# Patient Record
Sex: MALE | Race: BLACK OR AFRICAN AMERICAN | Employment: OTHER | URBAN - NONMETROPOLITAN AREA
[De-identification: names, ages, dates, MRNs, and addresses within clinical notes are randomized per-mention and may not be internally consistent; named-entity substitution may affect disease eponyms.]

---

## 2018-01-16 ENCOUNTER — HOSPITAL ENCOUNTER (OUTPATIENT)
Age: 48
Discharge: HOME | End: 2018-01-16
Payer: MEDICARE

## 2018-01-16 ENCOUNTER — HOSPITAL ENCOUNTER (OUTPATIENT)
Dept: CT IMAGING | Age: 48
Discharge: HOME | End: 2018-01-16
Payer: MEDICARE

## 2018-01-16 DIAGNOSIS — J44.9 CHRONIC OBSTRUCTIVE PULMONARY DISEASE, UNSPECIFIED COPD TYPE (HCC): ICD-10-CM

## 2018-01-16 DIAGNOSIS — R04.2 HEMOPTYSIS: ICD-10-CM

## 2018-01-16 LAB
ALBUMIN SERPL-MCNC: 3.9 G/DL (ref 3.5–5.1)
ALP BLD-CCNC: 73 U/L (ref 38–126)
ALT SERPL-CCNC: 11 U/L (ref 11–66)
ANION GAP SERPL CALCULATED.3IONS-SCNC: 14 MEQ/L (ref 8–16)
ANISOCYTOSIS: ABNORMAL
AST SERPL-CCNC: 13 U/L (ref 5–40)
BASOPHILS # BLD: 0.8 %
BASOPHILS ABSOLUTE: 0 THOU/MM3 (ref 0–0.1)
BILIRUB SERPL-MCNC: 0.2 MG/DL (ref 0.3–1.2)
BUN BLDV-MCNC: 9 MG/DL (ref 7–22)
CALCIUM SERPL-MCNC: 9.2 MG/DL (ref 8.5–10.5)
CHLORIDE BLD-SCNC: 102 MEQ/L (ref 98–111)
CHOLESTEROL, TOTAL: 168 MG/DL (ref 100–199)
CO2: 27 MEQ/L (ref 23–33)
CREAT SERPL-MCNC: 0.7 MG/DL (ref 0.4–1.2)
EOSINOPHIL # BLD: 1.8 %
EOSINOPHILS ABSOLUTE: 0.1 THOU/MM3 (ref 0–0.4)
GFR SERPL CREATININE-BSD FRML MDRD: > 90 ML/MIN/1.73M2
GLUCOSE BLD-MCNC: 88 MG/DL (ref 70–108)
HCT VFR BLD CALC: 48.2 % (ref 42–52)
HDLC SERPL-MCNC: 44 MG/DL
HEMOGLOBIN: 16.2 GM/DL (ref 14–18)
LDL CHOLESTEROL CALCULATED: 111 MG/DL
LYMPHOCYTES # BLD: 34.4 %
LYMPHOCYTES ABSOLUTE: 1.9 THOU/MM3 (ref 1–4.8)
MCH RBC QN AUTO: 31 PG (ref 27–31)
MCHC RBC AUTO-ENTMCNC: 33.6 GM/DL (ref 33–37)
MCV RBC AUTO: 92 FL (ref 80–94)
MONOCYTES # BLD: 8.5 %
MONOCYTES ABSOLUTE: 0.5 THOU/MM3 (ref 0.4–1.3)
NUCLEATED RED BLOOD CELLS: 0 /100 WBC
PDW BLD-RTO: 14.6 % (ref 11.5–14.5)
PLATELET # BLD: 179 THOU/MM3 (ref 130–400)
PMV BLD AUTO: 8.7 MCM (ref 7.4–10.4)
POTASSIUM SERPL-SCNC: 4.8 MEQ/L (ref 3.5–5.2)
PROSTATE SPECIFIC ANTIGEN: 1.63 NG/ML (ref 0–1)
RBC # BLD: 5.24 MILL/MM3 (ref 4.7–6.1)
SEG NEUTROPHILS: 54.5 %
SEGMENTED NEUTROPHILS ABSOLUTE COUNT: 2.9 THOU/MM3 (ref 1.8–7.7)
SODIUM BLD-SCNC: 143 MEQ/L (ref 135–145)
TOTAL PROTEIN: 8.1 G/DL (ref 6.1–8)
TRIGL SERPL-MCNC: 64 MG/DL (ref 0–199)
WBC # BLD: 5.4 THOU/MM3 (ref 4.8–10.8)

## 2018-01-16 PROCEDURE — 85025 COMPLETE CBC W/AUTO DIFF WBC: CPT

## 2018-01-16 PROCEDURE — 6360000004 HC RX CONTRAST MEDICATION: Performed by: INTERNAL MEDICINE

## 2018-01-16 PROCEDURE — 36415 COLL VENOUS BLD VENIPUNCTURE: CPT

## 2018-01-16 PROCEDURE — 80053 COMPREHEN METABOLIC PANEL: CPT

## 2018-01-16 PROCEDURE — G0103 PSA SCREENING: HCPCS

## 2018-01-16 PROCEDURE — 80061 LIPID PANEL: CPT

## 2018-01-16 PROCEDURE — 71260 CT THORAX DX C+: CPT

## 2018-01-16 RX ADMIN — IOPAMIDOL 85 ML: 755 INJECTION, SOLUTION INTRAVENOUS at 14:51

## 2019-03-21 ENCOUNTER — HOSPITAL ENCOUNTER (OUTPATIENT)
Age: 49
Discharge: HOME | End: 2019-03-21
Payer: MEDICARE

## 2019-03-21 LAB
ALBUMIN SERPL-MCNC: 3.9 G/DL (ref 3.5–5.1)
ALP BLD-CCNC: 80 U/L (ref 38–126)
ALT SERPL-CCNC: 15 U/L (ref 11–66)
ANION GAP SERPL CALCULATED.3IONS-SCNC: 9 MEQ/L (ref 8–16)
AST SERPL-CCNC: 17 U/L (ref 5–40)
BACTERIA: ABNORMAL
BASOPHILS # BLD: 0.5 %
BASOPHILS ABSOLUTE: 0 THOU/MM3 (ref 0–0.1)
BILIRUB SERPL-MCNC: 0.5 MG/DL (ref 0.3–1.2)
BILIRUBIN URINE: NEGATIVE
BLOOD, URINE: NEGATIVE
BUN BLDV-MCNC: 4 MG/DL (ref 7–22)
CALCIUM SERPL-MCNC: 8.9 MG/DL (ref 8.5–10.5)
CASTS: ABNORMAL /LPF
CASTS: ABNORMAL /LPF
CHARACTER, URINE: CLEAR
CHLORIDE BLD-SCNC: 101 MEQ/L (ref 98–111)
CHOLESTEROL, TOTAL: 132 MG/DL (ref 100–199)
CO2: 30 MEQ/L (ref 23–33)
COLOR: YELLOW
CREAT SERPL-MCNC: 0.7 MG/DL (ref 0.4–1.2)
CRYSTALS: ABNORMAL
EOSINOPHIL # BLD: 0.8 %
EOSINOPHILS ABSOLUTE: 0.1 THOU/MM3 (ref 0–0.4)
EPITHELIAL CELLS, UA: ABNORMAL /HPF
ERYTHROCYTE [DISTWIDTH] IN BLOOD BY AUTOMATED COUNT: 16.5 % (ref 11.5–14.5)
ERYTHROCYTE [DISTWIDTH] IN BLOOD BY AUTOMATED COUNT: 55.2 FL (ref 35–45)
GFR SERPL CREATININE-BSD FRML MDRD: > 90 ML/MIN/1.73M2
GLUCOSE BLD-MCNC: 78 MG/DL (ref 70–108)
GLUCOSE, URINE: NEGATIVE MG/DL
HCT VFR BLD CALC: 55.7 % (ref 42–52)
HDLC SERPL-MCNC: 41 MG/DL
HEMOGLOBIN: 18 GM/DL (ref 14–18)
IMMATURE GRANS (ABS): 0.02 THOU/MM3 (ref 0–0.07)
IMMATURE GRANULOCYTES: 0.3 %
KETONES, URINE: NEGATIVE
LDL CHOLESTEROL CALCULATED: 79 MG/DL
LEUKOCYTE EST, POC: ABNORMAL
LYMPHOCYTES # BLD: 29.1 %
LYMPHOCYTES ABSOLUTE: 1.9 THOU/MM3 (ref 1–4.8)
MCH RBC QN AUTO: 30.7 PG (ref 26–33)
MCHC RBC AUTO-ENTMCNC: 32.3 GM/DL (ref 32.2–35.5)
MCV RBC AUTO: 95.1 FL (ref 80–94)
MISCELLANEOUS LAB TEST RESULT: ABNORMAL
MONOCYTES # BLD: 7.9 %
MONOCYTES ABSOLUTE: 0.5 THOU/MM3 (ref 0.4–1.3)
NITRITE, URINE: NEGATIVE
NUCLEATED RED BLOOD CELLS: 0 /100 WBC
PH UA: 7.5 (ref 5–9)
PLATELET # BLD: 181 THOU/MM3 (ref 130–400)
PMV BLD AUTO: 10.4 FL (ref 9.4–12.4)
POTASSIUM SERPL-SCNC: 4.3 MEQ/L (ref 3.5–5.2)
PROSTATE SPECIFIC ANTIGEN: 3.13 NG/ML (ref 0–1)
PROTEIN UA: NEGATIVE MG/DL
RBC # BLD: 5.86 MILL/MM3 (ref 4.7–6.1)
RBC URINE: ABNORMAL /HPF
RENAL EPITHELIAL, UA: ABNORMAL
SEG NEUTROPHILS: 61.4 %
SEGMENTED NEUTROPHILS ABSOLUTE COUNT: 3.9 THOU/MM3 (ref 1.8–7.7)
SODIUM BLD-SCNC: 140 MEQ/L (ref 135–145)
SPECIFIC GRAVITY UA: 1.01 (ref 1–1.03)
TOTAL PROTEIN: 8.3 G/DL (ref 6.1–8)
TRIGL SERPL-MCNC: 58 MG/DL (ref 0–199)
TSH SERPL DL<=0.05 MIU/L-ACNC: 0.91 UIU/ML (ref 0.4–4.2)
UROBILINOGEN, URINE: 0.2 EU/DL (ref 0–1)
WBC # BLD: 6.4 THOU/MM3 (ref 4.8–10.8)
WBC UA: ABNORMAL /HPF
YEAST: ABNORMAL

## 2019-03-21 PROCEDURE — 80053 COMPREHEN METABOLIC PANEL: CPT

## 2019-03-21 PROCEDURE — 85025 COMPLETE CBC W/AUTO DIFF WBC: CPT

## 2019-03-21 PROCEDURE — 84443 ASSAY THYROID STIM HORMONE: CPT

## 2019-03-21 PROCEDURE — 81001 URINALYSIS AUTO W/SCOPE: CPT

## 2019-03-21 PROCEDURE — 36415 COLL VENOUS BLD VENIPUNCTURE: CPT

## 2019-03-21 PROCEDURE — 80061 LIPID PANEL: CPT

## 2019-03-21 PROCEDURE — G0103 PSA SCREENING: HCPCS

## 2019-03-30 ENCOUNTER — APPOINTMENT (OUTPATIENT)
Dept: GENERAL RADIOLOGY | Age: 49
DRG: 189 | End: 2019-03-30
Payer: MEDICARE

## 2019-03-30 ENCOUNTER — HOSPITAL ENCOUNTER (INPATIENT)
Age: 49
LOS: 5 days | Discharge: HOME | DRG: 189 | End: 2019-04-04
Attending: FAMILY MEDICINE | Admitting: INTERNAL MEDICINE
Payer: MEDICARE

## 2019-03-30 DIAGNOSIS — J44.1 COPD EXACERBATION (HCC): ICD-10-CM

## 2019-03-30 DIAGNOSIS — J96.22 ACUTE ON CHRONIC RESPIRATORY FAILURE WITH HYPERCAPNIA (HCC): ICD-10-CM

## 2019-03-30 DIAGNOSIS — R06.89 RESPIRATORY INSUFFICIENCY/FAILURE: Primary | ICD-10-CM

## 2019-03-30 LAB
ALBUMIN SERPL-MCNC: 3.7 G/DL (ref 3.5–5.1)
ALLEN TEST: POSITIVE
ALP BLD-CCNC: 107 U/L (ref 38–126)
ALT SERPL-CCNC: 17 U/L (ref 11–66)
ANION GAP SERPL CALCULATED.3IONS-SCNC: 15 MEQ/L (ref 8–16)
AST SERPL-CCNC: 20 U/L (ref 5–40)
BASE EXCESS (CALCULATED): -0.5 MMOL/L (ref -2.5–2.5)
BASE EXCESS (CALCULATED): -1.6 MMOL/L (ref -2.5–2.5)
BASE EXCESS (CALCULATED): 0.6 MMOL/L (ref -2.5–2.5)
BASOPHILS # BLD: 0.8 %
BASOPHILS ABSOLUTE: 0.1 THOU/MM3 (ref 0–0.1)
BILIRUB SERPL-MCNC: 0.5 MG/DL (ref 0.3–1.2)
BILIRUBIN DIRECT: < 0.2 MG/DL (ref 0–0.3)
BUN BLDV-MCNC: 16 MG/DL (ref 7–22)
CALCIUM SERPL-MCNC: 8.8 MG/DL (ref 8.5–10.5)
CHLORIDE BLD-SCNC: 99 MEQ/L (ref 98–111)
CO2: 25 MEQ/L (ref 23–33)
COLLECTED BY:: ABNORMAL
CREAT SERPL-MCNC: 0.6 MG/DL (ref 0.4–1.2)
DEVICE: ABNORMAL
EKG ATRIAL RATE: 98 BPM
EKG P AXIS: 77 DEGREES
EKG P-R INTERVAL: 148 MS
EKG Q-T INTERVAL: 348 MS
EKG QRS DURATION: 90 MS
EKG QTC CALCULATION (BAZETT): 444 MS
EKG R AXIS: 105 DEGREES
EKG T AXIS: 29 DEGREES
EKG VENTRICULAR RATE: 98 BPM
EOSINOPHIL # BLD: 0 %
EOSINOPHILS ABSOLUTE: 0 THOU/MM3 (ref 0–0.4)
ERYTHROCYTE [DISTWIDTH] IN BLOOD BY AUTOMATED COUNT: 16 % (ref 11.5–14.5)
ERYTHROCYTE [DISTWIDTH] IN BLOOD BY AUTOMATED COUNT: 55.8 FL (ref 35–45)
FLU A ANTIGEN: NEGATIVE
FLU B ANTIGEN: NEGATIVE
GFR SERPL CREATININE-BSD FRML MDRD: > 90 ML/MIN/1.73M2
GLUCOSE BLD-MCNC: 161 MG/DL (ref 70–108)
HCO3: 29 MMOL/L (ref 23–28)
HCO3: 29 MMOL/L (ref 23–28)
HCO3: 31 MMOL/L (ref 23–28)
HCT VFR BLD CALC: 54.5 % (ref 42–52)
HEMOGLOBIN: 17.3 GM/DL (ref 14–18)
IFIO2: 100
IFIO2: 70
IFIO2: 70
IMMATURE GRANS (ABS): 0.04 THOU/MM3 (ref 0–0.07)
IMMATURE GRANULOCYTES: 0.6 %
LACTIC ACID: 1.4 MMOL/L (ref 0.5–2.2)
LACTIC ACID: 3 MMOL/L (ref 0.5–2.2)
LYMPHOCYTES # BLD: 20.2 %
LYMPHOCYTES ABSOLUTE: 1.3 THOU/MM3 (ref 1–4.8)
MAGNESIUM: 2 MG/DL (ref 1.6–2.4)
MCH RBC QN AUTO: 30.6 PG (ref 26–33)
MCHC RBC AUTO-ENTMCNC: 31.7 GM/DL (ref 32.2–35.5)
MCV RBC AUTO: 96.5 FL (ref 80–94)
MONOCYTES # BLD: 19.4 %
MONOCYTES ABSOLUTE: 1.3 THOU/MM3 (ref 0.4–1.3)
NUCLEATED RED BLOOD CELLS: 0 /100 WBC
O2 SATURATION: 93 %
O2 SATURATION: 97 %
O2 SATURATION: 98 %
OSMOLALITY CALCULATION: 282.2 MOSMOL/KG (ref 275–300)
PCO2: 63 MMHG (ref 35–45)
PCO2: 68 MMHG (ref 35–45)
PCO2: 70 MMHG (ref 35–45)
PH BLOOD GAS: 7.23 (ref 7.35–7.45)
PH BLOOD GAS: 7.25 (ref 7.35–7.45)
PH BLOOD GAS: 7.27 (ref 7.35–7.45)
PIP: 26 CMH2O
PLATELET # BLD: 204 THOU/MM3 (ref 130–400)
PMV BLD AUTO: 10 FL (ref 9.4–12.4)
PO2: 111 MMHG (ref 71–104)
PO2: 114 MMHG (ref 71–104)
PO2: 82 MMHG (ref 71–104)
POTASSIUM SERPL-SCNC: 4.4 MEQ/L (ref 3.5–5.2)
PRO-BNP: 2989 PG/ML (ref 0–900)
RBC # BLD: 5.65 MILL/MM3 (ref 4.7–6.1)
SEG NEUTROPHILS: 59 %
SEGMENTED NEUTROPHILS ABSOLUTE COUNT: 3.9 THOU/MM3 (ref 1.8–7.7)
SET PEEP: 6 MMHG
SET PRESS SUPP: 20 CMH2O
SODIUM BLD-SCNC: 139 MEQ/L (ref 135–145)
SOURCE, BLOOD GAS: ABNORMAL
TOTAL PROTEIN: 8.4 G/DL (ref 6.1–8)
TROPONIN T: < 0.01 NG/ML
WBC # BLD: 6.6 THOU/MM3 (ref 4.8–10.8)

## 2019-03-30 PROCEDURE — 96375 TX/PRO/DX INJ NEW DRUG ADDON: CPT

## 2019-03-30 PROCEDURE — 94660 CPAP INITIATION&MGMT: CPT

## 2019-03-30 PROCEDURE — 71045 X-RAY EXAM CHEST 1 VIEW: CPT

## 2019-03-30 PROCEDURE — 80048 BASIC METABOLIC PNL TOTAL CA: CPT

## 2019-03-30 PROCEDURE — 99285 EMERGENCY DEPT VISIT HI MDM: CPT

## 2019-03-30 PROCEDURE — 6370000000 HC RX 637 (ALT 250 FOR IP): Performed by: INTERNAL MEDICINE

## 2019-03-30 PROCEDURE — 2709999900 HC NON-CHARGEABLE SUPPLY

## 2019-03-30 PROCEDURE — 93005 ELECTROCARDIOGRAM TRACING: CPT | Performed by: NURSE PRACTITIONER

## 2019-03-30 PROCEDURE — 84484 ASSAY OF TROPONIN QUANT: CPT

## 2019-03-30 PROCEDURE — 83605 ASSAY OF LACTIC ACID: CPT

## 2019-03-30 PROCEDURE — 87804 INFLUENZA ASSAY W/OPTIC: CPT

## 2019-03-30 PROCEDURE — 94761 N-INVAS EAR/PLS OXIMETRY MLT: CPT

## 2019-03-30 PROCEDURE — 2580000003 HC RX 258: Performed by: INTERNAL MEDICINE

## 2019-03-30 PROCEDURE — 6360000002 HC RX W HCPCS: Performed by: NURSE PRACTITIONER

## 2019-03-30 PROCEDURE — 93010 ELECTROCARDIOGRAM REPORT: CPT | Performed by: INTERNAL MEDICINE

## 2019-03-30 PROCEDURE — 83735 ASSAY OF MAGNESIUM: CPT

## 2019-03-30 PROCEDURE — 6360000002 HC RX W HCPCS: Performed by: INTERNAL MEDICINE

## 2019-03-30 PROCEDURE — 82803 BLOOD GASES ANY COMBINATION: CPT

## 2019-03-30 PROCEDURE — 36600 WITHDRAWAL OF ARTERIAL BLOOD: CPT

## 2019-03-30 PROCEDURE — 96374 THER/PROPH/DIAG INJ IV PUSH: CPT

## 2019-03-30 PROCEDURE — 94640 AIRWAY INHALATION TREATMENT: CPT

## 2019-03-30 PROCEDURE — 99222 1ST HOSP IP/OBS MODERATE 55: CPT | Performed by: INTERNAL MEDICINE

## 2019-03-30 PROCEDURE — 80076 HEPATIC FUNCTION PANEL: CPT

## 2019-03-30 PROCEDURE — 2700000000 HC OXYGEN THERAPY PER DAY

## 2019-03-30 PROCEDURE — 36415 COLL VENOUS BLD VENIPUNCTURE: CPT

## 2019-03-30 PROCEDURE — 2500000003 HC RX 250 WO HCPCS: Performed by: NURSE PRACTITIONER

## 2019-03-30 PROCEDURE — 2060000000 HC ICU INTERMEDIATE R&B

## 2019-03-30 PROCEDURE — 85025 COMPLETE CBC W/AUTO DIFF WBC: CPT

## 2019-03-30 PROCEDURE — 83880 ASSAY OF NATRIURETIC PEPTIDE: CPT

## 2019-03-30 PROCEDURE — 6370000000 HC RX 637 (ALT 250 FOR IP): Performed by: NURSE PRACTITIONER

## 2019-03-30 RX ORDER — ALBUTEROL SULFATE 2.5 MG/3ML
2.5 SOLUTION RESPIRATORY (INHALATION)
Status: DISCONTINUED | OUTPATIENT
Start: 2019-03-30 | End: 2019-04-04 | Stop reason: HOSPADM

## 2019-03-30 RX ORDER — METHYLPREDNISOLONE SODIUM SUCCINATE 125 MG/2ML
125 INJECTION, POWDER, LYOPHILIZED, FOR SOLUTION INTRAMUSCULAR; INTRAVENOUS ONCE
Status: COMPLETED | OUTPATIENT
Start: 2019-03-30 | End: 2019-03-30

## 2019-03-30 RX ORDER — NICOTINE 21 MG/24HR
1 PATCH, TRANSDERMAL 24 HOURS TRANSDERMAL DAILY
Status: DISCONTINUED | OUTPATIENT
Start: 2019-03-30 | End: 2019-04-04 | Stop reason: HOSPADM

## 2019-03-30 RX ORDER — SODIUM CHLORIDE 0.9 % (FLUSH) 0.9 %
10 SYRINGE (ML) INJECTION EVERY 12 HOURS SCHEDULED
Status: DISCONTINUED | OUTPATIENT
Start: 2019-03-30 | End: 2019-04-04 | Stop reason: HOSPADM

## 2019-03-30 RX ORDER — IPRATROPIUM BROMIDE AND ALBUTEROL SULFATE 2.5; .5 MG/3ML; MG/3ML
2 SOLUTION RESPIRATORY (INHALATION) ONCE
Status: COMPLETED | OUTPATIENT
Start: 2019-03-30 | End: 2019-03-30

## 2019-03-30 RX ORDER — SODIUM CHLORIDE 0.9 % (FLUSH) 0.9 %
10 SYRINGE (ML) INJECTION PRN
Status: DISCONTINUED | OUTPATIENT
Start: 2019-03-30 | End: 2019-04-04 | Stop reason: HOSPADM

## 2019-03-30 RX ORDER — TRAZODONE HYDROCHLORIDE 100 MG/1
100 TABLET ORAL NIGHTLY
Status: DISCONTINUED | OUTPATIENT
Start: 2019-03-30 | End: 2019-04-04 | Stop reason: HOSPADM

## 2019-03-30 RX ORDER — AMLODIPINE BESYLATE 10 MG/1
10 TABLET ORAL DAILY
Status: DISCONTINUED | OUTPATIENT
Start: 2019-03-30 | End: 2019-04-04 | Stop reason: HOSPADM

## 2019-03-30 RX ORDER — ONDANSETRON 2 MG/ML
4 INJECTION INTRAMUSCULAR; INTRAVENOUS EVERY 6 HOURS PRN
Status: DISCONTINUED | OUTPATIENT
Start: 2019-03-30 | End: 2019-04-04 | Stop reason: HOSPADM

## 2019-03-30 RX ORDER — ACETAMINOPHEN 325 MG/1
650 TABLET ORAL EVERY 4 HOURS PRN
Status: DISCONTINUED | OUTPATIENT
Start: 2019-03-30 | End: 2019-04-04 | Stop reason: HOSPADM

## 2019-03-30 RX ORDER — IPRATROPIUM BROMIDE AND ALBUTEROL SULFATE 2.5; .5 MG/3ML; MG/3ML
1 SOLUTION RESPIRATORY (INHALATION)
Status: DISCONTINUED | OUTPATIENT
Start: 2019-03-30 | End: 2019-04-04 | Stop reason: HOSPADM

## 2019-03-30 RX ORDER — RISPERIDONE 2 MG/1
2 TABLET, FILM COATED ORAL 2 TIMES DAILY
Status: DISCONTINUED | OUTPATIENT
Start: 2019-03-30 | End: 2019-04-04 | Stop reason: HOSPADM

## 2019-03-30 RX ORDER — BUMETANIDE 0.25 MG/ML
1 INJECTION, SOLUTION INTRAMUSCULAR; INTRAVENOUS ONCE
Status: COMPLETED | OUTPATIENT
Start: 2019-03-30 | End: 2019-03-30

## 2019-03-30 RX ORDER — CARBAMAZEPINE 200 MG/1
200 TABLET ORAL 2 TIMES DAILY
Status: DISCONTINUED | OUTPATIENT
Start: 2019-03-30 | End: 2019-04-04 | Stop reason: HOSPADM

## 2019-03-30 RX ORDER — MAGNESIUM SULFATE IN WATER 40 MG/ML
2 INJECTION, SOLUTION INTRAVENOUS ONCE
Status: DISCONTINUED | OUTPATIENT
Start: 2019-03-30 | End: 2019-03-30

## 2019-03-30 RX ORDER — METOPROLOL SUCCINATE 25 MG/1
25 TABLET, EXTENDED RELEASE ORAL DAILY
Status: DISCONTINUED | OUTPATIENT
Start: 2019-03-30 | End: 2019-04-02

## 2019-03-30 RX ORDER — SODIUM CHLORIDE 9 MG/ML
INJECTION, SOLUTION INTRAVENOUS CONTINUOUS
Status: DISCONTINUED | OUTPATIENT
Start: 2019-03-30 | End: 2019-04-01

## 2019-03-30 RX ORDER — NITROGLYCERIN 20 MG/100ML
10 INJECTION INTRAVENOUS CONTINUOUS
Status: DISCONTINUED | OUTPATIENT
Start: 2019-03-30 | End: 2019-03-30

## 2019-03-30 RX ORDER — METHYLPREDNISOLONE SODIUM SUCCINATE 40 MG/ML
40 INJECTION, POWDER, LYOPHILIZED, FOR SOLUTION INTRAMUSCULAR; INTRAVENOUS EVERY 6 HOURS
Status: COMPLETED | OUTPATIENT
Start: 2019-03-30 | End: 2019-04-01

## 2019-03-30 RX ORDER — PREDNISONE 20 MG/1
40 TABLET ORAL DAILY
Status: COMPLETED | OUTPATIENT
Start: 2019-04-02 | End: 2019-04-03

## 2019-03-30 RX ORDER — IPRATROPIUM BROMIDE AND ALBUTEROL SULFATE 2.5; .5 MG/3ML; MG/3ML
1 SOLUTION RESPIRATORY (INHALATION) ONCE
Status: DISCONTINUED | OUTPATIENT
Start: 2019-03-30 | End: 2019-03-30

## 2019-03-30 RX ORDER — DOXYCYCLINE HYCLATE 100 MG
100 TABLET ORAL EVERY 12 HOURS
Status: DISCONTINUED | OUTPATIENT
Start: 2019-03-30 | End: 2019-04-04 | Stop reason: HOSPADM

## 2019-03-30 RX ADMIN — IPRATROPIUM BROMIDE AND ALBUTEROL SULFATE 1 AMPULE: .5; 3 SOLUTION RESPIRATORY (INHALATION) at 20:26

## 2019-03-30 RX ADMIN — METHYLPREDNISOLONE SODIUM SUCCINATE 125 MG: 125 INJECTION, POWDER, FOR SOLUTION INTRAMUSCULAR; INTRAVENOUS at 06:44

## 2019-03-30 RX ADMIN — TRAZODONE HYDROCHLORIDE 100 MG: 100 TABLET ORAL at 21:52

## 2019-03-30 RX ADMIN — AMLODIPINE BESYLATE 10 MG: 10 TABLET ORAL at 13:01

## 2019-03-30 RX ADMIN — DOXYCYCLINE HYCLATE 100 MG: 100 TABLET, COATED ORAL at 23:03

## 2019-03-30 RX ADMIN — IPRATROPIUM BROMIDE AND ALBUTEROL SULFATE 1 AMPULE: .5; 3 SOLUTION RESPIRATORY (INHALATION) at 12:52

## 2019-03-30 RX ADMIN — IPRATROPIUM BROMIDE AND ALBUTEROL SULFATE 2 AMPULE: .5; 3 SOLUTION RESPIRATORY (INHALATION) at 06:35

## 2019-03-30 RX ADMIN — RISPERIDONE 2 MG: 2 TABLET ORAL at 13:01

## 2019-03-30 RX ADMIN — CARBAMAZEPINE 200 MG: 200 TABLET ORAL at 13:01

## 2019-03-30 RX ADMIN — IPRATROPIUM BROMIDE AND ALBUTEROL SULFATE 1 AMPULE: .5; 3 SOLUTION RESPIRATORY (INHALATION) at 16:30

## 2019-03-30 RX ADMIN — METHYLPREDNISOLONE SODIUM SUCCINATE 40 MG: 40 INJECTION, POWDER, FOR SOLUTION INTRAMUSCULAR; INTRAVENOUS at 12:53

## 2019-03-30 RX ADMIN — BUMETANIDE 1 MG: 0.25 INJECTION INTRAMUSCULAR; INTRAVENOUS at 07:38

## 2019-03-30 RX ADMIN — DOXYCYCLINE HYCLATE 100 MG: 100 TABLET, COATED ORAL at 13:01

## 2019-03-30 RX ADMIN — METHYLPREDNISOLONE SODIUM SUCCINATE 40 MG: 40 INJECTION, POWDER, FOR SOLUTION INTRAMUSCULAR; INTRAVENOUS at 23:03

## 2019-03-30 RX ADMIN — Medication 10 ML: at 21:52

## 2019-03-30 RX ADMIN — Medication 10 ML: at 12:54

## 2019-03-30 RX ADMIN — METOPROLOL SUCCINATE 25 MG: 25 TABLET, FILM COATED, EXTENDED RELEASE ORAL at 13:01

## 2019-03-30 RX ADMIN — SODIUM CHLORIDE: 9 INJECTION, SOLUTION INTRAVENOUS at 12:54

## 2019-03-30 RX ADMIN — CARBAMAZEPINE 200 MG: 200 TABLET ORAL at 21:52

## 2019-03-30 RX ADMIN — ASPIRIN 325 MG: 325 TABLET, DELAYED RELEASE ORAL at 13:01

## 2019-03-30 RX ADMIN — RISPERIDONE 2 MG: 2 TABLET ORAL at 21:52

## 2019-03-30 RX ADMIN — METHYLPREDNISOLONE SODIUM SUCCINATE 40 MG: 40 INJECTION, POWDER, FOR SOLUTION INTRAMUSCULAR; INTRAVENOUS at 16:44

## 2019-03-30 RX ADMIN — ENOXAPARIN SODIUM 40 MG: 40 INJECTION SUBCUTANEOUS at 13:53

## 2019-03-30 ASSESSMENT — ENCOUNTER SYMPTOMS
COUGH: 1
NAUSEA: 0
COLOR CHANGE: 0
DIARRHEA: 0
BACK PAIN: 0
CHEST TIGHTNESS: 1
RHINORRHEA: 1
SINUS PRESSURE: 0
WHEEZING: 0
CONSTIPATION: 0
PHOTOPHOBIA: 0
VOMITING: 0
ABDOMINAL DISTENTION: 0
VOICE CHANGE: 0
SHORTNESS OF BREATH: 1
ABDOMINAL PAIN: 0
BLOOD IN STOOL: 0
SORE THROAT: 1
EYE REDNESS: 0

## 2019-03-30 ASSESSMENT — PAIN SCALES - GENERAL
PAINLEVEL_OUTOF10: 0
PAINLEVEL_OUTOF10: 7

## 2019-03-30 ASSESSMENT — PAIN DESCRIPTION - PAIN TYPE: TYPE: ACUTE PAIN

## 2019-03-30 ASSESSMENT — PAIN DESCRIPTION - LOCATION: LOCATION: CHEST

## 2019-03-30 NOTE — CONSULTS
[place] [facility]    Patient - Amy Lyons   MRN -  [id number]   [facility] # - [de-identified]   - 1970      Date of Admission -  3/30/2019  6:05 AM  Date of evaluation -  3/30/2019  Room - -14/014-ANNAMARIA Navarro MD Primary Care Physician - Trevor Kee MD   Chief Complaint     Admitted 19 for worsening SOB    Active Hospital Problem List      Active Hospital Problems    Diagnosis Date Noted    Acute on chronic respiratory failure with hypercapnia [facility]) [J96.22] 2019     HPI     The patient is a 46 y.o. male with past medical history of COPD on home oxygen, and tobacco abuse, presented with worsening SOB x 1 week associated with a recent URTI with cough and wheezes treated with OTC meds. He has increased sputum production, white in Colour, no hemoptysis, No fever, no chills, no N/V. He is on home oxygen and a chronic smoker. Blood gas on admission as below        Ref. Range 3/30/2019 07:41 3/30/2019 07:45 3/30/2019 08:49   Source: Unknown L Radial  L Radial   pH, Blood Gas Latest Ref Range: 7.35 - 7.45  7.25 (L)  7.27 (L)   PCO2 Latest Ref Range: 35 - 45 mmhg 70 (HH)  63 (H)   pO2 Latest Ref Range: 71 - 104 mmhg 111 (H)  114 (H)   HCO3 Latest Ref Range: 23 - 28 mmol/l 31 (H)  29 (H)   Base Excess (Calculated) Latest Ref Range: -2.5 - 2.5 mmol/l 0.6  -0.5   O2 Sat Latest Units: % 97  98     Started on BIPAP     CXR showed no infiltrates     PFT 2017 showed FEV1: 2.01   24% bronchodilator response  DLCO 35%    Past Medical History         Diagnosis Date    Emphysema (subcutaneous) (surgical) resulting from a procedure     Hypertension     Pneumonia     Schizophrenia ([facility] [address]       Past Surgical History     History reviewed. No pertinent surgical history. Diet    DIET LOW SODIUM 2 GM; Allergies    Patient has no known allergies.   Social History     Social History     Socioeconomic History    Marital status: Single Spouse name: Not on file    Number of children: Not on file    Years of education: Not on file    Highest education level: Not on file   Occupational History    Not on file   Social Needs    Financial resource strain: Not on file    Food insecurity:     Worry: Not on file     Inability: Not on file    Transportation needs:     Medical: Not on file     Non-medical: Not on file   Tobacco Use    Smoking status: Current Every Day Smoker    Smokeless tobacco: Never Used   Substance and Sexual Activity    Alcohol use: No    Drug use: No    Sexual activity: Not Currently   Lifestyle    Physical activity:     Days per week: Not on file     Minutes per session: Not on file    Stress: Not on file   Relationships    Social connections:     Talks on phone: Not on file     Gets together: Not on file     Attends [employer] service: Not on file     Active member of club or organization: Not on file     Attends meetings of clubs or organizations: Not on file     Relationship status: Not on file    Intimate partner violence:     Fear of current or ex partner: Not on file     Emotionally abused: Not on file     Physically abused: Not on file     Forced sexual activity: Not on file   Other Topics Concern    Not on file   Social History Narrative    Not on file     Family History    History reviewed. No pertinent family history. Sleep History    No CPAP use   ROS    General/Constitutional: No recent loss of weight or appetite changes. No fever or chills. HENT: URTI last week  Eyes: Negative. Upper respiratory tract: No nasal stuffiness or post nasal drip. Lower respiratory tract/ lungs: Worsening sob and cough with sputum production   Cardiovascular: No palpitations, chest pain or edema. Gastrointestinal: No nausea or vomiting. Neurological: No focal neurological weakness. Extremities: No tenderness. Musculoskeletal: no complaints  Genitourinary: No complaints. Hematological: Negative.  Denies easy buising  Skin: No itching. Meds    Current Medications    amLODIPine  10 mg Oral Daily    aspirin  325 mg Oral Daily    carBAMazepine  200 mg Oral BID    metoprolol succinate  25 mg Oral Daily    nicotine  1 patch Transdermal Daily    risperiDONE  2 mg Oral BID    traZODone  100 mg Oral Nightly    sodium chloride flush  10 mL Intravenous 2 times per day    enoxaparin  40 mg Subcutaneous Q24H    ipratropium-albuterol  1 ampule Inhalation Q4H WA    methylPREDNISolone  40 mg Intravenous Q6H    Followed by   Nadia Post ON 4/2/2019] predniSONE  40 mg Oral Daily    doxycycline hyclate  100 mg Oral Q12H    [START ON 3/31/2019] influenza virus vaccine  0.5 mL Intramuscular Once     sodium chloride flush, magnesium hydroxide, ondansetron, albuterol, acetaminophen  IV Drips/Infusions   sodium chloride 75 mL/hr at 03/30/19 1254     Vitals    Vitals    height is 5' 11\" (1.803 m) and weight is 238 lb (108 kg). His oral temperature is 97.8 °F (36.6 °C). His blood pressure is 129/84 and his pulse is 85. His respiration is 26 and oxygen saturation is 99%. O2 Flow Rate (L/min): 40 L/min  I/O  No intake or output data in the 24 hours ending 03/30/19 1401  Patient Vitals for the past 96 hrs (Last 3 readings):   Weight   03/30/19 0626 238 lb (108 kg)     Exam   Constitutional: Patient appears moderately built and moderately nourished. Head: Normocephalic and atraumatic. Mouth/Throat: Oropharynx is clear and moist.  No oral thrush. Eyes: Conjunctivae are normal. Pupils are equal, round, and reactive to light. No scleral icterus. Neck: Neck supple. No JVD or tracheal deviation present. Cardiovascular: Regular rate, regular rhythm, S1 and S2 with no murmur. No peripheral edema  Pulmonary/Chest: Severely diminished breath sounds bilateral, silent chest   Abdominal: Soft. Bowel sounds audible. No distension or tenderness to palp  Musculoskeletal: Moves all extremities  Lymphadenopathy:  No cervical adenopathy. Neurological: Patient is alert and oriented to person, place, and time. Skin: Skin is warm and dry. Labs   ABG  Lab Results   Component Value Date    PH 7.27 03/30/2019    PO2 114 03/30/2019    PCO2 63 03/30/2019    HCO3 29 03/30/2019    O2SAT 98 03/30/2019     Lab Results   Component Value Date    IFIO2 70 03/30/2019    SETPEEP 6.0 03/30/2019     CBC  Recent Labs     03/30/19  0622   WBC 6.6   RBC 5.65   HGB 17.3   HCT 54.5*   MCV 96.5*   MCH 30.6   MCHC 31.7*      MPV 10.0      BMP  Recent Labs     03/30/19  0622      K 4.4   CL 99   CO2 25   BUN 16   CREATININE 0.6   GLUCOSE 161*   MG 2.0   CALCIUM 8.8     LFT  Recent Labs     03/30/19  0622   AST 20   ALT 17   BILITOT 0.5   ALKPHOS 107     TROP  Lab Results   Component Value Date    TROPONINT < 0.010 03/30/2019    TROPONINT < 0.010 03/02/2017     BNP  Lab Results   Component Value Date    PROBNP 2989.0 03/30/2019    PROBNP 113.8 03/02/2017     D-Dimer  Lab Results   Component Value Date    DDIMER 261.00 03/02/2017     Lactic Acid  Recent Labs     03/30/19  0633 03/30/19  1215   LACTA 3.0* 1.4     INR  No results for input(s): INR, PROTIME in the last 72 hours. PTT  No results for input(s): APTT in the last 72 hours. Glucose  No results for input(s): POCGLU in the last 72 hours. UA No results for input(s): SPECGRAV, PHUR, COLORU, CLARITYU, MUCUS, PROTEINU, BLOODU, RBCUA, WBCUA, BACTERIA, NITRU, GLUCOSEU, BILIRUBINUR, UROBILINOGEN, KETUA, LABCAST, LABCASTTY, AMORPHOS in the last 72 hours. Invalid input(s): CRYSTALS.   PFTs     Echo    No records   Cultures    Procalcitonin  Lab Results   Component Value Date    PROCAL 0.06 04/17/2017    PROCAL 0.07 03/02/2017        Radiology    CXR      CT Scans 01/2018      (See actual reports for details)    Assessment     Acute on chronic respiratory failure  COPD Exacerbation  Tobacco abuse   Recommendations     Continue BIPAP on and off as needed  Repeat blood gas  O2 to keep saturation 92%  Bronchodilators  Steroids  2D ECHO   DVT and GI prophylaxis     Thank you for the consult and allowing us to participate in the care of your patient. Case discussed with nurse and patient/family. Questions and concerns addressed. Meds and Orders reviewed.     Electronically signed by     Rachael Jordan MD on 3/30/2019 at 2:01 PM

## 2019-03-30 NOTE — H&P
Internal Medicine  History and Physical    Patient: Doug Morales  MRN: [id number]      History Obtained From:  patient  PCP: Khadra Lopez MD    CHIEF COMPLAINT:  SOB    HISTORY OF PRESENT ILLNESS:   The patient is a 46 y.o. male who presents with worsening SOB x 1 week. He has cough, wheezes. No fever, no chills, no N/V. He is on home oxygen and a chronic smoker! .  Seen in ED, treated and admitted for COPD exacerbation and hypercapnic resp failure. Now on BiPAP,bronchodilators, steroid. Past Medical History:        Diagnosis Date    Emphysema (subcutaneous) (surgical) resulting from a procedure     Pneumonia     Schizophrenia ([facility] [address]        Past Surgical History:    No past surgical history on file. Medications Prior to Admission:    Prior to Admission medications    Medication Sig Start Date End Date Taking? Authorizing Provider   acetaminophen (TYLENOL) 325 MG tablet Take 2 tablets by mouth every 4 hours as needed for Pain 3/6/17   Khadra Lopez MD   aspirin 325 MG EC tablet Take 1 tablet by mouth daily 3/6/17   Khadra Lopez MD   metoprolol succinate (TOPROL XL) 25 MG extended release tablet Take 1 tablet by mouth daily 3/6/17   Khadra Lopez MD   amLODIPine (NORVASC) 10 MG tablet Take 1 tablet by mouth daily 3/6/17   Khadra Lopez MD   nicotine (NICODERM CQ) 21 MG/24HR Place 1 patch onto the skin daily 3/6/17   Khadra Lopez MD   risperiDONE (RISPERDAL) 2 MG tablet Take 2 mg by mouth 2 times daily    Historical Provider, MD   traZODone (DESYREL) 100 MG tablet Take 100 mg by mouth nightly    Historical Provider, MD   carBAMazepine (TEGRETOL) 200 MG tablet Take 200 mg by mouth 2 times daily    Historical Provider, MD       Allergies:  Patient has no known allergies. Social History:   TOBACCO:   reports that he has never smoked. He has never used smokeless tobacco.  ETOH:   reports that he does not drink alcohol. Family History:   No family history on file.     REVIEW OF SYSTEMS:  CONSTITUTIONAL:  positive for  sweats, fatigue and malaise  negative for  fevers and chills  EYES:  negative for  irritation, redness and icterus  HEENT:  negative for  sore mouth, sore throat and hoarseness  RESPIRATORY:  positive for  dry cough, dyspnea and wheezing  negative for  chest pain and pleuritic pain  CARDIOVASCULAR:  positive for  dyspnea, fatigue  negative for  orthopnea, PND, exertional chest pressure/discomfort, edema  GASTROINTESTINAL:  negative for nausea, vomiting, change in bowel habits, abdominal pain, abdominal mass and abdominal distention  GENITOURINARY:  negative for frequency, dysuria, decreased stream and hematuria  MUSCULOSKELETAL:  negative for  myalgias and arthralgias  NEUROLOGICAL:  positive for weakness  negative for headaches, dizziness and seizures  BEHAVIOR/PSYCH:  positive for decreased energy level, poor concentration and fatigue and negative for increased agitation and anxiety    Physical Exam:    Vitals: /81   Pulse 89   Temp 98.5 °F (36.9 °C) (Oral)   Resp 20   Ht 5' 11\" (1.803 m)   Wt 238 lb (108 kg)   SpO2 99%   BMI 33.19 kg/m²   CONSTITUTIONAL:  fatigued, alert, cooperative, mild distress and mildly obese  EYES:  extra-ocular muscles intact  ENT:  normocepalic, without obvious abnormality  NECK:  supple, symmetrical, trachea midline  LUNGS:  tachypneic and diminished breath sounds throughout lungs  CARDIOVASCULAR:  normal S1 and S2 and no edema  ABDOMEN:  normal bowel sounds, soft, non-distended, non-tender and no hepatosplenomegally  MUSCULOSKELETAL:  there is no redness, warmth, or swelling of the joints  NEUROLOGIC:  Mental Status Exam:  Level of Alertness:   awake  Orientation:   person, place, time  Cranial Nerves:  cranial nerves II-XII are grossly intact  Motor Exam:  Motor exam is symmetrical 5 out of 5 all extremities bilaterally  SKIN:  normal skin color, texture, turgor      CBC:   Recent Labs     03/30/19  0622   WBC 6.6   HGB 17.3   PLT 204     BMP:    Recent Labs     03/30/19  0622      K 4.4   CL 99   CO2 25   BUN 16   CREATININE 0.6   GLUCOSE 161*     Hepatic:   Recent Labs     03/30/19  0622   AST 20   ALT 17   BILITOT 0.5   ALKPHOS 107        Ref. Range 3/30/2019 06:35 3/30/2019 07:00 3/30/2019 07:41 3/30/2019 07:45 3/30/2019 08:49   Source: Unknown R Radial  L Radial  L Radial   pH, Blood Gas Latest Ref Range: 7.35 - 7.45  7.23 (L)  7.25 (L)  7.27 (L)   PCO2 Latest Ref Range: 35 - 45 mmhg 68 (H)  70 (HH)  63 (H)   pO2 Latest Ref Range: 71 - 104 mmhg 82  111 (H)  114 (H)   HCO3 Latest Ref Range: 23 - 28 mmol/l 29 (H)  31 (H)  29 (H)   Base Excess (Calculated) Latest Ref Range: -2.5 - 2.5 mmol/l -1.6  0.6  -0.5   O2 Sat Latest Units: % 93  97  98   Nicholas Test Unknown Positive  Positive  Positive   IFIO2 Unknown 100  70  70   PIP Latest Units: cmH2O     26   SET PRESS SUPP Latest Units: cmH2O     20   SET PEEP Latest Units: mmhg     6.0   DEVICE Unknown Vapotherm  BiPAP  BiPAP   COLLECTED BY: Unknown [id number]  [id number]  [id number]     Pro-BNP 2989. 0High      Nasopharyngeal     Flu A Antigen NEGATIVE    Flu B Antigen NEGATIVE       PA/lat CXR:   FINDINGS:  There is interstitial prominence at the lung bases, stable compared to prior exam. No new focal opacity is identified. Pulmonary vasculature and cardiac-based also what are within normal limits. Impression:     Stable radiographic appearance of the chest. No evidence of an acute process. Assessment and Plan   1. Acute on chronic hypercapnic resp failure  2. COPD exacerbation  3. Chronic nicotine dependence  4. Erythrocytosis secondary to #3, he sees Dr Brenda Blankenship as OP for phlebothomy    duoneb aerosols. Solumedrol  Doxycycline  NiPPV. Resume home meds. F/up abg.  pulm consult.     Patient Active Problem List   Diagnosis Code    Acute on chronic respiratory failure with hypercapnia (HCC) J96.22       Katarina Peace MD  Admitting Internist

## 2019-03-30 NOTE — ED PROVIDER NOTES
[facility]  eMERGENCY dEPARTMENT eNCOUnter   Physician Attestation    Pt Name: Jennifer Fatima  MRN: [id number]  [place] 1970  Date of evaluation: 3/30/19        Physician Note:    Evaluation:  I have personally performed a face-to-face evaluation on this patient. I have reviewed the Midlevel's findings and agree. History and exam by me shows the following information. Patient brought in for shortness of breath and low oxygen saturation noted    Blood gas showed CO2 retention and respiratory acidosis    He was started on BiPAP with improvement in oxygenation    White count normal 6600     Hemoglobin 17.3    BNP 2989    Normal renal function    Troponin normal    EKG showed sinus rhythm with rate 98. QRS complexes show normal axis, normal conduction. ST-T waves show no acute change    With BiPAP repeat ABG showed not much change    Will increase BiPAP settings    Patient is alert, try to get him to coordinate with the BiPAP to improve ventilation. He has received Solu-Medrol    His BNP was high and he was given  IV    Admit          1. Respiratory insufficiency/failure    2. COPD exacerbation ([facility] [address]          DISPOSITION/PLAN  PATIENT REFERRED TO:  No follow-up provider specified.   DISCHARGE MEDICATIONS:  New Prescriptions    No medications on file         MD Pradepe Viramontes MD  03/30/19 [id number]

## 2019-03-30 NOTE — ED TRIAGE NOTES
Patient presents to the ED through the lobby with complaints of shortness of breath that started a few hours ago. Upon entering the room the patient's SpO2 was reading 65% with a good pleth. Patient was placed on a nonrebreather on 15L which brought the patient up to 91%. Respiratory was called and patient was placed on highflow nasal cannula. Patient's SpO2 up to 93% now. Patient has a history of COPD and is diminished bilaterally and has shallow and rapid breathing.

## 2019-03-30 NOTE — ED PROVIDER NOTES
[facility]    CHIEF COMPLAINT       Chief Complaint   Patient presents with    Shortness of Breath       Nurses Notes reviewed and I agree except as noted in the HPI. HISTORY OF PRESENT ILLNESS    Deedee uBrris laya 46 y.o. male who presents to the ED for evaluation of shortness of breath with chest tightness that began 4 hours ago. Patient has a history of COPD. He uses O2 at home but is unaware of how many liters. Patient has had rhinorrhea, pharyngitis, and productive cough recently. On evaluation the patient states his lips and tongue feel swollen. Patient denies fever, chills, nausea, emesis, or weakness. HPI was provided by the patient. REVIEW OF SYSTEMS     Review of Systems   Constitutional: Negative for appetite change, chills, diaphoresis, fatigue, fever and unexpected weight change. HENT: Positive for rhinorrhea and sore throat. Negative for congestion, hearing loss, postnasal drip, sinus pressure and voice change. Eyes: Negative for photophobia, redness and visual disturbance. Respiratory: Positive for cough, chest tightness and shortness of breath. Negative for wheezing. Cardiovascular: Negative for chest pain and palpitations. Gastrointestinal: Negative for abdominal distention, abdominal pain, blood in stool, constipation, diarrhea, nausea and vomiting. Endocrine: Negative for cold intolerance, heat intolerance, polydipsia, polyphagia and polyuria. Genitourinary: Negative for decreased urine volume, difficulty urinating, dysuria, flank pain and frequency. Musculoskeletal: Negative for arthralgias, back pain, gait problem, joint swelling, neck pain and neck stiffness. Skin: Negative for color change and rash. Allergic/Immunologic: Negative for immunocompromised state. Neurological: Negative for dizziness, tremors, weakness, light-headedness, numbness and headaches. Hematological: Does not bruise/bleed easily.    Psychiatric/Behavioral: Negative for normal.   Neck: Normal range of motion. Neck supple. Cardiovascular: Regular rhythm, S1 normal, S2 normal, normal heart sounds and intact distal pulses. Tachycardia present. Pulmonary/Chest: Effort normal. Tachypnea noted. No respiratory distress. He has decreased breath sounds. He exhibits no tenderness. Abdominal: Soft. Normal appearance and bowel sounds are normal. He exhibits no distension. There is no tenderness. Musculoskeletal: Normal range of motion. Neurological: He is alert and oriented to person, place, and time. Skin: Skin is warm and dry. No rash noted. No erythema. No pallor. Psychiatric: His behavior is normal. Judgment and thought content normal.   Nursing note and vitals reviewed. DIFFERENTIAL DIAGNOSIS:   COPD exacerbation, CHF exacerbation, pneumonia    DIAGNOSTIC RESULTS     EKG: All EKG's are interpreted by the Emergency Department Physician who eithersigns or Co-signs this chart in the absence of a cardiologist.    EKG read and interpreted by myself gives impression of sinus rhythm with fusion complexes with heart rate of 98; interval 148; QRS 90;QTc 444; axis 77 105 29. No STEMI. RADIOLOGY: non-plainfilm images(s) such as CT, Ultrasound and MRI are read by the radiologist.  Plain radiographic imagesare visualized and preliminarily interpreted by the emergency physician unless otherwise stated below. XR CHEST PORTABLE   Final Result   Stable radiographic appearance of the chest. No evidence of an acute process. **This report has been created using voice recognition software. It may contain minor errors which are inherent in voice recognition technology. **      Final report electronically signed by Dr. Desirae Trevino MD on 3/30/2019 7:07 AM            LABS:   Labs Reviewed   CBC WITH AUTO DIFFERENTIAL - Abnormal; Notable for the following components:       Result Value    Hematocrit 54.5 (*)     MCV 96.5 (*)     MCHC 31.7 (*)     RDW-CV 16.0 (*) RDW-SD 55.8 (*)     All other components within normal limits   BRAIN NATRIURETIC PEPTIDE - Abnormal; Notable for the following components:    Pro-BNP 2989.0 (*)     All other components within normal limits   BASIC METABOLIC PANEL - Abnormal; Notable for the following components:    Glucose 161 (*)     All other components within normal limits   HEPATIC FUNCTION PANEL - Abnormal; Notable for the following components: Total Protein 8.4 (*)     All other components within normal limits   LACTIC ACID, PLASMA - Abnormal; Notable for the following components:    Lactic Acid 3.0 (*)     All other components within normal limits   BLOOD GAS, ARTERIAL - Abnormal; Notable for the following components:    pH, Blood Gas 7.23 (*)     PCO2 68 (*)     HCO3 29 (*)     All other components within normal limits   BLOOD GAS, ARTERIAL - Abnormal; Notable for the following components:    pH, Blood Gas 7.25 (*)     PCO2 70 (*)     PO2 111 (*)     HCO3 31 (*)     All other components within normal limits   BLOOD GAS, ARTERIAL - Abnormal; Notable for the following components:    pH, Blood Gas 7.27 (*)     PCO2 63 (*)     PO2 114 (*)     HCO3 29 (*)     All other components within normal limits   RAPID INFLUENZA A/B ANTIGENS   TROPONIN   MAGNESIUM   ANION GAP   GLOMERULAR FILTRATION RATE, ESTIMATED   OSMOLALITY   URINALYSIS WITH MICROSCOPIC       EMERGENCY DEPARTMENT COURSE:   Vitals:    Vitals:    03/30/19 0626 03/30/19 3769 03/30/19 0655 03/30/19 0739   BP: (!) 171/117   132/82   Pulse: 103   96   Resp: 30  (!) 42 (!) 40   Temp: 98 °F (36.7 °C)      TempSrc: Oral      SpO2: (!) 65% 97%  99%   Weight: 238 lb (108 kg)      Height: 5' 11\" (1.803 m)          MDM    Patient was seen and evaluated in the emergency department, patient appeared to be in acute distress associated with shortness of breath. Initial pulse ox was 65%, non rebreather was placed on the patient. Options slowly increased.   Respiratory was called, breathing treatments were administered. Patient stabilized. Blood gas was obtained, respiratory acidosis was noted. Hypercapnia was noted. Patient was laced on BiPAP. Further labs and imaging were obtained. Lactic acid elevated, proBNP elevated, no leukocytosis noted. Negative for influenza. Repeat blood gas was obtained, no improvement noted. Pressure increased to 20 CM water. Repeat blood gas obtained, shows improvement minimal, patient appears to be tolerating BiPAP well. Case discussed with Dr. Ancelmo Stanton, who graciously agrees to admit the patient. All here in the emergency present patient is treated with DuoNeb, prednisone, Bumex. Case was staffed with my attending Dr. Efren Bean. Patient was admitted to the hospital in a guarded condition. Medications   nitroGLYCERIN 50 mg in dextrose 5% 250 mL infusion (0 mcg/min Intravenous Held 3/30/19 0738)   methylPREDNISolone sodium (SOLU-MEDROL) injection 125 mg (125 mg Intravenous Given 3/30/19 0644)   ipratropium-albuterol (DUONEB) nebulizer solution 2 ampule (2 ampules Inhalation Given 3/30/19 0635)   bumetanide (BUMEX) injection 1 mg (1 mg Intravenous Given 3/30/19 0738)       Patient was seenindependently by myself. The patient's final impression and disposition and plan was determined by myself. CRITICAL CARE:  There was a high probability of clinically significant/life threatening deterioration in this patient's condition which required my urgent intervention. Total critical care time was 45 minutes. This excludes any time for separately reportable procedures. CONSULTS:  Dr. Robert Daley:  None    FINAL IMPRESSION      1. Respiratory insufficiency/failure    2.  COPD exacerbation [facility])          DISPOSITION/PLAN   Patient admitted to Dr. Wendi Strange:  Ethan Solomon [address] [place] [id number] [phone]            DISCHARGE MEDICATIONS:     New Prescriptions    No medications on file       (Please note that portions of this note were completed with a voice recognition program.  Efforts were made to edit thedictations but occasionally words are mis-transcribed.)    Scribe:  Rae Valdivia 3/30/19 6:24 AM Scribing for and in the presence of KATHARINE Ku. Signed by: Mario Cervantes(Name), Scribe, 03/30/19 9:11 AM    Provider:  I personally performed the services described in the documentation,reviewed and edited the documentation which was dictated to the scribe in my presence, and it accurately records my words and actions.     Chacorta Wang CNP 03/30/19 9:11 AM    SAVANNAH Solomon - KATHARINE         Geosho, SAVANNAH - CNP  03/30/19 2500

## 2019-03-31 LAB
ALLEN TEST: ABNORMAL
ANION GAP SERPL CALCULATED.3IONS-SCNC: 10 MEQ/L (ref 8–16)
BACTERIA: ABNORMAL
BASE EXCESS (CALCULATED): 2.4 MMOL/L (ref -2.5–2.5)
BILIRUBIN URINE: NEGATIVE
BLOOD, URINE: NEGATIVE
BUN BLDV-MCNC: 18 MG/DL (ref 7–22)
CALCIUM SERPL-MCNC: 8.7 MG/DL (ref 8.5–10.5)
CASTS: ABNORMAL /LPF
CASTS: ABNORMAL /LPF
CHARACTER, URINE: CLEAR
CHLORIDE BLD-SCNC: 103 MEQ/L (ref 98–111)
CO2: 27 MEQ/L (ref 23–33)
COLLECTED BY:: ABNORMAL
COLOR: YELLOW
CREAT SERPL-MCNC: 0.5 MG/DL (ref 0.4–1.2)
CRYSTALS: ABNORMAL
DEVICE: ABNORMAL
EPITHELIAL CELLS, UA: ABNORMAL /HPF
GFR SERPL CREATININE-BSD FRML MDRD: > 90 ML/MIN/1.73M2
GLUCOSE BLD-MCNC: 123 MG/DL (ref 70–108)
GLUCOSE, URINE: NEGATIVE MG/DL
HCO3: 32 MMOL/L (ref 23–28)
IFIO2: 50
KETONES, URINE: NEGATIVE
LEUKOCYTE ESTERASE, URINE: NEGATIVE
MISCELLANEOUS LAB TEST RESULT: ABNORMAL
NITRITE, URINE: NEGATIVE
O2 SATURATION: 94 %
PCO2: 69 MMHG (ref 35–45)
PH BLOOD GAS: 7.28 (ref 7.35–7.45)
PH UA: 6 (ref 5–9)
PO2: 84 MMHG (ref 71–104)
POTASSIUM REFLEX MAGNESIUM: 5.2 MEQ/L (ref 3.5–5.2)
PROTEIN UA: 30 MG/DL
RBC URINE: ABNORMAL /HPF
RENAL EPITHELIAL, UA: ABNORMAL
SET PEEP: 6 MMHG
SET PRESS SUPP: 20 CMH2O
SET RESPIRATORY RATE: 32 BPM
SET TIDAL VOLUME: 404 ML
SODIUM BLD-SCNC: 140 MEQ/L (ref 135–145)
SOURCE, BLOOD GAS: ABNORMAL
SPECIFIC GRAVITY UA: 1.03 (ref 1–1.03)
UROBILINOGEN, URINE: 0.2 EU/DL (ref 0–1)
WBC UA: ABNORMAL /HPF
YEAST: ABNORMAL

## 2019-03-31 PROCEDURE — 90686 IIV4 VACC NO PRSV 0.5 ML IM: CPT | Performed by: INTERNAL MEDICINE

## 2019-03-31 PROCEDURE — 82803 BLOOD GASES ANY COMBINATION: CPT

## 2019-03-31 PROCEDURE — 2580000003 HC RX 258: Performed by: INTERNAL MEDICINE

## 2019-03-31 PROCEDURE — 80048 BASIC METABOLIC PNL TOTAL CA: CPT

## 2019-03-31 PROCEDURE — 36600 WITHDRAWAL OF ARTERIAL BLOOD: CPT

## 2019-03-31 PROCEDURE — G0008 ADMIN INFLUENZA VIRUS VAC: HCPCS | Performed by: INTERNAL MEDICINE

## 2019-03-31 PROCEDURE — 81001 URINALYSIS AUTO W/SCOPE: CPT

## 2019-03-31 PROCEDURE — 2060000000 HC ICU INTERMEDIATE R&B

## 2019-03-31 PROCEDURE — 94761 N-INVAS EAR/PLS OXIMETRY MLT: CPT

## 2019-03-31 PROCEDURE — 6370000000 HC RX 637 (ALT 250 FOR IP): Performed by: INTERNAL MEDICINE

## 2019-03-31 PROCEDURE — 94640 AIRWAY INHALATION TREATMENT: CPT

## 2019-03-31 PROCEDURE — 2709999900 HC NON-CHARGEABLE SUPPLY

## 2019-03-31 PROCEDURE — 6360000002 HC RX W HCPCS: Performed by: INTERNAL MEDICINE

## 2019-03-31 PROCEDURE — 99233 SBSQ HOSP IP/OBS HIGH 50: CPT | Performed by: INTERNAL MEDICINE

## 2019-03-31 PROCEDURE — 94660 CPAP INITIATION&MGMT: CPT

## 2019-03-31 PROCEDURE — 36415 COLL VENOUS BLD VENIPUNCTURE: CPT

## 2019-03-31 PROCEDURE — 2700000000 HC OXYGEN THERAPY PER DAY

## 2019-03-31 RX ORDER — VARENICLINE TARTRATE 0.5 MG/1
0.5 TABLET, FILM COATED ORAL DAILY
Status: COMPLETED | OUTPATIENT
Start: 2019-03-31 | End: 2019-04-02

## 2019-03-31 RX ADMIN — IPRATROPIUM BROMIDE AND ALBUTEROL SULFATE 1 AMPULE: .5; 3 SOLUTION RESPIRATORY (INHALATION) at 20:25

## 2019-03-31 RX ADMIN — CARBAMAZEPINE 200 MG: 200 TABLET ORAL at 20:42

## 2019-03-31 RX ADMIN — IPRATROPIUM BROMIDE AND ALBUTEROL SULFATE 1 AMPULE: .5; 3 SOLUTION RESPIRATORY (INHALATION) at 15:42

## 2019-03-31 RX ADMIN — TRAZODONE HYDROCHLORIDE 100 MG: 100 TABLET ORAL at 20:42

## 2019-03-31 RX ADMIN — ENOXAPARIN SODIUM 40 MG: 40 INJECTION SUBCUTANEOUS at 12:17

## 2019-03-31 RX ADMIN — METOPROLOL SUCCINATE 25 MG: 25 TABLET, FILM COATED, EXTENDED RELEASE ORAL at 09:05

## 2019-03-31 RX ADMIN — AMLODIPINE BESYLATE 10 MG: 10 TABLET ORAL at 09:05

## 2019-03-31 RX ADMIN — INFLUENZA A VIRUS A/MICHIGAN/45/2015 X-275 (H1N1) ANTIGEN (FORMALDEHYDE INACTIVATED), INFLUENZA A VIRUS A/SINGAPORE/INFIMH-16-0019/2016 IVR-186 (H3N2) ANTIGEN (FORMALDEHYDE INACTIVATED), INFLUENZA B VIRUS B/PHUKET/3073/2013 ANTIGEN (FORMALDEHYDE INACTIVATED), AND INFLUENZA B VIRUS B/MARYLAND/15/2016 BX-69A ANTIGEN (FORMALDEHYDE INACTIVATED) 0.5 ML: 15; 15; 15; 15 INJECTION, SUSPENSION INTRAMUSCULAR at 13:57

## 2019-03-31 RX ADMIN — ASPIRIN 325 MG: 325 TABLET, DELAYED RELEASE ORAL at 09:05

## 2019-03-31 RX ADMIN — Medication 10 ML: at 20:43

## 2019-03-31 RX ADMIN — DOXYCYCLINE HYCLATE 100 MG: 100 TABLET, COATED ORAL at 23:03

## 2019-03-31 RX ADMIN — METHYLPREDNISOLONE SODIUM SUCCINATE 40 MG: 40 INJECTION, POWDER, FOR SOLUTION INTRAMUSCULAR; INTRAVENOUS at 12:16

## 2019-03-31 RX ADMIN — METHYLPREDNISOLONE SODIUM SUCCINATE 40 MG: 40 INJECTION, POWDER, FOR SOLUTION INTRAMUSCULAR; INTRAVENOUS at 23:03

## 2019-03-31 RX ADMIN — DOXYCYCLINE HYCLATE 100 MG: 100 TABLET, COATED ORAL at 12:16

## 2019-03-31 RX ADMIN — RISPERIDONE 2 MG: 2 TABLET ORAL at 20:42

## 2019-03-31 RX ADMIN — IPRATROPIUM BROMIDE AND ALBUTEROL SULFATE 1 AMPULE: .5; 3 SOLUTION RESPIRATORY (INHALATION) at 07:35

## 2019-03-31 RX ADMIN — SODIUM CHLORIDE: 9 INJECTION, SOLUTION INTRAVENOUS at 15:33

## 2019-03-31 RX ADMIN — METHYLPREDNISOLONE SODIUM SUCCINATE 40 MG: 40 INJECTION, POWDER, FOR SOLUTION INTRAMUSCULAR; INTRAVENOUS at 17:00

## 2019-03-31 RX ADMIN — VARENICLINE TARTRATE 0.5 MG: 0.5 TABLET, FILM COATED ORAL at 17:00

## 2019-03-31 RX ADMIN — RISPERIDONE 2 MG: 2 TABLET ORAL at 09:05

## 2019-03-31 RX ADMIN — METHYLPREDNISOLONE SODIUM SUCCINATE 40 MG: 40 INJECTION, POWDER, FOR SOLUTION INTRAMUSCULAR; INTRAVENOUS at 05:56

## 2019-03-31 RX ADMIN — CARBAMAZEPINE 200 MG: 200 TABLET ORAL at 09:05

## 2019-03-31 RX ADMIN — IPRATROPIUM BROMIDE AND ALBUTEROL SULFATE 1 AMPULE: .5; 3 SOLUTION RESPIRATORY (INHALATION) at 12:22

## 2019-03-31 ASSESSMENT — PAIN SCALES - GENERAL
PAINLEVEL_OUTOF10: 0

## 2019-03-31 NOTE — PROGRESS NOTES
INTERNAL MEDICINE Progress Note  3/31/2019 1:40 PM  Subjective:   Admit Date: 3/30/2019  PCP: Bello Ross MD  Interval History: seen, SOB+, cough+    Objective:   Vitals: /74   Pulse 89   Temp 96.6 °F (35.9 °C) (Axillary)   Resp 21   Ht 5' 11\" (1.803 m)   Wt 238 lb (108 kg)   SpO2 99%   BMI 33.19 kg/m²   General appearance: alert and cooperative with exam  HEENT: Head: atraumatic  Neck: no adenopathy, no carotid bruit and no JVD  Lungs: diminished breath sounds bilaterally  Heart: S1, S2 normal  Abdomen: soft, non-tender; bowel sounds normal; no masses,  no organomegaly  Extremities: no edema, redness or tenderness in the calves or thighs  Neurologic: Mental status: alertness: alert, orientation: person, place, affect: blunted      Medications:   Scheduled Meds:   amLODIPine  10 mg Oral Daily    aspirin  325 mg Oral Daily    carBAMazepine  200 mg Oral BID    metoprolol succinate  25 mg Oral Daily    nicotine  1 patch Transdermal Daily    risperiDONE  2 mg Oral BID    traZODone  100 mg Oral Nightly    sodium chloride flush  10 mL Intravenous 2 times per day    enoxaparin  40 mg Subcutaneous Q24H    ipratropium-albuterol  1 ampule Inhalation Q4H WA    methylPREDNISolone  40 mg Intravenous Q6H    Followed by   Citlalli Hillman ON 4/2/2019] predniSONE  40 mg Oral Daily    doxycycline hyclate  100 mg Oral Q12H    influenza virus vaccine  0.5 mL Intramuscular Once     Continuous Infusions:   sodium chloride 75 mL/hr at 03/30/19 1254       Lab Results:   CBC:   Recent Labs     03/30/19  0622   WBC 6.6   HGB 17.3        BMP:    Recent Labs     03/30/19  0622 03/31/19  0344    140   K 4.4 5.2   CL 99 103   CO2 25 27   BUN 16 18   CREATININE 0.6 0.5   GLUCOSE 161* 123*     Hepatic:   Recent Labs     03/30/19  0622   AST 20   ALT 17   BILITOT 0.5   ALKPHOS 107     Magnesium:    Lab Results   Component Value Date    MG 2.0 03/30/2019     TSH:    Lab Results   Component Value Date    TSH 0.911 03/21/2019         Assessment and Plan:   1. Acute on chronic hypercapnic resp failure  2. COPD exacerbation  3. Chronic nicotine dependence  4. Erythrocytosis secondary to #3,      duoneb aerosols. Solumedrol  Doxycycline  NiPPV.   Clara Vance MD

## 2019-03-31 NOTE — PROGRESS NOTES
[phone] Dr. Leanna Krishnamurthy up to see patient, explains to patient and patient's family that patient needs to limit his intake of coffee to 1-2 cups a day and reasons why. Patient and patient's family voice understanding. 1700 Patient has been given two cups of coffee thus far this day. Patient continues to ask this nurse and staff members repetitively for coffee. Patient educated multiple times regarding excessive caffeine intake. Patient voices understanding. Will continue to educate and monitor.

## 2019-03-31 NOTE — FLOWSHEET NOTE
Pt is a 46year old son.  was following up on AD information that was left yesterday. Pt was alone today and appeared much brighter. However, pt did not seem to have full understanding of the purpose of the AD. Spiritual care to continue to offer support and encouragement. 03/31/19 1445   Encounter Summary   Services provided to: Patient   Referral/Consult From: Other    Support System Parent   Place of Religion   ([facility])   Continue Visiting Yes  (3/31)   Complexity of Encounter Low   Length of Encounter 15 minutes   Spiritual/Zoroastrianism   Type Spiritual support   Assessment Calm; Approachable   Intervention Active listening;Explored coping resources   Outcome Connection/belonging;Engaged in conversation

## 2019-04-01 LAB
ALLEN TEST: POSITIVE
BASE EXCESS (CALCULATED): 5.1 MMOL/L (ref -2.5–2.5)
COLLECTED BY:: ABNORMAL
DEVICE: ABNORMAL
ERYTHROCYTE [DISTWIDTH] IN BLOOD BY AUTOMATED COUNT: 15.4 % (ref 11.5–14.5)
ERYTHROCYTE [DISTWIDTH] IN BLOOD BY AUTOMATED COUNT: 55.4 FL (ref 35–45)
HCO3: 35 MMOL/L (ref 23–28)
HCT VFR BLD CALC: 52.7 % (ref 42–52)
HEMOGLOBIN: 16.3 GM/DL (ref 14–18)
IFIO2: 50
LV EF: 48 %
LVEF MODALITY: NORMAL
MCH RBC QN AUTO: 30.4 PG (ref 26–33)
MCHC RBC AUTO-ENTMCNC: 30.9 GM/DL (ref 32.2–35.5)
MCV RBC AUTO: 98.3 FL (ref 80–94)
O2 SATURATION: 93 %
PCO2: 66 MMHG (ref 35–45)
PH BLOOD GAS: 7.32 (ref 7.35–7.45)
PLATELET # BLD: 200 THOU/MM3 (ref 130–400)
PMV BLD AUTO: 10.1 FL (ref 9.4–12.4)
PO2: 73 MMHG (ref 71–104)
RBC # BLD: 5.36 MILL/MM3 (ref 4.7–6.1)
SOURCE, BLOOD GAS: ABNORMAL
WBC # BLD: 5.3 THOU/MM3 (ref 4.8–10.8)

## 2019-04-01 PROCEDURE — 6360000002 HC RX W HCPCS: Performed by: INTERNAL MEDICINE

## 2019-04-01 PROCEDURE — 94640 AIRWAY INHALATION TREATMENT: CPT

## 2019-04-01 PROCEDURE — 6370000000 HC RX 637 (ALT 250 FOR IP): Performed by: INTERNAL MEDICINE

## 2019-04-01 PROCEDURE — 36415 COLL VENOUS BLD VENIPUNCTURE: CPT

## 2019-04-01 PROCEDURE — 93306 TTE W/DOPPLER COMPLETE: CPT

## 2019-04-01 PROCEDURE — 2060000000 HC ICU INTERMEDIATE R&B

## 2019-04-01 PROCEDURE — 2580000003 HC RX 258: Performed by: INTERNAL MEDICINE

## 2019-04-01 PROCEDURE — 97530 THERAPEUTIC ACTIVITIES: CPT

## 2019-04-01 PROCEDURE — 36600 WITHDRAWAL OF ARTERIAL BLOOD: CPT

## 2019-04-01 PROCEDURE — 2700000000 HC OXYGEN THERAPY PER DAY

## 2019-04-01 PROCEDURE — 94761 N-INVAS EAR/PLS OXIMETRY MLT: CPT

## 2019-04-01 PROCEDURE — 97163 PT EVAL HIGH COMPLEX 45 MIN: CPT

## 2019-04-01 PROCEDURE — 85027 COMPLETE CBC AUTOMATED: CPT

## 2019-04-01 PROCEDURE — 2709999900 HC NON-CHARGEABLE SUPPLY

## 2019-04-01 PROCEDURE — APPSS30 APP SPLIT SHARED TIME 16-30 MINUTES: Performed by: NURSE PRACTITIONER

## 2019-04-01 PROCEDURE — 82803 BLOOD GASES ANY COMBINATION: CPT

## 2019-04-01 PROCEDURE — 99232 SBSQ HOSP IP/OBS MODERATE 35: CPT | Performed by: INTERNAL MEDICINE

## 2019-04-01 PROCEDURE — 94660 CPAP INITIATION&MGMT: CPT

## 2019-04-01 RX ORDER — HALOPERIDOL 5 MG/ML
2 INJECTION INTRAMUSCULAR EVERY 6 HOURS PRN
Status: DISCONTINUED | OUTPATIENT
Start: 2019-04-01 | End: 2019-04-04 | Stop reason: HOSPADM

## 2019-04-01 RX ORDER — POTASSIUM CHLORIDE 20 MEQ/1
20 TABLET, EXTENDED RELEASE ORAL
Status: DISCONTINUED | OUTPATIENT
Start: 2019-04-02 | End: 2019-04-04 | Stop reason: HOSPADM

## 2019-04-01 RX ORDER — FUROSEMIDE 10 MG/ML
20 INJECTION INTRAMUSCULAR; INTRAVENOUS ONCE
Status: COMPLETED | OUTPATIENT
Start: 2019-04-01 | End: 2019-04-01

## 2019-04-01 RX ORDER — FUROSEMIDE 20 MG/1
20 TABLET ORAL DAILY
Status: DISCONTINUED | OUTPATIENT
Start: 2019-04-02 | End: 2019-04-04 | Stop reason: HOSPADM

## 2019-04-01 RX ADMIN — IPRATROPIUM BROMIDE AND ALBUTEROL SULFATE 1 AMPULE: .5; 3 SOLUTION RESPIRATORY (INHALATION) at 10:12

## 2019-04-01 RX ADMIN — ENOXAPARIN SODIUM 40 MG: 40 INJECTION SUBCUTANEOUS at 16:04

## 2019-04-01 RX ADMIN — VARENICLINE TARTRATE 0.5 MG: 0.5 TABLET, FILM COATED ORAL at 08:51

## 2019-04-01 RX ADMIN — FUROSEMIDE 20 MG: 10 INJECTION, SOLUTION INTRAMUSCULAR; INTRAVENOUS at 19:12

## 2019-04-01 RX ADMIN — Medication 10 ML: at 20:39

## 2019-04-01 RX ADMIN — CARBAMAZEPINE 200 MG: 200 TABLET ORAL at 20:39

## 2019-04-01 RX ADMIN — TRAZODONE HYDROCHLORIDE 100 MG: 100 TABLET ORAL at 20:39

## 2019-04-01 RX ADMIN — ASPIRIN 325 MG: 325 TABLET, DELAYED RELEASE ORAL at 08:51

## 2019-04-01 RX ADMIN — RISPERIDONE 2 MG: 2 TABLET ORAL at 20:39

## 2019-04-01 RX ADMIN — HALOPERIDOL LACTATE 2 MG: 5 INJECTION INTRAMUSCULAR at 11:55

## 2019-04-01 RX ADMIN — HALOPERIDOL LACTATE 2 MG: 5 INJECTION INTRAMUSCULAR at 05:38

## 2019-04-01 RX ADMIN — METHYLPREDNISOLONE SODIUM SUCCINATE 40 MG: 40 INJECTION, POWDER, FOR SOLUTION INTRAMUSCULAR; INTRAVENOUS at 04:10

## 2019-04-01 RX ADMIN — DOXYCYCLINE HYCLATE 100 MG: 100 TABLET, COATED ORAL at 20:39

## 2019-04-01 RX ADMIN — DOXYCYCLINE HYCLATE 100 MG: 100 TABLET, COATED ORAL at 16:05

## 2019-04-01 RX ADMIN — METOPROLOL SUCCINATE 25 MG: 25 TABLET, FILM COATED, EXTENDED RELEASE ORAL at 08:51

## 2019-04-01 RX ADMIN — IPRATROPIUM BROMIDE AND ALBUTEROL SULFATE 1 AMPULE: .5; 3 SOLUTION RESPIRATORY (INHALATION) at 21:28

## 2019-04-01 RX ADMIN — CARBAMAZEPINE 200 MG: 200 TABLET ORAL at 08:51

## 2019-04-01 RX ADMIN — RISPERIDONE 2 MG: 2 TABLET ORAL at 08:51

## 2019-04-01 RX ADMIN — IPRATROPIUM BROMIDE AND ALBUTEROL SULFATE 1 AMPULE: .5; 3 SOLUTION RESPIRATORY (INHALATION) at 14:06

## 2019-04-01 RX ADMIN — IPRATROPIUM BROMIDE AND ALBUTEROL SULFATE 1 AMPULE: .5; 3 SOLUTION RESPIRATORY (INHALATION) at 17:51

## 2019-04-01 RX ADMIN — AMLODIPINE BESYLATE 10 MG: 10 TABLET ORAL at 08:51

## 2019-04-01 ASSESSMENT — PAIN DESCRIPTION - LOCATION: LOCATION: SHOULDER

## 2019-04-01 ASSESSMENT — PAIN SCALES - GENERAL
PAINLEVEL_OUTOF10: 0
PAINLEVEL_OUTOF10: 0
PAINLEVEL_OUTOF10: 4

## 2019-04-01 ASSESSMENT — PAIN DESCRIPTION - PAIN TYPE: TYPE: ACUTE PAIN

## 2019-04-01 NOTE — PLAN OF CARE
Problem: OXYGENATION/RESPIRATORY FUNCTION  Goal: Patient will achieve/maintain normal respiratory rate/effort  Description  Respiratory rate and effort will be within normal limits for the patient  4/1/2019 1115 by Julia Holbrook RN  Outcome: Met This Shift     Problem: Falls - Risk of:  Goal: Will remain free from falls  Description  Will remain free from falls  4/1/2019 1115 by Julia Holbrook RN  Outcome: Ongoing  Note:   Call light in reach, bed in lowest position, and bed alarm activated. Education given on use of call light before ambulation and when in need of assistance. Patient expressed understanding. Hourly visual checks performed and charted. Toileting offered to patient. No falls this shift, at any time. Arm band and falling star in place. Will continue to monitor. Problem: Falls - Risk of:  Goal: Absence of physical injury  Description  Absence of physical injury  4/1/2019 1115 by Julia Holbrook RN  Outcome: Ongoing  Note:   Patient remains free from physical injury. Patient's call light remains within reach and hourly rounds performed. Problem: Pain:  Goal: Pain level will decrease  Description  Pain level will decrease  4/1/2019 1115 by Julia Holbrook RN  Outcome: Ongoing  Note:   Patient has not reported any pain this shift. Patient has PRN medications ordered to help manage pain. Problem: Pain:  Goal: Control of acute pain  Description  Control of acute pain  4/1/2019 1115 by Julia Holbrook RN  Outcome: Ongoing  Note:   Patient has not reported any acute pain this shift. Patient has PRN medications ordered to help manage pain. Problem: Pain:  Goal: Control of chronic pain  Description  Control of chronic pain  4/1/2019 1115 by Julia Holbrook RN  Outcome: Ongoing  Note:   Patient has not reported any chronic pain this shift. Patient has PRN medications ordered to help manage pain.       Problem: OXYGENATION/RESPIRATORY FUNCTION  Goal: Patient will maintain patent airway  4/1/2019 1115 by Arlen Baum RN  Outcome: Ongoing  Note:   Patient is able to maintain a patent airway at this time. Patient's oxygen saturation remains above 92% on high flow oxygen. Problem: SKIN INTEGRITY  Goal: Skin integrity is maintained or improved  4/1/2019 1115 by Arlen Baum RN  Outcome: Ongoing     Problem: SKIN INTEGRITY  Goal: Skin integrity is maintained or improved  Intervention: COLLABORATE WITH WOCN  Note:   No signs of skin breakdown. Skin warm, dry, and intact. Mucous membranes pink and moist.  Assistance with turns/ambulation provided PRN. Will continue to monitor. Problem: Discharge Planning:  Goal: Discharged to appropriate level of care  Description  Discharged to appropriate level of care  4/1/2019 1115 by Arlen Baum RN  Outcome: Ongoing  Note:   Discharge planning in process and discussed with patient and family. Social work consulted for any additional needs. Care manager aware of discharge needs. Care plan reviewed with patient. Patient verbalizes understanding of the plan of care and contributed to goal setting.

## 2019-04-01 NOTE — FLOWSHEET NOTE
04/01/19 0513   Provider Notification   Reason for Communication Evaluate   Provider Name Dr. Herb Limon   Provider Notification Physician   Method of Communication Secure Message   Response See orders   Notification Time [id number]     Dr. Herb Limon notified of increase agitation, patient will not keep high flow oxygen on, pulling IV lines out, and pulling telemetry off. Dr. Herb Limon ordered 2 mg of Halodol IV q 6 hours PRN for agitation, 2mg given. Will continue to monitor.

## 2019-04-01 NOTE — CARE COORDINATION
4/1/19, 10:09 AM      Elisa Reed       Admitted from: ED 3/30/2019/ [address] day: 2   Location: [address] [place] [address]-A Reason for admit: Acute on chronic respiratory failure with hypercapnia ([address] [J96.22] Status: IP  Admit order signed?: yes  PMH:  has a past medical history of Emphysema (subcutaneous) (surgical) resulting from a procedure, Hypertension, Pneumonia, and Schizophrenia ([address] Procedure: ECHO pending  Pertinent abnormal Imaging:CXR    Impression:       Stable radiographic appearance of the chest. No evidence of an acute process. Medications:  Scheduled Meds:   varenicline  0.5 mg Oral Daily    amLODIPine  10 mg Oral Daily    aspirin  325 mg Oral Daily    carBAMazepine  200 mg Oral BID    metoprolol succinate  25 mg Oral Daily    nicotine  1 patch Transdermal Daily    risperiDONE  2 mg Oral BID    traZODone  100 mg Oral Nightly    sodium chloride flush  10 mL Intravenous 2 times per day    enoxaparin  40 mg Subcutaneous Q24H    ipratropium-albuterol  1 ampule Inhalation Q4H WA    [START ON 4/2/2019] predniSONE  40 mg Oral Daily    doxycycline hyclate  100 mg Oral Q12H     Continuous Infusions:   sodium chloride 75 mL/hr at 03/31/19 1533      Pertinent Info/Orders/Treatment Plan:  BNP 2989.0, IV fluids, Pulmonology consult, telemetry, PT/OT. Diet: DIET LOW SODIUM 2 GM;   Smoking status:  reports that he has been smoking.   He has never used smokeless tobacco.   PCP: Deisi Craig MD  Readmission: no  Readmission Risk Score: 12%    Discharge Planning  Current Residence:  Private Residence  Living Arrangements:  Parent   Support Systems:  Parent, Family Members  Current Services PTA:     Potential Assistance Needed:  Home Care  Potential Assistance Purchasing Medications:  No  Does patient want to participate in local refill/ meds to beds program?  Not Assessed  Type of Home Care Services:  Nursing Services  Patient expects to be discharged to:  home with mother  Expected Discharge date: 04/02/19  Follow Up Appointment: Best Day/ Time: Monday AM(mornings preferable)    Discharge Plan: Met with Sandie. He currently lives at home with his mother. He is on home oxygen therapy at 4 liters. Mother believes that this is supplied by [facility]. Plan is to return home at discharge. Denies need for DME or HH.      Evaluation: no

## 2019-04-01 NOTE — PLAN OF CARE
Problem: Falls - Risk of:  Goal: Will remain free from falls  Description  Will remain free from falls  Outcome: Ongoing  Note:   Call light in reach, bed in lowest position, and bed alarm activated. Education given on use of call light before ambulation and when in need of assistance. Patient expressed understanding. Hourly visual checks performed and charted. Toileting offered to patient. No falls this shift, at any time. Arm band and falling star in place. Will continue to monitor. Goal: Absence of physical injury  Description  Absence of physical injury  Outcome: Ongoing     Problem: RESPIRATORY  Goal: Clear lung sounds  3/31/2019 2030 by Sher Chapman RCP  Outcome: Ongoing  Note:   Duoneb txs ongoing to improve aeration to lungs and decrease shortness of breath  Goal: Able to breathe comfortably  Description  Able to breathe comfortably     3/31/2019 2030 by Sher Chapman RCP  Outcome: Ongoing  Note:   Pt wearing Vapotherm at this time due to being unable to tolerate bipap. 35L 40% sat 92%, pt tolerates well. Will wean as tolerated     Problem: Pain:  Goal: Pain level will decrease  Description  Pain level will decrease  Outcome: Ongoing  Note:   Patient able to use 0-10 pain scale. Denies pain at this time. Agreeable to take PRN pain medications. Goal: Control of acute pain  Description  Control of acute pain  Outcome: Ongoing  Goal: Control of chronic pain  Description  Control of chronic pain  Outcome: Ongoing     Problem: OXYGENATION/RESPIRATORY FUNCTION  Goal: Patient will maintain patent airway  Outcome: Ongoing  Note:   Lung sounds clear. diminished. Respirations are easy, unlabored, and even. No complaints of shortness of breath. Oxygen saturation >90% on heated high flow. Will continue to monitor.    Goal: Patient will achieve/maintain normal respiratory rate/effort  Description  Respiratory rate and effort will be within normal limits for the patient  Outcome: Ongoing Problem: SKIN INTEGRITY  Goal: Skin integrity is maintained or improved  Outcome: Ongoing  Note:   No signs of skin breakdown. Skin warm, dry, and intact. Mucous membranes pink and moist.  Assistance with turns/ambulation provided PRN. Will continue to monitor. Problem: Discharge Planning:  Goal: Discharged to appropriate level of care  Description  Discharged to appropriate level of care  Outcome: Ongoing  Note:   Discharge planning in process and discussed with patient/family. Patient lives at home with family support. Social work consulted for any additional needs. Care manager aware of discharge needs. Care plan reviewed with patient. Patient verbalize understanding of the plan of care and contribute to goal setting.

## 2019-04-01 NOTE — PROGRESS NOTES
INTERNAL MEDICINE Progress Note  4/1/2019 5:50 PM  Subjective:   Admit Date: 3/30/2019  PCP: Daniel Jaffe MD  Interval History: seen, feels better, less SOB, cough+    Objective:   Vitals: BP (!) 153/93   Pulse 91   Temp 98.7 °F (37.1 °C) (Oral)   Resp 18   Ht 5' 11\" (1.803 m)   Wt 228 lb 11.2 oz (103.7 kg)   SpO2 90%   BMI 31.90 kg/m²   General appearance: alert and cooperative with exam  HEENT:  atraumatic  Neck:  no JVD  Lungs: diminished breath sounds bilaterally  Heart: S1, S2 normal  Abdomen: soft, non-tender; bowel sounds normal; no masses,  no organomegaly  Extremities: no edema,   Neurologic:  alertness: alert, orientation: person, place, affect: blunted      Medications:   Scheduled Meds:   furosemide  20 mg Intravenous Once    varenicline  0.5 mg Oral Daily    amLODIPine  10 mg Oral Daily    aspirin  325 mg Oral Daily    carBAMazepine  200 mg Oral BID    metoprolol succinate  25 mg Oral Daily    nicotine  1 patch Transdermal Daily    risperiDONE  2 mg Oral BID    traZODone  100 mg Oral Nightly    sodium chloride flush  10 mL Intravenous 2 times per day    enoxaparin  40 mg Subcutaneous Q24H    ipratropium-albuterol  1 ampule Inhalation Q4H WA    [START ON 4/2/2019] predniSONE  40 mg Oral Daily    doxycycline hyclate  100 mg Oral Q12H     Continuous Infusions:      Lab Results:   CBC:   Recent Labs     03/30/19  0622 04/01/19  0403   WBC 6.6 5.3   HGB 17.3 16.3    200     BMP:    Recent Labs     03/30/19  0622 03/31/19  0344    140   K 4.4 5.2   CL 99 103   CO2 25 27   BUN 16 18   CREATININE 0.6 0.5   GLUCOSE 161* 123*     Hepatic:   Recent Labs     03/30/19  0622   AST 20   ALT 17   BILITOT 0.5   ALKPHOS 107     Magnesium:    Lab Results   Component Value Date    MG 2.0 03/30/2019     TSH:    Lab Results   Component Value Date    TSH 0.911 03/21/2019     ECHO:  Technically difficult examination.   Ejection fraction is visually estimated at 45-50%.   There was mild global hypokinesis of the left ventricle.       Assessment and Plan:   1. Acute on chronic hypercapnic resp failure  2. COPD exacerbation  3. Chronic nicotine dependence  4. Erythrocytosis secondary to #3,   5. Acute systolic CHF     duoneb aerosols. prednisone  Doxycycline  NiPPV. Chantix  Lasix.     Anastasia Davey MD

## 2019-04-01 NOTE — FLOWSHEET NOTE
04/01/19 1355   Encounter Summary   Services provided to: Patient and family together   Referral/Consult From: Nurse   Support System Parent   Continue Visiting Yes  (4/1-Complete AD)   Complexity of Encounter Moderate   Length of Encounter 30 minutes   Advance Directives (For Healthcare)   Healthcare Directive Other (Comment)  ([facility] waiting for addresses)   Information on Healthcare Directives Requested Yes   Patient Requests Assistance Yes, referral made to [address] [facility] completed; Documents explained;Documents given     Advance Directive Consult: Advance Directive education provided and POA forms were partially completed. Patient did not know the addresses of his niece and sister. His mother was present and she felt he should have others listed however the patient choose not to have them. Therefore the information was placed on the document and will be completed tomorrow after she obtains the addresses. Information was relayed to the patient's nurse for follow-up. Family was informed to contact the nurse once completed and we will take care of filing the docs and making copies for the representing parties.

## 2019-04-01 NOTE — PROGRESS NOTES
donned, steady and no LOB, limited by lines  Distance: 5'x1      Exercises:  Comments: see rx         Activity Tolerance:  Activity Tolerance: Patient Tolerated treatment well    Treatment Initiated: dyn sit and std at edge of bed without UE support to martin shorts, performed BLE stding marches x12 reps without LOB, MOD I. Assessment:  Assessment: pt tolerated well, limited by lines for high flow O2, IV lines, heart monitor, MOD I with mobility, no further PT at this time, defer to OT for energy conservation education  Prognosis: Good    Clinical Presentation: High - Unstable with Unpredictable Characteristics:  :  limited by lines for high flow O2, IV lines, heart monitor, MOD I with mobility, no further PT at this time, defer to OT for energy conservation education      Decision Making: High Complexitybased on patient assessment and decision making process of determining plan of care and establishing reasonable expectations for measurable functional outcomes    REQUIRES PT FOLLOW UP: No  No Skilled PT: Safe to return home    Discharge Recommendations:  Discharge Recommendations: Home with assist PRN    Patient Education:  Patient Education: POC  Barriers to Learning: cognition    Equipment Recommendations:  Equipment Needed: No    Safety:  Type of devices: All fall risk precautions in place, Bed alarm in place, Call light within reach, Left in bed, Nurse notified    Plan:  Times per week: NA    Goals:  Patient goals : return home  Short term goals  Time Frame for Short term goals: NA  Long term goals  Time Frame for Long term goals : NA    Evaluation Complexity: Based on the findings of patient history, examination, clinical presentation, and decision making during this evaluation, the evaluation of Elisa Reed  is of high complexity.             AM-PAC Inpatient Mobility without Stair Climbing Raw Score : 20  AM-PAC Inpatient without Stair Climbing T-Scale Score : 60.57  Mobility Inpatient CMS 0-100% Score: 0  Mobility Inpatient without Stair CMS G-Code Modifier : [facility] [place]

## 2019-04-01 NOTE — PLAN OF CARE
Problem: RESPIRATORY  Goal: Clear lung sounds  4/1/2019 1021 by Familia Navarro RCP  Outcome: Met This Shift    Patient lung sounds are considered normal for their current lung condition. No signs of distress noted.  Current treatment regimen appropriate       Problem: RESPIRATORY  Goal: Able to breathe comfortably  Description  Able to breathe comfortably     4/1/2019 1021 by Familia Navarro RCP  Outcome: Met This Shift    Pt ordered on HS bipap

## 2019-04-01 NOTE — PROGRESS NOTES
temperature is 98.7 °F (37.1 °C). His blood pressure is 128/77 and his pulse is 90. His respiration is 20 and oxygen saturation is 94%. O2 Flow Rate (L/min): 20 L/min  I/O    Intake/Output Summary (Last 24 hours) at 4/1/2019 1442  Last data filed at 4/1/2019 0450  Gross per 24 hour   Intake 2425.45 ml   Output 1100 ml   Net 1325.45 ml     Patient Vitals for the past 96 hrs (Last 3 readings):   Weight   04/01/19 0450 228 lb 11.2 oz (103.7 kg)   03/30/19 0626 238 lb (108 kg)     Exam   Physical Exam   Constitutional: No distress on HFNC. Patient appears moderately built and  moderately nourished. Neck: Neck supple. No tracheal deviation present. No JVD  Cardiovascular: HRRR. S1 and S2 with no murmur. No peripheral edema  Pulmonary/Chest: RRR. Normal effort with bilateral air entry, clear breath sounds; diminished BLL no wheezing. No stridor. No respiratory distress. Patient exhibits no tenderness. Abdominal: Soft. Bowel sounds audible. No distension or tenderness to palp. Musculoskeletal: Moves all extremities; passive and active ROM x 4  Neurological: Patient is alert and oriented to person, place, and time. Skin: Warm and dry. No clubbing or cyanosis. No bruising or bleeding.    Labs   ABG  Lab Results   Component Value Date    PH 7.32 04/01/2019    PO2 73 04/01/2019    PCO2 66 04/01/2019    HCO3 35 04/01/2019    O2SAT 93 04/01/2019     Lab Results   Component Value Date    IFIO2 50 04/01/2019    SETTIDVOL 404 03/31/2019    SETPEEP 6.0 03/31/2019     CBC  Recent Labs     03/30/19  0622 04/01/19  0403   WBC 6.6 5.3   RBC 5.65 5.36   HGB 17.3 16.3   HCT 54.5* 52.7*   MCV 96.5* 98.3*   MCH 30.6 30.4   MCHC 31.7* 30.9*    200   MPV 10.0 10.1      BMP  Recent Labs     03/30/19  0622 03/31/19  0344    140   K 4.4 5.2   CL 99 103   CO2 25 27   BUN 16 18   CREATININE 0.6 0.5   GLUCOSE 161* 123*   MG 2.0  --    CALCIUM 8.8 8.7     LFT  Recent Labs     03/30/19  0622   AST 20   ALT 17   BILITOT 0.5 ALKPHOS 107     TROP  Lab Results   Component Value Date    TROPONINT < 0.010 03/30/2019    TROPONINT < 0.010 03/02/2017     BNP  Lab Results   Component Value Date    PROBNP 2989.0 03/30/2019    PROBNP 113.8 03/02/2017     D-Dimer  Lab Results   Component Value Date    DDIMER 261.00 03/02/2017     Lactic Acid  Recent Labs     03/30/19  0633 03/30/19  1215   LACTA 3.0* 1.4     INR  No results for input(s): INR, PROTIME in the last 72 hours. PTT  No results for input(s): APTT in the last 72 hours. Glucose  No results for input(s): POCGLU in the last 72 hours. UA   Recent Labs     03/31/19  0410   SPECGRAV 1.027   PHUR 6.0   COLORU YELLOW   PROTEINU 30*   BLOODU NEGATIVE   RBCUA 0-2   WBCUA 0-2   BACTERIA NONE   NITRU NEGATIVE   BILIRUBINUR NEGATIVE   UROBILINOGEN 0.2   KETUA NEGATIVE   LABCAST NONE SEEN  NONE SEEN   . PFTs       Echo    4/1/19- done not read yet    Cultures    Procalcitonin  Lab Results   Component Value Date    PROCAL 0.06 04/17/2017    PROCAL 0.07 03/02/2017     Rapid Influenza A/B: (-)  Radiology    CXR  3/30/19  Stable radiographic appearance of the chest. No evidence of an acute process.         CT Scans   1/16/18  PROCEDURE: CT CHEST W CONTRAST   FINDINGS: Cardiac size is normal. Minimal atherosclerotic calcifications are present in the thoracic aorta without evidence of aneurysm. There is no pleural or pericardial effusion. Emphysematous changes are noted in the bilateral lungs. There is scarring at the bilateral lung apices and bases. No lung consolidations are identified. There is no mediastinal, hilar or axillary lymphadenopathy. Bilateral gynecomastia is noted. There are no aggressive osseous lesions in the thoracic spine. 1. No acute intrathoracic findings. 2. Chronic lung disease.              Assessment   Acute on chronic hypercapnic and hypoxemic respiratory failure- improving  COPD Exacerbation  Tobacco abuse; family doesn't give cigarettes but patient will smoke cigarette \"butts\" that he finds  Schizophrenia  HTN  Full Code    Recommendations   -Monitor spO2 to keep > 90%  -Wean HFNC as tolerated keeping spO2 > 90%  -Home O2 evaluation before DC home  -BiPAP 26/6 intermittent use  -ABG this AM reviewed  -Duonebs every 4 hours w/a  -Solumedrol today with Prednisone to start tomorrow 4/2/19  -ECHO done today; pending read  -DVT prophylaxis: Lovenox    Case discussed with nurse and patient/family. Questions and concerns addressed. Meds and Orders reviewed. Electronically signed by     SAVANNAH Aleman - CNP on 4/1/2019 at 2:42 PM    No communication possible due to schizophrenia   Sleeping, would not talk to me  No respiratory distress  On HFNC good sats  Chest no whezing today at tidal volume  ABGs with acute hypercapnic failure, improving  Continue steroids, Bipap as much as Sandie allows it. ABGs in am    Patient seen and examined independently by me. Above discussed and I agree with  Robinson Bearden CNP note Also see my additional comments. Labs, cultures, and radiographs when available were reviewed. Changes were made in the orders as necessary. I discussed patient concerns with Sandie'kostas BRAMBILA and instructions were given. Respiratory care issues addressed. Please see our orders for the updated patient care plan.     Electronically signed by     Ruth Cortez MD on 4/1/2019 at 4:48 PM

## 2019-04-01 NOTE — PLAN OF CARE
Problem: RESPIRATORY  Goal: Clear lung sounds  Outcome: Ongoing  Note:   Duoneb txs ongoing to improve aeration to lungs and decrease shortness of breath     Problem: RESPIRATORY  Goal: Able to breathe comfortably  Description  Able to breathe comfortably     Outcome: Ongoing  Note:   Pt wearing Vapotherm at this time due to being unable to tolerate bipap. 35L 40% sat 92%, pt tolerates well.  Will wean as tolerated

## 2019-04-02 ENCOUNTER — APPOINTMENT (OUTPATIENT)
Dept: GENERAL RADIOLOGY | Age: 49
DRG: 189 | End: 2019-04-02
Payer: MEDICARE

## 2019-04-02 LAB
ALLEN TEST: POSITIVE
ANION GAP SERPL CALCULATED.3IONS-SCNC: 12 MEQ/L (ref 8–16)
BASE EXCESS (CALCULATED): 8.7 MMOL/L (ref -2.5–2.5)
BUN BLDV-MCNC: 15 MG/DL (ref 7–22)
CALCIUM SERPL-MCNC: 8.6 MG/DL (ref 8.5–10.5)
CHLORIDE BLD-SCNC: 99 MEQ/L (ref 98–111)
CO2: 30 MEQ/L (ref 23–33)
COLLECTED BY:: ABNORMAL
CREAT SERPL-MCNC: 0.5 MG/DL (ref 0.4–1.2)
DEVICE: ABNORMAL
GFR SERPL CREATININE-BSD FRML MDRD: > 90 ML/MIN/1.73M2
GLUCOSE BLD-MCNC: 88 MG/DL (ref 70–108)
HCO3: 39 MMOL/L (ref 23–28)
IFIO2: 40
O2 SATURATION: 78 %
PCO2: 69 MMHG (ref 35–45)
PH BLOOD GAS: 7.35 (ref 7.35–7.45)
PIP: 26 CMH2O
PO2: 47 MMHG (ref 71–104)
POTASSIUM REFLEX MAGNESIUM: 4.5 MEQ/L (ref 3.5–5.2)
SET PEEP: 6 MMHG
SODIUM BLD-SCNC: 141 MEQ/L (ref 135–145)
SOURCE, BLOOD GAS: ABNORMAL

## 2019-04-02 PROCEDURE — 6360000002 HC RX W HCPCS: Performed by: INTERNAL MEDICINE

## 2019-04-02 PROCEDURE — 94660 CPAP INITIATION&MGMT: CPT

## 2019-04-02 PROCEDURE — 2700000000 HC OXYGEN THERAPY PER DAY

## 2019-04-02 PROCEDURE — 80048 BASIC METABOLIC PNL TOTAL CA: CPT

## 2019-04-02 PROCEDURE — 2709999900 HC NON-CHARGEABLE SUPPLY

## 2019-04-02 PROCEDURE — 92523 SPEECH SOUND LANG COMPREHEN: CPT

## 2019-04-02 PROCEDURE — 94761 N-INVAS EAR/PLS OXIMETRY MLT: CPT

## 2019-04-02 PROCEDURE — 36600 WITHDRAWAL OF ARTERIAL BLOOD: CPT

## 2019-04-02 PROCEDURE — 71046 X-RAY EXAM CHEST 2 VIEWS: CPT

## 2019-04-02 PROCEDURE — 94640 AIRWAY INHALATION TREATMENT: CPT

## 2019-04-02 PROCEDURE — APPSS30 APP SPLIT SHARED TIME 16-30 MINUTES: Performed by: NURSE PRACTITIONER

## 2019-04-02 PROCEDURE — 6370000000 HC RX 637 (ALT 250 FOR IP): Performed by: INTERNAL MEDICINE

## 2019-04-02 PROCEDURE — 97530 THERAPEUTIC ACTIVITIES: CPT

## 2019-04-02 PROCEDURE — 97166 OT EVAL MOD COMPLEX 45 MIN: CPT

## 2019-04-02 PROCEDURE — 2060000000 HC ICU INTERMEDIATE R&B

## 2019-04-02 PROCEDURE — 36415 COLL VENOUS BLD VENIPUNCTURE: CPT

## 2019-04-02 PROCEDURE — 99232 SBSQ HOSP IP/OBS MODERATE 35: CPT | Performed by: INTERNAL MEDICINE

## 2019-04-02 PROCEDURE — 82803 BLOOD GASES ANY COMBINATION: CPT

## 2019-04-02 RX ORDER — METOPROLOL SUCCINATE 50 MG/1
50 TABLET, EXTENDED RELEASE ORAL DAILY
Status: DISCONTINUED | OUTPATIENT
Start: 2019-04-02 | End: 2019-04-04 | Stop reason: HOSPADM

## 2019-04-02 RX ORDER — ALBUTEROL SULFATE 2.5 MG/3ML
2.5 SOLUTION RESPIRATORY (INHALATION) EVERY 4 HOURS PRN
Qty: 120 EACH | Refills: 3 | Status: ON HOLD | OUTPATIENT
Start: 2019-04-02 | End: 2020-03-06 | Stop reason: HOSPADM

## 2019-04-02 RX ADMIN — IPRATROPIUM BROMIDE AND ALBUTEROL SULFATE 1 AMPULE: .5; 3 SOLUTION RESPIRATORY (INHALATION) at 21:24

## 2019-04-02 RX ADMIN — METOPROLOL SUCCINATE 50 MG: 50 TABLET, FILM COATED, EXTENDED RELEASE ORAL at 13:12

## 2019-04-02 RX ADMIN — AMLODIPINE BESYLATE 10 MG: 10 TABLET ORAL at 10:20

## 2019-04-02 RX ADMIN — ENOXAPARIN SODIUM 40 MG: 40 INJECTION SUBCUTANEOUS at 13:12

## 2019-04-02 RX ADMIN — IPRATROPIUM BROMIDE AND ALBUTEROL SULFATE 1 AMPULE: .5; 3 SOLUTION RESPIRATORY (INHALATION) at 17:29

## 2019-04-02 RX ADMIN — RISPERIDONE 2 MG: 2 TABLET ORAL at 22:38

## 2019-04-02 RX ADMIN — VARENICLINE TARTRATE 0.5 MG: 0.5 TABLET, FILM COATED ORAL at 10:18

## 2019-04-02 RX ADMIN — DOXYCYCLINE HYCLATE 100 MG: 100 TABLET, COATED ORAL at 10:19

## 2019-04-02 RX ADMIN — CARBAMAZEPINE 200 MG: 200 TABLET ORAL at 10:27

## 2019-04-02 RX ADMIN — PREDNISONE 40 MG: 20 TABLET ORAL at 10:18

## 2019-04-02 RX ADMIN — FUROSEMIDE 20 MG: 20 TABLET ORAL at 10:19

## 2019-04-02 RX ADMIN — IPRATROPIUM BROMIDE AND ALBUTEROL SULFATE 1 AMPULE: .5; 3 SOLUTION RESPIRATORY (INHALATION) at 08:35

## 2019-04-02 RX ADMIN — ASPIRIN 325 MG: 325 TABLET, DELAYED RELEASE ORAL at 10:18

## 2019-04-02 RX ADMIN — POTASSIUM CHLORIDE 20 MEQ: 20 TABLET, EXTENDED RELEASE ORAL at 10:18

## 2019-04-02 RX ADMIN — CARBAMAZEPINE 200 MG: 200 TABLET ORAL at 22:38

## 2019-04-02 RX ADMIN — TRAZODONE HYDROCHLORIDE 100 MG: 100 TABLET ORAL at 22:38

## 2019-04-02 RX ADMIN — IPRATROPIUM BROMIDE AND ALBUTEROL SULFATE 1 AMPULE: .5; 3 SOLUTION RESPIRATORY (INHALATION) at 12:50

## 2019-04-02 RX ADMIN — DOXYCYCLINE HYCLATE 100 MG: 100 TABLET, COATED ORAL at 22:38

## 2019-04-02 RX ADMIN — RISPERIDONE 2 MG: 2 TABLET ORAL at 10:18

## 2019-04-02 ASSESSMENT — PAIN SCALES - WONG BAKER

## 2019-04-02 ASSESSMENT — PAIN SCALES - GENERAL
PAINLEVEL_OUTOF10: 0
PAINLEVEL_OUTOF10: 0

## 2019-04-02 NOTE — PROGRESS NOTES
[facility]    Patient - Aaron Rubio   MRN -  [id number]   [facility] # - [de-identified]   - 1970      Date of Admission -  3/30/2019  6:05 AM  Date of evaluation -  2019  Room - -14/014-ANNAMARIA Rebolledo MD Primary Care Physician - Rosa Francis MD   Chief Complaint/Reason for Consult     Acute on chronic hypercapnic respiratory failure    HPI     The patient is a 48 y. o. male with past medical history of COPD on home oxygen, and tobacco abuse, presented with worsening SOB x 1 week associated with a recent URTI with cough and wheezes treated with OTC meds. He has increased sputum production, white in Colour, no hemoptysis, No fever, no chills, no N/V. He is on home oxygen and a chronic smoker. ABG 7.25//    Started on BIPAP /     CXR showed no infiltrates      PFT 2017 showed FEV1: 2.01   24% bronchodilator response  DLCO 35%    Past 24 Hours:  -Chart reviewed  -Afebrile  -HFNC 40% 15LPM 89%  -Denies any chest pain, increased SOB, fever or chills    -All systems reviewed. Meds    Current Medications    metoprolol succinate  50 mg Oral Daily    furosemide  20 mg Oral Daily    potassium chloride  20 mEq Oral Daily with breakfast    amLODIPine  10 mg Oral Daily    aspirin  325 mg Oral Daily    carBAMazepine  200 mg Oral BID    nicotine  1 patch Transdermal Daily    risperiDONE  2 mg Oral BID    traZODone  100 mg Oral Nightly    sodium chloride flush  10 mL Intravenous 2 times per day    enoxaparin  40 mg Subcutaneous Q24H    ipratropium-albuterol  1 ampule Inhalation Q4H WA    predniSONE  40 mg Oral Daily    doxycycline hyclate  100 mg Oral Q12H     haloperidol lactate, sodium chloride flush, magnesium hydroxide, ondansetron, albuterol, acetaminophen  IV Drips/Infusions    Vitals    Vitals    height is 5' 11\" (1.803 m) and weight is 228 lb (103.4 kg). His oral temperature is 98.5 °F (36.9 °C).  His blood pressure is 135/84 and his pulse is 93. His respiration is 18 and oxygen saturation is 89% (abnormal). O2 Flow Rate (L/min): 15 L/min  I/O    Intake/Output Summary (Last 24 hours) at 4/2/2019 1352  Last data filed at 4/2/2019 1315  Gross per 24 hour   Intake 1468 ml   Output 2300 ml   Net -832 ml     Patient Vitals for the past 96 hrs (Last 3 readings):   Weight   04/02/19 0345 228 lb (103.4 kg)   04/01/19 0450 228 lb 11.2 oz (103.7 kg)   03/30/19 0626 238 lb (108 kg)     Exam   Physical Exam   Constitutional: No distress on HFNC. Patient appears moderately built and  moderately nourished. Neck: Neck supple. No tracheal deviation present. No JVD   Cardiovascular: HRRR. S1 and S2 with no murmur. No peripheral edema  Pulmonary/Chest: RRR. Normal effort with bilateral air entry, clear breath sounds. No stridor. No respiratory distress. Patient exhibits no tenderness. Abdominal: Soft. Bowel sounds audible. No distension or tenderness to palp. Musculoskeletal: Moves all extremities; passive and active ROM x 4  Neurological: Patient is alert and oriented to person, place, and time. Skin: Warm and dry. No clubbing or cyanosis. No bruising or bleeding. Labs   ABG  Lab Results   Component Value Date    PH 7.35 04/02/2019    PO2 47 04/02/2019    PCO2 69 04/02/2019    HCO3 39 04/02/2019    O2SAT 78 04/02/2019     Lab Results   Component Value Date    IFIO2 40 04/02/2019    SETTIDVOL 404 03/31/2019    SETPEEP 6.0 04/02/2019     CBC  Recent Labs     04/01/19  0403   WBC 5.3   RBC 5.36   HGB 16.3   HCT 52.7*   MCV 98.3*   MCH 30.4   MCHC 30.9*      MPV 10.1      BMP  Recent Labs     03/31/19  0344 04/02/19  0357    141   K 5.2 4.5    99   CO2 27 30   BUN 18 15   CREATININE 0.5 0.5   GLUCOSE 123* 88   CALCIUM 8.7 8.6     LFT  No results for input(s): AST, ALT, ALB, BILITOT, ALKPHOS, LIPASE in the last 72 hours. Invalid input(s):   AMYLASE  TROP  Lab Results   Component Value Date    TROPONINT < 0.010 03/30/2019    TROPONINT < 0.010 03/02/2017     BNP  Lab Results   Component Value Date    PROBNP 2989.0 03/30/2019    PROBNP 113.8 03/02/2017     D-Dimer  Lab Results   Component Value Date    DDIMER 261.00 03/02/2017     Lactic Acid  No results for input(s): LACTA in the last 72 hours. INR  No results for input(s): INR, PROTIME in the last 72 hours. PTT  No results for input(s): APTT in the last 72 hours. Glucose  No results for input(s): POCGLU in the last 72 hours. UA   Recent Labs     03/31/19  0410   SPECGRAV 1.027   PHUR 6.0   COLORU YELLOW   PROTEINU 30*   BLOODU NEGATIVE   RBCUA 0-2   WBCUA 0-2   BACTERIA NONE   NITRU NEGATIVE   BILIRUBINUR NEGATIVE   UROBILINOGEN 0.2   KETUA NEGATIVE   LABCAST NONE SEEN  NONE SEEN   . PFTs       Echo    4/1/19  Summary   Technically difficult examination.   Ejection fraction is visually estimated at 45-50%.   There was mild global hypokinesis of the left ventricle.      Signature      ----------------------------------------------------------------   Electronically signed by Rossi Carreno MD (Interpreting   physician) on 04/01/2019 at 05:15 PM      Cultures    Procalcitonin  Lab Results   Component Value Date    PROCAL 0.06 04/17/2017    PROCAL 0.07 03/02/2017     Rapid Influenza A/B: (-)  Radiology    CXR  3/30/19  Stable radiographic appearance of the chest. No evidence of an acute process.         CT Scans   1/16/18  PROCEDURE: CT CHEST W CONTRAST   FINDINGS: Cardiac size is normal. Minimal atherosclerotic calcifications are present in the thoracic aorta without evidence of aneurysm. There is no pleural or pericardial effusion. Emphysematous changes are noted in the bilateral lungs. There is scarring at the bilateral lung apices and bases. No lung consolidations are identified. There is no mediastinal, hilar or axillary lymphadenopathy. Bilateral gynecomastia is noted. There are no aggressive osseous lesions in the thoracic spine.    1. No acute intrathoracic findings. 2. Chronic lung disease. Assessment   Acute on chronic hypercapnic and hypoxemic respiratory failure- improving  COPD Exacerbation  Tobacco abuse; family doesn't give cigarettes but patient will smoke cigarette \"butts\" that he finds  Schizophrenia  HTN  Full Code    Recommendations   -Monitor spO2 to keep > 90%  -Wean HFNC as tolerated keeping spO2 > 90%-   -Home O2 evaluation before DC home  -BiPAP 26/6 intermittent use  -ABG this AM reviewed acute hypercapnic failure-compensated  -Duonebs every 4 hours w/a  -Prednisone 40 mg PO daily x 5 days only  -ECHO reviewed  -Diuresis per primary  -ATB per primary  -Pt and family requesting home nebulizer ordered and DME note put in  -RX Albuterol neb every 4 hours PRN for SOB/wheezing- escribed to [facility]  -DVT prophylaxis: Lovenox    Case discussed with nurse and patient/family. Questions and concerns addressed. Meds and Orders reviewed. Electronically signed by     Ashley Cabot, APRN - CNP on 4/2/2019 at 1:52 PM    Stable  Conversant  Afebrile  On HFNC 15lpm/FiO2 40%, good sats  Chest good air movement, few wheezes  Repeat CXR in am    Patient seen and examined independently by me. Above discussed and I agree with  Cachoror Fountain CNP note Also see my additional comments. Labs, cultures, and radiographs when available were reviewed. Changes were made in the orders as necessary. I discussed patient concerns with Marlyn BRAMBILA and instructions were given. Respiratory care issues addressed. Please see our orders for the updated patient care plan.     Electronically signed by     Lynn Paulino MD on 4/2/2019 at 5:04 PM

## 2019-04-02 NOTE — PROGRESS NOTES
INTERNAL MEDICINE Progress Note  4/2/2019 10:15 AM  Subjective:   Admit Date: 3/30/2019  PCP: Anastasia Davey MD  Interval History: seen,   less SOB,   Objective:   Vitals: BP (!) 153/92   Pulse 93   Temp 98.3 °F (36.8 °C) (Oral)   Resp 22   Ht 5' 11\" (1.803 m)   Wt 228 lb (103.4 kg)   SpO2 (!) 87%   BMI 31.80 kg/m²  on high-cheryl oxygen  General appearance: alert and cooperative with exam  HEENT:  atraumatic  Neck:  no JVD  Lungs: diminished breath sounds bilaterally, no rhonchi  Heart: S1, S2 normal  Abdomen: soft, non-tender; bowel sounds normal; no masses,  no organomegaly  Extremities: no edema,   Neurologic:  alertness: alert, orientation: person, place, affect: blunted      Medications:   Scheduled Meds:   furosemide  20 mg Oral Daily    potassium chloride  20 mEq Oral Daily with breakfast    varenicline  0.5 mg Oral Daily    amLODIPine  10 mg Oral Daily    aspirin  325 mg Oral Daily    carBAMazepine  200 mg Oral BID    metoprolol succinate  25 mg Oral Daily    nicotine  1 patch Transdermal Daily    risperiDONE  2 mg Oral BID    traZODone  100 mg Oral Nightly    sodium chloride flush  10 mL Intravenous 2 times per day    enoxaparin  40 mg Subcutaneous Q24H    ipratropium-albuterol  1 ampule Inhalation Q4H WA    predniSONE  40 mg Oral Daily    doxycycline hyclate  100 mg Oral Q12H     Continuous Infusions:      Lab Results:   CBC:   Recent Labs     04/01/19  0403   WBC 5.3   HGB 16.3        BMP:    Recent Labs     03/31/19  0344 04/02/19  0357    141   K 5.2 4.5    99   CO2 27 30   BUN 18 15   CREATININE 0.5 0.5   GLUCOSE 123* 88     Magnesium:    Lab Results   Component Value Date    MG 2.0 03/30/2019     TSH:    Lab Results   Component Value Date    TSH 0.911 03/21/2019     ECHO:  Technically difficult examination.   Ejection fraction is visually estimated at 45-50%.   There was mild global hypokinesis of the left ventricle.     04/02/19 0601    Specimen Source: Blood gases     pH, Blood Gas 7.35    PCO2 69High  mmhg    PO2 47Low  mmhg    HCO3 39High  mmol/l    Base Excess (Calculated) 8.7High  mmol/l    O2 Sat 78 %    IFIO2 40    DEVICE BiPAP    SET PEEP 6.0 mmhg    PIP 26 cmH2O    Nicholas Test Positive         Assessment and Plan:   1. Acute on chronic hypercapnic resp failure  2. COPD exacerbation  3. Chronic nicotine dependence  4. Erythrocytosis secondary to #3,   5. Acute systolic CHF     duoneb aerosols. prednisone  Wean oxygen  Chantix  Lasix.  Increase metoprolol  ambulate    Lukas Rubio MD

## 2019-04-02 NOTE — PROGRESS NOTES
Ronda Castillo was evaluated today and a DME order was entered for a nebulizer compressor in order to administer Albuterol due to the diagnosis of COPD. The need for this equipment and treatment was discussed with the patient and he understands and is in agreement.       Electronically signed by SAVANNAH Otero CNP on 4/2/2019 at 1:52 PM

## 2019-04-02 NOTE — PROGRESS NOTES
[facility] 58  INPATIENT OCCUPATIONAL THERAPY  [facility] NEUROSCIENCES 4A  EVALUATION    Time:  Time In: 1420  Time Out: 1444  Timed Code Treatment Minutes: 9 Minutes  Minutes: 24          Date: 2019  Patient Name: Doug Morales,   Gender: male      MRN: [id number]  : 1970  (46 y.o.)  Referring Practitioner: Dr. Anatoliy Britton  Diagnosis: Acute on chronic respiratory failure with hypercapnia  Additional Pertinent Hx: Per ER note on 3/30/19: 46 y.o. male who presents to the ED for evaluation of shortness of breath with chest tightness that began 4 hours ago. Patient has a history of COPD. He uses O2 at home but is unaware of how many liters. Patient has had rhinorrhea, pharyngitis, and productive cough recently. On evaluation the patient states his lips and tongue feel swollen. Restrictions/Precautions:  General Precautions         Other position/activity restrictions: high flow O2, hx of schizophrenia       Past Medical History:   Diagnosis Date    Emphysema (subcutaneous) (surgical) resulting from a procedure     Hypertension     Pneumonia     Schizophrenia ([facility] [address]      History reviewed. No pertinent surgical history.         Subjective  Chart Reviewed: Yes(orders, progress notes)  Patient assessed for rehabilitation services?: Yes  Family / Caregiver Present: Yes(Maureen (mom))    Subjective: cooperative    General:       Vision: Within Functional Limits    Hearing: Within functional limits         Pain:  Pain Assessment  Patient Currently in Pain: No       Social/Functional History:  Lives With: (with mother)  Type of Home: House  Home Layout: Two level, Bed/Bath upstairs  Home Access: Stairs to enter with rails  Entrance Stairs - Number of Steps: 3  Home Equipment: Oxygen(4L O2 PLOF)     Bathroom Shower/Tub: Tub/Shower unit  Bathroom Toilet: Standard       ADL Assistance: [address]: (does \"some\" per mom)  Homemaking Responsibilities: No    Ambulation Assistance: Independent  Transfer Assistance: Independent          Additional Comments: Mother reports Pt was fully indep PLOF while on 4L O2 no AD. Objective        Overall Cognitive Status: Exceptions(Pt noted to have delusions & decreased problem solving)         Sensation  Overall Sensation Status: WFL                 LUE AROM (degrees)  LUE AROM : WFL          RUE AROM (degrees)  RUE AROM : WFL       LUE Strength  Gross LUE Strength: WFL                RUE Strength  Gross RUE Strength: WFL                 ADL  LE Dressing: Contact guard assistance(expected, able to cross LE up to adjust slipper socks, unsteady in standing)     Bed mobility  Supine to Sit: Independent    Transfers  Sit to stand: Stand by assistance  Stand to sit: Stand by assistance    Balance  Sitting Balance: Independent  Standing Balance: Contact guard assistance           Functional Mobility  Functional - Mobility Device: No device  Activity: Other(3ft)  Assist Level: Contact guard assistance  Functional Mobility Comments: unsteadiness noted              Activity Tolerance:  Activity Tolerance: Patient limited by fatigue, Treatment limited secondary to decreased cognition    Treatment Initiated:  Pt stood x 2 min then 1 min while therapist answered mother's questions. Min SOB noted with standing. Unsteadiness noted with slight anterior-posterior sway in standing. Therapist initiated education on energy conservation. Assessment:  Assessment: Pt demo decreased balance & endurance for ADL at PLOF. Continued OT recommended to facilite increased endurance & balance ease & safety for returning to ADLs & IADLs at home.    Performance deficits / Impairments: Decreased functional mobility , Decreased endurance, Decreased ADL status, Decreased balance, Decreased safe awareness, Decreased cognition  Prognosis: Fair    Clinical Decision Making: Clinical Decision making was of Moderate Complexity as the result of analysis of data from a detailed assessment, a

## 2019-04-02 NOTE — PLAN OF CARE
Problem: Falls - Risk of:  Goal: Will remain free from falls  Description  Will remain free from falls  4/2/2019 0040 by Vicki Rodriguez RN  Outcome: Ongoing  Patient remained free of falls throughout the shift. Bed alarm on at all times, bed in lowest position, call light within reach, non-skid socks on, tele-sitter in room, hourly rounding completed. Problem: Falls - Risk of:  Goal: Absence of physical injury  Description  Absence of physical injury  4/2/2019 0040 by Vicki Rodriguez RN  Outcome: Ongoing  Patient remains free of physical injury throughout the shift. Safety precautions in place. Problem: Pain:  Goal: Pain level will decrease  Description  Pain level will decrease  4/2/2019 0040 by Vicki Rodriguez RN  Outcome: Ongoing  Patient denied any pain throughout the shift using the 0-10 pain scale. Continue to monitor. Problem: OXYGENATION/RESPIRATORY FUNCTION  Goal: Patient will maintain patent airway  4/2/2019 0040 by Vicki Rodriguez RN  Outcome: Ongoing  Patient is able to maintain a patent away at this time. Pulse ox remained above 90% throughout the shift on high flow oxygen. BIPAP worn throughout the night. Problem: SKIN INTEGRITY  Goal: Skin integrity is maintained or improved  4/2/2019 0040 by Vicki Rodriguez RN  Outcome: Ongoing  No signs of skin breakdown throughout the shift. Continue to monitor. Problem: Discharge Planning:  Goal: Discharged to appropriate level of care  Description  Discharged to appropriate level of care  4/2/2019 0040 by Vicki Rodriguez RN  Outcome: Ongoing  Patient is included in discharge planning throughout the shift. Patient plans to return home with family. Care plan reviewed with patient. Patient verbalize understanding of the plan of care and contribute to goal setting.

## 2019-04-02 NOTE — PROGRESS NOTES
[facility] NEUROSCIENCES 4A  Speech - Language - Cognitive Evaluation    SLP Individual Minutes  Time In: 6297  Time Out: 7029  Minutes: 21  Timed Code Treatment Minutes: 0 Minutes       Date: 2019  Patient Name: Amy Lyons      MRN: [id number]    : 1970  (46 y.o.)  Gender: male   Referring Physician:  Trevor Kee MD  Diagnosis: acute on chronic resp failure with hypercapnia  Secondary Diagnosis:  Cognitive deficts   Diet:  General with thin   History of Present Illness/Injury: Patient admitted to [address] [place] [address] with above dx. See physician H&P for full report. ST consulted d/t cognitive deficits. ST to complete cognitive evaluation and determine goals and POC as appropriate   Past Medical History:   Diagnosis Date    Emphysema (subcutaneous) (surgical) resulting from a procedure     Hypertension     Pneumonia     Schizophrenia ([facility] [address]        Precautions: Fall Risk   Pain:  0/10     Subjective:  Patient seen at bedside alert and cooperative; patient's mother present. Dr. Ankita Cuevas in room to discuss POC with patient. SOCIAL HISTORY: Patient's mother provided social history reports he lives with her in her home. Patient reports he enjoys TV and basketball. Patient's mother reports, \"He takes care of cooking, cleaning, finances. \" Patient does not drive.      ORAL MOTOR:  Labial / Facial:  WFL  Lingual: WFL  Soft Palate: WFL  Sensation: WFL  Dentition: WFL    SPEECH / VOICE:  Speech: Patient 80-90% intelligible within conversation, no dysarthria  Voice: Dry and clear    LANGUAGE:  Receptive:  1 step commands: 3/3  2 step commands: 2/3  Simple yes/no: 3/3  Complex yes/no: 0/1    Expressive:  Automatic speech: 3/3  Sentence Completion (automatic): 3/3  Confrontational naming: 3/3  Sentence Formulation: Patient able to express basic wants and needs   Conversational speech: WFL  Paraphasias: None    COGNITION:  Portions of MOCA completed; full evaluation not completed d/t time restrains and severity of poor attention to basic tasks     Orientation: 2/6  Immediate recall:  2/5  Short term recall: 0/5  Simple Yes/No: 3/3  Complex Yes/No: 0/1  Problem solving: Impaired; no awareness of need to utilize assistance prior to ambulating; telesiter in place  Reasoning: Impaired   Sequencing: Impaired  Thought organization: Impaired  Insight: Poor   Attention: Impaired; patient required multiple redirections to attend to basic tasks     SWALLOWING:  Patient denies swallowing deficits. Patient completed PO trial of thin liquids, no s/s of aspiration. Recommend continued recommended diet    IMPRESSIONS: Patient presents with mild-moderate cognitive function as evidence by impaired memory, thought organization, reasoning, attention and problem solving. Patient with overt difficulty attending to basic tasks such as state your phone number and address. Expressive/receptive language presents to be essentially [facility]. No dysarthria, dysphonia or dysphagia. Suspect patient to be close to baseline function as patient's mother stating \"He gets confused and now he is back to his normal self. \"  Patient would benefit from short term skilled ST services within acute setting to target hospital safety and recall of newly learned medical information        REHAB POTENTIAL: [] Excellent [] Good [] Fair  [] Poor    EDUCATION:   Learner: [x]Patient [] Significant other [] Son/Daughter [x] Parent     [] Other:   Education: [x] Reviewed results and recommendations of this evaluation     [x] Reviewed diet and strategies     [x] Reviewed signs, symptoms and risk of aspiration     [] Demonstrated how to thick liquid appropriately.      [x] Reviewed goals and Plan of Care     [] OTHER:   Method: [x] Discussion [x] Demonstration [] Hand-out     [] OTHER:   Evaluation of Education:     [x] Verbalizes understanding-patient and mother [] Demonstrates with assistance     [] Demonstrates without assistance []Needs further instruction     [x] No evidence of learning-patient  [] Family not present    PLAN / TREATMENT RECOMMENDATIONS:  [x] Skilled SLP intervention on acute care 3-5 x per week or until goals met and/or pt plateaus in function.   Specific interventions for next session may include: cognitive tasks       SHORT TERM GOALS:  Short-term Goals  Timeframe for Short-term Goals: 2 weeks  Goal 1: Patient will complete basic recall and short term memory tasks with 75% accuracy provided mod cues to improve overall recall of basic medical and biographgical information  Goal 2: Patient will complete basic sequencing task swith 70% accuracy provided mod cues to improve overall thought organization  Goal 3: Patient will complete basic attention tasks with no more than 3 redirections/cues within 2 minutes in order to improve overall completion of multi-step ADL and IADL task  Goal 4: Patient will complete reasoning/problem solving task swith 75% accuracy provided mod cues to improve overall safety within hospital and home setting    LONG TERM GOALS:  No LTGs due to [address]

## 2019-04-02 NOTE — FLOWSHEET NOTE
There was an advanced directives consult on the pt but he declined, saying that he does not want it.     04/02/19 1248   Encounter Summary   Services provided to: Patient and family together   Referral/Consult From: [facility] Family members   Continue Visiting No  (4/2 )   Complexity of Encounter Low   Length of Encounter 15 minutes   Routine   Type Follow up   Assessment Approachable;Calm   Intervention Nurtured hope; Active listening;Empowerment   Outcome Acceptance;Expressed gratitude;Encouraged; Hopeful

## 2019-04-03 ENCOUNTER — CARE COORDINATION (OUTPATIENT)
Dept: CASE MANAGEMENT | Age: 49
End: 2019-04-03

## 2019-04-03 LAB
ALLEN TEST: POSITIVE
ANION GAP SERPL CALCULATED.3IONS-SCNC: 7 MEQ/L (ref 8–16)
BASE EXCESS (CALCULATED): 8.2 MMOL/L (ref -2.5–2.5)
BUN BLDV-MCNC: 11 MG/DL (ref 7–22)
CALCIUM SERPL-MCNC: 8.4 MG/DL (ref 8.5–10.5)
CHLORIDE BLD-SCNC: 98 MEQ/L (ref 98–111)
CO2: 35 MEQ/L (ref 23–33)
COLLECTED BY:: ABNORMAL
CREAT SERPL-MCNC: 0.5 MG/DL (ref 0.4–1.2)
DEVICE: ABNORMAL
GFR SERPL CREATININE-BSD FRML MDRD: > 90 ML/MIN/1.73M2
GLUCOSE BLD-MCNC: 80 MG/DL (ref 70–108)
HCO3: 36 MMOL/L (ref 23–28)
IFIO2: 40
O2 SATURATION: 85 %
PCO2: 60 MMHG (ref 35–45)
PH BLOOD GAS: 7.39 (ref 7.35–7.45)
PO2: 52 MMHG (ref 71–104)
POTASSIUM SERPL-SCNC: 3.9 MEQ/L (ref 3.5–5.2)
SODIUM BLD-SCNC: 140 MEQ/L (ref 135–145)
SOURCE, BLOOD GAS: ABNORMAL

## 2019-04-03 PROCEDURE — 2060000000 HC ICU INTERMEDIATE R&B

## 2019-04-03 PROCEDURE — 80048 BASIC METABOLIC PNL TOTAL CA: CPT

## 2019-04-03 PROCEDURE — 94761 N-INVAS EAR/PLS OXIMETRY MLT: CPT

## 2019-04-03 PROCEDURE — 6370000000 HC RX 637 (ALT 250 FOR IP): Performed by: NURSE PRACTITIONER

## 2019-04-03 PROCEDURE — 93005 ELECTROCARDIOGRAM TRACING: CPT | Performed by: INTERNAL MEDICINE

## 2019-04-03 PROCEDURE — 6370000000 HC RX 637 (ALT 250 FOR IP): Performed by: INTERNAL MEDICINE

## 2019-04-03 PROCEDURE — 6360000002 HC RX W HCPCS: Performed by: INTERNAL MEDICINE

## 2019-04-03 PROCEDURE — 6360000002 HC RX W HCPCS: Performed by: NURSE PRACTITIONER

## 2019-04-03 PROCEDURE — APPSS30 APP SPLIT SHARED TIME 16-30 MINUTES: Performed by: NURSE PRACTITIONER

## 2019-04-03 PROCEDURE — 94640 AIRWAY INHALATION TREATMENT: CPT

## 2019-04-03 PROCEDURE — 2709999900 HC NON-CHARGEABLE SUPPLY

## 2019-04-03 PROCEDURE — 2700000000 HC OXYGEN THERAPY PER DAY

## 2019-04-03 PROCEDURE — 82803 BLOOD GASES ANY COMBINATION: CPT

## 2019-04-03 PROCEDURE — 99232 SBSQ HOSP IP/OBS MODERATE 35: CPT | Performed by: INTERNAL MEDICINE

## 2019-04-03 PROCEDURE — 36600 WITHDRAWAL OF ARTERIAL BLOOD: CPT

## 2019-04-03 PROCEDURE — 36415 COLL VENOUS BLD VENIPUNCTURE: CPT

## 2019-04-03 RX ORDER — FUROSEMIDE 40 MG/1
40 TABLET ORAL ONCE
Status: COMPLETED | OUTPATIENT
Start: 2019-04-03 | End: 2019-04-03

## 2019-04-03 RX ORDER — FUROSEMIDE 10 MG/ML
20 INJECTION INTRAMUSCULAR; INTRAVENOUS ONCE
Status: DISCONTINUED | OUTPATIENT
Start: 2019-04-03 | End: 2019-04-03

## 2019-04-03 RX ORDER — FORMOTEROL FUMARATE 20 UG/2ML
20 SOLUTION RESPIRATORY (INHALATION) 2 TIMES DAILY
Status: DISCONTINUED | OUTPATIENT
Start: 2019-04-03 | End: 2019-04-04 | Stop reason: HOSPADM

## 2019-04-03 RX ADMIN — AMLODIPINE BESYLATE 10 MG: 10 TABLET ORAL at 08:18

## 2019-04-03 RX ADMIN — IPRATROPIUM BROMIDE AND ALBUTEROL SULFATE 1 AMPULE: .5; 3 SOLUTION RESPIRATORY (INHALATION) at 12:47

## 2019-04-03 RX ADMIN — IPRATROPIUM BROMIDE AND ALBUTEROL SULFATE 1 AMPULE: .5; 3 SOLUTION RESPIRATORY (INHALATION) at 07:50

## 2019-04-03 RX ADMIN — PREDNISONE 40 MG: 20 TABLET ORAL at 08:18

## 2019-04-03 RX ADMIN — FUROSEMIDE 20 MG: 20 TABLET ORAL at 08:18

## 2019-04-03 RX ADMIN — RISPERIDONE 2 MG: 2 TABLET ORAL at 08:18

## 2019-04-03 RX ADMIN — POTASSIUM CHLORIDE 20 MEQ: 20 TABLET, EXTENDED RELEASE ORAL at 08:19

## 2019-04-03 RX ADMIN — TRAZODONE HYDROCHLORIDE 100 MG: 100 TABLET ORAL at 21:06

## 2019-04-03 RX ADMIN — CARBAMAZEPINE 200 MG: 200 TABLET ORAL at 08:18

## 2019-04-03 RX ADMIN — CARBAMAZEPINE 200 MG: 200 TABLET ORAL at 21:06

## 2019-04-03 RX ADMIN — DOXYCYCLINE HYCLATE 100 MG: 100 TABLET, COATED ORAL at 13:23

## 2019-04-03 RX ADMIN — IPRATROPIUM BROMIDE AND ALBUTEROL SULFATE 1 AMPULE: .5; 3 SOLUTION RESPIRATORY (INHALATION) at 22:17

## 2019-04-03 RX ADMIN — METOPROLOL SUCCINATE 50 MG: 50 TABLET, FILM COATED, EXTENDED RELEASE ORAL at 08:18

## 2019-04-03 RX ADMIN — ASPIRIN 325 MG: 325 TABLET, DELAYED RELEASE ORAL at 08:18

## 2019-04-03 RX ADMIN — IPRATROPIUM BROMIDE AND ALBUTEROL SULFATE 1 AMPULE: .5; 3 SOLUTION RESPIRATORY (INHALATION) at 17:50

## 2019-04-03 RX ADMIN — ENOXAPARIN SODIUM 40 MG: 40 INJECTION SUBCUTANEOUS at 13:24

## 2019-04-03 RX ADMIN — FUROSEMIDE 40 MG: 40 TABLET ORAL at 16:22

## 2019-04-03 RX ADMIN — FORMOTEROL FUMARATE DIHYDRATE 20 MCG: 20 SOLUTION RESPIRATORY (INHALATION) at 22:23

## 2019-04-03 RX ADMIN — RISPERIDONE 2 MG: 2 TABLET ORAL at 21:07

## 2019-04-03 ASSESSMENT — PAIN SCALES - GENERAL
PAINLEVEL_OUTOF10: 0

## 2019-04-03 NOTE — PROGRESS NOTES
99.3 °F (37.4 °C). His blood pressure is 145/84 (abnormal) and his pulse is 84. His respiration is 16 and oxygen saturation is 90%. O2 Flow Rate (L/min): 10 L/min  I/O    Intake/Output Summary (Last 24 hours) at 4/3/2019 1434  Last data filed at 4/3/2019 1319  Gross per 24 hour   Intake 1740 ml   Output 2800 ml   Net -1060 ml     Patient Vitals for the past 96 hrs (Last 3 readings):   Weight   04/02/19 0345 228 lb (103.4 kg)   04/01/19 0450 228 lb 11.2 oz (103.7 kg)     Exam   Physical Exam   Constitutional: No distress on HFNC 40% 10LPM per NC. Patient appears moderately built and  moderately nourished. Mouth/Throat: Oropharynx is clear and moist.  No oral thrush. Poor dentation  Neck: Neck supple. No tracheal deviation present. No JVD  Cardiovascular: HRRR. S1 and S2 with no murmur. No peripheral edema  Pulmonary/Chest: RRR. Normal effort with bilateral air entry, diminished breath sounds bilaterally. No stridor. No respiratory distress. Patient exhibits no tenderness. Abdominal: Soft. Bowel sounds audible. No distension or tenderness to palp. Musculoskeletal: Moves all extremities; passive and active ROM x 4  Neurological: Patient is alert and oriented to person, place, and time. Skin: Warm and dry. No bruising or bleeding.  No cyanosis or clubbing  Labs   ABG  Lab Results   Component Value Date    PH 7.39 04/03/2019    PO2 52 04/03/2019    PCO2 60 04/03/2019    HCO3 36 04/03/2019    O2SAT 85 04/03/2019     Lab Results   Component Value Date    IFIO2 40 04/03/2019    SETTIDVOL 404 03/31/2019    SETPEEP 6.0 04/02/2019     CBC  Recent Labs     04/01/19  0403   WBC 5.3   RBC 5.36   HGB 16.3   HCT 52.7*   MCV 98.3*   MCH 30.4   MCHC 30.9*      MPV 10.1      BMP  Recent Labs     04/02/19  0357 04/03/19  0331    140   K 4.5 3.9   CL 99 98   CO2 30 35*   BUN 15 11   CREATININE 0.5 0.5   GLUCOSE 88 80   CALCIUM 8.6 8.4*     LFT  No results for input(s): AST, ALT, ALB, BILITOT, ALKPHOS, LIPASE in the last 72 hours. Invalid input(s): AMYLASE  TROP  Lab Results   Component Value Date    TROPONINT < 0.010 03/30/2019    TROPONINT < 0.010 03/02/2017     BNP  Lab Results   Component Value Date    PROBNP 2989.0 03/30/2019    PROBNP 113.8 03/02/2017     D-Dimer  Lab Results   Component Value Date    DDIMER 261.00 03/02/2017     Lactic Acid  No results for input(s): LACTA in the last 72 hours. INR  No results for input(s): INR, PROTIME in the last 72 hours. PTT  No results for input(s): APTT in the last 72 hours. Glucose  No results for input(s): POCGLU in the last 72 hours. UA   No results for input(s): SPECGRAV, PHUR, COLORU, CLARITYU, MUCUS, PROTEINU, BLOODU, RBCUA, WBCUA, BACTERIA, NITRU, GLUCOSEU, BILIRUBINUR, UROBILINOGEN, KETUA, LABCAST, LABCASTTY, AMORPHOS in the last 72 hours. Invalid input(s): CRYSTALS. PFTs       Echo    3/30/19  Summary   Technically difficult examination.   Ejection fraction is visually estimated at 45-50%.   There was mild global hypokinesis of the left ventricle.      Signature      ----------------------------------------------------------------   Electronically signed by Millie Phillips MD (Interpreting   physician) on 04/01/2019 at 05:15 PM      Cultures    Procalcitonin  Lab Results   Component Value Date    PROCAL 0.06 04/17/2017    PROCAL 0.07 03/02/2017     Rapid Influenza A/B: (-)  Radiology    CXR  4/2/19  Normal chest. No Acute findings. 3/30/19  Stable radiographic appearance of the chest. No evidence of an acute process.         CT Scans   1/16/18  PROCEDURE: CT CHEST W CONTRAST   FINDINGS: Cardiac size is normal. Minimal atherosclerotic calcifications are present in the thoracic aorta without evidence of aneurysm. There is no pleural or pericardial effusion. Emphysematous changes are noted in the bilateral lungs. There is scarring at the bilateral lung apices and bases. No lung consolidations are identified.  There is no mediastinal, hilar or axillary lymphadenopathy. Bilateral gynecomastia is noted. There are no aggressive osseous lesions in the thoracic spine. 1. No acute intrathoracic findings. 2. Chronic lung disease. Assessment   Acute on chronic hypercapnic and hypoxemic respiratory failure- improving  COPD Exacerbation  Tobacco abuse; family doesn't give cigarettes but patient will smoke cigarette \"butts\" that he finds  Schizophrenia  HTN  Full Code    Recommendations   -Monitor spO2 to keep > 90%  -RT communication order in to place patient on 6LPM via NC with HFNC still on standby if needed.   -Wean HFNC as tolerated keeping spO2 > 90%-   -Home O2 evaluation before DC home  -BiPAP 26/6 intermittent use  -ABG this AM- compensated  -Duonebs every 4 hours w/a  -Added Perforomist neb BID  -Prednisone 40 mg PO daily x 5 days only  -Diuresis per primary; ordered Lasix 40 mg PO one time today (+2L on I&O)-pt has no IV access  -BMP in AM  -ATB per primary  -DVT prophylaxis: Lovenox    Case discussed with nurse and patient/family. Questions and concerns addressed. Meds and Orders reviewed. Electronically signed by     SAVANNAH Ortiz - CNP on 4/3/2019 at 2:34 PM    Extensive fibrotic changes related to COPD help explain lack of rapid improvement in oxygen saturation  Continue diuresis, steroids, bronchodilators    Patient seen and examined independently by me. Above discussed and I agree with Marcello Salazar CNP note Also see my additional comments. Labs, cultures, and radiographs when available were reviewed. Changes were made in the orders as necessary. I discussed patient concerns with Sandie's R.N. and instructions were given. Respiratory care issues addressed. Please see our orders for the updated patient care plan.     Electronically signed by     Kristi Chavez MD on 4/3/2019 at 9:32 PM

## 2019-04-03 NOTE — PLAN OF CARE
Problem: RESPIRATORY  Goal: Clear lung sounds  4/3/2019 0759 by Chas Goyal RCP  Outcome: Ongoing     Problem: RESPIRATORY  Goal: Able to breathe comfortably  Description  Able to breathe comfortably     4/3/2019 0759 by Chas Goyal RCP  Outcome: Ongoing   Will continue with treatments to help improve aeration t/o lungs and wean vaoptherm as tolerated

## 2019-04-03 NOTE — PLAN OF CARE
Problem: Falls - Risk of:  Goal: Will remain free from falls  Description  Will remain free from falls  Outcome: Ongoing  Note:   No falls yet this shift. Pt using call light appropriately to call for assistance with ambulation to the bathroom and to chair. Pt is also compliant with use of non-skid slippers. Pt reports understanding of fall prevention when discussed. Goal: Absence of physical injury  Description  Absence of physical injury  Outcome: Ongoing  Note:   No falls yet this shift. Pt using call light appropriately to call for assistance with ambulation to the bathroom and to chair. Pt is also compliant with use of non-skid slippers. Pt reports understanding of fall prevention when discussed. Problem: Pain:  Goal: Pain level will decrease  Description  Pain level will decrease   4/3/2019 0010 by Marika Webb RN  Outcome: Ongoing  Note:   Pt report pain at 0 on scale. Pt has oral medication helping to achieve pain goal of a 0 on scale. 4/3/2019 0007 by Marika Webb RN  Reactivated  4/3/2019 0007 by Marika Webb RN  Outcome: Completed     Problem: OXYGENATION/RESPIRATORY FUNCTION  Goal: Patient will maintain patent airway  Outcome: Ongoing  Note:   Patient on high flow oxygen at 15L with SPO2 of 92-93%. Will continue to monitor. Goal: Patient will achieve/maintain normal respiratory rate/effort  Description  Respiratory rate and effort will be within normal limits for the patient  Outcome: Ongoing  Note:   Patient on high flow oxygen at 15L with SPO2 of 92-93%. Will continue to monitor. Respiratory rate 15-16 this shift. Problem: SKIN INTEGRITY  Goal: Skin integrity is maintained or improved  Outcome: Ongoing  Note:   No new skin impairments noted. Pt understands the importance of frequent repositioning in order to prevent any skin breakdown.       Problem: Discharge Planning:  Goal: Discharged to appropriate level of care  Description  Discharged to appropriate level of care  Outcome: Ongoing  Note:   Pt plans home with mom at discharge. Care manager and social working helping with discharge needs. Problem: Musculor/Skeletal Functional Status  Goal: Highest potential functional level  Outcome: Ongoing  Note:   Up with 1 assist and tolerates ambulation fairly well. Tolerates working with PT and OT well. Care plan reviewed with patient. Patient verbalizes understanding of the plan of care and contributes to goal setting.

## 2019-04-03 NOTE — PLAN OF CARE
Problem: RESPIRATORY  Goal: Clear lung sounds  Outcome: Ongoing  Note:   Treatment will be continued as ordered to improve aeration. Problem: RESPIRATORY  Goal: Able to breathe comfortably  Description  Able to breathe comfortably     Outcome: Ongoing  Note:   Patient remains on vapotherm at this time. Tolerating well.

## 2019-04-03 NOTE — CARE COORDINATION
[address] Transitions Interview     4/3/2019    Patient: Deedee Burris Patient : 1970   MRN: [id number]  Reason for Admission:   RARS: Readmission Risk Score: 13         Reviewed nH Predict Tool with patient and SW/CM. nH Predict Tool uploaded in the media tab. Recommendation is for SNF and discharge plan is to return home with mother. Readmission Risk  Patient Active Problem List   Diagnosis    Acute on chronic respiratory failure with hypercapnia ([facility] [address]    Respiratory insufficiency/failure       Inpatient Assessment  Care Transitions Summary    Care Transitions Inpatient Review  Medication Review  Housing Review  Social Support  Durable Medical Equipment  Functional Review  Hearing and Vision  Care Transitions Interventions         Follow Up  No future appointments.     Health Maintenance  Health Maintenance Due   Topic Date Due    A1C test (Diabetic or Prediabetic)  2018       Louie Zamudio RN

## 2019-04-03 NOTE — PLAN OF CARE
Problem: Falls - Risk of:  Goal: Will remain free from falls  Description  Will remain free from falls  Outcome: Ongoing  Note:   Pt remains free of falls this shift, ambulates short distances without difficulty, does not always use call light appropriately so bed alarm on at all times and tele sitter in room, family at bedside majority of the shift. Problem: Falls - Risk of:  Goal: Absence of physical injury  Description  Absence of physical injury  Outcome: Ongoing  Note:   Pt remains free of physical injury this shift, will continue to provide a safe environment and keep tele sitter at bedside. Problem: Pain:  Goal: Pain level will decrease  Description  Pain level will decrease   Outcome: Ongoing  Note:   Pain Assessment: 0-10  Pain Level: 0   Pain goal:  0   Is pain goal met at this time? Yes     Additional interventions to be implemented: medications Tylenol, position change and rest         Problem: OXYGENATION/RESPIRATORY FUNCTION  Goal: Patient will maintain patent airway  Outcome: Ongoing  Note:   Pt has patent airway, breathing without difficulty, lungs remain clear/diminished, adequate cough, will continue to monitor. Problem: OXYGENATION/RESPIRATORY FUNCTION  Goal: Patient will achieve/maintain normal respiratory rate/effort  Description  Respiratory rate and effort will be within normal limits for the patient  Outcome: Ongoing  Note:   Pt breathing without difficulty at rest, somewhat dyspneic on exertion, will continue to monitor. Problem: SKIN INTEGRITY  Goal: Skin integrity is maintained or improved  Outcome: Ongoing  Note:   No new skin issues noted this shift, will continue to encourage frequent repositioning, pt turns self adequately. Problem: Discharge Planning:  Goal: Discharged to appropriate level of care  Description  Discharged to appropriate level of care  Outcome: Ongoing  Note:   Planning to go home with mother when medically stable, will continue to monitor. Problem: Musculor/Skeletal Functional Status  Goal: Highest potential functional level  Outcome: Ongoing  Note:   Pt is near his baseline functional level, will continue to work with OT and ST. Problem: Breathing Pattern - Ineffective:  Goal: Ability to achieve and maintain a regular respiratory rate will improve  Description  Ability to achieve and maintain a regular respiratory rate will improve  Outcome: Ongoing  Note:   Pt's respiratory rate remains regular, will continue to wean high flow nasal cannula as tolerated, will continue to monitor. Care plan reviewed with patient and family. Patient and family verbalize understanding of the plan of care and contribute to goal setting.

## 2019-04-03 NOTE — PROGRESS NOTES
INTERNAL MEDICINE Progress Note  4/3/2019 12:19 PM  Subjective:   Admit Date: 3/30/2019  PCP: Rae Puentes MD  Interval History: seen,   less SOB but remains on vapotherm FiO2 40%  Objective:   Vitals: BP (!) 141/87   Pulse 80   Temp 97.7 °F (36.5 °C) (Oral)   Resp 18   Ht 5' 11\" (1.803 m)   Wt 228 lb (103.4 kg)   SpO2 91%   BMI 31.80 kg/m²  on high-cheryl oxygen  General appearance: alert and cooperative with exam  HEENT:  atraumatic  Neck:  no JVD  Lungs: diminished breath sounds bilaterally,   Heart: S1, S2 normal  Abdomen: soft, non-tender; bowel sounds normal; no masses,  no organomegaly  Extremities: no edema,   Neurologic:  alertness: alert, orientation: person, place, affect: blunted      Medications:   Scheduled Meds:   metoprolol succinate  50 mg Oral Daily    furosemide  20 mg Oral Daily    potassium chloride  20 mEq Oral Daily with breakfast    amLODIPine  10 mg Oral Daily    aspirin  325 mg Oral Daily    carBAMazepine  200 mg Oral BID    nicotine  1 patch Transdermal Daily    risperiDONE  2 mg Oral BID    traZODone  100 mg Oral Nightly    sodium chloride flush  10 mL Intravenous 2 times per day    enoxaparin  40 mg Subcutaneous Q24H    ipratropium-albuterol  1 ampule Inhalation Q4H WA    doxycycline hyclate  100 mg Oral Q12H     Continuous Infusions:      Lab Results:   CBC:   Recent Labs     04/01/19  0403   WBC 5.3   HGB 16.3        BMP:    Recent Labs     04/02/19  0357 04/03/19  0331    140   K 4.5 3.9   CL 99 98   CO2 30 35*   BUN 15 11   CREATININE 0.5 0.5   GLUCOSE 88 80     Magnesium:    Lab Results   Component Value Date    MG 2.0 03/30/2019     TSH:    Lab Results   Component Value Date    TSH 0.911 03/21/2019     ECHO:  Technically difficult examination.   Ejection fraction is visually estimated at 45-50%.   There was mild global hypokinesis of the left ventricle.     04/02/19 0601    Specimen Source: Blood gases     pH, Blood Gas 7.35    PCO2 69High  mmhg

## 2019-04-03 NOTE — CARE COORDINATION
Remains on high flow oxygen, continue to wean. Oral prednisone x 5 days initiated. Duonebs, oral lasix, and Doxycycline continue. pln is home with mother at discharge.   Electronically signed by Zina Homans, RN on 4/3/2019 at 10:46 AM

## 2019-04-04 VITALS
OXYGEN SATURATION: 92 % | HEIGHT: 71 IN | SYSTOLIC BLOOD PRESSURE: 135 MMHG | HEART RATE: 90 BPM | DIASTOLIC BLOOD PRESSURE: 69 MMHG | BODY MASS INDEX: 30.73 KG/M2 | WEIGHT: 219.5 LBS | RESPIRATION RATE: 16 BRPM | TEMPERATURE: 97.9 F

## 2019-04-04 PROBLEM — J44.1 CHRONIC OBSTRUCTIVE PULMONARY DISEASE WITH ACUTE EXACERBATION (HCC): Status: ACTIVE | Noted: 2019-04-04

## 2019-04-04 PROBLEM — I50.23 ACUTE ON CHRONIC SYSTOLIC CONGESTIVE HEART FAILURE (HCC): Status: ACTIVE | Noted: 2019-04-04

## 2019-04-04 PROBLEM — Z72.0 NICOTINE ABUSE: Status: ACTIVE | Noted: 2019-04-04

## 2019-04-04 LAB
ALLEN TEST: POSITIVE
ANION GAP SERPL CALCULATED.3IONS-SCNC: 9 MEQ/L (ref 8–16)
BASE EXCESS (CALCULATED): 8.7 MMOL/L (ref -2.5–2.5)
BUN BLDV-MCNC: 11 MG/DL (ref 7–22)
CALCIUM SERPL-MCNC: 8.6 MG/DL (ref 8.5–10.5)
CHLORIDE BLD-SCNC: 96 MEQ/L (ref 98–111)
CO2: 34 MEQ/L (ref 23–33)
COLLECTED BY:: ABNORMAL
CREAT SERPL-MCNC: 0.6 MG/DL (ref 0.4–1.2)
DEVICE: ABNORMAL
EKG ATRIAL RATE: 67 BPM
EKG P AXIS: 37 DEGREES
EKG P-R INTERVAL: 162 MS
EKG Q-T INTERVAL: 410 MS
EKG QRS DURATION: 108 MS
EKG QTC CALCULATION (BAZETT): 433 MS
EKG R AXIS: 85 DEGREES
EKG T AXIS: 32 DEGREES
EKG VENTRICULAR RATE: 67 BPM
GFR SERPL CREATININE-BSD FRML MDRD: > 90 ML/MIN/1.73M2
GLUCOSE BLD-MCNC: 82 MG/DL (ref 70–108)
HCO3: 37 MMOL/L (ref 23–28)
IFIO2: 5
O2 SATURATION: 80 %
PCO2: 62 MMHG (ref 35–45)
PH BLOOD GAS: 7.39 (ref 7.35–7.45)
PO2: 46 MMHG (ref 71–104)
POTASSIUM SERPL-SCNC: 3.9 MEQ/L (ref 3.5–5.2)
REASON FOR REJECTION: NORMAL
REJECTED TEST: NORMAL
SODIUM BLD-SCNC: 139 MEQ/L (ref 135–145)
SOURCE, BLOOD GAS: ABNORMAL
TROPONIN T: < 0.01 NG/ML
TROPONIN T: < 0.01 NG/ML

## 2019-04-04 PROCEDURE — 6370000000 HC RX 637 (ALT 250 FOR IP): Performed by: INTERNAL MEDICINE

## 2019-04-04 PROCEDURE — 93010 ELECTROCARDIOGRAM REPORT: CPT | Performed by: INTERNAL MEDICINE

## 2019-04-04 PROCEDURE — 97110 THERAPEUTIC EXERCISES: CPT

## 2019-04-04 PROCEDURE — 6360000002 HC RX W HCPCS: Performed by: NURSE PRACTITIONER

## 2019-04-04 PROCEDURE — 36600 WITHDRAWAL OF ARTERIAL BLOOD: CPT

## 2019-04-04 PROCEDURE — 97127 HC SP THER IVNTJ W/FOCUS COG FUNCJ: CPT

## 2019-04-04 PROCEDURE — 82803 BLOOD GASES ANY COMBINATION: CPT

## 2019-04-04 PROCEDURE — 84484 ASSAY OF TROPONIN QUANT: CPT

## 2019-04-04 PROCEDURE — 2709999900 HC NON-CHARGEABLE SUPPLY

## 2019-04-04 PROCEDURE — 6360000002 HC RX W HCPCS: Performed by: INTERNAL MEDICINE

## 2019-04-04 PROCEDURE — 94761 N-INVAS EAR/PLS OXIMETRY MLT: CPT

## 2019-04-04 PROCEDURE — 2580000003 HC RX 258: Performed by: INTERNAL MEDICINE

## 2019-04-04 PROCEDURE — 99232 SBSQ HOSP IP/OBS MODERATE 35: CPT | Performed by: INTERNAL MEDICINE

## 2019-04-04 PROCEDURE — 36415 COLL VENOUS BLD VENIPUNCTURE: CPT

## 2019-04-04 PROCEDURE — 80048 BASIC METABOLIC PNL TOTAL CA: CPT

## 2019-04-04 PROCEDURE — APPSS30 APP SPLIT SHARED TIME 16-30 MINUTES: Performed by: NURSE PRACTITIONER

## 2019-04-04 PROCEDURE — 97530 THERAPEUTIC ACTIVITIES: CPT

## 2019-04-04 PROCEDURE — 94640 AIRWAY INHALATION TREATMENT: CPT

## 2019-04-04 RX ORDER — FORMOTEROL FUMARATE 20 UG/2ML
20 SOLUTION RESPIRATORY (INHALATION) 2 TIMES DAILY
Qty: 120 ML | Refills: 3 | Status: ON HOLD | OUTPATIENT
Start: 2019-04-04 | End: 2020-03-06 | Stop reason: SDUPTHER

## 2019-04-04 RX ORDER — BUDESONIDE 0.5 MG/2ML
500 INHALANT ORAL 2 TIMES DAILY
Qty: 60 AMPULE | Refills: 3 | Status: ON HOLD | OUTPATIENT
Start: 2019-04-04 | End: 2021-12-23 | Stop reason: SDUPTHER

## 2019-04-04 RX ORDER — POTASSIUM CHLORIDE 20 MEQ/1
20 TABLET, EXTENDED RELEASE ORAL
Qty: 60 TABLET | Refills: 3 | Status: ON HOLD | OUTPATIENT
Start: 2019-04-05 | End: 2020-05-28 | Stop reason: HOSPADM

## 2019-04-04 RX ORDER — FUROSEMIDE 20 MG/1
20 TABLET ORAL DAILY
Qty: 60 TABLET | Refills: 3 | Status: ON HOLD | OUTPATIENT
Start: 2019-04-05 | End: 2020-03-06 | Stop reason: HOSPADM

## 2019-04-04 RX ORDER — NICOTINE 21 MG/24HR
1 PATCH, TRANSDERMAL 24 HOURS TRANSDERMAL DAILY
Qty: 30 PATCH | Refills: 3 | Status: ON HOLD | OUTPATIENT
Start: 2019-04-04 | End: 2019-12-17 | Stop reason: HOSPADM

## 2019-04-04 RX ADMIN — POTASSIUM CHLORIDE 20 MEQ: 20 TABLET, EXTENDED RELEASE ORAL at 10:55

## 2019-04-04 RX ADMIN — RISPERIDONE 2 MG: 2 TABLET ORAL at 10:50

## 2019-04-04 RX ADMIN — METOPROLOL SUCCINATE 50 MG: 50 TABLET, FILM COATED, EXTENDED RELEASE ORAL at 10:50

## 2019-04-04 RX ADMIN — IPRATROPIUM BROMIDE AND ALBUTEROL SULFATE 1 AMPULE: .5; 3 SOLUTION RESPIRATORY (INHALATION) at 08:45

## 2019-04-04 RX ADMIN — FUROSEMIDE 20 MG: 20 TABLET ORAL at 10:50

## 2019-04-04 RX ADMIN — DOXYCYCLINE HYCLATE 100 MG: 100 TABLET, COATED ORAL at 10:50

## 2019-04-04 RX ADMIN — Medication 10 ML: at 10:51

## 2019-04-04 RX ADMIN — IPRATROPIUM BROMIDE AND ALBUTEROL SULFATE 1 AMPULE: .5; 3 SOLUTION RESPIRATORY (INHALATION) at 12:23

## 2019-04-04 RX ADMIN — DOXYCYCLINE HYCLATE 100 MG: 100 TABLET, COATED ORAL at 00:05

## 2019-04-04 RX ADMIN — ENOXAPARIN SODIUM 40 MG: 40 INJECTION SUBCUTANEOUS at 10:55

## 2019-04-04 RX ADMIN — CARBAMAZEPINE 200 MG: 200 TABLET ORAL at 10:50

## 2019-04-04 RX ADMIN — FORMOTEROL FUMARATE DIHYDRATE 20 MCG: 20 SOLUTION RESPIRATORY (INHALATION) at 08:55

## 2019-04-04 RX ADMIN — ASPIRIN 325 MG: 325 TABLET, DELAYED RELEASE ORAL at 10:55

## 2019-04-04 RX ADMIN — AMLODIPINE BESYLATE 10 MG: 10 TABLET ORAL at 10:50

## 2019-04-04 ASSESSMENT — PAIN SCALES - GENERAL: PAINLEVEL_OUTOF10: 0

## 2019-04-04 NOTE — PROGRESS NOTES
Kj Aguilar 58  INPATIENT OCCUPATIONAL THERAPY  [facility] NEUROSCIENCES 4A  DAILY NOTE    Time:  Time In: 6848  Time Out: 0818  Timed Code Treatment Minutes: 26 Minutes  Minutes: 26          Date: 2019  Patient Name: Angie Braden,   Gender: male      Room: [facility]14/014-A  MRN: [id number]  : 1970  (46 y.o.)  Referring Practitioner: Dr. Adan Alatorre  Diagnosis: Acute on chronic respiratory failure with hypercapnia  Additional Pertinent Hx: Per ER note on 3/30/19: 46 y.o. male who presents to the ED for evaluation of shortness of breath with chest tightness that began 4 hours ago. Patient has a history of COPD. He uses O2 at home but is unaware of how many liters. Patient has had rhinorrhea, pharyngitis, and productive cough recently. On evaluation the patient states his lips and tongue feel swollen. Past Medical History:   Diagnosis Date    Emphysema (subcutaneous) (surgical) resulting from a procedure     Hypertension     Pneumonia     Schizophrenia ([facility] [address]      History reviewed. No pertinent surgical history. Restrictions/Precautions:  General Precautions                    Other position/activity restrictions: high flow O2, hx of schizophrenia       Prior Level of Function:  ADL Assistance: Independent  Homemaking Assistance: (does \"some\" per mom)  Ambulation Assistance: Independent  Transfer Assistance: Independent  Additional Comments: Mother reports Pt was fully indep PLOF while on 4L O2 no AD. Subjective       Subjective: pt lying in bed sleeping this am.  Mom present for therapy session.  Pt and mother agreeable to OT   Comments: RN okayed therapy session     Pain:  Pain Assessment  Patient Currently in Pain: Denies       Objective  Overall Cognitive Status: Exceptions(pt having difficulty problem solving)       ADL  LE Dressing: Contact guard assistance(sitting EOB to adjust slipper socks crossing legs, unsteady at times )          Bed mobility  Supine to Sit: Independent    Transfers  Sit to stand: Stand by assistance  Stand to sit: Stand by assistance  Transfer Comments: unsteady at times when initally stand up        Balance  Sitting Balance: Independent  Standing Balance: Contact guard assistance     Time: x1 min   Activity: prep for mobility      Functional Mobility  Functional - Mobility Device: No device  Activity: Other  Assist Level: Contact guard assistance  Functional Mobility Comments: pt ambulated around unit with no device. No LOB, unsteady at times throughout mobility   Apparatus Needs: O2(5 L )        Comment: BUE AROM with mod resistvie band exercises compelte x15 reps x1 set sitting in recliner. Pt completed in all planes and joints to increase strength and endurance needed for ADLS and transfesrs        Activity Tolerance:  Activity Tolerance: Patient Tolerated treatment well    Assessment:     Performance deficits / Impairments: Decreased functional mobility , Decreased endurance, Decreased ADL status, Decreased balance, Decreased safe awareness, Decreased cognition  Prognosis: Fair    Discharge Recommendations:  Discharge Recommendations: 24 hour supervision or assist(home with mother )    Patient Education:  Patient Education: safety with mobility, energy conservation   Barriers to Learning: decreased cognition    Equipment Recommendations: Other: monitor for AD needs    Safety:  Safety Devices in place: Yes  Type of devices:  All fall risk precautions in place, Left in chair, Call light within reach, Chair alarm in place, Gait belt, Nurse notified(telesitter in room and mother in room )    Plan:  Times per week: 3-5x  Current Treatment Recommendations: Self-Care / ADL, Safety Education & Training, Balance Training, Endurance Training, Strengthening    Goals:  Patient goals : Pt did not state, mom asking about getting O2 weaned down    Short term goals  Time Frame for Short term goals: 2 weeks  Short term goal 1: Complete 2-3 min standing ADL with S & 0-1 vcs for safety for increased ease of sinkside grooming  Short term goal 2: Complete various t/fs with S & 0-1 vcs for safety  Short term goal 3: Complete BUE light strengthening HEP to increase UB endurance for assisting with IADL tasks at home  Short term goal 4: Complete mobility to/from bathroom with S & AD prn  Short term goal 5: Pt/mother demo fair understanding of energy conservation stratetgies as evidenced by id 2 with min vcs  Long term goals  Time Frame for Long term goals : No LTG set d/t  short ELOS  If patient is discharged prior to progress note completion, this note is to serve as the discharge note with all goals being unmet unless indicated otherwise.

## 2019-04-04 NOTE — DISCHARGE SUMMARY
Discharge Summary    Nora Tobin  :  1970  MRN:  [id number]    Admit date:  3/30/2019  Discharge date:      Admitting Physician:  Rae Puentes MD    Discharge Diagnoses:    1. Acute on chronic hypercapnic resp failure  2. COPD exacerbation  3. Chronic nicotine dependence  4. Erythrocytosis secondary to #3,   5. Acute on chronic systolic CHF      Admission Condition:  serious  Discharged Condition:  good    Hospital Course:   Patient responded to bronchodilators, steroid, BiPAP, lasix.     Discharge Medications:       Roberta Thrasher   Home Medication Instructions ZET:[id number]    Printed on:19 7110   Medication Information                      acetaminophen (TYLENOL) 325 MG tablet  Take 2 tablets by mouth every 4 hours as needed for Pain             albuterol (PROVENTIL) (2.5 MG/3ML) 0.083% nebulizer solution  Take 3 mLs by nebulization every 4 hours as needed for Wheezing or Shortness of Breath             amLODIPine (NORVASC) 10 MG tablet  Take 1 tablet by mouth daily             aspirin 325 MG EC tablet  Take 1 tablet by mouth daily             budesonide (PULMICORT) 0.5 MG/2ML nebulizer suspension  Take 2 mLs by nebulization 2 times daily             carBAMazepine (TEGRETOL) 200 MG tablet  Take 200 mg by mouth 2 times daily             formoterol (PERFOROMIST) 20 MCG/2ML nebulizer solution  Take 2 mLs by nebulization 2 times daily             furosemide (LASIX) 20 MG tablet  Take 1 tablet by mouth daily             metoprolol succinate (TOPROL XL) 25 MG extended release tablet  Take 1 tablet by mouth daily             nicotine (NICODERM CQ) 21 MG/24HR  Place 1 patch onto the skin daily             potassium chloride (KLOR-CON M) 20 MEQ extended release tablet  Take 1 tablet by mouth daily (with breakfast)             risperiDONE (RISPERDAL) 2 MG tablet  Take 2 mg by mouth 2 times daily             traZODone (DESYREL) 100 MG tablet  Take 100 mg by mouth nightly                 Consults:

## 2019-04-04 NOTE — PROGRESS NOTES
INTERNAL MEDICINE Progress Note  4/4/2019 5:17 PM  Subjective:   Admit Date: 3/30/2019  PCP: Lukas Rubio MD  Interval History: seen,   less SOB , on O2 via NC 5L  Objective:   Vitals: /69   Pulse 90   Temp 97.9 °F (36.6 °C) (Oral)   Resp 16   Ht 5' 11\" (1.803 m)   Wt 219 lb 8 oz (99.6 kg)   SpO2 92%   BMI 30.61 kg/m²    General appearance: alert and cooperative with exam  HEENT:  atraumatic  Neck:  no JVD  Lungs: diminished breath sounds bilaterally,   Heart: S1, S2 normal  Abdomen: soft, non-tender; bowel sounds normal; no masses,  no organomegaly  Extremities: no edema,   Neurologic:  alertness: alert, orientation: person, place, affect: blunted      Medications:   Scheduled Meds:   formoterol  20 mcg Nebulization BID    metoprolol succinate  50 mg Oral Daily    furosemide  20 mg Oral Daily    potassium chloride  20 mEq Oral Daily with breakfast    amLODIPine  10 mg Oral Daily    aspirin  325 mg Oral Daily    carBAMazepine  200 mg Oral BID    nicotine  1 patch Transdermal Daily    risperiDONE  2 mg Oral BID    traZODone  100 mg Oral Nightly    sodium chloride flush  10 mL Intravenous 2 times per day    enoxaparin  40 mg Subcutaneous Q24H    ipratropium-albuterol  1 ampule Inhalation Q4H WA    doxycycline hyclate  100 mg Oral Q12H     Continuous Infusions:      Lab Results:   CBC:   No results for input(s): WBC, HGB, PLT in the last 72 hours.   BMP:    Recent Labs     04/02/19  0357 04/03/19  0331 04/04/19  0440    140 139   K 4.5 3.9 3.9   CL 99 98 96*   CO2 30 35* 34*   BUN 15 11 11   CREATININE 0.5 0.5 0.6   GLUCOSE 88 80 82     Magnesium:    Lab Results   Component Value Date    MG 2.0 03/30/2019     TSH:    Lab Results   Component Value Date    TSH 0.911 03/21/2019     ECHO:  Technically difficult examination.   Ejection fraction is visually estimated at 45-50%.   There was mild global hypokinesis of the left ventricle.     04/02/19 0601    Specimen Source: Blood gases pH, Blood Gas 7.35    PCO2 69High  mmhg    PO2 47Low  mmhg    HCO3 39High  mmol/l    Base Excess (Calculated) 8.7High  mmol/l    O2 Sat 78 %    IFIO2 40    DEVICE BiPAP    SET PEEP 6.0 mmhg    PIP 26 cmH2O    Nicholas Test Positive         Assessment and Plan:   1. Acute on chronic hypercapnic resp failure  2. COPD exacerbation  3. Chronic nicotine dependence  4. Erythrocytosis secondary to #3,   5. Acute systolic CHF     duoneb aerosols. Prednisone po  NC oxygen  Chantix  Lasix. metoprolol  Stable for dc home. Must use Oxygen at home.     Nila Miles MD

## 2019-04-04 NOTE — PROGRESS NOTES
Updated [facility] home oxygen about changes with evaluation information faxed to office and portal tank from floor sent with patient with home oxygen to  tank tomorrow

## 2019-04-04 NOTE — PLAN OF CARE
Problem: RESPIRATORY  Goal: Clear lung sounds  Outcome: Ongoing   Breath sounds diminished, pt on duoneb and perforomist.

## 2019-04-04 NOTE — PROGRESS NOTES
A home oxygen evaluation has been completed. [x]Patient is an inpatient. It is expected that the patient will be discharged within the next 48 hours. Qualified provider to write order for home prescription if patient qualifies. Social service/care managers will arrange for home oxygen. If patient is active, arrange for Home Medical supplier to assess for Oxygen Conserving Device per pulse oximetry. []Patient is an outpatient. Results will be faxed to the ordering provider. Qualified provider to write order for home prescription if patient qualifies and arranges for home oxygen. Patient was placed on room air for 20 minutes. SpO2 was 84 % on room air at rest. 3 liters maintains a sat. of 92% Patient was walked for 6 minutes. SpO2 was 78 % during walking. Patients SpO2 was below 89% and qualified for home oxygen. Oxygen was applied at 6 lpm via nasal cannula to maintain a SpO2 between 90-92% while walking. Actual SpO2 was 90 %. If oxygen need is greater than 4 lpm the SpO2 on 4 lpm was 86. Note: For any SpO2 at 38% see policy and procedure for possible qualifications.

## 2019-04-04 NOTE — PROGRESS NOTES
[address] THERAPY  [facility] NEUROSCIENCES 4A    TIME   SLP Individual Minutes  Time In: 9965  Time Out: 8110  Minutes: 13  Timed Code Treatment Minutes: 15 Minutes       []Daily Note  []Progress Note  [x]Discharge Note    Date: 2019  Patient Name: Mervin Steiner        MRN: [id number]    : 1970  (46 y.o.)  Gender: male   Primary Provider: Adarsh Anders MD  Admitting Diagnosis:  Acute on chronic respiratory failure with hypercapnia   Secondary Diagnosis: Cognitive deficits   Precautions: Fall Risk   Swallowing Status/Diet: Regular with thin   Swallowing Strategies: Standard   DATE of last MBS:  N/a   Pain: 0/10    Subjective: Patient seen sitting upright in chair; alert and cooperative. Patient's mother present at beginning of session reporting \"Sandie is back to hospitals. He is doing much better and thinking the same. \"  Patient's mother reports, \"Yes I feel comfortable taking him home. \"     SHORT TERM GOAL #1:  Goal 1: Patient will complete basic recall and short term memory tasks with 75% accuracy provided mod cues to improve overall recall of basic medical and biographgical information GOAL MET.    INTERVENTIONS: Recall of basic orientation:    Month-min cues  Date-min cues  MARISOL-min cues  Season-indep  Year-indep  Location-idep  *Patient's mother reports, \"See he wasn't paying attention when the new month changed, that is normal.\"     Patient indep recalled he is supposed to stop smoking and is currently on a medication to help that    [address] #2:  Goal 2: Patient will complete basic sequencing task swith 70% accuracy provided mod cues to improve overall thought organization GOAL MET  INTERVENTIONS: Divergent naming: Target 8 members/60 sec  Trial 1 Sports: 8 indep  Trial 2 TV: 8 indep  Trial 3 Breakfast foods: 8 indep  Trial 4 Drinks: 7 indep, 1 min cues     SHORT TERM GOAL #3:  Goal 3: Patient will complete basic attention tasks with no more than 3 redirections/cues within 2 minutes in order to improve overall completion of multi-step ADL and IADL task GOAL MET  INTERVENTIONS: Patient required min-mod level of cues to attend to basic therapy tasks, suspect patient to be at baseline    [address] #4:  Goal 4: Patient will complete reasoning/problem solving task swith 75% accuracy provided mod cues to improve overall safety within hospital and home setting GOAL MET  INTERVENTIONS: Patient with telesitter in room; continues with impulsivity and decreased awareness of need for assistance prior to ambulating; however, patient in unfamiliar environment. Patient's mother home with patient     Patient presented with very basic problem solving situations within the home setting cued to generate safe/functional solution for each problem:  2/3 indep, 1/3 min cues     Patient reports he does not drive and it is recommended patient refrain from driving. ASSESSMENT:  Patient has met 4/4 STG's, continues with cognitive deficits; however, patient with dx of Schizophrenia resulting in attention and other cognitive deficits. Patient's mother reporting, \"He is back to his normal self. He is much better. \"  No further ST services indicated. Recommend discharge to home with Mom. ST to sign off. Certainly re-consult if change in cognitive function.         Assessment: [x]Progressing towards goals          []Not Progressing towards goals       Patient Tolerance of Treatment:   [x]Tolerated well []Tolerated fair []Required rest breaks []Fatigued  Plan for Next Session: n/a   Education:  Learner:  [x]Patient          []Significant Other          []Other  Education provided regarding:  [x]Goals and POC   []Diet and swallowing precautions    []Home Exercise Program  [x]Progress and/or discharge information  Method of Education:  [x]Discussion          [x]Demonstration          []Handout          []Other  Evaluation of Education:   [x]Verbalized understanding   []Demonstrates without assistance  [x]Demonstrates with assistance  []Needs further instruction     []No evidence of learning                  []No family present      Plan: []Continue with current plan of care    []Modify current plan of care as follows:    [x]Discharge patient    Discharge Location: [facility]     Services/Supervision Recommended: Discharge home with TINA Farias [address]

## 2019-04-04 NOTE — PROGRESS NOTES
Bianca Ross of telemetry ST looked a little more elevated. Obtained EKG and it says NSR but looks like changes from last EKG: V2 ST elevation 2 blocks, V3 ST elevation 1.5 blocks, lead 3 T wave flattened/slightly inverted, lead 2 ST 1mm block elevation. Pt denies chest pain    0002 - Received order for troponin every 4 hours x2 from Dr. Lala Ross.

## 2019-04-04 NOTE — PROGRESS NOTES
1406 - Respiratory therapist explained patient had his oxygen off when she was obtaining ABG. Patient remains on 5L nasal cannula. 3467 - Encouraged patient to leave oxygen on.

## 2019-04-04 NOTE — PROGRESS NOTES
[facility]    Patient - Julio Cesar Banda   MRN -  [id number]   [facility]   - 1970      Date of Admission -  3/30/2019  6:05 AM  Date of evaluation -  2019  Room - -14/014-A Meryle Rajas, MD Primary Care Physician - Valencia Walhs MD   Chief Complaint/Reason for Consult     Acute on chronic hypercapnic respiratory failure    HPI     The patient is a 48 y. o. male with past medical history of COPD on home oxygen, and tobacco abuse, presented with worsening SOB x 1 week associated with a recent URTI with cough and wheezes treated with OTC meds. He has increased sputum production, white in Colour, no hemoptysis, No fever, no chills, no N/V. He is on home oxygen and a chronic smoker. ABG 7.//    Started on BIPAP 26/6     CXR showed no infiltrates      PFT 2017 showed FEV1: 2.01   24% bronchodilator response  DLCO 35%    Past 24 Hours:  -Chart reviewed  -OOB to chair  -DC HFNC; stable on 5LPM  -Denies any chest pain, increased SOB, fever or chills  -ABG this AM on NC compensated    -All systems reviewed. Meds    Current Medications    formoterol  20 mcg Nebulization BID    metoprolol succinate  50 mg Oral Daily    furosemide  20 mg Oral Daily    potassium chloride  20 mEq Oral Daily with breakfast    amLODIPine  10 mg Oral Daily    aspirin  325 mg Oral Daily    carBAMazepine  200 mg Oral BID    nicotine  1 patch Transdermal Daily    risperiDONE  2 mg Oral BID    traZODone  100 mg Oral Nightly    sodium chloride flush  10 mL Intravenous 2 times per day    enoxaparin  40 mg Subcutaneous Q24H    ipratropium-albuterol  1 ampule Inhalation Q4H WA    doxycycline hyclate  100 mg Oral Q12H     haloperidol lactate, sodium chloride flush, magnesium hydroxide, ondansetron, albuterol, acetaminophen  IV Drips/Infusions    Vitals    Vitals    height is 5' 11\" (1.803 m) and weight is 219 lb 8 oz (99.6 kg). His oral temperature is 97.9 °F (36.6 °C). His blood pressure is 119/76 and his pulse is 82. His respiration is 18 and oxygen saturation is 92%. O2 Flow Rate (L/min): 4 L/min  I/O    Intake/Output Summary (Last 24 hours) at 4/4/2019 1459  Last data filed at 4/4/2019 1351  Gross per 24 hour   Intake 500 ml   Output 2450 ml   Net -1950 ml     Patient Vitals for the past 96 hrs (Last 3 readings):   Weight   04/04/19 0353 219 lb 8 oz (99.6 kg)   04/02/19 0345 228 lb (103.4 kg)   04/01/19 0450 228 lb 11.2 oz (103.7 kg)     Exam   Physical Exam   Constitutional: No distress on O2 5LPM per NC. Patient appears moderately built and  moderately nourished. OOB to chair  Mouth/Throat: Oropharynx is clear and moist.  No oral thrush. Poor dentation  Neck: Neck supple. No tracheal deviation present. No JVD  Cardiovascular: HRRR S1 and S2 with no murmur. No peripheral edema  Pulmonary/Chest: RRR. Normal effort with bilateral air entry, diminished breath sounds. No stridor. No respiratory distress. Patient exhibits no tenderness. Abdominal: Soft. Bowel sounds audible. No distension or tenderness to palp. Musculoskeletal: Moves all extremities  Neurological: Patient is alert and oriented to person, place, and time. Skin: Warm and dry. No bruising or bleeding. No cyanosis or clubbing    Labs   ABG  Lab Results   Component Value Date    PH 7.39 04/04/2019    PO2 46 04/04/2019    PCO2 62 04/04/2019    HCO3 37 04/04/2019    O2SAT 80 04/04/2019     Lab Results   Component Value Date    IFIO2 5 04/04/2019    SETTIDVOL 404 03/31/2019    SETPEEP 6.0 04/02/2019     CBC  No results for input(s): WBC, RBC, HGB, HCT, MCV, MCH, MCHC, RDW, PLT, MPV in the last 72 hours.    BMP  Recent Labs     04/02/19  0357 04/03/19  0331 04/04/19  0440    140 139   K 4.5 3.9 3.9   CL 99 98 96*   CO2 30 35* 34*   BUN 15 11 11   CREATININE 0.5 0.5 0.6   GLUCOSE 88 80 82   CALCIUM 8.6 8.4* 8.6     LFT  No results for input(s): AST, ALT, ALB, BILITOT, ALKPHOS, LIPASE in the last 72 hours. Invalid input(s): AMYLASE  TROP  Lab Results   Component Value Date    TROPONINT < 0.010 04/04/2019    TROPONINT < 0.010 04/04/2019    TROPONINT < 0.010 03/30/2019     BNP  Lab Results   Component Value Date    PROBNP 2989.0 03/30/2019    PROBNP 113.8 03/02/2017     D-Dimer  Lab Results   Component Value Date    DDIMER 261.00 03/02/2017     Lactic Acid  No results for input(s): LACTA in the last 72 hours. INR  No results for input(s): INR, PROTIME in the last 72 hours. PTT  No results for input(s): APTT in the last 72 hours. Glucose  No results for input(s): POCGLU in the last 72 hours. UA   No results for input(s): SPECGRAV, PHUR, COLORU, CLARITYU, MUCUS, PROTEINU, BLOODU, RBCUA, WBCUA, BACTERIA, NITRU, GLUCOSEU, BILIRUBINUR, UROBILINOGEN, KETUA, LABCAST, LABCASTTY, AMORPHOS in the last 72 hours. Invalid input(s): CRYSTALS. PFTs       Echo    3/30/19  Summary   Technically difficult examination.   Ejection fraction is visually estimated at 45-50%.   There was mild global hypokinesis of the left ventricle.      Signature      ----------------------------------------------------------------   Electronically signed by Yoselin Figueroa MD (Interpreting   physician) on 04/01/2019 at 05:15 PM      Cultures    Procalcitonin  Lab Results   Component Value Date    PROCAL 0.06 04/17/2017    PROCAL 0.07 03/02/2017     Rapid Influenza A/B: (-)  Radiology    CXR  4/2/19  Normal chest. No Acute findings. 3/30/19  Stable radiographic appearance of the chest. No evidence of an acute process.         CT Scans   1/16/18  PROCEDURE: CT CHEST W CONTRAST   FINDINGS: Cardiac size is normal. Minimal atherosclerotic calcifications are present in the thoracic aorta without evidence of aneurysm. There is no pleural or pericardial effusion. Emphysematous changes are noted in the bilateral lungs. There is scarring at the bilateral lung apices and bases.  No lung consolidations

## 2019-04-05 NOTE — CARE COORDINATION
4/5/19, 7:13 AM    Discharge plan discussed by  and . Discharge plan reviewed with patient/ family. Patient/ family verbalize understanding of discharge plan and are in agreement with plan. Understanding was demonstrated using the teach back method. IMM Letter  IMM Letter given to Patient/Family/Significant other/Guardian/POA/by[de-identified] CM  IMM Letter date given[de-identified] 04/04/19  IMM Letter time given[de-identified] 46     Pt discharged to home with [facility].

## 2019-04-18 ENCOUNTER — TELEPHONE (OUTPATIENT)
Dept: PULMONOLOGY | Age: 49
End: 2019-04-18

## 2019-04-18 NOTE — TELEPHONE ENCOUNTER
Called patient, no answer. Unable to leave a message. A no show letter has been mailed to patients home address with office contact information to reschedule the missed appt.

## 2019-12-13 ENCOUNTER — HOSPITAL ENCOUNTER (INPATIENT)
Age: 49
LOS: 4 days | Discharge: HOME | DRG: 189 | End: 2019-12-17
Attending: EMERGENCY MEDICINE | Admitting: INTERNAL MEDICINE
Payer: MEDICARE

## 2019-12-13 ENCOUNTER — APPOINTMENT (OUTPATIENT)
Dept: GENERAL RADIOLOGY | Age: 49
DRG: 189 | End: 2019-12-13
Payer: MEDICARE

## 2019-12-13 DIAGNOSIS — R06.02 SHORTNESS OF BREATH: ICD-10-CM

## 2019-12-13 DIAGNOSIS — E87.29 RESPIRATORY ACIDOSIS: ICD-10-CM

## 2019-12-13 DIAGNOSIS — R91.8 PULMONARY INFILTRATES: ICD-10-CM

## 2019-12-13 DIAGNOSIS — R09.02 HYPOXIA: ICD-10-CM

## 2019-12-13 DIAGNOSIS — R07.9 CHEST PAIN WITH HIGH RISK FOR CARDIAC ETIOLOGY: Primary | ICD-10-CM

## 2019-12-13 LAB
ALLEN TEST: POSITIVE
ANION GAP SERPL CALCULATED.3IONS-SCNC: 10 MEQ/L (ref 8–16)
BASE EXCESS (CALCULATED): 2.7 MMOL/L (ref -2.5–2.5)
BASOPHILS # BLD: 0.5 %
BASOPHILS ABSOLUTE: 0 THOU/MM3 (ref 0–0.1)
BUN BLDV-MCNC: 15 MG/DL (ref 7–22)
CALCIUM SERPL-MCNC: 8.3 MG/DL (ref 8.5–10.5)
CHLORIDE BLD-SCNC: 105 MEQ/L (ref 98–111)
CO2: 27 MEQ/L (ref 23–33)
COLLECTED BY:: ABNORMAL
CREAT SERPL-MCNC: 0.8 MG/DL (ref 0.4–1.2)
DEVICE: ABNORMAL
EOSINOPHIL # BLD: 0.6 %
EOSINOPHILS ABSOLUTE: 0.1 THOU/MM3 (ref 0–0.4)
ERYTHROCYTE [DISTWIDTH] IN BLOOD BY AUTOMATED COUNT: 15.9 % (ref 11.5–14.5)
ERYTHROCYTE [DISTWIDTH] IN BLOOD BY AUTOMATED COUNT: 53 FL (ref 35–45)
GFR SERPL CREATININE-BSD FRML MDRD: > 90 ML/MIN/1.73M2
GLUCOSE BLD-MCNC: 93 MG/DL (ref 70–108)
HCO3: 31 MMOL/L (ref 23–28)
HCT VFR BLD CALC: 55.4 % (ref 42–52)
HEMOGLOBIN: 17.3 GM/DL (ref 14–18)
IMMATURE GRANS (ABS): 0.05 THOU/MM3 (ref 0–0.07)
IMMATURE GRANULOCYTES: 0.6 %
LYMPHOCYTES # BLD: 23.4 %
LYMPHOCYTES ABSOLUTE: 2 THOU/MM3 (ref 1–4.8)
MCH RBC QN AUTO: 29.3 PG (ref 26–33)
MCHC RBC AUTO-ENTMCNC: 31.2 GM/DL (ref 32.2–35.5)
MCV RBC AUTO: 93.9 FL (ref 80–94)
MONOCYTES # BLD: 8.2 %
MONOCYTES ABSOLUTE: 0.7 THOU/MM3 (ref 0.4–1.3)
NUCLEATED RED BLOOD CELLS: 0 /100 WBC
O2 SATURATION: 95 %
OSMOLALITY CALCULATION: 283.6 MOSMOL/KG (ref 275–300)
PCO2: 59 MMHG (ref 35–45)
PH BLOOD GAS: 7.33 (ref 7.35–7.45)
PLATELET # BLD: 230 THOU/MM3 (ref 130–400)
PMV BLD AUTO: 10 FL (ref 9.4–12.4)
PO2: 84 MMHG (ref 71–104)
POTASSIUM REFLEX MAGNESIUM: 4.6 MEQ/L (ref 3.5–5.2)
PRO-BNP: 1583 PG/ML (ref 0–900)
RBC # BLD: 5.9 MILL/MM3 (ref 4.7–6.1)
SEG NEUTROPHILS: 66.7 %
SEGMENTED NEUTROPHILS ABSOLUTE COUNT: 5.6 THOU/MM3 (ref 1.8–7.7)
SODIUM BLD-SCNC: 142 MEQ/L (ref 135–145)
SOURCE, BLOOD GAS: ABNORMAL
TROPONIN T: 0.01 NG/ML
WBC # BLD: 8.4 THOU/MM3 (ref 4.8–10.8)

## 2019-12-13 PROCEDURE — 71045 X-RAY EXAM CHEST 1 VIEW: CPT

## 2019-12-13 PROCEDURE — 85025 COMPLETE CBC W/AUTO DIFF WBC: CPT

## 2019-12-13 PROCEDURE — 83880 ASSAY OF NATRIURETIC PEPTIDE: CPT

## 2019-12-13 PROCEDURE — 1200000003 HC TELEMETRY R&B

## 2019-12-13 PROCEDURE — 2709999900 HC NON-CHARGEABLE SUPPLY

## 2019-12-13 PROCEDURE — 82803 BLOOD GASES ANY COMBINATION: CPT

## 2019-12-13 PROCEDURE — 2500000003 HC RX 250 WO HCPCS: Performed by: EMERGENCY MEDICINE

## 2019-12-13 PROCEDURE — 93005 ELECTROCARDIOGRAM TRACING: CPT | Performed by: EMERGENCY MEDICINE

## 2019-12-13 PROCEDURE — 36415 COLL VENOUS BLD VENIPUNCTURE: CPT

## 2019-12-13 PROCEDURE — 94640 AIRWAY INHALATION TREATMENT: CPT

## 2019-12-13 PROCEDURE — 6370000000 HC RX 637 (ALT 250 FOR IP): Performed by: EMERGENCY MEDICINE

## 2019-12-13 PROCEDURE — 36600 WITHDRAWAL OF ARTERIAL BLOOD: CPT

## 2019-12-13 PROCEDURE — 99285 EMERGENCY DEPT VISIT HI MDM: CPT

## 2019-12-13 PROCEDURE — 80048 BASIC METABOLIC PNL TOTAL CA: CPT

## 2019-12-13 PROCEDURE — 84484 ASSAY OF TROPONIN QUANT: CPT

## 2019-12-13 RX ORDER — HYDROCODONE BITARTRATE AND ACETAMINOPHEN 5; 325 MG/1; MG/1
2 TABLET ORAL EVERY 4 HOURS PRN
Status: DISCONTINUED | OUTPATIENT
Start: 2019-12-13 | End: 2019-12-17 | Stop reason: HOSPADM

## 2019-12-13 RX ORDER — IPRATROPIUM BROMIDE AND ALBUTEROL SULFATE 2.5; .5 MG/3ML; MG/3ML
1 SOLUTION RESPIRATORY (INHALATION) ONCE
Status: COMPLETED | OUTPATIENT
Start: 2019-12-13 | End: 2019-12-13

## 2019-12-13 RX ORDER — SODIUM CHLORIDE 0.9 % (FLUSH) 0.9 %
10 SYRINGE (ML) INJECTION PRN
Status: DISCONTINUED | OUTPATIENT
Start: 2019-12-13 | End: 2019-12-17 | Stop reason: HOSPADM

## 2019-12-13 RX ORDER — ACETAMINOPHEN 325 MG/1
650 TABLET ORAL EVERY 4 HOURS PRN
Status: DISCONTINUED | OUTPATIENT
Start: 2019-12-13 | End: 2019-12-17 | Stop reason: HOSPADM

## 2019-12-13 RX ORDER — SODIUM CHLORIDE 0.9 % (FLUSH) 0.9 %
10 SYRINGE (ML) INJECTION EVERY 12 HOURS SCHEDULED
Status: DISCONTINUED | OUTPATIENT
Start: 2019-12-13 | End: 2019-12-17 | Stop reason: HOSPADM

## 2019-12-13 RX ORDER — IPRATROPIUM BROMIDE AND ALBUTEROL SULFATE 2.5; .5 MG/3ML; MG/3ML
1 SOLUTION RESPIRATORY (INHALATION)
Status: DISCONTINUED | OUTPATIENT
Start: 2019-12-14 | End: 2019-12-17 | Stop reason: HOSPADM

## 2019-12-13 RX ORDER — METHYLPREDNISOLONE SODIUM SUCCINATE 125 MG/2ML
125 INJECTION, POWDER, LYOPHILIZED, FOR SOLUTION INTRAMUSCULAR; INTRAVENOUS ONCE
Status: COMPLETED | OUTPATIENT
Start: 2019-12-14 | End: 2019-12-14

## 2019-12-13 RX ORDER — BUMETANIDE 0.25 MG/ML
2 INJECTION, SOLUTION INTRAMUSCULAR; INTRAVENOUS ONCE
Status: COMPLETED | OUTPATIENT
Start: 2019-12-13 | End: 2019-12-13

## 2019-12-13 RX ORDER — NAPROXEN SODIUM 220 MG
220 TABLET ORAL 2 TIMES DAILY WITH MEALS
Status: ON HOLD | COMMUNITY
End: 2020-05-28 | Stop reason: HOSPADM

## 2019-12-13 RX ORDER — FUROSEMIDE 10 MG/ML
40 INJECTION INTRAMUSCULAR; INTRAVENOUS EVERY 12 HOURS
Status: DISCONTINUED | OUTPATIENT
Start: 2019-12-14 | End: 2019-12-14

## 2019-12-13 RX ORDER — METHYLPREDNISOLONE SODIUM SUCCINATE 40 MG/ML
40 INJECTION, POWDER, LYOPHILIZED, FOR SOLUTION INTRAMUSCULAR; INTRAVENOUS DAILY
Status: DISCONTINUED | OUTPATIENT
Start: 2019-12-14 | End: 2019-12-14

## 2019-12-13 RX ORDER — HYDROCODONE BITARTRATE AND ACETAMINOPHEN 5; 325 MG/1; MG/1
1 TABLET ORAL EVERY 4 HOURS PRN
Status: DISCONTINUED | OUTPATIENT
Start: 2019-12-13 | End: 2019-12-17 | Stop reason: HOSPADM

## 2019-12-13 RX ADMIN — BUMETANIDE 2 MG: 0.25 INJECTION INTRAMUSCULAR; INTRAVENOUS at 20:20

## 2019-12-13 RX ADMIN — IPRATROPIUM BROMIDE AND ALBUTEROL SULFATE 1 AMPULE: .5; 3 SOLUTION RESPIRATORY (INHALATION) at 18:31

## 2019-12-13 ASSESSMENT — ENCOUNTER SYMPTOMS
SHORTNESS OF BREATH: 1
WHEEZING: 1
BACK PAIN: 0
ABDOMINAL PAIN: 0
NAUSEA: 0
CHEST TIGHTNESS: 1
SORE THROAT: 0
BLOOD IN STOOL: 0
DIARRHEA: 0
COUGH: 0
VOMITING: 0

## 2019-12-13 ASSESSMENT — PAIN SCALES - GENERAL
PAINLEVEL_OUTOF10: 10
PAINLEVEL_OUTOF10: 10
PAINLEVEL_OUTOF10: 9

## 2019-12-13 ASSESSMENT — PAIN DESCRIPTION - PAIN TYPE
TYPE: ACUTE PAIN
TYPE: ACUTE PAIN

## 2019-12-13 ASSESSMENT — PAIN DESCRIPTION - FREQUENCY: FREQUENCY: CONTINUOUS

## 2019-12-13 ASSESSMENT — PAIN DESCRIPTION - ORIENTATION
ORIENTATION: MID
ORIENTATION: RIGHT;LEFT;LOWER;MID

## 2019-12-13 ASSESSMENT — PAIN DESCRIPTION - LOCATION
LOCATION: CHEST
LOCATION: ABDOMEN;CHEST

## 2019-12-14 ENCOUNTER — APPOINTMENT (OUTPATIENT)
Dept: CT IMAGING | Age: 49
DRG: 189 | End: 2019-12-14
Payer: MEDICARE

## 2019-12-14 LAB
ALLEN TEST: POSITIVE
ANION GAP SERPL CALCULATED.3IONS-SCNC: 10 MEQ/L (ref 8–16)
BASE EXCESS (CALCULATED): 2.8 MMOL/L (ref -2.5–2.5)
BASE EXCESS (CALCULATED): 3.2 MMOL/L (ref -2.5–2.5)
BASE EXCESS (CALCULATED): 3.4 MMOL/L (ref -2.5–2.5)
BASE EXCESS (CALCULATED): 5 MMOL/L (ref -2.5–2.5)
BUN BLDV-MCNC: 15 MG/DL (ref 7–22)
CALCIUM SERPL-MCNC: 8.3 MG/DL (ref 8.5–10.5)
CHLORIDE BLD-SCNC: 105 MEQ/L (ref 98–111)
CHOLESTEROL, TOTAL: 102 MG/DL (ref 100–199)
CO2: 27 MEQ/L (ref 23–33)
COLLECTED BY:: ABNORMAL
CREAT SERPL-MCNC: 0.8 MG/DL (ref 0.4–1.2)
D-DIMER QUANTITATIVE: 1204 NG/ML FEU (ref 0–500)
DEVICE: ABNORMAL
EKG ATRIAL RATE: 98 BPM
EKG P AXIS: 74 DEGREES
EKG P-R INTERVAL: 146 MS
EKG Q-T INTERVAL: 356 MS
EKG QRS DURATION: 84 MS
EKG QTC CALCULATION (BAZETT): 454 MS
EKG R AXIS: 144 DEGREES
EKG T AXIS: 8 DEGREES
EKG VENTRICULAR RATE: 98 BPM
ERYTHROCYTE [DISTWIDTH] IN BLOOD BY AUTOMATED COUNT: 16.3 % (ref 11.5–14.5)
ERYTHROCYTE [DISTWIDTH] IN BLOOD BY AUTOMATED COUNT: 54.9 FL (ref 35–45)
FLU A ANTIGEN: NEGATIVE
FLU B ANTIGEN: NEGATIVE
GFR SERPL CREATININE-BSD FRML MDRD: > 90 ML/MIN/1.73M2
GLUCOSE BLD-MCNC: 108 MG/DL (ref 70–108)
GLUCOSE BLD-MCNC: 114 MG/DL (ref 70–108)
GLUCOSE BLD-MCNC: 139 MG/DL (ref 70–108)
GLUCOSE BLD-MCNC: 89 MG/DL (ref 70–108)
HCO3: 33 MMOL/L (ref 23–28)
HCO3: 33 MMOL/L (ref 23–28)
HCO3: 34 MMOL/L (ref 23–28)
HCO3: 35 MMOL/L (ref 23–28)
HCT VFR BLD CALC: 57.9 % (ref 42–52)
HDLC SERPL-MCNC: 27 MG/DL
HEMOGLOBIN: 17.9 GM/DL (ref 14–18)
IFIO2: 40
IFIO2: 50
IFIO2: 60
IFIO2: 80
LDL CHOLESTEROL CALCULATED: 61 MG/DL
MCH RBC QN AUTO: 29.5 PG (ref 26–33)
MCHC RBC AUTO-ENTMCNC: 30.9 GM/DL (ref 32.2–35.5)
MCV RBC AUTO: 95.5 FL (ref 80–94)
MODE: ABNORMAL
O2 SATURATION: 86 %
O2 SATURATION: 92 %
O2 SATURATION: 95 %
O2 SATURATION: 97 %
PCO2: 60 MMHG (ref 35–45)
PCO2: 67 MMHG (ref 35–45)
PCO2: 74 MMHG (ref 35–45)
PCO2: 80 MMHG (ref 35–45)
PH BLOOD GAS: 7.25 (ref 7.35–7.45)
PH BLOOD GAS: 7.27 (ref 7.35–7.45)
PH BLOOD GAS: 7.3 (ref 7.35–7.45)
PH BLOOD GAS: 7.35 (ref 7.35–7.45)
PLATELET # BLD: 218 THOU/MM3 (ref 130–400)
PMV BLD AUTO: 9.5 FL (ref 9.4–12.4)
PO2: 108 MMHG (ref 71–104)
PO2: 60 MMHG (ref 71–104)
PO2: 68 MMHG (ref 71–104)
PO2: 91 MMHG (ref 71–104)
POTASSIUM SERPL-SCNC: 4.7 MEQ/L (ref 3.5–5.2)
RBC # BLD: 6.06 MILL/MM3 (ref 4.7–6.1)
SET PEEP: 6 MMHG
SET PEEP: 8 MMHG
SET PRESS SUPP: 16 CMH2O
SET PRESS SUPP: 16 CMH2O
SET PRESS SUPP: 6 CMH2O
SET PRESS SUPP: 8 CMH2O
SODIUM BLD-SCNC: 142 MEQ/L (ref 135–145)
SOURCE, BLOOD GAS: ABNORMAL
TRIGL SERPL-MCNC: 70 MG/DL (ref 0–199)
TROPONIN T: < 0.01 NG/ML
TROPONIN T: < 0.01 NG/ML
WBC # BLD: 8.8 THOU/MM3 (ref 4.8–10.8)

## 2019-12-14 PROCEDURE — 99223 1ST HOSP IP/OBS HIGH 75: CPT | Performed by: INTERNAL MEDICINE

## 2019-12-14 PROCEDURE — 80061 LIPID PANEL: CPT

## 2019-12-14 PROCEDURE — 82803 BLOOD GASES ANY COMBINATION: CPT

## 2019-12-14 PROCEDURE — 2580000003 HC RX 258: Performed by: INTERNAL MEDICINE

## 2019-12-14 PROCEDURE — 84484 ASSAY OF TROPONIN QUANT: CPT

## 2019-12-14 PROCEDURE — 36600 WITHDRAWAL OF ARTERIAL BLOOD: CPT

## 2019-12-14 PROCEDURE — 36415 COLL VENOUS BLD VENIPUNCTURE: CPT

## 2019-12-14 PROCEDURE — 6370000000 HC RX 637 (ALT 250 FOR IP): Performed by: INTERNAL MEDICINE

## 2019-12-14 PROCEDURE — 94660 CPAP INITIATION&MGMT: CPT

## 2019-12-14 PROCEDURE — 2709999900 HC NON-CHARGEABLE SUPPLY

## 2019-12-14 PROCEDURE — 85379 FIBRIN DEGRADATION QUANT: CPT

## 2019-12-14 PROCEDURE — 1200000003 HC TELEMETRY R&B

## 2019-12-14 PROCEDURE — 85027 COMPLETE CBC AUTOMATED: CPT

## 2019-12-14 PROCEDURE — 71275 CT ANGIOGRAPHY CHEST: CPT

## 2019-12-14 PROCEDURE — 80048 BASIC METABOLIC PNL TOTAL CA: CPT

## 2019-12-14 PROCEDURE — 6360000002 HC RX W HCPCS: Performed by: INTERNAL MEDICINE

## 2019-12-14 PROCEDURE — 2700000000 HC OXYGEN THERAPY PER DAY

## 2019-12-14 PROCEDURE — 94761 N-INVAS EAR/PLS OXIMETRY MLT: CPT

## 2019-12-14 PROCEDURE — 94640 AIRWAY INHALATION TREATMENT: CPT

## 2019-12-14 PROCEDURE — 6360000004 HC RX CONTRAST MEDICATION: Performed by: INTERNAL MEDICINE

## 2019-12-14 PROCEDURE — 93010 ELECTROCARDIOGRAM REPORT: CPT | Performed by: NUCLEAR MEDICINE

## 2019-12-14 PROCEDURE — 87804 INFLUENZA ASSAY W/OPTIC: CPT

## 2019-12-14 PROCEDURE — 82948 REAGENT STRIP/BLOOD GLUCOSE: CPT

## 2019-12-14 RX ORDER — NICOTINE POLACRILEX 4 MG
15 LOZENGE BUCCAL PRN
Status: DISCONTINUED | OUTPATIENT
Start: 2019-12-14 | End: 2019-12-17 | Stop reason: HOSPADM

## 2019-12-14 RX ORDER — VARENICLINE TARTRATE 0.5 MG/1
0.5 TABLET, FILM COATED ORAL 2 TIMES DAILY
Status: DISCONTINUED | OUTPATIENT
Start: 2019-12-17 | End: 2019-12-17 | Stop reason: HOSPADM

## 2019-12-14 RX ORDER — FUROSEMIDE 20 MG/1
20 TABLET ORAL DAILY
Status: DISCONTINUED | OUTPATIENT
Start: 2019-12-15 | End: 2019-12-14

## 2019-12-14 RX ORDER — DEXTROSE MONOHYDRATE 25 G/50ML
12.5 INJECTION, SOLUTION INTRAVENOUS PRN
Status: DISCONTINUED | OUTPATIENT
Start: 2019-12-14 | End: 2019-12-17 | Stop reason: HOSPADM

## 2019-12-14 RX ORDER — DEXTROSE MONOHYDRATE 50 MG/ML
100 INJECTION, SOLUTION INTRAVENOUS PRN
Status: DISCONTINUED | OUTPATIENT
Start: 2019-12-14 | End: 2019-12-17 | Stop reason: HOSPADM

## 2019-12-14 RX ORDER — FORMOTEROL FUMARATE 20 UG/2ML
20 SOLUTION RESPIRATORY (INHALATION) 2 TIMES DAILY
Status: DISCONTINUED | OUTPATIENT
Start: 2019-12-14 | End: 2019-12-17 | Stop reason: HOSPADM

## 2019-12-14 RX ORDER — VARENICLINE TARTRATE 0.5 MG/1
0.5 TABLET, FILM COATED ORAL DAILY
Status: COMPLETED | OUTPATIENT
Start: 2019-12-14 | End: 2019-12-16

## 2019-12-14 RX ORDER — FUROSEMIDE 20 MG/1
20 TABLET ORAL DAILY
Status: DISCONTINUED | OUTPATIENT
Start: 2019-12-14 | End: 2019-12-14

## 2019-12-14 RX ORDER — FUROSEMIDE 10 MG/ML
40 INJECTION INTRAMUSCULAR; INTRAVENOUS ONCE
Status: COMPLETED | OUTPATIENT
Start: 2019-12-14 | End: 2019-12-14

## 2019-12-14 RX ORDER — METHYLPREDNISOLONE SODIUM SUCCINATE 40 MG/ML
40 INJECTION, POWDER, LYOPHILIZED, FOR SOLUTION INTRAMUSCULAR; INTRAVENOUS EVERY 6 HOURS
Status: DISCONTINUED | OUTPATIENT
Start: 2019-12-14 | End: 2019-12-15

## 2019-12-14 RX ORDER — ACETAMINOPHEN 325 MG/1
650 TABLET ORAL EVERY 4 HOURS PRN
Status: DISCONTINUED | OUTPATIENT
Start: 2019-12-14 | End: 2019-12-14 | Stop reason: SDUPTHER

## 2019-12-14 RX ORDER — BUDESONIDE 0.5 MG/2ML
500 INHALANT ORAL 2 TIMES DAILY
Status: DISCONTINUED | OUTPATIENT
Start: 2019-12-14 | End: 2019-12-17 | Stop reason: HOSPADM

## 2019-12-14 RX ORDER — RISPERIDONE 2 MG/1
2 TABLET, FILM COATED ORAL 2 TIMES DAILY
Status: DISCONTINUED | OUTPATIENT
Start: 2019-12-14 | End: 2019-12-17 | Stop reason: HOSPADM

## 2019-12-14 RX ORDER — CARBAMAZEPINE 200 MG/1
200 TABLET ORAL 2 TIMES DAILY
Status: DISCONTINUED | OUTPATIENT
Start: 2019-12-14 | End: 2019-12-17 | Stop reason: HOSPADM

## 2019-12-14 RX ORDER — METOPROLOL SUCCINATE 25 MG/1
25 TABLET, EXTENDED RELEASE ORAL DAILY
Status: DISCONTINUED | OUTPATIENT
Start: 2019-12-14 | End: 2019-12-17 | Stop reason: HOSPADM

## 2019-12-14 RX ORDER — AMLODIPINE BESYLATE 10 MG/1
10 TABLET ORAL DAILY
Status: DISCONTINUED | OUTPATIENT
Start: 2019-12-14 | End: 2019-12-17 | Stop reason: HOSPADM

## 2019-12-14 RX ORDER — 0.9 % SODIUM CHLORIDE 0.9 %
250 INTRAVENOUS SOLUTION INTRAVENOUS ONCE
Status: COMPLETED | OUTPATIENT
Start: 2019-12-14 | End: 2019-12-14

## 2019-12-14 RX ORDER — NICOTINE 21 MG/24HR
1 PATCH, TRANSDERMAL 24 HOURS TRANSDERMAL DAILY
Status: DISCONTINUED | OUTPATIENT
Start: 2019-12-14 | End: 2019-12-17 | Stop reason: HOSPADM

## 2019-12-14 RX ORDER — POTASSIUM CHLORIDE 20 MEQ/1
20 TABLET, EXTENDED RELEASE ORAL
Status: DISCONTINUED | OUTPATIENT
Start: 2019-12-14 | End: 2019-12-17 | Stop reason: HOSPADM

## 2019-12-14 RX ADMIN — Medication 10 ML: at 00:11

## 2019-12-14 RX ADMIN — METHYLPREDNISOLONE SODIUM SUCCINATE 40 MG: 40 INJECTION, POWDER, FOR SOLUTION INTRAMUSCULAR; INTRAVENOUS at 15:41

## 2019-12-14 RX ADMIN — CARBAMAZEPINE 200 MG: 200 TABLET ORAL at 20:00

## 2019-12-14 RX ADMIN — BUDESONIDE 500 MCG: 0.5 INHALANT RESPIRATORY (INHALATION) at 10:13

## 2019-12-14 RX ADMIN — RISPERIDONE 2 MG: 2 TABLET ORAL at 20:00

## 2019-12-14 RX ADMIN — AMLODIPINE BESYLATE 10 MG: 10 TABLET ORAL at 12:55

## 2019-12-14 RX ADMIN — CARBAMAZEPINE 200 MG: 200 TABLET ORAL at 12:55

## 2019-12-14 RX ADMIN — IPRATROPIUM BROMIDE AND ALBUTEROL SULFATE 1 AMPULE: .5; 3 SOLUTION RESPIRATORY (INHALATION) at 12:04

## 2019-12-14 RX ADMIN — IPRATROPIUM BROMIDE AND ALBUTEROL SULFATE 1 AMPULE: .5; 3 SOLUTION RESPIRATORY (INHALATION) at 16:13

## 2019-12-14 RX ADMIN — Medication 10 ML: at 10:18

## 2019-12-14 RX ADMIN — FORMOTEROL FUMARATE DIHYDRATE 20 MCG: 20 SOLUTION RESPIRATORY (INHALATION) at 17:17

## 2019-12-14 RX ADMIN — ENOXAPARIN SODIUM 40 MG: 40 INJECTION SUBCUTANEOUS at 10:17

## 2019-12-14 RX ADMIN — METHYLPREDNISOLONE SODIUM SUCCINATE 40 MG: 40 INJECTION, POWDER, FOR SOLUTION INTRAMUSCULAR; INTRAVENOUS at 10:17

## 2019-12-14 RX ADMIN — METHYLPREDNISOLONE SODIUM SUCCINATE 125 MG: 125 INJECTION, POWDER, FOR SOLUTION INTRAMUSCULAR; INTRAVENOUS at 00:11

## 2019-12-14 RX ADMIN — VARENICLINE TARTRATE 0.5 MG: 0.5 TABLET, FILM COATED ORAL at 15:41

## 2019-12-14 RX ADMIN — ASPIRIN 325 MG: 325 TABLET, DELAYED RELEASE ORAL at 12:55

## 2019-12-14 RX ADMIN — FORMOTEROL FUMARATE DIHYDRATE 20 MCG: 20 SOLUTION RESPIRATORY (INHALATION) at 10:14

## 2019-12-14 RX ADMIN — IPRATROPIUM BROMIDE AND ALBUTEROL SULFATE 1 AMPULE: .5; 3 SOLUTION RESPIRATORY (INHALATION) at 20:31

## 2019-12-14 RX ADMIN — FUROSEMIDE 40 MG: 40 INJECTION, SOLUTION INTRAMUSCULAR; INTRAVENOUS at 15:41

## 2019-12-14 RX ADMIN — POTASSIUM CHLORIDE 20 MEQ: 20 TABLET, EXTENDED RELEASE ORAL at 12:55

## 2019-12-14 RX ADMIN — BUDESONIDE 500 MCG: 0.5 INHALANT RESPIRATORY (INHALATION) at 17:33

## 2019-12-14 RX ADMIN — Medication 10 ML: at 20:00

## 2019-12-14 RX ADMIN — METHYLPREDNISOLONE SODIUM SUCCINATE 40 MG: 40 INJECTION, POWDER, FOR SOLUTION INTRAMUSCULAR; INTRAVENOUS at 22:15

## 2019-12-14 RX ADMIN — RISPERIDONE 2 MG: 2 TABLET ORAL at 12:55

## 2019-12-14 RX ADMIN — FUROSEMIDE 40 MG: 10 INJECTION, SOLUTION INTRAVENOUS at 00:11

## 2019-12-14 RX ADMIN — METOPROLOL SUCCINATE 25 MG: 25 TABLET, FILM COATED, EXTENDED RELEASE ORAL at 12:55

## 2019-12-14 RX ADMIN — SODIUM CHLORIDE 250 ML: 9 INJECTION, SOLUTION INTRAVENOUS at 18:32

## 2019-12-14 RX ADMIN — IOPAMIDOL 80 ML: 755 INJECTION, SOLUTION INTRAVENOUS at 17:57

## 2019-12-14 RX ADMIN — IPRATROPIUM BROMIDE AND ALBUTEROL SULFATE 1 AMPULE: .5; 3 SOLUTION RESPIRATORY (INHALATION) at 08:21

## 2019-12-14 ASSESSMENT — PAIN SCALES - GENERAL
PAINLEVEL_OUTOF10: 0
PAINLEVEL_OUTOF10: 0

## 2019-12-14 ASSESSMENT — PAIN SCALES - WONG BAKER: WONGBAKER_NUMERICALRESPONSE: 0

## 2019-12-15 LAB
ANION GAP SERPL CALCULATED.3IONS-SCNC: 11 MEQ/L (ref 8–16)
AVERAGE GLUCOSE: 120 MG/DL (ref 70–126)
BUN BLDV-MCNC: 17 MG/DL (ref 7–22)
CALCIUM SERPL-MCNC: 8.6 MG/DL (ref 8.5–10.5)
CHLORIDE BLD-SCNC: 97 MEQ/L (ref 98–111)
CO2: 32 MEQ/L (ref 23–33)
CREAT SERPL-MCNC: 0.8 MG/DL (ref 0.4–1.2)
ERYTHROCYTE [DISTWIDTH] IN BLOOD BY AUTOMATED COUNT: 15.9 % (ref 11.5–14.5)
ERYTHROCYTE [DISTWIDTH] IN BLOOD BY AUTOMATED COUNT: 54 FL (ref 35–45)
GFR SERPL CREATININE-BSD FRML MDRD: > 90 ML/MIN/1.73M2
GLUCOSE BLD-MCNC: 102 MG/DL (ref 70–108)
GLUCOSE BLD-MCNC: 105 MG/DL (ref 70–108)
GLUCOSE BLD-MCNC: 115 MG/DL (ref 70–108)
GLUCOSE BLD-MCNC: 115 MG/DL (ref 70–108)
GLUCOSE BLD-MCNC: 119 MG/DL (ref 70–108)
HBA1C MFR BLD: 6 % (ref 4.4–6.4)
HCT VFR BLD CALC: 56.1 % (ref 42–52)
HEMOGLOBIN: 17.2 GM/DL (ref 14–18)
MCH RBC QN AUTO: 29.7 PG (ref 26–33)
MCHC RBC AUTO-ENTMCNC: 30.7 GM/DL (ref 32.2–35.5)
MCV RBC AUTO: 96.9 FL (ref 80–94)
PLATELET # BLD: 223 THOU/MM3 (ref 130–400)
PMV BLD AUTO: 9.9 FL (ref 9.4–12.4)
POTASSIUM SERPL-SCNC: 4.8 MEQ/L (ref 3.5–5.2)
RBC # BLD: 5.79 MILL/MM3 (ref 4.7–6.1)
SODIUM BLD-SCNC: 140 MEQ/L (ref 135–145)
WBC # BLD: 9 THOU/MM3 (ref 4.8–10.8)

## 2019-12-15 PROCEDURE — 1200000003 HC TELEMETRY R&B

## 2019-12-15 PROCEDURE — 36415 COLL VENOUS BLD VENIPUNCTURE: CPT

## 2019-12-15 PROCEDURE — 99232 SBSQ HOSP IP/OBS MODERATE 35: CPT | Performed by: INTERNAL MEDICINE

## 2019-12-15 PROCEDURE — 6360000002 HC RX W HCPCS: Performed by: INTERNAL MEDICINE

## 2019-12-15 PROCEDURE — 82948 REAGENT STRIP/BLOOD GLUCOSE: CPT

## 2019-12-15 PROCEDURE — 6370000000 HC RX 637 (ALT 250 FOR IP): Performed by: INTERNAL MEDICINE

## 2019-12-15 PROCEDURE — 2700000000 HC OXYGEN THERAPY PER DAY

## 2019-12-15 PROCEDURE — 94640 AIRWAY INHALATION TREATMENT: CPT

## 2019-12-15 PROCEDURE — 94761 N-INVAS EAR/PLS OXIMETRY MLT: CPT

## 2019-12-15 PROCEDURE — 80048 BASIC METABOLIC PNL TOTAL CA: CPT

## 2019-12-15 PROCEDURE — 94660 CPAP INITIATION&MGMT: CPT

## 2019-12-15 PROCEDURE — 83036 HEMOGLOBIN GLYCOSYLATED A1C: CPT

## 2019-12-15 PROCEDURE — 2709999900 HC NON-CHARGEABLE SUPPLY

## 2019-12-15 PROCEDURE — 85027 COMPLETE CBC AUTOMATED: CPT

## 2019-12-15 PROCEDURE — 2580000003 HC RX 258: Performed by: INTERNAL MEDICINE

## 2019-12-15 RX ORDER — PREDNISONE 20 MG/1
40 TABLET ORAL DAILY
Status: DISCONTINUED | OUTPATIENT
Start: 2019-12-15 | End: 2019-12-17 | Stop reason: HOSPADM

## 2019-12-15 RX ORDER — LEVOFLOXACIN 750 MG/1
750 TABLET ORAL DAILY
Status: DISCONTINUED | OUTPATIENT
Start: 2019-12-15 | End: 2019-12-15

## 2019-12-15 RX ORDER — LEVOFLOXACIN 750 MG/1
750 TABLET ORAL DAILY
Status: DISCONTINUED | OUTPATIENT
Start: 2019-12-15 | End: 2019-12-17 | Stop reason: HOSPADM

## 2019-12-15 RX ADMIN — BUDESONIDE 500 MCG: 0.5 INHALANT RESPIRATORY (INHALATION) at 20:32

## 2019-12-15 RX ADMIN — Medication 10 ML: at 08:26

## 2019-12-15 RX ADMIN — VARENICLINE TARTRATE 0.5 MG: 0.5 TABLET, FILM COATED ORAL at 08:24

## 2019-12-15 RX ADMIN — CARBAMAZEPINE 200 MG: 200 TABLET ORAL at 08:24

## 2019-12-15 RX ADMIN — IPRATROPIUM BROMIDE AND ALBUTEROL SULFATE 1 AMPULE: .5; 3 SOLUTION RESPIRATORY (INHALATION) at 09:01

## 2019-12-15 RX ADMIN — LEVOFLOXACIN 750 MG: 750 TABLET, FILM COATED ORAL at 13:23

## 2019-12-15 RX ADMIN — ENOXAPARIN SODIUM 40 MG: 40 INJECTION SUBCUTANEOUS at 11:39

## 2019-12-15 RX ADMIN — RISPERIDONE 2 MG: 2 TABLET ORAL at 08:24

## 2019-12-15 RX ADMIN — IPRATROPIUM BROMIDE AND ALBUTEROL SULFATE 1 AMPULE: .5; 3 SOLUTION RESPIRATORY (INHALATION) at 16:38

## 2019-12-15 RX ADMIN — POTASSIUM CHLORIDE 20 MEQ: 20 TABLET, EXTENDED RELEASE ORAL at 08:24

## 2019-12-15 RX ADMIN — CARBAMAZEPINE 200 MG: 200 TABLET ORAL at 20:07

## 2019-12-15 RX ADMIN — BUDESONIDE 500 MCG: 0.5 INHALANT RESPIRATORY (INHALATION) at 09:01

## 2019-12-15 RX ADMIN — FORMOTEROL FUMARATE DIHYDRATE 20 MCG: 20 SOLUTION RESPIRATORY (INHALATION) at 09:01

## 2019-12-15 RX ADMIN — ASPIRIN 325 MG: 325 TABLET, DELAYED RELEASE ORAL at 08:24

## 2019-12-15 RX ADMIN — METHYLPREDNISOLONE SODIUM SUCCINATE 40 MG: 40 INJECTION, POWDER, FOR SOLUTION INTRAMUSCULAR; INTRAVENOUS at 04:20

## 2019-12-15 RX ADMIN — RISPERIDONE 2 MG: 2 TABLET ORAL at 21:00

## 2019-12-15 RX ADMIN — Medication 10 ML: at 20:07

## 2019-12-15 RX ADMIN — IPRATROPIUM BROMIDE AND ALBUTEROL SULFATE 1 AMPULE: .5; 3 SOLUTION RESPIRATORY (INHALATION) at 12:59

## 2019-12-15 RX ADMIN — PREDNISONE 40 MG: 20 TABLET ORAL at 13:23

## 2019-12-15 RX ADMIN — METOPROLOL SUCCINATE 25 MG: 25 TABLET, FILM COATED, EXTENDED RELEASE ORAL at 08:24

## 2019-12-15 RX ADMIN — FORMOTEROL FUMARATE DIHYDRATE 20 MCG: 20 SOLUTION RESPIRATORY (INHALATION) at 20:32

## 2019-12-15 RX ADMIN — IPRATROPIUM BROMIDE AND ALBUTEROL SULFATE 1 AMPULE: .5; 3 SOLUTION RESPIRATORY (INHALATION) at 20:32

## 2019-12-15 RX ADMIN — AMLODIPINE BESYLATE 10 MG: 10 TABLET ORAL at 16:53

## 2019-12-15 ASSESSMENT — PAIN SCALES - GENERAL: PAINLEVEL_OUTOF10: 0

## 2019-12-16 LAB
ANION GAP SERPL CALCULATED.3IONS-SCNC: 8 MEQ/L (ref 8–16)
BUN BLDV-MCNC: 12 MG/DL (ref 7–22)
CALCIUM SERPL-MCNC: 8 MG/DL (ref 8.5–10.5)
CHLORIDE BLD-SCNC: 98 MEQ/L (ref 98–111)
CO2: 31 MEQ/L (ref 23–33)
CREAT SERPL-MCNC: 0.7 MG/DL (ref 0.4–1.2)
ERYTHROCYTE [DISTWIDTH] IN BLOOD BY AUTOMATED COUNT: 15.1 % (ref 11.5–14.5)
ERYTHROCYTE [DISTWIDTH] IN BLOOD BY AUTOMATED COUNT: 52.4 FL (ref 35–45)
GFR SERPL CREATININE-BSD FRML MDRD: > 90 ML/MIN/1.73M2
GLUCOSE BLD-MCNC: 107 MG/DL (ref 70–108)
GLUCOSE BLD-MCNC: 145 MG/DL (ref 70–108)
GLUCOSE BLD-MCNC: 83 MG/DL (ref 70–108)
GLUCOSE BLD-MCNC: 97 MG/DL (ref 70–108)
GLUCOSE BLD-MCNC: 99 MG/DL (ref 70–108)
HCT VFR BLD CALC: 51.3 % (ref 42–52)
HEMOGLOBIN: 15.7 GM/DL (ref 14–18)
MCH RBC QN AUTO: 29 PG (ref 26–33)
MCHC RBC AUTO-ENTMCNC: 30.6 GM/DL (ref 32.2–35.5)
MCV RBC AUTO: 94.6 FL (ref 80–94)
PLATELET # BLD: 195 THOU/MM3 (ref 130–400)
PMV BLD AUTO: 9.6 FL (ref 9.4–12.4)
POTASSIUM SERPL-SCNC: 4.6 MEQ/L (ref 3.5–5.2)
RBC # BLD: 5.42 MILL/MM3 (ref 4.7–6.1)
SODIUM BLD-SCNC: 137 MEQ/L (ref 135–145)
WBC # BLD: 10.5 THOU/MM3 (ref 4.8–10.8)

## 2019-12-16 PROCEDURE — 80048 BASIC METABOLIC PNL TOTAL CA: CPT

## 2019-12-16 PROCEDURE — 94761 N-INVAS EAR/PLS OXIMETRY MLT: CPT

## 2019-12-16 PROCEDURE — 6360000002 HC RX W HCPCS: Performed by: INTERNAL MEDICINE

## 2019-12-16 PROCEDURE — 85027 COMPLETE CBC AUTOMATED: CPT

## 2019-12-16 PROCEDURE — 94640 AIRWAY INHALATION TREATMENT: CPT

## 2019-12-16 PROCEDURE — 82948 REAGENT STRIP/BLOOD GLUCOSE: CPT

## 2019-12-16 PROCEDURE — 94660 CPAP INITIATION&MGMT: CPT

## 2019-12-16 PROCEDURE — 1200000003 HC TELEMETRY R&B

## 2019-12-16 PROCEDURE — 2580000003 HC RX 258: Performed by: INTERNAL MEDICINE

## 2019-12-16 PROCEDURE — 36415 COLL VENOUS BLD VENIPUNCTURE: CPT

## 2019-12-16 PROCEDURE — 6370000000 HC RX 637 (ALT 250 FOR IP): Performed by: INTERNAL MEDICINE

## 2019-12-16 PROCEDURE — 2700000000 HC OXYGEN THERAPY PER DAY

## 2019-12-16 PROCEDURE — APPSS30 APP SPLIT SHARED TIME 16-30 MINUTES: Performed by: NURSE PRACTITIONER

## 2019-12-16 RX ORDER — HALOPERIDOL DECANOATE 50 MG/ML
50 INJECTION INTRAMUSCULAR
Status: ON HOLD | COMMUNITY
End: 2020-05-28 | Stop reason: HOSPADM

## 2019-12-16 RX ORDER — PRAVASTATIN SODIUM 40 MG
40 TABLET ORAL NIGHTLY
COMMUNITY

## 2019-12-16 RX ADMIN — LEVOFLOXACIN 750 MG: 750 TABLET, FILM COATED ORAL at 08:44

## 2019-12-16 RX ADMIN — IPRATROPIUM BROMIDE AND ALBUTEROL SULFATE 1 AMPULE: .5; 3 SOLUTION RESPIRATORY (INHALATION) at 11:40

## 2019-12-16 RX ADMIN — VARENICLINE TARTRATE 0.5 MG: 0.5 TABLET, FILM COATED ORAL at 08:44

## 2019-12-16 RX ADMIN — PREDNISONE 40 MG: 20 TABLET ORAL at 08:44

## 2019-12-16 RX ADMIN — METOPROLOL SUCCINATE 25 MG: 25 TABLET, FILM COATED, EXTENDED RELEASE ORAL at 08:44

## 2019-12-16 RX ADMIN — BUDESONIDE 500 MCG: 0.5 INHALANT RESPIRATORY (INHALATION) at 19:54

## 2019-12-16 RX ADMIN — BUDESONIDE 500 MCG: 0.5 INHALANT RESPIRATORY (INHALATION) at 07:35

## 2019-12-16 RX ADMIN — CARBAMAZEPINE 200 MG: 200 TABLET ORAL at 19:45

## 2019-12-16 RX ADMIN — IPRATROPIUM BROMIDE AND ALBUTEROL SULFATE 1 AMPULE: .5; 3 SOLUTION RESPIRATORY (INHALATION) at 07:35

## 2019-12-16 RX ADMIN — Medication 10 ML: at 08:45

## 2019-12-16 RX ADMIN — IPRATROPIUM BROMIDE AND ALBUTEROL SULFATE 1 AMPULE: .5; 3 SOLUTION RESPIRATORY (INHALATION) at 15:49

## 2019-12-16 RX ADMIN — Medication 10 ML: at 19:45

## 2019-12-16 RX ADMIN — RISPERIDONE 2 MG: 2 TABLET ORAL at 19:45

## 2019-12-16 RX ADMIN — FORMOTEROL FUMARATE DIHYDRATE 20 MCG: 20 SOLUTION RESPIRATORY (INHALATION) at 19:54

## 2019-12-16 RX ADMIN — ENOXAPARIN SODIUM 40 MG: 40 INJECTION SUBCUTANEOUS at 08:44

## 2019-12-16 RX ADMIN — CARBAMAZEPINE 200 MG: 200 TABLET ORAL at 08:44

## 2019-12-16 RX ADMIN — ASPIRIN 325 MG: 325 TABLET, DELAYED RELEASE ORAL at 08:44

## 2019-12-16 RX ADMIN — RISPERIDONE 2 MG: 2 TABLET ORAL at 08:44

## 2019-12-16 RX ADMIN — IPRATROPIUM BROMIDE AND ALBUTEROL SULFATE 1 AMPULE: .5; 3 SOLUTION RESPIRATORY (INHALATION) at 19:54

## 2019-12-16 RX ADMIN — POTASSIUM CHLORIDE 20 MEQ: 20 TABLET, EXTENDED RELEASE ORAL at 08:44

## 2019-12-16 RX ADMIN — FORMOTEROL FUMARATE DIHYDRATE 20 MCG: 20 SOLUTION RESPIRATORY (INHALATION) at 07:35

## 2019-12-16 ASSESSMENT — PAIN SCALES - GENERAL
PAINLEVEL_OUTOF10: 0

## 2019-12-17 VITALS
DIASTOLIC BLOOD PRESSURE: 91 MMHG | HEART RATE: 90 BPM | RESPIRATION RATE: 16 BRPM | SYSTOLIC BLOOD PRESSURE: 132 MMHG | WEIGHT: 270.7 LBS | BODY MASS INDEX: 38.75 KG/M2 | OXYGEN SATURATION: 91 % | TEMPERATURE: 97.8 F | HEIGHT: 70 IN

## 2019-12-17 PROBLEM — F20.81 SCHIZOPHRENIFORM DISORDER (HCC): Status: ACTIVE | Noted: 2019-12-17

## 2019-12-17 LAB
ANION GAP SERPL CALCULATED.3IONS-SCNC: 8 MEQ/L (ref 8–16)
BUN BLDV-MCNC: 9 MG/DL (ref 7–22)
CALCIUM SERPL-MCNC: 8 MG/DL (ref 8.5–10.5)
CHLORIDE BLD-SCNC: 102 MEQ/L (ref 98–111)
CO2: 29 MEQ/L (ref 23–33)
CREAT SERPL-MCNC: 0.6 MG/DL (ref 0.4–1.2)
ERYTHROCYTE [DISTWIDTH] IN BLOOD BY AUTOMATED COUNT: 15.1 % (ref 11.5–14.5)
ERYTHROCYTE [DISTWIDTH] IN BLOOD BY AUTOMATED COUNT: 52.5 FL (ref 35–45)
GFR SERPL CREATININE-BSD FRML MDRD: > 90 ML/MIN/1.73M2
GLUCOSE BLD-MCNC: 101 MG/DL (ref 70–108)
GLUCOSE BLD-MCNC: 107 MG/DL (ref 70–108)
GLUCOSE BLD-MCNC: 76 MG/DL (ref 70–108)
GLUCOSE BLD-MCNC: 90 MG/DL (ref 70–108)
HCT VFR BLD CALC: 50.5 % (ref 42–52)
HEMOGLOBIN: 15.4 GM/DL (ref 14–18)
MCH RBC QN AUTO: 28.8 PG (ref 26–33)
MCHC RBC AUTO-ENTMCNC: 30.5 GM/DL (ref 32.2–35.5)
MCV RBC AUTO: 94.6 FL (ref 80–94)
PLATELET # BLD: 192 THOU/MM3 (ref 130–400)
PMV BLD AUTO: 9.7 FL (ref 9.4–12.4)
POTASSIUM SERPL-SCNC: 4.9 MEQ/L (ref 3.5–5.2)
RBC # BLD: 5.34 MILL/MM3 (ref 4.7–6.1)
SODIUM BLD-SCNC: 139 MEQ/L (ref 135–145)
WBC # BLD: 7.8 THOU/MM3 (ref 4.8–10.8)

## 2019-12-17 PROCEDURE — 36415 COLL VENOUS BLD VENIPUNCTURE: CPT

## 2019-12-17 PROCEDURE — 6370000000 HC RX 637 (ALT 250 FOR IP): Performed by: INTERNAL MEDICINE

## 2019-12-17 PROCEDURE — 6360000002 HC RX W HCPCS: Performed by: INTERNAL MEDICINE

## 2019-12-17 PROCEDURE — 2580000003 HC RX 258: Performed by: INTERNAL MEDICINE

## 2019-12-17 PROCEDURE — 94660 CPAP INITIATION&MGMT: CPT

## 2019-12-17 PROCEDURE — 94761 N-INVAS EAR/PLS OXIMETRY MLT: CPT

## 2019-12-17 PROCEDURE — 80048 BASIC METABOLIC PNL TOTAL CA: CPT

## 2019-12-17 PROCEDURE — 2700000000 HC OXYGEN THERAPY PER DAY

## 2019-12-17 PROCEDURE — 82948 REAGENT STRIP/BLOOD GLUCOSE: CPT

## 2019-12-17 PROCEDURE — 85027 COMPLETE CBC AUTOMATED: CPT

## 2019-12-17 PROCEDURE — 94640 AIRWAY INHALATION TREATMENT: CPT

## 2019-12-17 RX ORDER — VARENICLINE TARTRATE 0.5 MG/1
TABLET, FILM COATED ORAL
Qty: 60 TABLET | Refills: 3 | Status: SHIPPED | OUTPATIENT
Start: 2019-12-17 | End: 2020-03-02

## 2019-12-17 RX ORDER — IPRATROPIUM BROMIDE AND ALBUTEROL SULFATE 2.5; .5 MG/3ML; MG/3ML
3 SOLUTION RESPIRATORY (INHALATION)
Qty: 360 ML | Refills: 2 | Status: ON HOLD | OUTPATIENT
Start: 2019-12-17 | End: 2020-05-28 | Stop reason: SDUPTHER

## 2019-12-17 RX ORDER — PREDNISONE 20 MG/1
TABLET ORAL
Qty: 15 TABLET | Refills: 0 | Status: SHIPPED | OUTPATIENT
Start: 2019-12-18 | End: 2019-12-30

## 2019-12-17 RX ORDER — LEVOFLOXACIN 750 MG/1
750 TABLET ORAL DAILY
Qty: 2 TABLET | Refills: 0 | Status: SHIPPED | OUTPATIENT
Start: 2019-12-18 | End: 2019-12-20

## 2019-12-17 RX ADMIN — FORMOTEROL FUMARATE DIHYDRATE 20 MCG: 20 SOLUTION RESPIRATORY (INHALATION) at 05:07

## 2019-12-17 RX ADMIN — VARENICLINE TARTRATE 0.5 MG: 0.5 TABLET, FILM COATED ORAL at 08:58

## 2019-12-17 RX ADMIN — BUDESONIDE 500 MCG: 0.5 INHALANT RESPIRATORY (INHALATION) at 05:07

## 2019-12-17 RX ADMIN — POTASSIUM CHLORIDE 20 MEQ: 20 TABLET, EXTENDED RELEASE ORAL at 08:59

## 2019-12-17 RX ADMIN — IPRATROPIUM BROMIDE AND ALBUTEROL SULFATE 1 AMPULE: .5; 3 SOLUTION RESPIRATORY (INHALATION) at 05:07

## 2019-12-17 RX ADMIN — IPRATROPIUM BROMIDE AND ALBUTEROL SULFATE 1 AMPULE: .5; 3 SOLUTION RESPIRATORY (INHALATION) at 09:09

## 2019-12-17 RX ADMIN — Medication 10 ML: at 08:59

## 2019-12-17 RX ADMIN — RISPERIDONE 2 MG: 2 TABLET ORAL at 08:59

## 2019-12-17 RX ADMIN — LEVOFLOXACIN 750 MG: 750 TABLET, FILM COATED ORAL at 08:58

## 2019-12-17 RX ADMIN — PREDNISONE 40 MG: 20 TABLET ORAL at 08:59

## 2019-12-17 RX ADMIN — AMLODIPINE BESYLATE 10 MG: 10 TABLET ORAL at 08:58

## 2019-12-17 RX ADMIN — IPRATROPIUM BROMIDE AND ALBUTEROL SULFATE 1 AMPULE: .5; 3 SOLUTION RESPIRATORY (INHALATION) at 12:38

## 2019-12-17 RX ADMIN — ENOXAPARIN SODIUM 40 MG: 40 INJECTION SUBCUTANEOUS at 08:59

## 2019-12-17 RX ADMIN — ASPIRIN 325 MG: 325 TABLET, DELAYED RELEASE ORAL at 08:59

## 2019-12-17 RX ADMIN — CARBAMAZEPINE 200 MG: 200 TABLET ORAL at 08:58

## 2019-12-17 RX ADMIN — METOPROLOL SUCCINATE 25 MG: 25 TABLET, FILM COATED, EXTENDED RELEASE ORAL at 08:58

## 2019-12-17 ASSESSMENT — PAIN SCALES - GENERAL: PAINLEVEL_OUTOF10: 0

## 2019-12-18 ENCOUNTER — CARE COORDINATION (OUTPATIENT)
Dept: CASE MANAGEMENT | Age: 49
End: 2019-12-18

## 2019-12-19 ENCOUNTER — CARE COORDINATION (OUTPATIENT)
Dept: CASE MANAGEMENT | Age: 49
End: 2019-12-19

## 2019-12-20 ENCOUNTER — CARE COORDINATION (OUTPATIENT)
Dept: CASE MANAGEMENT | Age: 49
End: 2019-12-20

## 2020-02-13 ENCOUNTER — HOSPITAL ENCOUNTER (OUTPATIENT)
Dept: CT IMAGING | Age: 50
Discharge: HOME | End: 2020-02-13
Payer: MEDICARE

## 2020-02-13 PROCEDURE — 71250 CT THORAX DX C-: CPT

## 2020-03-02 ENCOUNTER — APPOINTMENT (OUTPATIENT)
Dept: GENERAL RADIOLOGY | Age: 50
DRG: 189 | End: 2020-03-02
Payer: MEDICARE

## 2020-03-02 ENCOUNTER — HOSPITAL ENCOUNTER (INPATIENT)
Age: 50
LOS: 4 days | Discharge: HOME HEALTH | DRG: 189 | End: 2020-03-06
Attending: EMERGENCY MEDICINE | Admitting: INTERNAL MEDICINE
Payer: MEDICARE

## 2020-03-02 PROBLEM — J96.90 RESPIRATORY FAILURE (HCC): Status: ACTIVE | Noted: 2020-03-02

## 2020-03-02 LAB
ALBUMIN SERPL-MCNC: 3.7 G/DL (ref 3.5–5.1)
ALLEN TEST: POSITIVE
ALP BLD-CCNC: 107 U/L (ref 38–126)
ALT SERPL-CCNC: 20 U/L (ref 11–66)
ANION GAP SERPL CALCULATED.3IONS-SCNC: 12 MEQ/L (ref 8–16)
APTT: 29.6 SECONDS (ref 22–38)
AST SERPL-CCNC: 21 U/L (ref 5–40)
BASE EXCESS (CALCULATED): -0.5 MMOL/L (ref -2.5–2.5)
BASE EXCESS (CALCULATED): 0.9 MMOL/L (ref -2.5–2.5)
BASOPHILS # BLD: 0.3 %
BASOPHILS ABSOLUTE: 0 THOU/MM3 (ref 0–0.1)
BILIRUB SERPL-MCNC: 0.4 MG/DL (ref 0.3–1.2)
BUN BLDV-MCNC: 21 MG/DL (ref 7–22)
CALCIUM SERPL-MCNC: 8.8 MG/DL (ref 8.5–10.5)
CHLORIDE BLD-SCNC: 100 MEQ/L (ref 98–111)
CO2: 25 MEQ/L (ref 23–33)
COLLECTED BY:: ABNORMAL
COLLECTED BY:: ABNORMAL
CREAT SERPL-MCNC: 1 MG/DL (ref 0.4–1.2)
DEVICE: ABNORMAL
DEVICE: ABNORMAL
EKG ATRIAL RATE: 103 BPM
EKG P AXIS: 76 DEGREES
EKG P-R INTERVAL: 162 MS
EKG Q-T INTERVAL: 334 MS
EKG QRS DURATION: 72 MS
EKG QTC CALCULATION (BAZETT): 437 MS
EKG R AXIS: 130 DEGREES
EKG T AXIS: 46 DEGREES
EKG VENTRICULAR RATE: 103 BPM
EOSINOPHIL # BLD: 0.7 %
EOSINOPHILS ABSOLUTE: 0.1 THOU/MM3 (ref 0–0.4)
ERYTHROCYTE [DISTWIDTH] IN BLOOD BY AUTOMATED COUNT: 18.6 % (ref 11.5–14.5)
ERYTHROCYTE [DISTWIDTH] IN BLOOD BY AUTOMATED COUNT: 56.5 FL (ref 35–45)
FLU A ANTIGEN: NEGATIVE
FLU B ANTIGEN: NEGATIVE
GFR SERPL CREATININE-BSD FRML MDRD: > 90 ML/MIN/1.73M2
GLUCOSE BLD-MCNC: 103 MG/DL (ref 70–108)
HCO3: 29 MMOL/L (ref 23–28)
HCO3: 30 MMOL/L (ref 23–28)
HCT VFR BLD CALC: 59.3 % (ref 42–52)
HEMOGLOBIN: 17.6 GM/DL (ref 14–18)
IFIO2: 100
IFIO2: 60
IMMATURE GRANS (ABS): 0.05 THOU/MM3 (ref 0–0.07)
IMMATURE GRANULOCYTES: 0.7 %
INR BLD: 1.26 (ref 0.85–1.13)
LACTIC ACID, SEPSIS: 1.6 MMOL/L (ref 0.5–1.9)
LYMPHOCYTES # BLD: 19.5 %
LYMPHOCYTES ABSOLUTE: 1.5 THOU/MM3 (ref 1–4.8)
MAGNESIUM: 2 MG/DL (ref 1.6–2.4)
MCH RBC QN AUTO: 27.1 PG (ref 26–33)
MCHC RBC AUTO-ENTMCNC: 29.7 GM/DL (ref 32.2–35.5)
MCV RBC AUTO: 91.2 FL (ref 80–94)
MODE: ABNORMAL
MONOCYTES # BLD: 7.8 %
MONOCYTES ABSOLUTE: 0.6 THOU/MM3 (ref 0.4–1.3)
NUCLEATED RED BLOOD CELLS: 0 /100 WBC
O2 SATURATION: 88 %
O2 SATURATION: 92 %
OSMOLALITY CALCULATION: 277 MOSMOL/KG (ref 275–300)
PCO2: 63 MMHG (ref 35–45)
PCO2: 66 MMHG (ref 35–45)
PH BLOOD GAS: 7.25 (ref 7.35–7.45)
PH BLOOD GAS: 7.29 (ref 7.35–7.45)
PLATELET # BLD: 196 THOU/MM3 (ref 130–400)
PMV BLD AUTO: 10.4 FL (ref 9.4–12.4)
PO2: 65 MMHG (ref 71–104)
PO2: 73 MMHG (ref 71–104)
POTASSIUM SERPL-SCNC: 4.1 MEQ/L (ref 3.5–5.2)
PRO-BNP: 797.2 PG/ML (ref 0–900)
PROCALCITONIN: 0.1 NG/ML (ref 0.01–0.09)
RBC # BLD: 6.5 MILL/MM3 (ref 4.7–6.1)
SEG NEUTROPHILS: 71 %
SEGMENTED NEUTROPHILS ABSOLUTE COUNT: 5.4 THOU/MM3 (ref 1.8–7.7)
SET PEEP: 6 MMHG
SET PRESS SUPP: 10 CMH2O
SODIUM BLD-SCNC: 137 MEQ/L (ref 135–145)
SOURCE, BLOOD GAS: ABNORMAL
SOURCE, BLOOD GAS: ABNORMAL
TOTAL PROTEIN: 7.4 G/DL (ref 6.1–8)
TROPONIN T: < 0.01 NG/ML
TSH SERPL DL<=0.05 MIU/L-ACNC: 2.04 UIU/ML (ref 0.4–4.2)
WBC # BLD: 7.6 THOU/MM3 (ref 4.8–10.8)

## 2020-03-02 PROCEDURE — 82803 BLOOD GASES ANY COMBINATION: CPT

## 2020-03-02 PROCEDURE — 36600 WITHDRAWAL OF ARTERIAL BLOOD: CPT

## 2020-03-02 PROCEDURE — 87804 INFLUENZA ASSAY W/OPTIC: CPT

## 2020-03-02 PROCEDURE — 6360000002 HC RX W HCPCS: Performed by: EMERGENCY MEDICINE

## 2020-03-02 PROCEDURE — 2700000000 HC OXYGEN THERAPY PER DAY

## 2020-03-02 PROCEDURE — 99285 EMERGENCY DEPT VISIT HI MDM: CPT

## 2020-03-02 PROCEDURE — 93005 ELECTROCARDIOGRAM TRACING: CPT | Performed by: EMERGENCY MEDICINE

## 2020-03-02 PROCEDURE — 71045 X-RAY EXAM CHEST 1 VIEW: CPT

## 2020-03-02 PROCEDURE — 87641 MR-STAPH DNA AMP PROBE: CPT

## 2020-03-02 PROCEDURE — 6370000000 HC RX 637 (ALT 250 FOR IP): Performed by: EMERGENCY MEDICINE

## 2020-03-02 PROCEDURE — 85025 COMPLETE CBC W/AUTO DIFF WBC: CPT

## 2020-03-02 PROCEDURE — 83880 ASSAY OF NATRIURETIC PEPTIDE: CPT

## 2020-03-02 PROCEDURE — 84443 ASSAY THYROID STIM HORMONE: CPT

## 2020-03-02 PROCEDURE — 84145 PROCALCITONIN (PCT): CPT

## 2020-03-02 PROCEDURE — 96375 TX/PRO/DX INJ NEW DRUG ADDON: CPT

## 2020-03-02 PROCEDURE — 85730 THROMBOPLASTIN TIME PARTIAL: CPT

## 2020-03-02 PROCEDURE — 87500 VANOMYCIN DNA AMP PROBE: CPT

## 2020-03-02 PROCEDURE — 94660 CPAP INITIATION&MGMT: CPT

## 2020-03-02 PROCEDURE — 87081 CULTURE SCREEN ONLY: CPT

## 2020-03-02 PROCEDURE — 83735 ASSAY OF MAGNESIUM: CPT

## 2020-03-02 PROCEDURE — 71046 X-RAY EXAM CHEST 2 VIEWS: CPT

## 2020-03-02 PROCEDURE — 85610 PROTHROMBIN TIME: CPT

## 2020-03-02 PROCEDURE — 96365 THER/PROPH/DIAG IV INF INIT: CPT

## 2020-03-02 PROCEDURE — 2060000000 HC ICU INTERMEDIATE R&B

## 2020-03-02 PROCEDURE — 87040 BLOOD CULTURE FOR BACTERIA: CPT

## 2020-03-02 PROCEDURE — 36415 COLL VENOUS BLD VENIPUNCTURE: CPT

## 2020-03-02 PROCEDURE — 94761 N-INVAS EAR/PLS OXIMETRY MLT: CPT

## 2020-03-02 PROCEDURE — 80053 COMPREHEN METABOLIC PANEL: CPT

## 2020-03-02 PROCEDURE — 2580000003 HC RX 258: Performed by: EMERGENCY MEDICINE

## 2020-03-02 PROCEDURE — 84484 ASSAY OF TROPONIN QUANT: CPT

## 2020-03-02 PROCEDURE — 93010 ELECTROCARDIOGRAM REPORT: CPT | Performed by: NUCLEAR MEDICINE

## 2020-03-02 PROCEDURE — 94640 AIRWAY INHALATION TREATMENT: CPT

## 2020-03-02 PROCEDURE — 83605 ASSAY OF LACTIC ACID: CPT

## 2020-03-02 PROCEDURE — 2709999900 HC NON-CHARGEABLE SUPPLY

## 2020-03-02 RX ORDER — SODIUM CHLORIDE 0.9 % (FLUSH) 0.9 %
10 SYRINGE (ML) INJECTION PRN
Status: DISCONTINUED | OUTPATIENT
Start: 2020-03-02 | End: 2020-03-06 | Stop reason: HOSPADM

## 2020-03-02 RX ORDER — ACETAMINOPHEN 325 MG/1
650 TABLET ORAL EVERY 6 HOURS PRN
Status: DISCONTINUED | OUTPATIENT
Start: 2020-03-02 | End: 2020-03-06 | Stop reason: HOSPADM

## 2020-03-02 RX ORDER — FUROSEMIDE 10 MG/ML
20 INJECTION INTRAMUSCULAR; INTRAVENOUS DAILY
Status: DISCONTINUED | OUTPATIENT
Start: 2020-03-03 | End: 2020-03-03

## 2020-03-02 RX ORDER — IPRATROPIUM BROMIDE AND ALBUTEROL SULFATE 2.5; .5 MG/3ML; MG/3ML
1 SOLUTION RESPIRATORY (INHALATION)
Status: DISCONTINUED | OUTPATIENT
Start: 2020-03-03 | End: 2020-03-06 | Stop reason: HOSPADM

## 2020-03-02 RX ORDER — AMLODIPINE BESYLATE 10 MG/1
10 TABLET ORAL DAILY
Status: DISCONTINUED | OUTPATIENT
Start: 2020-03-03 | End: 2020-03-06 | Stop reason: HOSPADM

## 2020-03-02 RX ORDER — METHYLPREDNISOLONE SODIUM SUCCINATE 40 MG/ML
40 INJECTION, POWDER, LYOPHILIZED, FOR SOLUTION INTRAMUSCULAR; INTRAVENOUS EVERY 6 HOURS
Status: DISPENSED | OUTPATIENT
Start: 2020-03-03 | End: 2020-03-05

## 2020-03-02 RX ORDER — METHYLPREDNISOLONE SODIUM SUCCINATE 125 MG/2ML
125 INJECTION, POWDER, LYOPHILIZED, FOR SOLUTION INTRAMUSCULAR; INTRAVENOUS ONCE
Status: COMPLETED | OUTPATIENT
Start: 2020-03-02 | End: 2020-03-02

## 2020-03-02 RX ORDER — VARENICLINE TARTRATE 0.5 MG/1
0.5 TABLET, FILM COATED ORAL DAILY
Status: DISCONTINUED | OUTPATIENT
Start: 2020-03-03 | End: 2020-03-02 | Stop reason: CLARIF

## 2020-03-02 RX ORDER — CARBAMAZEPINE 200 MG/1
200 TABLET ORAL 2 TIMES DAILY
Status: DISCONTINUED | OUTPATIENT
Start: 2020-03-02 | End: 2020-03-06 | Stop reason: HOSPADM

## 2020-03-02 RX ORDER — FUROSEMIDE 20 MG/1
20 TABLET ORAL DAILY
Status: DISCONTINUED | OUTPATIENT
Start: 2020-03-03 | End: 2020-03-02 | Stop reason: ALTCHOICE

## 2020-03-02 RX ORDER — PREDNISONE 20 MG/1
40 TABLET ORAL DAILY
Status: DISCONTINUED | OUTPATIENT
Start: 2020-03-05 | End: 2020-03-06 | Stop reason: HOSPADM

## 2020-03-02 RX ORDER — IPRATROPIUM BROMIDE AND ALBUTEROL SULFATE 2.5; .5 MG/3ML; MG/3ML
1 SOLUTION RESPIRATORY (INHALATION) ONCE
Status: COMPLETED | OUTPATIENT
Start: 2020-03-02 | End: 2020-03-02

## 2020-03-02 RX ORDER — PROMETHAZINE HYDROCHLORIDE 25 MG/1
12.5 TABLET ORAL EVERY 6 HOURS PRN
Status: DISCONTINUED | OUTPATIENT
Start: 2020-03-02 | End: 2020-03-06 | Stop reason: HOSPADM

## 2020-03-02 RX ORDER — ONDANSETRON 2 MG/ML
4 INJECTION INTRAMUSCULAR; INTRAVENOUS EVERY 6 HOURS PRN
Status: DISCONTINUED | OUTPATIENT
Start: 2020-03-02 | End: 2020-03-06 | Stop reason: HOSPADM

## 2020-03-02 RX ORDER — BUDESONIDE 0.5 MG/2ML
500 INHALANT ORAL 2 TIMES DAILY
Status: DISCONTINUED | OUTPATIENT
Start: 2020-03-02 | End: 2020-03-06 | Stop reason: HOSPADM

## 2020-03-02 RX ORDER — METOPROLOL SUCCINATE 25 MG/1
25 TABLET, EXTENDED RELEASE ORAL DAILY
Status: DISCONTINUED | OUTPATIENT
Start: 2020-03-03 | End: 2020-03-06 | Stop reason: HOSPADM

## 2020-03-02 RX ORDER — RISPERIDONE 2 MG/1
2 TABLET, FILM COATED ORAL 2 TIMES DAILY
Status: DISCONTINUED | OUTPATIENT
Start: 2020-03-02 | End: 2020-03-06 | Stop reason: HOSPADM

## 2020-03-02 RX ORDER — ALBUTEROL SULFATE 2.5 MG/3ML
2.5 SOLUTION RESPIRATORY (INHALATION)
Status: DISCONTINUED | OUTPATIENT
Start: 2020-03-02 | End: 2020-03-06 | Stop reason: HOSPADM

## 2020-03-02 RX ORDER — HALOPERIDOL DECANOATE 50 MG/ML
50 INJECTION INTRAMUSCULAR
Status: DISCONTINUED | OUTPATIENT
Start: 2020-03-16 | End: 2020-03-06 | Stop reason: HOSPADM

## 2020-03-02 RX ORDER — POLYETHYLENE GLYCOL 3350 17 G/17G
17 POWDER, FOR SOLUTION ORAL DAILY PRN
Status: DISCONTINUED | OUTPATIENT
Start: 2020-03-02 | End: 2020-03-06 | Stop reason: HOSPADM

## 2020-03-02 RX ORDER — FUROSEMIDE 10 MG/ML
20 INJECTION INTRAMUSCULAR; INTRAVENOUS ONCE
Status: COMPLETED | OUTPATIENT
Start: 2020-03-02 | End: 2020-03-02

## 2020-03-02 RX ORDER — ACETAMINOPHEN 650 MG/1
650 SUPPOSITORY RECTAL EVERY 6 HOURS PRN
Status: DISCONTINUED | OUTPATIENT
Start: 2020-03-02 | End: 2020-03-06 | Stop reason: HOSPADM

## 2020-03-02 RX ORDER — PRAVASTATIN SODIUM 40 MG
40 TABLET ORAL NIGHTLY
Status: DISCONTINUED | OUTPATIENT
Start: 2020-03-02 | End: 2020-03-06 | Stop reason: HOSPADM

## 2020-03-02 RX ORDER — SODIUM CHLORIDE 0.9 % (FLUSH) 0.9 %
10 SYRINGE (ML) INJECTION EVERY 12 HOURS SCHEDULED
Status: DISCONTINUED | OUTPATIENT
Start: 2020-03-02 | End: 2020-03-06 | Stop reason: HOSPADM

## 2020-03-02 RX ADMIN — FUROSEMIDE 20 MG: 10 INJECTION, SOLUTION INTRAMUSCULAR; INTRAVENOUS at 19:07

## 2020-03-02 RX ADMIN — IPRATROPIUM BROMIDE AND ALBUTEROL SULFATE 1 AMPULE: .5; 3 SOLUTION RESPIRATORY (INHALATION) at 18:41

## 2020-03-02 RX ADMIN — CEFTRIAXONE SODIUM 1 G: 1 INJECTION, POWDER, FOR SOLUTION INTRAMUSCULAR; INTRAVENOUS at 19:33

## 2020-03-02 RX ADMIN — METHYLPREDNISOLONE SODIUM SUCCINATE 125 MG: 125 INJECTION, POWDER, FOR SOLUTION INTRAMUSCULAR; INTRAVENOUS at 19:07

## 2020-03-02 RX ADMIN — AZITHROMYCIN 500 MG: 500 INJECTION, POWDER, LYOPHILIZED, FOR SOLUTION INTRAVENOUS at 19:33

## 2020-03-02 ASSESSMENT — ENCOUNTER SYMPTOMS
VOMITING: 0
BACK PAIN: 0
ABDOMINAL PAIN: 0
COLOR CHANGE: 1
COUGH: 1
SHORTNESS OF BREATH: 1
WHEEZING: 1
NAUSEA: 0

## 2020-03-02 ASSESSMENT — PAIN SCALES - GENERAL: PAINLEVEL_OUTOF10: 0

## 2020-03-02 NOTE — ED NOTES
Family states that pt was having trouble breathing today beth when he walks. States could not ambulate very far. Pt states he is supposed to be on oxygen all the time but unsure of how many liters. Pt states he does not take his tank out in public with him. Pt has edema noted to bilateral legs. Family states pt takes a water pill everyday. Assessment completed.       Malena Bruno, RN  03/02/20 [address], RN  03/02/20 7329

## 2020-03-02 NOTE — ED NOTES
Upon first encounter with pt,  Pt resp labored and rapid. Pt appears to be in distress. Pt lips grey in color. Pt is alert and answering questions. Pt pulse ox placed and cardiac monitor. SPO2 noted to be 54% on RA. Pt placed on 15L NRB. Resp paged and Dr. Suman Montes notified of pt.                   Shea Glynn RN  03/02/20 4686

## 2020-03-03 LAB
ALLEN TEST: POSITIVE
ANION GAP SERPL CALCULATED.3IONS-SCNC: 11 MEQ/L (ref 8–16)
BASE EXCESS (CALCULATED): 1.4 MMOL/L (ref -2.5–2.5)
BASE EXCESS (CALCULATED): 4.6 MMOL/L (ref -2.5–2.5)
BUN BLDV-MCNC: 22 MG/DL (ref 7–22)
CALCIUM SERPL-MCNC: 8.3 MG/DL (ref 8.5–10.5)
CHLORIDE BLD-SCNC: 102 MEQ/L (ref 98–111)
CO2: 26 MEQ/L (ref 23–33)
COLLECTED BY:: ABNORMAL
COLLECTED BY:: ABNORMAL
CREAT SERPL-MCNC: 0.9 MG/DL (ref 0.4–1.2)
DEVICE: ABNORMAL
DEVICE: ABNORMAL
GFR SERPL CREATININE-BSD FRML MDRD: > 90 ML/MIN/1.73M2
GLUCOSE BLD-MCNC: 114 MG/DL (ref 70–108)
HCO3: 32 MMOL/L (ref 23–28)
HCO3: 35 MMOL/L (ref 23–28)
IFIO2: 50
IFIO2: 50
MODE: ABNORMAL
MRSA SCREEN RT-PCR: NEGATIVE
O2 SATURATION: 47 %
O2 SATURATION: 90 %
PCO2: 73 MMHG (ref 35–45)
PCO2: 73 MMHG (ref 35–45)
PH BLOOD GAS: 7.25 (ref 7.35–7.45)
PH BLOOD GAS: 7.29 (ref 7.35–7.45)
PO2: 31 MMHG (ref 71–104)
PO2: 68 MMHG (ref 71–104)
POTASSIUM REFLEX MAGNESIUM: 4.8 MEQ/L (ref 3.5–5.2)
SET PEEP: 12 MMHG
SET PEEP: 12 MMHG
SET PRESS SUPP: 18 CMH2O
SET RESPIRATORY RATE: 20 BPM
SET RESPIRATORY RATE: 20 BPM
SODIUM BLD-SCNC: 139 MEQ/L (ref 135–145)
SOURCE, BLOOD GAS: ABNORMAL
SOURCE, BLOOD GAS: ABNORMAL
VANCOMYCIN RESISTANT ENTEROCOCCUS: NEGATIVE

## 2020-03-03 PROCEDURE — 80048 BASIC METABOLIC PNL TOTAL CA: CPT

## 2020-03-03 PROCEDURE — 6370000000 HC RX 637 (ALT 250 FOR IP): Performed by: INTERNAL MEDICINE

## 2020-03-03 PROCEDURE — 97535 SELF CARE MNGMENT TRAINING: CPT

## 2020-03-03 PROCEDURE — 97110 THERAPEUTIC EXERCISES: CPT

## 2020-03-03 PROCEDURE — 2700000000 HC OXYGEN THERAPY PER DAY

## 2020-03-03 PROCEDURE — 94660 CPAP INITIATION&MGMT: CPT

## 2020-03-03 PROCEDURE — 6360000002 HC RX W HCPCS: Performed by: INTERNAL MEDICINE

## 2020-03-03 PROCEDURE — 2060000000 HC ICU INTERMEDIATE R&B

## 2020-03-03 PROCEDURE — 82803 BLOOD GASES ANY COMBINATION: CPT

## 2020-03-03 PROCEDURE — 97166 OT EVAL MOD COMPLEX 45 MIN: CPT

## 2020-03-03 PROCEDURE — 2709999900 HC NON-CHARGEABLE SUPPLY

## 2020-03-03 PROCEDURE — 97162 PT EVAL MOD COMPLEX 30 MIN: CPT

## 2020-03-03 PROCEDURE — 36415 COLL VENOUS BLD VENIPUNCTURE: CPT

## 2020-03-03 PROCEDURE — 99223 1ST HOSP IP/OBS HIGH 75: CPT | Performed by: INTERNAL MEDICINE

## 2020-03-03 PROCEDURE — 87255 GENET VIRUS ISOLATE HSV: CPT

## 2020-03-03 PROCEDURE — 36600 WITHDRAWAL OF ARTERIAL BLOOD: CPT

## 2020-03-03 PROCEDURE — 94640 AIRWAY INHALATION TREATMENT: CPT

## 2020-03-03 PROCEDURE — 94761 N-INVAS EAR/PLS OXIMETRY MLT: CPT

## 2020-03-03 PROCEDURE — 2580000003 HC RX 258: Performed by: INTERNAL MEDICINE

## 2020-03-03 RX ORDER — FUROSEMIDE 10 MG/ML
40 INJECTION INTRAMUSCULAR; INTRAVENOUS 2 TIMES DAILY
Status: DISCONTINUED | OUTPATIENT
Start: 2020-03-03 | End: 2020-03-05

## 2020-03-03 RX ORDER — ARFORMOTEROL TARTRATE 15 UG/2ML
15 SOLUTION RESPIRATORY (INHALATION) 2 TIMES DAILY
Status: DISCONTINUED | OUTPATIENT
Start: 2020-03-03 | End: 2020-03-06 | Stop reason: HOSPADM

## 2020-03-03 RX ADMIN — ASPIRIN 325 MG: 325 TABLET, DELAYED RELEASE ORAL at 09:53

## 2020-03-03 RX ADMIN — RISPERIDONE 2 MG: 2 TABLET ORAL at 00:26

## 2020-03-03 RX ADMIN — PRAVASTATIN SODIUM 40 MG: 40 TABLET ORAL at 00:26

## 2020-03-03 RX ADMIN — Medication 10 ML: at 09:54

## 2020-03-03 RX ADMIN — RISPERIDONE 2 MG: 2 TABLET ORAL at 09:53

## 2020-03-03 RX ADMIN — CARBAMAZEPINE 200 MG: 200 TABLET ORAL at 00:26

## 2020-03-03 RX ADMIN — METOPROLOL SUCCINATE 25 MG: 25 TABLET, FILM COATED, EXTENDED RELEASE ORAL at 09:53

## 2020-03-03 RX ADMIN — AMLODIPINE BESYLATE 10 MG: 10 TABLET ORAL at 09:53

## 2020-03-03 RX ADMIN — METHYLPREDNISOLONE SODIUM SUCCINATE 40 MG: 40 INJECTION, POWDER, FOR SOLUTION INTRAMUSCULAR; INTRAVENOUS at 05:21

## 2020-03-03 RX ADMIN — METHYLPREDNISOLONE SODIUM SUCCINATE 40 MG: 40 INJECTION, POWDER, FOR SOLUTION INTRAMUSCULAR; INTRAVENOUS at 18:44

## 2020-03-03 RX ADMIN — METHYLPREDNISOLONE SODIUM SUCCINATE 40 MG: 40 INJECTION, POWDER, FOR SOLUTION INTRAMUSCULAR; INTRAVENOUS at 14:39

## 2020-03-03 RX ADMIN — BUDESONIDE 500 MCG: 0.5 INHALANT RESPIRATORY (INHALATION) at 20:04

## 2020-03-03 RX ADMIN — ARFORMOTEROL TARTRATE 15 MCG: 15 SOLUTION RESPIRATORY (INHALATION) at 16:01

## 2020-03-03 RX ADMIN — FUROSEMIDE 20 MG: 40 INJECTION, SOLUTION INTRAMUSCULAR; INTRAVENOUS at 09:53

## 2020-03-03 RX ADMIN — Medication 10 ML: at 00:27

## 2020-03-03 RX ADMIN — IPRATROPIUM BROMIDE AND ALBUTEROL SULFATE 1 AMPULE: .5; 3 SOLUTION RESPIRATORY (INHALATION) at 15:53

## 2020-03-03 RX ADMIN — AZITHROMYCIN 500 MG: 500 INJECTION, POWDER, LYOPHILIZED, FOR SOLUTION INTRAVENOUS at 20:59

## 2020-03-03 RX ADMIN — IPRATROPIUM BROMIDE AND ALBUTEROL SULFATE 1 AMPULE: .5; 3 SOLUTION RESPIRATORY (INHALATION) at 19:47

## 2020-03-03 RX ADMIN — RISPERIDONE 2 MG: 2 TABLET ORAL at 20:57

## 2020-03-03 RX ADMIN — CARBAMAZEPINE 200 MG: 200 TABLET ORAL at 09:53

## 2020-03-03 RX ADMIN — METHYLPREDNISOLONE SODIUM SUCCINATE 40 MG: 40 INJECTION, POWDER, FOR SOLUTION INTRAMUSCULAR; INTRAVENOUS at 00:26

## 2020-03-03 RX ADMIN — CARBAMAZEPINE 200 MG: 200 TABLET ORAL at 20:57

## 2020-03-03 RX ADMIN — ENOXAPARIN SODIUM 40 MG: 40 INJECTION SUBCUTANEOUS at 09:55

## 2020-03-03 RX ADMIN — BUDESONIDE 500 MCG: 0.5 INHALANT RESPIRATORY (INHALATION) at 08:44

## 2020-03-03 RX ADMIN — IPRATROPIUM BROMIDE AND ALBUTEROL SULFATE 1 AMPULE: .5; 3 SOLUTION RESPIRATORY (INHALATION) at 12:18

## 2020-03-03 RX ADMIN — Medication 10 ML: at 20:57

## 2020-03-03 RX ADMIN — IPRATROPIUM BROMIDE AND ALBUTEROL SULFATE 1 AMPULE: .5; 3 SOLUTION RESPIRATORY (INHALATION) at 08:40

## 2020-03-03 RX ADMIN — CEFTRIAXONE SODIUM 1 G: 1 INJECTION, POWDER, FOR SOLUTION INTRAMUSCULAR; INTRAVENOUS at 20:57

## 2020-03-03 RX ADMIN — FUROSEMIDE 40 MG: 10 INJECTION, SOLUTION INTRAMUSCULAR; INTRAVENOUS at 18:44

## 2020-03-03 RX ADMIN — PRAVASTATIN SODIUM 40 MG: 40 TABLET ORAL at 20:57

## 2020-03-03 ASSESSMENT — PAIN SCALES - GENERAL
PAINLEVEL_OUTOF10: 0

## 2020-03-03 NOTE — ED NOTES
Pt asleep in bed. Resp regular. Family at side. Updated on bed status. Denies other needs. Call light in reach.       Ed Bernardo RN  03/02/20 8194

## 2020-03-03 NOTE — CONSULTS
[facility]    Patient - ePrez Vail   MRN -  [id number]   [facility] - [de-identified]   - 1970      Date of Admission -  3/2/2020  4:21 PM  Date of evaluation -  3/3/2020  Room - --ANNAMARIA Chew MD Primary Care Physician - Mitch Roy MD   Chief Complaint   SOB, wheezing  Active Hospital Problem List      Active Hospital Problems    Diagnosis Date Noted    Respiratory failure [facility]) [J96.90] 2020     HPI   Perez Vail is a 44 y.o. male schizophrenic, smoker, presents to ED with SOB and wheezing which have progressed over the last few days, also worsening B LE edema, in the ED was on respiratory distress, low sats, started on supplemental oxygen and Bipap with improvement. H/O CMP EF 45% on diuretics  ABGs; 7.25/66/65/88%--on Bipap  Has supplemental oxygen but not using as prescribed   Last FEV1 () 2.5L (65%)  CXR: cardiomegaly, pleural effusions. Past Medical History         Diagnosis Date    Acute on chronic systolic congestive heart failure ([address] 2019    Emphysema (subcutaneous) (surgical) resulting from a procedure     Hypertension     Pneumonia     Schizophrenia [facility])       Past Surgical History     History reviewed. No pertinent surgical history. Diet    DIET GENERAL;  Allergies    Patient has no known allergies.   Social History     Social History     Socioeconomic History    Marital status: Single     Spouse name: Not on file    Number of children: 0    Years of education: Not on file    Highest education level: Not on file   Occupational History    Not on file   Social Needs    Financial resource strain: Not on file    Food insecurity:     Worry: Not on file     Inability: Not on file    Transportation needs:     Medical: Not on file     Non-medical: Not on file   Tobacco Use    Smoking status: Current Every Day Smoker     Packs/day: 1.00     Years: 20.00     Pack years: 20.00 Types: Cigarettes    Smokeless tobacco: Never Used   Substance and Sexual Activity    Alcohol use: No    Drug use: No    Sexual activity: Not Currently   Lifestyle    Physical activity:     Days per week: Not on file     Minutes per session: Not on file    Stress: Not on file   Relationships    Social connections:     Talks on phone: Not on file     Gets together: Not on file     Attends Yazidi service: Not on file     Active member of club or organization: Not on file     Attends meetings of clubs or organizations: Not on file     Relationship status: Not on file    Intimate partner violence:     Fear of current or ex partner: Not on file     Emotionally abused: Not on file     Physically abused: Not on file     Forced sexual activity: Not on file   Other Topics Concern    Not on file   Social History Narrative    Not on file     Family History          Problem Relation Age of Onset    High Blood Pressure Mother        ROS    General/Constitutional: No recent loss of weight or appetite changes. No fever or chills. HENT: Negative. Eyes: Negative. Upper respiratory tract: No nasal stuffiness or post nasal drip. Lower respiratory tract/ lungs: SOB, MELGAR, wheezing. Cardiovascular: Bilateral LE edema  Gastrointestinal: No nausea or vomiting. Neurological: No focal neurological weakness. Extremities: No tenderness. Musculoskeletal: no complaints  Genitourinary: No complaints. Hematological: Negative. Denies easy buising  Skin: No itching.   Meds    Current Medications    amLODIPine  10 mg Oral Daily    aspirin  325 mg Oral Daily    budesonide  500 mcg Nebulization BID    carBAMazepine  200 mg Oral BID    [START ON 3/16/2020] haloperidol decanoate  50 mg Intramuscular Q14 Days    metoprolol succinate  25 mg Oral Daily    pravastatin  40 mg Oral Nightly    risperiDONE  2 mg Oral BID    sodium chloride flush  10 mL Intravenous 2 times per day    enoxaparin  40 mg Subcutaneous Daily    FINDINGS: Cardiomegaly. Right pleural effusion. Airspace opacity in the right lung base and prominent interstitium could reflect infiltrate and interstitial edema or asymmetric edema. Follow-up advised. Findings have progressed when compared to prior    study suggesting fluid overload. No acute osseous findings.           Impression   Cardiomegaly. Right pleural effusion. Airspace opacity in the right lung base and prominent interstitium could reflect infiltrate and interstitial edema or asymmetric edema. Follow-up advised. Findings have progressed when compared to prior study    suggesting fluid overload.                   **This report has been created using voice recognition software. It may contain minor errors which are inherent in voice recognition technology. **       Final report electronically signed by Dr. Deedee Foley on 3/2/2020 7:31 PM       CT Scans    (See actual reports for details)  Assessment   Acute on chronic hypercapnic and hypoxemic respiratory failure  Acute on chronic CHF (systolic)  Blunting right costophrenics angle/pleural effusion  Moderate COPD GOLD 2  COPD Exacerbation ? Tobacco abuse  schizophrenia  HTN   Obesity  Full Code     Recommendations   -BIPAP support, settings adjusted 30/12 BUR 20  -Repeat ABGs in 2 h  -Duonebs every 4 hours w/a  -Perforomist neb BID  -Systemic steroids  -Smoking cessation  -Diuresis  -No strong indication for ATBs, reasonable to start empirically, de escalate according to culture data. -DVT prophylaxis: Lovenox     Case discussed with nurse and patient/family. Questions and concerns addressed. Meds and Orders reviewed.     Thank you for the consult and allowing us to participate in the care of your patient.      Case discussed with nurse and patient/family. Questions and concerns addressed. Meds and Orders reviewed. Thank you for the consult and allowing us to participate in the care of your patient.      Case discussed with nurse and

## 2020-03-03 NOTE — FLOWSHEET NOTE
03/03/20 1144   Provider Notification   Reason for Communication Evaluate  (calling critical ABGs)   Provider Name [facility]   Provider Notification Physician   Method of Communication Call  (message left with Reather Camera in office)   Notification Time [phone]    [facility] said no setting changes at this time. Recheck ABGs at 1600.

## 2020-03-03 NOTE — PROGRESS NOTES
PULMONARY REHABILITATION REFERRAL      Pulmonary Rehab Evaluation order received from Dr. Kishore Nj. I saw order today and went up to talk to pt about pulmonary rehab. Pt is currently on bipap of 30/12 with a rate of 20 and 50% FiO2. I did speak with pts mother who was in the room. I explained pulmonary rehab to her and left a pamphlet with her for the pt. I told pt we will stop back up another day to talk to the pt. She had no questions at this time.

## 2020-03-03 NOTE — PROGRESS NOTES
[address] [address]  INPATIENT PHYSICAL THERAPY  EVALUATION  [facility] ICU STEPDOWN TELEMETRY 4K - 4K-03/003-A    Time In:   Time Out: 7275  Timed Code Treatment Minutes: 8 Minutes  Minutes: 20          Date: 3/3/2020  Patient Name: Do Todd,  Gender:  male        MRN: [id number]  : 1970  (44 y.o.)      Referring Practitioner: Richmond Bonner MD  Diagnosis: Respiratory failure  Additional Pertinent Hx: Per ED note: Do Todd is a 44 y.o. male who presents fatigue and wheezing. Patient has COPD and emphysema. Patient had an appointment today and he was not wearing his O2. Patient has schizophrenia and is unsure how much O2 he wears at home. He is here with his mother and a neighbor. Patient has had worsening MELGAR for the past few days. Patient has leg swelling and states he takes Lasix. Patient or mother are unsure if he has CHF. Patient denies chest pain, abdominal pain, fever, recent antibiotics, or steroids. Patient reports he has a cough. Patient is a smoker and mother states he doesn't smoke with his O2 on. Mother or patient has no other complaints at this time. Restrictions/Precautions:  Restrictions/Precautions: Fall Risk  Position Activity Restriction  Other position/activity restrictions: Continuous bipap 3/3    Subjective:  Chart Reviewed: Yes  Patient assessed for rehabilitation services?: Yes  Family / Caregiver Present: No  Subjective: RN approved session, pt is supine in bed on continuous bipap, agreeable to PT. Pt sleepy throughout, cues to keep awake.     General:  Overall Orientation Status: Within Normal Limits  Follows Commands: Within Functional Limits    Vision: Within Functional Limits    Hearing: Within functional limits         Pain:   .      Denies    Social/Functional History:    Lives With: Other (comment)(Mother)  Type of Home: House  Home Layout: One level  Home Equipment: (None)      Ambulation Assistance: Independent  Transfer Assistance: Independent Additional Comments: Pt states amb without AD, O2 at night    OBJECTIVE:  Range of Motion:  Bilateral Lower Extremity: WFL    Strength:  Bilateral Lower Extremity: Impaired - grossly 3+/5    Balance:  Static Standing Balance: Contact Guard Assistance  Dynamic Standing Balance: Contact Guard Assistance    Bed Mobility:  Supine to Sit: Stand By Assistance    Transfers:  Sit to Stand: Contact Guard Assistance  Stand to [employer] Assistance    Ambulation:  Contact Guard Assistance  Distance: 3 feet to chair  Surface: Level Tile  Device:No Device  Gait Deviations: Forward Flexed Posture, Slow Mabel, Decreased Step Length Bilaterally and Decreased Gait Speed  **Pt declines further mobility, O2 sats >93% after mobility on continuous bipap    Exercise:  Patient was guided in 1 set(s) 10 reps of exercise to both lower extremities. Ankle pumps, Heelslides and Hip abduction/adduction. Exercises were completed for increased independence with functional mobility. Functional Outcome Measures: Completed  AM-PAC Inpatient Mobility without Stair Climbing Raw Score : 15  -PAC Inpatient without Stair Climbing T-Scale Score : 43.03    ASSESSMENT:  Activity Tolerance:  Patient tolerance of  treatment: fair. Limited by fatigue. Treatment Initiated: Treatment and education initiated within context of evaluation. Evaluation time included review of current medical information, gathering information related to past medical, social and functional history, completion of standardized testing, formal and informal observation of tasks, assessment of data and development of plan of care and goals. Treatment time included skilled education and facilitation of tasks to increase safety and independence with functional mobility for improved independence and quality of life. See above exercises. Assessment:   Body structures, Functions, Activity limitations: Decreased functional mobility , Decreased balance, Decreased

## 2020-03-03 NOTE — ED PROVIDER NOTES
Details   aspirin 325 MG EC tablet Take 1 tablet by mouth daily  Qty: 30 tablet, Refills: 3      ipratropium-albuterol (DUONEB) 0.5-2.5 (3) MG/3ML SOLN nebulizer solution Inhale 3 mLs into the lungs every 4 hours (while awake)  Qty: 360 mL, Refills: 2      haloperidol decanoate (HALDOL DECANOATE) 50 MG/ML injection Inject 50 mg into the muscle every 14 days      pravastatin (PRAVACHOL) 40 MG tablet Take 40 mg by mouth nightly      naproxen sodium (ALEVE) 220 MG tablet Take 220 mg by mouth 2 times daily (with meals)      formoterol (PERFOROMIST) 20 MCG/2ML nebulizer solution Take 2 mLs by nebulization 2 times daily  Qty: 120 mL, Refills: 3      furosemide (LASIX) 20 MG tablet Take 1 tablet by mouth daily  Qty: 60 tablet, Refills: 3      potassium chloride (KLOR-CON M) 20 MEQ extended release tablet Take 1 tablet by mouth daily (with breakfast)  Qty: 60 tablet, Refills: 3      budesonide (PULMICORT) 0.5 MG/2ML nebulizer suspension Take 2 mLs by nebulization 2 times daily  Qty: 60 ampule, Refills: 3      albuterol (PROVENTIL) (2.5 MG/3ML) 0.083% nebulizer solution Take 3 mLs by nebulization every 4 hours as needed for Wheezing or Shortness of Breath  Qty: 120 each, Refills: 3      metoprolol succinate (TOPROL XL) 25 MG extended release tablet Take 1 tablet by mouth daily  Qty: 30 tablet, Refills: 3      amLODIPine (NORVASC) 10 MG tablet Take 1 tablet by mouth daily  Qty: 30 tablet, Refills: 3      risperiDONE (RISPERDAL) 2 MG tablet Take 2 mg by mouth 2 times daily      traZODone (DESYREL) 100 MG tablet Take 100 mg by mouth nightly      carBAMazepine (TEGRETOL) 200 MG tablet Take 200 mg by mouth 2 times daily      OXYGEN Inhale 3 L into the lungs continuous             ALLERGIES     has No Known Allergies. FAMILY HISTORY     He indicated that his mother is alive. He indicated that his sister is alive. family history includes High Blood Pressure in his mother.     SOCIAL HISTORY      reports that he has been smoking cigarettes. He has a 20.00 pack-year smoking history. He has never used smokeless tobacco. He reports that he does not drink alcohol or use drugs. PHYSICAL EXAM     INITIAL VITALS:  height is 5' 11\" (1.803 m) and weight is 277 lb 6.4 oz (125.8 kg). His oral temperature is 98.7 °F (37.1 °C). His blood pressure is 113/57 (abnormal) and his pulse is 92. His respiration is 20 and oxygen saturation is 94%. CONSTITUTIONAL: [Awake, alert, non toxic, well developed, well nourished, significant respiratory distress exhibited by tachypnea and hypoxia]  HEAD: [Normocephalic, atraumatic]  EYES: [Pupils equal, round & reactive to light, extraocular movements intact, no nystagmus, clear conjunctiva, non-icteric sclera]  ENT: [External ear canal clear without evidence of cerumen impaction or foreign body, TM's clear without erythema or bulging. Nares patent without drainage, septum appears midline. Moist mucus membranes, oropharynx clear without exudate, erythema, or mass. Uvula midline]  NECK: [Nontender and supple. No meningismus, no appreciated lymphadenopathy. Intact full range of motion. C-spine midline without vertebral tenderness. Trachea midline.]  CHEST: [Inspection normal, no lesions, equal rise. No crepitus or tenderness upon palpation.]  CARDIOVASCULAR: Tachycardic rate, rhythm, normal S1 and S2. No appreciated murmurs, rubs, or gallops. No pulse deficits appreciated. Intact distal perfusion. JVD not appreciated.]  PULMONARY: [Patient was 54% on roomair upon arrival to the ER. Patient was put on nonrebreather and his O2 saturation went up to 90's. Patient has wheezing throughout]  ABDOMEN: [Inspection normal, without surgical scars. Soft, non-tender, non-distended, with normoactive bowel sounds. No palpable masses, rebound, or guarding]  BACK: [Intact ROM. No midline vertebral tenderness, step off, or crepitus. No CVA tenderness.]  MUSCULOSKELETAL: [Extremities nontender to palpation.  No gross deformity or Antonio Leak on 3/2/2020 7:31 PM      XR CHEST STANDARD (2 VW)   Final Result   Extensive right lower lobe and right middle lobe airspace infiltrates and mild bilateral interstitial edema. **This report has been created using voice recognition software. It may contain minor errors which are inherent in voice recognition technology. **      Final report electronically signed by Dr. Tyrel Armendariz on 3/2/2020 4:56 PM        [] Visualized and interpreted by me   [x] Radiologist's Wet Read Report Reviewed   [] Discussed withRadiologist.    LABS:   Labs Reviewed   CBC WITH AUTO DIFFERENTIAL - Abnormal; Notable for the following components:       Result Value    RBC 6.50 (*)     Hematocrit 59.3 (*)     MCHC 29.7 (*)     RDW-CV 18.6 (*)     RDW-SD 56.5 (*)     All other components within normal limits   BLOOD GAS, ARTERIAL - Abnormal; Notable for the following components:    pH, Blood Gas 7.29 (*)     PCO2 63 (*)     HCO3 30 (*)     All other components within normal limits   PROCALCITONIN - Abnormal; Notable for the following components:    Procalcitonin 0.10 (*)     All other components within normal limits   PROTIME-INR - Abnormal; Notable for the following components:    INR 1.26 (*)     All other components within normal limits   BLOOD GAS, ARTERIAL - Abnormal; Notable for the following components:    pH, Blood Gas 7.25 (*)     PCO2 66 (*)     PO2 65 (*)     HCO3 29 (*)     All other components within normal limits   BASIC METABOLIC PANEL W/ REFLEX TO MG FOR LOW K - Abnormal; Notable for the following components:    Glucose 114 (*)     Calcium 8.3 (*)     All other components within normal limits   BLOOD GAS, ARTERIAL - Abnormal; Notable for the following components:    pH, Blood Gas 7.29 (*)     PCO2 73 (*)     PO2 68 (*)     HCO3 35 (*)     Base Excess (Calculated) 4.6 (*)     All other components within normal limits   BLOOD GAS, ARTERIAL - Abnormal; Notable for the following components:    pH, Blood Gas 7.25 (*)     PCO2 73 (*)     PO2 31 (*)     HCO3 32 (*)     All other components within normal limits   BLOOD GAS, ARTERIAL - Abnormal; Notable for the following components:    pH, Blood Gas 7.34 (*)     PCO2 59 (*)     PO2 62 (*)     HCO3 32 (*)     Base Excess (Calculated) 3.7 (*)     All other components within normal limits   BLOOD GAS, ARTERIAL - Abnormal; Notable for the following components:    PCO2 57 (*)     PO2 58 (*)     HCO3 32 (*)     Base Excess (Calculated) 3.6 (*)     All other components within normal limits   BASIC METABOLIC PANEL W/ REFLEX TO MG FOR LOW K - Abnormal; Notable for the following components:    BUN 25 (*)     Calcium 8.3 (*)     All other components within normal limits   CULTURE, BLOOD 1    Narrative:     Source: blood-Adult-suboptimal <5.5oz./set volume       Site: Peripheral Vein            Current Antibiotics: not stated   CULTURE, BLOOD 2    Narrative:     Source: blood-Adult-suboptimal <5.5oz./set volume       Site: Peripheral Vein            Current Antibiotics: not stated   RAPID INFLUENZA A/B ANTIGENS   CULTURE, MRSA, SCREENING    Narrative:     Source: rectal       Site:           Current Antibiotics: Azithromycin, Ceftriaxone   VRE SCREEN BY PCR   CULTURE, VIRUS, RESPIRATORY   COMPREHENSIVE METABOLIC PANEL   TROPONIN   BRAIN NATRIURETIC PEPTIDE   MAGNESIUM   TSH WITHOUT REFLEX   ANION GAP   GLOMERULAR FILTRATION RATE, ESTIMATED   OSMOLALITY   APTT   LACTATE, SEPSIS   MRSA BY PCR   ANION GAP   GLOMERULAR FILTRATION RATE, ESTIMATED   BLOOD GAS, ARTERIAL   BLOOD GAS, ARTERIAL   ANION GAP   GLOMERULAR FILTRATION RATE, ESTIMATED   BLOOD GAS, ARTERIAL       EMERGENCY DEPARTMENT COURSE:   Vitals:    Vitals:    03/04/20 0830 03/04/20 1000 03/04/20 1132 03/04/20 1530   BP: 111/75  (!) 108/56 (!) 113/57   Pulse: 73  89 92   Resp: 20 18 20   Temp: 98 °F (36.7 °C)  97.9 °F (36.6 °C) 98.7 °F (37.1 °C)   TempSrc: Axillary  Oral Oral   SpO2: 97% 94% 94% 94%   Weight:       Height: mis-transcribed.)    Scribe:  Laura Borges 3/2/20 7:20 PM Scribing for and in the presence of @Amanda Buitrago MD@. Signed by: Sascha Rosenthal, 03/04/20 5:29 PM    Provider:  I personally performed the services described in the documentation, reviewed and edited the documentation which was dictated to the scribe in my presence, and it accurately records my words and actions.     Humberto Ramon MD 3/2/20 5:29 PM                 Humberto Ramon MD  03/04/20 9514

## 2020-03-03 NOTE — PLAN OF CARE
Problem: Falls - Risk of:  Goal: Will remain free from falls  Description  Will remain free from falls  Outcome: Ongoing  Note:   Patient has remained free of falls this shift. Fall precautions are in place. Problem: Falls - Risk of:  Goal: Absence of physical injury  Description  Absence of physical injury  Outcome: Ongoing     Problem: Pain Control  Goal: Maintain pain level at or below patient's acceptable level (or 5 if patient is unable to determine acceptable level)  Outcome: Ongoing  Flowsheets (Taken 3/3/2020 0057)  Patient's Stated Pain Goal: No pain  Note:   Patient states he has no pain. Problem: Cardiovascular  Goal: No DVT, peripheral vascular complications  Outcome: Ongoing  Note:   Patient shows no signs of DVT, pt to receive Lovenox. Problem: Cardiovascular  Goal: Hemodynamic stability  Outcome: Ongoing  Note:   Patient's vitals are stable at this time. Problem: Respiratory  Goal: No pulmonary complications  Outcome: Ongoing  Note:   Patient admitted with fatigue and wheezing, spo2 was 54% in ED. Patient now on BiPAP with SpO2 remaining above 90%. Problem: Respiratory  Goal: O2 Sat > 90%  Outcome: Ongoing     Problem: Skin Integrity/Risk  Goal: No skin breakdown during hospitalization  Outcome: Ongoing  Note:   Patient shows no signs of skin breakdown this shift. Problem: Skin Integrity/Risk  Goal: Wound healing  Outcome: Ongoing     Care plan reviewed with patient. Patient verbalize understanding of the plan of care and contribute to goal setting.

## 2020-03-03 NOTE — ED NOTES
Pt resting in bed. Resp regular. Pt on BIPAP per orders. Denies needs. Call light in reach. Updated on POC.       Deandre Land RN  03/02/20 Lynn Cowan

## 2020-03-03 NOTE — PROGRESS NOTES
[facility] [address]  INPATIENT OCCUPATIONAL THERAPY  [facility] ICU STEPDOWN TELEMETRY 4K  EVALUATION    Time:   Time In: 7112  Time Out: 1705  Timed Code Treatment Minutes: 15 Minutes  Minutes: 30          Date: 3/3/2020  Patient Name: Anthony Raza,   Gender: male      MRN: [id number]  : 1970  (44 y.o.)  Referring Practitioner: Dr. Elise Medeiros MD  Diagnosis: Respiratory Failure  Additional Pertinent Hx: Pt presents with fatigue and wheezing. Patient has COPD and emphysema. Patient had an appointment today and he was not wearing his O2. Patient has schizophrenia and is unsure how much O2 he wears at home. He is here with his mother and a neighbor. Patient has had worsening MELGAR for the past few days. Patient has leg swelling and states he takes Lasix. Patient or mother are unsure if he has CHF. Patient denies chest pain, abdominal pain, fever, recent antibiotics, or steroids. Patient reports he has a cough. Patient is a smoker and mother states he doesn't smoke with his O2 on. Restrictions/Precautions:  Restrictions/Precautions: Fall Risk  Position Activity Restriction  Other position/activity restrictions: Continuous bipap 3/3    Subjective  Chart Reviewed: Yes, Orders, History and Physical, Other (comment)(PT evaluation)  Patient assessed for rehabilitation services?: Yes  Response to previous treatment: Patient with no complaints from previous session  Family / Caregiver Present: No    Subjective: Pleasant and cooperative  Comments: RN approved session. Pt had finished eating lunch. He had his light on and asked if what he had ordered for supper was here. His nurse indicated he ate lunch late because he was sleeping earlier during lunch time.     Pain:  Pain Assessment  Patient Currently in Pain: Denies    Social/Functional History:  Lives With: Other (comment)(with mother)  Type of Home: House  Home Layout: One level  Home Access: Level entry  Home Equipment: (None)   Bathroom Accessibility: cues for breathing technique as needed to increase his strength for ease of helping with cleaning or carrying groceries. Short term goal 4: Pt will complete standing activities while following good posture with min cues as needed to increase his tolerance and safety while grooming or helping in the kitchen. Long term goals  Time Frame for Long term goals : None secondary to short estimated length of stay. See long-term goal time frame for expected duration of plan of care. If no long-term goals established, a short length of stay is anticipated. Following session, patient left in safe position with all fall risk precautions in place.

## 2020-03-03 NOTE — FLOWSHEET NOTE
03/03/20 1358   Provider Notification   Reason for Communication Evaluate   Provider Name Dr. Scarlet Bridges   Provider Notification Physician   Method of Communication Call  (Message left with office)   Notification Time [phone]   Updated doctor of ABG results. After reviewing patient's condition, doctor stated it is ok to remove the BiPap to allow the patient to eat then replace the BiPap and check ABGs in the morning.

## 2020-03-03 NOTE — PLAN OF CARE
Problem: Falls - Risk of:  Goal: Will remain free from falls  Description  Will remain free from falls  3/3/2020 1333 by Adams Negro RN  Outcome: Ongoing  Note:   Patient up as tolerated with standby assistance. Using call light appropriately. Alert and oriented X 4 but drowsy. Problem: Falls - Risk of:  Goal: Absence of physical injury  Description  Absence of physical injury  3/3/2020 1333 by Adams Negro RN  Outcome: Ongoing  Note:   Patient up as tolerated with standby assistance. Using call light appropriately. Alert and oriented X 4 but drowsy. Problem: Pain Control  Goal: Maintain pain level at or below patient's acceptable level (or 5 if patient is unable to determine acceptable level)  3/3/2020 1333 by Adams Negro RN  Outcome: Ongoing  Note:   Pain Assessment: 0-10  Pain Level: 0   Patient's Stated Pain Goal: No pain   Is pain goal met at this time? Yes             Problem: Cardiovascular  Goal: No DVT, peripheral vascular complications  8/3/3188 7151 by Adams Negro RN  Outcome: Ongoing  Note:   Assessment negative for DVT. Patient tolerates lovenox as it is ordered. Problem: Cardiovascular  Goal: Hemodynamic stability  3/3/2020 1333 by Adams Negro RN  Outcome: Ongoing  Note:   Assessment negative for DVT. Patient tolerates lovenox as it is ordered. Pulses palpable in all extremities. Vitals stable. Problem: Respiratory  Goal: No pulmonary complications  3/0/8732 1392 by Adams Negro RN  Outcome: Ongoing  Note:   Patient wore 6L via nasal canula for breakfast, has otherwise worn the BiPap continuously. Problem: Respiratory  Goal: O2 Sat > 90%  3/3/2020 1333 by Adams Negro RN  Outcome: Ongoing  Note:   Patient wore 6L via nasal canula for breakfast, has otherwise worn the BiPap continuously. SpO2 drops when on nasal canula, but remains greater than 90% on BiPap.       Problem: Skin Integrity/Risk  Goal: No skin breakdown during hospitalization  3/3/2020

## 2020-03-04 LAB
ALLEN TEST: POSITIVE
ALLEN TEST: POSITIVE
ANION GAP SERPL CALCULATED.3IONS-SCNC: 10 MEQ/L (ref 8–16)
BASE EXCESS (CALCULATED): 3.6 MMOL/L (ref -2.5–2.5)
BASE EXCESS (CALCULATED): 3.7 MMOL/L (ref -2.5–2.5)
BUN BLDV-MCNC: 25 MG/DL (ref 7–22)
CALCIUM SERPL-MCNC: 8.3 MG/DL (ref 8.5–10.5)
CHLORIDE BLD-SCNC: 100 MEQ/L (ref 98–111)
CO2: 26 MEQ/L (ref 23–33)
COLLECTED BY:: ABNORMAL
COLLECTED BY:: ABNORMAL
CREAT SERPL-MCNC: 0.6 MG/DL (ref 0.4–1.2)
DEVICE: ABNORMAL
DEVICE: ABNORMAL
GFR SERPL CREATININE-BSD FRML MDRD: > 90 ML/MIN/1.73M2
GLUCOSE BLD-MCNC: 93 MG/DL (ref 70–108)
HCO3: 32 MMOL/L (ref 23–28)
HCO3: 32 MMOL/L (ref 23–28)
IFIO2: 35
IFIO2: 4
O2 SATURATION: 88 %
O2 SATURATION: 89 %
PCO2: 57 MMHG (ref 35–45)
PCO2: 59 MMHG (ref 35–45)
PH BLOOD GAS: 7.34 (ref 7.35–7.45)
PH BLOOD GAS: 7.35 (ref 7.35–7.45)
PO2: 58 MMHG (ref 71–104)
PO2: 62 MMHG (ref 71–104)
POTASSIUM REFLEX MAGNESIUM: 4.8 MEQ/L (ref 3.5–5.2)
SET PEEP: 12 MMHG
SET PRESS SUPP: 18 CMH2O
SET RESPIRATORY RATE: 20 BPM
SODIUM BLD-SCNC: 136 MEQ/L (ref 135–145)
SOURCE, BLOOD GAS: ABNORMAL
SOURCE, BLOOD GAS: ABNORMAL

## 2020-03-04 PROCEDURE — 99232 SBSQ HOSP IP/OBS MODERATE 35: CPT | Performed by: INTERNAL MEDICINE

## 2020-03-04 PROCEDURE — 6360000002 HC RX W HCPCS: Performed by: INTERNAL MEDICINE

## 2020-03-04 PROCEDURE — 6360000002 HC RX W HCPCS: Performed by: NURSE PRACTITIONER

## 2020-03-04 PROCEDURE — 82803 BLOOD GASES ANY COMBINATION: CPT

## 2020-03-04 PROCEDURE — 94761 N-INVAS EAR/PLS OXIMETRY MLT: CPT

## 2020-03-04 PROCEDURE — 36415 COLL VENOUS BLD VENIPUNCTURE: CPT

## 2020-03-04 PROCEDURE — 94640 AIRWAY INHALATION TREATMENT: CPT

## 2020-03-04 PROCEDURE — 6370000000 HC RX 637 (ALT 250 FOR IP): Performed by: INTERNAL MEDICINE

## 2020-03-04 PROCEDURE — 2580000003 HC RX 258: Performed by: INTERNAL MEDICINE

## 2020-03-04 PROCEDURE — 94660 CPAP INITIATION&MGMT: CPT

## 2020-03-04 PROCEDURE — 97116 GAIT TRAINING THERAPY: CPT

## 2020-03-04 PROCEDURE — 2700000000 HC OXYGEN THERAPY PER DAY

## 2020-03-04 PROCEDURE — 2709999900 HC NON-CHARGEABLE SUPPLY

## 2020-03-04 PROCEDURE — 80048 BASIC METABOLIC PNL TOTAL CA: CPT

## 2020-03-04 PROCEDURE — APPSS30 APP SPLIT SHARED TIME 16-30 MINUTES: Performed by: NURSE PRACTITIONER

## 2020-03-04 PROCEDURE — 94010 BREATHING CAPACITY TEST: CPT

## 2020-03-04 PROCEDURE — 2060000000 HC ICU INTERMEDIATE R&B

## 2020-03-04 PROCEDURE — 97110 THERAPEUTIC EXERCISES: CPT

## 2020-03-04 PROCEDURE — 94760 N-INVAS EAR/PLS OXIMETRY 1: CPT

## 2020-03-04 PROCEDURE — 36600 WITHDRAWAL OF ARTERIAL BLOOD: CPT

## 2020-03-04 RX ADMIN — IPRATROPIUM BROMIDE AND ALBUTEROL SULFATE 1 AMPULE: .5; 3 SOLUTION RESPIRATORY (INHALATION) at 13:24

## 2020-03-04 RX ADMIN — METHYLPREDNISOLONE SODIUM SUCCINATE 40 MG: 40 INJECTION, POWDER, FOR SOLUTION INTRAMUSCULAR; INTRAVENOUS at 09:02

## 2020-03-04 RX ADMIN — FUROSEMIDE 40 MG: 10 INJECTION, SOLUTION INTRAMUSCULAR; INTRAVENOUS at 09:02

## 2020-03-04 RX ADMIN — AMLODIPINE BESYLATE 10 MG: 10 TABLET ORAL at 09:03

## 2020-03-04 RX ADMIN — IPRATROPIUM BROMIDE AND ALBUTEROL SULFATE 1 AMPULE: .5; 3 SOLUTION RESPIRATORY (INHALATION) at 17:26

## 2020-03-04 RX ADMIN — PRAVASTATIN SODIUM 40 MG: 40 TABLET ORAL at 20:12

## 2020-03-04 RX ADMIN — ARFORMOTEROL TARTRATE 15 MCG: 15 SOLUTION RESPIRATORY (INHALATION) at 09:39

## 2020-03-04 RX ADMIN — ARFORMOTEROL TARTRATE 15 MCG: 15 SOLUTION RESPIRATORY (INHALATION) at 21:32

## 2020-03-04 RX ADMIN — METHYLPREDNISOLONE SODIUM SUCCINATE 40 MG: 40 INJECTION, POWDER, FOR SOLUTION INTRAMUSCULAR; INTRAVENOUS at 17:46

## 2020-03-04 RX ADMIN — FUROSEMIDE 40 MG: 10 INJECTION, SOLUTION INTRAMUSCULAR; INTRAVENOUS at 17:46

## 2020-03-04 RX ADMIN — IPRATROPIUM BROMIDE AND ALBUTEROL SULFATE 1 AMPULE: .5; 3 SOLUTION RESPIRATORY (INHALATION) at 09:38

## 2020-03-04 RX ADMIN — Medication 10 ML: at 09:02

## 2020-03-04 RX ADMIN — Medication 10 ML: at 20:12

## 2020-03-04 RX ADMIN — BUDESONIDE 500 MCG: 0.5 INHALANT RESPIRATORY (INHALATION) at 09:38

## 2020-03-04 RX ADMIN — METOPROLOL SUCCINATE 25 MG: 25 TABLET, FILM COATED, EXTENDED RELEASE ORAL at 09:03

## 2020-03-04 RX ADMIN — AZITHROMYCIN 500 MG: 500 INJECTION, POWDER, LYOPHILIZED, FOR SOLUTION INTRAVENOUS at 20:11

## 2020-03-04 RX ADMIN — ENOXAPARIN SODIUM 40 MG: 40 INJECTION SUBCUTANEOUS at 09:02

## 2020-03-04 RX ADMIN — CEFTRIAXONE SODIUM 1 G: 1 INJECTION, POWDER, FOR SOLUTION INTRAMUSCULAR; INTRAVENOUS at 20:11

## 2020-03-04 RX ADMIN — IPRATROPIUM BROMIDE AND ALBUTEROL SULFATE 1 AMPULE: .5; 3 SOLUTION RESPIRATORY (INHALATION) at 21:32

## 2020-03-04 RX ADMIN — BUDESONIDE 500 MCG: 0.5 INHALANT RESPIRATORY (INHALATION) at 21:32

## 2020-03-04 RX ADMIN — RISPERIDONE 2 MG: 2 TABLET ORAL at 09:03

## 2020-03-04 RX ADMIN — METHYLPREDNISOLONE SODIUM SUCCINATE 40 MG: 40 INJECTION, POWDER, FOR SOLUTION INTRAMUSCULAR; INTRAVENOUS at 04:22

## 2020-03-04 RX ADMIN — CARBAMAZEPINE 200 MG: 200 TABLET ORAL at 09:03

## 2020-03-04 RX ADMIN — CARBAMAZEPINE 200 MG: 200 TABLET ORAL at 20:12

## 2020-03-04 RX ADMIN — RISPERIDONE 2 MG: 2 TABLET ORAL at 20:12

## 2020-03-04 RX ADMIN — ASPIRIN 325 MG: 325 TABLET, DELAYED RELEASE ORAL at 09:03

## 2020-03-04 ASSESSMENT — PAIN SCALES - GENERAL
PAINLEVEL_OUTOF10: 0

## 2020-03-04 NOTE — PROGRESS NOTES
[facility]  INPATIENT PHYSICAL THERAPY  DAILY NOTE  [facility] ICU STEPDOWN TELEMETRY 4K - 4K-03/003-A    Time In: 2637  Time Out: 1320  Timed Code Treatment Minutes: 23 Minutes  Minutes: 23          Date: 3/4/2020  Patient Name: Lita Valdivia,  Gender:  male        MRN: [id number]  : 1970  (44 y.o.)     Referring Practitioner: Nicky Koch MD  Diagnosis: Respiratory failure  Additional Pertinent Hx: Per ED note: Lita Valdivia is a 44 y.o. male who presents fatigue and wheezing. Patient has COPD and emphysema. Patient had an appointment today and he was not wearing his O2. Patient has schizophrenia and is unsure how much O2 he wears at home. He is here with his mother and a neighbor. Patient has had worsening MELGAR for the past few days. Patient has leg swelling and states he takes Lasix. Patient or mother are unsure if he has CHF. Patient denies chest pain, abdominal pain, fever, recent antibiotics, or steroids. Patient reports he has a cough. Patient is a smoker and mother states he doesn't smoke with his O2 on. Mother or patient has no other complaints at this time. Prior Level of Function:  Lives With: Other (comment)(with mother)  Type of Home: House  Home Layout: One level  Home Access: Level entry  Home Equipment: (None)   Bathroom Accessibility: Accessible    Receives Help From: Family  ADL Assistance: Independent  Homemaking Assistance: Needs assistance  Homemaking Responsibilities: No  Ambulation Assistance: Independent  Transfer Assistance: Independent  Active : No  Additional Comments: Pt states amb without AD, O2 at night- CPAP is used. Restrictions/Precautions:  Restrictions/Precautions: Fall Risk  Position Activity Restriction  Other position/activity restrictions: Continuous bipap 3/3    SUBJECTIVE: RN approved session. Reporting pt has been unsteady and getting himself up throughout the day without use of call light. Pt in bedside chair when therapist arrived.  Pt hard to understand, was agreeable to therapy. PAIN: non noted    OBJECTIVE:  Bed Mobility:  Not Tested    Transfers:  Sit to Stand: [address], cues for hand placement  Stand to [address] for hand placement. Cues to decrease speed of task for safety. Ambulation:  Minimal Assistance, X 1  Distance: 4ft (with RW), 24ft with no AD  Surface: Level Tile  Device:No Device  Gait Deviations:  Decreased Step Length Bilaterally, Decreased Weight Shift Bilaterally, Decreased Heel Strike Bilaterally and [address] of Support. Unsteadiness throughout with no LOB. Pt completing ambulation without AD safer than with AD. Poor understanding of use of walker. Balance:  Dynamic Sitting Balance: Stand By Assistance  Static Standing Balance: Contact Guard Assistance  Dynamic Standing Balance: Contact Guard Assistance  Pt reports dizziness upon initial stand. Lateral weight shifting prior to ambulation all at CGAx1. Exercise:  Patient was guided in 10 reps of exercise to both lower extremities. Long arc quads, Hip abduction/adduction, Straight leg raises, Seated hip flexion, Seated heel/toe raises and Seated isometric hip adduction. Exercises were completed for increased independence with functional mobility. Functional Outcome Measures: Completed  -PAC Inpatient Mobility without Stair Climbing Raw Score : 15  -PAC Inpatient without Stair Climbing T-Scale Score : 43.03    ASSESSMENT:  Assessment: Patient progressing toward established goals. Activity Tolerance:  Patient tolerance of  treatment: good.       Equipment Recommendations:Equipment Needed: No  Discharge Recommendations:  Continue to assess pending progress    Plan: Times per week: 3-5 X GM  Current Treatment Recommendations: Strengthening, Transfer Training, Endurance Training, Functional Mobility Training, Gait Training, Stair training, Safety Education & Training, Balance Training, Patient/Caregiver Education &

## 2020-03-04 NOTE — PROGRESS NOTES
Tobacco Use    Smoking status: Current Every Day Smoker     Packs/day: 1.00     Years: 20.00     Pack years: 20.00     Types: Cigarettes    Smokeless tobacco: Never Used   Substance and Sexual Activity    Alcohol use: No    Drug use: No    Sexual activity: Not Currently   Lifestyle    Physical activity:     Days per week: Not on file     Minutes per session: Not on file    Stress: Not on file   Relationships    Social connections:     Talks on phone: Not on file     Gets together: Not on file     Attends [employer] service: Not on file     Active member of club or organization: Not on file     Attends meetings of clubs or organizations: Not on file     Relationship status: Not on file    Intimate partner violence:     Fear of current or ex partner: Not on file     Emotionally abused: Not on file     Physically abused: Not on file     Forced sexual activity: Not on file   Other Topics Concern    Not on file   Social History Narrative    Not on file     Family History          Problem Relation Age of Onset    High Blood Pressure Mother        Meds    Current Medications    Arformoterol Tartrate  15 mcg Nebulization BID    furosemide  40 mg Intravenous BID    amLODIPine  10 mg Oral Daily    aspirin  325 mg Oral Daily    budesonide  500 mcg Nebulization BID    carBAMazepine  200 mg Oral BID    [START ON 3/16/2020] haloperidol decanoate  50 mg Intramuscular Q14 Days    metoprolol succinate  25 mg Oral Daily    pravastatin  40 mg Oral Nightly    risperiDONE  2 mg Oral BID    sodium chloride flush  10 mL Intravenous 2 times per day    enoxaparin  40 mg Subcutaneous Daily    ipratropium-albuterol  1 ampule Inhalation Q4H WA    methylPREDNISolone  40 mg Intravenous Q6H    Followed by   Clay Ferguson ON 3/5/2020] predniSONE  40 mg Oral Daily    cefTRIAXone (ROCEPHIN) IV  1 g Intravenous Q24H    azithromycin  500 mg Intravenous Q24H     sodium chloride flush, acetaminophen **OR** acetaminophen, polyethylene glycol, promethazine **OR** ondansetron, albuterol  IV Drips/Infusions    Vitals    Vitals    height is 5' 11\" (1.803 m) and weight is 277 lb 6.4 oz (125.8 kg). His axillary temperature is 98 °F (36.7 °C). His blood pressure is 111/75 and his pulse is 73. His respiration is 20 and oxygen saturation is 97%. O2 Flow Rate (L/min): 6 L/min  I/O    Intake/Output Summary (Last 24 hours) at 3/4/2020 0851  Last data filed at 3/4/2020 0350  Gross per 24 hour   Intake 1220 ml   Output 1400 ml   Net -180 ml     Patient Vitals for the past 96 hrs (Last 3 readings):   Weight   03/02/20 2232 277 lb 6.4 oz (125.8 kg)   03/02/20 1615 270 lb (122.5 kg)     Exam   Physical Exam   Constitutional: No distress on O2 per 5LPM NC. Obese BMI 38.8  Head: Normocephalic and atraumatic. Mouth/Throat: Oropharynx is clear and moist.  No oral thrush. Eyes: Conjunctivae are normal. Pupils are equal, round. No scleral icterus. Neck: Neck supple. No tracheal deviation present. Cardiovascular: S1 and S2 with no murmur. No peripheral edema  Pulmonary/Chest: Normal effort with bilateral air entry, bilateral expiratory wheezing . No stridor. No respiratory distress. Patient exhibits no tenderness. Abdominal: Soft. Bowel sounds audible. No distension or tenderness to palp. Musculoskeletal: Moves all extremities; no clubbing/cyanosis  Neurological: Patient is alert and oriented to person, place, and time. Skin: Warm and dry. No bruising or bleeding.    Labs   ABG  Lab Results   Component Value Date    PH 7.34 03/04/2020    PO2 62 03/04/2020    PCO2 59 03/04/2020    HCO3 32 03/04/2020    O2SAT 89 03/04/2020     Lab Results   Component Value Date    IFIO2 35 03/04/2020    MODE NCPAP 03/03/2020    SETTIDVOL 404 03/31/2019    SETPEEP 12.0 03/04/2020     CBC  Recent Labs     03/02/20  1639   WBC 7.6   RBC 6.50*   HGB 17.6   HCT 59.3*   MCV 91.2   MCH 27.1   MCHC 29.7*      MPV 10.4      BMP  Recent Labs     03/02/20  0590 03/03/20  0528 03/04/20  0443    139 136   K 4.1 4.8 4.8    102 100   CO2 25 26 26   BUN 21 22 25*   CREATININE 1.0 0.9 0.6   GLUCOSE 103 114* 93   MG 2.0  --   --    CALCIUM 8.8 8.3* 8.3*     LFT  Recent Labs     03/02/20  1639   AST 21   ALT 20   BILITOT 0.4   ALKPHOS 107     TROP  Lab Results   Component Value Date    TROPONINT < 0.010 03/02/2020    TROPONINT < 0.010 12/14/2019    TROPONINT < 0.010 12/14/2019     BNP  Lab Results   Component Value Date    PROBNP 797.2 03/02/2020    PROBNP 1583.0 12/13/2019    PROBNP 2989.0 03/30/2019     D-Dimer  Lab Results   Component Value Date    DDIMER 1204.00 12/14/2019     Lactic Acid  No results for input(s): LACTA in the last 72 hours. INR  Recent Labs     03/02/20 1949   INR 1.26*     PTT  Recent Labs     03/02/20 1949   APTT 29.6     Glucose  No results for input(s): POCGLU in the last 72 hours. UA No results for input(s): SPECGRAV, PHUR, COLORU, CLARITYU, MUCUS, PROTEINU, BLOODU, RBCUA, WBCUA, BACTERIA, NITRU, GLUCOSEU, BILIRUBINUR, UROBILINOGEN, KETUA, LABCAST, LABCASTTY, AMORPHOS in the last 72 hours. Invalid input(s): CRYSTALS. PFTs             Echo     Conclusions      Summary   Technically difficult examination. Ejection fraction is visually estimated at 45-50%. There was mild global hypokinesis of the left ventricle. Signature      ----------------------------------------------------------------   Electronically signed by Mauro Crowe MD (Interpreting   physician) on 04/01/2019 at 05:15 PM   ----------------------------------------------------------------    Cultures    Procalcitonin  Lab Results   Component Value Date    PROCAL 0.10 03/02/2020    PROCAL 0.06 04/17/2017    PROCAL 0.07 03/02/2017     Blood Cultures x 2:  No growth; prelim   VRE (-)  MRSA pending   Rapid Influenza A/B (-)  Radiology    CXR  3/2/20      FINDINGS: Cardiomegaly. Right pleural effusion.  Airspace opacity in the right lung base and prominent interstitium could reflect infiltrate and interstitial edema or asymmetric edema. Follow-up advised. Findings have progressed when compared to prior    study suggesting fluid overload. No acute osseous findings.           Impression   Cardiomegaly. Right pleural effusion. Airspace opacity in the right lung base and prominent interstitium could reflect infiltrate and interstitial edema or asymmetric edema. Follow-up advised. Findings have progressed when compared to prior study    suggesting fluid overload.                   **This report has been created using voice recognition software. It may contain minor errors which are inherent in voice recognition technology. **       Final report electronically signed by Dr. Veena Sanchez on 3/2/2020 7:31 PM       CT Scans    (See actual reports for details)  Assessment   Acute on chronic hypercapnic and hypoxemic respiratory failure  Acute on chronic CHF (systolic)  Blunting right costophrenics angle/pleural effusion  Moderate COPD GOLD 2  COPD Exacerbation ? Tobacco abuse  schizophrenia  HTN   Obesity  Full Code     Recommendations   -BIPAP support, settings adjusted 25/12 BUR 20  -ABG in AM on BIPAP with new above settings  -ABG now on O2 no less than 3LPM  -May need overnight pulse oximetry study tomorrow night  -Duonebs every 4 hours w/a  -Perforomist neb BID  -Pulmicort neb BID  -Prednisone 40 mg PO daily tomorrow x 3 doses  -Smoking cessation  -Diuresis  -No strong indication for ATBs, reasonable to start empirically, de escalate according to culture data. -DVT prophylaxis: Lovenox  -Home BiPAP evaluation - trying for Severe COPD algorithm   -Bedside spirometry ordered.      Case discussed with nurse and patient/family. Questions and concerns addressed.   Meds and Orders reviewed.       Electronically signed by     SAVANNAH Cade - CNP on 3/4/2020 at 8:51 AM    Awake, alert, eating  Afebrile  On NC  ABGs compensating  Fair diuresis, seems to be improving

## 2020-03-04 NOTE — PROGRESS NOTES
INTERNAL MEDICINE Progress Note  3/4/2020 1:38 PM  Subjective:   Admit Date: 3/2/2020  PCP: Daryl Sims MD  Interval History:     Breathing easier, SOB, cough    Objective:   Vitals: BP (!) 108/56   Pulse 89   Temp 97.9 °F (36.6 °C) (Oral)   Resp 18   Ht 5' 11\" (1.803 m)   Wt 277 lb 6.4 oz (125.8 kg)   SpO2 94%   BMI 38.69 kg/m²   General appearance: alert and cooperative with exam  HEENT: Head: atraumatic  Neck: no adenopathy, no carotid bruit and no JVD  Lungs: diminished breath sounds bilaterally  Heart: S1, S2 normal  Abdomen: normal findings: bowel sounds normal, no masses palpable, soft, non-tender and symmetric and abnormal findings:  distended  Extremities: no edema, redness or tenderness in the calves or thighs  Neurologic: Mental status: alertness: alert, orientation: person, affect: blunted, thought content exhibits tangential connections, loose associations, word salad      Medications:   Scheduled Meds:   Arformoterol Tartrate  15 mcg Nebulization BID    furosemide  40 mg Intravenous BID    amLODIPine  10 mg Oral Daily    aspirin  325 mg Oral Daily    budesonide  500 mcg Nebulization BID    carBAMazepine  200 mg Oral BID    [START ON 3/16/2020] haloperidol decanoate  50 mg Intramuscular Q14 Days    metoprolol succinate  25 mg Oral Daily    pravastatin  40 mg Oral Nightly    risperiDONE  2 mg Oral BID    sodium chloride flush  10 mL Intravenous 2 times per day    enoxaparin  40 mg Subcutaneous Daily    ipratropium-albuterol  1 ampule Inhalation Q4H WA    methylPREDNISolone  40 mg Intravenous Q6H    Followed by   Ronald Salazar ON 3/5/2020] predniSONE  40 mg Oral Daily    cefTRIAXone (ROCEPHIN) IV  1 g Intravenous Q24H    azithromycin  500 mg Intravenous Q24H     Continuous Infusions:    Lab Results:   CBC:   Recent Labs     03/02/20  1639   WBC 7.6   HGB 17.6        BMP:    Recent Labs     03/02/20  1639 03/03/20  0528 03/04/20  0443    139 136   K 4.1 4.8 4.8

## 2020-03-05 LAB
ANION GAP SERPL CALCULATED.3IONS-SCNC: 7 MEQ/L (ref 8–16)
BASE EXCESS MIXED: 6.7 MMOL/L (ref -2–3)
BUN BLDV-MCNC: 20 MG/DL (ref 7–22)
CALCIUM SERPL-MCNC: 8.3 MG/DL (ref 8.5–10.5)
CHLORIDE BLD-SCNC: 100 MEQ/L (ref 98–111)
CO2: 29 MEQ/L (ref 23–33)
COLLECTED BY:: ABNORMAL
CREAT SERPL-MCNC: 0.7 MG/DL (ref 0.4–1.2)
DEVICE: ABNORMAL
ERYTHROCYTE [DISTWIDTH] IN BLOOD BY AUTOMATED COUNT: 17.8 % (ref 11.5–14.5)
ERYTHROCYTE [DISTWIDTH] IN BLOOD BY AUTOMATED COUNT: 54.6 FL (ref 35–45)
FIO2, MIXED VENOUS: 45
GFR SERPL CREATININE-BSD FRML MDRD: > 90 ML/MIN/1.73M2
GLUCOSE BLD-MCNC: 91 MG/DL (ref 70–108)
HCO3, MIXED: 36 MMOL/L (ref 23–28)
HCT VFR BLD CALC: 53 % (ref 42–52)
HEMOGLOBIN: 16.2 GM/DL (ref 14–18)
MCH RBC QN AUTO: 27.2 PG (ref 26–33)
MCHC RBC AUTO-ENTMCNC: 30.6 GM/DL (ref 32.2–35.5)
MCV RBC AUTO: 89.1 FL (ref 80–94)
MRSA SCREEN: NORMAL
O2 SAT, MIXED: 97 %
PCO2, MIXED VENOUS: 69 MMHG (ref 41–51)
PH, MIXED: 7.33 (ref 7.31–7.41)
PLATELET # BLD: 164 THOU/MM3 (ref 130–400)
PMV BLD AUTO: 10.2 FL (ref 9.4–12.4)
PO2 MIXED: 102 MMHG (ref 25–40)
POTASSIUM REFLEX MAGNESIUM: 4.5 MEQ/L (ref 3.5–5.2)
RBC # BLD: 5.95 MILL/MM3 (ref 4.7–6.1)
SET PEEP: 12 MMHG
SET PRESS SUPP: 18 CMH2O
SET RESPIRATORY RATE: 20 BPM
SODIUM BLD-SCNC: 136 MEQ/L (ref 135–145)
WBC # BLD: 7.9 THOU/MM3 (ref 4.8–10.8)

## 2020-03-05 PROCEDURE — 36415 COLL VENOUS BLD VENIPUNCTURE: CPT

## 2020-03-05 PROCEDURE — 2060000000 HC ICU INTERMEDIATE R&B

## 2020-03-05 PROCEDURE — 94660 CPAP INITIATION&MGMT: CPT

## 2020-03-05 PROCEDURE — 6360000002 HC RX W HCPCS: Performed by: INTERNAL MEDICINE

## 2020-03-05 PROCEDURE — 99232 SBSQ HOSP IP/OBS MODERATE 35: CPT | Performed by: INTERNAL MEDICINE

## 2020-03-05 PROCEDURE — 6370000000 HC RX 637 (ALT 250 FOR IP): Performed by: INTERNAL MEDICINE

## 2020-03-05 PROCEDURE — 36600 WITHDRAWAL OF ARTERIAL BLOOD: CPT

## 2020-03-05 PROCEDURE — 80048 BASIC METABOLIC PNL TOTAL CA: CPT

## 2020-03-05 PROCEDURE — 2709999900 HC NON-CHARGEABLE SUPPLY

## 2020-03-05 PROCEDURE — 6370000000 HC RX 637 (ALT 250 FOR IP): Performed by: NURSE PRACTITIONER

## 2020-03-05 PROCEDURE — 2700000000 HC OXYGEN THERAPY PER DAY

## 2020-03-05 PROCEDURE — 85027 COMPLETE CBC AUTOMATED: CPT

## 2020-03-05 PROCEDURE — APPSS30 APP SPLIT SHARED TIME 16-30 MINUTES: Performed by: NURSE PRACTITIONER

## 2020-03-05 PROCEDURE — 2580000003 HC RX 258: Performed by: INTERNAL MEDICINE

## 2020-03-05 PROCEDURE — 94640 AIRWAY INHALATION TREATMENT: CPT

## 2020-03-05 RX ORDER — POTASSIUM CHLORIDE 20 MEQ/1
20 TABLET, EXTENDED RELEASE ORAL
Status: DISCONTINUED | OUTPATIENT
Start: 2020-03-05 | End: 2020-03-06 | Stop reason: HOSPADM

## 2020-03-05 RX ORDER — FUROSEMIDE 20 MG/1
20 TABLET ORAL 2 TIMES DAILY
Status: DISCONTINUED | OUTPATIENT
Start: 2020-03-05 | End: 2020-03-06 | Stop reason: HOSPADM

## 2020-03-05 RX ADMIN — AMLODIPINE BESYLATE 10 MG: 10 TABLET ORAL at 09:34

## 2020-03-05 RX ADMIN — ARFORMOTEROL TARTRATE 15 MCG: 15 SOLUTION RESPIRATORY (INHALATION) at 09:14

## 2020-03-05 RX ADMIN — ENOXAPARIN SODIUM 40 MG: 40 INJECTION SUBCUTANEOUS at 09:34

## 2020-03-05 RX ADMIN — BUDESONIDE 500 MCG: 0.5 INHALANT RESPIRATORY (INHALATION) at 19:57

## 2020-03-05 RX ADMIN — AZITHROMYCIN 500 MG: 500 INJECTION, POWDER, LYOPHILIZED, FOR SOLUTION INTRAVENOUS at 21:34

## 2020-03-05 RX ADMIN — Medication 10 ML: at 21:34

## 2020-03-05 RX ADMIN — IPRATROPIUM BROMIDE AND ALBUTEROL SULFATE 1 AMPULE: .5; 3 SOLUTION RESPIRATORY (INHALATION) at 16:36

## 2020-03-05 RX ADMIN — PRAVASTATIN SODIUM 40 MG: 40 TABLET ORAL at 21:33

## 2020-03-05 RX ADMIN — PREDNISONE 40 MG: 20 TABLET ORAL at 09:45

## 2020-03-05 RX ADMIN — METOPROLOL SUCCINATE 25 MG: 25 TABLET, FILM COATED, EXTENDED RELEASE ORAL at 09:35

## 2020-03-05 RX ADMIN — IPRATROPIUM BROMIDE AND ALBUTEROL SULFATE 1 AMPULE: .5; 3 SOLUTION RESPIRATORY (INHALATION) at 20:07

## 2020-03-05 RX ADMIN — FUROSEMIDE 40 MG: 10 INJECTION, SOLUTION INTRAMUSCULAR; INTRAVENOUS at 17:30

## 2020-03-05 RX ADMIN — RISPERIDONE 2 MG: 2 TABLET ORAL at 21:33

## 2020-03-05 RX ADMIN — IPRATROPIUM BROMIDE AND ALBUTEROL SULFATE 1 AMPULE: .5; 3 SOLUTION RESPIRATORY (INHALATION) at 09:13

## 2020-03-05 RX ADMIN — BUDESONIDE 500 MCG: 0.5 INHALANT RESPIRATORY (INHALATION) at 09:14

## 2020-03-05 RX ADMIN — CARBAMAZEPINE 200 MG: 200 TABLET ORAL at 09:34

## 2020-03-05 RX ADMIN — FUROSEMIDE 40 MG: 10 INJECTION, SOLUTION INTRAMUSCULAR; INTRAVENOUS at 09:34

## 2020-03-05 RX ADMIN — ARFORMOTEROL TARTRATE 15 MCG: 15 SOLUTION RESPIRATORY (INHALATION) at 19:51

## 2020-03-05 RX ADMIN — ASPIRIN 325 MG: 325 TABLET, DELAYED RELEASE ORAL at 09:35

## 2020-03-05 RX ADMIN — CARBAMAZEPINE 200 MG: 200 TABLET ORAL at 21:33

## 2020-03-05 RX ADMIN — Medication 10 ML: at 09:34

## 2020-03-05 RX ADMIN — RISPERIDONE 2 MG: 2 TABLET ORAL at 09:35

## 2020-03-05 RX ADMIN — CEFTRIAXONE SODIUM 1 G: 1 INJECTION, POWDER, FOR SOLUTION INTRAMUSCULAR; INTRAVENOUS at 21:34

## 2020-03-05 RX ADMIN — IPRATROPIUM BROMIDE AND ALBUTEROL SULFATE 1 AMPULE: .5; 3 SOLUTION RESPIRATORY (INHALATION) at 13:06

## 2020-03-05 ASSESSMENT — PAIN SCALES - GENERAL
PAINLEVEL_OUTOF10: 0
PAINLEVEL_OUTOF10: 0

## 2020-03-05 NOTE — PROGRESS NOTES
[facility]    Patient - Federica Thorne   MRN -  [id number]   [facility]   - 1970      Date of Admission -  3/2/2020  4:21 PM  Date of evaluation -  3/5/2020  Room - --A   Herbert Mora MD Primary Care Physician - Cristobal Willoughby MD   Chief Complaint   SOB, wheezing  Active Hospital Problem List      Active Hospital Problems    Diagnosis Date Noted    Respiratory failure [facility]) [J96.90] 2020     HPI   Federica Thorne is a 44 y.o. male schizophrenic, smoker, presents to ED with SOB and wheezing which have progressed over the last few days, also worsening B LE edema, in the ED was on respiratory distress, low sats, started on supplemental oxygen and Bipap with improvement. H/O CMP EF 45% on diuretics  ABGs; 7.25/66/65/88%--on Bipap  Has supplemental oxygen but not using as prescribed   Last FEV1 () 2.5L (65%)  CXR: cardiomegaly, pleural effusions. Past 24 Hours   -used BiPAP overnight  -BiPAP training today for outpt use  -Alert and oriented    -All systems reviewed. Past Medical History         Diagnosis Date    Acute on chronic systolic congestive heart failure ([address] 2019    Emphysema (subcutaneous) (surgical) resulting from a procedure     Hypertension     Pneumonia     Schizophrenia [facility])       Past Surgical History     History reviewed. No pertinent surgical history. Diet    DIET GENERAL;  Allergies    Patient has no known allergies.   Social History     Social History     Socioeconomic History    Marital status: Single     Spouse name: Not on file    Number of children: 0    Years of education: Not on file    Highest education level: Not on file   Occupational History    Not on file   Social Needs    Financial resource strain: Not on file    Food insecurity:     Worry: Not on file     Inability: Not on file    Transportation needs:     Medical: Not on file     Non-medical: Not on Drips/Infusions    Vitals    Vitals    height is 5' 11\" (1.803 m) and weight is 271 lb 8 oz (123.2 kg). His axillary temperature is 96.9 °F (36.1 °C). His blood pressure is 132/92 (abnormal) and his pulse is 74. His respiration is 20 and oxygen saturation is 96%. O2 Flow Rate (L/min): 5 L/min  I/O    Intake/Output Summary (Last 24 hours) at 3/5/2020 1124  Last data filed at 3/5/2020 0929  Gross per 24 hour   Intake 1493.73 ml   Output 2200 ml   Net -706.27 ml     Patient Vitals for the past 96 hrs (Last 3 readings):   Weight   03/05/20 0316 271 lb 8 oz (123.2 kg)   03/02/20 2232 277 lb 6.4 oz (125.8 kg)   03/02/20 1615 270 lb (122.5 kg)     Exam   Physical Exam   Constitutional: Stable on 4LPM via NC. Obese BMI 37.1  Neck: Neck supple. No tracheal deviation present. No JVD  Cardiovascular: S1 and S2 with no murmur. No peripheral edema  Pulmonary/Chest: Normal effort with bilateral air entry, diminished BLL . No stridor. No respiratory distress. Patient exhibits no tenderness. No wheezing heard  Abdominal: Soft. Bowel sounds audible. No distension or tenderness to palp. Musculoskeletal: Moves all extremities; no clubbing/cyanosis  Neurological: Patient is alert and oriented to person, place, and time. Skin: Warm and dry. No bruising or bleeding.    Labs   ABG  Lab Results   Component Value Date    PH 7.35 03/04/2020    PO2 58 03/04/2020    PCO2 57 03/04/2020    HCO3 32 03/04/2020    O2SAT 88 03/04/2020     Lab Results   Component Value Date    IFIO2 4 03/04/2020    MODE NCPAP 03/03/2020    SETTIDVOL 404 03/31/2019    SETPEEP 12.0 03/05/2020     CBC  Recent Labs     03/02/20  1639 03/05/20  0525   WBC 7.6 7.9   RBC 6.50* 5.95   HGB 17.6 16.2   HCT 59.3* 53.0*   MCV 91.2 89.1   MCH 27.1 27.2   MCHC 29.7* 30.6*    164   MPV 10.4 10.2      BMP  Recent Labs     03/02/20  1639 03/03/20  0528 03/04/20  0443 03/05/20  0525    139 136 136   K 4.1 4.8 4.8 4.5    102 100 100   CO2 25 26 26 29   BUN 21 22 25* 20   CREATININE 1.0 0.9 0.6 0.7   GLUCOSE 103 114* 93 91   MG 2.0  --   --   --    CALCIUM 8.8 8.3* 8.3* 8.3*     LFT  Recent Labs     03/02/20  1639   AST 21   ALT 20   BILITOT 0.4   ALKPHOS 107     TROP  Lab Results   Component Value Date    TROPONINT < 0.010 03/02/2020    TROPONINT < 0.010 12/14/2019    TROPONINT < 0.010 12/14/2019     BNP  Lab Results   Component Value Date    PROBNP 797.2 03/02/2020    PROBNP 1583.0 12/13/2019    PROBNP 2989.0 03/30/2019     D-Dimer  Lab Results   Component Value Date    DDIMER 1204.00 12/14/2019     Lactic Acid  No results for input(s): LACTA in the last 72 hours. INR  Recent Labs     03/02/20 1949   INR 1.26*     PTT  Recent Labs     03/02/20 1949   APTT 29.6     Glucose  No results for input(s): POCGLU in the last 72 hours. UA No results for input(s): SPECGRAV, PHUR, COLORU, CLARITYU, MUCUS, PROTEINU, BLOODU, RBCUA, WBCUA, BACTERIA, NITRU, GLUCOSEU, BILIRUBINUR, UROBILINOGEN, KETUA, LABCAST, LABCASTTY, AMORPHOS in the last 72 hours. Invalid input(s): CRYSTALS. Bed side spirometry:   Date performed:3/4/20  FEV1: Measurement:               47         % Predicted. FEV1/FVC ratio : 58  FEF 25-75%:Measurement:           22             % Predicted. PFTs             Echo     Conclusions      Summary   Technically difficult examination. Ejection fraction is visually estimated at 45-50%. There was mild global hypokinesis of the left ventricle.       Signature      ----------------------------------------------------------------   Electronically signed by Serg Kennedy MD (Interpreting   physician) on 04/01/2019 at 05:15 PM   ----------------------------------------------------------------    Cultures    Procalcitonin  Lab Results   Component Value Date    PROCAL 0.10 03/02/2020    PROCAL 0.06 04/17/2017    PROCAL 0.07 03/02/2017     Blood Cultures x 2:  No growth; prelim   VRE (-)  MRSA pending   Rapid Influenza A/B (-)  Radiology    CXR  3/2/20    FINDINGS: Cardiomegaly. Right pleural effusion. Airspace opacity in the right lung base and prominent interstitium could reflect infiltrate and interstitial edema or asymmetric edema. Follow-up advised. Findings have progressed when compared to prior    study suggesting fluid overload. No acute osseous findings.           Impression   Cardiomegaly. Right pleural effusion. Airspace opacity in the right lung base and prominent interstitium could reflect infiltrate and interstitial edema or asymmetric edema. Follow-up advised. Findings have progressed when compared to prior study    suggesting fluid overload.                   **This report has been created using voice recognition software. It may contain minor errors which are inherent in voice recognition technology. **       Final report electronically signed by Dr. Lia Escobar on 3/2/2020 7:31 PM       CT Scans    (See actual reports for details)  Assessment   Acute on chronic hypercapnic and hypoxemic respiratory failure  Acute on chronic CHF (systolic)  Blunting right costophrenics angle/pleural effusion  Bedside spirometry FEV1 47 Severe COPD  COPD Exacerbation ? Tobacco abuse  schizophrenia  HTN   Obesity  Full Code     Recommendations   -BIPAP support, settings adjusted 25/12 BUR 20  -Overnight pulse oximetry study done and reviewed. -Duonebs every 4 hours w/a  -Perforomist neb BID  -Pulmicort neb BID  -Prednisone 40 mg PO daily tomorrow x 3 doses  -Smoking cessation  -Diuresis  -No strong indication for ATBs, reasonable to start empirically, de escalate according to culture data. -DVT prophylaxis: Lovenox  -Home BiPAP evaluation - Severe COPD algorithm   -Bedside spirometry and reviewed. -Stable from pulmonary standpoint     Case discussed with nurse and patient/family. Questions and concerns addressed. Meds and Orders reviewed. -Sleep apnea is not believed to be the predominant cause of patients awake hypercapnia.    -Home BiPAP ordered 25/12 with 3LPM O2 bleed in.   -DME note done        Electronically signed by     SAVANNAH Lipscomb CNP on 3/5/2020 at 11:24 AM    Qualifies for home Bipap due to hypercapnic/hypoxemic respiratory failure. Respiratory failure multifactorial: COPD, Obesity, OHS, Systolic CHF, Smoking, JORDAN  Needs new home oxygen eval to re assess supplemental oxygen requirements. Difficult case due to schizophrenia with poor compliance with recommendations. Patient seen and examined independently by me. Above discussed and I agree with Eron Rodgers CNP note Also see my additional comments. Labs, cultures, and radiographs when available were reviewed. Changes were made in the orders as necessary. I discussed patient concerns with Marlyn BRAMBILA and instructions were given. Respiratory care issues addressed. Please see our orders for the updated patient care plan.     Electronically signed by     Birgit Boudreaux MD on 3/6/2020 at 11:55 AM

## 2020-03-05 NOTE — FLOWSHEET NOTE
03/04/20 3107   Encounter Summary   Services provided to: Patient   Referral/Consult From: [facility]   Continue Visiting Yes  (3/4)   Complexity of Encounter Moderate   Length of Encounter 15 minutes   Spiritual Assessment Completed Yes   Grief and Life Adjustment   Type Adjustment to illness   Assessment Approachable   Intervention Explored feelings, thoughts, concerns;Nurtured hope;Prayer;Discussed illness/injury and it's impact   Outcome Expressed gratitude;Receptive     Assessment: The patient was siting in his chair by the door. The room was all dark with no TV on either. He is a 44 yr old male who shared \"they put something in my water that is making me pee a lot. \" He was shocked to know this could be a treatment option. His nurse explained that they need to pull fluid off him and this is the easiest way. - The patient appeared confused about the treatment. He was also very hard to understand during his conversation. The nurse stated this is also normal for him as well as some confusion has been noticeable. - The patient has family (mother) that visits regularly. Plan: The spiritual care team will continue to offer support by listening to the patient's after care plans and offering encouragement toward success.

## 2020-03-05 NOTE — PROGRESS NOTES
[facility]  PHYSICAL THERAPY MISSED TREATMENT NOTE  ACUTE CARE    Date: 3/5/2020  Patient Name: Stephon Lee        MRN: [id number]   : 1970  (44 y.o.)  Gender: male   Referring Practitioner: Dr. Pili Barron MD  Referring Practitioner: Padmaja Lewis MD  Diagnosis: Respiratory Failure  Diagnosis: Respiratory failure         REASON FOR MISSED TREATMENT:  Nursing reported that pt had just gotten back to bed and to maybe try later, will check back next available date.

## 2020-03-05 NOTE — PLAN OF CARE
Problem: Falls - Risk of:  Goal: Will remain free from falls  Description  Will remain free from falls  Outcome: Ongoing  Note:   Patient alert and oriented x4. Bed alarmed and armed. Bed wheels locked. Bedside table in reach. Patient up with assistance when ambulating. Patient verbalizes and demonstrates the use of the call light. Hourly rounding being completed. Call light within reach. Side rails up x2. Bed alarm on. Non skid slippers available. Problem: Pain Control  Goal: Maintain pain level at or below patient's acceptable level (or 5 if patient is unable to determine acceptable level)  Outcome: Ongoing  Note:   Patient free from pain this shift. Pain rated 0 on 0-10 pain rating scale. Will continue to reassess. Problem: Cardiovascular  Goal: No DVT, peripheral vascular complications  Outcome: Ongoing  Note:   No DVT's found this shift. No redness, swelling, or warm. All signs negative. Problem: Respiratory  Goal: O2 Sat > 90%  Outcome: Ongoing  Note:   Vitals:    03/04/20 2133   BP:    Pulse:    Resp: 18   Temp:    SpO2: 93%    Patient wearing Bipap and tolerating well. Problem: DISCHARGE BARRIERS  Goal: Patient's continuum of care needs are met  Outcome: Ongoing  Note:   Patient plans to be discharged to [facility]? Home with home health?  when medically stable. Will continue to assess. Mother is at bedside and offering constant support for patient. Care plan reviewed with patient and mother  Patient and mother verbalize understanding of the plan of care and contribute to goal setting.

## 2020-03-05 NOTE — PLAN OF CARE
Problem: Impaired respiratory status  Goal: Able to breathe comfortably  Outcome: Ongoing  Note:   Patient currently getting home BiPAP eval on 5L NC for test.

## 2020-03-06 VITALS
RESPIRATION RATE: 21 BRPM | DIASTOLIC BLOOD PRESSURE: 73 MMHG | HEART RATE: 94 BPM | TEMPERATURE: 98.5 F | OXYGEN SATURATION: 90 % | HEIGHT: 71 IN | WEIGHT: 265.3 LBS | BODY MASS INDEX: 37.14 KG/M2 | SYSTOLIC BLOOD PRESSURE: 139 MMHG

## 2020-03-06 LAB
ANION GAP SERPL CALCULATED.3IONS-SCNC: 8 MEQ/L (ref 8–16)
BUN BLDV-MCNC: 19 MG/DL (ref 7–22)
CALCIUM SERPL-MCNC: 8.3 MG/DL (ref 8.5–10.5)
CHLORIDE BLD-SCNC: 99 MEQ/L (ref 98–111)
CO2: 34 MEQ/L (ref 23–33)
CREAT SERPL-MCNC: 0.6 MG/DL (ref 0.4–1.2)
GFR SERPL CREATININE-BSD FRML MDRD: > 90 ML/MIN/1.73M2
GLUCOSE BLD-MCNC: 85 MG/DL (ref 70–108)
POTASSIUM REFLEX MAGNESIUM: 4.8 MEQ/L (ref 3.5–5.2)
SODIUM BLD-SCNC: 141 MEQ/L (ref 135–145)

## 2020-03-06 PROCEDURE — 6360000002 HC RX W HCPCS: Performed by: INTERNAL MEDICINE

## 2020-03-06 PROCEDURE — 94640 AIRWAY INHALATION TREATMENT: CPT

## 2020-03-06 PROCEDURE — 97530 THERAPEUTIC ACTIVITIES: CPT

## 2020-03-06 PROCEDURE — 36415 COLL VENOUS BLD VENIPUNCTURE: CPT

## 2020-03-06 PROCEDURE — 97116 GAIT TRAINING THERAPY: CPT

## 2020-03-06 PROCEDURE — 2580000003 HC RX 258: Performed by: INTERNAL MEDICINE

## 2020-03-06 PROCEDURE — 6370000000 HC RX 637 (ALT 250 FOR IP): Performed by: INTERNAL MEDICINE

## 2020-03-06 PROCEDURE — APPSS30 APP SPLIT SHARED TIME 16-30 MINUTES: Performed by: NURSE PRACTITIONER

## 2020-03-06 PROCEDURE — 94762 N-INVAS EAR/PLS OXIMTRY CONT: CPT

## 2020-03-06 PROCEDURE — 2700000000 HC OXYGEN THERAPY PER DAY

## 2020-03-06 PROCEDURE — 99232 SBSQ HOSP IP/OBS MODERATE 35: CPT | Performed by: INTERNAL MEDICINE

## 2020-03-06 PROCEDURE — 6370000000 HC RX 637 (ALT 250 FOR IP): Performed by: NURSE PRACTITIONER

## 2020-03-06 PROCEDURE — 80048 BASIC METABOLIC PNL TOTAL CA: CPT

## 2020-03-06 RX ORDER — PREDNISONE 20 MG/1
40 TABLET ORAL DAILY
Qty: 4 TABLET | Refills: 0 | Status: SHIPPED | OUTPATIENT
Start: 2020-03-07 | End: 2020-03-09

## 2020-03-06 RX ORDER — FUROSEMIDE 20 MG/1
20 TABLET ORAL 2 TIMES DAILY
Qty: 60 TABLET | Refills: 3 | Status: ON HOLD | OUTPATIENT
Start: 2020-03-06 | End: 2020-05-28 | Stop reason: HOSPADM

## 2020-03-06 RX ORDER — VARENICLINE TARTRATE 0.5 MG/1
TABLET, FILM COATED ORAL
Qty: 60 TABLET | Refills: 3 | Status: ON HOLD | OUTPATIENT
Start: 2020-03-06 | End: 2020-05-27 | Stop reason: ALTCHOICE

## 2020-03-06 RX ORDER — FORMOTEROL FUMARATE 20 UG/2ML
20 SOLUTION RESPIRATORY (INHALATION) 2 TIMES DAILY
Qty: 120 ML | Refills: 3 | Status: ON HOLD | OUTPATIENT
Start: 2020-03-06 | End: 2021-12-23 | Stop reason: HOSPADM

## 2020-03-06 RX ORDER — ALBUTEROL SULFATE 2.5 MG/3ML
2.5 SOLUTION RESPIRATORY (INHALATION)
Qty: 120 EACH | Refills: 3 | Status: ON HOLD | OUTPATIENT
Start: 2020-03-06 | End: 2021-12-23 | Stop reason: SDUPTHER

## 2020-03-06 RX ORDER — AMOXICILLIN AND CLAVULANATE POTASSIUM 875; 125 MG/1; MG/1
1 TABLET, FILM COATED ORAL 2 TIMES DAILY
Qty: 28 TABLET | Refills: 0 | Status: SHIPPED | OUTPATIENT
Start: 2020-03-06 | End: 2020-03-12

## 2020-03-06 RX ADMIN — PREDNISONE 40 MG: 20 TABLET ORAL at 09:52

## 2020-03-06 RX ADMIN — IPRATROPIUM BROMIDE AND ALBUTEROL SULFATE 1 AMPULE: .5; 3 SOLUTION RESPIRATORY (INHALATION) at 08:09

## 2020-03-06 RX ADMIN — FUROSEMIDE 20 MG: 20 TABLET ORAL at 09:51

## 2020-03-06 RX ADMIN — ASPIRIN 325 MG: 325 TABLET, DELAYED RELEASE ORAL at 09:51

## 2020-03-06 RX ADMIN — AMLODIPINE BESYLATE 10 MG: 10 TABLET ORAL at 09:51

## 2020-03-06 RX ADMIN — BUDESONIDE 500 MCG: 0.5 INHALANT RESPIRATORY (INHALATION) at 08:09

## 2020-03-06 RX ADMIN — ENOXAPARIN SODIUM 40 MG: 40 INJECTION SUBCUTANEOUS at 09:51

## 2020-03-06 RX ADMIN — RISPERIDONE 2 MG: 2 TABLET ORAL at 09:51

## 2020-03-06 RX ADMIN — CARBAMAZEPINE 200 MG: 200 TABLET ORAL at 09:51

## 2020-03-06 RX ADMIN — METOPROLOL SUCCINATE 25 MG: 25 TABLET, FILM COATED, EXTENDED RELEASE ORAL at 09:51

## 2020-03-06 RX ADMIN — ARFORMOTEROL TARTRATE 15 MCG: 15 SOLUTION RESPIRATORY (INHALATION) at 08:09

## 2020-03-06 RX ADMIN — Medication 10 ML: at 09:53

## 2020-03-06 RX ADMIN — IPRATROPIUM BROMIDE AND ALBUTEROL SULFATE 1 AMPULE: .5; 3 SOLUTION RESPIRATORY (INHALATION) at 13:05

## 2020-03-06 RX ADMIN — POTASSIUM CHLORIDE 20 MEQ: 1500 TABLET, EXTENDED RELEASE ORAL at 09:51

## 2020-03-06 ASSESSMENT — PAIN SCALES - GENERAL
PAINLEVEL_OUTOF10: 0
PAINLEVEL_OUTOF10: 0

## 2020-03-06 NOTE — DISCHARGE SUMMARY
times daily (with meals)             OXYGEN  Inhale 3 L into the lungs continuous             potassium chloride (KLOR-CON M) 20 MEQ extended release tablet  Take 1 tablet by mouth daily (with breakfast)             pravastatin (PRAVACHOL) 40 MG tablet  Take 40 mg by mouth nightly             predniSONE (DELTASONE) 20 MG tablet  Take 2 tablets by mouth daily for 2 days             risperiDONE (RISPERDAL) 2 MG tablet  Take 2 mg by mouth 2 times daily             traZODone (DESYREL) 100 MG tablet  Take 100 mg by mouth nightly             varenicline (CHANTIX) 0.5 MG tablet  Take 1 tablet by mouth daily for 3 days, THEN 1 tablet 2 times daily for 4 days, THEN 2 tablets 2 times daily. Consults:  Pulmonary service    Significant Diagnostic Studies: labs: CBC:       Recent Labs     03/05/20  0525   WBC 7.9   HGB 16.2         BMP:          Recent Labs     03/04/20  0443 03/05/20  0525 03/06/20  0535    136 141   K 4.8 4.5 4.8    100 99   CO2 26 29 34*   BUN 25* 20 19   CREATININE 0.6 0.7 0.6   GLUCOSE 93 91 85      Magnesium:          Lab Results   Component Value Date     MG 2.0 03/02/2020      HgBA1c:          Lab Results   Component Value Date     LABA1C 6.0 12/15/2019      TSH:          Lab Results   Component Value Date     TSH 2.040 03/02/2020 03/04/20 0602     Specimen Source: Blood gases       pH, Blood Gas 7.34Low      PCO2 59High  mmhg     PO2 62Low  mmhg     HCO3 32High  mmol/l     Base Excess (Calculated) 3.7High  mmol/l     O2 Sat 89 %     IFIO2 35     DEVICE BiPAP     SET RESPIRATORY RATE 20 bpm     SET PEEP 12.0 mmhg     SET PRESS SUPP 18           Assessment and Plan:   6. Acute on chronic hypoxemic and hypercapnic resp failure  7. COPD exacerbation  8. Acute on chronic systolic CHF  9. HTN   10. Schizoaffective disorder     Treatments:   Cont bronchodilators, steroid  Po lasix.   NIPPV prn  / O2,  Per Pulm service - Home BiPAP ordered 25/12 with 3LPM O2 bleed in.   Stable for dc home with HHS. Lnae Dave Disposition:  Home with HHS.     Signed:  Jaspal Diaz  3/6/2020, 11:48 AM

## 2020-03-06 NOTE — CARE COORDINATION
3/4/20, 10:25 AM    DISCHARGE ON GOING EVALUATION    Satish Encinas day: 2  Location: -03/003-A Reason for admit: Respiratory failure [facility]) [J96.90]   Procedure:   3/2 CXR  Right pleural effusion. Airspace opacity in the right lung base and prominent interstitium could reflect infiltrate and interstitial edema or asymmetric edema. Follow-up advised.  Findings have progressed when compared to prior study   suggesting fluid overload  Treatment Plan of Care: CO2 59; monitor; Pulmonary following  Barriers to Discharge: BIPAP 250/12 BUR 20 versus Oxygen 5L; weaning as able, IV AB/Diuretics/Steroids continued  PCP: Roberta Crisostomo MD  Readmission Risk Score: 19%  Patient Goals/Plan/Treatment Preferences: met with client; plans home with mother Adriano Lott, [facility] COPD/CHF/Pneumonia (nsg, therapy), new Pulmonary Rehab, gets OP Haldol injections every 14d, has [facility] home oxygen 3L ATC, therapies following; await therapy input, has nebulizer    Update: home BIPAP planned if qualifies: 25/12; ABG with CO2 66 noted, bedside spirometry planned today (FEV 1/FVC ratio pending needs to be greater than or = to 70 if diagnosis obesity hypoventilation syndrome),    if using COPD/Respiratory algorithm, need ABG now on usual FIO2 4L (called PORTER Guzman) and CO2 must be greater or = 52 to qualify, then need possible overnite oximetry tomorrow night): collaborated wit Tayo Navarro, [address] [facility] RCP and Jorden Santamaria Pulmonary CNP  Electronically signed by Hernandez Núñez RN on 3/4/2020 at 2:03 PM   Update: CO2 57 on usual FIO2 4L; updated [facility] PORTER Hays    Update: [facility] client prefers: however, has outstanding bill and will need copay up front; amount to be determined  Electronically signed by Hernandez Núñez RN on 3/4/2020 at 2:05 PM
DISCHARGE/PLANNING EVALUATION  3/4/20, 3:15 PM    Reason for Referral:  \"[facility] COPD (nursing)  Mental Status:  Alert and oriented, some confusion  Decision Making:  Makes decisions with his mother who is his POA  Family/Social/Home Environment:  Attempted to speak to the patient but he was not certain about a lot of things and was agreeable for SW to speak to his mother. SW called the mother but the patient's sister answered. His mother was not home. She states that the patient and their mother live in an apartment that does not have an elevator. She states he has been able to manage steps. Her mother takes care of all housekeeping. Their sister, Romy Cartwright, takes him to his appointments. He is able to do his own personal care using a bathtub shower without a seat or grab bar. They set this up for him. Current Services including food security, transportation and housekeeping:  See above for household needs. He is current with [facility], they also assist with medications  Current Equipment: he has home o2 from [facility]  Payment Source: Medicare and Medicaid  Concerns or Barriers to Discharge:  SW discussed the need [facility], she felt this would be a good idea for him. Post acute provider list with quality measures, geographic area and applicable managed care information provided. Questions regarding selection process answered: not at this time    Teach Back Method used with patient and his sister regarding care plan and needs  Patient's sisrter verbalize understanding of the plan of care and contribute to goal setting. Patient goals, treatment preferences and discharge plan: SYDNEE asked that patient's mother return call to discuss the discharge plan.  If all in agreement with refer to [facility]    Electronically signed by ALICE Pa on 3/4/2020 at 3:15 PM     3/5/20, 8:29 AM    DISCHARGE PLANNING EVALUATION      SYDNEE verified with Sherry Maciel with Public [employer] that patient does not have Medicaid
Update: Pulmonary pursuing COPD/Respiratory Failure algorithm, ABG CO2 qualified, plans nocturnal oximetry tonight (no BIPAP, do on 5L NC oxygen, updated Arvid April, RCP)  Electronically signed by Edi Santoro RN on 3/5/2020 at 11:49 AM
Update; texted Attending for [facility] (nsg, therapy) orders  Electronically signed by Mariya Guerrero RN on 3/6/2020 at 8:38 AM     Update: left message for mother Keysha Brown to be here for BIPAP training @ 1300  Electronically signed by Mariya Guerrero RN on 3/6/2020 at 10:25 AM    Update: BIPAP [facility] with oxygen [facility]L bleed [facility] Heber Cespedes for possible discharge today; collaborated with Sarina Ponce  Electronically signed by Mariya Guerrero RN on 3/6/2020 at 10:41 AM    Update; reply to [facility] order text; \"read\"  Electronically signed by Mariya Guerrero RN on 3/6/2020 at 11:17 AM    Update: home oxygen eval 4L at rest, 4L with activity; collaborated with Pulmonary who will place order/smart phrase and change BIPAP order bleed in oxygen to 4L; called Jefferson Sharma that family requesting oxygen tank for ride home; collaborated with Heber Cespedes RN  Electronically signed by Mariya Guerrero RN on 3/6/2020 at 1:27 PM    3/6/20, 2:26 PM  Called referral to [facility] for [facility] (nsg, aides, therapy, SW)  Patient goals/plan/ treatment preferences discussed by  and . Patient goals/plan/ treatment preferences reviewed with patient/ family. Patient/ family verbalize understanding of discharge plan and are in agreement with goal/plan/treatment preferences. Understanding was demonstrated using the teach back method. AVS provided by RN at time of discharge, which includes all necessary medical information pertaining to the patients current course of illness, treatment, post-discharge goals of care, and treatment preferences.     Services After Discharge  Services At/After Discharge: Nursing Services, OT, PT, Aide Services([facility] [place])   IMM Letter  IMM Letter given to Patient/Family/Significant other/Guardian/POA/by[de-identified]   IMM Letter date given[de-identified] 03/06/20  IMM Letter time given[de-identified] 4021
Planning Evaluation  Current Residence:  Private Residence  Living Arrangements:  Parent   Support Systems:  Family Members, Parent  Current Services PTA:     Potential Assistance Needed:  N/A  Potential Assistance Purchasing Medications:  No  Does patient want to participate in local refill/ meds to beds program?  No  Type of Home Care Services:  None  Patient expects to be discharged to:  home w/ parent  Expected Discharge date:  03/05/20  Follow Up Appointment: Best Day/ Time: Tuesday AM    Patient Goals/Plan/Treatment Preferences: plans home with mother and new Pulmonary Rehab, gets OP Haldol injections every 14d, has home oxygen 3L ATC, therapies following; await therapy input, needs meet/greet as asleep at time of rounds, nsg did not want awakened  Transportation/Food Security/Housekeeping Addressed:  No issues identified.     Evaluation: no

## 2020-03-06 NOTE — PROGRESS NOTES
[facility]  INPATIENT PHYSICAL THERAPY  DAILY NOTE  [facility] ICU STEPDOWN TELEMETRY 4K - 4K-03/003-A    Time In: 0907  Time Out: 09  Timed Code Treatment Minutes: 23 Minutes  Minutes: 23          Date: 3/6/2020  Patient Name: Tre Guadalupe,  Gender:  male        MRN: [id number]  : 1970  (44 y.o.)     Referring Practitioner: Marisa Gallardo MD  Diagnosis: Respiratory failure  Additional Pertinent Hx: Per ED note: Tre Guadalupe is a 44 y.o. male who presents fatigue and wheezing. Patient has COPD and emphysema. Patient had an appointment today and he was not wearing his O2. Patient has schizophrenia and is unsure how much O2 he wears at home. He is here with his mother and a neighbor. Patient has had worsening MELGAR for the past few days. Patient has leg swelling and states he takes Lasix. Patient or mother are unsure if he has CHF. Patient denies chest pain, abdominal pain, fever, recent antibiotics, or steroids. Patient reports he has a cough. Patient is a smoker and mother states he doesn't smoke with his O2 on. Mother or patient has no other complaints at this time. Prior Level of Function:  Lives With: Other (comment)(with mother)  Type of Home: House  Home Layout: One level  Home Access: Level entry  Home Equipment: (None)   Bathroom Accessibility: Accessible    Receives Help From: Family  ADL Assistance: Independent  Homemaking Assistance: Needs assistance  Homemaking Responsibilities: No  Ambulation Assistance: Independent  Transfer Assistance: Independent  Active : No  Additional Comments: Pt states amb without AD, O2 at night- CPAP is used. Restrictions/Precautions:  Restrictions/Precautions: Fall Risk  Position Activity Restriction  Other position/activity restrictions: Continuous bipap 3/3    SUBJECTIVE: RN approved tx session. Pt in bed sleeping, with breakfast next to him.  Educated pt on getting up to chair for breakfast.     PAIN: reports pain, unable to state where    OBJECTIVE:  Bed Mobility:  Supine to Sit: Stand By Assistance  Scooting: Stand By Assistance  Flat bed with side rail used  Transfers:  Sit to Stand from bed, bedside chair, and toilet: Contact Guard Assistance  Stand to [address] for hand placement, and completing at a slow pace, squaring up to surface prior to sitting. Instructed pt on consecutive sit to stand transfers 10 reps at [facility] 62. Ambulation:  Contact Guard Assistance, Minimal Assistance, X 1  Distance: 3ft + 10ft + 15ft  Surface: Level Tile  Device:No Device  Gait Deviations: Forward Flexed Posture, Decreased Step Length Bilaterally, Decreased Weight Shift Bilaterally, Wide Base of Support and Unsteady Gait  Pt had LOB with all ambulation requiring min assist to correct. Educated to slow pace and obstacle negotiation. Balance:  Dynamic Sitting Balance: Contact Guard Assistance on toilet reaching behind and to floor level several times  Static Standing Balance: Stand By Assistance  Dynamic Standing Balance: Contact Guard Assistance    Exercise:  Patient was guided in 20 reps of exercise to both lower extremities. Long arc quads, Hip abduction/adduction, Seated hip flexion, Seated heel/toe raises and Seated isometric hip adduction. Exercises were completed for increased independence with functional mobility. Functional Outcome Measures: Completed  AM-PAC Inpatient Mobility without Stair Climbing Raw Score : 15  AM-PAC Inpatient without Stair Climbing T-Scale Score : 43.03    ASSESSMENT:  Assessment: Patient progressing toward established goals. Educated RN on pt at close 1 assist with no AD. Pt ambulates unsafe with educated. Activity Tolerance:  Patient tolerance of  treatment: good.       Equipment Recommendations:Equipment Needed: No  Discharge Recommendations:  Continue to assess pending progress    Plan: Times per week: 3-5 X GM  Current Treatment Recommendations: Strengthening, Transfer Training, Endurance Training, Functional Mobility Training, Gait Training, Stair training, Safety Education & Training, Balance Training, Patient/Caregiver Education & Training, Equipment Evaluation, Education, & procurement, Home Exercise Program    Patient Education  Patient Education: [employer], Equipment Education, Transfers, Reviewed Prior Education, Gait, Use of [employer], Verbal Exercise Instruction    Goals:  Patient goals : to get better  Short term goals  Time Frame for Short term goals: by discharge  Short term goal 1: Pt to transfer supine <--> sit S to enable pt to get in/out of bed. Short term goal 2: Pt to transfer sit <--> stand S for increased functional mobility. Short term goal 3: Pt to ambulate >50 feet without AD SBA for household ambulation. Long term goals  Time Frame for Long term goals : NA due to short length of stay. Following session, patient left in safe position with all fall risk precautions in place.

## 2020-03-06 NOTE — PROGRESS NOTES
Spoke with outpatient pharmacy. Stated they would not be able to fill performomist and albuterol here due to patients insurance. Spoke with patient's mother, stated they use [facility] on .  Both prescriptions were called into patient's pharmacy

## 2020-03-06 NOTE — PROGRESS NOTES
A home oxygen evaluation has been completed. [x]Patient is an inpatient. It is expected that the patient will be discharged within the next 48 hours. Qualified provider to write order for home prescription if patient qualifies. Social service/care managers will arrange for home oxygen. If patient is active, arrange for Home Medical supplier to assess for Oxygen Conserving Device per pulse oximetry. []Patient is an outpatient. Results will be faxed to the ordering provider. Qualified provider to write order for home prescription if patient qualifies and arranges for home oxygen. Patient was placed on room air for 5 minutes. SpO2 was 86 % on room air at rest. Patients SpO2 was below 89% and qualified for home oxygen. Oxygen was applied at 4 lpm via nasal cannula to maintain a SpO2 between 90-92% while at rest. Actual SpO2 was 90-91 %. Patient ambulate = stand at bedside for exercise flow rate. Patients standing SpO2 was 90% on 4 lpm to maintain SpO2 between 90-92% while exercising. Results reported to [facility]    If oxygen need is greater than 4 lpm the SpO2 on 4 lpm was . Note: For any SpO2 at 93% see policy and procedure for possible qualifications.

## 2020-03-06 NOTE — PROGRESS NOTES
INTERNAL MEDICINE Progress Note  3/6/2020 11:37 AM  Subjective:   Admit Date: 3/2/2020  PCP: Lucas Posada MD  Interval History:   Feels better  No CP, no fever.     Objective:   Vitals: /73   Pulse 94   Temp 98.5 °F (36.9 °C) (Oral)   Resp 21   Ht 5' 11\" (1.803 m)   Wt 265 lb 4.8 oz (120.3 kg)   SpO2 90%   BMI 37.00 kg/m²   General appearance: alert and cooperative with exam  HEENT:  atraumatic  Neck: no JVD  Lungs: diminished breath sounds bilaterally, no rhonchi, no creps  Heart: S1, S2 normal  Abdomen: normal findings: bowel sounds normal, no masses palpable, soft, non-tender and symmetric and abnormal findings:  distended  Extremities: no edema,   Neurologic: : alert, orientation: person, affect: blunted, non focal exam    Medications:   Scheduled Meds:   furosemide  20 mg Oral BID    potassium chloride  20 mEq Oral Daily with breakfast    Arformoterol Tartrate  15 mcg Nebulization BID    amLODIPine  10 mg Oral Daily    aspirin  325 mg Oral Daily    budesonide  500 mcg Nebulization BID    carBAMazepine  200 mg Oral BID    [START ON 3/16/2020] haloperidol decanoate  50 mg Intramuscular Q14 Days    metoprolol succinate  25 mg Oral Daily    pravastatin  40 mg Oral Nightly    risperiDONE  2 mg Oral BID    sodium chloride flush  10 mL Intravenous 2 times per day    enoxaparin  40 mg Subcutaneous Daily    ipratropium-albuterol  1 ampule Inhalation Q4H WA    predniSONE  40 mg Oral Daily    cefTRIAXone (ROCEPHIN) IV  1 g Intravenous Q24H    azithromycin  500 mg Intravenous Q24H     Continuous Infusions:    Lab Results:   CBC:   Recent Labs     03/05/20  0525   WBC 7.9   HGB 16.2        BMP:    Recent Labs     03/04/20  0443 03/05/20  0525 03/06/20  0535    136 141   K 4.8 4.5 4.8    100 99   CO2 26 29 34*   BUN 25* 20 19   CREATININE 0.6 0.7 0.6   GLUCOSE 93 91 85     Magnesium:    Lab Results   Component Value Date    MG 2.0 03/02/2020     HgBA1c:    Lab Results Component Value Date    LABA1C 6.0 12/15/2019     TSH:    Lab Results   Component Value Date    TSH 2.040 03/02/2020 03/04/20 0602    Specimen Source: Blood gases     pH, Blood Gas 7.34Low     PCO2 59High  mmhg    PO2 62Low  mmhg    HCO3 32High  mmol/l    Base Excess (Calculated) 3.7High  mmol/l    O2 Sat 89 %    IFIO2 35    DEVICE BiPAP    SET RESPIRATORY RATE 20 bpm    SET PEEP 12.0 mmhg    SET PRESS SUPP 18        Assessment and Plan:   1. Acute on chronic hypoxemic and hypercapnic resp failure  2. COPD exacerbation  3. Acute on chronic systolic CHF  4. HTN   5. Schizoaffective disorder     Cont bronchodilators, steroid  Po lasix. NIPPV prn  / O2,  Per Pulm service - Home BiPAP ordered 25/12 with 3LPM O2 bleed in. Stable for dc home with [facility]. Elidia San MD

## 2020-03-06 NOTE — DISCHARGE INSTR - COC
Isolation        Patient Infection Status     None to display          Nurse Assessment:  Last Vital Signs: /62   Pulse 86   Temp 97.8 °F (36.6 °C) (Oral)   Resp 22   Ht 5' 11\" (1.803 m)   Wt 265 lb 4.8 oz (120.3 kg)   SpO2 91%   BMI 37.00 kg/m²     Last documented pain score (0-10 scale): Pain Level: 0  Last Weight:   Wt Readings from Last 1 Encounters:   03/06/20 265 lb 4.8 oz (120.3 kg)     Mental Status:  {IP PT MENTAL STATUS:20030}    IV Access:  { GINO IV ACCESS:452404427}    Nursing Mobility/ADLs:  Walking   {[facility] DME CTID:840127120}  Transfer  {[facility] DME XXNJ:979194983}  Bathing  {[facility] DME QQSL:783484208}  Dressing  {[facility] DME HFJD:498116238}  Toileting  {[facility] DME SDCE:856870113}  Feeding  {[facility] XGIF:457000824}  Med Admin  {[facility] DME PSAB:533890861}  Med Delivery   { GINO MED Delivery:089917153}    Wound Care Documentation and Therapy:   Please have them change daily. Dakins moist to dry w/i ulcers on right distal medial wound and right heel; betadine to right lateral leg, lateral foot and left heel. Offload heels w/ pillows. Elimination:  Continence:   · Bowel: {YES / DK:40921}  · Bladder: {YES / LS:74642}  Urinary Catheter: {Urinary Catheter:116839823}   Colostomy/Ileostomy/Ileal Conduit: {YES / PA:62599}       Date of Last BM: ***    Intake/Output Summary (Last 24 hours) at 3/6/2020 1014  Last data filed at 3/6/2020 0310  Gross per 24 hour   Intake 898 ml   Output 1650 ml   Net -752 ml     I/O last 3 completed shifts: In: 1168 [P.O.:810;  I.V.:358]  Out: 2425 [Urine:2425]    Safety Concerns:     [address] Safety Concerns:746774159}    Impairments/Disabilities:      [address] Impairments/Disabilities:205752644}    Nutrition Therapy:  Current Nutrition Therapy:   [address] Diet List:245438608}    Routes of Feeding: {[facility] DME Other Feedings:878885416}  Liquids: {Slp liquid thickness:50121}  Daily Fluid Restriction: {[facility]P DME Yes amt example:444304137}  Last Modified Barium Swallow with Video (Video Swallowing Test): {Done Not Done [facility]:568632690}    Treatments at the Time of Hospital Discharge:   Respiratory Treatments: ***  Oxygen Therapy:  {Therapy; copd oxygen:42777}  Ventilator:    {[facility] Vent EHSK:157996213}    Rehab Therapies: {THERAPEUTIC INTERVENTION:6695348697}  Weight Bearing Status/Restrictions: { CC Weight Bearin}  Other Medical Equipment (for information only, NOT a DME order):  {EQUIPMENT:296987797}  Other Treatments: ***    Patient's personal belongings (please select all that are sent with patient):  {[facility] DME Belongings:684662619}    RN SIGNATURE:  {Esignature:719401144}    CASE MANAGEMENT/SOCIAL WORK SECTION    Inpatient Status Date: ***    Readmission Risk Assessment Score:  Readmission Risk              Risk of Unplanned Readmission:        16           Discharging to Facility/ Agency   · Name:   · Address:  · Phone:  · Fax:    Dialysis Facility (if applicable)   · Name:  · Address:  · Dialysis Schedule:  · Phone:  · Fax:    / signature: {Esignature:653851235}    PHYSICIAN SECTION    Prognosis: {Prognosis:9051164662}    Condition at Discharge: [address] [place] [address] Patient Condition:949808164}    Rehab Potential (if transferring to Rehab): {Prognosis:6404540617}    Recommended Labs or Other Treatments After Discharge: ***    Physician Certification: I certify the above information and transfer of Mercedes Prasad  is necessary for the continuing treatment of the diagnosis listed and that he requires {Admit to Appropriate Level of Care:38713} for {GREATER/LESS:888781526} 30 days.      Update Admission H&P: {P DME Changes in XYW:612674509}    PHYSICIAN SIGNATURE:  {Esignature:782767368}

## 2020-03-08 LAB
BLOOD CULTURE, ROUTINE: NORMAL
BLOOD CULTURE, ROUTINE: NORMAL

## 2020-03-09 LAB — VIRAL CULTURE,RAPID,RESPIRATOR: NORMAL

## 2020-03-09 NOTE — PROGRESS NOTES
CLINICAL PHARMACY NOTE: MEDS TO [address] Select Patient?: No  Total # of Prescriptions Filled: 4   The following medications were delivered to the patient:  Augmentin 875mg  Furosemide 20mg  Chantix Starting Pack  Prednisone 20mg  Total # of Interventions Completed: 2  Time Spent (min): 30    Additional Documentation:

## 2020-03-10 ENCOUNTER — TELEPHONE (OUTPATIENT)
Dept: RESPIRATORY THERAPY | Age: 50
End: 2020-03-10

## 2020-05-17 ENCOUNTER — APPOINTMENT (OUTPATIENT)
Dept: GENERAL RADIOLOGY | Age: 50
DRG: 004 | End: 2020-05-17
Payer: MEDICARE

## 2020-05-17 ENCOUNTER — HOSPITAL ENCOUNTER (INPATIENT)
Age: 50
LOS: 11 days | Discharge: NURSING, REHAB OR LONG-TERM CARE | DRG: 004 | End: 2020-05-28
Attending: EMERGENCY MEDICINE | Admitting: INTERNAL MEDICINE
Payer: MEDICARE

## 2020-05-17 PROBLEM — J96.20 ACUTE ON CHRONIC RESPIRATORY FAILURE (HCC): Status: ACTIVE | Noted: 2020-05-17

## 2020-05-17 PROBLEM — J96.00 ACUTE RESPIRATORY FAILURE (HCC): Status: ACTIVE | Noted: 2020-05-17

## 2020-05-17 LAB
ALBUMIN SERPL-MCNC: 3.8 G/DL (ref 3.5–5.1)
ALP BLD-CCNC: 112 U/L (ref 38–126)
ALT SERPL-CCNC: 12 U/L (ref 11–66)
AMORPHOUS: ABNORMAL
ANION GAP SERPL CALCULATED.3IONS-SCNC: 11 MEQ/L (ref 8–16)
ANISOCYTOSIS: PRESENT
AST SERPL-CCNC: 13 U/L (ref 5–40)
BACTERIA: ABNORMAL
BASE EXCESS (CALCULATED): -3.8 MMOL/L (ref -2.5–2.5)
BASE EXCESS (CALCULATED): -5.5 MMOL/L (ref -2.5–2.5)
BASOPHILS # BLD: 0.4 %
BASOPHILS ABSOLUTE: 0 THOU/MM3 (ref 0–0.1)
BILIRUB SERPL-MCNC: 0.4 MG/DL (ref 0.3–1.2)
BILIRUBIN DIRECT: 0.3 MG/DL (ref 0–0.3)
BILIRUBIN URINE: ABNORMAL
BLOOD, URINE: NEGATIVE
BUN BLDV-MCNC: 35 MG/DL (ref 7–22)
C-REACTIVE PROTEIN: 1.18 MG/DL (ref 0–1)
CALCIUM SERPL-MCNC: 8.8 MG/DL (ref 8.5–10.5)
CASTS: ABNORMAL /LPF
CASTS: ABNORMAL /LPF
CHARACTER, URINE: ABNORMAL
CHLORIDE BLD-SCNC: 99 MEQ/L (ref 98–111)
CO2: 25 MEQ/L (ref 23–33)
COLLECTED BY:: ABNORMAL
COLLECTED BY:: ABNORMAL
COLOR: ABNORMAL
CREAT SERPL-MCNC: 1.4 MG/DL (ref 0.4–1.2)
CRYSTALS: ABNORMAL
DEVICE: ABNORMAL
DEVICE: ABNORMAL
EKG ATRIAL RATE: 98 BPM
EKG P AXIS: 95 DEGREES
EKG P-R INTERVAL: 164 MS
EKG Q-T INTERVAL: 326 MS
EKG QRS DURATION: 72 MS
EKG QTC CALCULATION (BAZETT): 416 MS
EKG R AXIS: 154 DEGREES
EKG T AXIS: 36 DEGREES
EKG VENTRICULAR RATE: 98 BPM
EOSINOPHIL # BLD: 0.6 %
EOSINOPHILS ABSOLUTE: 0 THOU/MM3 (ref 0–0.4)
EPITHELIAL CELLS, UA: ABNORMAL /HPF
ERYTHROCYTE [DISTWIDTH] IN BLOOD BY AUTOMATED COUNT: 22 % (ref 11.5–14.5)
ERYTHROCYTE [DISTWIDTH] IN BLOOD BY AUTOMATED COUNT: 67.7 FL (ref 35–45)
GFR SERPL CREATININE-BSD FRML MDRD: 64 ML/MIN/1.73M2
GLUCOSE BLD-MCNC: 109 MG/DL (ref 70–108)
GLUCOSE, URINE: NEGATIVE MG/DL
HCO3: 25 MMOL/L (ref 23–28)
HCO3: 26 MMOL/L (ref 23–28)
HCT VFR BLD CALC: 58.1 % (ref 42–52)
HEMOGLOBIN: 17.1 GM/DL (ref 14–18)
ICTOTEST: NEGATIVE
IFIO2: 100
IFIO2: 100
IMMATURE GRANS (ABS): 0.01 THOU/MM3 (ref 0–0.07)
IMMATURE GRANULOCYTES: 0.2 %
KETONES, URINE: ABNORMAL
LACTIC ACID, SEPSIS: 1.1 MMOL/L (ref 0.5–1.9)
LACTIC ACID: 0.7 MMOL/L (ref 0.5–2.2)
LD: 175 U/L (ref 100–190)
LEUKOCYTE EST, POC: ABNORMAL
LYMPHOCYTES # BLD: 21.5 %
LYMPHOCYTES ABSOLUTE: 1.1 THOU/MM3 (ref 1–4.8)
MAGNESIUM: 2.2 MG/DL (ref 1.6–2.4)
MCH RBC QN AUTO: 26.3 PG (ref 26–33)
MCHC RBC AUTO-ENTMCNC: 29.4 GM/DL (ref 32.2–35.5)
MCV RBC AUTO: 89.2 FL (ref 80–94)
MISCELLANEOUS LAB TEST RESULT: ABNORMAL
MODE: ABNORMAL
MONOCYTES # BLD: 10.2 %
MONOCYTES ABSOLUTE: 0.5 THOU/MM3 (ref 0.4–1.3)
MUCUS: ABNORMAL
NITRITE, URINE: NEGATIVE
NUCLEATED RED BLOOD CELLS: 0 /100 WBC
O2 SATURATION: 76 %
O2 SATURATION: 93 %
OSMOLALITY CALCULATION: 278.7 MOSMOL/KG (ref 275–300)
PCO2: 61 MMHG (ref 35–45)
PCO2: 68 MMHG (ref 35–45)
PH BLOOD GAS: 7.18 (ref 7.35–7.45)
PH BLOOD GAS: 7.23 (ref 7.35–7.45)
PH UA: 5 (ref 5–9)
PLATELET # BLD: 182 THOU/MM3 (ref 130–400)
PLATELET ESTIMATE: ADEQUATE
PMV BLD AUTO: 9.5 FL (ref 9.4–12.4)
PO2: 53 MMHG (ref 71–104)
PO2: 79 MMHG (ref 71–104)
POTASSIUM SERPL-SCNC: 5.1 MEQ/L (ref 3.5–5.2)
PRO-BNP: 1388 PG/ML (ref 0–900)
PROCALCITONIN: 0.15 NG/ML (ref 0.01–0.09)
PROTEIN UA: 100 MG/DL
RBC # BLD: 6.51 MILL/MM3 (ref 4.7–6.1)
RBC URINE: ABNORMAL /HPF
RENAL EPITHELIAL, UA: ABNORMAL
SARS-COV-2, NAAT: NOT DETECTED
SCAN OF BLOOD SMEAR: NORMAL
SEDIMENTATION RATE, ERYTHROCYTE: 1 MM/HR (ref 0–10)
SEG NEUTROPHILS: 67.1 %
SEGMENTED NEUTROPHILS ABSOLUTE COUNT: 3.4 THOU/MM3 (ref 1.8–7.7)
SET PEEP: 16 MMHG
SET PRESS SUPP: 16 CMH2O
SET RESPIRATORY RATE: 14 BPM
SODIUM BLD-SCNC: 135 MEQ/L (ref 135–145)
SOURCE, BLOOD GAS: ABNORMAL
SOURCE, BLOOD GAS: ABNORMAL
SPECIFIC GRAVITY UA: 1.02 (ref 1–1.03)
TOTAL PROTEIN: 7 G/DL (ref 6.1–8)
TROPONIN T: 0.03 NG/ML
TROPONIN T: 0.04 NG/ML
TROPONIN T: 0.04 NG/ML
UROBILINOGEN, URINE: 1 EU/DL (ref 0–1)
WBC # BLD: 5 THOU/MM3 (ref 4.8–10.8)
WBC UA: ABNORMAL /HPF
YEAST: ABNORMAL

## 2020-05-17 PROCEDURE — 2500000003 HC RX 250 WO HCPCS

## 2020-05-17 PROCEDURE — 71045 X-RAY EXAM CHEST 1 VIEW: CPT

## 2020-05-17 PROCEDURE — 87070 CULTURE OTHR SPECIMN AEROBIC: CPT

## 2020-05-17 PROCEDURE — 6360000002 HC RX W HCPCS

## 2020-05-17 PROCEDURE — 6370000000 HC RX 637 (ALT 250 FOR IP): Performed by: INTERNAL MEDICINE

## 2020-05-17 PROCEDURE — 2000000000 HC ICU R&B

## 2020-05-17 PROCEDURE — 83735 ASSAY OF MAGNESIUM: CPT

## 2020-05-17 PROCEDURE — 2700000000 HC OXYGEN THERAPY PER DAY

## 2020-05-17 PROCEDURE — 93005 ELECTROCARDIOGRAM TRACING: CPT | Performed by: PHYSICIAN ASSISTANT

## 2020-05-17 PROCEDURE — 6360000002 HC RX W HCPCS: Performed by: PHYSICIAN ASSISTANT

## 2020-05-17 PROCEDURE — 89220 SPUTUM SPECIMEN COLLECTION: CPT

## 2020-05-17 PROCEDURE — 94002 VENT MGMT INPAT INIT DAY: CPT

## 2020-05-17 PROCEDURE — 31500 INSERT EMERGENCY AIRWAY: CPT

## 2020-05-17 PROCEDURE — 2709999900 HC NON-CHARGEABLE SUPPLY

## 2020-05-17 PROCEDURE — 87086 URINE CULTURE/COLONY COUNT: CPT

## 2020-05-17 PROCEDURE — 0BH17EZ INSERTION OF ENDOTRACHEAL AIRWAY INTO TRACHEA, VIA NATURAL OR ARTIFICIAL OPENING: ICD-10-PCS | Performed by: INTERNAL MEDICINE

## 2020-05-17 PROCEDURE — 2500000003 HC RX 250 WO HCPCS: Performed by: PHYSICIAN ASSISTANT

## 2020-05-17 PROCEDURE — 5A1955Z RESPIRATORY VENTILATION, GREATER THAN 96 CONSECUTIVE HOURS: ICD-10-PCS | Performed by: INTERNAL MEDICINE

## 2020-05-17 PROCEDURE — 80053 COMPREHEN METABOLIC PANEL: CPT

## 2020-05-17 PROCEDURE — 51702 INSERT TEMP BLADDER CATH: CPT

## 2020-05-17 PROCEDURE — 93010 ELECTROCARDIOGRAM REPORT: CPT | Performed by: INTERNAL MEDICINE

## 2020-05-17 PROCEDURE — 81001 URINALYSIS AUTO W/SCOPE: CPT

## 2020-05-17 PROCEDURE — 6360000002 HC RX W HCPCS: Performed by: INTERNAL MEDICINE

## 2020-05-17 PROCEDURE — 84300 ASSAY OF URINE SODIUM: CPT

## 2020-05-17 PROCEDURE — 99285 EMERGENCY DEPT VISIT HI MDM: CPT

## 2020-05-17 PROCEDURE — 87077 CULTURE AEROBIC IDENTIFY: CPT

## 2020-05-17 PROCEDURE — 87081 CULTURE SCREEN ONLY: CPT

## 2020-05-17 PROCEDURE — 86140 C-REACTIVE PROTEIN: CPT

## 2020-05-17 PROCEDURE — 36600 WITHDRAWAL OF ARTERIAL BLOOD: CPT

## 2020-05-17 PROCEDURE — 6370000000 HC RX 637 (ALT 250 FOR IP): Performed by: PHYSICIAN ASSISTANT

## 2020-05-17 PROCEDURE — 83880 ASSAY OF NATRIURETIC PEPTIDE: CPT

## 2020-05-17 PROCEDURE — C1751 CATH, INF, PER/CENT/MIDLINE: HCPCS

## 2020-05-17 PROCEDURE — 82570 ASSAY OF URINE CREATININE: CPT

## 2020-05-17 PROCEDURE — 87205 SMEAR GRAM STAIN: CPT

## 2020-05-17 PROCEDURE — 84145 PROCALCITONIN (PCT): CPT

## 2020-05-17 PROCEDURE — 2500000003 HC RX 250 WO HCPCS: Performed by: EMERGENCY MEDICINE

## 2020-05-17 PROCEDURE — 94761 N-INVAS EAR/PLS OXIMETRY MLT: CPT

## 2020-05-17 PROCEDURE — 84484 ASSAY OF TROPONIN QUANT: CPT

## 2020-05-17 PROCEDURE — 85651 RBC SED RATE NONAUTOMATED: CPT

## 2020-05-17 PROCEDURE — 85025 COMPLETE CBC W/AUTO DIFF WBC: CPT

## 2020-05-17 PROCEDURE — U0002 COVID-19 LAB TEST NON-CDC: HCPCS

## 2020-05-17 PROCEDURE — 99291 CRITICAL CARE FIRST HOUR: CPT

## 2020-05-17 PROCEDURE — 2580000003 HC RX 258

## 2020-05-17 PROCEDURE — 2500000003 HC RX 250 WO HCPCS: Performed by: NURSE PRACTITIONER

## 2020-05-17 PROCEDURE — 6360000002 HC RX W HCPCS: Performed by: NURSE PRACTITIONER

## 2020-05-17 PROCEDURE — 83615 LACTATE (LD) (LDH) ENZYME: CPT

## 2020-05-17 PROCEDURE — 6360000002 HC RX W HCPCS: Performed by: EMERGENCY MEDICINE

## 2020-05-17 PROCEDURE — 83605 ASSAY OF LACTIC ACID: CPT

## 2020-05-17 PROCEDURE — 82803 BLOOD GASES ANY COMBINATION: CPT

## 2020-05-17 PROCEDURE — 2580000003 HC RX 258: Performed by: INTERNAL MEDICINE

## 2020-05-17 PROCEDURE — 99223 1ST HOSP IP/OBS HIGH 75: CPT | Performed by: INTERNAL MEDICINE

## 2020-05-17 PROCEDURE — 0100U HC RESPIRPTHGN MULT REV TRANS & AMP PRB TECH 21 TRGT: CPT

## 2020-05-17 PROCEDURE — 82248 BILIRUBIN DIRECT: CPT

## 2020-05-17 PROCEDURE — 87040 BLOOD CULTURE FOR BACTERIA: CPT

## 2020-05-17 PROCEDURE — 94640 AIRWAY INHALATION TREATMENT: CPT

## 2020-05-17 PROCEDURE — 36415 COLL VENOUS BLD VENIPUNCTURE: CPT

## 2020-05-17 RX ORDER — SODIUM CHLORIDE 0.9 % (FLUSH) 0.9 %
10 SYRINGE (ML) INJECTION PRN
Status: DISCONTINUED | OUTPATIENT
Start: 2020-05-17 | End: 2020-05-28 | Stop reason: HOSPADM

## 2020-05-17 RX ORDER — PROPOFOL 10 MG/ML
10 INJECTION, EMULSION INTRAVENOUS
Status: ACTIVE | OUTPATIENT
Start: 2020-05-17 | End: 2020-05-17

## 2020-05-17 RX ORDER — FUROSEMIDE 10 MG/ML
20 INJECTION INTRAMUSCULAR; INTRAVENOUS 2 TIMES DAILY
Status: DISCONTINUED | OUTPATIENT
Start: 2020-05-17 | End: 2020-05-17

## 2020-05-17 RX ORDER — PRAVASTATIN SODIUM 40 MG
40 TABLET ORAL NIGHTLY
Status: DISCONTINUED | OUTPATIENT
Start: 2020-05-17 | End: 2020-05-28 | Stop reason: HOSPADM

## 2020-05-17 RX ORDER — SUCCINYLCHOLINE CHLORIDE 20 MG/ML
INJECTION INTRAMUSCULAR; INTRAVENOUS DAILY PRN
Status: COMPLETED | OUTPATIENT
Start: 2020-05-17 | End: 2020-05-17

## 2020-05-17 RX ORDER — SODIUM CHLORIDE 0.9 % (FLUSH) 0.9 %
10 SYRINGE (ML) INJECTION EVERY 12 HOURS SCHEDULED
Status: DISCONTINUED | OUTPATIENT
Start: 2020-05-17 | End: 2020-05-28 | Stop reason: HOSPADM

## 2020-05-17 RX ORDER — FUROSEMIDE 10 MG/ML
40 INJECTION INTRAMUSCULAR; INTRAVENOUS 2 TIMES DAILY
Status: DISCONTINUED | OUTPATIENT
Start: 2020-05-17 | End: 2020-05-17

## 2020-05-17 RX ORDER — LEVOFLOXACIN 5 MG/ML
500 INJECTION, SOLUTION INTRAVENOUS ONCE
Status: COMPLETED | OUTPATIENT
Start: 2020-05-17 | End: 2020-05-17

## 2020-05-17 RX ORDER — MIDAZOLAM HYDROCHLORIDE 1 MG/ML
4 INJECTION INTRAMUSCULAR; INTRAVENOUS ONCE
Status: COMPLETED | OUTPATIENT
Start: 2020-05-17 | End: 2020-05-17

## 2020-05-17 RX ORDER — ONDANSETRON 2 MG/ML
4 INJECTION INTRAMUSCULAR; INTRAVENOUS EVERY 6 HOURS PRN
Status: DISCONTINUED | OUTPATIENT
Start: 2020-05-17 | End: 2020-05-28 | Stop reason: HOSPADM

## 2020-05-17 RX ORDER — METHYLPREDNISOLONE SODIUM SUCCINATE 40 MG/ML
40 INJECTION, POWDER, LYOPHILIZED, FOR SOLUTION INTRAMUSCULAR; INTRAVENOUS EVERY 6 HOURS
Status: COMPLETED | OUTPATIENT
Start: 2020-05-17 | End: 2020-05-19

## 2020-05-17 RX ORDER — IPRATROPIUM BROMIDE AND ALBUTEROL SULFATE 2.5; .5 MG/3ML; MG/3ML
1 SOLUTION RESPIRATORY (INHALATION) ONCE
Status: COMPLETED | OUTPATIENT
Start: 2020-05-17 | End: 2020-05-17

## 2020-05-17 RX ORDER — BUDESONIDE 0.5 MG/2ML
500 INHALANT ORAL 2 TIMES DAILY
Status: DISCONTINUED | OUTPATIENT
Start: 2020-05-17 | End: 2020-05-17

## 2020-05-17 RX ORDER — VARENICLINE TARTRATE 1 MG/1
1 TABLET, FILM COATED ORAL 2 TIMES DAILY
Status: ON HOLD | COMMUNITY
End: 2020-05-28 | Stop reason: HOSPADM

## 2020-05-17 RX ORDER — RISPERIDONE 2 MG/1
2 TABLET, FILM COATED ORAL 2 TIMES DAILY
Status: DISCONTINUED | OUTPATIENT
Start: 2020-05-17 | End: 2020-05-27

## 2020-05-17 RX ORDER — FUROSEMIDE 10 MG/ML
60 INJECTION INTRAMUSCULAR; INTRAVENOUS 2 TIMES DAILY
Status: DISCONTINUED | OUTPATIENT
Start: 2020-05-17 | End: 2020-05-20

## 2020-05-17 RX ORDER — BUDESONIDE 0.5 MG/2ML
500 INHALANT ORAL 2 TIMES DAILY
Status: DISCONTINUED | OUTPATIENT
Start: 2020-05-17 | End: 2020-05-20

## 2020-05-17 RX ORDER — CARBAMAZEPINE 200 MG/1
200 TABLET ORAL 2 TIMES DAILY
Status: DISCONTINUED | OUTPATIENT
Start: 2020-05-17 | End: 2020-05-28 | Stop reason: HOSPADM

## 2020-05-17 RX ORDER — PROPOFOL 10 MG/ML
INJECTION, EMULSION INTRAVENOUS
Status: COMPLETED
Start: 2020-05-17 | End: 2020-05-17

## 2020-05-17 RX ORDER — PREDNISONE 20 MG/1
40 TABLET ORAL DAILY
Status: DISCONTINUED | OUTPATIENT
Start: 2020-05-19 | End: 2020-05-28 | Stop reason: HOSPADM

## 2020-05-17 RX ORDER — LOSARTAN POTASSIUM 100 MG/1
100 TABLET ORAL DAILY
Status: ON HOLD | COMMUNITY
End: 2020-05-28 | Stop reason: HOSPADM

## 2020-05-17 RX ORDER — IPRATROPIUM BROMIDE AND ALBUTEROL SULFATE 2.5; .5 MG/3ML; MG/3ML
1 SOLUTION RESPIRATORY (INHALATION) EVERY 4 HOURS
Status: DISCONTINUED | OUTPATIENT
Start: 2020-05-17 | End: 2020-05-20

## 2020-05-17 RX ORDER — ACETAMINOPHEN 325 MG/1
650 TABLET ORAL EVERY 6 HOURS PRN
Status: DISCONTINUED | OUTPATIENT
Start: 2020-05-17 | End: 2020-05-28 | Stop reason: HOSPADM

## 2020-05-17 RX ORDER — ACETAMINOPHEN 650 MG/1
650 SUPPOSITORY RECTAL EVERY 6 HOURS PRN
Status: DISCONTINUED | OUTPATIENT
Start: 2020-05-17 | End: 2020-05-28 | Stop reason: HOSPADM

## 2020-05-17 RX ORDER — POLYETHYLENE GLYCOL 3350 17 G/17G
17 POWDER, FOR SOLUTION ORAL DAILY PRN
Status: DISCONTINUED | OUTPATIENT
Start: 2020-05-17 | End: 2020-05-28 | Stop reason: HOSPADM

## 2020-05-17 RX ORDER — PROPOFOL 10 MG/ML
INJECTION, EMULSION INTRAVENOUS DAILY PRN
Status: COMPLETED | OUTPATIENT
Start: 2020-05-17 | End: 2020-05-17

## 2020-05-17 RX ORDER — AMLODIPINE BESYLATE 10 MG/1
10 TABLET ORAL DAILY
Status: DISCONTINUED | OUTPATIENT
Start: 2020-05-17 | End: 2020-05-17

## 2020-05-17 RX ORDER — ETOMIDATE 2 MG/ML
INJECTION INTRAVENOUS DAILY PRN
Status: COMPLETED | OUTPATIENT
Start: 2020-05-17 | End: 2020-05-17

## 2020-05-17 RX ORDER — VECURONIUM BROMIDE 1 MG/ML
INJECTION, POWDER, LYOPHILIZED, FOR SOLUTION INTRAVENOUS DAILY PRN
Status: COMPLETED | OUTPATIENT
Start: 2020-05-17 | End: 2020-05-17

## 2020-05-17 RX ORDER — FAMOTIDINE 20 MG/1
20 TABLET, FILM COATED ORAL 2 TIMES DAILY
Status: DISCONTINUED | OUTPATIENT
Start: 2020-05-17 | End: 2020-05-26

## 2020-05-17 RX ORDER — PROPOFOL 10 MG/ML
10 INJECTION, EMULSION INTRAVENOUS
Status: DISCONTINUED | OUTPATIENT
Start: 2020-05-17 | End: 2020-05-19

## 2020-05-17 RX ORDER — IPRATROPIUM BROMIDE AND ALBUTEROL SULFATE 2.5; .5 MG/3ML; MG/3ML
1 SOLUTION RESPIRATORY (INHALATION) EVERY 4 HOURS
Status: DISCONTINUED | OUTPATIENT
Start: 2020-05-17 | End: 2020-05-17

## 2020-05-17 RX ORDER — PROPOFOL 10 MG/ML
10 INJECTION, EMULSION INTRAVENOUS
Status: DISCONTINUED | OUTPATIENT
Start: 2020-05-17 | End: 2020-05-17

## 2020-05-17 RX ORDER — PROMETHAZINE HYDROCHLORIDE 25 MG/1
12.5 TABLET ORAL EVERY 6 HOURS PRN
Status: DISCONTINUED | OUTPATIENT
Start: 2020-05-17 | End: 2020-05-28 | Stop reason: HOSPADM

## 2020-05-17 RX ADMIN — MIDAZOLAM 4 MG: 1 INJECTION INTRAMUSCULAR; INTRAVENOUS at 11:54

## 2020-05-17 RX ADMIN — Medication 5 MCG/MIN: at 13:13

## 2020-05-17 RX ADMIN — VECURONIUM BROMIDE 10 MG: 10 INJECTION, POWDER, LYOPHILIZED, FOR SOLUTION INTRAVENOUS at 10:47

## 2020-05-17 RX ADMIN — SUCCINYLCHOLINE CHLORIDE 100 MG: 20 INJECTION, SOLUTION INTRAMUSCULAR; INTRAVENOUS at 10:17

## 2020-05-17 RX ADMIN — METHYLPREDNISOLONE SODIUM SUCCINATE 40 MG: 40 INJECTION, POWDER, FOR SOLUTION INTRAMUSCULAR; INTRAVENOUS at 13:54

## 2020-05-17 RX ADMIN — IPRATROPIUM BROMIDE AND ALBUTEROL SULFATE 1 AMPULE: .5; 3 SOLUTION RESPIRATORY (INHALATION) at 09:45

## 2020-05-17 RX ADMIN — CARBAMAZEPINE 200 MG: 200 TABLET ORAL at 20:05

## 2020-05-17 RX ADMIN — PROPOFOL 100 MG: 10 INJECTION, EMULSION INTRAVENOUS at 10:36

## 2020-05-17 RX ADMIN — CARBAMAZEPINE 200 MG: 200 TABLET ORAL at 13:57

## 2020-05-17 RX ADMIN — PROPOFOL INJECTABLE EMULSION 10 MCG/KG/MIN: 10 INJECTION, EMULSION INTRAVENOUS at 10:37

## 2020-05-17 RX ADMIN — ALBUTEROL SULFATE 2.5 MG: 2.5 SOLUTION RESPIRATORY (INHALATION) at 09:56

## 2020-05-17 RX ADMIN — LEVOFLOXACIN 500 MG: 5 INJECTION, SOLUTION INTRAVENOUS at 11:11

## 2020-05-17 RX ADMIN — CEFTRIAXONE SODIUM 1 G: 1 INJECTION, POWDER, FOR SOLUTION INTRAMUSCULAR; INTRAVENOUS at 13:43

## 2020-05-17 RX ADMIN — IPRATROPIUM BROMIDE AND ALBUTEROL SULFATE 1 AMPULE: .5; 3 SOLUTION RESPIRATORY (INHALATION) at 09:55

## 2020-05-17 RX ADMIN — Medication 10 ML: at 20:05

## 2020-05-17 RX ADMIN — PROPOFOL INJECTABLE EMULSION 30 MCG/KG/MIN: 10 INJECTION, EMULSION INTRAVENOUS at 15:02

## 2020-05-17 RX ADMIN — IPRATROPIUM BROMIDE AND ALBUTEROL SULFATE 1 AMPULE: .5; 3 SOLUTION RESPIRATORY (INHALATION) at 22:22

## 2020-05-17 RX ADMIN — Medication 5 MCG/MIN: at 13:14

## 2020-05-17 RX ADMIN — RISPERIDONE 2 MG: 2 TABLET ORAL at 20:05

## 2020-05-17 RX ADMIN — PROPOFOL 10 MCG/KG/MIN: 10 INJECTION, EMULSION INTRAVENOUS at 10:37

## 2020-05-17 RX ADMIN — ETOMIDATE 20 MG: 2 INJECTION INTRAVENOUS at 10:16

## 2020-05-17 RX ADMIN — BUDESONIDE 500 MCG: 0.5 INHALANT RESPIRATORY (INHALATION) at 18:15

## 2020-05-17 RX ADMIN — FUROSEMIDE 60 MG: 10 INJECTION, SOLUTION INTRAMUSCULAR; INTRAVENOUS at 16:29

## 2020-05-17 RX ADMIN — RISPERIDONE 2 MG: 2 TABLET ORAL at 13:56

## 2020-05-17 RX ADMIN — METHYLPREDNISOLONE SODIUM SUCCINATE 40 MG: 40 INJECTION, POWDER, FOR SOLUTION INTRAMUSCULAR; INTRAVENOUS at 20:02

## 2020-05-17 RX ADMIN — FAMOTIDINE 20 MG: 20 TABLET ORAL at 20:08

## 2020-05-17 RX ADMIN — PROPOFOL INJECTABLE EMULSION 30 MCG/KG/MIN: 10 INJECTION, EMULSION INTRAVENOUS at 16:40

## 2020-05-17 RX ADMIN — ENOXAPARIN SODIUM 40 MG: 40 INJECTION SUBCUTANEOUS at 13:22

## 2020-05-17 RX ADMIN — PROPOFOL 50 MG: 10 INJECTION, EMULSION INTRAVENOUS at 10:41

## 2020-05-17 RX ADMIN — IPRATROPIUM BROMIDE AND ALBUTEROL SULFATE 1 AMPULE: .5; 3 SOLUTION RESPIRATORY (INHALATION) at 17:59

## 2020-05-17 RX ADMIN — PRAVASTATIN SODIUM 40 MG: 40 TABLET ORAL at 20:04

## 2020-05-17 ASSESSMENT — ENCOUNTER SYMPTOMS
CONSTIPATION: 0
SORE THROAT: 0
SINUS PAIN: 0
COUGH: 1
SHORTNESS OF BREATH: 1
VOMITING: 0
SINUS PRESSURE: 0
NAUSEA: 0
ABDOMINAL DISTENTION: 0
ABDOMINAL PAIN: 0
BLOOD IN STOOL: 0
DIARRHEA: 0
EYE PAIN: 0

## 2020-05-17 ASSESSMENT — PULMONARY FUNCTION TESTS
PIF_VALUE: 27
PIF_VALUE: 34
PIF_VALUE: 27
PIF_VALUE: 31
PIF_VALUE: 34

## 2020-05-17 NOTE — PLAN OF CARE
Problem: Restraint Use - Nonviolent/Non-Self-Destructive Behavior:  Goal: Absence of restraint indications  Description: Absence of restraint indications  Outcome: Ongoing  Goal: Absence of restraint-related injury  Description: Absence of restraint-related injury  Outcome: Ongoing     Problem: Discharge Planning:  Goal: Participates in care planning  Description: Participates in care planning  Outcome: Ongoing  Goal: Discharged to appropriate level of care  Description: Discharged to appropriate level of care  Outcome: Ongoing     Problem: Airway Clearance - Ineffective:  Goal: Ability to maintain a clear airway will improve  Description: Ability to maintain a clear airway will improve  Outcome: Ongoing     Problem: Anxiety/Stress:  Goal: Level of anxiety will decrease  Description: Level of anxiety will decrease  Outcome: Ongoing     Problem: Cardiac Output - Decreased:  Goal: Hemodynamic stability will improve  Description: Hemodynamic stability will improve  Outcome: Ongoing     Problem: Mental Status - Impaired:  Goal: Mental status will be restored to baseline  Description: Mental status will be restored to baseline  Outcome: Ongoing     Problem: Pain:  Goal: Pain level will decrease  Description: Pain level will decrease  Outcome: Ongoing  Goal: Recognizes and communicates pain  Description: Recognizes and communicates pain  Outcome: Ongoing  Goal: Control of acute pain  Description: Control of acute pain  Outcome: Ongoing  Goal: Control of chronic pain  Description: Control of chronic pain  Outcome: Ongoing   Care plan not reviewed with Patient on vent and unable to reach family

## 2020-05-17 NOTE — ED PROVIDER NOTES
[address] [address]  eMERGENCY dEPARTMENT eNCOUnter   Physician Attestation    Pt Name: Jaxson Orellana  MRN: [id number]  Belindagfluis 1970  Date of evaluation: 5/17/20        Physician Note:    I have personally performed and/or participated in the history, exam and medical decision making and agree with all pertinent clinical information. I have also reviewed and agree with the past medical, family and social history unless otherwise noted. I have personally performed a face to face diagnostic evaluation on this patient. I have reviewed the mid-levels findings and agree. History: This patient was seen in conjunction with Lazaro Quan. This is a 78-year-old male with an underlying history of COPD and CHF so he certainly has some chronic respiratory issues but he was in some respiratory distress on arrival and was markedly hypoxemic although he was alert. We applied oxygen he seemed to improve quite a bit for a few minutes and then he went into full out respiratory failure. He ended up getting intubated. My [de-identified] assistant did the initial intubation attempt using the glide scope. He was not able to do it on the first attempt so I took over and I got him intubated. The issue is that his airway is very anterior and his airway was actually quite edematous. Which is somewhat suspicious for COVID. He was given 20 of etomidate 100 mg of succinylcholine. H is actual COVID test when obtained came back negative. In any case he is on the vent now. We have him on a propofol drip sedated. He does have evidence of pneumonia on his films. We have confirmation of the ET tube and central line on his films. The right internal jugular ultrasound-guided central line was placed under my supervision by the physician assistant.      Case discussed with Dr. Marisa Maldonado and Dr Chani Moreno arrange for ICU admission    CRITICAL CARE: There was a high probability of clinically significant/life threatening

## 2020-05-17 NOTE — ED PROVIDER NOTES
Brando Aguilar 58  EMERGENCY DEPARTMENT ENCOUNTER          CHIEF COMPLAINT     No chief complaint on file. Nurses Notes reviewed and I agree except as notedin the HPI. HISTORY OF PRESENT ILLNESS    Kary Son is a 44 y.o. male who presents came with shortness of breath that started today. He states that he has had worsening shortness of breath the last 2 years. He does have a history heart failure and COPD. He states he quit smoking. He said any fever chills and no body aches. He denies any vomiting or diarrhea. He denies any chest pain. Location/Symptom: SOB  Timing/Onset: increased today  Context/Setting: home  Quality: none  Duration: constant  Modifying Factors: none  Severity: none    REVIEW OF SYSTEMS     Review of Systems   Constitutional: Negative for chills and fever. HENT: Positive for congestion. Negative for ear pain, sinus pressure, sinus pain and sore throat. Eyes: Negative for pain. Respiratory: Positive for cough and shortness of breath. Cardiovascular: Negative for chest pain and leg swelling. Gastrointestinal: Negative for abdominal distention, abdominal pain, blood in stool, constipation, diarrhea, nausea and vomiting. Genitourinary: Negative for difficulty urinating, frequency and hematuria. Musculoskeletal: Negative for arthralgias. Skin: Negative for rash. Neurological: Negative for dizziness and headaches. All other systems reviewed and are negative. PAST MEDICAL HISTORY    has a past medical history of Acute on chronic systolic congestive heart failure ([address] Emphysema (subcutaneous) (surgical) resulting from a procedure, Hypertension, Pneumonia, and Schizophrenia ([address] SURGICAL HISTORY      has no past surgical history on file.     CURRENT MEDICATIONS       Current Discharge Medication List      CONTINUE these medications which have NOT CHANGED    Details   losartan (COZAAR) 100 MG tablet Take 100 mg by mouth daily      varenicline 20.00 pack-year smoking history. He has never used smokeless tobacco. He reports that he does not drink alcohol or use drugs. PHYSICAL EXAM     INITIAL VITALS:  height is 6' 2\" (1.88 m) and weight is 293 lb 10.4 oz (133.2 kg). His bladder temperature is 98.8 °F (37.1 °C). His blood pressure is 86/59 (abnormal) and his pulse is 74. His respiration is 12 and oxygen saturation is 96%. Physical Exam  Vitals signs and nursing note reviewed. Constitutional:       General: He is in acute distress. Appearance: He is well-developed. He is ill-appearing. HENT:      Head: Normocephalic and atraumatic. Right Ear: External ear normal.      Left Ear: External ear normal.   Eyes:      Pupils: Pupils are equal, round, and reactive to light. Neck:      Musculoskeletal: Normal range of motion. Cardiovascular:      Rate and Rhythm: Normal rate and regular rhythm. Pulmonary:      Effort: Respiratory distress present. Breath sounds: Wheezing present. Comments: Accessory muscle use. Tachypnea  Abdominal:      General: Bowel sounds are normal. There is no distension. Palpations: Abdomen is soft. Tenderness: There is no abdominal tenderness. Skin:     General: Skin is warm. Neurological:      Mental Status: He is alert and oriented to person, place, and time. Cranial Nerves: No cranial nerve deficit. Coordination: Coordination normal.      Deep Tendon Reflexes: Reflexes normal.   Psychiatric:         Behavior: Behavior normal.           DIFFERENTIAL DIAGNOSIS:   Possible CHF exacerbation possible COPD exacerbation COVID possible.     DIAGNOSTIC RESULTS     EKG: All EKG's are interpreted by the Emergency Department Physician who either signs or Co-signs this chart in the absence of a cardiologist.      RADIOLOGY: non-plain film images(s) such as CT, Ultrasound and MRI are read by the radiologist.  Single view chest x-ray read per radiology      LABS:   Labs Reviewed   BLOOD GAS, Site: swab          Current Antibiotics: Levofloxacin   CULTURE, VRE    Narrative:     Source: feces for VRE       Site: swab          Current Antibiotics: Levofloxacin   MAGNESIUM   LACTATE DEHYDROGENASE   HEPATIC FUNCTION PANEL   SEDIMENTATION RATE   LACTATE, SEPSIS   COVID-19   ANION GAP   OSMOLALITY   SCAN OF BLOOD SMEAR   ICTOTEST, URINE   BLOOD GAS, ARTERIAL   MISCELLANEOUS SENDOUT 1   SODIUM, URINE, RANDOM   CREATININE, RANDOM URINE   LACTIC ACID, PLASMA   TROPONIN   TROPONIN       EMERGENCY DEPARTMENT COURSE:   :    Vitals:    05/17/20 1250 05/17/20 1259 05/17/20 1300 05/17/20 1400   BP:   (!) 75/54 (!) 86/59   Pulse: 72 71 72 74   Resp: 12 12 12 12   Temp:    98.8 °F (37.1 °C)   TempSrc:    Bladder   SpO2:    96%   Weight:       Height:         Patient was seen history physical exam was performed. Patient is given a DuoNeb and albuterol aerosol. Patient's initial pulse ox was in the 50s. We did move him to trauma room 3 where we did place him on nonrebreather his sats did come up to the 90s but he did mental status did markedly decrease and he went unresponsive. Patient was intubated please see procedure note. Patient was also given Levaquin. His COVID was negative. He got a central line and intubated please see procedure notes. He was intubated with rapid sequence intubation etomidate and succinylcholine. Dr. Maty Dean and Dr. Rosangela Maxwell were notified will assume care of the patient. CRITICAL CARE:  There was a high probability of clinically significant life threatening deterioration in this patient's condition which required the urgent intervention of the Midlevel provider. Total critical care time was 60 minutes. This excludes any time for separately reportable procedures.       CONSULTS:  Dr Rosangela Maxwell and Dr Willie Cordero:  Intubation  Date/Time: 5/17/2020 3:35 PM  Performed by: GAURANG Pitts  Authorized by: Stephon Manuel MD     Consent:     Consent obtained:  Emergent situation  Pre-procedure details:     Patient status:  Unresponsive    Mallampati score:  IV    Pretreatment meds: Etomidate. Paralytics:  Succinylcholine  Procedure details:     Preoxygenation:  Bag valve mask    CPR in progress: no      Intubation method:  Oral    Oral intubation technique:  Video-assisted    Laryngoscope blade: Mac 4    Tube size (mm):  7.5    Tube type:  Cuffed    Number of attempts:  1    Ventilation between attempts: no      Cricoid pressure: no      Tube visualized through cords: no    Placement assessment:     ETT to lip:  23    Tube secured with:  ETT flores    Breath sounds:  Equal and absent over the epigastrium    Placement verification: chest rise, condensation, CXR verification, direct visualization, equal breath sounds and ETCO2 detector    Post-procedure details:     Patient tolerance of procedure: Tolerated well, no immediate complications        FINAL IMPRESSION      1. Acute respiratory failure with hypercapnia (HCC)    2.  Pneumonia due to infectious organism, unspecified laterality, unspecified part of lung          DISPOSITION/PLAN   Admit    PATIENT REFERRED TO:  Miller Fabi, [email or web address]  [phone]            DISCHARGE MEDICATIONS:  Current Discharge Medication List          (Please note that portions of this note were completed with a voice recognitionprogram.  Efforts were made to edit the dictations but occasionally words are mis-transcribed.)    GAURANG [place]  05/17/20 0248

## 2020-05-17 NOTE — CONSULTS
CRITICAL CARE CONSULT NOTE      Patient: Virgil England    Unit/Bed:4D-04/004-A  YOB: 1970  MRN: [id number]   PCP: Jossie Cano MD  Date of Admission: 5/17/2020  Chief Complaint:- SOB      Assessment and Plan:      1. Acute on chronic hypoxic hypercapnic respiratory failure: Wears 3 L at baseline. Patient also wears CPAP at night, with 3 L bled in. On arrival to the emergency room patient was noted to be high hypoxic with an SPO2 in the 60s. He was emergently intubated. Maintain peak pressure less than 35.  2. Acute on chronic heart failure with reduced ejection fraction: Noted +4 pitting edema bilateral lower extremities. Diuresis 20 mg of Lasix twice daily. Albumin level normal.    3. Acute kidney injury: Likely secondary to hypotension, and prerenal in nature. Urine electrolytes pending. 4. COPD exacerbation: Steroids, Solu-Medrol 40 mg every 6 hours for 3 days. Rocephin. Bio fire pneumonia panel pending, duo nebs  5. Elevated troponin: Likely secondary to hypoxia, EKG shows no acute changes. Repeat echo pending, trend troponin. Patient on 325 ASA daily  6. Hypertension: History, currently hypotensive. Norvasc with hold parameters  7. Schizophrenia: Risperdal    8. GERD: Pepcid twice daily  9. Hyperlipidemia: Pravastatin 40 mg nightly             INITIAL H AND P AND ICU COURSE:  Marcello Burciaga is a 57-year-old black male who presented to [facility] on 5/17/2020 with complaints of shortness of breath that started today. He has a past medical history of hypertension, heart failure, schizophrenia, COPD. HPI is limited secondary to patient inability to give details of history secondary to hypoxia and need for immediate intubation. Patient was dropped off at the emergency room and no family member accompanied patient to the emergency room. Past Medical History: Per HPI  Family History:  Mother: Hypertension  Social History: Current every day smoker, 20-pack-year history, denies EtOH use. Denies drug use. ROS   Intubated and sedated on mechanical ventilation    Scheduled Meds:   amLODIPine  10 mg Oral Daily    aspirin  325 mg Oral Daily    carBAMazepine  200 mg Oral BID    pravastatin  40 mg Oral Nightly    risperiDONE  2 mg Oral BID    sodium chloride flush  10 mL Intravenous 2 times per day    enoxaparin  40 mg Subcutaneous Q24H    methylPREDNISolone  40 mg Intravenous Q6H    Followed by   Kayode Stuart ON 5/19/2020] predniSONE  40 mg Oral Daily    cefTRIAXone (ROCEPHIN) IV  1 g Intravenous Q24H    famotidine  20 mg Per NG tube BID    budesonide  500 mcg Nebulization BID    ipratropium-albuterol  1 ampule Inhalation Q4H     Continuous Infusions:   propofol 10 mcg/kg/min (05/17/20 1135)       PHYSICAL EXAMINATION:  T:  98.4.  P:  84. RR:  20. B/P:  86/58. FiO2:  100. O2 Sat:  92.  I/O: 0  Body mass index is 37.7 kg/m². General:   Acute on chronically ill-appearing black male  HEENT:  normocephalic and atraumatic. No scleral icterus. PERR  Neck: supple. No Thyromegaly. Lungs: Diminished to auscultation. No retractions  Cardiac: RRR. No JVD. Abdomen: soft. Nontender. Rounded  Extremities:  No clubbing, cyanosis. +4 pitting edema bilateral lower extremities up to abdomen  Vasculature: capillary refill < 3 seconds. Palpable dorsalis pedis pulses. Skin:  warm and dry. Psych: Spontaneously moves all 4 extremities  Lymph:  No supraclavicular adenopathy. Neurologic:  No focal deficit. No seizures. Data: (All radiographs, tracings, PFTs, and imaging are personally viewed and interpreted unless otherwise noted).  Sodium 135, potassium 5.1, chloride 99, CO2 25, BUN 35, creatinine 1.4, anion gap 11.0, magnesium 2.2, lactic acid 1.1, glucose 109.    WBC 5.0, hemoglobin 17.1, hematocrit 58.1, platelets 228   Telemetry shows normal sinus rhythm   Chest x-ray 5/17/2020 reports moderate atelectasis versus pneumonia on the right mid and lower lung fields small effusion right side minimal atelectasis/pneumonia retrocardiac region   EKG 5/17/2020 reports normal sinus rhythm   Echocardiogram 4/1/2018 reports ejection fraction 45 to 50% with mild global hypokinesis of left ventricle.  ECHO pending           Meets Continued ICU Level Care Criteria:    [x] Yes   [] No - Transfer Planned to listed location:  [] HOSPITALIST CONTACTED-      Case and plan discussed with Dr. Andrea Downing and Dr. Mario Montilla. Electronically signed by Aparna Dumont. SAVANNAH Wallis - CNP  CRITICAL CARE SPECIALIST  Patient seen by me. Case discussed with nurse practitioner. Case discussed with emergency room physician. Patient intubated for COPD exacerbation and remains on mechanical ventilator. Patient with pitting edema undergoing diuresis. Likely will have to intensify the diuretic. Monitoring troponin. Pressors for hypotension. Electronically signed by Gerry Downing MD.

## 2020-05-17 NOTE — FLOWSHEET NOTE
Patient arrived to unit from ER via cart. Patient transferred to ICU bed and placed on continuous ICU bedside monitor. Patient admitted for Acute respiratory failure ([facility] [address] [J96.00]. Vitals obtained. See flowsheets. Patient's IV access includes Rt SCV CVC. Current infusions and rates of infusion include propofol @10 mcg, 7.6 ml/hr. levaquin @ 100. Assessment completed by Noé White RN. Two nurse skin assessment completed by myself and Iris. See flowsheets for assessment details. Policies and procedures of ICU unable to be explained to patient at this time. Family member(s)/representative(s) not present at time of admission include. Patient rights explained to family member(s)/representatives and patient, as able.

## 2020-05-17 NOTE — ED NOTES
SpO2 on room air 50%. Pt placed on NRB by Nicanor Badillo. Pt SpO2 increased to 79%.      Tania Newton RN  05/17/20 86

## 2020-05-17 NOTE — H&P
Internal Medicine  History and Physical    Patient: Shawn Reed  MRN: [id number]      History Obtained From:  electronic medical record, ER MD  PCP: Catrachito Bond MD    CHIEF COMPLAINT:  SOB, resp failure    HISTORY OF PRESENT ILLNESS:   The patient is a 44 y.o. male who presents with shortness of breath and respiratory distress. Treated with bronchodilators in ED and subsequently intubated, now admitted into ICU bed. Past Medical History:        Diagnosis Date    Acute on chronic systolic congestive heart failure ([address] 4/4/2019    Emphysema (subcutaneous) (surgical) resulting from a procedure     Hypertension     Pneumonia     Schizophrenia ([address] [address]        Past Surgical History:    History reviewed. No pertinent surgical history. Medications Prior to Admission:    Prior to Admission medications    Medication Sig Start Date End Date Taking? Authorizing Provider   losartan (COZAAR) 100 MG tablet Take 100 mg by mouth daily    Historical Provider, MD   varenicline (CHANTIX) 1 MG tablet Take 1 mg by mouth 2 times daily    Historical Provider, MD   formoterol (PERFOROMIST) 20 MCG/2ML nebulizer solution Take 2 mLs by nebulization 2 times daily 3/6/20   Catrachito Bond MD   varenicline (CHANTIX) 0.5 MG tablet Take 1 tablet by mouth daily for 3 days, THEN 1 tablet 2 times daily for 4 days, THEN 2 tablets 2 times daily.  3/6/20 4/12/20  Catrachito Bond MD   albuterol (PROVENTIL) (2.5 MG/3ML) 0.083% nebulizer solution Take 3 mLs by nebulization every 2 hours as needed for Wheezing 3/6/20   Catrachito Bond MD   furosemide (LASIX) 20 MG tablet Take 1 tablet by mouth 2 times daily  Patient taking differently: Take 20 mg by mouth daily  3/6/20   Catrachito Bond MD   aspirin 325 MG EC tablet Take 1 tablet by mouth daily 12/17/19   Catrachito Bond MD   ipratropium-albuterol (DUONEB) 0.5-2.5 (3) MG/3ML SOLN nebulizer solution Inhale 3 mLs into the lungs every 4 hours (while awake) 12/17/19   Catrachito Bond MD   haloperidol normal S1 and S2 and 2+ bipedal edema  ABDOMEN:  normal bowel sounds, soft, distended and no hepatosplenomegally  MUSCULOSKELETAL:  there is no redness, warmth, or swelling of the joints  NEUROLOGIC:  Sedated on vent  SKIN:  Pedal edema++      CBC:   Recent Labs     20  0941   WBC 5.0   HGB 17.1        BMP:    Recent Labs     20  0941      K 5.1   CL 99   CO2 25   BUN 35*   CREATININE 1.4*   GLUCOSE 109*     Hepatic:   Recent Labs     20  0941   AST 13   ALT 12   BILITOT 0.4   ALKPHOS 112     Troponin T 0.035Abnormal      Pro-BNP 1388. 0High      Procalcitonin 0. 15High      Lactic Acid, Sepsis 1.1     SARS-CoV-2, NAAT NOT DETECTED           PA/lat CXR:1. Moderate cardiomegaly. Moderate atelectasis/pneumonia right mid and lower lung fields. Small effusion right side. Minimal atelectasis/pneumonia retrocardiac region left lung base. 2. NG tube passes into stomach. ET tube tip 8 cm proximal to leny. Right jugular line, catheter tip in SVC. 3. Overall appearance of chest improved from prior.      Ventricular Rate 98 BPM    Atrial Rate 98 BPM    P-R Interval 164 ms    QRS Duration 72 ms    Q-T Interval 326 ms    QTc Calculation (Bazett) 416 ms    P Axis 95 degrees    R Axis 154 degrees    T Axis 36 degrees   Narrative:     Normal sinus rhythm  Right axis deviation  Pulmonary disease pattern  Septal infarct (cited on or before 02-MAR-2020)     20 1113    Specimen Source: Blood gases     pH, Blood Gas                                       7.18 7777. 18Low Panic      PCO2 68High  mmhg    PO2 53Low  mmhg    HCO3 25 mmol/l    Base Excess (Calculated) -5.5Low  mmol/l    O2 Sat 76 %    IFIO2 100    DEVICE Adult Vent    Mode PC/PS    SET RESPIRATORY RATE 14 bpm    SET PEEP 16.0 mmhg    SET PRESS SUPP 16 cmH2O    Source: L Radial         Assessment and Plan   1. Acute on chronic hypoxemic / hypercapnic resp failure, req vent support  2. COPD with acute exac  3.  Acute

## 2020-05-17 NOTE — ED TRIAGE NOTES
Pt presents to ED from home via private car c/c sob. Pt unable to tell this RN or PA how long he has been having sob. Pt has hx of COPD. Pt appears fatigued.

## 2020-05-18 ENCOUNTER — APPOINTMENT (OUTPATIENT)
Dept: GENERAL RADIOLOGY | Age: 50
DRG: 004 | End: 2020-05-18
Payer: MEDICARE

## 2020-05-18 LAB
ANION GAP SERPL CALCULATED.3IONS-SCNC: 12 MEQ/L (ref 8–16)
BUN BLDV-MCNC: 36 MG/DL (ref 7–22)
CALCIUM SERPL-MCNC: 8.6 MG/DL (ref 8.5–10.5)
CHLORIDE BLD-SCNC: 100 MEQ/L (ref 98–111)
CO2: 23 MEQ/L (ref 23–33)
CREAT SERPL-MCNC: 1.4 MG/DL (ref 0.4–1.2)
CREATININE URINE: 333.5 MG/DL
ERYTHROCYTE [DISTWIDTH] IN BLOOD BY AUTOMATED COUNT: 22.1 % (ref 11.5–14.5)
ERYTHROCYTE [DISTWIDTH] IN BLOOD BY AUTOMATED COUNT: 68.4 FL (ref 35–45)
GFR SERPL CREATININE-BSD FRML MDRD: 64 ML/MIN/1.73M2
GLUCOSE BLD-MCNC: 123 MG/DL (ref 70–108)
HCT VFR BLD CALC: 58.9 % (ref 42–52)
HEMOGLOBIN: 17.1 GM/DL (ref 14–18)
LV EF: 53 %
LVEF MODALITY: NORMAL
MCH RBC QN AUTO: 26.1 PG (ref 26–33)
MCHC RBC AUTO-ENTMCNC: 29 GM/DL (ref 32.2–35.5)
MCV RBC AUTO: 89.9 FL (ref 80–94)
MISC REFERENCE: NORMAL
MRSA SCREEN: NORMAL
PLATELET # BLD: 202 THOU/MM3 (ref 130–400)
PMV BLD AUTO: 9.5 FL (ref 9.4–12.4)
POTASSIUM REFLEX MAGNESIUM: 5.7 MEQ/L (ref 3.5–5.2)
PROCALCITONIN: 0.15 NG/ML (ref 0.01–0.09)
RBC # BLD: 6.55 MILL/MM3 (ref 4.7–6.1)
SODIUM BLD-SCNC: 135 MEQ/L (ref 135–145)
SODIUM URINE: < 20 MEQ/L
WBC # BLD: 4.8 THOU/MM3 (ref 4.8–10.8)

## 2020-05-18 PROCEDURE — 89220 SPUTUM SPECIMEN COLLECTION: CPT

## 2020-05-18 PROCEDURE — 36415 COLL VENOUS BLD VENIPUNCTURE: CPT

## 2020-05-18 PROCEDURE — 99233 SBSQ HOSP IP/OBS HIGH 50: CPT | Performed by: INTERNAL MEDICINE

## 2020-05-18 PROCEDURE — 93306 TTE W/DOPPLER COMPLETE: CPT

## 2020-05-18 PROCEDURE — 94770 HC ETCO2 MONITOR DAILY: CPT

## 2020-05-18 PROCEDURE — 80048 BASIC METABOLIC PNL TOTAL CA: CPT

## 2020-05-18 PROCEDURE — APPSS180 APP SPLIT SHARED TIME > 60 MINUTES: Performed by: NURSE PRACTITIONER

## 2020-05-18 PROCEDURE — 85027 COMPLETE CBC AUTOMATED: CPT

## 2020-05-18 PROCEDURE — 94761 N-INVAS EAR/PLS OXIMETRY MLT: CPT

## 2020-05-18 PROCEDURE — 6360000002 HC RX W HCPCS: Performed by: NURSE PRACTITIONER

## 2020-05-18 PROCEDURE — 6370000000 HC RX 637 (ALT 250 FOR IP): Performed by: INTERNAL MEDICINE

## 2020-05-18 PROCEDURE — 2700000000 HC OXYGEN THERAPY PER DAY

## 2020-05-18 PROCEDURE — 6360000002 HC RX W HCPCS: Performed by: INTERNAL MEDICINE

## 2020-05-18 PROCEDURE — 71045 X-RAY EXAM CHEST 1 VIEW: CPT

## 2020-05-18 PROCEDURE — 94003 VENT MGMT INPAT SUBQ DAY: CPT

## 2020-05-18 PROCEDURE — 2709999900 HC NON-CHARGEABLE SUPPLY

## 2020-05-18 PROCEDURE — 94640 AIRWAY INHALATION TREATMENT: CPT

## 2020-05-18 PROCEDURE — 99223 1ST HOSP IP/OBS HIGH 75: CPT | Performed by: INTERNAL MEDICINE

## 2020-05-18 PROCEDURE — 2000000000 HC ICU R&B

## 2020-05-18 PROCEDURE — 84145 PROCALCITONIN (PCT): CPT

## 2020-05-18 PROCEDURE — 2580000003 HC RX 258: Performed by: INTERNAL MEDICINE

## 2020-05-18 PROCEDURE — 2500000003 HC RX 250 WO HCPCS: Performed by: NURSE PRACTITIONER

## 2020-05-18 RX ORDER — SODIUM POLYSTYRENE SULFONATE 15 G/60ML
15 SUSPENSION ORAL; RECTAL ONCE
Status: COMPLETED | OUTPATIENT
Start: 2020-05-18 | End: 2020-05-18

## 2020-05-18 RX ADMIN — FAMOTIDINE 20 MG: 20 TABLET ORAL at 08:31

## 2020-05-18 RX ADMIN — IPRATROPIUM BROMIDE AND ALBUTEROL SULFATE 1 AMPULE: .5; 3 SOLUTION RESPIRATORY (INHALATION) at 06:36

## 2020-05-18 RX ADMIN — METHYLPREDNISOLONE SODIUM SUCCINATE 40 MG: 40 INJECTION, POWDER, FOR SOLUTION INTRAMUSCULAR; INTRAVENOUS at 15:04

## 2020-05-18 RX ADMIN — Medication 10 ML: at 08:33

## 2020-05-18 RX ADMIN — PROPOFOL INJECTABLE EMULSION 35 MCG/KG/MIN: 10 INJECTION, EMULSION INTRAVENOUS at 17:11

## 2020-05-18 RX ADMIN — CARBAMAZEPINE 200 MG: 200 TABLET ORAL at 09:47

## 2020-05-18 RX ADMIN — CARBAMAZEPINE 200 MG: 200 TABLET ORAL at 22:11

## 2020-05-18 RX ADMIN — FAMOTIDINE 20 MG: 20 TABLET ORAL at 22:11

## 2020-05-18 RX ADMIN — RISPERIDONE 2 MG: 2 TABLET ORAL at 09:47

## 2020-05-18 RX ADMIN — FUROSEMIDE 60 MG: 10 INJECTION, SOLUTION INTRAMUSCULAR; INTRAVENOUS at 17:12

## 2020-05-18 RX ADMIN — IPRATROPIUM BROMIDE AND ALBUTEROL SULFATE 1 AMPULE: .5; 3 SOLUTION RESPIRATORY (INHALATION) at 16:23

## 2020-05-18 RX ADMIN — SODIUM POLYSTYRENE SULFONATE 15 G: 15 SUSPENSION ORAL; RECTAL at 11:02

## 2020-05-18 RX ADMIN — Medication 10 ML: at 22:12

## 2020-05-18 RX ADMIN — FUROSEMIDE 60 MG: 10 INJECTION, SOLUTION INTRAMUSCULAR; INTRAVENOUS at 08:31

## 2020-05-18 RX ADMIN — PRAVASTATIN SODIUM 40 MG: 40 TABLET ORAL at 22:11

## 2020-05-18 RX ADMIN — METHYLPREDNISOLONE SODIUM SUCCINATE 40 MG: 40 INJECTION, POWDER, FOR SOLUTION INTRAMUSCULAR; INTRAVENOUS at 22:11

## 2020-05-18 RX ADMIN — ENOXAPARIN SODIUM 40 MG: 40 INJECTION SUBCUTANEOUS at 13:34

## 2020-05-18 RX ADMIN — Medication 12 MCG/MIN: at 03:10

## 2020-05-18 RX ADMIN — RISPERIDONE 2 MG: 2 TABLET ORAL at 22:11

## 2020-05-18 RX ADMIN — CEFTRIAXONE SODIUM 1 G: 1 INJECTION, POWDER, FOR SOLUTION INTRAMUSCULAR; INTRAVENOUS at 13:40

## 2020-05-18 RX ADMIN — PROPOFOL INJECTABLE EMULSION 35 MCG/KG/MIN: 10 INJECTION, EMULSION INTRAVENOUS at 03:11

## 2020-05-18 RX ADMIN — METHYLPREDNISOLONE SODIUM SUCCINATE 40 MG: 40 INJECTION, POWDER, FOR SOLUTION INTRAMUSCULAR; INTRAVENOUS at 09:47

## 2020-05-18 RX ADMIN — IPRATROPIUM BROMIDE AND ALBUTEROL SULFATE 1 AMPULE: .5; 3 SOLUTION RESPIRATORY (INHALATION) at 01:49

## 2020-05-18 RX ADMIN — BUDESONIDE 500 MCG: 0.5 INHALANT RESPIRATORY (INHALATION) at 08:08

## 2020-05-18 RX ADMIN — IPRATROPIUM BROMIDE AND ALBUTEROL SULFATE 1 AMPULE: .5; 3 SOLUTION RESPIRATORY (INHALATION) at 22:09

## 2020-05-18 RX ADMIN — BUDESONIDE 500 MCG: 0.5 INHALANT RESPIRATORY (INHALATION) at 22:09

## 2020-05-18 RX ADMIN — PROPOFOL INJECTABLE EMULSION 30 MCG/KG/MIN: 10 INJECTION, EMULSION INTRAVENOUS at 09:46

## 2020-05-18 RX ADMIN — IPRATROPIUM BROMIDE AND ALBUTEROL SULFATE 1 AMPULE: .5; 3 SOLUTION RESPIRATORY (INHALATION) at 12:26

## 2020-05-18 RX ADMIN — METHYLPREDNISOLONE SODIUM SUCCINATE 40 MG: 40 INJECTION, POWDER, FOR SOLUTION INTRAMUSCULAR; INTRAVENOUS at 01:07

## 2020-05-18 ASSESSMENT — PULMONARY FUNCTION TESTS
PIF_VALUE: 25
PIF_VALUE: 23
PIF_VALUE: 21
PIF_VALUE: 22

## 2020-05-18 NOTE — PLAN OF CARE
Problem: Restraint Use - Nonviolent/Non-Self-Destructive Behavior:  Goal: Absence of restraint indications  Description: Absence of restraint indications  5/17/2020 2258 by Kandice Hurley RN  Note: Patient is agitated and subdued. Unable to follow commands and is at risk for self extubation. PROM delivered to patient. 5/17/2020 1557 by Prema Camacho RN  Outcome: Ongoing     Problem: Airway Clearance - Ineffective:  Goal: Ability to maintain a clear airway will improve  Description: Ability to maintain a clear airway will improve  5/17/2020 2258 by Kandice Hurley RN  Note: Patient is unable to breathe on own. Strong cough and gag.   5/17/2020 1557 by Prema Camacho RN  Outcome: Ongoing     Problem: Cardiac Output - Decreased:  Goal: Hemodynamic stability will improve  Description: Hemodynamic stability will improve  5/17/2020 2258 by Kandice Hurley RN  Note: Edema generalized and pitting.  Lasix has been given for overload; poor urine output  5/17/2020 1557 by Prema Camacho RN  Outcome: Ongoing

## 2020-05-18 NOTE — CONSULTS
[facility]  Cardiology Consult        Patient: Pedro Luis Pike  YOB: 1970  MRN: [id number]     Acct: [de-identified]    PCP: Mike Spann MD    Date of Admission: 5/17/2020      Reason for Consultation:  CHF, Elevated trops      History Of Present Illness:    44 y.o. male c hx of mild LV dysfunction with EF 45-50%, COPD, on 3L supplemental O2, JORDAN on CPAP, schizophrenia, HTN,  who presented to the hospital with shortness of breath. History is obtained from the chart at patient is intubated and sedated. Patient is emergently intubated in the ER due to what seemed liked COPD exacerbation. Cardiology was consulted for mild elevation of troponins. EKG shows SR, poor R wave progression. Echo from 4/2019 showed TDS and EF 45-50%. Cr 1.4, Trops 0.035>0.037>0.029      Past Medical History:          Diagnosis Date    Acute on chronic systolic congestive heart failure ([facility] [address] 4/4/2019    Emphysema (subcutaneous) (surgical) resulting from a procedure     Hypertension     Pneumonia     Schizophrenia ([facility] [address]        Past Surgical History:      History reviewed. No pertinent surgical history. Medications Prior to Admission:      Prior to Admission medications    Medication Sig Start Date End Date Taking? Authorizing Provider   losartan (COZAAR) 100 MG tablet Take 100 mg by mouth daily    Historical Provider, MD   varenicline (CHANTIX) 1 MG tablet Take 1 mg by mouth 2 times daily    Historical Provider, MD   formoterol (PERFOROMIST) 20 MCG/2ML nebulizer solution Take 2 mLs by nebulization 2 times daily 3/6/20   Mike Spann MD   varenicline (CHANTIX) 0.5 MG tablet Take 1 tablet by mouth daily for 3 days, THEN 1 tablet 2 times daily for 4 days, THEN 2 tablets 2 times daily.  3/6/20 4/12/20  Mike Spann MD   albuterol (PROVENTIL) (2.5 MG/3ML) 0.083% nebulizer solution Take 3 mLs by nebulization every 2 hours as needed for Wheezing 3/6/20   Mike Spann MD   furosemide (LASIX) 20 MG tablet Take 1 tablet by mouth 2 times daily  Patient taking differently: Take 20 mg by mouth daily  3/6/20   Juan José Rae MD   aspirin 325 MG EC tablet Take 1 tablet by mouth daily 12/17/19   Juan José Rae MD   ipratropium-albuterol (DUONEB) 0.5-2.5 (3) MG/3ML SOLN nebulizer solution Inhale 3 mLs into the lungs every 4 hours (while awake) 12/17/19   Juan José Rae MD   haloperidol decanoate (HALDOL DECANOATE) 50 MG/ML injection Inject 50 mg into the muscle every 14 days    Historical Provider, MD   pravastatin (PRAVACHOL) 40 MG tablet Take 40 mg by mouth nightly    Historical Provider, MD   naproxen sodium (ALEVE) 220 MG tablet Take 220 mg by mouth 2 times daily (with meals)    Historical Provider, MD   potassium chloride (KLOR-CON M) 20 MEQ extended release tablet Take 1 tablet by mouth daily (with breakfast) 4/5/19   Juan José Rae MD   budesonide (PULMICORT) 0.5 MG/2ML nebulizer suspension Take 2 mLs by nebulization 2 times daily 4/4/19   Juan José Rae MD   metoprolol succinate (TOPROL XL) 25 MG extended release tablet Take 1 tablet by mouth daily 3/6/17   Juan José Rae MD   amLODIPine (NORVASC) 10 MG tablet Take 1 tablet by mouth daily 3/6/17   Juan José Rae MD   risperiDONE (RISPERDAL) 2 MG tablet Take 2 mg by mouth 2 times daily    Historical Provider, MD   traZODone (DESYREL) 100 MG tablet Take 100 mg by mouth nightly    Historical Provider, MD   carBAMazepine (TEGRETOL) 200 MG tablet Take 200 mg by mouth 2 times daily    Historical Provider, MD       Current Facility-Administered Medications   Medication Dose Route Frequency Provider Last Rate Last Dose    aspirin EC tablet 325 mg  325 mg Oral Daily Juan José Rae MD        carBAMazepine (TEGRETOL) tablet 200 mg  200 mg Oral BID Juan José Rae MD   200 mg at 05/18/20 0947    pravastatin (PRAVACHOL) tablet 40 mg  40 mg Oral Nightly Juan José Rae MD   40 mg at 05/17/20 2004    risperiDONE (RISPERDAL) tablet 2 mg  2 mg Oral BID Elijah Liu, cigarettes. He has a 20.00 pack-year smoking history. He has never used smokeless tobacco.  ETOH:   reports no history of alcohol use. Family History:        Problem Relation Age of Onset    High Blood Pressure Mother          Review of Systems -   Unable to obtain due to patient intubated and sedated. All others reviewed and are negative. /64   Pulse 83   Temp 98.4 °F (36.9 °C) (Bladder)   Resp 15   Ht 6' 2\" (1.88 m)   Wt 291 lb 0.1 oz (132 kg)   SpO2 96%   BMI 37.36 kg/m²       Physical Examination:   General appearance -patient intubated and sedated. Mental status -patient intubated and sedated. Neck - supple, no significant adenopathy, no JVD, or carotid bruits  Chest - clear to auscultation, no wheezes, rales or rhonchi, symmetric air entry  Heart - normal rate, regular rhythm, normal S1, S2, no murmurs, rubs, clicks or gallops  Abdomen - soft, nontender, nondistended, no masses or organomegaly  Neurological - patient intubated and sedated.    Musculoskeletal - no joint tenderness, deformity or swelling  Extremities - peripheral pulses normal, 2-3+ pedal edema, no clubbing or cyanosis  Skin - normal coloration and turgor, no rashes, no suspicious skin lesions noted      LABS:    Recent Labs     05/17/20  0941 05/17/20  1630 05/17/20 2000   TROPONINT 0.035* 0.037* 0.029*     CBC:   Lab Results   Component Value Date    WBC 4.8 05/18/2020    RBC 6.55 05/18/2020    HGB 17.1 05/18/2020    HCT 58.9 05/18/2020    MCV 89.9 05/18/2020    MCH 26.1 05/18/2020    MCHC 29.0 05/18/2020    RDW 14.6 01/16/2018     05/18/2020    MPV 9.5 05/18/2020     BMP:    Lab Results   Component Value Date     05/18/2020    K 5.7 05/18/2020     05/18/2020    CO2 23 05/18/2020    BUN 36 05/18/2020    LABALBU 3.8 05/17/2020    CREATININE 1.4 05/18/2020    CALCIUM 8.6 05/18/2020    LABGLOM 64 05/18/2020    GLUCOSE 123 05/18/2020     Hepatic Function Panel:    Lab Results   Component Value Date    ALKPHOS 112 05/17/2020    ALT 12 05/17/2020    AST 13 05/17/2020    PROT 7.0 05/17/2020    BILITOT 0.4 05/17/2020    BILIDIR 0.3 05/17/2020    LABALBU 3.8 05/17/2020     Magnesium:    Lab Results   Component Value Date    MG 2.2 05/17/2020     Warfarin PT/INR:  No components found for: Corona Gustafson  HgBA1c:    Lab Results   Component Value Date    LABA1C 6.0 12/15/2019     FLP:    Lab Results   Component Value Date    TRIG 70 12/14/2019    HDL 27 12/14/2019    LDLCALC 61 12/14/2019     TSH:    Lab Results   Component Value Date    TSH 2.040 03/02/2020     BNP: No components found for: PRO-BNP      Assessment/Plan:    Patient Active Problem List   Diagnosis    Acute on chronic respiratory failure with hypercapnia (HCC)    Respiratory insufficiency/failure    Chronic obstructive pulmonary disease with acute exacerbation (HCC)    Acute on chronic systolic congestive heart failure (HCC)    Nicotine abuse    Chest pain    Schizophreniform disorder ([address]    Respiratory failure ([address]    Smoker    Acute respiratory failure (HCC)    Acute on chronic respiratory failure (HCC)     Acute respiratory failure requiring intubation/mech ventillation  Mild LV dysfunction  COPD on 3L   JORDAN on CPAP  Schizophrenia  HTN  HLD    Continue COPD management with steriods/abx/bronchodilators  Continue Aspirin/statin  Get 2D Echo to evaluate LVSF/Valves  Will need ischemic evaluation once acute issues resolve  Continue rest the management  Further recs based on echo results and clinical course  Will follow up        Please do note hesitate to contact me for any further questions. Thank you for the opportunity to be involved in this patient's care.     Code Status: Full Code    Electronically signed by Debi Monsivais MD on 5/18/2020 at 1:46 PM

## 2020-05-18 NOTE — PLAN OF CARE
Problem: Nutrition  Goal: Optimal nutrition therapy  Outcome: Ongoing   Nutrition Problem: Inadequate oral intake  Intervention: Food and/or Nutrient Delivery: Start Tube Feeding  Nutritional Goals: TF to provide % of nutrient need while pt is intubated

## 2020-05-18 NOTE — PLAN OF CARE
Patient continues on breathing treatments; tolerating well.   Will continue to monitor patients respiratory status

## 2020-05-18 NOTE — PLAN OF CARE
Problem: Impaired respiratory status  Goal: Will be able to breathe spontaneously, without ventilator support  Description: Will be able to breathe spontaneously, without ventilator support     Outcome: Ongoing  Note: Vent setting optimized to achieve target tidal volume, respiratory rate and ideal oxygen saturations. SBT will be performed when appropriate.

## 2020-05-18 NOTE — CARE COORDINATION
5/18/20, 8:06 AM EDT  DISCHARGE PLANNING EVALUATION:    Marisela Michelle       Admitted from: ED 5/17/2020/ [address] [facility] day: 1   Location: [facility]04/004-A Reason for admit: Acute respiratory failure ([address] [J96.00]  Acute on chronic respiratory failure ([address] [J96.20] Status: IP  Admit order signed?: no  PMH:  has a past medical history of Acute on chronic systolic congestive heart failure ([address] Emphysema (subcutaneous) (surgical) resulting from a procedure, Hypertension, Pneumonia, and Schizophrenia ([address] Procedure:   5/17 Intubated in ED  5/17 CVC   5/17 CXR:   1. Poor inflation lungs. Moderate cardiomegaly. 2. Moderate bibasilar atelectasis/pneumonia. No effusion is seen     5/18 CXR:   Increased haziness in the mid and lower right hemithorax likely due to an increase in the right pleural effusion. Underlying atelectasis is likely, cannot exclude pneumonia. Persistent mild interstitial pulmonary edema or atypical pneumonia. Minimal left basilar atelectasis     Medications:  Scheduled Meds:   aspirin  325 mg Oral Daily    carBAMazepine  200 mg Oral BID    pravastatin  40 mg Oral Nightly    risperiDONE  2 mg Oral BID    sodium chloride flush  10 mL Intravenous 2 times per day    enoxaparin  40 mg Subcutaneous Q24H    methylPREDNISolone  40 mg Intravenous Q6H    Followed by   Roanna Counter ON 5/19/2020] predniSONE  40 mg Oral Daily    cefTRIAXone (ROCEPHIN) IV  1 g Intravenous Q24H    famotidine  20 mg Per NG tube BID    budesonide  500 mcg Nebulization BID    ipratropium-albuterol  1 ampule Inhalation Q4H    furosemide  60 mg Intravenous BID     Continuous Infusions:   norepinephrine 4 mcg/min (05/18/20 0645)    propofol 35 mcg/kg/min (05/18/20 0311)      Pertinent Info/Orders/Treatment Plan: Presented with c/o SOB that started on day of presentation. Required emergent intubation in ED with sats 60's. CHF. MAGNO. COPD exacerbation. Intensivist and Cardiology consulted. Echo ordered.     Remains on vent

## 2020-05-18 NOTE — PROGRESS NOTES
INTERNAL MEDICINE Progress Note  5/18/2020 12:56 PM  Subjective:   Admit Date: 5/17/2020  PCP: Vincent Reynaga MD  Interval History:    On vent, ETT  Sedated  Diuresing well, off vasopressor  Objective:   Vitals: /69   Pulse 86   Temp 98.4 °F (36.9 °C) (Bladder)   Resp 21   Ht 6' 2\" (1.88 m)   Wt 291 lb 0.1 oz (132 kg)   SpO2 93%   BMI 37.36 kg/m²   General appearance: sedated on vent  HEENT: Head: atraumatic  Neck: no carotid bruit and no JVD  Lungs: diminished breath sounds bilaterally  Heart: S1, S2 normal  Abdomen: normal findings: bowel sounds normal and no masses palpable and abnormal findings:  distended  Extremities: edema ++ mateo  Neurologic: Mental status: sedated on vent      Medications:   Scheduled Meds:   aspirin  325 mg Oral Daily    carBAMazepine  200 mg Oral BID    pravastatin  40 mg Oral Nightly    risperiDONE  2 mg Oral BID    sodium chloride flush  10 mL Intravenous 2 times per day    enoxaparin  40 mg Subcutaneous Q24H    methylPREDNISolone  40 mg Intravenous Q6H    Followed by   Amy Lagunas ON 5/19/2020] predniSONE  40 mg Oral Daily    cefTRIAXone (ROCEPHIN) IV  1 g Intravenous Q24H    famotidine  20 mg Per NG tube BID    budesonide  500 mcg Nebulization BID    ipratropium-albuterol  1 ampule Inhalation Q4H    furosemide  60 mg Intravenous BID     Continuous Infusions:   norepinephrine Stopped (05/18/20 1242)    propofol 40 mcg/kg/min (05/18/20 1021)       Lab Results:   CBC:   Recent Labs     05/17/20  0941 05/18/20  0406   WBC 5.0 4.8   HGB 17.1 17.1    202     BMP:    Recent Labs     05/17/20  0941 05/18/20  0406    135   K 5.1 5.7*   CL 99 100   CO2 25 23   BUN 35* 36*   CREATININE 1.4* 1.4*   GLUCOSE 109* 123*     Hepatic:   Recent Labs     05/17/20  0941   AST 13   ALT 12   BILITOT 0.4   ALKPHOS 112     Magnesium:    Lab Results   Component Value Date    MG 2.2 05/17/2020     HgBA1c:    Lab Results   Component Value Date    LABA1C 6.0 12/15/2019     TSH: Lab Results   Component Value Date    TSH 2.040 03/02/2020 05/18/20 1000    Specimen Source: Blood gases     pH, Blood Gas 7.23Low     PCO2 61High  mmhg    PO2 79 mmhg    HCO3 26 mmol/l    Base Excess (Calculated) -3.8Low  mmol/l    O2 Sat 93 %    IFIO2 100    DEVICE NRB    Source: R Radial      Procalcitonin 0. 15High      CXR  Increased haziness in the mid and lower right hemithorax likely due to an increase in the right pleural effusion. Underlying atelectasis is likely, cannot exclude pneumonia. Persistent mild interstitial pulmonary edema or atypical pneumonia. Minimal left basilar atelectasis. Assessment and Plan:   1. Acute on chronic hypoxemic / hypercapnic resp failure, req vent support  2. COPD with acute exac  3. Acute on chronic systolic CHF  4. Elevated troponin  5. MAGNO  6.  Hyperkalemia, had kayexalate     Cont vent care  Bronchodilators  Solumedrol  Diuresis-lasix  Am labs    Fritz Chawla MD

## 2020-05-18 NOTE — PROGRESS NOTES
TF. K+ 5.7, BUN 36, Cr 1.4.  meds include lasix, steroid & levophed  · Wound Type: None(per RN)  · Current Nutrition Therapies:  · Oral Diet Orders: NPO   · Tube Feeding (TF) Orders:   · Feeding Route: Orogastric  · Formula: Renal(Nepro)  · Rate (ml/hr):to start at 15 ml/hr & goal is 25 ml/hr    · Volume (ml/day): 600ml  · Duration: Continuous  · Additives/Modulars: (1 ( 2.5 oz) liquid protein bid)  · Water Flushes: per Dr  · Goal TF & Flush Orders Provides: 3154 kcals TF ( 1992 kcals with diprivan) , 101 grams protein & 97 grams CHO/24 hours  · Anthropometric Measures:  · Ht: 6' 2\" (188 cm)   · Current Body Wt: 291 lb 0.1 oz (132 kg)(5/18 +2+3 edema)  · Admission Body Wt: 291 lb 0.1 oz (132 kg)(5/18 +2+3 edema)  · Usual Body Wt: 270 lb 11.6 oz (122.8 kg)(12/31/19 per EMR )  · Weight Change: Difficult to assess d/t Lasix & +2+3 edema this admit.    · Ideal Body Wt: 190 lb (86.2 kg), % Ideal Body 153%  · BMI Classification: BMI 35.0 - 39.9 Obese Class II(37.4)    Nutrition Interventions:   Start Tube Feeding  Continued Inpatient Monitoring, Education not appropriate at this time, Coordination of Care    Nutrition Evaluation:   · Evaluation: Goals set   · Goals: TF to provide % of nutrient need while pt is intubated    · Monitoring: Nutrition Progression, TF Intake, TF Tolerance, Skin Integrity, Weight, Pertinent Labs, Monitor Hemodynamic Status, Monitor Bowel Function      Electronically signed by Chely Guy RD, LD on 5/18/20 at 1:34 PM EDT    Contact Number: [phone]

## 2020-05-18 NOTE — PROGRESS NOTES
CRITICAL CARE PROGRESS NOTE      Patient: Lou Powers    Unit/Bed:4D-04/004-A  YOB: 1970  MRN: [id number]   PCP: Fritz Chawla MD  Date of Admission: 5/17/2020  Chief Complaint:- SOB    Assessment and Plan:      1. Acute on chronic hypoxic hypercapnic respiratory failure: Wears 3 L at baseline. Patient also wears CPAP at night, with 3 L bled in. On arrival to the emergency room patient was noted to be high hypoxic with an SPO2 in the 60s. He was emergently intubated. Maintain peak pressure less than 35.  2. Acute on chronic heart failure with reduced ejection fraction: Noted +4 pitting edema bilateral lower extremities. Diuresis 20 mg of Lasix twice daily. Albumin level normal.  ECHO pending    3. Acute kidney injury: Likely secondary to hypotension, and prerenal in nature. Urine electrolytes shows pre-renal, likely hypotension cause, monitor   4. COPD exacerbation: Steroids, Solu-Medrol 40 mg every 6 hours for 3 days. Rocephin. Bio fire pneumonia panel negative, duo nebs  5. Hyperkalemia: 1 dose kayexalate   6. Elevated troponin: Likely secondary to hypoxia, EKG shows no acute changes. Repeat echo pending, trend troponin. Patient on 325 ASA daily  7. Hypertension: History, currently hypotensive. Norvasc with hold parameters  8. Schizophrenia: Risperdal    9. GERD: Pepcid twice daily  10. Hyperlipidemia: Pravastatin 40 mg nightly      INITIAL H AND P AND ICU COURSE:  Berkley Peralta is a 58-year-old black male who presented to [facility] on 5/17/2020 with complaints of shortness of breath that started today. He has a past medical history of hypertension, heart failure, schizophrenia, COPD. HPI is limited secondary to patient inability to give details of history secondary to hypoxia and need for immediate intubation. Patient was dropped off at the emergency room and no family member accompanied patient to the emergency room.  5/18 patient  continues on mechanical ventilation    Past Medical History:  Per HPI  Family History: Mother: Hypertension  Social History: Current every day smoker, 20-pack-year history, denies EtOH use. Denies drug use. ROS   Intubated and sedated on mechanical ventilation    Scheduled Meds:   aspirin  325 mg Oral Daily    carBAMazepine  200 mg Oral BID    pravastatin  40 mg Oral Nightly    risperiDONE  2 mg Oral BID    sodium chloride flush  10 mL Intravenous 2 times per day    enoxaparin  40 mg Subcutaneous Q24H    methylPREDNISolone  40 mg Intravenous Q6H    Followed by   Anell Mouse ON 5/19/2020] predniSONE  40 mg Oral Daily    cefTRIAXone (ROCEPHIN) IV  1 g Intravenous Q24H    famotidine  20 mg Per NG tube BID    budesonide  500 mcg Nebulization BID    ipratropium-albuterol  1 ampule Inhalation Q4H    furosemide  60 mg Intravenous BID     Continuous Infusions:   propofol 40 mcg/kg/min (05/18/20 1021)       PHYSICAL EXAMINATION:  T:  98.6. P:  82. RR:  14. B/P:  93/62. FiO2:  70. O2 Sat:  94.  I/O:  859/1600  Body mass index is 37.36 kg/m². General:  Acute on chronically ill-appearing black male  HEENT:  normocephalic and atraumatic. No scleral icterus. PERR  Neck: supple. No Thyromegaly. Lungs: Diminished to auscultation. No retractions  Cardiac: Sinus rhythm. No JVD. Abdomen: soft. Nontender. Rounded   Extremities:  No clubbing, cyanosis. + 3 pitting edema bilateral LE   Vasculature: capillary refill < 3 seconds. Palpable dorsalis pedis pulses. Skin:  warm and dry. Psych: Spontaneous movement x4  Lymph:  No supraclavicular adenopathy. Neurologic:  No focal deficit. No seizures. Data: (All radiographs, tracings, PFTs, and imaging are personally viewed and interpreted unless otherwise noted).     Sodium 135, potassium 5.7, chloride 100, CO2 23, BUN 36, creatinine 1.4, anion gap 12.0, glucose 123   WBC 4.8, hemoglobin 17.1, hematocrit 58.9, platelets 330   Telemetry shows normal sinus rhythm   Chest x-ray 5/18/2020 reports increased haziness in the mid and lower right hemithorax likely due to increase in right pleural effusion. Mild interstitial pulmonary edema   Echo pending          Meets Continued ICU Level Care Criteria:    [x] Yes   [] No - Transfer Planned to listed location:  [] HOSPITALIST CONTACTED-      Case and plan discussed with Dr. Jeffrey Argueta and Dr. Louie Rios. Electronically signed by Miguel Moore. SAVANNAH Montero - CNP  CRITICAL CARE SPECIALIST  Patient seen by me. Case discussed with nurse practitioner. Case discussed with Dr. Louie Rios. Patient unable to wean from mechanical ventilator secondary to PEEP requirements. Primary care physician continuing with Rocephin despite negative PCR of airway. We will defer antibiotics to primary care physician. Electronically signed by Alyssa Argueta MD.

## 2020-05-19 ENCOUNTER — APPOINTMENT (OUTPATIENT)
Dept: GENERAL RADIOLOGY | Age: 50
DRG: 004 | End: 2020-05-19
Payer: MEDICARE

## 2020-05-19 LAB
ANION GAP SERPL CALCULATED.3IONS-SCNC: 11 MEQ/L (ref 8–16)
BUN BLDV-MCNC: 39 MG/DL (ref 7–22)
CALCIUM SERPL-MCNC: 8.7 MG/DL (ref 8.5–10.5)
CHLORIDE BLD-SCNC: 101 MEQ/L (ref 98–111)
CO2: 27 MEQ/L (ref 23–33)
CREAT SERPL-MCNC: 1.2 MG/DL (ref 0.4–1.2)
ERYTHROCYTE [DISTWIDTH] IN BLOOD BY AUTOMATED COUNT: 21.2 % (ref 11.5–14.5)
ERYTHROCYTE [DISTWIDTH] IN BLOOD BY AUTOMATED COUNT: 64.2 FL (ref 35–45)
GFR SERPL CREATININE-BSD FRML MDRD: 77 ML/MIN/1.73M2
GLUCOSE BLD-MCNC: 110 MG/DL (ref 70–108)
GRAM STAIN RESULT: NORMAL
HCT VFR BLD CALC: 52.7 % (ref 42–52)
HEMOGLOBIN: 16 GM/DL (ref 14–18)
MCH RBC QN AUTO: 26.6 PG (ref 26–33)
MCHC RBC AUTO-ENTMCNC: 30.4 GM/DL (ref 32.2–35.5)
MCV RBC AUTO: 87.7 FL (ref 80–94)
MRSA SCREEN: NORMAL
PLATELET # BLD: 203 THOU/MM3 (ref 130–400)
PMV BLD AUTO: 10 FL (ref 9.4–12.4)
POTASSIUM SERPL-SCNC: 4.5 MEQ/L (ref 3.5–5.2)
RBC # BLD: 6.01 MILL/MM3 (ref 4.7–6.1)
RESPIRATORY CULTURE: NORMAL
SODIUM BLD-SCNC: 139 MEQ/L (ref 135–145)
URINE CULTURE, ROUTINE: NORMAL
WBC # BLD: 6.4 THOU/MM3 (ref 4.8–10.8)

## 2020-05-19 PROCEDURE — 6360000002 HC RX W HCPCS: Performed by: INTERNAL MEDICINE

## 2020-05-19 PROCEDURE — 2000000000 HC ICU R&B

## 2020-05-19 PROCEDURE — 6360000002 HC RX W HCPCS: Performed by: NURSE PRACTITIONER

## 2020-05-19 PROCEDURE — 94770 HC ETCO2 MONITOR DAILY: CPT

## 2020-05-19 PROCEDURE — 99233 SBSQ HOSP IP/OBS HIGH 50: CPT | Performed by: INTERNAL MEDICINE

## 2020-05-19 PROCEDURE — 36592 COLLECT BLOOD FROM PICC: CPT

## 2020-05-19 PROCEDURE — 6370000000 HC RX 637 (ALT 250 FOR IP): Performed by: INTERNAL MEDICINE

## 2020-05-19 PROCEDURE — 85027 COMPLETE CBC AUTOMATED: CPT

## 2020-05-19 PROCEDURE — 2580000003 HC RX 258: Performed by: INTERNAL MEDICINE

## 2020-05-19 PROCEDURE — 94003 VENT MGMT INPAT SUBQ DAY: CPT

## 2020-05-19 PROCEDURE — APPSS180 APP SPLIT SHARED TIME > 60 MINUTES: Performed by: NURSE PRACTITIONER

## 2020-05-19 PROCEDURE — 94640 AIRWAY INHALATION TREATMENT: CPT

## 2020-05-19 PROCEDURE — 2700000000 HC OXYGEN THERAPY PER DAY

## 2020-05-19 PROCEDURE — 94761 N-INVAS EAR/PLS OXIMETRY MLT: CPT

## 2020-05-19 PROCEDURE — 99232 SBSQ HOSP IP/OBS MODERATE 35: CPT | Performed by: PHYSICIAN ASSISTANT

## 2020-05-19 PROCEDURE — 80048 BASIC METABOLIC PNL TOTAL CA: CPT

## 2020-05-19 PROCEDURE — 2500000003 HC RX 250 WO HCPCS

## 2020-05-19 PROCEDURE — 36415 COLL VENOUS BLD VENIPUNCTURE: CPT

## 2020-05-19 PROCEDURE — 2709999900 HC NON-CHARGEABLE SUPPLY

## 2020-05-19 PROCEDURE — 71045 X-RAY EXAM CHEST 1 VIEW: CPT

## 2020-05-19 RX ORDER — PROPOFOL 10 MG/ML
10 INJECTION, EMULSION INTRAVENOUS
Status: DISPENSED | OUTPATIENT
Start: 2020-05-19 | End: 2020-05-27

## 2020-05-19 RX ORDER — ASPIRIN 81 MG/1
324 TABLET, CHEWABLE ORAL DAILY
Status: DISCONTINUED | OUTPATIENT
Start: 2020-05-19 | End: 2020-05-28 | Stop reason: HOSPADM

## 2020-05-19 RX ADMIN — MILRINONE LACTATE 0.25 MCG/KG/MIN: 1 INJECTION, SOLUTION INTRAVENOUS at 12:51

## 2020-05-19 RX ADMIN — PRAVASTATIN SODIUM 40 MG: 40 TABLET ORAL at 20:33

## 2020-05-19 RX ADMIN — CARBAMAZEPINE 200 MG: 200 TABLET ORAL at 20:33

## 2020-05-19 RX ADMIN — CARBAMAZEPINE 200 MG: 200 TABLET ORAL at 08:33

## 2020-05-19 RX ADMIN — FUROSEMIDE 60 MG: 10 INJECTION, SOLUTION INTRAMUSCULAR; INTRAVENOUS at 18:04

## 2020-05-19 RX ADMIN — METHYLPREDNISOLONE SODIUM SUCCINATE 40 MG: 40 INJECTION, POWDER, FOR SOLUTION INTRAMUSCULAR; INTRAVENOUS at 09:45

## 2020-05-19 RX ADMIN — ASPIRIN 81 MG 324 MG: 81 TABLET ORAL at 08:33

## 2020-05-19 RX ADMIN — Medication 10 ML: at 20:33

## 2020-05-19 RX ADMIN — BUDESONIDE 500 MCG: 0.5 INHALANT RESPIRATORY (INHALATION) at 09:18

## 2020-05-19 RX ADMIN — PROPOFOL INJECTABLE EMULSION 45 MCG/KG/MIN: 10 INJECTION, EMULSION INTRAVENOUS at 23:52

## 2020-05-19 RX ADMIN — IPRATROPIUM BROMIDE AND ALBUTEROL SULFATE 1 AMPULE: .5; 3 SOLUTION RESPIRATORY (INHALATION) at 09:18

## 2020-05-19 RX ADMIN — FAMOTIDINE 20 MG: 20 TABLET ORAL at 20:33

## 2020-05-19 RX ADMIN — PROPOFOL INJECTABLE EMULSION 35 MCG/KG/MIN: 10 INJECTION, EMULSION INTRAVENOUS at 10:48

## 2020-05-19 RX ADMIN — BUDESONIDE 500 MCG: 0.5 INHALANT RESPIRATORY (INHALATION) at 20:37

## 2020-05-19 RX ADMIN — ENOXAPARIN SODIUM 40 MG: 40 INJECTION SUBCUTANEOUS at 13:39

## 2020-05-19 RX ADMIN — RISPERIDONE 2 MG: 2 TABLET ORAL at 08:33

## 2020-05-19 RX ADMIN — RISPERIDONE 2 MG: 2 TABLET ORAL at 20:33

## 2020-05-19 RX ADMIN — IPRATROPIUM BROMIDE AND ALBUTEROL SULFATE 1 AMPULE: .5; 3 SOLUTION RESPIRATORY (INHALATION) at 12:50

## 2020-05-19 RX ADMIN — PROPOFOL INJECTABLE EMULSION 40 MCG/KG/MIN: 10 INJECTION, EMULSION INTRAVENOUS at 17:50

## 2020-05-19 RX ADMIN — PREDNISONE 40 MG: 20 TABLET ORAL at 14:03

## 2020-05-19 RX ADMIN — ACETAMINOPHEN 650 MG: 325 TABLET ORAL at 14:01

## 2020-05-19 RX ADMIN — METHYLPREDNISOLONE SODIUM SUCCINATE 40 MG: 40 INJECTION, POWDER, FOR SOLUTION INTRAMUSCULAR; INTRAVENOUS at 03:45

## 2020-05-19 RX ADMIN — IPRATROPIUM BROMIDE AND ALBUTEROL SULFATE 1 AMPULE: .5; 3 SOLUTION RESPIRATORY (INHALATION) at 16:40

## 2020-05-19 RX ADMIN — IPRATROPIUM BROMIDE AND ALBUTEROL SULFATE 1 AMPULE: .5; 3 SOLUTION RESPIRATORY (INHALATION) at 20:37

## 2020-05-19 RX ADMIN — Medication 10 ML: at 08:39

## 2020-05-19 RX ADMIN — FAMOTIDINE 20 MG: 20 TABLET ORAL at 08:33

## 2020-05-19 RX ADMIN — FUROSEMIDE 60 MG: 10 INJECTION, SOLUTION INTRAMUSCULAR; INTRAVENOUS at 08:38

## 2020-05-19 RX ADMIN — Medication 2 MCG/MIN: at 14:48

## 2020-05-19 RX ADMIN — IPRATROPIUM BROMIDE AND ALBUTEROL SULFATE 1 AMPULE: .5; 3 SOLUTION RESPIRATORY (INHALATION) at 01:41

## 2020-05-19 RX ADMIN — IPRATROPIUM BROMIDE AND ALBUTEROL SULFATE 1 AMPULE: .5; 3 SOLUTION RESPIRATORY (INHALATION) at 05:19

## 2020-05-19 RX ADMIN — CEFTRIAXONE SODIUM 1 G: 1 INJECTION, POWDER, FOR SOLUTION INTRAMUSCULAR; INTRAVENOUS at 13:41

## 2020-05-19 RX ADMIN — PROPOFOL INJECTABLE EMULSION 35 MCG/KG/MIN: 10 INJECTION, EMULSION INTRAVENOUS at 02:24

## 2020-05-19 ASSESSMENT — PULMONARY FUNCTION TESTS
PIF_VALUE: 22
PIF_VALUE: 22
PIF_VALUE: 21
PIF_VALUE: 23
PIF_VALUE: 22
PIF_VALUE: 21

## 2020-05-19 NOTE — PLAN OF CARE
Problem: Impaired respiratory status  Goal: Will be able to breathe spontaneously, without ventilator support  Description: Will be able to breathe spontaneously, without ventilator support     Outcome: Ongoing  Note: Vent settings optimized to achieve target tidal volume, respiratory rate and ideal oxygen saturations. Patient is currently on 24/10 Rate of 12 and 60% SBT will be performed when appropriate. Will continue to wean as patient tolerates.

## 2020-05-19 NOTE — PROGRESS NOTES
CRITICAL CARE PROGRESS NOTE      Patient: [facility]    Unit/Bed:4D-04/004-A  YOB: 1970  MRN: [id number]   PCP: Tania Cartwright MD  Date of Admission: 5/17/2020  Chief Complaint:- SOB    Assessment and Plan:     1. Acute on chronic hypoxic hypercapnic respiratory failure: Wears 3 L at baseline.  Patient also wears CPAP at night, with 3 L bled in.  On arrival to the emergency room patient was noted to be high hypoxic with an SPO2 in the 60s.  He was emergently intubated.  Maintain peak pressure less than 35. Continue to wean PEEP as tolerated  2. Acute on chronic heart failure with reduced ejection fraction: Noted +4 pitting edema bilateral lower extremities.  Diuresis 60 mg of Lasix twice daily. Albumin level normal.  ECHO 50-55%     3. Acute kidney injury: Likely secondary to hypotension, and prerenal in nature.  Urine electrolytes shows pre-renal, likely hypotension cause, monitor. Improved creatinine 1.2  4. COPD exacerbation: Steroids, Solu-Medrol 40 mg every 6 hours for 3 days.  Rocephin.  Bio fire pneumonia panel negative, duo nebs  5. Elevated troponin: Likely secondary to hypoxia, EKG shows no acute changes.  Repeat echo pending, trend troponin.  Patient on 325 ASA daily. Cardiology consulted, possible ischemic work-up in the future. 6. Hypertension: History, currently hypotensive.  Norvasc with hold parameters  7. Pericardial effusion: Small effusion noted on echo, circumferential but no evidence of tamponade. 8. Schizophrenia: Risperdal    9. GERD: Pepcid twice daily  10. Hyperlipidemia: Pravastatin 40 mg nightly  11.  Hyperkalemia: resolved; 1 dose kayexalate     INITIAL H AND P AND ICU COURSE:  Jessy Gonzalez is a 44year-old black male who presented to [facility] on 5/17/2020 with complaints of shortness of breath that started today. Marguerite Hampton has a past medical history of hypertension, heart failure, schizophrenia, COPD.  HPI is limited secondary to patient inability to give details of history secondary to hypoxia and need for immediate intubation.  Patient was dropped off at the emergency room and no family member accompanied patient to the emergency room. 5/18 patient  continues on mechanical ventilation 5/19       Past Medical History:   Per HPI  Family History: Mother: Hypertension  Social History:  Current every day smoker, 20-pack-year history, denies EtOH use.  Denies drug use.       ROS   Intubated and sedated on mechanical ventilation    Scheduled Meds:   aspirin  325 mg Oral Daily    carBAMazepine  200 mg Oral BID    pravastatin  40 mg Oral Nightly    risperiDONE  2 mg Oral BID    sodium chloride flush  10 mL Intravenous 2 times per day    enoxaparin  40 mg Subcutaneous Q24H    methylPREDNISolone  40 mg Intravenous Q6H    Followed by   [place] predniSONE  40 mg Oral Daily    cefTRIAXone (ROCEPHIN) IV  1 g Intravenous Q24H    famotidine  20 mg Per NG tube BID    budesonide  500 mcg Nebulization BID    ipratropium-albuterol  1 ampule Inhalation Q4H    furosemide  60 mg Intravenous BID     Continuous Infusions:   propofol 35 mcg/kg/min (05/19/20 0224)       PHYSICAL EXAMINATION:  T:  98.6. P:  80. RR:  17. B/P:  99/64. FiO2:  60. O2 Sat:  93.  I/O:  1274/3050  PC: 14/10 TV:250: RRTotal: 16. Body mass index is 36.54 kg/m². General:   Acute on chronically ill-appearing black male  HEENT:  normocephalic and atraumatic. No scleral icterus. PERR  Neck: supple. No Thyromegaly. Lungs: diminished to auscultation. No retractions  Cardiac: RRR. No JVD. Abdomen: soft. Nontender. Rounded   Extremities:  No clubbing, cyanosis. +3 pitting edema bilateral lower extremities  Vasculature: capillary refill < 3 seconds. Palpable dorsalis pedis pulses. Skin:  warm and dry. Psych: Withdraws from pain x4, follows no commands  Lymph:  No supraclavicular adenopathy. Neurologic:  No focal deficit. No seizures.     Data: (All radiographs, tracings, PFTs, and imaging are personally viewed and interpreted unless otherwise noted).  Sodium 139, potassium 4.5, chloride 101, CO2 27, BUN 39, creatinine 1.2, anion gap 11.0, glucose 110.  WBC 6.4, hemoglobin 16.0, hematocrit 52.7, platelet count 082.  Telemetry shows normal sinus rhythm   Chest x-ray 5/18/2020 reports increase haziness in the mid and lower right hemithorax likely due to increase in the right pleural effusion.  Echocardiogram 5/18/2020 reports ejection fraction from 50 to 55%, enlarged right atrium moderate pulmonary hypertension, small circumferential pericardial effusion        Meets Continued ICU Level Care Criteria:    [x] Yes   [] No - Transfer Planned to listed location:  [] HOSPITALIST CONTACTED-      Case and plan discussed with Dr. Chani Moreno and Dr. Marisa Maldonado        Electronically signed by Christyne Cabot. SAVANNAH Salcedo - [facility]  CRITICAL CARE SPECIALIST  Patient seen by me. Case discussed with nurse practitioner. Patient remains on mechanical ventilator. Patient noncompliant COPD. Ongoing tobacco abuse. Electronically signed by Katie Moreno MD.

## 2020-05-19 NOTE — PLAN OF CARE
Problem: Restraint Use - Nonviolent/Non-Self-Destructive Behavior:  Goal: Absence of restraint indications  Description: Absence of restraint indications  Outcome: Ongoing  Note: Continues to intermittently reach for ETT and lines - remains in bilateral soft wrist restraints. Problem: Restraint Use - Nonviolent/Non-Self-Destructive Behavior:  Goal: Absence of restraint-related injury  Description: Absence of restraint-related injury  Outcome: Ongoing  Note: Remains free from restraint related injury. Problem: Discharge Planning:  Goal: Participates in care planning  Description: Participates in care planning  Outcome: Ongoing  Note: Patient remains sedated on ventilator, unable to participate in plan of care     Problem: Discharge Planning:  Goal: Discharged to appropriate level of care  Description: Discharged to appropriate level of care  Outcome: Ongoing  Note: Remains in ICU     Problem: Airway Clearance - Ineffective:  Goal: Ability to maintain a clear airway will improve  Description: Ability to maintain a clear airway will improve  Outcome: Ongoing  Note: Remains intubated on ventilator support. Problem: Anxiety/Stress:  Goal: Level of anxiety will decrease  Description: Level of anxiety will decrease  Outcome: Ongoing  Note: Intermittent periods of restlessness, remains on propofol for sedation     Problem: Cardiac Output - Decreased:  Goal: Hemodynamic stability will improve  Description: Hemodynamic stability will improve  Outcome: Ongoing  Note: Started on Milrinone - levophed initiated levophed for bp support     Problem: Mental Status - Impaired:  Goal: Mental status will be restored to baseline  Description: Mental status will be restored to baseline  Outcome: Ongoing  Note: Remains sedated on ventilator     Problem: Pain:  Goal: Pain level will decrease  Description: Pain level will decrease  Outcome: Ongoing  Note: Pain rated per cpot - treated with prn orders as needed.      Problem: Pain:  Goal: Recognizes and communicates pain  Description: Recognizes and communicates pain  Outcome: Ongoing  Note: Pain rated per cpot - treated with prn orders as needed. Problem: Pain:  Goal: Control of acute pain  Description: Control of acute pain  Outcome: Ongoing  Note: Pain rated per cpot - treated with prn orders as needed. Problem: Pain:  Goal: Control of chronic pain  Description: Control of chronic pain  Outcome: Ongoing  Note: Pain rated per cpot - treated with prn orders as needed. Problem: Falls - Risk of:  Goal: Will remain free from falls  Description: Will remain free from falls  Outcome: Ongoing  Note: Remains free from falls - remains in bed this shift, fall precautions in place. Problem: Falls - Risk of:  Goal: Absence of physical injury  Description: Absence of physical injury  Outcome: Ongoing  Note: Remains free from physical injury. Problem: Nutrition  Goal: Optimal nutrition therapy  Outcome: Ongoing  Note: Tolerating tube feed. Care plan reviewed with patient. Patient unable to verbalize understanding of the plan of care and contribute to goal setting.

## 2020-05-19 NOTE — PLAN OF CARE
Problem: Impaired respiratory status  Goal: Will be able to breathe spontaneously, without ventilator support  Description: Will be able to breathe spontaneously, without ventilator support     5/19/2020 1303 by Tory Landry RCP  Outcome: Ongoing  Pt remains on ventilator  Receives duoneb q4 and pulmicort bid

## 2020-05-19 NOTE — PROGRESS NOTES
Cardiology Progress Note      Patient: Mavis Chinchilla  YOB: 1970  MRN: [id number]   Acct: [de-identified]  Admit Date:  5/17/2020  Primary Cardiologist: none  Pt seen by dr jesus    Note per dr Irina Hodge for Consultation:  CHF, Elevated trops        History Of Present Illness:    44 y.o. male c hx of mild LV dysfunction with EF 45-50%, COPD, on 3L supplemental O2, JORDAN on CPAP, schizophrenia, HTN,  who presented to the hospital with shortness of breath. History is obtained from the chart at patient is intubated and sedated. Patient is emergently intubated in the ER due to what seemed liked COPD exacerbation. Cardiology was consulted for mild elevation of troponins. EKG shows SR, poor R wave progression. Echo from 4/2019 showed TDS and EF 45-50%.   Cr 1.4, Trops 0.035>0.037>0.029\"    Subjective (Events in last 24 hours): pt sedated, on vent  On diprivan gtt    Net I/o -2.5 L  Weight down 9 lb    Objective:   BP (!) 90/56   Pulse 79   Temp 98.6 °F (37 °C) (Bladder)   Resp 13   Ht 6' 2\" (1.88 m)   Wt 284 lb 9.8 oz (129.1 kg)   SpO2 93%   BMI 36.54 kg/m²        TELEMETRY: nsr    Physical Exam:  General Appearance: sedated, on vent  Cardiovascular: normal rate, regular rhythm, normal S1 and S2, no murmurs, rubs, clicks, or gallops, distal pulses intact, no carotid bruits, no JVD  Pulmonary/Chest: clear to auscultation bilaterally- no wheezes, rales or rhonchi, normal air movement, no respiratory distress  Abdomen: soft, non-tender, non-distended, normal bowel sounds, no masses Extremities: +3 ble edema, pulse   Skin: warm and dry, no rash or erythema  Head: normocephalic and atraumatic  Eyes: pupils equal, round, and reactive to light  Neck: supple and non-tender without mass, no thyromegaly       Medications:    aspirin  324 mg Oral Daily    carBAMazepine  200 mg Oral BID    pravastatin  40 mg Oral Nightly    risperiDONE  2 mg Oral BID    sodium chloride flush  10 mL Intravenous 2 times per day    enoxaparin  40 mg Subcutaneous Q24H    predniSONE  40 mg Oral Daily    cefTRIAXone (ROCEPHIN) IV  1 g Intravenous Q24H    famotidine  20 mg Per NG tube BID    budesonide  500 mcg Nebulization BID    ipratropium-albuterol  1 ampule Inhalation Q4H    furosemide  60 mg Intravenous BID      propofol 35 mcg/kg/min (05/19/20 1048)     sodium chloride flush, 10 mL, PRN  acetaminophen, 650 mg, Q6H PRN    Or  acetaminophen, 650 mg, Q6H PRN  polyethylene glycol, 17 g, Daily PRN  promethazine, 12.5 mg, Q6H PRN    Or  ondansetron, 4 mg, Q6H PRN        Diagnostics:  Echo 5/18/20      Summary   Left ventricular size is normal and systolic function is mildly reduced. Ejection fraction was estimated at 50-55%. LV wall thickness is within   normal limits. Intraventricular septal wall compression during systole suggestive of RV   pressure overload. Markedly enlarged right atrium size. The right ventricle was not clearly visualized. Appears dilated. RVSP of 55-60 mm Hg consistent with moderate pulmonary hypertension. Small circumferential pericardial effusion without evidence of tamponade   physiology. IVC size is dilated with <50% respiratory collapse. Signature      ----------------------------------------------------------------   Electronically signed by Yamile Ellis MD (Interpreting   physician) on 05/18/2020 at 04:20 PM    Lab Data:    Cardiac Enzymes:  No results for input(s): CKTOTAL, CKMB, CKMBINDEX, TROPONINI in the last 72 hours.     CBC:   Lab Results   Component Value Date    WBC 6.4 05/19/2020    RBC 6.01 05/19/2020    HGB 16.0 05/19/2020    HCT 52.7 05/19/2020     05/19/2020       CMP:    Lab Results   Component Value Date     05/19/2020    K 4.5 05/19/2020    K 5.7 05/18/2020     05/19/2020    CO2 27 05/19/2020    BUN 39 05/19/2020    CREATININE 1.2 05/19/2020    LABGLOM 77 05/19/2020    GLUCOSE 110 05/19/2020    CALCIUM 8.7 05/19/2020       Hepatic Function Panel: Lab Results   Component Value Date    ALKPHOS 112 05/17/2020    ALT 12 05/17/2020    AST 13 05/17/2020    PROT 7.0 05/17/2020    BILITOT 0.4 05/17/2020    BILIDIR 0.3 05/17/2020    LABALBU 3.8 05/17/2020       Magnesium:    Lab Results   Component Value Date    MG 2.2 05/17/2020       PT/INR:    Lab Results   Component Value Date    INR 1.26 03/02/2020       HgBA1c:    Lab Results   Component Value Date    LABA1C 6.0 12/15/2019       FLP:    Lab Results   Component Value Date    TRIG 70 12/14/2019    HDL 27 12/14/2019    LDLCALC 61 12/14/2019       TSH:    Lab Results   Component Value Date    TSH 2.040 03/02/2020         Assessment:    Acute on chronic hypoxic hypercapnic resp failure - intubated  Acute on chronic diastolic CHF/RHF  COPD exacerbation  Mod PHTN - RVSP 55-60mmHg per echo 5/18/20  Elevated troponins - likely due to above  Ef 50-55 per echo 5/18/20  Hx HTN  Dyslipidemia  Hx schizophrenia   Tobacco abuse  Hx noncompliance    Discussed with dr jesus  Plan:  · Daily I/o and weights  · 2 liter fluid restriction and 2gm sodium diet  · Cont diuresis  · Add milrinone gtt  · Bmp daily  · Keep mag >2 and k >2  · Will need ischemic w/up once stable - likely stress test         Electronically signed by Carlos Alberto Dowling PA-C on 5/19/2020 at 11:09 AM

## 2020-05-19 NOTE — PROGRESS NOTES
This RN spoke with patient's sister Mary Anne Sandoval. Updates given on patient's current status and plan of care. All questions answered at this time.

## 2020-05-19 NOTE — PLAN OF CARE
Problem: Restraint Use - Nonviolent/Non-Self-Destructive Behavior:  Goal: Absence of restraint indications  Description: Absence of restraint indications  5/19/2020 0123 by ALINE ROE  Outcome: Ongoing  Note: Patient continues to pull at lines and tubes while restraints released. Will continue to monitor. Problem: Discharge Planning:  Goal: Participates in care planning  Description: Participates in care planning  Outcome: Ongoing  Note: Patient from home. Problem: Airway Clearance - Ineffective:  Goal: Ability to maintain a clear airway will improve  Description: Ability to maintain a clear airway will improve  Outcome: Ongoing  Note: Patient intubated at this time. Problem: Anxiety/Stress:  Goal: Level of anxiety will decrease  Description: Level of anxiety will decrease  Outcome: Ongoing  Note: Patient remains intubated and sedated at this time. Problem: Cardiac Output - Decreased:  Goal: Hemodynamic stability will improve  Description: Hemodynamic stability will improve  Outcome: Ongoing  Note: Generalized pitting edema. Lasix per orders. Adequate urine output. Problem: Mental Status - Impaired:  Goal: Mental status will be restored to baseline  Description: Mental status will be restored to baseline  Outcome: Ongoing  Note: Patient remains intubated and sedated at this time. Problem: Pain:  Goal: Pain level will decrease  Description: Pain level will decrease  Outcome: Ongoing  Note: No s/s of pain, patient sedated and intubated at this time. Problem: Falls - Risk of:  Goal: Will remain free from falls  Description: Will remain free from falls  Outcome: Ongoing  Note: Fall precautions in place. Call light and bedside table within reach. Bed locked and in lowest position. Bed alarm on. Patient intubated and sedated, 4/4 side rails up. Hourly rounding.         Problem: Nutrition  Goal: Optimal nutrition therapy  5/19/2020 0132 by Virginia Potts  Note: Patient receiving tube feed Nepro @ goal of 25ml/hr. Protein administered this shift. Problem: Restraint Use - Nonviolent/Non-Self-Destructive Behavior:  Goal: Absence of restraint-related injury  Description: Absence of restraint-related injury  Note: No s/s of injury. PROM provided. Care plan reviewed with patient and patient's sister Jean-Paul Sanchez. Patient and family verbalize understanding of the plan of care and contribute to goal setting.

## 2020-05-19 NOTE — CARE COORDINATION
5/19/20, 1:14 PM EDT    DISCHARGE ON GOING EVALUATION    Satish Encinas day: 2  Location: -04/004-A Reason for admit: Acute respiratory failure ([facility] [address] [J96.00]  Acute on chronic respiratory failure ([facility] [address] [J96.20]   Procedure:   5/17 Intubated in ED  5/17 CVC   5/18 Echo with EF 50-55%; small circumferential pericardial effusion without evidence of tamponade  Treatment Plan of Care: Levo off yesterday afternoon. Primacor drip started today. Plan for ischemic w/u when stable, likely stress test. Remains on vent w/ETT on PCV, peep 10, FIO2 60%, sats 93%. Tmax 99.3. NSR. Unable to follow commands; DE x4 to painful stim. Internal Med, Intensivist, and Cardiology following. Dietitian consulted. Telemetry, OG w/TF, pandya care. Primacor @ 0.25 mcg/kg/min, diprivan @ 35 mcg/kg/min, asa, nebs, tegretol, IV rocephin, lovenox, pepcid, IV lasix 60 mg bid, pravastatin, IV solumedrol 40 mg Q6H, risperdal. BUN 39, Creat down to 1.2. Barriers to Discharge: on vent  PCP: Talia Mckenzie MD  Readmission Risk Score: 18%  Patient Goals/Plan/Treatment Preferences: Spoke with Sandie's mother/POA James Wiley, by phone; states Adriana Hair4 lives with her. He uses home cpap and wears 4L O2 'most of the time'. [facility]. He does not drive, family takes him to appointments and where he needs to go, he cares for himself independently, and has a PCP. Plan pending clinical course.

## 2020-05-19 NOTE — PROGRESS NOTES
INTERNAL MEDICINE Progress Note  5/19/2020 11:26 AM  Subjective:   Admit Date: 5/17/2020  PCP: Donavon Kendall MD  Interval History:    On vent, ETT  Sedated    Objective:   Vitals: BP (!) 90/56   Pulse 79   Temp 98.6 °F (37 °C) (Bladder)   Resp 13   Ht 6' 2\" (1.88 m)   Wt 284 lb 9.8 oz (129.1 kg)   SpO2 93%   BMI 36.54 kg/m²   General appearance: sedated on vent  HEENT:  atraumatic  Neck: no carotid bruit and no JVD  Lungs: diminished breath sounds bilaterally  Heart: S1, S2 normal  Abdomen: normal findings: bowel sounds normal and no masses palpable and abnormal findings:  distended  Extremities: edema ++ mateo  Neurologic:  sedated on vent      Medications:   Scheduled Meds:   aspirin  324 mg Oral Daily    carBAMazepine  200 mg Oral BID    pravastatin  40 mg Oral Nightly    risperiDONE  2 mg Oral BID    sodium chloride flush  10 mL Intravenous 2 times per day    enoxaparin  40 mg Subcutaneous Q24H    predniSONE  40 mg Oral Daily    cefTRIAXone (ROCEPHIN) IV  1 g Intravenous Q24H    famotidine  20 mg Per NG tube BID    budesonide  500 mcg Nebulization BID    ipratropium-albuterol  1 ampule Inhalation Q4H    furosemide  60 mg Intravenous BID     Continuous Infusions:   propofol 35 mcg/kg/min (05/19/20 1048)       Lab Results:   CBC:   Recent Labs     05/17/20  0941 05/18/20  0406 05/19/20  0445   WBC 5.0 4.8 6.4   HGB 17.1 17.1 16.0    202 203     BMP:    Recent Labs     05/17/20  0941 05/18/20  0406 05/19/20  0445    135 139   K 5.1 5.7* 4.5   CL 99 100 101   CO2 25 23 27   BUN 35* 36* 39*   CREATININE 1.4* 1.4* 1.2   GLUCOSE 109* 123* 110*     Hepatic:   Recent Labs     05/17/20  0941   AST 13   ALT 12   BILITOT 0.4   ALKPHOS 112     Magnesium:    Lab Results   Component Value Date    MG 2.2 05/17/2020     HgBA1c:    Lab Results   Component Value Date    LABA1C 6.0 12/15/2019     TSH:    Lab Results   Component Value Date    TSH 2.040 03/02/2020 05/18/20 1000    Specimen Source:

## 2020-05-20 LAB
ANION GAP SERPL CALCULATED.3IONS-SCNC: 7 MEQ/L (ref 8–16)
BUN BLDV-MCNC: 41 MG/DL (ref 7–22)
CALCIUM SERPL-MCNC: 8.7 MG/DL (ref 8.5–10.5)
CHLORIDE BLD-SCNC: 101 MEQ/L (ref 98–111)
CO2: 31 MEQ/L (ref 23–33)
CREAT SERPL-MCNC: 1.1 MG/DL (ref 0.4–1.2)
ERYTHROCYTE [DISTWIDTH] IN BLOOD BY AUTOMATED COUNT: 21.2 % (ref 11.5–14.5)
ERYTHROCYTE [DISTWIDTH] IN BLOOD BY AUTOMATED COUNT: 64.5 FL (ref 35–45)
GFR SERPL CREATININE-BSD FRML MDRD: 85 ML/MIN/1.73M2
GLUCOSE BLD-MCNC: 113 MG/DL (ref 70–108)
HCT VFR BLD CALC: 49.2 % (ref 42–52)
HEMOGLOBIN: 15 GM/DL (ref 14–18)
MCH RBC QN AUTO: 26.6 PG (ref 26–33)
MCHC RBC AUTO-ENTMCNC: 30.5 GM/DL (ref 32.2–35.5)
MCV RBC AUTO: 87.4 FL (ref 80–94)
PLATELET # BLD: 185 THOU/MM3 (ref 130–400)
PMV BLD AUTO: 9.5 FL (ref 9.4–12.4)
POTASSIUM SERPL-SCNC: 4 MEQ/L (ref 3.5–5.2)
RBC # BLD: 5.63 MILL/MM3 (ref 4.7–6.1)
SODIUM BLD-SCNC: 139 MEQ/L (ref 135–145)
VRE CULTURE: NORMAL
WBC # BLD: 7.3 THOU/MM3 (ref 4.8–10.8)

## 2020-05-20 PROCEDURE — 80048 BASIC METABOLIC PNL TOTAL CA: CPT

## 2020-05-20 PROCEDURE — 94003 VENT MGMT INPAT SUBQ DAY: CPT

## 2020-05-20 PROCEDURE — 2000000000 HC ICU R&B

## 2020-05-20 PROCEDURE — 6360000002 HC RX W HCPCS: Performed by: INTERNAL MEDICINE

## 2020-05-20 PROCEDURE — 99232 SBSQ HOSP IP/OBS MODERATE 35: CPT | Performed by: PHYSICIAN ASSISTANT

## 2020-05-20 PROCEDURE — 94640 AIRWAY INHALATION TREATMENT: CPT

## 2020-05-20 PROCEDURE — 6370000000 HC RX 637 (ALT 250 FOR IP): Performed by: NURSE PRACTITIONER

## 2020-05-20 PROCEDURE — 2580000003 HC RX 258: Performed by: INTERNAL MEDICINE

## 2020-05-20 PROCEDURE — 94770 HC ETCO2 MONITOR DAILY: CPT

## 2020-05-20 PROCEDURE — APPSS180 APP SPLIT SHARED TIME > 60 MINUTES: Performed by: NURSE PRACTITIONER

## 2020-05-20 PROCEDURE — 36415 COLL VENOUS BLD VENIPUNCTURE: CPT

## 2020-05-20 PROCEDURE — 6370000000 HC RX 637 (ALT 250 FOR IP): Performed by: INTERNAL MEDICINE

## 2020-05-20 PROCEDURE — 36592 COLLECT BLOOD FROM PICC: CPT

## 2020-05-20 PROCEDURE — 2700000000 HC OXYGEN THERAPY PER DAY

## 2020-05-20 PROCEDURE — 6360000002 HC RX W HCPCS: Performed by: NURSE PRACTITIONER

## 2020-05-20 PROCEDURE — 94761 N-INVAS EAR/PLS OXIMETRY MLT: CPT

## 2020-05-20 PROCEDURE — 2709999900 HC NON-CHARGEABLE SUPPLY

## 2020-05-20 PROCEDURE — 85027 COMPLETE CBC AUTOMATED: CPT

## 2020-05-20 PROCEDURE — 99233 SBSQ HOSP IP/OBS HIGH 50: CPT | Performed by: INTERNAL MEDICINE

## 2020-05-20 RX ORDER — FUROSEMIDE 10 MG/ML
40 INJECTION INTRAMUSCULAR; INTRAVENOUS 2 TIMES DAILY
Status: DISCONTINUED | OUTPATIENT
Start: 2020-05-20 | End: 2020-05-23

## 2020-05-20 RX ORDER — ALBUTEROL SULFATE 2.5 MG/3ML
5 SOLUTION RESPIRATORY (INHALATION) EVERY 4 HOURS PRN
Status: DISCONTINUED | OUTPATIENT
Start: 2020-05-20 | End: 2020-05-28 | Stop reason: HOSPADM

## 2020-05-20 RX ADMIN — PROPOFOL INJECTABLE EMULSION 45 MCG/KG/MIN: 10 INJECTION, EMULSION INTRAVENOUS at 23:39

## 2020-05-20 RX ADMIN — GLYCOPYRROLATE AND FORMOTEROL FUMARATE 2 PUFF: 9; 4.8 AEROSOL, METERED RESPIRATORY (INHALATION) at 08:33

## 2020-05-20 RX ADMIN — PRAVASTATIN SODIUM 40 MG: 40 TABLET ORAL at 20:26

## 2020-05-20 RX ADMIN — FUROSEMIDE 60 MG: 10 INJECTION, SOLUTION INTRAMUSCULAR; INTRAVENOUS at 08:20

## 2020-05-20 RX ADMIN — FUROSEMIDE 40 MG: 10 INJECTION, SOLUTION INTRAMUSCULAR; INTRAVENOUS at 17:33

## 2020-05-20 RX ADMIN — ENOXAPARIN SODIUM 40 MG: 40 INJECTION SUBCUTANEOUS at 13:55

## 2020-05-20 RX ADMIN — ASPIRIN 81 MG 324 MG: 81 TABLET ORAL at 08:20

## 2020-05-20 RX ADMIN — Medication 10 ML: at 08:20

## 2020-05-20 RX ADMIN — PREDNISONE 40 MG: 20 TABLET ORAL at 08:21

## 2020-05-20 RX ADMIN — RISPERIDONE 2 MG: 2 TABLET ORAL at 08:21

## 2020-05-20 RX ADMIN — RISPERIDONE 2 MG: 2 TABLET ORAL at 20:26

## 2020-05-20 RX ADMIN — IPRATROPIUM BROMIDE AND ALBUTEROL SULFATE 1 AMPULE: .5; 3 SOLUTION RESPIRATORY (INHALATION) at 04:35

## 2020-05-20 RX ADMIN — PROPOFOL INJECTABLE EMULSION 45 MCG/KG/MIN: 10 INJECTION, EMULSION INTRAVENOUS at 05:37

## 2020-05-20 RX ADMIN — CEFTRIAXONE SODIUM 1 G: 1 INJECTION, POWDER, FOR SOLUTION INTRAMUSCULAR; INTRAVENOUS at 13:55

## 2020-05-20 RX ADMIN — FAMOTIDINE 20 MG: 20 TABLET ORAL at 20:26

## 2020-05-20 RX ADMIN — PROPOFOL INJECTABLE EMULSION 45 MCG/KG/MIN: 10 INJECTION, EMULSION INTRAVENOUS at 17:33

## 2020-05-20 RX ADMIN — IPRATROPIUM BROMIDE AND ALBUTEROL SULFATE 1 AMPULE: .5; 3 SOLUTION RESPIRATORY (INHALATION) at 00:48

## 2020-05-20 RX ADMIN — CARBAMAZEPINE 200 MG: 200 TABLET ORAL at 20:26

## 2020-05-20 RX ADMIN — PROPOFOL INJECTABLE EMULSION 45 MCG/KG/MIN: 10 INJECTION, EMULSION INTRAVENOUS at 11:17

## 2020-05-20 RX ADMIN — MILRINONE LACTATE 0.25 MCG/KG/MIN: 1 INJECTION, SOLUTION INTRAVENOUS at 13:55

## 2020-05-20 RX ADMIN — GLYCOPYRROLATE AND FORMOTEROL FUMARATE 2 PUFF: 9; 4.8 AEROSOL, METERED RESPIRATORY (INHALATION) at 16:51

## 2020-05-20 RX ADMIN — Medication 10 ML: at 20:27

## 2020-05-20 RX ADMIN — CARBAMAZEPINE 200 MG: 200 TABLET ORAL at 08:21

## 2020-05-20 RX ADMIN — FAMOTIDINE 20 MG: 20 TABLET ORAL at 08:20

## 2020-05-20 ASSESSMENT — PULMONARY FUNCTION TESTS
PIF_VALUE: 23

## 2020-05-20 NOTE — PLAN OF CARE
Problem: Restraint Use - Nonviolent/Non-Self-Destructive Behavior:  Goal: Absence of restraint indications  5/20/2020 0848 by Pamela Hodge RN  Outcome: Ongoing  Note: Patient continues to pull at lines and tubes when restraints are released. Will continue to monitor. Problem: Restraint Use - Nonviolent/Non-Self-Destructive Behavior:  Goal: Absence of restraint-related injury  5/20/2020 0848 by Pamela Hodge RN  Outcome: Ongoing  Note: No redness or swelling noted at site. No signs of injury related to restraints. Will continue to monitor. Problem: Discharge Planning:  Goal: Participates in care planning  5/20/2020 0848 by Pamela Hodge RN  Outcome: Ongoing  Note: Patient continues to meet criteria for ICU monitoring. Problem: Anxiety/Stress:  Goal: Level of anxiety will decrease  5/20/2020 0848 by Pamela Hodge RN  Outcome: Ongoing  Note: Patient continues to be sedated on vent. Patient on home medications for anxiety. Problem: Cardiac Output - Decreased:  Goal: Hemodynamic stability will improve  5/20/2020 0848 by Pamela Hodge RN  Outcome: Ongoing  Note: Milirone drip started yesterday. Remains on levo      Problem: Mental Status - Impaired:  Goal: Mental status will be restored to baseline  5/20/2020 0848 by Pamela Hodge RN  Outcome: Ongoing  Note: Continues to be sedated      Problem: Falls - Risk of:  Goal: Will remain free from falls  5/20/2020 0848 by Pamela Hodge RN  Outcome: Ongoing  Note: Free from falls this shift. Fall precautions remain in place at this time. Problem: Urinary Elimination:  Goal: Complications related to the disease process, condition or treatment will be avoided or minimized  5/20/2020 0848 by Pamela Hodge RN  Outcome: Ongoing  Note: Minaya care completed this shift. No signs of infections. Care plan reviewed with patient and sister.   Patient's sister able to  verbalize understanding of the plan of care and contribute to goal setting.

## 2020-05-20 NOTE — PLAN OF CARE
Problem: Restraint Use - Nonviolent/Non-Self-Destructive Behavior:  Goal: Absence of restraint indications  Description: Absence of restraint indications  Outcome: Ongoing  Note: Pt still requiring restraints for pulling at line/removal of equipment. Will continue to monitor     Problem: Restraint Use - Nonviolent/Non-Self-Destructive Behavior:  Goal: Absence of restraint-related injury  Description: Absence of restraint-related injury  Outcome: Ongoing  Note: No restraint related injury. ROM performed per protocol     Problem: Discharge Planning:  Goal: Participates in care planning  Description: Participates in care planning  Outcome: Ongoing  Note: Pt from home with mother. Planning discharge home     Problem: Discharge Planning:  Goal: Discharged to appropriate level of care  Description: Discharged to appropriate level of care  Outcome: Ongoing  Note: Planning home with mother     Problem: Airway Clearance - Ineffective:  Goal: Ability to maintain a clear airway will improve  Description: Ability to maintain a clear airway will improve  Outcome: Ongoing  Note: Pt remains intubated at this time with patent airway. Problem: Anxiety/Stress:  Goal: Level of anxiety will decrease  Description: Level of anxiety will decrease  Outcome: Ongoing  Note: Pt sedated currently     Problem: Cardiac Output - Decreased:  Goal: Hemodynamic stability will improve  Description: Hemodynamic stability will improve  Outcome: Ongoing  Note: Pt requiring levo for BP support     Problem: Mental Status - Impaired:  Goal: Mental status will be restored to baseline  Description: Mental status will be restored to baseline  Outcome: Ongoing  Note: Pt currently sedated     Problem: Pain:  Goal: Pain level will decrease  Description: Pain level will decrease  Outcome: Ongoing  Note: Pain Assessment: CPOT  Pain Level: (cpot)       Is pain goal met at this time?   Yes             Problem: Falls - Risk of:  Goal: Will remain free from

## 2020-05-20 NOTE — CARE COORDINATION
5/20/20, 1:23 PM EDT    DISCHARGE ON 1 [facility] day: 3  Location: -04/004-A Reason for admit: Acute respiratory failure ([facility] [address] [J96.00]  Acute on chronic respiratory failure ([facility] [address] [J96.20]   Procedure:   5/17 Intubated in ED  5/17 CVC   5/18 Echo with EF 50-55%; small circumferential pericardial effusion without evidence of tamponade  5/19 CXR:   1. Moderate cardiomegaly. ET tube, NG tube, and right jugular line in good position. 2. Moderate hazy appearance both mid and lower lung fields consistent with atelectasis/pneumonia/edema. Small effusion right side. Questionable small effusion left side. 3. Overall appearance of chest has worsened since prior     Treatment Plan of Care: Increased peep requirements overnight. Bevespi added. Plan for ischemic w/u when stable, likely stress test. Remains on vent w/ETT on PCV, peep 12, FIO2 70%, sats 92%. Afebrile. NSR. Unable to follow commands; DE x4 to painful stim. Internal Med, Intensivist, and Cardiology following. Dietitian consulted. Telemetry, OG w/TF, pandya care. Primacor @ 0.25 mcg/kg/min, levo @ 4 mcg/min, diprivan @ 45 mcg/kg/min, asa, tegretol, IV rocephin, lovenox, pepcid, IV lasix 60 mg bid, inhaler, pravastatin, prednisone, risperdal.   Barriers to Discharge: on vent  PCP: Rupa No MD  Readmission Risk Score: 18%  Patient Goals/Plan/Treatment Preferences: From home w/mother. Wears 4L O2 thru [facility] and home cpap. Plan pending clinical course.

## 2020-05-20 NOTE — PROGRESS NOTES
Cardiology Progress Note      Patient: Brianne Galindo  YOB: 1970  MRN: [id number]   Acct: [de-identified]  Admit Date:  5/17/2020  Primary Cardiologist: none  Pt seen by dr jesus    Note per dr Evens Hampton for Consultation:  CHF, Elevated trops        History Of Present Illness:    44 y.o. male c hx of mild LV dysfunction with EF 45-50%, COPD, on 3L supplemental O2, JORDAN on CPAP, schizophrenia, HTN,  who presented to the hospital with shortness of breath. History is obtained from the chart at patient is intubated and sedated. Patient is emergently intubated in the ER due to what seemed liked COPD exacerbation. Cardiology was consulted for mild elevation of troponins. EKG shows SR, poor R wave progression. Echo from 4/2019 showed TDS and EF 45-50%.   Cr 1.4, Trops 0.035>0.037>0.029\"    Subjective (Events in last 24 hours):   Pt sedated, on vent  On diprivan, levophed at 4, primacor gtt  PEEP 10, FiO2 70    Net I/o -4.7 L  Weight down 15 lb    Objective:   BP (!) 95/58   Pulse 79   Temp 98.1 °F (36.7 °C) (Bladder)   Resp 19   Ht 6' 2\" (1.88 m)   Wt 280 lb 3.3 oz (127.1 kg)   SpO2 92%   BMI 35.98 kg/m²        TELEMETRY: nsr    Physical Exam:  General Appearance: sedated, on vent  Cardiovascular: normal rate, regular rhythm, normal S1 and S2, no murmurs, rubs, clicks, or gallops, distal pulses intact, no carotid bruits, no JVD  Pulmonary/Chest: clear to auscultation bilaterally- no wheezes, rales or rhonchi, normal air movement, no respiratory distress  Abdomen: soft, non-tender, non-distended, normal bowel sounds, no masses Extremities: +3 ble pitting edema, pulse   Skin: warm and dry, no rash or erythema  Head: normocephalic and atraumatic  Eyes: pupils equal, round, and reactive to light  Neck: supple and non-tender without mass, no thyromegaly       Medications:    glycopyrrolate-formoterol  2 puff Inhalation BID    aspirin  324 mg Oral Daily    carBAMazepine  200 mg Oral BID    pravastatin  40 mg Oral Nightly    risperiDONE  2 mg Oral BID    sodium chloride flush  10 mL Intravenous 2 times per day    enoxaparin  40 mg Subcutaneous Q24H    predniSONE  40 mg Oral Daily    cefTRIAXone (ROCEPHIN) IV  1 g Intravenous Q24H    famotidine  20 mg Per NG tube BID    furosemide  60 mg Intravenous BID      propofol 45 mcg/kg/min (05/20/20 0537)    milrinone 0.25 mcg/kg/min (05/19/20 1251)    norepinephrine 4 mcg/min (05/19/20 1631)     albuterol, 5 mg, Q4H PRN  sodium chloride flush, 10 mL, PRN  acetaminophen, 650 mg, Q6H PRN    Or  acetaminophen, 650 mg, Q6H PRN  polyethylene glycol, 17 g, Daily PRN  promethazine, 12.5 mg, Q6H PRN    Or  ondansetron, 4 mg, Q6H PRN        Diagnostics:  Echo 5/18/20      Summary   Left ventricular size is normal and systolic function is mildly reduced. Ejection fraction was estimated at 50-55%. LV wall thickness is within   normal limits. Intraventricular septal wall compression during systole suggestive of RV   pressure overload. Markedly enlarged right atrium size. The right ventricle was not clearly visualized. Appears dilated. RVSP of 55-60 mm Hg consistent with moderate pulmonary hypertension. Small circumferential pericardial effusion without evidence of tamponade   physiology. IVC size is dilated with <50% respiratory collapse. Signature      ----------------------------------------------------------------   Electronically signed by Jose Plascencia MD (Interpreting   physician) on 05/18/2020 at 04:20 PM    Lab Data:    Cardiac Enzymes:  No results for input(s): CKTOTAL, CKMB, CKMBINDEX, TROPONINI in the last 72 hours.     CBC:   Lab Results   Component Value Date    WBC 7.3 05/20/2020    RBC 5.63 05/20/2020    HGB 15.0 05/20/2020    HCT 49.2 05/20/2020     05/20/2020       CMP:    Lab Results   Component Value Date     05/20/2020    K 4.0 05/20/2020    K 5.7 05/18/2020     05/20/2020    CO2 31 05/20/2020    BUN 41

## 2020-05-20 NOTE — PROGRESS NOTES
CRITICAL CARE PROGRESS NOTE      Patient: Gadiel Villasenor    Unit/Bed:4D-04/004-A  YOB: 1970  MRN: [id number]   PCP: Mattie Tidwell MD  Date of Admission: 5/17/2020  Chief Complaint:- SOB    Assessment and Plan:      1. Acute on chronic hypoxic hypercapnic respiratory failure: Wears 3 L at baseline.  Patient also wears CPAP at night, with 3 L bled in.  On arrival to the emergency room patient was noted to be high hypoxic with an SPO2 in the 60s.  He was emergently intubated.  Maintain peak pressure less than 35. Increased PEEP requirements overnight  2. Acute on chronic heart failure with reduced ejection fraction: Noted +4 pitting edema bilateral lower extremities.  Diuresis 60 mg of Lasix twice daily. Albumin level normal.  ECHO 50-55%. Added milrinone 5/19 for increased diuresis  3. Acute kidney injury: Likely secondary to hypotension, and prerenal in nature.  Urine electrolytes shows pre-renal, likely hypotension cause, monitor. Improved creatinine 1.1  4. COPD exacerbation: Steroids, Solu-Medrol 40 mg every 6 hours for 3 days.  Rocephin.  Bio fire pneumonia panel negative, Bevespi added  5. Elevated troponin: Likely secondary to hypoxia, EKG shows no acute changes.  Repeat echo pending, trend troponin.  Patient on 325 ASA daily. Cardiology consulted, possible ischemic work-up in the future. 6. Hypertension: History, currently hypotensive.  Norvasc with hold parameters  7. Pericardial effusion: Small effusion noted on echo, circumferential but no evidence of tamponade. 8. Schizophrenia: Risperdal    9. GERD: Pepcid twice daily  10. Hyperlipidemia: Pravastatin 40 mg nightly  11.  Hyperkalemia: resolved; 1 dose kayexalate         INITIAL H AND P AND ICU COURSE:    Yoan Pennington is a 44year-old black male who presented to [facility] on 5/17/2020 with complaints of shortness of breath that started today. Queenie Carlson has a past medical history of hypertension, heart failure, schizophrenia, COPD.  HPI is limited secondary to patient inability to give details of history secondary to hypoxia and need for immediate intubation.  Patient was dropped off at the emergency room and no family member accompanied patient to the emergency room. 5/18 patient  continues on mechanical ventilation 5/20 increased PEEP requirements overnight. Added Bevespi for COPD exacerbation. Milrinone added 5/19 by cardiology for increased diuresis    Past Medical History:  Per HPI  Family History: Mother: Hypertension  Social History:  Current every day smoker, 20-pack-year history, denies EtOH use.  Denies drug use. ROS  Intubated and sedated on mechanical ventilation    Scheduled Meds:   aspirin  324 mg Oral Daily    carBAMazepine  200 mg Oral BID    pravastatin  40 mg Oral Nightly    risperiDONE  2 mg Oral BID    sodium chloride flush  10 mL Intravenous 2 times per day    enoxaparin  40 mg Subcutaneous Q24H    predniSONE  40 mg Oral Daily    cefTRIAXone (ROCEPHIN) IV  1 g Intravenous Q24H    famotidine  20 mg Per NG tube BID    budesonide  500 mcg Nebulization BID    ipratropium-albuterol  1 ampule Inhalation Q4H    furosemide  60 mg Intravenous BID     Continuous Infusions:   propofol 45 mcg/kg/min (05/20/20 0537)    milrinone 0.25 mcg/kg/min (05/19/20 1251)    norepinephrine 4 mcg/min (05/19/20 1631)       PHYSICAL EXAMINATION:  T:  97.9.  P:  80. RR:  17. B/P:  89/53. FiO2:  65. O2 Sat:  95.  I/O: 1577/3050    PC: 12/12: TV: 476: RRTotal: 16: Ti:1 sec: ETCO2: 45. Body mass index is 36.54 kg/m². General: Acute chronically ill-appearing black male  HEENT:  normocephalic and atraumatic. No scleral icterus. PERR  Neck: supple. No Thyromegaly. Lungs: Diminished to auscultation. No retractions  Cardiac: RRR. No JVD. Abdomen: soft. Nontender. Extremities:  No clubbing, cyanosis. + 3 pitting edema bilaterally LE   Vasculature: capillary refill < 3 seconds. Palpable dorsalis pedis pulses.   Skin: warm and dry. Psych: Withdrawals from pain x4  Lymph:  No supraclavicular adenopathy. Neurologic:  No focal deficit. No seizures. Data: (All radiographs, tracings, PFTs, and imaging are personally viewed and interpreted unless otherwise noted).  Sodium 39, potassium 4.0, chloride 101, CO2 31, BUN 41, creatinine 1.1, anion gap 7.0, glucose 113.  WBC 7.3, hemoglobin 15.0, hematocrit 49.2, platelet count 257.  Telemetry shows   Chest x-ray 5/19/2020 reports moderate hazy appearance of both mid and lower lung fields consistent with atelectasis pneumonia and edema, small effusion on the right side questionable small left effusion. Overall appearance worse.  Echocardiogram 5/18/2020 reports ejection fraction from 50 to 55%, enlarged right atrium moderate pulmonary hypertension, small circumferential pericardial effusion      Meets Continued ICU Level Care Criteria:    [x] Yes   [] No - Transfer Planned to listed location:  [] HOSPITALIST CONTACTED- DR     Case and plan discussed with Dr. Boom Ramsay, Dr. Rahel Gunter,  and Cardiology, APRN. Electronically signed by Vignesh Isaac. SAVANNAH Dong - [facility]  CRITICAL CARE SPECIALIST  Patient seen by me. Case discussed with nurse practitioner. Patient remains on mechanical ventilator. Difficult to wean secondary to advanced COPD and pulmonary hypertension. Electronically signed by Gerard Ramsay MD.

## 2020-05-20 NOTE — FLOWSHEET NOTE
Called patient's sister, Jamilah Foley, to update her on patient's plan of care for the day. All questions were addressed and answered.

## 2020-05-20 NOTE — PROGRESS NOTES
INTERNAL MEDICINE Progress Note  5/20/2020 2:09 PM  Subjective:   Admit Date: 5/17/2020  PCP: Talia Mckenzie MD  Interval History:   Remains on vent, ETT  Diuresing well  Objective:   Vitals: BP 99/63   Pulse 78   Temp 98.4 °F (36.9 °C) (Bladder)   Resp 12   Ht 6' 2\" (1.88 m)   Wt 280 lb 3.3 oz (127.1 kg)   SpO2 91%   BMI 35.98 kg/m²   General appearance: sedated on vent  HEENT:  atraumatic  Neck: no carotid bruit and no JVD  Lungs: diminished breath sounds bilaterally  Heart: S1, S2 normal  Abdomen: normal findings: bowel sounds normal and no masses palpable and abnormal findings:  distended  Extremities: edema + mateo  Neurologic:  sedated on vent      Medications:   Scheduled Meds:   glycopyrrolate-formoterol  2 puff Inhalation BID    aspirin  324 mg Oral Daily    carBAMazepine  200 mg Oral BID    pravastatin  40 mg Oral Nightly    risperiDONE  2 mg Oral BID    sodium chloride flush  10 mL Intravenous 2 times per day    enoxaparin  40 mg Subcutaneous Q24H    predniSONE  40 mg Oral Daily    cefTRIAXone (ROCEPHIN) IV  1 g Intravenous Q24H    famotidine  20 mg Per NG tube BID    furosemide  60 mg Intravenous BID     Continuous Infusions:   propofol 45 mcg/kg/min (05/20/20 1117)    milrinone 0.25 mcg/kg/min (05/20/20 1355)    norepinephrine 4 mcg/min (05/19/20 1631)       Lab Results:   CBC:   Recent Labs     05/18/20  0406 05/19/20  0445 05/20/20  0510   WBC 4.8 6.4 7.3   HGB 17.1 16.0 15.0    203 185     BMP:    Recent Labs     05/18/20  0406 05/19/20  0445 05/20/20  0510    139 139   K 5.7* 4.5 4.0    101 101   CO2 23 27 31   BUN 36* 39* 41*   CREATININE 1.4* 1.2 1.1   GLUCOSE 123* 110* 113*     Magnesium:    Lab Results   Component Value Date    MG 2.2 05/17/2020     HgBA1c:    Lab Results   Component Value Date    LABA1C 6.0 12/15/2019     TSH:    Lab Results   Component Value Date    TSH 2.040 03/02/2020 05/18/20 1000    Specimen Source: Blood gases     pH, Blood Gas

## 2020-05-21 ENCOUNTER — APPOINTMENT (OUTPATIENT)
Dept: GENERAL RADIOLOGY | Age: 50
DRG: 004 | End: 2020-05-21
Payer: MEDICARE

## 2020-05-21 LAB
ANION GAP SERPL CALCULATED.3IONS-SCNC: 6 MEQ/L (ref 8–16)
ANION GAP SERPL CALCULATED.3IONS-SCNC: 7 MEQ/L (ref 8–16)
BUN BLDV-MCNC: 28 MG/DL (ref 7–22)
BUN BLDV-MCNC: 38 MG/DL (ref 7–22)
CALCIUM SERPL-MCNC: 5.3 MG/DL (ref 8.5–10.5)
CALCIUM SERPL-MCNC: 9.1 MG/DL (ref 8.5–10.5)
CHLORIDE BLD-SCNC: 102 MEQ/L (ref 98–111)
CHLORIDE BLD-SCNC: 119 MEQ/L (ref 98–111)
CO2: 23 MEQ/L (ref 23–33)
CO2: 32 MEQ/L (ref 23–33)
CREAT SERPL-MCNC: 0.5 MG/DL (ref 0.4–1.2)
CREAT SERPL-MCNC: 0.7 MG/DL (ref 0.4–1.2)
ERYTHROCYTE [DISTWIDTH] IN BLOOD BY AUTOMATED COUNT: 21.3 % (ref 11.5–14.5)
ERYTHROCYTE [DISTWIDTH] IN BLOOD BY AUTOMATED COUNT: 66 FL (ref 35–45)
GFR SERPL CREATININE-BSD FRML MDRD: > 90 ML/MIN/1.73M2
GFR SERPL CREATININE-BSD FRML MDRD: > 90 ML/MIN/1.73M2
GLUCOSE BLD-MCNC: 74 MG/DL (ref 70–108)
GLUCOSE BLD-MCNC: 94 MG/DL (ref 70–108)
HCT VFR BLD CALC: 52.1 % (ref 42–52)
HEMOGLOBIN: 15.5 GM/DL (ref 14–18)
MCH RBC QN AUTO: 26.5 PG (ref 26–33)
MCHC RBC AUTO-ENTMCNC: 29.8 GM/DL (ref 32.2–35.5)
MCV RBC AUTO: 88.9 FL (ref 80–94)
PLATELET # BLD: 170 THOU/MM3 (ref 130–400)
PMV BLD AUTO: 10.3 FL (ref 9.4–12.4)
POTASSIUM SERPL-SCNC: 2.4 MEQ/L (ref 3.5–5.2)
POTASSIUM SERPL-SCNC: 4 MEQ/L (ref 3.5–5.2)
RBC # BLD: 5.86 MILL/MM3 (ref 4.7–6.1)
SODIUM BLD-SCNC: 141 MEQ/L (ref 135–145)
SODIUM BLD-SCNC: 148 MEQ/L (ref 135–145)
WBC # BLD: 7 THOU/MM3 (ref 4.8–10.8)

## 2020-05-21 PROCEDURE — 6360000002 HC RX W HCPCS: Performed by: INTERNAL MEDICINE

## 2020-05-21 PROCEDURE — 94761 N-INVAS EAR/PLS OXIMETRY MLT: CPT

## 2020-05-21 PROCEDURE — 6370000000 HC RX 637 (ALT 250 FOR IP): Performed by: NURSE PRACTITIONER

## 2020-05-21 PROCEDURE — 36415 COLL VENOUS BLD VENIPUNCTURE: CPT

## 2020-05-21 PROCEDURE — 2700000000 HC OXYGEN THERAPY PER DAY

## 2020-05-21 PROCEDURE — 6370000000 HC RX 637 (ALT 250 FOR IP): Performed by: INTERNAL MEDICINE

## 2020-05-21 PROCEDURE — 71045 X-RAY EXAM CHEST 1 VIEW: CPT

## 2020-05-21 PROCEDURE — 80048 BASIC METABOLIC PNL TOTAL CA: CPT

## 2020-05-21 PROCEDURE — 2580000003 HC RX 258: Performed by: INTERNAL MEDICINE

## 2020-05-21 PROCEDURE — 85027 COMPLETE CBC AUTOMATED: CPT

## 2020-05-21 PROCEDURE — 99233 SBSQ HOSP IP/OBS HIGH 50: CPT | Performed by: INTERNAL MEDICINE

## 2020-05-21 PROCEDURE — 94770 HC ETCO2 MONITOR DAILY: CPT

## 2020-05-21 PROCEDURE — 2000000000 HC ICU R&B

## 2020-05-21 PROCEDURE — APPSS180 APP SPLIT SHARED TIME > 60 MINUTES: Performed by: NURSE PRACTITIONER

## 2020-05-21 PROCEDURE — 94640 AIRWAY INHALATION TREATMENT: CPT

## 2020-05-21 PROCEDURE — 2709999900 HC NON-CHARGEABLE SUPPLY

## 2020-05-21 PROCEDURE — 94003 VENT MGMT INPAT SUBQ DAY: CPT

## 2020-05-21 RX ORDER — POTASSIUM CHLORIDE 7.45 MG/ML
10 INJECTION INTRAVENOUS PRN
Status: DISCONTINUED | OUTPATIENT
Start: 2020-05-21 | End: 2020-05-28 | Stop reason: HOSPADM

## 2020-05-21 RX ORDER — TADALAFIL 20 MG/1
40 TABLET ORAL DAILY
Status: DISCONTINUED | OUTPATIENT
Start: 2020-05-21 | End: 2020-05-28 | Stop reason: HOSPADM

## 2020-05-21 RX ORDER — POTASSIUM CHLORIDE 20 MEQ/1
40 TABLET, EXTENDED RELEASE ORAL PRN
Status: DISCONTINUED | OUTPATIENT
Start: 2020-05-21 | End: 2020-05-28 | Stop reason: HOSPADM

## 2020-05-21 RX ADMIN — RISPERIDONE 2 MG: 2 TABLET ORAL at 08:51

## 2020-05-21 RX ADMIN — CARBAMAZEPINE 200 MG: 200 TABLET ORAL at 21:34

## 2020-05-21 RX ADMIN — PROPOFOL INJECTABLE EMULSION 45 MCG/KG/MIN: 10 INJECTION, EMULSION INTRAVENOUS at 17:20

## 2020-05-21 RX ADMIN — FAMOTIDINE 20 MG: 20 TABLET ORAL at 21:36

## 2020-05-21 RX ADMIN — FUROSEMIDE 40 MG: 10 INJECTION, SOLUTION INTRAMUSCULAR; INTRAVENOUS at 18:03

## 2020-05-21 RX ADMIN — PROPOFOL INJECTABLE EMULSION 45 MCG/KG/MIN: 10 INJECTION, EMULSION INTRAVENOUS at 11:05

## 2020-05-21 RX ADMIN — PRAVASTATIN SODIUM 40 MG: 40 TABLET ORAL at 21:34

## 2020-05-21 RX ADMIN — PREDNISONE 40 MG: 20 TABLET ORAL at 08:51

## 2020-05-21 RX ADMIN — TADALAFIL 40 MG: 20 TABLET ORAL at 14:16

## 2020-05-21 RX ADMIN — MILRINONE LACTATE 0.25 MCG/KG/MIN: 1 INJECTION, SOLUTION INTRAVENOUS at 18:47

## 2020-05-21 RX ADMIN — CEFTRIAXONE SODIUM 1 G: 1 INJECTION, POWDER, FOR SOLUTION INTRAMUSCULAR; INTRAVENOUS at 14:15

## 2020-05-21 RX ADMIN — GLYCOPYRROLATE AND FORMOTEROL FUMARATE 2 PUFF: 9; 4.8 AEROSOL, METERED RESPIRATORY (INHALATION) at 16:00

## 2020-05-21 RX ADMIN — GLYCOPYRROLATE AND FORMOTEROL FUMARATE 2 PUFF: 9; 4.8 AEROSOL, METERED RESPIRATORY (INHALATION) at 06:03

## 2020-05-21 RX ADMIN — ENOXAPARIN SODIUM 40 MG: 40 INJECTION SUBCUTANEOUS at 14:15

## 2020-05-21 RX ADMIN — RISPERIDONE 2 MG: 2 TABLET ORAL at 21:34

## 2020-05-21 RX ADMIN — ASPIRIN 81 MG 324 MG: 81 TABLET ORAL at 08:51

## 2020-05-21 RX ADMIN — POLYETHYLENE GLYCOL 3350 17 G: 17 POWDER, FOR SOLUTION ORAL at 00:05

## 2020-05-21 RX ADMIN — Medication 10 ML: at 18:03

## 2020-05-21 RX ADMIN — Medication 10 ML: at 21:34

## 2020-05-21 RX ADMIN — PROPOFOL INJECTABLE EMULSION 45 MCG/KG/MIN: 10 INJECTION, EMULSION INTRAVENOUS at 05:43

## 2020-05-21 RX ADMIN — FAMOTIDINE 20 MG: 20 TABLET ORAL at 08:51

## 2020-05-21 RX ADMIN — Medication 10 ML: at 08:52

## 2020-05-21 RX ADMIN — FUROSEMIDE 40 MG: 10 INJECTION, SOLUTION INTRAMUSCULAR; INTRAVENOUS at 08:52

## 2020-05-21 RX ADMIN — CARBAMAZEPINE 200 MG: 200 TABLET ORAL at 08:51

## 2020-05-21 ASSESSMENT — PULMONARY FUNCTION TESTS
PIF_VALUE: 21
PIF_VALUE: 28
PIF_VALUE: 24
PIF_VALUE: 26
PIF_VALUE: 27
PIF_VALUE: 29

## 2020-05-21 ASSESSMENT — PAIN SCALES - GENERAL: PAINLEVEL_OUTOF10: 0

## 2020-05-21 NOTE — PLAN OF CARE
Problem: Restraint Use - Nonviolent/Non-Self-Destructive Behavior:  Goal: Absence of restraint indications  Description: Absence of restraint indications  Outcome: Ongoing  Note: Patient is at risk for pulling out lines or tubes. Patient has attempted to reach for ET tube while not restrained. Goal: Absence of restraint-related injury  Description: Absence of restraint-related injury  Outcome: Ongoing  Note: Patient has not had any restraint related injuries while in restraints. Skin under both wrists intact. Problem: Discharge Planning:  Goal: Participates in care planning  Description: Participates in care planning  Outcome: Ongoing  Note: Patient is not a discharge candidate at this time. Planning in process, patient plans to return home with family. Evaluation of any services needed to be addressed. Problem: Cardiac Output - Decreased:  Goal: Hemodynamic stability will improve  Description: Hemodynamic stability will improve  Outcome: Ongoing  Note: VSS     Problem: Pain:  Description: Pain management should include both nonpharmacologic and pharmacologic interventions. Goal: Pain level will decrease  Description: Pain level will decrease  Outcome: Ongoing  Note: Patient has intermittent complaints of pain. Medication given as prescribed. Repositioned for comfort. Problem: Falls - Risk of:  Goal: Will remain free from falls  Description: Will remain free from falls  Outcome: Ongoing  Note: No falls this shift. Patient remains on falling star program with fall band on and falling star on door. Bed exit alarm on. Problem: Nutrition  Goal: Optimal nutrition therapy  Outcome: Ongoing  Note: Tolerating goal tube feed rate. Unable to review care plan, no family present, patient sedated on vent.

## 2020-05-21 NOTE — CARE COORDINATION
5/21/20, 12:25 PM EDT    DISCHARGE ON GOING EVALUATION    Satish Encinas day: 4  Location: -04/004-A Reason for admit: Acute respiratory failure ([facility] [J96.00]  Acute on chronic respiratory failure ([facility] [address] [J96.20]   Procedure:   5/17 Intubated in ED  5/17 CVC   5/18 Echo with EF 50-55%; small circumferential pericardial effusion without evidence of tamponade  5/21 CXR: Stable cardiac enlargement with moderate diffuse pulmonary venous congestion and/or infiltrates. Bilateral effusions not excluded  Treatment Plan of Care: Lasix dose decreased. Tadalafil started. Peep decreased. Remains on vent w/ETT on PCV, peep 10, FIO2 70%, sats 92%. Tmax 99.9. NSR. Unable to follow commands; DE x4 to painful stim. Internal Med, Intensivist, and Cardiology following. Dietitian consulted. Telemetry, OG w/TF, pandya care. Primacor @ 0.25 mcg/kg/min, levo @ 4 mcg/min, diprivan @ 45 mcg/kg/min, asa, tegretol, IV rocephin, lovenox, pepcid, IV lasix 40 mg bid, inhaler, pravastatin, prednisone, risperdal, tadalafil.   Barriers to Discharge: on vent  PCP: Sandie Kc MD  Readmission Risk Score: 19%  Patient Goals/Plan/Treatment Preferences: From home w/mother. Wears 4L O2 thru [facility] and home cpap. Plan pending clinical course.

## 2020-05-21 NOTE — PROGRESS NOTES
INTERNAL MEDICINE Progress Note  5/21/2020 3:16 PM  Subjective:   Admit Date: 5/17/2020  PCP: Owen No MD  Interval History:   Sedated on vent, ETT  Diuresing well  Objective:   Vitals: /63   Pulse 79   Temp 99.9 °F (37.7 °C) (Bladder)   Resp 12   Ht 6' 2\" (1.88 m)   Wt 278 lb 7.1 oz (126.3 kg)   SpO2 91%   BMI 35.75 kg/m²   General appearance: sedated on vent  HEENT:  atraumatic  Neck:  no JVD  Lungs: diminished breath sounds bilaterally  Heart: S1, S2 normal  Abdomen: normal findings: bowel sounds normal and no masses palpable and abnormal findings:  distended  Extremities: edema + mateo  Neurologic:  sedated on vent      Medications:   Scheduled Meds:   Tadalafil (PAH)  40 mg Oral Daily    glycopyrrolate-formoterol  2 puff Inhalation BID    furosemide  40 mg Intravenous BID    aspirin  324 mg Oral Daily    carBAMazepine  200 mg Oral BID    pravastatin  40 mg Oral Nightly    risperiDONE  2 mg Oral BID    sodium chloride flush  10 mL Intravenous 2 times per day    enoxaparin  40 mg Subcutaneous Q24H    predniSONE  40 mg Oral Daily    cefTRIAXone (ROCEPHIN) IV  1 g Intravenous Q24H    famotidine  20 mg Per NG tube BID     Continuous Infusions:   propofol 45 mcg/kg/min (05/21/20 1300)    milrinone 0.25 mcg/kg/min (05/21/20 1300)    norepinephrine 4 mcg/min (05/21/20 1300)       Lab Results:   CBC:   Recent Labs     05/19/20  0445 05/20/20  0510 05/21/20  0621   WBC 6.4 7.3 7.0   HGB 16.0 15.0 15.5    185 170     BMP:    Recent Labs     05/20/20  0510 05/21/20  0621 05/21/20  0715    148* 141   K 4.0 2.4* 4.0    119* 102   CO2 31 23 32   BUN 41* 28* 38*   CREATININE 1.1 0.5 0.7   GLUCOSE 113* 74 94     Magnesium:    Lab Results   Component Value Date    MG 2.2 05/17/2020     HgBA1c:    Lab Results   Component Value Date    LABA1C 6.0 12/15/2019     TSH:    Lab Results   Component Value Date    TSH 2.040 03/02/2020 05/18/20 1000    Specimen Source: Blood gases pH, Blood Gas 7.23Low     PCO2 61High  mmhg    PO2 79 mmhg    HCO3 26 mmol/l    Base Excess (Calculated) -3.8Low  mmol/l    O2 Sat 93 %    IFIO2 100    DEVICE NRB    Source: R Radial      Procalcitonin 0. 15High      CXR  Increased haziness in the mid and lower right hemithorax likely due to an increase in the right pleural effusion. Underlying atelectasis is likely, cannot exclude pneumonia. Persistent mild interstitial pulmonary edema or atypical pneumonia. Minimal left basilar atelectasis. Transthoracic Echocardiography Report (TTE)   Left ventricular size is normal and systolic function is mildly reduced.   Ejection fraction was estimated at 50-55%. LV wall thickness is within   normal limits.   Intraventricular septal wall compression during systole suggestive of RV   pressure overload.   Markedly enlarged right atrium size.   The right ventricle was not clearly visualized. Appears dilated.  RVSP of 55-60 mm Hg consistent with moderate pulmonary hypertension.   Small circumferential pericardial effusion without evidence of tamponade   physiology. Pavel Cone size is dilated with <50% respiratory collapse.     Assessment and Plan:   1. Acute on chronic hypoxemic / hypercapnic resp failure, req vent support  2. COPD with acute exac  3. Acute on chronic CHF pEF, diuresing well  4. Moderate pulmonary hypertension  5. Elevated troponin  6.  MAGNO, resolved     Cont vent care / weaning per CCS  Bronchodilators  GI and DVT prophylaxes  Am labs    Guerita Catherine MD

## 2020-05-21 NOTE — PROGRESS NOTES
patient inability to give details of history secondary to hypoxia and need for immediate intubation.  Patient was dropped off at the emergency room and no family member accompanied patient to the emergency room. 5/18 patient  continues on mechanical ventilation 5/20 increased PEEP requirements overnight. Added Bevespi for COPD exacerbation. Milrinone added 5/19 by cardiology for increased diuresis 5/21 continue to diuresis. Edema is improving. PEEP has improved to 10 overnight. Past Medical History:  Per HPI  Family History: Mother: Hypertension  Social History:  Current every day smoker, 20-pack-year history, denies EtOH use.  Denies drug use. ROS   Intubated and sedated on mechanical ventilation    Scheduled Meds:   glycopyrrolate-formoterol  2 puff Inhalation BID    furosemide  40 mg Intravenous BID    aspirin  324 mg Oral Daily    carBAMazepine  200 mg Oral BID    pravastatin  40 mg Oral Nightly    risperiDONE  2 mg Oral BID    sodium chloride flush  10 mL Intravenous 2 times per day    enoxaparin  40 mg Subcutaneous Q24H    predniSONE  40 mg Oral Daily    cefTRIAXone (ROCEPHIN) IV  1 g Intravenous Q24H    famotidine  20 mg Per NG tube BID     Continuous Infusions:   propofol 45 mcg/kg/min (05/21/20 0543)    milrinone 0.25 mcg/kg/min (05/20/20 1355)    norepinephrine 4 mcg/min (05/19/20 1631)       PHYSICAL EXAMINATION:  T:  98.6. P:  82. RR:  21. B/P:  104/63. FiO2:  70. O2 Sat:  100. I/O:  0818/2632    PC: 12/10: TV: 306: RRTotal: 20: Ti:1 sec: ETCO2: 47. Body mass index is 35.75 kg/m². General:   Acute on chronically ill-appearing black male  HEENT:  normocephalic and atraumatic. No scleral icterus. PERR  Neck: supple. No Thyromegaly. Lungs: diminished to auscultation. No retractions  Cardiac: RRR. No JVD. Abdomen: soft. Nontender. Extremities:  No clubbing, cyanosis. +2 pitting edema bilateral lower extremities  Vasculature: capillary refill < 3 seconds.  Palpable dorsalis pedis pulses. Skin:  warm and dry. Psych: Drowsy from pain x4 follows no commands  Lymph:  No supraclavicular adenopathy. Neurologic:  No focal deficit. No seizures. Data: (All radiographs, tracings, PFTs, and imaging are personally viewed and interpreted unless otherwise noted).  Sodium 141, potassium 4.0, chloride 102, CO2 32, BUN 38, creatinine 0.7, anion gap 7.0, glucose 91   WBC 7.0, hemoglobin 15.5, hematocrit 52.1, platelet count 959.  Telemetry shows normal sinus rhythm   Paced x-ray 5/21/2020 reports stable cardiac enlargement, moderate diffuse pulmonary venous congestion.  Chest x-ray 5/19/2020 reports moderate hazy appearance of both mid and lower lung fields consistent with atelectasis pneumonia and edema, small effusion on the right side questionable small left effusion. Overall appearance worse.  Echocardiogram 5/18/2020 reports ejection fraction from 50 to 55%, enlarged right atrium moderate pulmonary hypertension, small circumferential pericardial effusion        Meets Continued ICU Level Care Criteria:    [x] Yes   [] No - Transfer Planned to listed location:  [] HOSPITALIST CONTACTED- DR     Case and plan discussed with Dr. Emerson Schaumann and Dr. Kirstin Cantu . Electronically signed by Mallory Pradhan. SAVANNAH Linton - [facility]  CRITICAL CARE SPECIALIST  Patient seen by me. Case discussed with nurse practitioner. Patient continues to fail weaning from mechanical ventilator. PEEP requirements are too high to successfully extubate and to successfully wean. Tadalafil added for pulmonary hypertension. Patient may require tracheostomy tube. Electronically signed by Woodson Dienes Emerson Schaumann MD.

## 2020-05-22 LAB
ANION GAP SERPL CALCULATED.3IONS-SCNC: 7 MEQ/L (ref 8–16)
BACTERIA: ABNORMAL /HPF
BILIRUBIN URINE: NEGATIVE
BLOOD CULTURE, ROUTINE: NORMAL
BLOOD CULTURE, ROUTINE: NORMAL
BLOOD, URINE: ABNORMAL
BUN BLDV-MCNC: 38 MG/DL (ref 7–22)
CALCIUM SERPL-MCNC: 9.1 MG/DL (ref 8.5–10.5)
CASTS 2: ABNORMAL /LPF
CASTS UA: ABNORMAL /LPF
CHARACTER, URINE: ABNORMAL
CHLORIDE BLD-SCNC: 101 MEQ/L (ref 98–111)
CO2: 34 MEQ/L (ref 23–33)
COLOR: YELLOW
CREAT SERPL-MCNC: 0.8 MG/DL (ref 0.4–1.2)
CRYSTALS, UA: ABNORMAL
EPITHELIAL CELLS, UA: ABNORMAL /HPF
ERYTHROCYTE [DISTWIDTH] IN BLOOD BY AUTOMATED COUNT: 21.2 % (ref 11.5–14.5)
ERYTHROCYTE [DISTWIDTH] IN BLOOD BY AUTOMATED COUNT: 65.2 FL (ref 35–45)
GFR SERPL CREATININE-BSD FRML MDRD: > 90 ML/MIN/1.73M2
GLUCOSE BLD-MCNC: 94 MG/DL (ref 70–108)
GLUCOSE URINE: NEGATIVE MG/DL
HCT VFR BLD CALC: 51.9 % (ref 42–52)
HEMOGLOBIN: 15.4 GM/DL (ref 14–18)
KETONES, URINE: NEGATIVE
LEUKOCYTE ESTERASE, URINE: NEGATIVE
MCH RBC QN AUTO: 26.4 PG (ref 26–33)
MCHC RBC AUTO-ENTMCNC: 29.7 GM/DL (ref 32.2–35.5)
MCV RBC AUTO: 89 FL (ref 80–94)
MISC REFERENCE: NORMAL
MISCELLANEOUS 2: ABNORMAL
NITRITE, URINE: NEGATIVE
PH UA: 6 (ref 5–9)
PLATELET # BLD: 158 THOU/MM3 (ref 130–400)
PMV BLD AUTO: 10.5 FL (ref 9.4–12.4)
POTASSIUM SERPL-SCNC: 3.8 MEQ/L (ref 3.5–5.2)
PROTEIN UA: 30
RBC # BLD: 5.83 MILL/MM3 (ref 4.7–6.1)
RBC URINE: > 200 /HPF
RENAL EPITHELIAL, UA: ABNORMAL
SODIUM BLD-SCNC: 142 MEQ/L (ref 135–145)
SPECIFIC GRAVITY, URINE: 1.03 (ref 1–1.03)
UROBILINOGEN, URINE: 1 EU/DL (ref 0–1)
WBC # BLD: 10.1 THOU/MM3 (ref 4.8–10.8)
WBC UA: ABNORMAL /HPF
YEAST: ABNORMAL

## 2020-05-22 PROCEDURE — 6360000002 HC RX W HCPCS: Performed by: INTERNAL MEDICINE

## 2020-05-22 PROCEDURE — 85027 COMPLETE CBC AUTOMATED: CPT

## 2020-05-22 PROCEDURE — 0100U HC RESPIRPTHGN MULT REV TRANS & AMP PRB TECH 21 TRGT: CPT

## 2020-05-22 PROCEDURE — 2580000003 HC RX 258: Performed by: INTERNAL MEDICINE

## 2020-05-22 PROCEDURE — 94761 N-INVAS EAR/PLS OXIMETRY MLT: CPT

## 2020-05-22 PROCEDURE — 87801 DETECT AGNT MULT DNA AMPLI: CPT

## 2020-05-22 PROCEDURE — 2000000000 HC ICU R&B

## 2020-05-22 PROCEDURE — 94770 HC ETCO2 MONITOR DAILY: CPT

## 2020-05-22 PROCEDURE — 87070 CULTURE OTHR SPECIMN AEROBIC: CPT

## 2020-05-22 PROCEDURE — 6370000000 HC RX 637 (ALT 250 FOR IP): Performed by: INTERNAL MEDICINE

## 2020-05-22 PROCEDURE — 80048 BASIC METABOLIC PNL TOTAL CA: CPT

## 2020-05-22 PROCEDURE — 94003 VENT MGMT INPAT SUBQ DAY: CPT

## 2020-05-22 PROCEDURE — 87205 SMEAR GRAM STAIN: CPT

## 2020-05-22 PROCEDURE — 87040 BLOOD CULTURE FOR BACTERIA: CPT

## 2020-05-22 PROCEDURE — 2700000000 HC OXYGEN THERAPY PER DAY

## 2020-05-22 PROCEDURE — 94640 AIRWAY INHALATION TREATMENT: CPT

## 2020-05-22 PROCEDURE — 99233 SBSQ HOSP IP/OBS HIGH 50: CPT | Performed by: INTERNAL MEDICINE

## 2020-05-22 PROCEDURE — 6370000000 HC RX 637 (ALT 250 FOR IP): Performed by: NURSE PRACTITIONER

## 2020-05-22 PROCEDURE — 81001 URINALYSIS AUTO W/SCOPE: CPT

## 2020-05-22 PROCEDURE — 2709999900 HC NON-CHARGEABLE SUPPLY

## 2020-05-22 PROCEDURE — 36415 COLL VENOUS BLD VENIPUNCTURE: CPT

## 2020-05-22 PROCEDURE — 87147 CULTURE TYPE IMMUNOLOGIC: CPT

## 2020-05-22 PROCEDURE — 99232 SBSQ HOSP IP/OBS MODERATE 35: CPT | Performed by: PHYSICIAN ASSISTANT

## 2020-05-22 PROCEDURE — APPSS180 APP SPLIT SHARED TIME > 60 MINUTES: Performed by: NURSE PRACTITIONER

## 2020-05-22 RX ADMIN — FUROSEMIDE 40 MG: 10 INJECTION, SOLUTION INTRAMUSCULAR; INTRAVENOUS at 08:28

## 2020-05-22 RX ADMIN — ACETAMINOPHEN 650 MG: 325 TABLET ORAL at 08:28

## 2020-05-22 RX ADMIN — ENOXAPARIN SODIUM 40 MG: 40 INJECTION SUBCUTANEOUS at 16:32

## 2020-05-22 RX ADMIN — TADALAFIL 40 MG: 20 TABLET ORAL at 08:28

## 2020-05-22 RX ADMIN — PROPOFOL INJECTABLE EMULSION 40 MCG/KG/MIN: 10 INJECTION, EMULSION INTRAVENOUS at 00:17

## 2020-05-22 RX ADMIN — RISPERIDONE 2 MG: 2 TABLET ORAL at 08:27

## 2020-05-22 RX ADMIN — PROPOFOL INJECTABLE EMULSION 40 MCG/KG/MIN: 10 INJECTION, EMULSION INTRAVENOUS at 14:25

## 2020-05-22 RX ADMIN — FAMOTIDINE 20 MG: 20 TABLET ORAL at 08:28

## 2020-05-22 RX ADMIN — GLYCOPYRROLATE AND FORMOTEROL FUMARATE 2 PUFF: 9; 4.8 AEROSOL, METERED RESPIRATORY (INHALATION) at 20:34

## 2020-05-22 RX ADMIN — GLYCOPYRROLATE AND FORMOTEROL FUMARATE 2 PUFF: 9; 4.8 AEROSOL, METERED RESPIRATORY (INHALATION) at 08:38

## 2020-05-22 RX ADMIN — ACETAMINOPHEN 650 MG: 325 TABLET ORAL at 00:58

## 2020-05-22 RX ADMIN — FAMOTIDINE 20 MG: 20 TABLET ORAL at 22:52

## 2020-05-22 RX ADMIN — PROPOFOL INJECTABLE EMULSION 40 MCG/KG/MIN: 10 INJECTION, EMULSION INTRAVENOUS at 21:18

## 2020-05-22 RX ADMIN — FUROSEMIDE 40 MG: 10 INJECTION, SOLUTION INTRAMUSCULAR; INTRAVENOUS at 22:51

## 2020-05-22 RX ADMIN — PRAVASTATIN SODIUM 40 MG: 40 TABLET ORAL at 22:52

## 2020-05-22 RX ADMIN — CARBAMAZEPINE 200 MG: 200 TABLET ORAL at 08:28

## 2020-05-22 RX ADMIN — RISPERIDONE 2 MG: 2 TABLET ORAL at 22:52

## 2020-05-22 RX ADMIN — CARBAMAZEPINE 200 MG: 200 TABLET ORAL at 22:52

## 2020-05-22 RX ADMIN — CEFTRIAXONE SODIUM 1 G: 1 INJECTION, POWDER, FOR SOLUTION INTRAMUSCULAR; INTRAVENOUS at 16:17

## 2020-05-22 RX ADMIN — ASPIRIN 81 MG 324 MG: 81 TABLET ORAL at 08:27

## 2020-05-22 RX ADMIN — Medication 10 ML: at 22:53

## 2020-05-22 RX ADMIN — Medication 10 ML: at 08:48

## 2020-05-22 RX ADMIN — PREDNISONE 40 MG: 20 TABLET ORAL at 08:28

## 2020-05-22 RX ADMIN — PROPOFOL INJECTABLE EMULSION 40 MCG/KG/MIN: 10 INJECTION, EMULSION INTRAVENOUS at 07:16

## 2020-05-22 ASSESSMENT — PULMONARY FUNCTION TESTS
PIF_VALUE: 22
PIF_VALUE: 23
PIF_VALUE: 25
PIF_VALUE: 21
PIF_VALUE: 24

## 2020-05-22 ASSESSMENT — PAIN SCALES - GENERAL: PAINLEVEL_OUTOF10: 0

## 2020-05-22 NOTE — PROGRESS NOTES
INTERNAL MEDICINE Progress Note  5/22/2020 10:28 AM  Subjective:   Admit Date: 5/17/2020  PCP: Gina Saucedo MD  Interval History:   Sedated on vent, ETT    Objective:   Vitals: BP (!) 77/45   Pulse 79   Temp 101.1 °F (38.4 °C) (Bladder)   Resp 16   Ht 6' 2\" (1.88 m)   Wt 278 lb 3.5 oz (126.2 kg)   SpO2 91%   BMI 35.72 kg/m²   General appearance: sedated on vent, calm  HEENT:  atraumatic  Neck:  no JVD  Lungs: diminished breath sounds bilaterally  Heart: S1, S2 normal  Abdomen: normal findings: bowel sounds normal and no masses palpable and abnormal findings:  distended  Extremities: edema + mateo  Neurologic:  sedated on vent      Medications:   Scheduled Meds:   Tadalafil (PAH)  40 mg Oral Daily    glycopyrrolate-formoterol  2 puff Inhalation BID    furosemide  40 mg Intravenous BID    aspirin  324 mg Oral Daily    carBAMazepine  200 mg Oral BID    pravastatin  40 mg Oral Nightly    risperiDONE  2 mg Oral BID    sodium chloride flush  10 mL Intravenous 2 times per day    enoxaparin  40 mg Subcutaneous Q24H    predniSONE  40 mg Oral Daily    cefTRIAXone (ROCEPHIN) IV  1 g Intravenous Q24H    famotidine  20 mg Per NG tube BID     Continuous Infusions:   propofol 40 mcg/kg/min (05/22/20 0716)    milrinone 0.25 mcg/kg/min (05/21/20 1847)    norepinephrine 4 mcg/min (05/22/20 0959)       Lab Results:   CBC:   Recent Labs     05/20/20  0510 05/21/20  0621 05/22/20  0437   WBC 7.3 7.0 10.1   HGB 15.0 15.5 15.4    170 158     BMP:    Recent Labs     05/21/20  0621 05/21/20  0715 05/22/20  0315   * 141 142   K 2.4* 4.0 3.8   * 102 101   CO2 23 32 34*   BUN 28* 38* 38*   CREATININE 0.5 0.7 0.8   GLUCOSE 74 94 94     Magnesium:    Lab Results   Component Value Date    MG 2.2 05/17/2020     HgBA1c:    Lab Results   Component Value Date    LABA1C 6.0 12/15/2019     TSH:    Lab Results   Component Value Date    TSH 2.040 03/02/2020 05/18/20 1000    Specimen Source: Blood gases     pH, Blood Gas 7.23Low     PCO2 61High  mmhg    PO2 79 mmhg    HCO3 26 mmol/l    Base Excess (Calculated) -3.8Low  mmol/l    O2 Sat 93 %    IFIO2 100    DEVICE NRB    Source: R Radial      Procalcitonin 0. 15High      CXR  Increased haziness in the mid and lower right hemithorax likely due to an increase in the right pleural effusion. Underlying atelectasis is likely, cannot exclude pneumonia. Persistent mild interstitial pulmonary edema or atypical pneumonia. Minimal left basilar atelectasis. Transthoracic Echocardiography Report (TTE)   Left ventricular size is normal and systolic function is mildly reduced.   Ejection fraction was estimated at 50-55%. LV wall thickness is within   normal limits.   Intraventricular septal wall compression during systole suggestive of RV   pressure overload.   Markedly enlarged right atrium size.   The right ventricle was not clearly visualized. Appears dilated.  RVSP of 55-60 mm Hg consistent with moderate pulmonary hypertension.   Small circumferential pericardial effusion without evidence of tamponade   physiology. [address] size is dilated with <50% respiratory collapse.     Assessment and Plan:   1. Acute on chronic hypoxemic / hypercapnic resp failure, req vent support  2. COPD with acute exac  3. Acute on chronic CHF pEF, diuresing well  4. Moderate pulmonary hypertension, on Tadalafil  5. Elevated troponin  6.  MAGNO, resolved     Cont vent care / weaning per CCS  Bronchodilators  Vasopressor prn to keep sbp >100    Nahum Camacho MD

## 2020-05-22 NOTE — PROGRESS NOTES
CRITICAL CARE PROGRESS NOTE      Patient: Jaxson Orellana    Unit/Bed:4D-04/004-A  YOB: 1970  MRN: [id number]   PCP: Sandie Kc MD  Date of Admission: 5/17/2020  Chief Complaint:- SOB    Assessment and Plan:      1. Acute on chronic hypoxic hypercapnic respiratory failure: Wears 3 L at baseline.  Patient also wears CPAP at night, with 3 L bled in.  On arrival to the emergency room patient was noted to be high hypoxic with an SPO2 in the 60s.  He was emergently intubated.  Maintain peak pressure less than 35.     2. Acute on chronic heart failure with reduced ejection fraction: Noted +4 pitting edema bilateral lower extremities.  Diuresis 60 mg of Lasix twice daily. Albumin level normal.  ECHO 50-55%.  Added milrinone 5/19 for increased diuresis  3. Moderate Pulmonary hypertension: Tadalafil added 5/21. Right ventricular pressures 55 to 60 mmHg  4. COPD exacerbation: Steroids, Solu-Medrol 40 mg every 6 hours for 3 days.  Rocephin.  Bio fire pneumonia panel negative, Bevespi added 5/20  5. Fever of unknown origin: Recultured 5/22  6. Elevated troponin: Likely secondary to hypoxia, EKG shows no acute changes.  Repeat echo pending, trend troponin.  Patient on 325 ASA daily.  Cardiology consulted, possible ischemic work-up in the future. 7. Hypertension: History, currently hypotensive.  Norvasc with hold parameters  8. Pericardial effusion: Small effusion noted on echo, circumferential but no evidence of tamponade. 9. Schizophrenia: Risperdal    10. GERD: Pepcid twice daily  11. Hyperlipidemia: Pravastatin 40 mg nightly  12. Hyperkalemia: resolved; 1 dose kayexalate  13.  Acute kidney injury: resolved;  Likely secondary to hypotension, and prerenal in nature.  Urine electrolytes shows pre-renal, likely hypotension cause, monitor.  Improved creatinine 1.1       INITIAL H AND P AND ICU COURSE:  Mateus Mccall is a 44year-old black male who presented to [facility] on 5/17/2020 with complaints of shortness of breath that started today. Paula Fernandes has a past medical history of hypertension, heart failure, schizophrenia, COPD.  HPI is limited secondary to patient inability to give details of history secondary to hypoxia and need for immediate intubation.  Patient was dropped off at the emergency room and no family member accompanied patient to the emergency room. 5/18 patient  continues on mechanical ventilation 5/20 increased PEEP requirements overnight.  Added Bevespi for COPD exacerbation.  Milrinone added 5/19 by cardiology for increased diuresis 5/21 continue to diuresis. Edema is improving. PEEP has improved to 10 overnight.  5/22 patient developed fever overnight. Tadalafil was added 5/21 for moderate pulmonary hypertension. Past Medical History:  Per HPI  Family History: Mother: Hypertension  Social History:  Current every day smoker, 20-pack-year history, denies EtOH use.  Denies drug use. ROS   Intubated and sedated on mechanical ventilation    Scheduled Meds:   Tadalafil (PAH)  40 mg Oral Daily    glycopyrrolate-formoterol  2 puff Inhalation BID    furosemide  40 mg Intravenous BID    aspirin  324 mg Oral Daily    carBAMazepine  200 mg Oral BID    pravastatin  40 mg Oral Nightly    risperiDONE  2 mg Oral BID    sodium chloride flush  10 mL Intravenous 2 times per day    enoxaparin  40 mg Subcutaneous Q24H    predniSONE  40 mg Oral Daily    cefTRIAXone (ROCEPHIN) IV  1 g Intravenous Q24H    famotidine  20 mg Per NG tube BID     Continuous Infusions:   propofol 40 mcg/kg/min (05/22/20 0017)    milrinone 0.25 mcg/kg/min (05/21/20 1847)    norepinephrine 1 mcg/min (05/22/20 0520)       PHYSICAL EXAMINATION:  T:  101.5.  P:  84. RR:  19. B/P:  112/61. FiO2:  60. O2 Sat:  96.  I/O:  1720/2700  PC: 14/10: TV: 596: RRTotal: 18: Ti:1 sec: ETCO2: 49. Body mass index is 35.72 kg/m².      General:   Acute on chronically ill-appearing white male  HEENT:  normocephalic and

## 2020-05-22 NOTE — PLAN OF CARE
Problem: Nutrition  Goal: Optimal nutrition therapy  5/22/2020 1041 by Gualberto Canela RD, LD  Outcome: Ongoing   Nutrition Problem: Inadequate oral intake  Intervention: Food and/or Nutrient Delivery: Continue current Tube Feeding  Nutritional Goals: TF to provide % of nutrient need while pt is intubated

## 2020-05-22 NOTE — CARE COORDINATION
5/22/20, 11:49 AM EDT    DISCHARGE ON GOING EVALUATION    Satish Encinas day: 5  Location: -04/004-A Reason for admit: Acute respiratory failure ([facility] [address] [J96.00]  Acute on chronic respiratory failure ([facility] [address] [J96.20]   Procedure:   5/17 Intubated in ED  5/17 CVC   5/18 Echo with EF 50-55%; small circumferential pericardial effusion without evidence of tamponade  5/21 CXR: Stable cardiac enlargement with moderate diffuse pulmonary venous congestion and/or infiltrates. Bilateral effusions not excluded  Treatment Plan of Care: Intensivist, internal med, cardio following. Remains on vent. Repeat cultures sent 5/22 r/t fever of unknown origin. Tadalafil started on 5/21. MAGNO resolved, creatinine 0. 8. Iv rocephin. Nebs. IV lasix. Prednisone. Remains or Primacor gtt. Levo gtt @ 4mcg. Propofol at 40mcg. Telemetry. Daily wts. I/O. Dietitian following. Barriers to Discharge: Await medical stability. PCP: Eugenio Reynolds MD  Readmission Risk Score: 19%  Patient Goals/Plan/Treatment Preferences: From home with mother. Baseline 4L O2, cpap at night. Plans pending clinical course.

## 2020-05-22 NOTE — FLOWSHEET NOTE
Pt was unresponsive but he was blessed.       05/22/20 1633   Encounter Summary   Services provided to: Patient   Referral/Consult From: Rounding   Continue Visiting Yes  (5/22 NR P)   Complexity of Encounter Low   Length of Encounter 15 minutes   Routine   Type Follow up   Assessment Unable to respond   Intervention [place]

## 2020-05-22 NOTE — PROGRESS NOTES
Nutrition Assessment (Enteral Nutrition)    Type and Reason for Visit: Reassess    Nutrition Recommendations: Continue current TF regimen. Free water per MD.  Nutrition Assessment:   Pt improving from a nutritional standpoint AEB received ~92% estimated needs. Remains at risk for further nutritional compromise r/t admitted with Acute Respiratory Failure, Intubated, COPD, Acute on Chronic CHF and underlying medical condition (Hx CHF, HTN, Pneumonia, GERD, Schizophrenia). Nutrition recommendations/interventions as per above. Malnutrition Assessment:  · Malnutrition Status: At risk for malnutrition  · Context: Acute illness or injury  · Findings of the 6 clinical characteristics of malnutrition (Minimum of 2 out of 6 clinical characteristics is required to make the diagnosis of moderate or severe Protein Calorie Malnutrition based on AND/ASPEN Guidelines):  1. Energy Intake-Greater than 75% of estimated energy requirement,      2. Weight Loss-Unable to assess(edema & lasix this admit),    3. Fat Loss-No significant subcutaneous fat loss,    4. Muscle Loss-No significant muscle mass loss,    5. Fluid Accumulation-Moderate to severe fluid accumulation,    6.  Strength-Not measured    Nutrition Risk Level: High    Nutrition Needs:  · Estimated Daily Total Kcal: ~1980 kcals ( 15 kcals/kg on admit weight of 132 kg)    · Estimated Daily Protein (g): ~104 grams ( 1.2 grams protein/kg on IBW of 86.4 kg)  Monitor renal status  · Estimated Daily Fluid (ml/day):  per MD    Nutrition Diagnosis:   · Problem: Inadequate oral intake  · Etiology: related to Impaired respiratory function-inability to consume food     Signs and symptoms:  as evidenced by NPO status due to medical condition    Objective Information:  · Nutrition-Focused Physical Findings: Pt  sedated with diprivan infusing 30.5ml/hr (805 lipid kcals). RN mentions pt had a BM today & tolerating TF. Labs: reviewed. MAP 78.  Meds: Lasix, Deltasone, Levophed, Glycolax. 30-130ml residual noted.    · Wound Type: None(per RN)  · Current Nutrition Therapies:  · Oral Diet Orders: NPO   · Oral Diet intake: NPO  · Oral Nutrition Supplement (ONS) Orders: None  · ONS intake: NPO  · Tube Feeding (TF) Orders:   · Feeding Route: Orogastric  · Formula: Renal(Nepro)  · Rate (ml/hr):35ml/hr    · Volume (ml/day): 600ml  · Duration: Continuous  · Additives/Modulars: (1 ( 2.5 oz) liquid protein bid)  · Water Flushes: per Dr  · Goal TF & Flush Orders Provides: 1288kcals TF (2093kcals with diprivan), 101 grams protein, 97 grams CHO/24 hrs  · Additional Calories:  diprivan 30.5ml/hr  (805 lipid kcals)  · Anthropometric Measures:  · Ht: 6' 2\" (188 cm)   · Current Body Wt: 278 lb 3.5 oz (126.2 kg)(+ 2 RUE, LUE, + 4 RLE, LLE, + 2 perineal, + 1 sacral edema)  · Admission Body Wt: 291 lb 0.1 oz (132 kg)(5/18 +2+3 edema)  · Usual Body Wt: 270 lb 11.6 oz (122.8 kg)(12/31/19 per EMR )  · Weight Change: difficult to assess d/t + 2, + 4 edema this admit   · Ideal Body Wt: 190 lb (86.2 kg), % Ideal Body 146%  · BMI Classification: BMI 35.0 - 39.9 Obese Class II(37.4)    Nutrition Interventions:   Continue current Tube Feeding  Continued Inpatient Monitoring, Education not appropriate at this time, Coordination of Care    Nutrition Evaluation:   · Evaluation: Progressing toward goals   · Goals: TF to provide % of nutrient need while pt is intubated    · Monitoring: Nutrition Progression, TF Intake, TF Tolerance, Skin Integrity, Weight, Pertinent Labs, Monitor Hemodynamic Status, Monitor Bowel Function      Electronically signed by Daja Cunningham RD, BINH on 5/22/20 at 10:41 AM EDT    Contact Number: [phone]

## 2020-05-22 NOTE — PLAN OF CARE
Problem: Restraint Use - Nonviolent/Non-Self-Destructive Behavior:  Goal: Absence of restraint indications  Description: Absence of restraint indications  5/22/2020 0233 by Barbara Crook RN  Note: Patient is at risk for pulling out lines or tubes. Patient has attempted to reach for ET tube while not restrained. 5/21/2020 2019 by Watson Enrique RN  Outcome: Ongoing  Note:   Patient is at risk for pulling out lines or tubes. Patient has attempted to reach for ET tube while not restrained. Problem: Restraint Use - Nonviolent/Non-Self-Destructive Behavior:  Goal: Absence of restraint-related injury  Description: Absence of restraint-related injury  5/22/2020 0233 by Barbara Crook RN  Outcome: Ongoing  Note: Patient has not had any restraint related injuries while in restraints. Skin under both wrists intact. Problem: Discharge Planning:  Goal: Participates in care planning  Description: Participates in care planning  Outcome: Ongoing  Note: Patient is not a discharge candidate at this time. Planning in process, patient plans to return home with family. Evaluation of any services needed to be addressed. Problem: Airway Clearance - Ineffective:  Goal: Ability to maintain a clear airway will improve  Description: Ability to maintain a clear airway will improve  5/22/2020 0233 by Barbara Crook RN  Outcome: Ongoing  Note: Patient remains orally intubated. Frequent suctioning needed. Problem: Cardiac Output - Decreased:  Goal: Hemodynamic stability will improve  Description: Hemodynamic stability will improve  Outcome: Ongoing  Note: VSS     Problem: Falls - Risk of:  Goal: Will remain free from falls  Description: Will remain free from falls  5/22/2020 0233 by Barbara Crook RN  Outcome: Ongoing  Note: No falls this shift. Patient remains on falling star program with fall band on and falling star on door. Bed exit alarm on.         Problem: Nutrition  Goal: Optimal nutrition therapy  5/22/2020 9862

## 2020-05-22 NOTE — PLAN OF CARE
Problem: Falls - Risk of:  Goal: Will remain free from falls  Description: Will remain free from falls  Outcome: Met This Shift     Problem: Falls - Risk of:  Goal: Absence of physical injury  Description: Absence of physical injury  Outcome: Met This Shift     Problem: Restraint Use - Nonviolent/Non-Self-Destructive Behavior:  Goal: Absence of restraint indications  Description: Absence of restraint indications  Outcome: Ongoing  Note:   Patient is at risk for pulling out lines or tubes. Patient has attempted to reach for ET tube while not restrained. Problem: Restraint Use - Nonviolent/Non-Self-Destructive Behavior:  Goal: Absence of restraint-related injury  Description: Absence of restraint-related injury  Outcome: Ongoing  Note: Skin intact to bilateral wrist.      Problem: Airway Clearance - Ineffective:  Goal: Ability to maintain a clear airway will improve  Description: Ability to maintain a clear airway will improve  Outcome: Ongoing  Note: Suctioning large amt of secretions from ETT. Remains intubated     Problem: Nutrition  Goal: Optimal nutrition therapy  Outcome: Ongoing  Note: Tube feed at goal. Tolerating at present.had one high residula today. Care plan reviewed with patient and sister. Sister verbalize understanding of the plan of care and contribute to goal setting. Pt unable due to sedation and intubated.

## 2020-05-22 NOTE — PROGRESS NOTES
Cardiology Progress Note      Patient: Pedro Luis Pike  YOB: 1970  MRN: [id number]   Acct: [de-identified]  Admit Date:  5/17/2020  Primary Cardiologist: none  Pt seen by dr jesus    Note per dr Rakesh Hubbard for Consultation:  CHF, Elevated trops        History Of Present Illness:    44 y.o. male c hx of mild LV dysfunction with EF 45-50%, COPD, on 3L supplemental O2, JORDAN on CPAP, schizophrenia, HTN,  who presented to the hospital with shortness of breath. History is obtained from the chart at patient is intubated and sedated. Patient is emergently intubated in the ER due to what seemed liked COPD exacerbation. Cardiology was consulted for mild elevation of troponins. EKG shows SR, poor R wave progression. Echo from 4/2019 showed TDS and EF 45-50%.   Cr 1.4, Trops 0.035>0.037>0.029\"    Subjective (Events in last 24 hours):   Pt sedated on vent  On diprivan, levophed, primacor  PEEP 10, FiO2 55    Net I/o -5.77 L  Weight down 15 lb    Objective:   BP (!) 77/45   Pulse 79   Temp 101.1 °F (38.4 °C) (Bladder)   Resp 16   Ht 6' 2\" (1.88 m)   Wt 278 lb 3.5 oz (126.2 kg)   SpO2 91%   BMI 35.72 kg/m²        TELEMETRY: nsr    Physical Exam:  General Appearance: sedated, on vent  Cardiovascular: normal rate, regular rhythm, normal S1 and S2, no murmurs, rubs, clicks, or gallops, distal pulses intact, no carotid bruits, no JVD  Pulmonary/Chest: clear to auscultation bilaterally- no wheezes, rales or rhonchi, normal air movement, no respiratory distress  Abdomen: soft, non-tender, non-distended, normal bowel sounds, no masses Extremities: +3 ble pitting edema, pulse   Skin: warm and dry, no rash or erythema  Head: normocephalic and atraumatic  Eyes: pupils equal, round, and reactive to light  Neck: supple and non-tender without mass, no thyromegaly       Medications:    Tadalafil (PAH)  40 mg Oral Daily    glycopyrrolate-formoterol  2 puff Inhalation BID    furosemide  40 mg Intravenous BID    aspirin 324 mg Oral Daily    carBAMazepine  200 mg Oral BID    pravastatin  40 mg Oral Nightly    risperiDONE  2 mg Oral BID    sodium chloride flush  10 mL Intravenous 2 times per day    enoxaparin  40 mg Subcutaneous Q24H    predniSONE  40 mg Oral Daily    cefTRIAXone (ROCEPHIN) IV  1 g Intravenous Q24H    famotidine  20 mg Per NG tube BID      propofol 40 mcg/kg/min (05/22/20 0716)    milrinone 0.25 mcg/kg/min (05/21/20 1847)    norepinephrine 4 mcg/min (05/22/20 0959)     potassium chloride, 40 mEq, PRN    Or  potassium alternative oral replacement, 40 mEq, PRN    Or  potassium chloride, 10 mEq, PRN  albuterol, 5 mg, Q4H PRN  sodium chloride flush, 10 mL, PRN  acetaminophen, 650 mg, Q6H PRN    Or  acetaminophen, 650 mg, Q6H PRN  polyethylene glycol, 17 g, Daily PRN  promethazine, 12.5 mg, Q6H PRN    Or  ondansetron, 4 mg, Q6H PRN        Diagnostics:  Echo 5/18/20      Summary   Left ventricular size is normal and systolic function is mildly reduced. Ejection fraction was estimated at 50-55%. LV wall thickness is within   normal limits. Intraventricular septal wall compression during systole suggestive of RV   pressure overload. Markedly enlarged right atrium size. The right ventricle was not clearly visualized. Appears dilated. RVSP of 55-60 mm Hg consistent with moderate pulmonary hypertension. Small circumferential pericardial effusion without evidence of tamponade   physiology. IVC size is dilated with <50% respiratory collapse. Signature      ----------------------------------------------------------------   Electronically signed by Lesley Espinal MD (Interpreting   physician) on 05/18/2020 at 04:20 PM    Lab Data:    Cardiac Enzymes:  No results for input(s): CKTOTAL, CKMB, CKMBINDEX, TROPONINI in the last 72 hours.     CBC:   Lab Results   Component Value Date    WBC 10.1 05/22/2020    RBC 5.83 05/22/2020    HGB 15.4 05/22/2020    HCT 51.9 05/22/2020     05/22/2020       CMP: Lab Results   Component Value Date     05/22/2020    K 3.8 05/22/2020    K 5.7 05/18/2020     05/22/2020    CO2 34 05/22/2020    BUN 38 05/22/2020    CREATININE 0.8 05/22/2020    LABGLOM >90 05/22/2020    GLUCOSE 94 05/22/2020    CALCIUM 9.1 05/22/2020       Hepatic Function Panel:    Lab Results   Component Value Date    ALKPHOS 112 05/17/2020    ALT 12 05/17/2020    AST 13 05/17/2020    PROT 7.0 05/17/2020    BILITOT 0.4 05/17/2020    BILIDIR 0.3 05/17/2020    LABALBU 3.8 05/17/2020       Magnesium:    Lab Results   Component Value Date    MG 2.2 05/17/2020       PT/INR:    Lab Results   Component Value Date    INR 1.26 03/02/2020       HgBA1c:    Lab Results   Component Value Date    LABA1C 6.0 12/15/2019       FLP:    Lab Results   Component Value Date    TRIG 70 12/14/2019    HDL 27 12/14/2019    LDLCALC 61 12/14/2019       TSH:    Lab Results   Component Value Date    TSH 2.040 03/02/2020         Assessment:    Acute on chronic hypoxic hypercapnic resp failure - intubated  Acute on chronic diastolic CHF/RHF  COPD exacerbation  Mod PHTN - RVSP 55-60mmHg per echo 5/18/20  Elevated troponins - likely due to above  Ef 50-55 per echo 5/18/20  Hx HTN - now hypotensive, on levophed gtt  Dyslipidemia  Hx schizophrenia   Tobacco abuse  Hx noncompliance    Discussed with dr jesus  Plan:  · Daily I/o and weights  · 2 liter fluid restriction and 2gm sodium diet  · Cont diuresis - on lasix 40 iv bid, and primacor gtt  · Tadalafil added by critical care team  · Cont asa/statin  · Bmp daily  · Keep mag >2 and k >2  · Wean primacor gtt to 0.125  · Pt will need referral to [address] [place] [address] for pulmonary hypertension management - timing per attending   · Will need ischemic w/up once stable - likely stress test         Electronically signed by Aleksander Geiger PA-C on 5/22/2020 at 10:55 AM

## 2020-05-23 ENCOUNTER — APPOINTMENT (OUTPATIENT)
Dept: GENERAL RADIOLOGY | Age: 50
DRG: 004 | End: 2020-05-23
Payer: MEDICARE

## 2020-05-23 LAB
ACINETOBACTER BAUMANNII FILM ARRAY: NOT DETECTED
ANION GAP SERPL CALCULATED.3IONS-SCNC: 6 MEQ/L (ref 8–16)
BOTTLE TYPE: ABNORMAL
BUN BLDV-MCNC: 40 MG/DL (ref 7–22)
CALCIUM SERPL-MCNC: 9 MG/DL (ref 8.5–10.5)
CANDIDA ALBICANS FILM ARRAY: NOT DETECTED
CANDIDA GLABRATA FILM ARRAY: NOT DETECTED
CANDIDA KRUSEI FILM ARRAY: NOT DETECTED
CANDIDA PARAPSILOSIS FILM ARRAY: NOT DETECTED
CANDIDA TROPICALIS FILM ARRAY: NOT DETECTED
CARBAPENEM RESITANT FILM ARRAY: ABNORMAL
CHLORIDE BLD-SCNC: 99 MEQ/L (ref 98–111)
CO2: 35 MEQ/L (ref 23–33)
CREAT SERPL-MCNC: 0.7 MG/DL (ref 0.4–1.2)
ENTERBACTER CLOACAE FILM ARRAY: NOT DETECTED
ENTERBACTERIACEAE FILM ARRAY: NOT DETECTED
ENTEROCOCCUS FILM ARRAY: NOT DETECTED
ERYTHROCYTE [DISTWIDTH] IN BLOOD BY AUTOMATED COUNT: 21 % (ref 11.5–14.5)
ERYTHROCYTE [DISTWIDTH] IN BLOOD BY AUTOMATED COUNT: 65.4 FL (ref 35–45)
ESCHERICHIA COLI FILM ARRAY: NOT DETECTED
GFR SERPL CREATININE-BSD FRML MDRD: > 90 ML/MIN/1.73M2
GLUCOSE BLD-MCNC: 95 MG/DL (ref 70–108)
HAEMOPHILUS INFLUENZA FILM ARRAY: NOT DETECTED
HCT VFR BLD CALC: 51.5 % (ref 42–52)
HEMOGLOBIN: 15.4 GM/DL (ref 14–18)
KLEBSIELLA OXYTOCA FILM ARRAY: NOT DETECTED
KLEBSIELLA PNEUMONIAE FILM ARRAY: NOT DETECTED
LISTERIA MONOCYTOGENES FILM ARRAY: NOT DETECTED
MCH RBC QN AUTO: 26.7 PG (ref 26–33)
MCHC RBC AUTO-ENTMCNC: 29.9 GM/DL (ref 32.2–35.5)
MCV RBC AUTO: 89.3 FL (ref 80–94)
METHICILLIN RESISTANT FILM ARRAY: DETECTED
NEISSERIA MENIGITIDIS FILM ARRAY: NOT DETECTED
PLATELET # BLD: 157 THOU/MM3 (ref 130–400)
PMV BLD AUTO: 10.8 FL (ref 9.4–12.4)
POTASSIUM SERPL-SCNC: 3.2 MEQ/L (ref 3.5–5.2)
PROTEUS FILM ARRAY: NOT DETECTED
PSEUDOMONAS AERUGINOSA FILM ARRAY: NOT DETECTED
RBC # BLD: 5.77 MILL/MM3 (ref 4.7–6.1)
SERRATIA MARCESCENS FILM ARRAY: NOT DETECTED
SODIUM BLD-SCNC: 140 MEQ/L (ref 135–145)
SOURCE OF BLOOD CULTURE: ABNORMAL
STAPH AUREUS FILM ARRAY: NOT DETECTED
STAPHYLOCOCCUS FILM ARRAY: DETECTED
STREP AGALACTIAE FILM ARRAY: NOT DETECTED
STREP PNEUMONIAE FILM ARRAY: NOT DETECTED
STREP PYOCGENES FILM ARRAY: NOT DETECTED
STREPTOCOCCUS FILM ARRAY: NOT DETECTED
VANCOMYCIN RESISTANT FILM ARRAY: ABNORMAL
WBC # BLD: 12.1 THOU/MM3 (ref 4.8–10.8)

## 2020-05-23 PROCEDURE — 6370000000 HC RX 637 (ALT 250 FOR IP): Performed by: INTERNAL MEDICINE

## 2020-05-23 PROCEDURE — 2709999900 HC NON-CHARGEABLE SUPPLY

## 2020-05-23 PROCEDURE — 2580000003 HC RX 258: Performed by: INTERNAL MEDICINE

## 2020-05-23 PROCEDURE — 2700000000 HC OXYGEN THERAPY PER DAY

## 2020-05-23 PROCEDURE — 2000000000 HC ICU R&B

## 2020-05-23 PROCEDURE — 80048 BASIC METABOLIC PNL TOTAL CA: CPT

## 2020-05-23 PROCEDURE — 94003 VENT MGMT INPAT SUBQ DAY: CPT

## 2020-05-23 PROCEDURE — 99291 CRITICAL CARE FIRST HOUR: CPT | Performed by: INTERNAL MEDICINE

## 2020-05-23 PROCEDURE — 94640 AIRWAY INHALATION TREATMENT: CPT

## 2020-05-23 PROCEDURE — 94770 HC ETCO2 MONITOR DAILY: CPT

## 2020-05-23 PROCEDURE — 85027 COMPLETE CBC AUTOMATED: CPT

## 2020-05-23 PROCEDURE — 94761 N-INVAS EAR/PLS OXIMETRY MLT: CPT

## 2020-05-23 PROCEDURE — 99232 SBSQ HOSP IP/OBS MODERATE 35: CPT | Performed by: NURSE PRACTITIONER

## 2020-05-23 PROCEDURE — 6360000002 HC RX W HCPCS: Performed by: INTERNAL MEDICINE

## 2020-05-23 PROCEDURE — 6370000000 HC RX 637 (ALT 250 FOR IP): Performed by: NURSE PRACTITIONER

## 2020-05-23 PROCEDURE — 36415 COLL VENOUS BLD VENIPUNCTURE: CPT

## 2020-05-23 PROCEDURE — 99233 SBSQ HOSP IP/OBS HIGH 50: CPT | Performed by: NURSE PRACTITIONER

## 2020-05-23 PROCEDURE — 71045 X-RAY EXAM CHEST 1 VIEW: CPT

## 2020-05-23 RX ORDER — FUROSEMIDE 40 MG/1
40 TABLET ORAL 2 TIMES DAILY
Status: DISCONTINUED | OUTPATIENT
Start: 2020-05-23 | End: 2020-05-28 | Stop reason: HOSPADM

## 2020-05-23 RX ADMIN — CEFTRIAXONE SODIUM 1 G: 1 INJECTION, POWDER, FOR SOLUTION INTRAMUSCULAR; INTRAVENOUS at 13:51

## 2020-05-23 RX ADMIN — PRAVASTATIN SODIUM 40 MG: 40 TABLET ORAL at 20:28

## 2020-05-23 RX ADMIN — PROPOFOL INJECTABLE EMULSION 35 MCG/KG/MIN: 10 INJECTION, EMULSION INTRAVENOUS at 12:11

## 2020-05-23 RX ADMIN — FAMOTIDINE 20 MG: 20 TABLET ORAL at 08:02

## 2020-05-23 RX ADMIN — ENOXAPARIN SODIUM 40 MG: 40 INJECTION SUBCUTANEOUS at 13:51

## 2020-05-23 RX ADMIN — TADALAFIL 40 MG: 20 TABLET ORAL at 08:05

## 2020-05-23 RX ADMIN — CARBAMAZEPINE 200 MG: 200 TABLET ORAL at 20:28

## 2020-05-23 RX ADMIN — POTASSIUM BICARBONATE 40 MEQ: 782 TABLET, EFFERVESCENT ORAL at 06:18

## 2020-05-23 RX ADMIN — RISPERIDONE 2 MG: 2 TABLET ORAL at 20:28

## 2020-05-23 RX ADMIN — Medication 10 ML: at 08:02

## 2020-05-23 RX ADMIN — FAMOTIDINE 20 MG: 20 TABLET ORAL at 20:28

## 2020-05-23 RX ADMIN — PROPOFOL INJECTABLE EMULSION 45 MCG/KG/MIN: 10 INJECTION, EMULSION INTRAVENOUS at 03:40

## 2020-05-23 RX ADMIN — ACETAMINOPHEN 650 MG: 325 TABLET ORAL at 03:41

## 2020-05-23 RX ADMIN — RISPERIDONE 2 MG: 2 TABLET ORAL at 08:04

## 2020-05-23 RX ADMIN — PROPOFOL INJECTABLE EMULSION 45 MCG/KG/MIN: 10 INJECTION, EMULSION INTRAVENOUS at 04:08

## 2020-05-23 RX ADMIN — FUROSEMIDE 40 MG: 10 INJECTION, SOLUTION INTRAMUSCULAR; INTRAVENOUS at 08:02

## 2020-05-23 RX ADMIN — CARBAMAZEPINE 200 MG: 200 TABLET ORAL at 08:04

## 2020-05-23 RX ADMIN — GLYCOPYRROLATE AND FORMOTEROL FUMARATE 2 PUFF: 9; 4.8 AEROSOL, METERED RESPIRATORY (INHALATION) at 19:24

## 2020-05-23 RX ADMIN — Medication 10 ML: at 20:28

## 2020-05-23 RX ADMIN — FUROSEMIDE 40 MG: 40 TABLET ORAL at 17:25

## 2020-05-23 RX ADMIN — PROPOFOL INJECTABLE EMULSION 35 MCG/KG/MIN: 10 INJECTION, EMULSION INTRAVENOUS at 19:51

## 2020-05-23 RX ADMIN — PREDNISONE 40 MG: 20 TABLET ORAL at 08:05

## 2020-05-23 RX ADMIN — GLYCOPYRROLATE AND FORMOTEROL FUMARATE 2 PUFF: 9; 4.8 AEROSOL, METERED RESPIRATORY (INHALATION) at 07:46

## 2020-05-23 RX ADMIN — ASPIRIN 81 MG 324 MG: 81 TABLET ORAL at 08:02

## 2020-05-23 ASSESSMENT — PULMONARY FUNCTION TESTS
PIF_VALUE: 24
PIF_VALUE: 24
PIF_VALUE: 25
PIF_VALUE: 24
PIF_VALUE: 25

## 2020-05-23 ASSESSMENT — PAIN SCALES - GENERAL: PAINLEVEL_OUTOF10: 0

## 2020-05-23 NOTE — PLAN OF CARE
Problem: Impaired respiratory status  Goal: Will be able to breathe spontaneously, without ventilator support  Description: Will be able to breathe spontaneously, without ventilator support     Outcome: Ongoing  Pt remains on ventilator at this time  Bevespi mdi bid

## 2020-05-23 NOTE — PROGRESS NOTES
Cardiology Progress Note      Patient: Francis Bacon  YOB: 1970  MRN: [id number]   Acct: [de-identified]  Admit Date:  5/17/2020  Primary Cardiologist: none  Seen by dr. Marylou Herr    Per prior cardiology consult note-  Reason for Consultation:  CHF, Elevated trops        History Of Present Illness:    44 y.o. male c hx of mild LV dysfunction with EF 45-50%, COPD, on 3L supplemental O2, JORDAN on CPAP, schizophrenia, HTN,  who presented to the hospital with shortness of breath. History is obtained from the chart at patient is intubated and sedated. Patient is emergently intubated in the ER due to what seemed liked COPD exacerbation. Cardiology was consulted for mild elevation of troponins. EKG shows SR, poor R wave progression. Echo from 4/2019 showed TDS and EF 45-50%.   Cr 1.4, Trops 0.035>0.037>0.029      Subjective (Events in last 24 hours):     Pt chart reviewed     Pt remains intubated - now requiring 70% FIO2 (was 55% yesterday) and 10 PEEP     CXR - resolved CHF    He also has fevers now     No ectopy on tele   BP stable off pressor support       Objective:   BP (!) 91/51   Pulse 76   Temp 97.7 °F (36.5 °C) (Bladder)   Resp 14   Ht 6' 2\" (1.88 m)   Wt 275 lb 5.7 oz (124.9 kg)   SpO2 93%   BMI 35.35 kg/m²        TELEMETRY: SR    Physical Exam:  General Appearance: intubated and sedated - in no acute distress  Cardiovascular: normal rate, regular rhythm, normal S1 and S2, no murmurs, rubs, clicks, or gallops, distal pulses intact,   Pulmonary/Chest: clear upper - diminished lower posterior to auscultation bilaterally- no wheezes, rales or rhonchi, normal air movement, no respiratory distress  Abdomen: soft, non-tender, non-distended, normal bowel sounds, no masses Extremities: no cyanosis, clubbing - 2+ pitting LE edema, pulses present    Skin: warm and dry, no rash or erythema  Head: normocephalic and atraumatic  Musculoskeletal: normal range of motion, no joint swelling, deformity or tenderness  Neurological: intubated and sedated     Medications:    Tadalafil (PAH)  40 mg Oral Daily    glycopyrrolate-formoterol  2 puff Inhalation BID    furosemide  40 mg Intravenous BID    aspirin  324 mg Oral Daily    carBAMazepine  200 mg Oral BID    pravastatin  40 mg Oral Nightly    risperiDONE  2 mg Oral BID    sodium chloride flush  10 mL Intravenous 2 times per day    enoxaparin  40 mg Subcutaneous Q24H    predniSONE  40 mg Oral Daily    cefTRIAXone (ROCEPHIN) IV  1 g Intravenous Q24H    famotidine  20 mg Per NG tube BID      propofol 35 mcg/kg/min (20 0816)    milrinone 0.125 mcg/kg/min (20 1206)    norepinephrine Stopped (20 0834)     potassium chloride, 40 mEq, PRN    Or  potassium alternative oral replacement, 40 mEq, PRN    Or  potassium chloride, 10 mEq, PRN  albuterol, 5 mg, Q4H PRN  sodium chloride flush, 10 mL, PRN  acetaminophen, 650 mg, Q6H PRN    Or  acetaminophen, 650 mg, Q6H PRN  polyethylene glycol, 17 g, Daily PRN  promethazine, 12.5 mg, Q6H PRN    Or  ondansetron, 4 mg, Q6H PRN        Diagnostics:  EK-MAY-2020 09:26:46 [facility] ROUTINE RETRIEVAL  ** Poor data quality, interpretation may be adversely affected  ** Suspect arm lead reversal, interpretation assumes no reversal  Normal sinus rhythm  Right axis deviation  Pulmonary disease pattern  Septal infarct (cited on or before 02-MAR-2020)  Abnormal ECG  When compared with ECG of 02-MAR-2020 16:19,  Nonspecific T wave abnormality now evident in Lateral leads  Confirmed by ROYCE MYLES ([id number]) on 2020 11:34:49 AM    Echo:    Electronically signed by Yarelis Fowler MD (Interpreting   physician) on 2020 at 04:20 PM   ----------------------------------------------------------------      Findings      Mitral Valve   The mitral valve was not well visualized . Trace mitral regurgitation is present. Aortic Valve   The aortic valve leaflets were not well visualized. Aortic valve appears tricuspid. Aortic valve leaflets are somewhat thickened. Tricuspid Valve   Tricuspid valve was not well visualized. Mild tricuspid regurgitation. Pulmonic Valve   The pulmonic valve was not well visualized . Trivial pulmonic regurgitation visualized. Left Atrium   Left atrial size was normal.      Left Ventricle   Left ventricular size is normal and systolic function is mildly reduced. Ejection fraction was estimated at 50-55%. LV wall thickness is within   normal limits. Intraventricular septal wall compression during systole suggestive of RV   pressure overload. Right Atrium   Markedly enlarged right atrium size. Right Ventricle   The right ventricle was not clearly visualized. Appears dilated. RVSP of 55-60 mm Hg consistent with moderate pulmonary hypertension. Pericardial Effusion   Small circumferential pericardial effusion without evidence of tamponade   physiology. Pleural Effusion   No evidence of pleural effusion. Aorta / Great Vessels   -Aortic root dimension within normal limits. -IVC size is dilated with <50% respiratory collapse. Stress:     Left Heart Cath:     Lab Data:    Cardiac Enzymes:  No results for input(s): CKTOTAL, CKMB, CKMBINDEX, TROPONINI in the last 72 hours.     CBC:   Lab Results   Component Value Date    WBC 12.1 05/23/2020    RBC 5.77 05/23/2020    HGB 15.4 05/23/2020    HCT 51.5 05/23/2020     05/23/2020       CMP:    Lab Results   Component Value Date     05/23/2020    K 3.2 05/23/2020    K 5.7 05/18/2020    CL 99 05/23/2020    CO2 35 05/23/2020    BUN 40 05/23/2020    CREATININE 0.7 05/23/2020    LABGLOM >90 05/23/2020    GLUCOSE 95 05/23/2020    CALCIUM 9.0 05/23/2020       Hepatic Function Panel:    Lab Results   Component Value Date    ALKPHOS 112 05/17/2020    ALT 12 05/17/2020    AST 13 05/17/2020    PROT 7.0 05/17/2020    BILITOT 0.4 05/17/2020    BILIDIR 0.3 05/17/2020    LABALBU 3.8 05/17/2020       Magnesium:    Lab Results   Component Value Date    MG 2.2 05/17/2020       PT/INR:    Lab Results   Component Value Date    INR 1.26 03/02/2020       HgBA1c:    Lab Results   Component Value Date    LABA1C 6.0 12/15/2019       FLP:    Lab Results   Component Value Date    TRIG 70 12/14/2019    HDL 27 12/14/2019    LDLCALC 61 12/14/2019       TSH:    Lab Results   Component Value Date    TSH 2.040 03/02/2020         Assessment:  Acute on chronic hypoxic / hypercapnic resp failure - intubated    Home 02    Acute on chronic RHF - compensated   Moderate PHTN by echo    ?  PNA  Fevers    Elevated trop (0.02) -- secondary to the above - no NSTEMI    Chronic tobacco abuse   COPD   Hx schizophrenia  Hx noncompliance       Plan:  · CXR - resolved chf  · Wean off primacor today   · Change to po diuretics          Electronically signed by SAVANNAH Don CNP on 5/23/2020 at 10:25 AM

## 2020-05-23 NOTE — PROGRESS NOTES
mmhg    HCO3 26 mmol/l    Base Excess (Calculated) -3.8Low  mmol/l    O2 Sat 93 %    IFIO2 100    DEVICE NRB    Source: R Radial      Procalcitonin 0. 15High      CXR  Increased haziness in the mid and lower right hemithorax likely due to an increase in the right pleural effusion. Underlying atelectasis is likely, cannot exclude pneumonia. Persistent mild interstitial pulmonary edema or atypical pneumonia. Minimal left basilar atelectasis. Transthoracic Echocardiography Report (TTE)   Left ventricular size is normal and systolic function is mildly reduced.   Ejection fraction was estimated at 50-55%. LV wall thickness is within   normal limits.   Intraventricular septal wall compression during systole suggestive of RV   pressure overload.   Markedly enlarged right atrium size.   The right ventricle was not clearly visualized. Appears dilated.  RVSP of 55-60 mm Hg consistent with moderate pulmonary hypertension.   Small circumferential pericardial effusion without evidence of tamponade   physiology. [address] size is dilated with <50% respiratory collapse.     Assessment and Plan:   1. Acute on chronic hypoxemic / hypercapnic resp failure, req vent support  2. COPD with acute exac  3. Acute on chronic CHF pEF, diuresing well  4. Moderate pulmonary hypertension, on Tadalafil  5. hypokalemia   replace K  Cont vent care / weaning per CCS  Bronchodilators  Am labs.     Ana Cheadle, MD

## 2020-05-23 NOTE — PROGRESS NOTES
Pharmacy Note  BioFire Result    Brianne Galindo is a 44 y.o. male, with a positive blood culture result    [employer] message received from Nitza , laboratory employee on 5/23/2020 at 8:58 AM    First Gram stain result: gram positive cocci in clusters    BioFire BCID result: Staphylococcus (NOT aureus) mecA +    BioFire BCID and gram stain congruent? Yes    Suspected source? Contaminant? Patient chart reviewed for signs/symptoms of infection: Yes  BP (!) 93/59   Pulse 86   Temp 100.8 °F (38.2 °C) (Bladder)   Resp 16   Ht 6' 2\" (1.88 m)   Wt 275 lb 5.7 oz (124.9 kg)   SpO2 90%   BMI 35.35 kg/m²   Lab Results   Component Value Date    WBC 12.1 (H) 05/23/2020       Allergies reviewed  Patient has no known allergies. Renal function reviewed  Estimated Creatinine Clearance: 175 mL/min (based on SCr of 0.7 mg/dL).     Current antibiotic regimen: ceftriaxone    Intervention needed: No    Individual contacted: OLESYA Santamaria  5/23/2020 8:58 AM

## 2020-05-24 ENCOUNTER — APPOINTMENT (OUTPATIENT)
Dept: CT IMAGING | Age: 50
DRG: 004 | End: 2020-05-24
Payer: MEDICARE

## 2020-05-24 LAB
ALLEN TEST: POSITIVE
ANION GAP SERPL CALCULATED.3IONS-SCNC: 7 MEQ/L (ref 8–16)
BASE EXCESS (CALCULATED): 7 MMOL/L (ref -2.5–2.5)
BLOOD CULTURE, ROUTINE: ABNORMAL
BLOOD CULTURE, ROUTINE: ABNORMAL
BUN BLDV-MCNC: 42 MG/DL (ref 7–22)
CALCIUM SERPL-MCNC: 9.1 MG/DL (ref 8.5–10.5)
CHLORIDE BLD-SCNC: 101 MEQ/L (ref 98–111)
CO2: 34 MEQ/L (ref 23–33)
COLLECTED BY:: ABNORMAL
CREAT SERPL-MCNC: 0.8 MG/DL (ref 0.4–1.2)
DEVICE: ABNORMAL
ERYTHROCYTE [DISTWIDTH] IN BLOOD BY AUTOMATED COUNT: 21.2 % (ref 11.5–14.5)
ERYTHROCYTE [DISTWIDTH] IN BLOOD BY AUTOMATED COUNT: 66.4 FL (ref 35–45)
GFR SERPL CREATININE-BSD FRML MDRD: > 90 ML/MIN/1.73M2
GLUCOSE BLD-MCNC: 99 MG/DL (ref 70–108)
GRAM STAIN RESULT: NORMAL
HCO3: 35 MMOL/L (ref 23–28)
HCT VFR BLD CALC: 51.2 % (ref 42–52)
HEMOGLOBIN: 15.3 GM/DL (ref 14–18)
IFIO2: 55
MCH RBC QN AUTO: 26.8 PG (ref 26–33)
MCHC RBC AUTO-ENTMCNC: 29.9 GM/DL (ref 32.2–35.5)
MCV RBC AUTO: 89.7 FL (ref 80–94)
MODE: ABNORMAL
O2 SATURATION: 93 %
ORGANISM: ABNORMAL
PCO2: 60 MMHG (ref 35–45)
PH BLOOD GAS: 7.38 (ref 7.35–7.45)
PLATELET # BLD: 148 THOU/MM3 (ref 130–400)
PMV BLD AUTO: 10.7 FL (ref 9.4–12.4)
PO2: 71 MMHG (ref 71–104)
POTASSIUM SERPL-SCNC: 3.6 MEQ/L (ref 3.5–5.2)
RBC # BLD: 5.71 MILL/MM3 (ref 4.7–6.1)
RESPIRATORY CULTURE: NORMAL
SET PEEP: 10 MMHG
SET PRESS SUPP: 12 CMH2O
SET RESPIRATORY RATE: 12 BPM
SODIUM BLD-SCNC: 142 MEQ/L (ref 135–145)
SOURCE, BLOOD GAS: ABNORMAL
WBC # BLD: 10.4 THOU/MM3 (ref 4.8–10.8)

## 2020-05-24 PROCEDURE — 2700000000 HC OXYGEN THERAPY PER DAY

## 2020-05-24 PROCEDURE — 94640 AIRWAY INHALATION TREATMENT: CPT

## 2020-05-24 PROCEDURE — 6360000002 HC RX W HCPCS: Performed by: INTERNAL MEDICINE

## 2020-05-24 PROCEDURE — 99233 SBSQ HOSP IP/OBS HIGH 50: CPT | Performed by: NURSE PRACTITIONER

## 2020-05-24 PROCEDURE — 85027 COMPLETE CBC AUTOMATED: CPT

## 2020-05-24 PROCEDURE — 6370000000 HC RX 637 (ALT 250 FOR IP): Performed by: NURSE PRACTITIONER

## 2020-05-24 PROCEDURE — 94770 HC ETCO2 MONITOR DAILY: CPT

## 2020-05-24 PROCEDURE — 6360000004 HC RX CONTRAST MEDICATION: Performed by: INTERNAL MEDICINE

## 2020-05-24 PROCEDURE — 94003 VENT MGMT INPAT SUBQ DAY: CPT

## 2020-05-24 PROCEDURE — 6370000000 HC RX 637 (ALT 250 FOR IP): Performed by: INTERNAL MEDICINE

## 2020-05-24 PROCEDURE — 2000000000 HC ICU R&B

## 2020-05-24 PROCEDURE — 2709999900 HC NON-CHARGEABLE SUPPLY

## 2020-05-24 PROCEDURE — 94761 N-INVAS EAR/PLS OXIMETRY MLT: CPT

## 2020-05-24 PROCEDURE — 2580000003 HC RX 258: Performed by: INTERNAL MEDICINE

## 2020-05-24 PROCEDURE — 36415 COLL VENOUS BLD VENIPUNCTURE: CPT

## 2020-05-24 PROCEDURE — 99291 CRITICAL CARE FIRST HOUR: CPT | Performed by: INTERNAL MEDICINE

## 2020-05-24 PROCEDURE — 80048 BASIC METABOLIC PNL TOTAL CA: CPT

## 2020-05-24 PROCEDURE — 82803 BLOOD GASES ANY COMBINATION: CPT

## 2020-05-24 PROCEDURE — 36600 WITHDRAWAL OF ARTERIAL BLOOD: CPT

## 2020-05-24 PROCEDURE — 71275 CT ANGIOGRAPHY CHEST: CPT

## 2020-05-24 RX ADMIN — FUROSEMIDE 40 MG: 40 TABLET ORAL at 08:24

## 2020-05-24 RX ADMIN — PROPOFOL INJECTABLE EMULSION 35 MCG/KG/MIN: 10 INJECTION, EMULSION INTRAVENOUS at 17:12

## 2020-05-24 RX ADMIN — FAMOTIDINE 20 MG: 20 TABLET ORAL at 08:24

## 2020-05-24 RX ADMIN — FAMOTIDINE 20 MG: 20 TABLET ORAL at 21:24

## 2020-05-24 RX ADMIN — FUROSEMIDE 40 MG: 40 TABLET ORAL at 17:13

## 2020-05-24 RX ADMIN — PROPOFOL INJECTABLE EMULSION 35 MCG/KG/MIN: 10 INJECTION, EMULSION INTRAVENOUS at 02:58

## 2020-05-24 RX ADMIN — RISPERIDONE 2 MG: 2 TABLET ORAL at 21:24

## 2020-05-24 RX ADMIN — CARBAMAZEPINE 200 MG: 200 TABLET ORAL at 08:24

## 2020-05-24 RX ADMIN — CEFTRIAXONE SODIUM 1 G: 1 INJECTION, POWDER, FOR SOLUTION INTRAMUSCULAR; INTRAVENOUS at 13:17

## 2020-05-24 RX ADMIN — Medication 10 ML: at 08:24

## 2020-05-24 RX ADMIN — Medication 10 ML: at 21:24

## 2020-05-24 RX ADMIN — ASPIRIN 81 MG 324 MG: 81 TABLET ORAL at 08:24

## 2020-05-24 RX ADMIN — RISPERIDONE 2 MG: 2 TABLET ORAL at 08:24

## 2020-05-24 RX ADMIN — IOPAMIDOL 80 ML: 755 INJECTION, SOLUTION INTRAVENOUS at 22:42

## 2020-05-24 RX ADMIN — CARBAMAZEPINE 200 MG: 200 TABLET ORAL at 21:24

## 2020-05-24 RX ADMIN — TADALAFIL 40 MG: 20 TABLET ORAL at 08:24

## 2020-05-24 RX ADMIN — GLYCOPYRROLATE AND FORMOTEROL FUMARATE 2 PUFF: 9; 4.8 AEROSOL, METERED RESPIRATORY (INHALATION) at 18:26

## 2020-05-24 RX ADMIN — PREDNISONE 40 MG: 20 TABLET ORAL at 08:24

## 2020-05-24 RX ADMIN — GLYCOPYRROLATE AND FORMOTEROL FUMARATE 2 PUFF: 9; 4.8 AEROSOL, METERED RESPIRATORY (INHALATION) at 08:16

## 2020-05-24 RX ADMIN — ENOXAPARIN SODIUM 40 MG: 40 INJECTION SUBCUTANEOUS at 13:17

## 2020-05-24 RX ADMIN — PROPOFOL INJECTABLE EMULSION 35 MCG/KG/MIN: 10 INJECTION, EMULSION INTRAVENOUS at 10:35

## 2020-05-24 RX ADMIN — PRAVASTATIN SODIUM 40 MG: 40 TABLET ORAL at 21:24

## 2020-05-24 ASSESSMENT — PULMONARY FUNCTION TESTS
PIF_VALUE: 24
PIF_VALUE: 20
PIF_VALUE: 22
PIF_VALUE: 23
PIF_VALUE: 21
PIF_VALUE: 23

## 2020-05-24 ASSESSMENT — PAIN SCALES - GENERAL
PAINLEVEL_OUTOF10: 0

## 2020-05-24 NOTE — PLAN OF CARE
Problem: Impaired respiratory status  Goal: Will be able to breathe spontaneously, without ventilator support  Description: Will be able to breathe spontaneously, without ventilator support     Outcome: Not Met This Shift   Patient remains on ventilator with lung protective strategies in place. Will continue to monitor patient for weaning readiness.

## 2020-05-24 NOTE — PROGRESS NOTES
CRITICAL CARE PROGRESS NOTE      Patient: Gadiel Villasenor    Unit/Bed:4D-04/004-A  YOB: 1970  MRN: [id number]   PCP: Mattie Tidwell MD  Date of Admission: 5/17/2020  Chief Complaint:- SOB    Assessment and Plan:        1. Acute on chronic hypoxic hypercapnic respiratory failure: Wears 3 L at baseline.  Patient also wears CPAP at night, with 3 L bled in.  On arrival to the emergency room patient was noted to be hypoxic with an SPO2 in the 62s.  He was emergently intubated.  Maintain peak pressure less than 35.   Continues to require high peep, likely will need trach for continued vent support   2. Acute on chronic heart failure with reduced ejection fraction: Noted +2 pitting edema bilateral lower extremities.  Diuresis 60 mg of Lasix twice daily. Albumin level normal.  ECHO 50-55%.  Added milrinone 5/19 for increased diuresis, stopped 5/23, diuresed 7.1L   3. Moderate Pulmonary hypertension: Tadalafil added 5/21.  Right ventricular pressures 55 to 60 mmHg, continues to require high levels of PEEP   4. COPD exacerbation: Steroids, Solu-Medrol 40 mg every 6 hours for 3 days.  now 40 mg daily. Rocephin.  Bio fire pneumonia panel negative, Bevespi added 5/20. Repeat biofire 5/22 negative   5. Fever of unknown origin: Recultured 5/22, negative urine and biofire pneumonia negative, primary service continuing rocephin. 6. Elevated troponin: Likely secondary to hypoxia, EKG shows no acute changes.  Repeat echo pending, trend troponin.  Patient on 325 ASA daily.  Cardiology consulted, possible ischemic work-up in the future. 7. Hypertension: History, currently hypotensive.  Norvasc with hold parameters  8. Pericardial effusion: Small effusion noted on echo, circumferential but no evidence of tamponade. 9. Schizophrenia: Risperdal    10. GERD: Pepcid twice daily  11. Hyperlipidemia: Pravastatin 40 mg nightly  12. Hyperkalemia: resolved; 1 dose kayexalate  13.  Acute kidney injury: resolved;  Likely secondary to hypotension, and prerenal in nature.  Urine electrolytes shows pre-renal, likely hypotension cause, monitor.  Improved creatinine 1.1    INITIAL H AND P AND ICU COURSE:    Luther Recinos is a 44year-old black male who presented to [facility] on 5/17/2020 with complaints of shortness of breath that started today. Fátima Ayala has a past medical history of hypertension, heart failure, schizophrenia, COPD.  HPI is limited secondary to patient inability to give details of history secondary to hypoxia and need for immediate intubation.  Patient was dropped off at the emergency room and no family member accompanied patient to the emergency room. 5/18 patient  continues on mechanical ventilation 5/20 increased PEEP requirements overnight. Rolinda Shoulder for COPD exacerbation.  Milrinone added 5/19 by cardiology for increased diuresis 5/21 continue to diuresis. Christopher Lowell is improving.  PEEP has improved to 10 overnight.  5/22 patient developed fever overnight.  Tadalafil was added 5/21 for moderate pulmonary hypertension. 5/24 to require high levels of PEEP between 10 and 12. Patient continues to diurese. Continue Lasix 40 twice daily. Albumin levels are normal.      Past Medical History: Per HPI  Family History: Mother: Hypertension  Social History:  Current every day smoker, 20-pack-year history, denies EtOH use.  Denies drug use.     ROS   Intubated and sedated on mechanical ventilation    Scheduled Meds:   furosemide  40 mg Oral BID    Tadalafil (PAH)  40 mg Oral Daily    glycopyrrolate-formoterol  2 puff Inhalation BID    aspirin  324 mg Oral Daily    carBAMazepine  200 mg Oral BID    pravastatin  40 mg Oral Nightly    risperiDONE  2 mg Oral BID    sodium chloride flush  10 mL Intravenous 2 times per day    enoxaparin  40 mg Subcutaneous Q24H    predniSONE  40 mg Oral Daily    cefTRIAXone (ROCEPHIN) IV  1 g Intravenous Q24H    famotidine  20 mg Per NG tube BID     Continuous Infusions:   propofol 35 mcg/kg/min (05/24/20 1035)       PHYSICAL EXAMINATION:  T:  98.4.  P:  69. RR:  21. B/P:  106/77  FiO2:  55. O2 Sat:  91.  I/O:  468/700  Body mass index is 34.82 kg/m². General:   Acute on chronically ill-appearing black male  HEENT:  normocephalic and atraumatic. No scleral icterus. PERR  Neck: supple. No Thyromegaly. Lungs: Diminished to auscultation. No retractions  Cardiac: RRR. No JVD. Abdomen: soft. Nontender. Rounded  Extremities:  No clubbing, cyanosis. +2 pitting edema bilateral lower extremities  Vasculature: capillary refill < 3 seconds. Palpable dorsalis pedis pulses. Skin:  warm and dry. Psych: Moves all extremities to painful stimuli. Responds to no commands. Lymph:  No supraclavicular adenopathy. Neurologic:  No focal deficit. No seizures. Data: (All radiographs, tracings, PFTs, and imaging are personally viewed and interpreted unless otherwise noted).  Sodium 142, potassium 3.6, chloride 101, CO2 34, BUN 42, creatinine 0.8, anion gap 7.0, glucose 99   WBC 10.4, hemoglobin 15.3, hematocrit 51.2, platelet count 451   Telemetry shows normal sinus rhythm   Chest x-ray 5/21/2020 reports stable cardiac enlargement, moderate diffuse pulmonary venous congestion.  Chest x-ray 5/19/2020 reports moderate hazy appearance of both mid and lower lung fields consistent with atelectasis pneumonia and edema, small effusion on the right side questionable small left effusion.  Overall appearance worse.  Echocardiogram 5/18/2020 reports ejection fraction from 50 to 55%, enlarged right atrium moderate pulmonary hypertension, small circumferential pericardial effusion          Meets Continued ICU Level Care Criteria:    [x] Yes   [] No - Transfer Planned to listed location:  [] HOSPITALIST CONTACTED- DR     Case and plan discussed with Dr. En Weinstein. Electronically signed by Asmita Wood.  SAVANNAH Echols - CNP  CRITICAL CARE SPECIALIST

## 2020-05-24 NOTE — PLAN OF CARE
Problem: Restraint Use - Nonviolent/Non-Self-Destructive Behavior:  Goal: Absence of restraint indications  Description: Absence of restraint indications  5/23/2020 2311 by Trav Gonzales RN  Outcome: Ongoing  Note: Pt has restraints ordered due to intermittent pulling at ETT. Problem: Restraint Use - Nonviolent/Non-Self-Destructive Behavior:  Goal: Absence of restraint-related injury  Description: Absence of restraint-related injury  5/23/2020 2311 by Trav Gonzales RN  Outcome: Ongoing  Note: Pt has no evidence of restraint related injury this shift. Problem: Discharge Planning:  Goal: Participates in care planning  Description: Participates in care planning  5/23/2020 2311 by Trav Gonzales RN  Outcome: Ongoing  Note: Pt family participates in care planning. Problem: Discharge Planning:  Goal: Discharged to appropriate level of care  Description: Discharged to appropriate level of care  5/23/2020 2311 by Trav Gonzales RN  Outcome: Ongoing  Note: Pt is currently sedated on vent. Pt remains in ICU care at this time. Problem: Airway Clearance - Ineffective:  Goal: Ability to maintain a clear airway will improve  Description: Ability to maintain a clear airway will improve  5/23/2020 2311 by Trav Gonzales RN  Outcome: Ongoing  Note: Pt currently sedated on vent. Pt is on FiO2 65% and PEEP 12. Weaning as tolerated. Problem: Anxiety/Stress:  Goal: Level of anxiety will decrease  Description: Level of anxiety will decrease  5/23/2020 2311 by Trav Gonzales RN  Outcome: Ongoing  Note: Pt is currently sedated on vent. No anxiety noted this shift. Problem: Cardiac Output - Decreased:  Goal: Hemodynamic stability will improve  Description: Hemodynamic stability will improve  5/23/2020 2311 by Trav Gonzales RN  Outcome: Ongoing  Note: Pt has been hemodynamically stable this shift.      Problem: Mental Status - Impaired:  Goal: Mental status will be restored to baseline  Description: Mental status will be restored to baseline  Outcome: Ongoing  Note: Pt is currently sedated on vent. Pt doesn't follow commands, withdraws from pain x4 ext. Pt opens eyes to voice. Problem: Pain:  Goal: Pain level will decrease  Description: Pain level will decrease  Outcome: Ongoing  Note: Pt is currently sedated on vent. Using CPOT score for pain assessment. Problem: Pain:  Goal: Recognizes and communicates pain  Description: Recognizes and communicates pain  Outcome: Ongoing  Note: Pt is currently sedated on vent. Using CPOT score for pain assessment. Problem: Pain:  Goal: Control of acute pain  Description: Control of acute pain  Outcome: Ongoing  Note: Pt is currently sedated on vent. Using CPOT score for pain assessment. Problem: Falls - Risk of:  Goal: Will remain free from falls  Description: Will remain free from falls  5/23/2020 2311 by Adrienne Snyder RN  Outcome: Ongoing  Note: Pt has had no falls this time this shift. Pt is sedated on vent. Bed alarm on, fall band on, fall sign posted. Problem: Falls - Risk of:  Goal: Absence of physical injury  Description: Absence of physical injury  Outcome: Ongoing  Note: Pt has no evidence of physical injury this shift. Problem: Nutrition  Goal: Optimal nutrition therapy  Outcome: Ongoing  Note: Pt is on continuous tube feeding, nepro at 45ml/hr. Problem: Urinary Elimination:  Goal: Complications related to the disease process, condition or treatment will be avoided or minimized  Description: Complications related to the disease process, condition or treatment will be avoided or minimized  Outcome: Ongoing  Note: Pt is afebrile, WBC 12.1. Pt shows no signs of CAUTI this shift. Care plan reviewed with patient and family. Patient and family verbalize understanding of the plan of care and contribute to goal setting.

## 2020-05-24 NOTE — PROGRESS NOTES
INTERNAL MEDICINE Progress Note  5/24/2020 12:58 PM  Subjective:   Admit Date: 5/17/2020  PCP: Fritz Chawla MD  Interval History:   sedated on vent, ETT    Objective:   Vitals: /72   Pulse 75   Temp 98.2 °F (36.8 °C) (Bladder)   Resp 16   Ht 6' 2\" (1.88 m)   Wt 271 lb 2.7 oz (123 kg)   SpO2 95%   BMI 34.82 kg/m²   General appearance: sedated on vent,   HEENT:  atraumatic  Neck:  no JVD  Lungs: diminished breath sounds bilaterally  Heart: S1, S2 normal  Abdomen: normal findings: bowel sounds normal and no masses palpable and abnormal findings:  distended  Extremities: edema + mateo  Neurologic:  sedated on vent      Medications:   Scheduled Meds:   furosemide  40 mg Oral BID    Tadalafil (PAH)  40 mg Oral Daily    glycopyrrolate-formoterol  2 puff Inhalation BID    aspirin  324 mg Oral Daily    carBAMazepine  200 mg Oral BID    pravastatin  40 mg Oral Nightly    risperiDONE  2 mg Oral BID    sodium chloride flush  10 mL Intravenous 2 times per day    enoxaparin  40 mg Subcutaneous Q24H    predniSONE  40 mg Oral Daily    cefTRIAXone (ROCEPHIN) IV  1 g Intravenous Q24H    famotidine  20 mg Per NG tube BID     Continuous Infusions:   propofol 35 mcg/kg/min (05/24/20 1035)    norepinephrine Stopped (05/23/20 0834)       Lab Results:   CBC:   Recent Labs     05/22/20  0437 05/23/20  0354 05/24/20  0456   WBC 10.1 12.1* 10.4   HGB 15.4 15.4 15.3    157 148     BMP:    Recent Labs     05/22/20  0315 05/23/20  0354 05/24/20  0456    140 142   K 3.8 3.2* 3.6    99 101   CO2 34* 35* 34*   BUN 38* 40* 42*   CREATININE 0.8 0.7 0.8   GLUCOSE 94 95 99     Magnesium:    Lab Results   Component Value Date    MG 2.2 05/17/2020     HgBA1c:    Lab Results   Component Value Date    LABA1C 6.0 12/15/2019     TSH:    Lab Results   Component Value Date    TSH 2.040 03/02/2020 05/18/20 1000    Specimen Source: Blood gases     pH, Blood Gas 7.23Low     PCO2 61High  mmhg    PO2 79 mmhg    HCO3 26 mmol/l    Base Excess (Calculated) -3.8Low  mmol/l    O2 Sat 93 %    IFIO2 100    DEVICE NRB    Source: R Radial      Procalcitonin 0. 15High      CXR  Increased haziness in the mid and lower right hemithorax likely due to an increase in the right pleural effusion. Underlying atelectasis is likely, cannot exclude pneumonia. Persistent mild interstitial pulmonary edema or atypical pneumonia. Minimal left basilar atelectasis. Transthoracic Echocardiography Report (TTE)   Left ventricular size is normal and systolic function is mildly reduced.   Ejection fraction was estimated at 50-55%. LV wall thickness is within   normal limits.   Intraventricular septal wall compression during systole suggestive of RV   pressure overload.   Markedly enlarged right atrium size.   The right ventricle was not clearly visualized. Appears dilated.  RVSP of 55-60 mm Hg consistent with moderate pulmonary hypertension.   Small circumferential pericardial effusion without evidence of tamponade   physiology. [address] size is dilated with <50% respiratory collapse.     Assessment and Plan:   1. Acute on chronic hypoxemic / hypercapnic resp failure, req vent support  2. COPD with acute exac  3. Acute on chronic CHF pEF, diuresing well  4. Moderate pulmonary hypertension, on Tadalafil  5. hypokalemia     Cont vent care / weaning per CCS  Bronchodilators  Am labs.     Owen No MD

## 2020-05-25 ENCOUNTER — APPOINTMENT (OUTPATIENT)
Dept: GENERAL RADIOLOGY | Age: 50
DRG: 004 | End: 2020-05-25
Payer: MEDICARE

## 2020-05-25 LAB
ANION GAP SERPL CALCULATED.3IONS-SCNC: 7 MEQ/L (ref 8–16)
BUN BLDV-MCNC: 40 MG/DL (ref 7–22)
CALCIUM SERPL-MCNC: 9.1 MG/DL (ref 8.5–10.5)
CHLORIDE BLD-SCNC: 103 MEQ/L (ref 98–111)
CO2: 35 MEQ/L (ref 23–33)
CREAT SERPL-MCNC: 0.7 MG/DL (ref 0.4–1.2)
GFR SERPL CREATININE-BSD FRML MDRD: > 90 ML/MIN/1.73M2
GLUCOSE BLD-MCNC: 98 MG/DL (ref 70–108)
POTASSIUM SERPL-SCNC: 3.4 MEQ/L (ref 3.5–5.2)
SODIUM BLD-SCNC: 145 MEQ/L (ref 135–145)

## 2020-05-25 PROCEDURE — 2709999900 HC NON-CHARGEABLE SUPPLY

## 2020-05-25 PROCEDURE — 2000000000 HC ICU R&B

## 2020-05-25 PROCEDURE — 94770 HC ETCO2 MONITOR DAILY: CPT

## 2020-05-25 PROCEDURE — 36591 DRAW BLOOD OFF VENOUS DEVICE: CPT

## 2020-05-25 PROCEDURE — 99233 SBSQ HOSP IP/OBS HIGH 50: CPT | Performed by: INTERNAL MEDICINE

## 2020-05-25 PROCEDURE — 6360000002 HC RX W HCPCS: Performed by: INTERNAL MEDICINE

## 2020-05-25 PROCEDURE — 6370000000 HC RX 637 (ALT 250 FOR IP): Performed by: INTERNAL MEDICINE

## 2020-05-25 PROCEDURE — 6370000000 HC RX 637 (ALT 250 FOR IP): Performed by: NURSE PRACTITIONER

## 2020-05-25 PROCEDURE — 94761 N-INVAS EAR/PLS OXIMETRY MLT: CPT

## 2020-05-25 PROCEDURE — 94003 VENT MGMT INPAT SUBQ DAY: CPT

## 2020-05-25 PROCEDURE — 2580000003 HC RX 258: Performed by: INTERNAL MEDICINE

## 2020-05-25 PROCEDURE — 71045 X-RAY EXAM CHEST 1 VIEW: CPT

## 2020-05-25 PROCEDURE — 94640 AIRWAY INHALATION TREATMENT: CPT

## 2020-05-25 PROCEDURE — 80048 BASIC METABOLIC PNL TOTAL CA: CPT

## 2020-05-25 PROCEDURE — 36592 COLLECT BLOOD FROM PICC: CPT

## 2020-05-25 PROCEDURE — 2700000000 HC OXYGEN THERAPY PER DAY

## 2020-05-25 RX ADMIN — PREDNISONE 40 MG: 20 TABLET ORAL at 08:20

## 2020-05-25 RX ADMIN — CARBAMAZEPINE 200 MG: 200 TABLET ORAL at 08:20

## 2020-05-25 RX ADMIN — GLYCOPYRROLATE AND FORMOTEROL FUMARATE 2 PUFF: 9; 4.8 AEROSOL, METERED RESPIRATORY (INHALATION) at 06:16

## 2020-05-25 RX ADMIN — ENOXAPARIN SODIUM 40 MG: 40 INJECTION SUBCUTANEOUS at 13:40

## 2020-05-25 RX ADMIN — PROPOFOL INJECTABLE EMULSION 35 MCG/KG/MIN: 10 INJECTION, EMULSION INTRAVENOUS at 08:19

## 2020-05-25 RX ADMIN — RISPERIDONE 2 MG: 2 TABLET ORAL at 19:51

## 2020-05-25 RX ADMIN — FUROSEMIDE 40 MG: 40 TABLET ORAL at 08:20

## 2020-05-25 RX ADMIN — GLYCOPYRROLATE AND FORMOTEROL FUMARATE 2 PUFF: 9; 4.8 AEROSOL, METERED RESPIRATORY (INHALATION) at 17:25

## 2020-05-25 RX ADMIN — RISPERIDONE 2 MG: 2 TABLET ORAL at 08:20

## 2020-05-25 RX ADMIN — FUROSEMIDE 40 MG: 40 TABLET ORAL at 16:18

## 2020-05-25 RX ADMIN — POTASSIUM BICARBONATE 40 MEQ: 782 TABLET, EFFERVESCENT ORAL at 05:04

## 2020-05-25 RX ADMIN — PROPOFOL INJECTABLE EMULSION 30 MCG/KG/MIN: 10 INJECTION, EMULSION INTRAVENOUS at 22:22

## 2020-05-25 RX ADMIN — Medication 10 ML: at 08:21

## 2020-05-25 RX ADMIN — TADALAFIL 40 MG: 20 TABLET ORAL at 08:20

## 2020-05-25 RX ADMIN — CEFTRIAXONE SODIUM 1 G: 1 INJECTION, POWDER, FOR SOLUTION INTRAMUSCULAR; INTRAVENOUS at 13:40

## 2020-05-25 RX ADMIN — PROPOFOL INJECTABLE EMULSION 35 MCG/KG/MIN: 10 INJECTION, EMULSION INTRAVENOUS at 01:16

## 2020-05-25 RX ADMIN — ASPIRIN 81 MG 324 MG: 81 TABLET ORAL at 08:20

## 2020-05-25 RX ADMIN — Medication 10 ML: at 20:02

## 2020-05-25 RX ADMIN — FAMOTIDINE 20 MG: 20 TABLET ORAL at 08:20

## 2020-05-25 RX ADMIN — FAMOTIDINE 20 MG: 20 TABLET ORAL at 19:52

## 2020-05-25 RX ADMIN — PRAVASTATIN SODIUM 40 MG: 40 TABLET ORAL at 19:52

## 2020-05-25 RX ADMIN — PROPOFOL INJECTABLE EMULSION 35 MCG/KG/MIN: 10 INJECTION, EMULSION INTRAVENOUS at 14:52

## 2020-05-25 RX ADMIN — CARBAMAZEPINE 200 MG: 200 TABLET ORAL at 19:52

## 2020-05-25 ASSESSMENT — PAIN SCALES - GENERAL
PAINLEVEL_OUTOF10: 0

## 2020-05-25 ASSESSMENT — PULMONARY FUNCTION TESTS
PIF_VALUE: 20
PIF_VALUE: 20
PIF_VALUE: 21
PIF_VALUE: 19
PIF_VALUE: 19
PIF_VALUE: 21

## 2020-05-25 NOTE — PLAN OF CARE
Hemodynamic stability will improve  Description: Hemodynamic stability will improve  5/25/2020 0206 by Angelina Hoyos RN  Outcome: Ongoing  Note: Pt has been hemodynamically stable this shift. Problem: Mental Status - Impaired:  Goal: Mental status will be restored to baseline  Description: Mental status will be restored to baseline  5/25/2020 0958 by Claudell Sames, RN  Outcome: Ongoing  Note: Sedation continues. 5/25/2020 0206 by Angelina Hoyos RN  Outcome: Ongoing  Note: Pt is currently sedated on vent. Pt doesn't follow commands, withdraws from pain x4 ext. Pt opens eyes to voice. Problem: Pain:  Goal: Pain level will decrease  Description: Pain level will decrease  5/25/2020 0958 by Claudell Sames, RN  Outcome: Ongoing  Note: Pain Assessment: CPOT  Pain Level: 0       Is pain goal met at this time? Yes          5/25/2020 0206 by Angelina Hoyos RN  Outcome: Ongoing  Note: Pt is currently sedated on vent. Using CPOT score for pain assessment. Goal: Recognizes and communicates pain  Description: Recognizes and communicates pain  5/25/2020 0206 by Angelina Hoyos RN  Outcome: Ongoing  Note: Pt is currently sedated on vent. Using CPOT score for pain assessment. Goal: Control of acute pain  Description: Control of acute pain  5/25/2020 0206 by Angelina Hoyos RN  Outcome: Ongoing  Note: Pt is currently sedated on vent. Using CPOT score for pain assessment. Problem: Impaired respiratory status  Goal: Will be able to breathe spontaneously, without ventilator support  Description: Will be able to breathe spontaneously, without ventilator support     5/25/2020 0744 by Rosie Vargas RCP  Outcome: Ongoing  Note: Vent setting optimized to achieve target tidal volume, respiratory rate and ideal oxygen saturations. SBT will be performed when appropriate.       5/25/2020 0152 by Gunnar Mccann RCP  Outcome: Ongoing     Problem: Falls - Risk of:  Goal: Will remain free from falls  Description: Will remain free from falls  5/25/2020 0958 by Rodolfo Pate RN  Outcome: Ongoing  Note: No falls this shift. Bed alarm on.  5/25/2020 0206 by Paresh Lake RN  Outcome: Ongoing  Note: Pt has had no falls this time this shift. Pt is sedated on vent. Bed alarm on, fall band on, fall sign posted. Goal: Absence of physical injury  Description: Absence of physical injury  5/25/2020 0206 by Paresh Lake RN  Outcome: Ongoing  Note: Pt has no evidence of physical injury this shift. Problem: Nutrition  Goal: Optimal nutrition therapy  5/25/2020 0958 by Rodolfo Pate RN  Outcome: Ongoing  Note: Tube feed continues and is at goal.  5/25/2020 0206 by Paresh Lake RN  Outcome: Ongoing  Note: Pt is on continuous tube feeding, nepro at 25ml/hr. Problem: Urinary Elimination:  Goal: Complications related to the disease process, condition or treatment will be avoided or minimized  Description: Complications related to the disease process, condition or treatment will be avoided or minimized  5/25/2020 0206 by Paresh Lake RN  Outcome: Ongoing  Note: Pt is afebrile, WBC 10.4. Pt shows no signs of CAUTI this shift. Care plan reviewed with patient. Patient verbalizes understanding of the plan of care and contribute to goal setting.

## 2020-05-25 NOTE — PROGRESS NOTES
INTERNAL MEDICINE Progress Note  5/25/2020 1:27 PM  Subjective:   Admit Date: 5/17/2020  PCP: Radha Chang MD  Interval History:   No new concerns  sedated on vent, ETT    Objective:   Vitals: BP 97/70   Pulse 72   Temp 99 °F (37.2 °C)   Resp 14   Ht 6' 2\" (1.88 m)   Wt 268 lb 8.3 oz (121.8 kg)   SpO2 92%   BMI 34.48 kg/m²   General appearance: sedated on vent,   HEENT:  atraumatic  Neck:  no JVD  Lungs: diminished breath sounds bilaterally  Heart: S1, S2 normal  Abdomen: normal findings: bowel sounds normal and no masses palpable and abnormal findings:  distended  Extremities: edema + mateo  Neurologic:  sedated on vent      Medications:   Scheduled Meds:   furosemide  40 mg Oral BID    Tadalafil (PAH)  40 mg Oral Daily    glycopyrrolate-formoterol  2 puff Inhalation BID    aspirin  324 mg Oral Daily    carBAMazepine  200 mg Oral BID    pravastatin  40 mg Oral Nightly    risperiDONE  2 mg Oral BID    sodium chloride flush  10 mL Intravenous 2 times per day    enoxaparin  40 mg Subcutaneous Q24H    predniSONE  40 mg Oral Daily    cefTRIAXone (ROCEPHIN) IV  1 g Intravenous Q24H    famotidine  20 mg Per NG tube BID     Continuous Infusions:   propofol 35 mcg/kg/min (05/25/20 0819)       Lab Results:   CBC:   Recent Labs     05/23/20  0354 05/24/20  0456   WBC 12.1* 10.4   HGB 15.4 15.3    148     BMP:    Recent Labs     05/23/20  0354 05/24/20  0456 05/25/20  0428    142 145   K 3.2* 3.6 3.4*   CL 99 101 103   CO2 35* 34* 35*   BUN 40* 42* 40*   CREATININE 0.7 0.8 0.7   GLUCOSE 95 99 98     Magnesium:    Lab Results   Component Value Date    MG 2.2 05/17/2020     HgBA1c:    Lab Results   Component Value Date    LABA1C 6.0 12/15/2019     TSH:    Lab Results   Component Value Date    TSH 2.040 03/02/2020 05/18/20 1000    Specimen Source: Blood gases     pH, Blood Gas 7.23Low     PCO2 61High  mmhg    PO2 79 mmhg    HCO3 26 mmol/l    Base Excess (Calculated) -3.8Low  mmol/l    O2 Sat 93 %    IFIO2 100    DEVICE NRB    Source: R Radial      Procalcitonin 0. 15High        Transthoracic Echocardiography Report (TTE)   Left ventricular size is normal and systolic function is mildly reduced.   Ejection fraction was estimated at 50-55%. LV wall thickness is within   normal limits.   Intraventricular septal wall compression during systole suggestive of RV   pressure overload.   Markedly enlarged right atrium size.   The right ventricle was not clearly visualized. Appears dilated.  RVSP of 55-60 mm Hg consistent with moderate pulmonary hypertension.   Small circumferential pericardial effusion without evidence of tamponade   physiology. [address] size is dilated with <50% respiratory collapse.     Assessment and Plan:   1. Acute on chronic hypoxemic / hypercapnic resp failure, req vent support  2. COPD with acute exac  3. Acute on chronic CHF pEF, diuresing well  4. Moderate pulmonary hypertension, on Tadalafil  5. hypokalemia     Cont vent care / weaning per CCS  Bronchodilators  Am labs.   F/up CXR am    Ana Cheadle, MD

## 2020-05-25 NOTE — PROGRESS NOTES
Family member Sally Russo updated on plan of care. All questions answered and no questions at this time.

## 2020-05-25 NOTE — PLAN OF CARE
Problem: Impaired respiratory status  Goal: Will be able to breathe spontaneously, without ventilator support  Description: Will be able to breathe spontaneously, without ventilator support     5/25/2020 1903 by Faina Ribeiro RCP  Outcome: Ongoing  Note: Vent setting optimized to achieve target tidal volume, respiratory rate and ideal oxygen saturations. SBT will be performed when appropriate.

## 2020-05-25 NOTE — PROGRESS NOTES
CRITICAL CARE PROGRESS NOTE      Patient: Ollie Hamman    Unit/Bed:4D-04/004-A  YOB: 1970  MRN: [id number]   PCP: Jorge Samuels MD  Date of Admission: 5/17/2020  Chief Complaint:- SOB    Assessment and Plan:     1. Acute on chronic hypoxic hypercapnic respiratory failure: Wears 3 L at baseline.  Patient also wears CPAP at night, with 3 L bled in.  On arrival to the emergency room patient was noted to be hypoxic with an SPO2 in the 62s.  He was emergently intubated.  Maintain peak pressure less than 35.   Continues to require high peep, likely will need trach for continued vent support   2. Acute on chronic heart failure with reduced ejection fraction: Noted +2 pitting edema bilateral lower extremities.  Diuresis 60 mg of Lasix twice daily. Albumin level normal.  ECHO 50-55%.  Added milrinone 5/19 for increased diuresis, stopped 5/23, diuresed 7.4L   3. Moderate Pulmonary hypertension: Tadalafil added 5/21.  Right ventricular pressures 55 to 60 mmHg, continues to require high levels of PEEP   4. COPD exacerbation: Steroids, Solu-Medrol 40 mg every 6 hours for 3 days.  now 40 mg daily. Rocephin.  Bio fire pneumonia panel negative, Bevespi added 5/20. Repeat biofire 5/22 negative   5. Elevated troponin: Likely secondary to hypoxia, EKG shows no acute changes.  Repeat echo pending, trend troponin.  Patient on 325 ASA daily.  Cardiology consulted, possible ischemic work-up in the future. 6. Hypertension: History, currently hypotensive.  Norvasc with hold parameters  7. Pericardial effusion: Small effusion noted on echo, circumferential but no evidence of tamponade. 8. Schizophrenia: Risperdal    9. GERD: Pepcid twice daily  10. Hyperlipidemia: Pravastatin 40 mg nightly  11. Hyperkalemia: resolved; 1 dose kayexalate  12.  Acute kidney injury: resolved;  Likely secondary to hypotension, and prerenal in nature.  Urine electrolytes shows pre-renal, likely hypotension cause, monitor.  Improved creatinine 1. 1  13. Fever of unknown origin: resolved; Recultured 5/22, negative urine and biofire pneumonia negative, primary service continuing rocephin.     INITIAL H AND P AND ICU COURSE:    Jessy Gonzalez is a 44year-old black male who presented to [facility] on 5/17/2020 with complaints of shortness of breath that started today. Marguerite Hampton has a past medical history of hypertension, heart failure, schizophrenia, COPD.  HPI is limited secondary to patient inability to give details of history secondary to hypoxia and need for immediate intubation.  Patient was dropped off at the emergency room and no family member accompanied patient to the emergency room. 5/18 patient  continues on mechanical ventilation 5/20 increased PEEP requirements overnight. [place] for COPD exacerbation.  Milrinone added 5/19 by cardiology for increased diuresis 5/21 continue to diuresis. Alejandro Holland is improving.  PEEP has improved to 10 overnight.  5/22 patient developed fever overnight.  Tadalafil was added 5/21 for moderate pulmonary hypertension. 5/24 to require high levels of PEEP between 10 and 12. Patient continues to diurese. Continue Lasix 40 twice daily. Albumin levels are normal. 5/25 patient continues to diuresis. CTA chest negative for PE.     Past Medical History:  Per HPI  Family History:   Mother: Hypertension  Social History:  Current every day smoker, 20-pack-year history, denies EtOH use.  Denies drug use.       ROS   Intubated and sedated on mechanical ventilation    Scheduled Meds:   furosemide  40 mg Oral BID    Tadalafil (PAH)  40 mg Oral Daily    glycopyrrolate-formoterol  2 puff Inhalation BID    aspirin  324 mg Oral Daily    carBAMazepine  200 mg Oral BID    pravastatin  40 mg Oral Nightly    risperiDONE  2 mg Oral BID    sodium chloride flush  10 mL Intravenous 2 times per day    enoxaparin  40 mg Subcutaneous Q24H    predniSONE  40 mg Oral Daily    cefTRIAXone (ROCEPHIN) IV  1 g Intravenous Q24H  famotidine  20 mg Per NG tube BID     Continuous Infusions:   propofol 35 mcg/kg/min (05/25/20 0116)       PHYSICAL EXAMINATION:  T:  99.7. P:  74. RR:  15. B/P:  103/62. FiO2:  50. O2 Sat:  95.  I/O:  1428/2025  Body mass index is 34.48 kg/m². General: Acute on chronically ill-appearing black male  HEENT:  normocephalic and atraumatic. No scleral icterus. PERR  Neck: supple. No Thyromegaly. Lungs: Diminished to auscultation. No retractions  Cardiac: RRR. No JVD. Abdomen: soft. Nontender. Rounded  Extremities:  No clubbing, cyanosis. + 3 pitting edema bilateral LE  Vasculature: capillary refill < 3 seconds. Palpable dorsalis pedis pulses. Skin:  warm and dry. Psych:  Moves all extremities to painful stimuli. Responds to no commands. Lymph:  No supraclavicular adenopathy. Neurologic:  No focal deficit. No seizures. Data: (All radiographs, tracings, PFTs, and imaging are personally viewed and interpreted unless otherwise noted).  Sodium 145, potassium 3.4, chloride 103, CO2 35, BUN 40, creatinine 0.7, anion gap 7.0, glucose 98.  WBC 10.4, hemoglobin 15.3, hematocrit 51.2, platelet   Telemetry shows NSR   Chest x-ray 5/21/2020 reports stable cardiac enlargement, moderate diffuse pulmonary venous congestion.  Chest x-ray 5/19/2020 reports moderate hazy appearance of both mid and lower lung fields consistent with atelectasis pneumonia and edema, small effusion on the right side questionable small left effusion.  Overall appearance worse.  Echocardiogram 5/18/2020 reports ejection fraction from 50 to 55%, enlarged right atrium moderate pulmonary hypertension, small circumferential pericardial effusion   CTA last 5/24/2020 reports negative exam for pulmonary embolism. Cardiomegaly with reflux of contrast into the inferior vena cava and hepatic veins, correlate with decreased cardiac output. Moderate pleural effusions. Underlying COPD.           Meets Continued ICU Level Care Criteria:    [x] Yes   [] No - Transfer Planned to listed location:  [] HOSPITALIST CONTACTED-      Case and plan discussed with Dr. Lyndon Dominguez and Dr. Torey Hoffmann. Electronically signed by Christopher Pimentel. SAVANNAH Jackman - [facility]  CRITICAL CARE SPECIALIST  Patient continues to fail weaning from mechanical ventilator. He will need tracheostomy tube. Case discussed with nurse practitioner. Patient seen by me. Electronically signed by Barbara Dominguez MD.

## 2020-05-25 NOTE — PLAN OF CARE
Problem: Restraint Use - Nonviolent/Non-Self-Destructive Behavior:  Goal: Absence of restraint indications  Description: Absence of restraint indications  Outcome: Ongoing  Note: Pt has restraints ordered due to intermittent pulling at ETT. Problem: Restraint Use - Nonviolent/Non-Self-Destructive Behavior:  Goal: Absence of restraint-related injury  Description: Absence of restraint-related injury  Outcome: Ongoing  Note: Pt has no evidence of restraint related injury this shift. Problem: Discharge Planning:  Goal: Participates in care planning  Description: Participates in care planning  Outcome: Ongoing  Note: Pt family participates in care planning. Problem: Discharge Planning:  Goal: Discharged to appropriate level of care  Description: Discharged to appropriate level of care  Outcome: Ongoing  Note: Pt is currently sedated on vent. Pt remains in ICU care at this time. Problem: Airway Clearance - Ineffective:  Goal: Ability to maintain a clear airway will improve  Description: Ability to maintain a clear airway will improve  Outcome: Ongoing  Note: Pt currently sedated on vent. Pt is on FiO2 50% and PEEP 10. Weaning as tolerated. Problem: Anxiety/Stress:  Goal: Level of anxiety will decrease  Description: Level of anxiety will decrease  Outcome: Ongoing  Note: Pt is currently sedated on vent. No anxiety noted this shift. Problem: Cardiac Output - Decreased:  Goal: Hemodynamic stability will improve  Description: Hemodynamic stability will improve  Outcome: Ongoing  Note: Pt has been hemodynamically stable this shift. Problem: Mental Status - Impaired:  Goal: Mental status will be restored to baseline  Description: Mental status will be restored to baseline  Outcome: Ongoing  Note: Pt is currently sedated on vent. Pt doesn't follow commands, withdraws from pain x4 ext. Pt opens eyes to voice.      Problem: Pain:  Goal: Pain level will decrease  Description: Pain level will

## 2020-05-26 ENCOUNTER — APPOINTMENT (OUTPATIENT)
Dept: INTERVENTIONAL RADIOLOGY/VASCULAR | Age: 50
DRG: 004 | End: 2020-05-26
Payer: MEDICARE

## 2020-05-26 ENCOUNTER — APPOINTMENT (OUTPATIENT)
Dept: GENERAL RADIOLOGY | Age: 50
DRG: 004 | End: 2020-05-26
Payer: MEDICARE

## 2020-05-26 LAB
ANION GAP SERPL CALCULATED.3IONS-SCNC: 6 MEQ/L (ref 8–16)
BUN BLDV-MCNC: 33 MG/DL (ref 7–22)
CALCIUM SERPL-MCNC: 8.8 MG/DL (ref 8.5–10.5)
CHLORIDE BLD-SCNC: 102 MEQ/L (ref 98–111)
CO2: 36 MEQ/L (ref 23–33)
CREAT SERPL-MCNC: 0.6 MG/DL (ref 0.4–1.2)
ERYTHROCYTE [DISTWIDTH] IN BLOOD BY AUTOMATED COUNT: 20.7 % (ref 11.5–14.5)
ERYTHROCYTE [DISTWIDTH] IN BLOOD BY AUTOMATED COUNT: 64.8 FL (ref 35–45)
GFR SERPL CREATININE-BSD FRML MDRD: > 90 ML/MIN/1.73M2
GLUCOSE BLD-MCNC: 96 MG/DL (ref 70–108)
HCT VFR BLD CALC: 51.6 % (ref 42–52)
HEMOGLOBIN: 15 GM/DL (ref 14–18)
MCH RBC QN AUTO: 25.9 PG (ref 26–33)
MCHC RBC AUTO-ENTMCNC: 29.1 GM/DL (ref 32.2–35.5)
MCV RBC AUTO: 89 FL (ref 80–94)
PLATELET # BLD: 141 THOU/MM3 (ref 130–400)
PMV BLD AUTO: 11.4 FL (ref 9.4–12.4)
POTASSIUM SERPL-SCNC: 3.5 MEQ/L (ref 3.5–5.2)
PRO-BNP: 550.9 PG/ML (ref 0–900)
RBC # BLD: 5.8 MILL/MM3 (ref 4.7–6.1)
SODIUM BLD-SCNC: 144 MEQ/L (ref 135–145)
WBC # BLD: 9.1 THOU/MM3 (ref 4.8–10.8)

## 2020-05-26 PROCEDURE — 99232 SBSQ HOSP IP/OBS MODERATE 35: CPT | Performed by: PHYSICIAN ASSISTANT

## 2020-05-26 PROCEDURE — 6370000000 HC RX 637 (ALT 250 FOR IP): Performed by: INTERNAL MEDICINE

## 2020-05-26 PROCEDURE — 94761 N-INVAS EAR/PLS OXIMETRY MLT: CPT

## 2020-05-26 PROCEDURE — 93970 EXTREMITY STUDY: CPT

## 2020-05-26 PROCEDURE — 94640 AIRWAY INHALATION TREATMENT: CPT

## 2020-05-26 PROCEDURE — 6370000000 HC RX 637 (ALT 250 FOR IP): Performed by: NURSE PRACTITIONER

## 2020-05-26 PROCEDURE — 2500000003 HC RX 250 WO HCPCS: Performed by: NURSE PRACTITIONER

## 2020-05-26 PROCEDURE — 6360000002 HC RX W HCPCS: Performed by: INTERNAL MEDICINE

## 2020-05-26 PROCEDURE — 94770 HC ETCO2 MONITOR DAILY: CPT

## 2020-05-26 PROCEDURE — 2700000000 HC OXYGEN THERAPY PER DAY

## 2020-05-26 PROCEDURE — 83880 ASSAY OF NATRIURETIC PEPTIDE: CPT

## 2020-05-26 PROCEDURE — 99233 SBSQ HOSP IP/OBS HIGH 50: CPT | Performed by: INTERNAL MEDICINE

## 2020-05-26 PROCEDURE — 85027 COMPLETE CBC AUTOMATED: CPT

## 2020-05-26 PROCEDURE — 2000000000 HC ICU R&B

## 2020-05-26 PROCEDURE — 2709999900 HC NON-CHARGEABLE SUPPLY

## 2020-05-26 PROCEDURE — 71045 X-RAY EXAM CHEST 1 VIEW: CPT

## 2020-05-26 PROCEDURE — 94003 VENT MGMT INPAT SUBQ DAY: CPT

## 2020-05-26 PROCEDURE — 80048 BASIC METABOLIC PNL TOTAL CA: CPT

## 2020-05-26 PROCEDURE — 2580000003 HC RX 258: Performed by: INTERNAL MEDICINE

## 2020-05-26 RX ORDER — LORAZEPAM 2 MG/ML
4 INJECTION INTRAMUSCULAR ONCE
Status: COMPLETED | OUTPATIENT
Start: 2020-05-27 | End: 2020-05-27

## 2020-05-26 RX ORDER — KETAMINE HCL IN NACL, ISO-OSM 100MG/10ML
100 SYRINGE (ML) INJECTION ONCE
Status: COMPLETED | OUTPATIENT
Start: 2020-05-27 | End: 2020-05-27

## 2020-05-26 RX ORDER — LIDOCAINE HYDROCHLORIDE AND EPINEPHRINE 10; 10 MG/ML; UG/ML
20 INJECTION, SOLUTION INFILTRATION; PERINEURAL ONCE
Status: COMPLETED | OUTPATIENT
Start: 2020-05-27 | End: 2020-05-27

## 2020-05-26 RX ORDER — FENTANYL CITRATE 50 UG/ML
100 INJECTION, SOLUTION INTRAMUSCULAR; INTRAVENOUS ONCE
Status: COMPLETED | OUTPATIENT
Start: 2020-05-27 | End: 2020-05-27

## 2020-05-26 RX ORDER — FENTANYL CITRATE 50 UG/ML
100 INJECTION, SOLUTION INTRAMUSCULAR; INTRAVENOUS ONCE
Status: DISCONTINUED | OUTPATIENT
Start: 2020-05-26 | End: 2020-05-26

## 2020-05-26 RX ORDER — CISATRACURIUM BESYLATE 2 MG/ML
10 INJECTION, SOLUTION INTRAVENOUS ONCE
Status: COMPLETED | OUTPATIENT
Start: 2020-05-27 | End: 2020-05-27

## 2020-05-26 RX ADMIN — RISPERIDONE 2 MG: 2 TABLET ORAL at 08:26

## 2020-05-26 RX ADMIN — CARBAMAZEPINE 200 MG: 200 TABLET ORAL at 19:55

## 2020-05-26 RX ADMIN — CARBAMAZEPINE 200 MG: 200 TABLET ORAL at 08:26

## 2020-05-26 RX ADMIN — PROPOFOL INJECTABLE EMULSION 35 MCG/KG/MIN: 10 INJECTION, EMULSION INTRAVENOUS at 06:47

## 2020-05-26 RX ADMIN — ACETAMINOPHEN 650 MG: 325 TABLET ORAL at 03:38

## 2020-05-26 RX ADMIN — FAMOTIDINE 20 MG: 10 INJECTION INTRAVENOUS at 21:52

## 2020-05-26 RX ADMIN — GLYCOPYRROLATE AND FORMOTEROL FUMARATE 2 PUFF: 9; 4.8 AEROSOL, METERED RESPIRATORY (INHALATION) at 19:08

## 2020-05-26 RX ADMIN — GLYCOPYRROLATE AND FORMOTEROL FUMARATE 2 PUFF: 9; 4.8 AEROSOL, METERED RESPIRATORY (INHALATION) at 08:15

## 2020-05-26 RX ADMIN — PROPOFOL INJECTABLE EMULSION 35 MCG/KG/MIN: 10 INJECTION, EMULSION INTRAVENOUS at 21:55

## 2020-05-26 RX ADMIN — Medication 10 ML: at 08:26

## 2020-05-26 RX ADMIN — TADALAFIL 40 MG: 20 TABLET ORAL at 08:26

## 2020-05-26 RX ADMIN — PROPOFOL INJECTABLE EMULSION 35 MCG/KG/MIN: 10 INJECTION, EMULSION INTRAVENOUS at 14:16

## 2020-05-26 RX ADMIN — PREDNISONE 40 MG: 20 TABLET ORAL at 08:26

## 2020-05-26 RX ADMIN — CEFTRIAXONE SODIUM 1 G: 1 INJECTION, POWDER, FOR SOLUTION INTRAMUSCULAR; INTRAVENOUS at 13:49

## 2020-05-26 RX ADMIN — POTASSIUM BICARBONATE 40 MEQ: 782 TABLET, EFFERVESCENT ORAL at 05:43

## 2020-05-26 RX ADMIN — RISPERIDONE 2 MG: 2 TABLET ORAL at 19:54

## 2020-05-26 RX ADMIN — FUROSEMIDE 40 MG: 40 TABLET ORAL at 08:26

## 2020-05-26 RX ADMIN — FUROSEMIDE 40 MG: 40 TABLET ORAL at 17:22

## 2020-05-26 RX ADMIN — FAMOTIDINE 20 MG: 20 TABLET ORAL at 08:26

## 2020-05-26 RX ADMIN — ENOXAPARIN SODIUM 40 MG: 40 INJECTION SUBCUTANEOUS at 13:49

## 2020-05-26 RX ADMIN — ASPIRIN 81 MG 324 MG: 81 TABLET ORAL at 08:26

## 2020-05-26 RX ADMIN — PRAVASTATIN SODIUM 40 MG: 40 TABLET ORAL at 19:55

## 2020-05-26 RX ADMIN — Medication 10 ML: at 19:54

## 2020-05-26 ASSESSMENT — PULMONARY FUNCTION TESTS
PIF_VALUE: 21
PIF_VALUE: 18
PIF_VALUE: 20
PIF_VALUE: 20

## 2020-05-26 NOTE — CARE COORDINATION
5/26/20, 2:28 PM EDT    DISCHARGE ON GOING EVALUATION    Satish Encinas day: 9  Location: -04/004-A Reason for admit: Acute respiratory failure ([facility] [address] [J96.00]  Acute on chronic respiratory failure ([facility] [address] [J96.20]   Procedure:   5/17 Intubated in ED  5/17 CVC   5/18 Echo with EF 50-55%; small circumferential pericardial effusion without evidence of tamponade  5/24 CTA Chest: Neg for PE; Cardiomegaly with reflux of contrast into the inferior vena cava and hepatic veins, correlate with decreased cardiac output; Bibasilar atelectasis and moderate pleural effusions; Underlying changes of COPD  5/26 BLE Venous dopplers: Neg for DVT  5/26 CXR: Stable  Treatment Plan of Care: Remains on vent w/ETT on PCV, peep 8, FIO2 50%, sats 92%. Tmax 100.4. NSR. Follows commands. Internal Med, Intensivist, and Cardiology following. Dietitian and Palliative Care consulted. Telemetry, OG w/TF, flexiseal, pandya care. Diprivan @ 35 mcg/kg/min, asa, tegretol, IV rocephin, lovenox, pepcid, po lasix 40 mg bid, inhaler, pravastatin, prednisone, risperdal, tadalafil. CO2 36. Barriers to Discharge: on vent  PCP: Mattie Tidwell MD  Readmission Risk Score: 20%  Patient Goals/Plan/Treatment Preferences: From home w/mother. Wears 4L O2 thru [facility] and home cpap. Received order for LTACH eval. Intensivist awaiting return call from family to discuss possible trach.

## 2020-05-26 NOTE — PLAN OF CARE
Problem: Nutrition  Goal: Optimal nutrition therapy  Outcome: Ongoing   Nutrition Problem: Inadequate oral intake  Intervention: Food and/or Nutrient Delivery: Continue current Tube Feeding  Nutritional Goals: TF to provide % of nutrient need while pt is intubated

## 2020-05-26 NOTE — PROGRESS NOTES
Physical Findings: Pt seen intubated toleraing Nepro TF at goal of 25 ml/hr & 2 protein modualrs per RN. Per RN pt has flexiseal  but stool not real bad now. Pt sedated with diprivan. MAP 78. BUN 33, Cr 0.4  meds also include lasix & prednisone. · Wound Type: None(per RN)  · Current Nutrition Therapies:  · Oral Diet Orders: NPO   · Oral Diet intake: NPO  · Oral Nutrition Supplement (ONS) Orders: None  · ONS intake: NPO  · Tube Feeding (TF) Orders:   · Feeding Route: Orogastric  · Formula: Renal(Nepro)  · Rate (ml/hr):25 ml/hr ( current goal)    · Volume (ml/day): 600ml  · Duration: Continuous  · Additives/Modulars: (1 ( 2.5 oz) liquid protein bid)  · Water Flushes: per Dr  · Current TF & Flush Orders Provides:    · Goal TF & Flush Orders Provides: 1288kcals TF (2093kcals with diprivan), 101 grams protein, 97 grams CHO/24 hrs  · Additional Calories: . · Anthropometric Measures:  · Ht: 6' 2\" (188 cm)   · Current Body Wt: 272 lb 7.8 oz (123.6 kg)(5/26 +2 +4 edema)  · Admission Body Wt: 291 lb 0.1 oz (132 kg)(5/18 +2+3 edema)  · Usual Body Wt: 270 lb 11.6 oz (122.8 kg)(12/31/19 per EMR )  · Weight Change: difficult to assess d/t + 2, + 4 edema this admit   · Ideal Body Wt: 190 lb (86.2 kg), % Ideal Body 146%  · BMI Classification: BMI 35.0 - 39.9 Obese Class II Body mass index is 34.99 kg/m².     Nutrition Interventions:   Continue current Tube Feeding  Continued Inpatient Monitoring, Education not appropriate at this time, Coordination of Care    Nutrition Evaluation:   · Evaluation: Progressing toward goals   · Goals: TF to provide % of nutrient need while pt is intubated    · Monitoring: Nutrition Progression, TF Intake, TF Tolerance, Skin Integrity, Weight, Pertinent Labs, Monitor Hemodynamic Status, Monitor Bowel Function      Electronically signed by Eunice Donaldson RD, LD on 5/26/20 at 1:57 PM EDT    Contact Number: [phone]

## 2020-05-26 NOTE — PLAN OF CARE
Problem: Restraint Use - Nonviolent/Non-Self-Destructive Behavior:  Goal: Absence of restraint indications  Description: Absence of restraint indications  5/25/2020 2339 by Maribell Mata RN  Outcome: Ongoing  Note: Patient continues to reach for ETT while performing ROM exercises. Problem: Restraint Use - Nonviolent/Non-Self-Destructive Behavior:  Goal: Absence of restraint-related injury  Description: Absence of restraint-related injury  Outcome: Ongoing  Note: No signs or injury noted. Performing ROM exercises every 2 hour and PRN. Problem: Discharge Planning:  Goal: Discharged to appropriate level of care  Description: Discharged to appropriate level of care  Outcome: Ongoing  Note: Discharge planning in process and discussed with patient/family. Social work consulted for any additional needs. Care manager aware of discharge needs. Patient is from home with family and discharge planning still remains in progress due to clinical course. Problem: Airway Clearance - Ineffective:  Goal: Ability to maintain a clear airway will improve  Description: Ability to maintain a clear airway will improve  5/25/2020 2339 by Maribell Mata RN  Outcome: Ongoing  Note: Lung sounds diminished throughout. Problem: Anxiety/Stress:  Goal: Level of anxiety will decrease  Description: Level of anxiety will decrease  Outcome: Ongoing  Note: No anxiety noted at this time. Patient remains on a propofol gtt. Problem: Cardiac Output - Decreased:  Goal: Hemodynamic stability will improve  Description: Hemodynamic stability will improve  Outcome: Ongoing  Note:   Vitals:    05/25/20 2002 05/25/20 2102 05/25/20 2202 05/25/20 2302   BP: 97/64 94/64 97/66 103/69   Pulse: 73 77 73 74   Resp: 15 16 15 18   Temp:       TempSrc:       SpO2:       Weight:       Height:         Patient off all pressors and tolerating well.      Problem: Mental Status - Impaired:  Goal: Mental status will be restored to baseline  Description: Mental status will be restored to baseline  5/25/2020 2339 by Cherri Mackenzie RN  Outcome: Ongoing  Note: Patient opens eyes to his name. Patient unable to communicate due to ETT. Patient is able to follow commands. Problem: Pain:  Goal: Pain level will decrease  Description: Pain level will decrease  5/25/2020 2339 by Cherri Mackenzie RN  Outcome: Ongoing  Note: Per the CPOT scale, patient is free of pain. Patient has a pain goal of \"no pain. \"       Problem: Falls - Risk of:  Goal: Will remain free from falls  Description: Will remain free from falls  5/25/2020 2339 by Cherri Mackenzie RN  Outcome: Ongoing  Note: Call light in reach, bed in lowest position, and bed alarm activated. Education given on use of call light before ambulation and when in need of assistance. Patient unable to express understanding. Hourly visual checks performed and charted. Toileting offered to patient. No falls this shift, at any time. Arm band and falling star in place. Will continue to monitor. Problem: Falls - Risk of:  Goal: Absence of physical injury  Description: Absence of physical injury  Outcome: Ongoing  Note: Call light in reach, bed in lowest position, and bed alarm activated. Education given on use of call light before ambulation and when in need of assistance. Patient unable to express understanding. Hourly visual checks performed and charted. Toileting offered to patient. No falls this shift, at any time. Arm band and falling star in place. Will continue to monitor. Problem: Nutrition  Goal: Optimal nutrition therapy  5/25/2020 2339 by Cherri Mackenzie RN  Outcome: Ongoing  Note: Patient had TF infusing through an OF. Patient tolerating well. Minimal residuals noted. Bowel sounds active in all four quadrants.      Problem: Urinary Elimination:  Goal: Complications related to the disease process, condition or treatment will be avoided or minimized  Description: Complications related to the disease

## 2020-05-26 NOTE — PROGRESS NOTES
INTERNAL MEDICINE Progress Note  5/26/2020 11:03 AM  Subjective:   Admit Date: 5/17/2020  PCP: Mattie Tidwell MD  Interval History:   No new concerns    Objective:   Vitals: BP 97/68   Pulse 65   Temp 98.8 °F (37.1 °C) (Bladder)   Resp 11   Ht 6' 2\" (1.88 m)   Wt 272 lb 7.8 oz (123.6 kg)   SpO2 90%   BMI 34.99 kg/m²   General appearance: sedated on vent,   HEENT:  atraumatic  Neck:  no JVD  Lungs: diminished breath sounds bilaterally  Heart: S1, S2 normal  Abdomen: normal findings: bowel sounds normal and no masses palpable and abnormal findings:  distended  Extremities: edema + mateo  Neurologic:  sedated on vent      Medications:   Scheduled Meds:   furosemide  40 mg Oral BID    Tadalafil (PAH)  40 mg Oral Daily    glycopyrrolate-formoterol  2 puff Inhalation BID    aspirin  324 mg Oral Daily    carBAMazepine  200 mg Oral BID    pravastatin  40 mg Oral Nightly    risperiDONE  2 mg Oral BID    sodium chloride flush  10 mL Intravenous 2 times per day    enoxaparin  40 mg Subcutaneous Q24H    predniSONE  40 mg Oral Daily    cefTRIAXone (ROCEPHIN) IV  1 g Intravenous Q24H    famotidine  20 mg Per NG tube BID     Continuous Infusions:   propofol 35 mcg/kg/min (05/26/20 0827)       Lab Results:   CBC:   Recent Labs     05/24/20  0456 05/26/20  0440   WBC 10.4 9.1   HGB 15.3 15.0    141     BMP:    Recent Labs     05/24/20  0456 05/25/20  0428 05/26/20  0440    145 144   K 3.6 3.4* 3.5    103 102   CO2 34* 35* 36*   BUN 42* 40* 33*   CREATININE 0.8 0.7 0.6   GLUCOSE 99 98 96     Magnesium:    Lab Results   Component Value Date    MG 2.2 05/17/2020     HgBA1c:    Lab Results   Component Value Date    LABA1C 6.0 12/15/2019     TSH:    Lab Results   Component Value Date    TSH 2.040 03/02/2020 05/18/20 1000    Specimen Source: Blood gases     pH, Blood Gas 7.23Low     PCO2 61High  mmhg    PO2 79 mmhg    HCO3 26 mmol/l    Base Excess (Calculated) -3.8Low  mmol/l    O2 Sat 93 % IFIO2 100    DEVICE NRB    Source: R Radial      Procalcitonin 0. 15High        Transthoracic Echocardiography Report (TTE)   Left ventricular size is normal and systolic function is mildly reduced.   Ejection fraction was estimated at 50-55%. LV wall thickness is within   normal limits.   Intraventricular septal wall compression during systole suggestive of RV   pressure overload.   Markedly enlarged right atrium size.   The right ventricle was not clearly visualized. Appears dilated.  RVSP of 55-60 mm Hg consistent with moderate pulmonary hypertension.   Small circumferential pericardial effusion without evidence of tamponade   physiology. [address] size is dilated with <50% respiratory collapse. CXR 5/26/20  FINDINGS:  Lines/tubes/devices: There is no significant change in the satisfactory positions of the life support lines and tubes. Lungs/pleura:  There are stable bilateral layering small to moderate pleural effusions. No pneumothorax. There is no change in the mild interstitial infiltrates consistent with pulmonary edema or an atypical pneumonia. Heart: Stable borderline cardiomegaly. Mediastinum/yasmany: No obvious mass or adenopathy. Skeleton: No significant bone or joint abnormality. Impression:     Stable appearance of the chest compared to the prior study as detailed above.        Assessment and Plan:   1. Acute on chronic hypoxemic / hypercapnic resp failure, req vent support  2. COPD with acute exac  3. Acute on chronic CHF pEF, diuresing well  4. Moderate pulmonary hypertension, on Tadalafil  5. hypokalemia     Cont vent care / weaning per CCS  Trach planned  Bronchodilators  Am labs.       Stephon Manuel MD

## 2020-05-26 NOTE — PROGRESS NOTES
CRITICAL CARE PROGRESS NOTE      Patient: Dalton Parham    Unit/Bed:4D-04/004-A  YOB: 1970  MRN: [id number]   PCP: Keo Durán MD  Date of Admission: 5/17/2020  Chief Complaint:- SOB    Assessment and Plan:      1. Acute on chronic hypoxic hypercapnic respiratory failure: Wears 3 L at baseline.  Patient also wears CPAP at night, with 3 L bled in.  On arrival to the emergency room patient was noted to be hypoxic with an SPO2 in the 62s.  He was emergently intubated.  Maintain peak pressure less than 35.   Continues to require high peep, likely will need trach for continued vent support   2. Acute on chronic heart failure with reduced ejection fraction: Noted +2 pitting edema bilateral lower extremities.  Diuresis 40 mg of Lasix twice daily. Albumin level normal.  ECHO 50-55%.  Added milrinone 5/19 for increased diuresis, stopped 5/23, diuresed 7.7L   3. Moderate Pulmonary hypertension: Tadalafil added 5/21.  Right ventricular pressures 55 to 60 mmHg, continues to require high levels of PEEP   4. COPD exacerbation: Steroids, Solu-Medrol 40 mg every 6 hours for 3 days.  now 40 mg daily. Rocephin.  Bio fire pneumonia panel negative, Bevespi added 5/20. Repeat biofire 5/22 negative  5. Elevated troponin: Likely secondary to hypoxia, EKG shows no acute changes.  Repeat echo pending, trend troponin.  Patient on 325 ASA daily.  Cardiology consulted, possible ischemic work-up in the future. 6. Hypertension: History, currently hypotensive.  Norvasc with hold parameters  Pericardial effusion: Small effusion noted on echo, circumferential but no evidence of tamponade  7. Schizophrenia: Risperdal    8. GERD: Pepcid twice daily  9. Hyperlipidemia: Pravastatin 40 mg nightly  10. Hyperkalemia: resolved; 1 dose kayexalate  11.  Acute kidney injury: resolved;  Likely secondary to hypotension, and prerenal in nature.  Urine electrolytes shows pre-renal, likely hypotension cause, monitor.  Improved creatinine 1. 1  12. Fever of unknown origin: resolved; Recultured 5/22, negative urine and biofire pneumonia negative, primary service continuing rocephin.     INITIAL H AND P AND ICU COURSE:  Luther Rceinos is a 44year-old black male who presented to [facility] on 5/17/2020 with complaints of shortness of breath that started today. Fátima List has a past medical history of hypertension, heart failure, schizophrenia, COPD.  HPI is limited secondary to patient inability to give details of history secondary to hypoxia and need for immediate intubation.  Patient was dropped off at the emergency room and no family member accompanied patient to the emergency room. 5/18 patient  continues on mechanical ventilation 5/20 increased PEEP requirements overnight. Rolinda Shoulder for COPD exacerbation.  Milrinone added 5/19 by cardiology for increased diuresis 5/21 continue to diuresis. Christopher Lowell is improving.  PEEP has improved to 10 overnight.  5/22 patient developed fever overnight.  Tadalafil was added 5/21 for moderate pulmonary hypertension. 5/24 to require high levels of PEEP between 10 and 12.  Patient continues to diurese.  Continue Lasix 40 twice daily.  Albumin levels are normal. 5/25 patient continues to diuresis. CTA chest negative for PE. 5/26 [facility] is making decisions about tracheostomy tube placement. Patient continues on ventilator support. Low likelihood of extubation without tracheostomy tube. Past Medical History:  Per HPI  Family History: Mother: Hypertension  Social History:  Current every day smoker, 20-pack-year history, denies EtOH use.  Denies drug use.     ROS   Intubated and sedated on mechanical ventilation    Scheduled Meds:   furosemide  40 mg Oral BID    Tadalafil (PAH)  40 mg Oral Daily    glycopyrrolate-formoterol  2 puff Inhalation BID    aspirin  324 mg Oral Daily    carBAMazepine  200 mg Oral BID    pravastatin  40 mg Oral Nightly    risperiDONE  2 mg Oral BID    sodium chloride flush  10 mL Intravenous 2 times per day    enoxaparin  40 mg Subcutaneous Q24H    predniSONE  40 mg Oral Daily    cefTRIAXone (ROCEPHIN) IV  1 g Intravenous Q24H    famotidine  20 mg Per NG tube BID     Continuous Infusions:   propofol 35 mcg/kg/min (05/26/20 0647)       PHYSICAL EXAMINATION:  T:  100.4. P:  74. RR:  17. B/P:  100/65. FiO2:  45. O2 Sat:  93.  I/O: 1318/1625  PC: 14/8: TV: 537: RRTotal: 20: Ti:1 sec: ETCO2: 40. Body mass index is 34.99 kg/m². General: Acute on Chronically ill-appearing black male  HEENT:  normocephalic and atraumatic. No scleral icterus. PERR  Neck: supple. No Thyromegaly. Lungs: clear to auscultation. No retractions  Cardiac: RRR. No JVD. Abdomen: soft. Nontender. Rounded   Extremities:  No clubbing, cyanosis. + 3 pitting edema LE   Vasculature: capillary refill < 3 seconds. Palpable dorsalis pedis pulses. Skin:  warm and dry. Psych: Withdraws from pain x4  Lymph:  No supraclavicular adenopathy. Neurologic:  No focal deficit. No seizures. Data: (All radiographs, tracings, PFTs, and imaging are personally viewed and interpreted unless otherwise noted).  Sodium 144, potassium 3.5, chloride 102, CO2 36, BUN 33, creatinine 0.6, anion gap 6.0, glucose 96.   WBC 9.1, hemoglobin 15.0, hematocrit 51.6, platelet count 976.  Telemetry shows normal sinus rhythm   Chest x-ray 5/21/2020 reports stable cardiac enlargement, moderate diffuse pulmonary venous congestion.  Chest x-ray 5/19/2020 reports moderate hazy appearance of both mid and lower lung fields consistent with atelectasis pneumonia and edema, small effusion on the right side questionable small left effusion.  Overall appearance worse.  Echocardiogram 5/18/2020 reports ejection fraction from 50 to 55%, enlarged right atrium moderate pulmonary hypertension, small circumferential pericardial effusion   CTA last 5/24/2020 reports negative exam for pulmonary embolism.   Cardiomegaly with reflux of contrast into the inferior vena cava and hepatic veins, correlate with decreased cardiac output. Moderate pleural effusions. Underlying COPD. Meets Continued ICU Level Care Criteria:    [x] Yes   [] No - Transfer Planned to listed location:  [] HOSPITALIST CONTACTED-      Case and plan discussed with Dr. Jolly Herrera and Dr. Fang Ramirez. Electronically signed by Leoncio Lewis. SAVANNAH Rojo - [facility]  CRITICAL CARE SPECIALIST  Patient seen by me. Case discussed with nurse practitioner. Case discussed with Dr. Fang Ramirez. Family informed that patient will require tracheostomy tube. They are considering their options. Continue with mechanical ventilator support. Electronically signed by Shiloh Herrera MD.

## 2020-05-26 NOTE — PROGRESS NOTES
carBAMazepine  200 mg Oral BID    pravastatin  40 mg Oral Nightly    risperiDONE  2 mg Oral BID    sodium chloride flush  10 mL Intravenous 2 times per day    enoxaparin  40 mg Subcutaneous Q24H    predniSONE  40 mg Oral Daily    cefTRIAXone (ROCEPHIN) IV  1 g Intravenous Q24H    famotidine  20 mg Per NG tube BID      propofol 35 mcg/kg/min (05/26/20 0827)     potassium chloride, 40 mEq, PRN    Or  potassium alternative oral replacement, 40 mEq, PRN    Or  potassium chloride, 10 mEq, PRN  albuterol, 5 mg, Q4H PRN  sodium chloride flush, 10 mL, PRN  acetaminophen, 650 mg, Q6H PRN    Or  acetaminophen, 650 mg, Q6H PRN  polyethylene glycol, 17 g, Daily PRN  promethazine, 12.5 mg, Q6H PRN    Or  ondansetron, 4 mg, Q6H PRN        Diagnostics:  Echo 5/18/20      Summary   Left ventricular size is normal and systolic function is mildly reduced. Ejection fraction was estimated at 50-55%. LV wall thickness is within   normal limits. Intraventricular septal wall compression during systole suggestive of RV   pressure overload. Markedly enlarged right atrium size. The right ventricle was not clearly visualized. Appears dilated. RVSP of 55-60 mm Hg consistent with moderate pulmonary hypertension. Small circumferential pericardial effusion without evidence of tamponade   physiology. IVC size is dilated with <50% respiratory collapse. Signature      ----------------------------------------------------------------   Electronically signed by Edgardo Hidalgo MD (Interpreting   physician) on 05/18/2020 at 04:20 PM    Lab Data:    Cardiac Enzymes:  No results for input(s): CKTOTAL, CKMB, CKMBINDEX, TROPONINI in the last 72 hours.     CBC:   Lab Results   Component Value Date    WBC 9.1 05/26/2020    RBC 5.80 05/26/2020    HGB 15.0 05/26/2020    HCT 51.6 05/26/2020     05/26/2020       CMP:    Lab Results   Component Value Date     05/26/2020    K 3.5 05/26/2020    K 5.7 05/18/2020     05/26/2020    CO2 36 05/26/2020    BUN 33 05/26/2020    CREATININE 0.6 05/26/2020    LABGLOM >90 05/26/2020    GLUCOSE 96 05/26/2020    CALCIUM 8.8 05/26/2020       Hepatic Function Panel:    Lab Results   Component Value Date    ALKPHOS 112 05/17/2020    ALT 12 05/17/2020    AST 13 05/17/2020    PROT 7.0 05/17/2020    BILITOT 0.4 05/17/2020    BILIDIR 0.3 05/17/2020    LABALBU 3.8 05/17/2020       Magnesium:    Lab Results   Component Value Date    MG 2.2 05/17/2020       PT/INR:    Lab Results   Component Value Date    INR 1.26 03/02/2020       HgBA1c:    Lab Results   Component Value Date    LABA1C 6.0 12/15/2019       FLP:    Lab Results   Component Value Date    TRIG 70 12/14/2019    HDL 27 12/14/2019    LDLCALC 61 12/14/2019       TSH:    Lab Results   Component Value Date    TSH 2.040 03/02/2020         Assessment:    Acute on chronic hypoxic hypercapnic resp failure - intubated, on home O2  Acute on chronic diastolic CHF/RHF  COPD exacerbation  fevers  Mod PHTN - RVSP 55-60mmHg per echo 5/18/20  Elevated troponins - likely due to above  Ef 50-55 per echo 5/18/20  Hx HTN - now hypotensive, on levophed gtt  Dyslipidemia  Hx schizophrenia   Tobacco abuse  Hx noncompliance    Plan:  · Daily I/o and weights  · 2 liter fluid restriction and 2gm sodium diet  · Tadalafil added by critical care team  · Cont asa/statin/lasix  · Keep mag >2 and k >4  · [facility] will [facility] - timing per attending   · Will need ischemic w/up once stable - can do as outpt  · Check dopplers to r/o ble dvt  · F/up dr jesus 2 weeks upon discharge  · Will see prn         Electronically signed by Joce Gaytan PA-C on 5/26/2020 at 9:13 AM

## 2020-05-26 NOTE — PROGRESS NOTES
Palliative care consult receive for goals of care discussion. Discussed with intensivist KATHARINE Murcia. She has a call out to patient's sister; waiting on call back. She will contact \[facility]\"" care once she hears back from family.

## 2020-05-27 ENCOUNTER — APPOINTMENT (OUTPATIENT)
Dept: GENERAL RADIOLOGY | Age: 50
DRG: 004 | End: 2020-05-27
Payer: MEDICARE

## 2020-05-27 LAB
ANION GAP SERPL CALCULATED.3IONS-SCNC: 8 MEQ/L (ref 8–16)
BLOOD CULTURE, ROUTINE: NORMAL
BUN BLDV-MCNC: 31 MG/DL (ref 7–22)
CALCIUM SERPL-MCNC: 8.9 MG/DL (ref 8.5–10.5)
CHLORIDE BLD-SCNC: 103 MEQ/L (ref 98–111)
CO2: 36 MEQ/L (ref 23–33)
CREAT SERPL-MCNC: 0.6 MG/DL (ref 0.4–1.2)
ERYTHROCYTE [DISTWIDTH] IN BLOOD BY AUTOMATED COUNT: 20.8 % (ref 11.5–14.5)
ERYTHROCYTE [DISTWIDTH] IN BLOOD BY AUTOMATED COUNT: 65.1 FL (ref 35–45)
GFR SERPL CREATININE-BSD FRML MDRD: > 90 ML/MIN/1.73M2
GLUCOSE BLD-MCNC: 96 MG/DL (ref 70–108)
HCT VFR BLD CALC: 53 % (ref 42–52)
HEMOGLOBIN: 15.4 GM/DL (ref 14–18)
MAGNESIUM: 2.3 MG/DL (ref 1.6–2.4)
MCH RBC QN AUTO: 26.1 PG (ref 26–33)
MCHC RBC AUTO-ENTMCNC: 29.1 GM/DL (ref 32.2–35.5)
MCV RBC AUTO: 89.8 FL (ref 80–94)
PLATELET # BLD: 141 THOU/MM3 (ref 130–400)
PMV BLD AUTO: 11.6 FL (ref 9.4–12.4)
POTASSIUM SERPL-SCNC: 3.3 MEQ/L (ref 3.5–5.2)
RBC # BLD: 5.9 MILL/MM3 (ref 4.7–6.1)
SODIUM BLD-SCNC: 147 MEQ/L (ref 135–145)
WBC # BLD: 9.8 THOU/MM3 (ref 4.8–10.8)

## 2020-05-27 PROCEDURE — 31600 PLANNED TRACHEOSTOMY: CPT

## 2020-05-27 PROCEDURE — APPSS180 APP SPLIT SHARED TIME > 60 MINUTES: Performed by: NURSE PRACTITIONER

## 2020-05-27 PROCEDURE — 94761 N-INVAS EAR/PLS OXIMETRY MLT: CPT

## 2020-05-27 PROCEDURE — 31622 DX BRONCHOSCOPE/WASH: CPT | Performed by: INTERNAL MEDICINE

## 2020-05-27 PROCEDURE — 6370000000 HC RX 637 (ALT 250 FOR IP): Performed by: INTERNAL MEDICINE

## 2020-05-27 PROCEDURE — 94770 HC ETCO2 MONITOR DAILY: CPT

## 2020-05-27 PROCEDURE — 6360000002 HC RX W HCPCS: Performed by: INTERNAL MEDICINE

## 2020-05-27 PROCEDURE — 99291 CRITICAL CARE FIRST HOUR: CPT | Performed by: INTERNAL MEDICINE

## 2020-05-27 PROCEDURE — 6370000000 HC RX 637 (ALT 250 FOR IP): Performed by: NURSE PRACTITIONER

## 2020-05-27 PROCEDURE — 2709999900 HC NON-CHARGEABLE SUPPLY

## 2020-05-27 PROCEDURE — 0B113F4 BYPASS TRACHEA TO CUTANEOUS WITH TRACHEOSTOMY DEVICE, PERCUTANEOUS APPROACH: ICD-10-PCS | Performed by: INTERNAL MEDICINE

## 2020-05-27 PROCEDURE — 0BJ08ZZ INSPECTION OF TRACHEOBRONCHIAL TREE, VIA NATURAL OR ARTIFICIAL OPENING ENDOSCOPIC: ICD-10-PCS | Performed by: INTERNAL MEDICINE

## 2020-05-27 PROCEDURE — 2000000000 HC ICU R&B

## 2020-05-27 PROCEDURE — 2500000003 HC RX 250 WO HCPCS: Performed by: INTERNAL MEDICINE

## 2020-05-27 PROCEDURE — 2700000000 HC OXYGEN THERAPY PER DAY

## 2020-05-27 PROCEDURE — 94640 AIRWAY INHALATION TREATMENT: CPT

## 2020-05-27 PROCEDURE — 6370000000 HC RX 637 (ALT 250 FOR IP): Performed by: PHYSICIAN ASSISTANT

## 2020-05-27 PROCEDURE — 85027 COMPLETE CBC AUTOMATED: CPT

## 2020-05-27 PROCEDURE — C1769 GUIDE WIRE: HCPCS

## 2020-05-27 PROCEDURE — 6360000002 HC RX W HCPCS: Performed by: NURSE PRACTITIONER

## 2020-05-27 PROCEDURE — 83735 ASSAY OF MAGNESIUM: CPT

## 2020-05-27 PROCEDURE — 80048 BASIC METABOLIC PNL TOTAL CA: CPT

## 2020-05-27 PROCEDURE — 71045 X-RAY EXAM CHEST 1 VIEW: CPT

## 2020-05-27 PROCEDURE — 36592 COLLECT BLOOD FROM PICC: CPT

## 2020-05-27 PROCEDURE — 2500000003 HC RX 250 WO HCPCS: Performed by: NURSE PRACTITIONER

## 2020-05-27 PROCEDURE — 31600 PLANNED TRACHEOSTOMY: CPT | Performed by: INTERNAL MEDICINE

## 2020-05-27 PROCEDURE — 3609027000 HC BRONCHOSCOPY

## 2020-05-27 PROCEDURE — 2580000003 HC RX 258: Performed by: INTERNAL MEDICINE

## 2020-05-27 PROCEDURE — 94003 VENT MGMT INPAT SUBQ DAY: CPT

## 2020-05-27 PROCEDURE — 2720000010 HC SURG SUPPLY STERILE

## 2020-05-27 RX ORDER — LORAZEPAM 2 MG/ML
INJECTION INTRAMUSCULAR
Status: COMPLETED | OUTPATIENT
Start: 2020-05-27 | End: 2020-05-27

## 2020-05-27 RX ORDER — KETAMINE HCL IN NACL, ISO-OSM 100MG/10ML
SYRINGE (ML) INJECTION
Status: COMPLETED | OUTPATIENT
Start: 2020-05-27 | End: 2020-05-27

## 2020-05-27 RX ORDER — FENTANYL CITRATE 50 UG/ML
INJECTION, SOLUTION INTRAMUSCULAR; INTRAVENOUS
Status: COMPLETED | OUTPATIENT
Start: 2020-05-27 | End: 2020-05-27

## 2020-05-27 RX ORDER — OXYCODONE HYDROCHLORIDE AND ACETAMINOPHEN 5; 325 MG/1; MG/1
1 TABLET ORAL EVERY 6 HOURS PRN
Status: DISCONTINUED | OUTPATIENT
Start: 2020-05-27 | End: 2020-05-28 | Stop reason: HOSPADM

## 2020-05-27 RX ORDER — CISATRACURIUM BESYLATE 2 MG/ML
INJECTION, SOLUTION INTRAVENOUS
Status: COMPLETED | OUTPATIENT
Start: 2020-05-27 | End: 2020-05-27

## 2020-05-27 RX ORDER — RISPERIDONE 2 MG/1
2 TABLET, FILM COATED ORAL NIGHTLY
Status: DISCONTINUED | OUTPATIENT
Start: 2020-05-28 | End: 2020-05-28 | Stop reason: HOSPADM

## 2020-05-27 RX ORDER — FLUCONAZOLE 2 MG/ML
400 INJECTION, SOLUTION INTRAVENOUS EVERY 24 HOURS
Status: DISCONTINUED | OUTPATIENT
Start: 2020-05-27 | End: 2020-05-28 | Stop reason: HOSPADM

## 2020-05-27 RX ADMIN — LORAZEPAM 4 MG: 2 INJECTION, SOLUTION INTRAMUSCULAR; INTRAVENOUS at 09:22

## 2020-05-27 RX ADMIN — FENTANYL CITRATE 100 MCG: 50 INJECTION, SOLUTION INTRAMUSCULAR; INTRAVENOUS at 09:22

## 2020-05-27 RX ADMIN — FLUCONAZOLE 400 MG: 400 INJECTION, SOLUTION INTRAVENOUS at 10:23

## 2020-05-27 RX ADMIN — CISATRACURIUM BESYLATE 10 MG: 2 INJECTION INTRAVENOUS at 09:22

## 2020-05-27 RX ADMIN — CISATRACURIUM BESYLATE 10 MG: 2 INJECTION, SOLUTION INTRAVENOUS at 09:22

## 2020-05-27 RX ADMIN — Medication 100 MG: at 09:22

## 2020-05-27 RX ADMIN — Medication 10 ML: at 10:31

## 2020-05-27 RX ADMIN — POTASSIUM BICARBONATE 40 MEQ: 782 TABLET, EFFERVESCENT ORAL at 05:46

## 2020-05-27 RX ADMIN — FENTANYL CITRATE 100 MCG: 50 INJECTION INTRAMUSCULAR; INTRAVENOUS at 09:22

## 2020-05-27 RX ADMIN — FAMOTIDINE 20 MG: 10 INJECTION INTRAVENOUS at 10:21

## 2020-05-27 RX ADMIN — ENOXAPARIN SODIUM 40 MG: 40 INJECTION SUBCUTANEOUS at 23:34

## 2020-05-27 RX ADMIN — GLYCOPYRROLATE AND FORMOTEROL FUMARATE 2 PUFF: 9; 4.8 AEROSOL, METERED RESPIRATORY (INHALATION) at 18:45

## 2020-05-27 RX ADMIN — PRAVASTATIN SODIUM 40 MG: 40 TABLET ORAL at 21:04

## 2020-05-27 RX ADMIN — CARBAMAZEPINE 200 MG: 200 TABLET ORAL at 21:04

## 2020-05-27 RX ADMIN — OXYCODONE HYDROCHLORIDE AND ACETAMINOPHEN 1 TABLET: 5; 325 TABLET ORAL at 21:04

## 2020-05-27 RX ADMIN — LORAZEPAM 4 MG: 2 INJECTION INTRAMUSCULAR; INTRAVENOUS at 09:22

## 2020-05-27 RX ADMIN — FAMOTIDINE 20 MG: 10 INJECTION INTRAVENOUS at 21:04

## 2020-05-27 RX ADMIN — PROPOFOL INJECTABLE EMULSION 35 MCG/KG/MIN: 10 INJECTION, EMULSION INTRAVENOUS at 05:04

## 2020-05-27 RX ADMIN — Medication 10 ML: at 21:04

## 2020-05-27 RX ADMIN — LIDOCAINE HYDROCHLORIDE,EPINEPHRINE BITARTRATE 20 ML: 10; .01 INJECTION, SOLUTION INFILTRATION; PERINEURAL at 09:22

## 2020-05-27 RX ADMIN — RISPERIDONE 2 MG: 2 TABLET ORAL at 11:21

## 2020-05-27 RX ADMIN — CARBAMAZEPINE 200 MG: 200 TABLET ORAL at 10:49

## 2020-05-27 RX ADMIN — PREDNISONE 40 MG: 20 TABLET ORAL at 10:48

## 2020-05-27 RX ADMIN — GLYCOPYRROLATE AND FORMOTEROL FUMARATE 2 PUFF: 9; 4.8 AEROSOL, METERED RESPIRATORY (INHALATION) at 07:37

## 2020-05-27 RX ADMIN — TADALAFIL 40 MG: 20 TABLET ORAL at 10:49

## 2020-05-27 ASSESSMENT — PULMONARY FUNCTION TESTS
PIF_VALUE: 21
PIF_VALUE: 20
PIF_VALUE: 20
PIF_VALUE: 21
PIF_VALUE: 20
PIF_VALUE: 19

## 2020-05-27 NOTE — PROCEDURES
BRONCHOSCOPY    Indication:Tracheostomy tube and FUO. Risks and benefits to the procedure were discussed. Alternatives and their risks were discussed as well. Sedation:None      EBL:  NONE. Findings:   Diffuse tracheal plaques consistent with fungal infection.  Large volume of secretions but no mucus production.  Adequate positioning of tracheostomy tube above the leny. Samples:   None. Complications:  None    Procedure:  After informed consent was obtained, patient received sedation as detailed above. Utilizing the endotracheal tube, the bronchoscope was advanced to the level of the trachea and subsequently the leny. The leny was noted to be crisp. Scope was taken to the left and right lung fields. Samples taken as noted above. Bronchoscope was withdrawn to the point of entry. Tracheostomy was placed under direct bronchoscopic visualization. Immediately post tracheostomy tube placement, the scope was removed from the endotracheal tube and placed through the tracheostomy tube. This verified no active bleeding and verified adequate positioning above the leny. Bronchoscope was subsequently withdrawn. .        Electronically signed by Sita Plunkett M.D.

## 2020-05-27 NOTE — FLOWSHEET NOTE
05/27/20 0538   Provider Notification   Reason for Communication Review case  (low K+)   Provider Name Riya Gomez   Provider Notification Nurse Practitioner   Method of Communication Face to face   Response See orders   Notification Time [phone]     Notified Riya Gomez that patient's K+ has been low for the past three days after replacement. See orders for Magnesium and holding Lasix.

## 2020-05-27 NOTE — PLAN OF CARE
Problem: Impaired respiratory status  Goal: Will be able to breathe spontaneously, without ventilator support  Description: Will be able to breathe spontaneously, without ventilator support     5/27/2020 1326 by Joao Ferreira RCP  Outcome: Ongoing

## 2020-05-27 NOTE — PLAN OF CARE
Problem: Restraint Use - Nonviolent/Non-Self-Destructive Behavior:  Goal: Absence of restraint indications  Description: Absence of restraint indications  Outcome: Ongoing  Note: Patient continues to reach for ETT while performing ROM exercises. Problem: Restraint Use - Nonviolent/Non-Self-Destructive Behavior:  Goal: Absence of restraint-related injury  Description: Absence of restraint-related injury  Outcome: Ongoing  Note: No signs or injury noted. Performing ROM exercises every 2 hour and PRN. Problem: Discharge Planning:  Goal: Discharged to appropriate level of care  Description: Discharged to appropriate level of care  Outcome: Ongoing  Note: Discharge planning in process and discussed with patient/family. Social work consulted for any additional needs. Care manager aware of discharge needs. Patient is from home with family and discharge planning still remains in progress due to clinical course. Problem: Airway Clearance - Ineffective:  Goal: Ability to maintain a clear airway will improve  Description: Ability to maintain a clear airway will improve  Outcome: Ongoing  Note: Patient has ETT in. Patient still has a gag. Plan is to place a tracheostomy tomorrow. Problem: Anxiety/Stress:  Goal: Level of anxiety will decrease  Description: Level of anxiety will decrease  Outcome: Ongoing  Note: No anxiety noted at this time. Patient remains on a propofol gtt. Problem: Cardiac Output - Decreased:  Goal: Hemodynamic stability will improve  Description: Hemodynamic stability will improve  Outcome: Ongoing  Note:   Vitals:    05/26/20 2201 05/26/20 2303 05/26/20 2334 05/27/20 0002   BP: 98/61 98/65 102/62 104/64   Pulse: 70 76 73 74   Resp: 15 16 19 14   Temp:   99.7 °F (37.6 °C)    TempSrc:   Bladder    SpO2:   98% 96%   Weight:       Height:         Vital signs within normal range for patient. Will continue to monitor.      Problem: Mental Status - Impaired:  Goal: Mental status will be restored to baseline  Description: Mental status will be restored to baseline  Outcome: Ongoing  Note: Patient opens eyes to his name. Patient unable to communicate due to ETT. Patient is able to follow commands. Problem: Pain:  Goal: Pain level will decrease  Description: Pain level will decrease  Outcome: Ongoing  Note: Per the CPOT scale, patient is free of pain. Patient has a pain goal of \"no pain. \"        Problem: Falls - Risk of:  Goal: Will remain free from falls  Description: Will remain free from falls  Outcome: Ongoing  Note: Call light in reach, bed in lowest position, and bed alarm activated. Education given on use of call light before ambulation and when in need of assistance. Patient unable to express understanding. Hourly visual checks performed and charted. Toileting offered to patient. No falls this shift, at any time. Arm band and falling star in place. Will continue to monitor. Problem: Falls - Risk of:  Goal: Absence of physical injury  Description: Absence of physical injury  Outcome: Ongoing  Note: Call light in reach, bed in lowest position, and bed alarm activated. Education given on use of call light before ambulation and when in need of assistance. Patient unable to express understanding. Hourly visual checks performed and charted. Toileting offered to patient. No falls this shift, at any time. Arm band and falling star in place. Will continue to monitor. Problem: Nutrition  Goal: Optimal nutrition therapy  Outcome: Ongoing  Note: Patient had TF infusing through an OF. Patient tolerating well. Minimal residuals noted. Bowel sounds active in all four quadrants.      Problem: Urinary Elimination:  Goal: Complications related to the disease process, condition or treatment will be avoided or minimized  Description: Complications related to the disease process, condition or treatment will be avoided or minimized  Outcome: Ongoing  Note: No signs or symptoms noted at this time. Minaya care performed this shift. Will continue to monitor. Problem: Infection - Central Venous Catheter-Associated Bloodstream Infection:  Goal: Will show no infection signs and symptoms  Description: Will show no infection signs and symptoms  Outcome: Ongoing  Note: Dressing clean, dry, and intact. No signs or symptoms of infection noted. CHG given this shift. Care plan reviewed with patient and family. Family verbalize understanding of the plan of care and contribute to goal setting.

## 2020-05-27 NOTE — CARE COORDINATION
5/27/20, 10:01 AM EDT    DISCHARGE ON GOING EVALUATION    Satish Encinas day: 10  Location: -04/004-A Reason for admit: Acute respiratory failure ([facility] [address] [J96.00]  Acute on chronic respiratory failure ([facility] [address] [J96.20]   Procedure:   5/17 Intubated in ED  5/17 CVC   5/18 Echo with EF 50-55%; small circumferential pericardial effusion without evidence of tamponade  5/24 CTA Chest: Neg for PE; Cardiomegaly with reflux of contrast into the inferior vena cava and hepatic veins, correlate with decreased cardiac output; Bibasilar atelectasis and moderate pleural effusions; Underlying changes of COPD  5/26 BLE Venous dopplers: Neg for DVT  5/27 Bronch: Diffuse tracheal plaques consistent with fungal infection; large volume of secretions but no mucus production  5/27 Trach placed: Jammie #6 XLT    Treatment Plan of Care: Bronch done today - findings consistent with fungal infection, diflucan started. Trach placed today. Remains on vent to trach, on PCV, peep 8, FIO2 55%, sats 96%. Tmax 99.7. NSR. Follows commands. Internal Med, Intensivist, and Cardiology following. Dietitian and Palliative Care consulted. Telemetry, OG w/TF, flexiseal, pandya care. Diprivan @ 35 mcg/kg/min, asa, tegretol, lovenox, pepcid, IV diflucan, inhaler, pravastatin, prednisone, risperdal, tadalafil. Na+ 147, K+ 3.3, CO2 36. Barriers to Discharge: not medically ready  PCP: Sandie Kc MD  Readmission Risk Score: 21%  Patient Goals/Plan/Treatment Preferences: Plan for [place] [facility]; tomorrow if stable. [address] and spoke with Manny Vasquez, mother/POA, at [phone]; discussed discharge plan. Reported received LTACH eval. Discussed what LTACH is, what they do, also discussed ECF and differences. Nicolasa Joanne states she would prefer Acacia Lynch go to [facility] at discharge. Nicolasa Rubio understands plan is to possibly discharge to [facility] tomorrow if stable and is in agreement with that plan.

## 2020-05-27 NOTE — PROGRESS NOTES
INTERNAL MEDICINE Progress Note  5/27/2020 2:34 PM  Subjective:   Admit Date: 5/17/2020  PCP: Henry Kramer MD  Interval History:   Trach placed today 5/27/20  Sedated-diprivan  Objective:   Vitals: /78   Pulse 74   Temp 99.5 °F (37.5 °C) (Bladder)   Resp 14   Ht 6' 2\" (1.88 m)   Wt 264 lb 8.8 oz (120 kg)   SpO2 93%   BMI 33.97 kg/m²   General appearance: sedated on vent, trach -Vent  HEENT:  atraumatic  Neck:  no JVD  Lungs: diminished breath sounds bilaterally  Heart: S1, S2 normal  Abdomen: normal findings: bowel sounds normal and no masses palpable and abnormal findings:  distended  Extremities: edema + mateo  Neurologic:  sedated on vent      Medications:   Scheduled Meds:   fluconazole  400 mg Intravenous Q24H    famotidine (PEPCID) injection  20 mg Intravenous BID    [START ON 5/28/2020] enoxaparin  40 mg Subcutaneous Q24H    [Held by provider] furosemide  40 mg Oral BID    Tadalafil (PAH)  40 mg Oral Daily    glycopyrrolate-formoterol  2 puff Inhalation BID    aspirin  324 mg Oral Daily    carBAMazepine  200 mg Oral BID    pravastatin  40 mg Oral Nightly    risperiDONE  2 mg Oral BID    sodium chloride flush  10 mL Intravenous 2 times per day    predniSONE  40 mg Oral Daily     Continuous Infusions:      Lab Results:   CBC:   Recent Labs     05/26/20  0440 05/27/20  0431   WBC 9.1 9.8   HGB 15.0 15.4    141     BMP:    Recent Labs     05/25/20  0428 05/26/20  0440 05/27/20  0431    144 147*   K 3.4* 3.5 3.3*    102 103   CO2 35* 36* 36*   BUN 40* 33* 31*   CREATININE 0.7 0.6 0.6   GLUCOSE 98 96 96     Magnesium:    Lab Results   Component Value Date    MG 2.3 05/27/2020     HgBA1c:    Lab Results   Component Value Date    LABA1C 6.0 12/15/2019     TSH:    Lab Results   Component Value Date    TSH 2.040 03/02/2020     Transthoracic Echocardiography Report (TTE)   Left ventricular size is normal and systolic function is mildly reduced.   Ejection fraction was estimated at 50-55%. LV wall thickness is within   normal limits.   Intraventricular septal wall compression during systole suggestive of RV   pressure overload.   Markedly enlarged right atrium size.   The right ventricle was not clearly visualized. Appears dilated.  RVSP of 55-60 mm Hg consistent with moderate pulmonary hypertension.   Small circumferential pericardial effusion without evidence of tamponade   physiology. [address] size is dilated with <50% respiratory collapse. CXR 5/26/20  FINDINGS:  Lines/tubes/devices: There is no significant change in the satisfactory positions of the life support lines and tubes. Lungs/pleura:  There are stable bilateral layering small to moderate pleural effusions. No pneumothorax. There is no change in the mild interstitial infiltrates consistent with pulmonary edema or an atypical pneumonia. Heart: Stable borderline cardiomegaly. Mediastinum/yasmany: No obvious mass or adenopathy. Skeleton: No significant bone or joint abnormality. Impression:     Stable appearance of the chest compared to the prior study as detailed above.        Assessment and Plan:   1. Acute on chronic hypoxemic / hypercapnic resp failure, req vent support  2. COPD with acute exac  3. Acute on chronic CHF pEF, diuresing well  4. Moderate pulmonary hypertension, on Tadalafil  5. hypokalemia     Cont trach /vent care   bronchodilators  Replace K  Am labs.   LTAC veronicaal    Monika Rodriguez MD

## 2020-05-27 NOTE — PROGRESS NOTES
08:35 found pt on spont mode, low rr alarm noted by nurse.   Pt changed per dr Garcia Ma earlier, at this time 08:35 dr Garcia Ma said to change back to pcv+

## 2020-05-27 NOTE — PROGRESS NOTES
Assisted Dr. Emil Sood with a percutaneous tracheostomy insertion. A time out was performed. Respiratory care practitioners assisting was Zach Gillespie. The type and size of tracheostomy tube was 6.0 xlt  After the procedure vitals were assessed. See nurses notes for vitals. Breath sounds were normal clear t/o The patient was returned to the ventilator at settings of:  32/12   PEEP of 12  CmH2O,  rate of 20  and FiO2 of 100 and weaning . Patient tolerated the procedure well.

## 2020-05-27 NOTE — PROGRESS NOTES
Time-out was done. ICU disposable airway mobile scope SN [id number] was used. Assisted Dr. Hamida Schumacher with bronchoscopy procedure for percutaneous tracheostomy. Bronchial washings were not obtained. Patient tolerated the procedure well.

## 2020-05-27 NOTE — PROCEDURES
PERCUTANEOUS TRACHEOSTOMY PLACEMENT    Risks and benefits to the procedure were discussed. Alternatives and their risks were discussed as well. INDICATION: Persistent respiratory failure    SEDATION:  100 mg Fentanyl, 100 mg Ketamine, 4 mg Ativan, 10 mg Nimbex. EBL: Less than 5 cc    COMPLICATIONS: None. PROCEDURE:   After informed consent was obtained, patient received sedation as detailed above. Utilizing contact precautions, patient was prepped using chlorhexidine prep, and draped utilizing sterile gloves, sterile gown, hair neck, and mask. Procedure was performed under direct bronchoscopy. Endotracheal tube was pulled back using blotting technique and area secured. Patient received a total of 7 cc of lidocaine with epinephrine utilizing a scalpel a 1-1/2 incision was made in the skin vertically. Utilizing forceps, the area was bluntly dissected to the level of the trachea. Utilizing 3 cc of lidocaine with the area around the trachea  Was anesthetized. Needle was placed into the trachea under direct bronchoscopic visualization. Needle was withdrawn. Utilizing an introducer needle, it was placed into the trachea under direct bronchoscopic visualization. Guidewire was placed through the introducer needle. Introducer needle was withdrawn leaving the guidewire in place. Punch dilator was placed over the guidewire and subsequently removed. [employer] was utilized. Guiding sheath was placed over the guidewire. [employer] dilator was placed onto the guiding sheath. The unit was subsequently advanced into the trachea under direct bronchoscopic visualization until 28 Japanese was visualized within the airway. Blue [employer] dilator was removed leaving the guiding sheath and guidewire in position. A number 6 XLT Shiley was loaded onto dilator and  Placed on the guiding sheath and guidewire. The unit was advanced into the trachea under direct supervision via bronchoscopy.   Tracheostomy tube was successfully placed. Bronchoscopy was placed into tracheostomy and verified position. Tracheostomy cuff was inflated, and then later hooked up to the tracheostomy tube. Protective gauze was placed at the insertion site. Tracheostomy was secured to the skin utilizing  2. 0 Ethilon ×2. There were no immediate issue, and the procedure was completed.

## 2020-05-27 NOTE — PROGRESS NOTES
Received a phone call from patient's brother. HIPPA code verified. Patient's brother states Fahad Champion would like to give Dawna Burnette two more days before going ahead with the tracheostomy and that they would like to come up and see Dr. Boom Ramsay ASAP. \" Explained to the brother that the patient's consent was signed by his mother and plan was for a tracheostomy in the morning. Also explained to family that Dr. Boom Ramsay is not available at this time and would be back tomorrow morning. Family asked to speak with the provider covering the ICU. Phone handed over to Yuli Jasso NP. Brijesh Whyte reexplained the [address]. Family would like to come up tomorrow morning around 8 am to talk with Dr. Boom Ramsay face to face and review the tracheostomy procedure.

## 2020-05-27 NOTE — PLAN OF CARE
Problem: Restraint Use - Nonviolent/Non-Self-Destructive Behavior:  Goal: Absence of restraint indications  Description: Absence of restraint indications  Outcome: Not Met This Shift  Note: Pulls at trach when untied      Problem: Restraint Use - Nonviolent/Non-Self-Destructive Behavior:  Goal: Absence of restraint-related injury  Description: Absence of restraint-related injury  Outcome: Ongoing  Note: None at present      Problem: Discharge Planning:  Goal: Participates in care planning  Description: Participates in care planning  Outcome: Ongoing  Note: Family active participants      Problem: Discharge Planning:  Goal: Discharged to appropriate level of care  Description: Discharged to appropriate level of care  Outcome: Ongoing  Note: Requires icu at this time      Problem: Airway Clearance - Ineffective:  Goal: Ability to maintain a clear airway will improve  Description: Ability to maintain a clear airway will improve  Outcome: Ongoing  Note: Patent via ett / trach      Problem: Anxiety/Stress:  Goal: Level of anxiety will decrease  Description: Level of anxiety will decrease  Outcome: Ongoing  Note: Remains on sedation - willl stop this afternoon after trach      Problem: Cardiac Output - Decreased:  Goal: Hemodynamic stability will improve  Description: Hemodynamic stability will improve  Outcome: Ongoing  Note: Stable at present      Problem: Mental Status - Impaired:  Goal: Mental status will be restored to baseline  Description: Mental status will be restored to baseline  Outcome: Ongoing  Note: Intubated on vent . Problem: Pain:  Description: Pain management should include both nonpharmacologic and pharmacologic interventions. Goal: Pain level will decrease  Description: Pain level will decrease  Outcome: Ongoing  Note: Cpot continues 0      Problem: Pain:  Description: Pain management should include both nonpharmacologic and pharmacologic interventions.   Goal: Recognizes and communicates

## 2020-05-27 NOTE — PROGRESS NOTES
Pt continues on vent - open eyes to name- follows commands weakly x 4 extremities. Tube feed turned off due to possible trach today . 0802 placed on spontaneous per Dr Dayanara Mcneal. 0840 pt with respiratory rate 9-10 , distress noted . Informed Dr Dayanara Mcneal, placed back on vent-0 dr Dayanara Mcneal spoke with pt family regarding trach - consent already sighned per pt mother - will proceed with trach today .    2844 trach started - 0935  Trach completed - pt tolerated well   1010 cxr done for n/g and trach placement

## 2020-05-27 NOTE — PROGRESS NOTES
CRITICAL CARE PROGRESS NOTE      Patient: Kary Son    Unit/Bed:4D-04/004-A  YOB: 1970  MRN: [id number]   PCP: Gina Saucedo MD  Date of Admission: 5/17/2020  Chief Complaint:- SOB    Assessment and Plan:      1. Acute on chronic hypoxic hypercapnic respiratory failure: Wears 3 L at baseline.  Patient also wears CPAP at night, with 3 L bled in.  On arrival to the emergency room patient was noted to be hypoxic with an SPO2 in the 62s.  He was emergently intubated.  Maintain peak pressure less than 35.  Tracheostomy tube placement 5/27  2. Acute on chronic heart failure with reduced ejection fraction: Noted +2 pitting edema bilateral lower extremities.  Diuresis 40 mg of Lasix twice daily. Albumin level normal.  ECHO 50-55%.  Added milrinone 5/19 for increased diuresis, stopped 5/23, diuresed 7.9L   3. Moderate Pulmonary hypertension: Tadalafil added 5/21.  Right ventricular pressures 55 to 60 mmHg, continues to require high levels of PEEP   4. COPD exacerbation: Steroids, Solu-Medrol 40 mg every 6 hours for 3 days.  now 40 mg daily. Rocephin.  Bio fire pneumonia panel negative, Bevespi added 5/20. Repeat biofire 5/22 negative  5. Elevated troponin: Likely secondary to hypoxia, EKG shows no acute changes.  Repeat echo pending, trend troponin.  Patient on 325 ASA daily.  Cardiology consulted, possible ischemic work-up in the future. 6. Hypertension: History, currently hypotensive.  Norvasc with hold parameters  Pericardial effusion: Small effusion noted on echo, circumferential but no evidence of tamponade  7. Schizophrenia: Risperdal    8. GERD: Pepcid twice daily  9. Hyperlipidemia: Pravastatin 40 mg nightly  10. Hyperkalemia: resolved; 1 dose kayexalate  11. Acute kidney injury: resolved;  Likely secondary to hypotension, and prerenal in nature.  Urine electrolytes shows pre-renal, likely hypotension cause, monitor.  Improved creatinine 1.1  12.  Fever of unknown origin: resolved; Recultured 5/22, negative urine and biofire pneumonia negative, primary service continuing rocephin.     INITIAL H AND P AND ICU COURSE:  Yoan Pennington is a 44year-old black male who presented to [facility] on 5/17/2020 with complaints of shortness of breath that started today. [facility] has a past medical history of hypertension, heart failure, schizophrenia, COPD.  HPI is limited secondary to patient inability to give details of history secondary to hypoxia and need for immediate intubation.  Patient was dropped off at the emergency room and no family member accompanied patient to the emergency room. 5/18 patient  continues on mechanical ventilation 5/20 increased PEEP requirements overnight. Bebe Marion for COPD exacerbation.  Milrinone added 5/19 by cardiology for increased diuresis 5/21 continue to diuresis. Verdie Arms is improving.  PEEP has improved to 10 overnight.  5/22 patient developed fever overnight.  Tadalafil was added 5/21 for moderate pulmonary hypertension. 5/24 to require high levels of PEEP between 10 and 12.  Patient continues to diurese.  Continue Lasix 40 twice daily.  Albumin levels are normal. 5/25 patient continues to diuresis.  CTA chest negative for PE. 5/26 Sulaiman Ernst is making decisions about tracheostomy tube placement. Patient continues on ventilator support. Low likelihood of extubation without tracheostomy tube. 5/27 patient underwent tracheostomy tube placement. Noted yeast plaques in the airway. Diflucan started. Past Medical History: per HPI  Family History: Mother: Hypertension  Social History:   Current every day smoker, 20-pack-year history, denies EtOH use.  Denies drug use.     ROS   Intubated and sedated on mechanical ventilation    Scheduled Meds:   fluconazole  400 mg Intravenous Q24H    famotidine (PEPCID) injection  20 mg Intravenous BID    cisatracurium Besylate  10 mg Intravenous Once    ketamine  100 mg Intravenous Once    LORazepam  4 mg Intravenous Once    lidocaine-EPINEPHrine  20 mL Intradermal Once    [START ON 5/28/2020] enoxaparin  40 mg Subcutaneous Q24H    fentanNYL  100 mcg Intravenous Once    [Held by provider] furosemide  40 mg Oral BID    Tadalafil (PAH)  40 mg Oral Daily    glycopyrrolate-formoterol  2 puff Inhalation BID    aspirin  324 mg Oral Daily    carBAMazepine  200 mg Oral BID    pravastatin  40 mg Oral Nightly    risperiDONE  2 mg Oral BID    sodium chloride flush  10 mL Intravenous 2 times per day    predniSONE  40 mg Oral Daily    cefTRIAXone (ROCEPHIN) IV  1 g Intravenous Q24H     Continuous Infusions:   propofol 35 mcg/kg/min (05/27/20 0504)       PHYSICAL EXAMINATION:  T:  99.5. P:  78. RR:  15. B/P:  104/65. FiO2:  55. O2 Sat:  96.  I/O: 1337/1525  Body mass index is 33.97 kg/m². General:   Acute on chronically ill-appearing black male  HEENT:  normocephalic and atraumatic. No scleral icterus. PERR  Neck: supple. No Thyromegaly. Lungs: Diminished to auscultation. No retractions  Cardiac: RRR. No JVD. Abdomen: soft. Nontender. Rounded  Extremities:  No clubbing, cyanosis. +3 pitting edema bilateral lower extremities  Vasculature: capillary refill < 3 seconds. Palpable dorsalis pedis pulses. Skin:  warm and dry. Psych: Follows no commands, moves all extremities x4. Lymph:  No supraclavicular adenopathy. Neurologic:  No focal deficit. No seizures. Data: (All radiographs, tracings, PFTs, and imaging are personally viewed and interpreted unless otherwise noted).  Sodium 147, potassium 3.3, chloride 103, CO2 36, BUN 31, creatinine 0.6, anion gap 8.0, magnesium 2.3.   WBC 9.8, hemoglobin 15.4, hematocrit 53.0, platelet count 336.  Telemetry shows normal sinus rhythm   Lower extremity Dopplers negative for DVT   Chest x-ray 5/21/2020 reports stable cardiac enlargement, moderate diffuse pulmonary venous congestion.    Chest x-ray 5/19/2020 reports moderate hazy appearance of both mid and lower lung fields consistent with atelectasis pneumonia and edema, small effusion on the right side questionable small left effusion.  Overall appearance worse.  Echocardiogram 5/18/2020 reports ejection fraction from 50 to 55%, enlarged right atrium moderate pulmonary hypertension, small circumferential pericardial effusion   CTA last 5/24/2020 reports negative exam for pulmonary embolism.  Cardiomegaly with reflux of contrast into the inferior vena cava and hepatic veins, correlate with decreased cardiac output.  Moderate pleural effusions.  Underlying COPD. Meets Continued ICU Level Care Criteria:    [x] Yes   [] No - Transfer Planned to listed location:  [] HOSPITALIST CONTACTED-      Case and plan discussed with Dr. Marcy Pearson and Dr. Morales Marinelli. Electronically signed by David Spencer. [employer], [facility]  CRITICAL CARE SPECIALIST  Patient seen by me. Case discussed with nurse practitioner. Patient failed spontaneous breathing trial this morning. He initially tolerated spontaneous breathing trial at pressure support 10 and PEEP of 6. However, after 15 minutes, he developed respiratory fatigue with tidal volume in the 200 range and respiratory rate 36. Eating trial was terminated. Patient did undergo tracheostomy tube placement on 5/27/2020 due to failed progression of respiratory failure. Patient was found to have tracheal plaques consistent with fungal infection of the upper airway. Diflucan was initiated on 5/27/2020. I did call and discussed the case with the patient's family and explained the scenario. Patient now transitioning to LTAC. Cultures are negative for infectious etiology and fever. Undergoing trial of Diflucan. CC time 35 minutes. Time does not include procedures. Time does not include nurse practitioner assessment. Time does include direct patient interaction, assessment, review of chart, family discussion, and coordination of care. Electronically signed by Monica Pearson MD.

## 2020-05-28 ENCOUNTER — HOSPITAL ENCOUNTER (OUTPATIENT)
Age: 50
Discharge: HOME | End: 2020-06-23
Attending: INTERNAL MEDICINE | Admitting: INTERNAL MEDICINE
Payer: COMMERCIAL

## 2020-05-28 ENCOUNTER — APPOINTMENT (OUTPATIENT)
Dept: GENERAL RADIOLOGY | Age: 50
DRG: 004 | End: 2020-05-28
Payer: MEDICARE

## 2020-05-28 VITALS
RESPIRATION RATE: 13 BRPM | HEART RATE: 79 BPM | WEIGHT: 263.67 LBS | OXYGEN SATURATION: 91 % | BODY MASS INDEX: 33.84 KG/M2 | TEMPERATURE: 97.1 F | DIASTOLIC BLOOD PRESSURE: 71 MMHG | SYSTOLIC BLOOD PRESSURE: 109 MMHG | HEIGHT: 74 IN

## 2020-05-28 LAB
ALLEN TEST: POSITIVE
ANION GAP SERPL CALCULATED.3IONS-SCNC: 8 MEQ/L (ref 8–16)
BASE EXCESS (CALCULATED): 12.7 MMOL/L (ref -2.5–2.5)
BUN BLDV-MCNC: 30 MG/DL (ref 7–22)
CALCIUM SERPL-MCNC: 9 MG/DL (ref 8.5–10.5)
CHLORIDE BLD-SCNC: 104 MEQ/L (ref 98–111)
CO2: 35 MEQ/L (ref 23–33)
COLLECTED BY:: ABNORMAL
CREAT SERPL-MCNC: 0.5 MG/DL (ref 0.4–1.2)
DEVICE: ABNORMAL
ERYTHROCYTE [DISTWIDTH] IN BLOOD BY AUTOMATED COUNT: 20.5 % (ref 11.5–14.5)
ERYTHROCYTE [DISTWIDTH] IN BLOOD BY AUTOMATED COUNT: 64.8 FL (ref 35–45)
GFR SERPL CREATININE-BSD FRML MDRD: > 90 ML/MIN/1.73M2
GLUCOSE BLD-MCNC: 102 MG/DL (ref 70–108)
HCO3: 41 MMOL/L (ref 23–28)
HCT VFR BLD CALC: 51.3 % (ref 42–52)
HEMOGLOBIN: 15 GM/DL (ref 14–18)
IFIO2: 60
MCH RBC QN AUTO: 26.4 PG (ref 26–33)
MCHC RBC AUTO-ENTMCNC: 29.2 GM/DL (ref 32.2–35.5)
MCV RBC AUTO: 90.2 FL (ref 80–94)
MODE: AC
O2 SATURATION: 95 %
PCO2: 63 MMHG (ref 35–45)
PH BLOOD GAS: 7.42 (ref 7.35–7.45)
PLATELET # BLD: 141 THOU/MM3 (ref 130–400)
PMV BLD AUTO: 11.3 FL (ref 9.4–12.4)
PO2: 75 MMHG (ref 71–104)
POTASSIUM SERPL-SCNC: 3.5 MEQ/L (ref 3.5–5.2)
RBC # BLD: 5.69 MILL/MM3 (ref 4.7–6.1)
SET PEEP: 5 MMHG
SET RESPIRATORY RATE: 16 BPM
SET TIDAL VOLUME: 480 ML
SODIUM BLD-SCNC: 147 MEQ/L (ref 135–145)
SOURCE, BLOOD GAS: ABNORMAL
WBC # BLD: 10.3 THOU/MM3 (ref 4.8–10.8)

## 2020-05-28 PROCEDURE — 2500000003 HC RX 250 WO HCPCS: Performed by: NURSE PRACTITIONER

## 2020-05-28 PROCEDURE — 6360000002 HC RX W HCPCS: Performed by: NURSE PRACTITIONER

## 2020-05-28 PROCEDURE — 94003 VENT MGMT INPAT SUBQ DAY: CPT

## 2020-05-28 PROCEDURE — 37799 UNLISTED PX VASCULAR SURGERY: CPT

## 2020-05-28 PROCEDURE — 82803 BLOOD GASES ANY COMBINATION: CPT

## 2020-05-28 PROCEDURE — 94770 HC ETCO2 MONITOR DAILY: CPT

## 2020-05-28 PROCEDURE — 94640 AIRWAY INHALATION TREATMENT: CPT

## 2020-05-28 PROCEDURE — 85027 COMPLETE CBC AUTOMATED: CPT

## 2020-05-28 PROCEDURE — 2709999900 HC NON-CHARGEABLE SUPPLY

## 2020-05-28 PROCEDURE — 80048 BASIC METABOLIC PNL TOTAL CA: CPT

## 2020-05-28 PROCEDURE — 99233 SBSQ HOSP IP/OBS HIGH 50: CPT | Performed by: INTERNAL MEDICINE

## 2020-05-28 PROCEDURE — APPSS180 APP SPLIT SHARED TIME > 60 MINUTES: Performed by: NURSE PRACTITIONER

## 2020-05-28 PROCEDURE — 74018 RADEX ABDOMEN 1 VIEW: CPT

## 2020-05-28 PROCEDURE — 6370000000 HC RX 637 (ALT 250 FOR IP): Performed by: PHYSICIAN ASSISTANT

## 2020-05-28 PROCEDURE — 6370000000 HC RX 637 (ALT 250 FOR IP): Performed by: INTERNAL MEDICINE

## 2020-05-28 PROCEDURE — 2580000003 HC RX 258: Performed by: INTERNAL MEDICINE

## 2020-05-28 PROCEDURE — 2700000000 HC OXYGEN THERAPY PER DAY

## 2020-05-28 PROCEDURE — 94761 N-INVAS EAR/PLS OXIMETRY MLT: CPT

## 2020-05-28 PROCEDURE — 6370000000 HC RX 637 (ALT 250 FOR IP): Performed by: NURSE PRACTITIONER

## 2020-05-28 RX ORDER — PREDNISONE 20 MG/1
40 TABLET ORAL DAILY
Qty: 20 TABLET | Refills: 0 | DISCHARGE
Start: 2020-05-29 | End: 2020-06-08

## 2020-05-28 RX ORDER — PROMETHAZINE HYDROCHLORIDE 12.5 MG/1
12.5 TABLET ORAL EVERY 6 HOURS PRN
DISCHARGE
Start: 2020-05-28 | End: 2020-06-04

## 2020-05-28 RX ORDER — IPRATROPIUM BROMIDE AND ALBUTEROL SULFATE 2.5; .5 MG/3ML; MG/3ML
3 SOLUTION RESPIRATORY (INHALATION) EVERY 4 HOURS
Qty: 360 ML | Refills: 2 | Status: ON HOLD | DISCHARGE
Start: 2020-05-28 | End: 2021-12-23 | Stop reason: SDUPTHER

## 2020-05-28 RX ORDER — ONDANSETRON 2 MG/ML
4 INJECTION INTRAMUSCULAR; INTRAVENOUS EVERY 6 HOURS PRN
Qty: 84 ML | Status: ON HOLD | DISCHARGE
Start: 2020-05-28 | End: 2021-12-23 | Stop reason: HOSPADM

## 2020-05-28 RX ORDER — FLUCONAZOLE 2 MG/ML
400 INJECTION, SOLUTION INTRAVENOUS EVERY 24 HOURS
Status: ON HOLD | DISCHARGE
Start: 2020-05-29 | End: 2021-12-23 | Stop reason: HOSPADM

## 2020-05-28 RX ORDER — POLYETHYLENE GLYCOL 3350 17 G/17G
17 POWDER, FOR SOLUTION ORAL DAILY PRN
Qty: 527 G | Refills: 1 | DISCHARGE
Start: 2020-05-28 | End: 2020-06-27

## 2020-05-28 RX ORDER — OXYCODONE HYDROCHLORIDE AND ACETAMINOPHEN 5; 325 MG/1; MG/1
1 TABLET ORAL EVERY 6 HOURS PRN
Refills: 0 | Status: SHIPPED | DISCHARGE
Start: 2020-05-28 | End: 2020-05-31

## 2020-05-28 RX ORDER — TADALAFIL 20 MG/1
40 TABLET ORAL DAILY
Qty: 30 TABLET | Refills: 3 | Status: ON HOLD | DISCHARGE
Start: 2020-05-29 | End: 2021-12-23 | Stop reason: SDUPTHER

## 2020-05-28 RX ORDER — RISPERIDONE 2 MG/1
2 TABLET, FILM COATED ORAL NIGHTLY
Qty: 60 TABLET | Refills: 3 | Status: ON HOLD | DISCHARGE
Start: 2020-05-28 | End: 2021-12-23 | Stop reason: SDUPTHER

## 2020-05-28 RX ADMIN — OXYCODONE HYDROCHLORIDE AND ACETAMINOPHEN 1 TABLET: 5; 325 TABLET ORAL at 08:28

## 2020-05-28 RX ADMIN — FLUCONAZOLE 400 MG: 400 INJECTION, SOLUTION INTRAVENOUS at 10:30

## 2020-05-28 RX ADMIN — PREDNISONE 40 MG: 20 TABLET ORAL at 08:28

## 2020-05-28 RX ADMIN — CARBAMAZEPINE 200 MG: 200 TABLET ORAL at 08:28

## 2020-05-28 RX ADMIN — POTASSIUM BICARBONATE 40 MEQ: 782 TABLET, EFFERVESCENT ORAL at 05:35

## 2020-05-28 RX ADMIN — ASPIRIN 81 MG 324 MG: 81 TABLET ORAL at 08:28

## 2020-05-28 RX ADMIN — Medication 10 ML: at 08:29

## 2020-05-28 RX ADMIN — GLYCOPYRROLATE AND FORMOTEROL FUMARATE 2 PUFF: 9; 4.8 AEROSOL, METERED RESPIRATORY (INHALATION) at 08:06

## 2020-05-28 RX ADMIN — TADALAFIL 40 MG: 20 TABLET ORAL at 08:28

## 2020-05-28 RX ADMIN — FAMOTIDINE 20 MG: 10 INJECTION INTRAVENOUS at 08:28

## 2020-05-28 ASSESSMENT — PULMONARY FUNCTION TESTS
PIF_VALUE: 22

## 2020-05-28 ASSESSMENT — PAIN SCALES - GENERAL
PAINLEVEL_OUTOF10: 1
PAINLEVEL_OUTOF10: 6

## 2020-05-28 NOTE — CARE COORDINATION
05/28/20 9:42 AM    OK for discharge to [facility] today. Patient accepted for [facility] room 33 today at 1200. Dr. Torey Hoffmann will be attending physician at [address] [place] [address] also. Primary RN, yazan Prasad. Reminded Shanice to call family to notify when discharges to [address] [place] [address]. 5/28/20, 9:44 AM EDT    Discharge to [address] room 33 today. Patient goals/plan/ treatment preferences discussed by  and . Patient goals/plan/ treatment preferences reviewed with patient/ family. Patient/ family verbalize understanding of discharge plan and are in agreement with goal/plan/treatment preferences. Understanding was demonstrated using the teach back method. AVS provided by RN at time of discharge, which includes all necessary medical information pertaining to the patients current course of illness, treatment, post-discharge goals of care, and treatment preferences.     Services After Discharge  Services At/After Discharge: ([address], [facility])   [place] Letter  IMM Letter given to Patient/Family/Significant other/Guardian/POA/by[de-identified] Staff  IMM Letter date given[de-identified] 05/19/20  IMM Letter time given[de-identified] [phone]

## 2020-05-28 NOTE — FLOWSHEET NOTE
05/28/20 1300   Surgical Airway (trach) Cuffed;Long   Placement Date/Time: 05/27/20 0935   Timeout: Patient;Procedure;Site/Side;Appropriate Equipment; Consent Confirmed;Sterile Technique using full body drape;Site prepped with chlorhexidine;Correct Position  Placed By: Licensed provider  Surgical Airway T... Status Secured   Site Assessment Bleeding;Oozing Secretions;Drainage   Site Care Dressing applied;Cleansed   Inner Cannula Care Changed/new   Ties Assessment Secure     Trach care done      36 RN went into patient's room for a turn and reposition. NG found on patient's chest. MD notified, stated call report and see if ICU can send patient to [place]. [facility] called and report was given to [place] SHETTY. [place] SHETTY asks charge and per [place] SHETTY ICU is to place an NG and also get an x-ray prior to sending patient to [place] (bed 33). NG was placed by ICU  [address] was obtained showing tube follows stomach curvature. [phone] called and updated that patient will be going to transfer over shortly    1430 Patient transferred with [facility] and also transport via [facility] bed to [place] on a ventilator. All necessary paperwork sent over with patient at this time.

## 2020-05-28 NOTE — DISCHARGE SUMMARY
ipratropium-albuterol (DUONEB) 0.5-2.5 (3) MG/3ML SOLN nebulizer solution  Inhale 3 mLs into the lungs every 4 hours             metoprolol succinate (TOPROL XL) 25 MG extended release tablet  Take 1 tablet by mouth daily             ondansetron (ZOFRAN) 4 MG/2ML injection  Infuse 2 mLs intravenously every 6 hours as needed for Nausea or Vomiting             oxyCODONE-acetaminophen (PERCOCET) 5-325 MG per tablet  Take 1 tablet by mouth every 6 hours as needed for Pain for up to 3 days. polyethylene glycol (GLYCOLAX) 17 g packet  Take 17 g by mouth daily as needed for Constipation             pravastatin (PRAVACHOL) 40 MG tablet  Take 40 mg by mouth nightly             predniSONE (DELTASONE) 20 MG tablet  Take 2 tablets by mouth daily for 10 days             promethazine (PHENERGAN) 12.5 MG tablet  Take 1 tablet by mouth every 6 hours as needed for Nausea             risperiDONE (RISPERDAL) 2 MG tablet  Take 1 tablet by mouth nightly             Tadalafil, PAH, 20 MG tablet  Take 2 tablets by mouth daily                 Consults:  pulmonary/intensive care    Significant Diagnostic Studies: labs: CBC:         Recent Labs     05/26/20  0440 05/27/20  0431 05/28/20  0335   WBC 9.1 9.8 10.3   HGB 15.0 15.4 15.0    141 141      BMP:          Recent Labs     05/26/20  0440 05/27/20  0431 05/28/20  0335    147* 147*   K 3.5 3.3* 3.5    103 104   CO2 36* 36* 35*   BUN 33* 31* 30*   CREATININE 0.6 0.6 0.5   GLUCOSE 96 96 102      Magnesium:          Lab Results   Component Value Date     MG 2.3 05/27/2020      HgBA1c:          Lab Results   Component Value Date     LABA1C 6.0 12/15/2019      TSH:          Lab Results   Component Value Date     TSH 2.040 03/02/2020      Transthoracic Echocardiography Report (TTE)   Left ventricular size is normal and systolic function is mildly reduced.   Ejection fraction was estimated at 50-55%.  LV wall thickness is within   normal limits.   Intraventricular septal wall compression during systole suggestive of RV   pressure overload.   Markedly enlarged right atrium size.   The right ventricle was not clearly visualized. Appears dilated.  RVSP of 55-60 mm Hg consistent with moderate pulmonary hypertension.   Small circumferential pericardial effusion without evidence of tamponade   physiology. [address] size is dilated with <50% respiratory collapse.        CXR 5/26/20  FINDINGS:  Lines/tubes/devices: There is no significant change in the satisfactory positions of the life support lines and tubes. Lungs/pleura:  There are stable bilateral layering small to moderate pleural effusions. No pneumothorax. There is no change in the mild interstitial infiltrates consistent with pulmonary edema or an atypical pneumonia. Heart: Stable borderline cardiomegaly. Mediastinum/yasmany: No obvious mass or adenopathy. Skeleton: No significant bone or joint abnormality. Impression:     Stable appearance of the chest compared to the prior study as detailed above.         Assessment and Plan:   6. Acute on chronic hypoxemic / hypercapnic resp failure, s/p trach 5/27/20, vent support  7. COPD with acute exac  8. Acute on chronic CHF pEF, diuresing well  9.  Moderate pulmonary hypertension, on Tadalafil  10. hypokalemia     Cont trach /vent care   Bronchodilators  Free water via NGT  Transfer LTAC eval    Treatments:   Bronchodilators, steroid, diuresis    Disposition:   LTAC    Signed:  Elijah Liu  5/28/2020, 2:10 PM

## 2020-05-28 NOTE — PROGRESS NOTES
INTERNAL MEDICINE Progress Note  5/28/2020 2:08 PM  Subjective:   Admit Date: 5/17/2020  PCP: Amelia Tanner MD  Interval History:   Trach placed 5/27/20  Alert on vent via trach    Objective:   Vitals: /74   Pulse 90   Temp 97.1 °F (36.2 °C) (Bladder)   Resp 30   Ht 6' 2\" (1.88 m)   Wt 263 lb 10.7 oz (119.6 kg)   SpO2 91%   BMI 33.85 kg/m²   General appearance: alert on vent, trach -Vent  HEENT:  atraumatic  Neck:  no JVD  Lungs: diminished breath sounds bilaterally  Heart: S1, S2 normal  Abdomen: normal findings: bowel sounds normal and no masses palpable and abnormal findings:  distended  Extremities: edema + mateo  Neurologic:  Alert, moves all extremities      Medications:   Scheduled Meds:   fluconazole  400 mg Intravenous Q24H    risperiDONE  2 mg Oral Nightly    famotidine (PEPCID) injection  20 mg Intravenous BID    enoxaparin  40 mg Subcutaneous Q24H    [Held by provider] furosemide  40 mg Oral BID    Tadalafil (PAH)  40 mg Oral Daily    glycopyrrolate-formoterol  2 puff Inhalation BID    aspirin  324 mg Oral Daily    carBAMazepine  200 mg Oral BID    pravastatin  40 mg Oral Nightly    sodium chloride flush  10 mL Intravenous 2 times per day    predniSONE  40 mg Oral Daily     Continuous Infusions:      Lab Results:   CBC:   Recent Labs     05/26/20  0440 05/27/20  0431 05/28/20  0335   WBC 9.1 9.8 10.3   HGB 15.0 15.4 15.0    141 141     BMP:    Recent Labs     05/26/20  0440 05/27/20  0431 05/28/20  0335    147* 147*   K 3.5 3.3* 3.5    103 104   CO2 36* 36* 35*   BUN 33* 31* 30*   CREATININE 0.6 0.6 0.5   GLUCOSE 96 96 102     Magnesium:    Lab Results   Component Value Date    MG 2.3 05/27/2020     HgBA1c:    Lab Results   Component Value Date    LABA1C 6.0 12/15/2019     TSH:    Lab Results   Component Value Date    TSH 2.040 03/02/2020     Transthoracic Echocardiography Report (TTE)   Left ventricular size is normal and systolic function is mildly reduced.   Ejection fraction was estimated at 50-55%. LV wall thickness is within   normal limits.   Intraventricular septal wall compression during systole suggestive of RV   pressure overload.   Markedly enlarged right atrium size.   The right ventricle was not clearly visualized. Appears dilated.  RVSP of 55-60 mm Hg consistent with moderate pulmonary hypertension.   Small circumferential pericardial effusion without evidence of tamponade   physiology. [address] size is dilated with <50% respiratory collapse. CXR 5/26/20  FINDINGS:  Lines/tubes/devices: There is no significant change in the satisfactory positions of the life support lines and tubes. Lungs/pleura:  There are stable bilateral layering small to moderate pleural effusions. No pneumothorax. There is no change in the mild interstitial infiltrates consistent with pulmonary edema or an atypical pneumonia. Heart: Stable borderline cardiomegaly. Mediastinum/yasmany: No obvious mass or adenopathy. Skeleton: No significant bone or joint abnormality. Impression:     Stable appearance of the chest compared to the prior study as detailed above.        Assessment and Plan:   1. Acute on chronic hypoxemic / hypercapnic resp failure, req vent support  2. COPD with acute exac  3. Acute on chronic CHF pEF, diuresing well  4.  Moderate pulmonary hypertension, on Tadalafil  5. hypokalemia     Cont trach /vent care   Bronchodilators  Free water via NGT  Transfer LTAC soha No MD

## 2020-05-28 NOTE — PROGRESS NOTES
CRITICAL CARE PROGRESS NOTE      Patient: Lisa Riley    Unit/Bed:4D-04/004-A  YOB: 1970  MRN: [id number]   PCP: Guerita Catherine MD  Date of Admission: 5/17/2020  Chief Complaint:- SOB    Assessment and Plan:      1. Acute on chronic hypoxic hypercapnic respiratory failure: Wears 3 L at baseline.  Patient also wears CPAP at night, with 3 L bled in.  On arrival to the emergency room patient was noted to be hypoxic with an SPO2 in the 62s.  He was emergently intubated.  Maintain peak pressure less than 35.  Tracheostomy tube placement 5/27  2. Acute on chronic heart failure with reduced ejection fraction: Noted +2 pitting edema bilateral lower extremities.  Diuresis 40 mg of Lasix twice daily. Albumin level normal.  ECHO 50-55%.  Added milrinone 5/19 for increased diuresis, stopped 5/23, diuresed 7.9L   3. Moderate Pulmonary hypertension: Tadalafil added 5/21.  Right ventricular pressures 55 to 60 mmHg, continues to require high levels of PEEP   4. COPD exacerbation: Steroids, Solu-Medrol 40 mg every 6 hours for 3 days.  now 40 mg daily. Rocephin.  Bio fire pneumonia panel negative, Bevespi added 5/20. Repeat biofire 5/22 negative  5. Elevated troponin: Likely secondary to hypoxia, EKG shows no acute changes.  Repeat echo pending, trend troponin.  Patient on 325 ASA daily.  Cardiology consulted, possible ischemic work-up in the future. 6. Hypertension: History, currently hypotensive.  Norvasc with hold parameters  Pericardial effusion: Small effusion noted on echo, circumferential but no evidence of tamponade  7. Schizophrenia: Risperdal    8. GERD: Pepcid twice daily  9. Hyperlipidemia: Pravastatin 40 mg nightly  10. Hyperkalemia: resolved; 1 dose kayexalate  11. Acute kidney injury: resolved;  Likely secondary to hypotension, and prerenal in nature.  Urine electrolytes shows pre-renal, likely hypotension cause, monitor.  Improved creatinine 1.1  12.  Fever of unknown origin: resolved; Recultured 5/22, negative urine and biofire pneumonia negative, primary service continuing rocephin.      INITIAL H AND P AND ICU COURSE:  Jones Wagner is a 44year-old black male who presented to [facility] on [facility]/17/2020 with complaints of shortness of breath that started today. Nikki Calvillo has a past medical history of hypertension, heart failure, schizophrenia, COPD.  HPI is limited secondary to patient inability to give details of history secondary to hypoxia and need for immediate intubation.  Patient was dropped off at the emergency room and no family member accompanied patient to the emergency room. 5/18 patient  continues on mechanical ventilation 5/20 increased PEEP requirements overnight. Jose D Galea for COPD exacerbation.  Milrinone added 5/19 by cardiology for increased diuresis 5/21 continue to diuresis. Lourena Sandhoff is improving.  PEEP has improved to 10 overnight.  5/22 patient developed fever overnight.  Tadalafil was added 5/21 for moderate pulmonary hypertension. 5/24 to require high levels of PEEP between 10 and 12.  Patient continues to diurese.  Continue Lasix 40 twice daily.  Albumin levels are normal. 5/25 patient continues to diuresis.  CTA chest negative for PE. 5/26 Amanda is making decisions about tracheostomy tube placement.  Patient continues on ventilator support.  Low likelihood of extubation without tracheostomy tube. 5/27 patient underwent tracheostomy tube placement. Noted yeast plaques in the airway. Diflucan started. 5/28 patient is stable post tracheostomy tube placement 5/27 patient will transition to [place] under the care of Dr. Hong Anderson     Past Medical History:  Per HPI  Family History: Mother: Hypertension  Social History:  Current every day smoker, 20-pack-year history, denies EtOH use.  Denies drug use.     ROS   Unable to review secondary to mental status     Scheduled Meds:   fluconazole  400 mg Intravenous Q24H    risperiDONE  2 mg Oral Nightly    famotidine (PEPCID) injection 20 mg Intravenous BID    enoxaparin  40 mg Subcutaneous Q24H    [Held by provider] furosemide  40 mg Oral BID    Tadalafil (PAH)  40 mg Oral Daily    glycopyrrolate-formoterol  2 puff Inhalation BID    aspirin  324 mg Oral Daily    carBAMazepine  200 mg Oral BID    pravastatin  40 mg Oral Nightly    sodium chloride flush  10 mL Intravenous 2 times per day    predniSONE  40 mg Oral Daily     Continuous Infusions:    PHYSICAL EXAMINATION:  T:  99.5. P:  90. RR:  17. B/P:  118/74. FiO2:  60. O2 Sat:  93.  I/O: 1577/1220  Body mass index is 33.85 kg/m². General:   Acute on chronically ill-appearing black male  HEENT:  normocephalic and atraumatic. No scleral icterus. PERR  Neck: supple. No Thyromegaly. Lungs: clear to auscultation. No retractions  Cardiac: RRR. No JVD. Abdomen: soft. Nontender. Rounded   Extremities:  No clubbing, cyanosis. + 3 pitting edema bilateral lower extremities   Vasculature: capillary refill < 3 seconds. Palpable dorsalis pedis pulses. Skin:  warm and dry. Psych: opens eyes to verbal stimulation, follows basic commands. Lymph:  No supraclavicular adenopathy. Neurologic:  No focal deficit. No seizures. Data: (All radiographs, tracings, PFTs, and imaging are personally viewed and interpreted unless otherwise noted).  Sodium 147, potassium 3.5, chloride 104, CO2 35, BUN 30, creatinine 0.5, anion gap 8.0, glucose 102.  WBC 10.3, hemoglobin 15.0, hematocrit 51.3, platelet count 725.  Telemetry shows rhythm   Lower extremity Dopplers negative for DVT   Chest x-ray 5/21/2020 reports stable cardiac enlargement, moderate diffuse pulmonary venous congestion.  Chest x-ray 5/19/2020 reports moderate hazy appearance of both mid and lower lung fields consistent with atelectasis pneumonia and edema, small effusion on the right side questionable small left effusion.  Overall appearance worse.    Echocardiogram 5/18/2020 reports ejection fraction from 50 to 55%, enlarged right atrium moderate pulmonary hypertension, small circumferential pericardial effusion   CTA last 5/24/2020 reports negative exam for pulmonary embolism.  Cardiomegaly with reflux of contrast into the inferior vena cava and hepatic veins, correlate with decreased cardiac output.  Moderate pleural effusions.  Underlying COPD. Meets Continued ICU Level Care Criteria:    [] Yes   [x] No - Transfer Planned to listed location: [place]  [] HOSPITALIST CONTACTED-      Case and plan discussed with Dr. Lew Gomez and Dr. Hong Anderson       Electronically signed by Jessica Valdovinos. SAVANNAH Rodriguez - [facility]  CRITICAL CARE SPECIALIST  Patient seen by me. Case discussed with nurse practitioner. Patient remains mechanical ventilator dependent. Status post tracheostomy tube placement. Continue with bronchodilator therapy. Patient transitioning to [facility] facility. Electronically signed by Donita Gomez MD.

## 2020-05-28 NOTE — DISCHARGE INSTR - COC
Resulted          Nurse Assessment:  Last Vital Signs: /74   Pulse 90   Temp 99.4 °F (37.4 °C) (Oral)   Resp 24   Ht 6' 2\" (1.88 m)   Wt 263 lb 10.7 oz (119.6 kg)   SpO2 93%   BMI 33.85 kg/m²     Last documented pain score (0-10 scale): Pain Level: 6  Last Weight:   Wt Readings from Last 1 Encounters:   05/28/20 263 lb 10.7 oz (119.6 kg)     Mental Status:  Sometimes alert, KB orientation, can sometimes follow commands    IV Access:  - Peripheral IV Left AC - inserted 5/17/2020  CVC Right side triple lumen inserted 5/17/2020      Nursing Mobility/ADLs:  Walking   Assisted  Transfer  Assisted  Bathing  Assisted  Dressing  Assisted  Toileting  Assisted  Feeding  Assisted  Med Admin  Assisted  Med Delivery   Crushed and placed through NG    Wound Care Documentation and Therapy:        Elimination:  Continence:   · Bowel: Has a Dignishield - incontinent   · Bladder: Minaya catheter  Urinary Catheter: Insertion Date: 5/17/2020   Colostomy/Ileostomy/Ileal Conduit: No  Rectal Tube With balloon-Stool Appearance: Loose  Rectal Tube With balloon-Stool Color: Brown  Rectal Tube With balloon-Stool Amount: Medium    Date of Last BM: 5/28/2020    Intake/Output Summary (Last 24 hours) at 5/28/2020 0940  Last data filed at 5/28/2020 0828  Gross per 24 hour   Intake 1617 ml   Output 1220 ml   Net 397 ml     I/O last 3 completed shifts: In: 0009 [I.V.:487; NG/GT:1090]  Out: 65 [Urine:820; Stool:400]    Safety Concerns:     Restraints for ventilator    Impairments/Disabilities:       On Ventilator    Nutrition Therapy:  Current Nutrition Therapy:   - Tube Feedings:  Renal  Nepro 25 mL / hr with 200 mL free water flush every 8 hours  Routes of Feeding: Nasogastric  Liquids: No Liquids  Daily Fluid Restriction: no  Last Modified Barium Swallow with Video (Video Swallowing Test): not done    Treatments at the Time of Hospital Discharge:   Respiratory Treatments: 2L NC O2 PRN  Oxygen Therapy:  is not on home oxygen

## 2020-05-28 NOTE — PLAN OF CARE
Problem: Impaired respiratory status  Goal: Will be able to breathe spontaneously, without ventilator support  Description: Will be able to breathe spontaneously, without ventilator support     Outcome: Ongoing  Note: Patient remains on ventilator at this time. Will continue to monitor and assess readiness to wean.

## 2020-05-29 ENCOUNTER — APPOINTMENT (OUTPATIENT)
Dept: GENERAL RADIOLOGY | Age: 50
End: 2020-05-29
Attending: INTERNAL MEDICINE
Payer: COMMERCIAL

## 2020-05-29 LAB
ANION GAP SERPL CALCULATED.3IONS-SCNC: 10 MEQ/L (ref 8–16)
GFR SERPL CREATININE-BSD FRML MDRD: > 90 ML/MIN/1.73M2
SCAN OF BLOOD SMEAR: NORMAL

## 2020-05-29 PROCEDURE — 71045 X-RAY EXAM CHEST 1 VIEW: CPT

## 2020-05-29 PROCEDURE — 74018 RADEX ABDOMEN 1 VIEW: CPT

## 2020-05-30 ENCOUNTER — APPOINTMENT (OUTPATIENT)
Dept: GENERAL RADIOLOGY | Age: 50
End: 2020-05-30
Attending: INTERNAL MEDICINE
Payer: COMMERCIAL

## 2020-05-30 LAB
BASE EXCESS (CALCULATED): 8.3 MMOL/L (ref -2.5–2.5)
COLLECTED BY:: ABNORMAL
DEVICE: ABNORMAL
HCO3: 35 MMOL/L (ref 23–28)
IFIO2: 60
MODE: AC
O2 SATURATION: 92 %
PCO2: 55 MMHG (ref 35–45)
PH BLOOD GAS: 7.41 (ref 7.35–7.45)
PO2: 66 MMHG (ref 71–104)
SET PEEP: 8 MMHG
SET RESPIRATORY RATE: 16 BPM
SET TIDAL VOLUME: 480 ML
SOURCE, BLOOD GAS: ABNORMAL

## 2020-05-30 PROCEDURE — 74018 RADEX ABDOMEN 1 VIEW: CPT

## 2020-05-30 PROCEDURE — 71045 X-RAY EXAM CHEST 1 VIEW: CPT

## 2020-06-01 LAB
ANION GAP SERPL CALCULATED.3IONS-SCNC: 5 MEQ/L (ref 8–16)
BASOPHILS # BLD: 0.5 %
BASOPHILS ABSOLUTE: 0 THOU/MM3 (ref 0–0.1)
BUN BLDV-MCNC: 27 MG/DL (ref 7–22)
CALCIUM SERPL-MCNC: 8.7 MG/DL (ref 8.5–10.5)
CHLORIDE BLD-SCNC: 105 MEQ/L (ref 98–111)
CO2: 36 MEQ/L (ref 23–33)
CREAT SERPL-MCNC: 0.6 MG/DL (ref 0.4–1.2)
EOSINOPHIL # BLD: 1 %
EOSINOPHILS ABSOLUTE: 0.1 THOU/MM3 (ref 0–0.4)
ERYTHROCYTE [DISTWIDTH] IN BLOOD BY AUTOMATED COUNT: 19.9 % (ref 11.5–14.5)
ERYTHROCYTE [DISTWIDTH] IN BLOOD BY AUTOMATED COUNT: 64.2 FL (ref 35–45)
GFR SERPL CREATININE-BSD FRML MDRD: > 90 ML/MIN/1.73M2
GLUCOSE BLD-MCNC: 96 MG/DL (ref 70–108)
HCT VFR BLD CALC: 51 % (ref 42–52)
HEMOGLOBIN: 14.5 GM/DL (ref 14–18)
HYPOCHROMIA: PRESENT
IMMATURE GRANS (ABS): 0.2 THOU/MM3 (ref 0–0.07)
IMMATURE GRANULOCYTES: 2.1 %
LYMPHOCYTES # BLD: 12.5 %
LYMPHOCYTES ABSOLUTE: 1.2 THOU/MM3 (ref 1–4.8)
MCH RBC QN AUTO: 25.9 PG (ref 26–33)
MCHC RBC AUTO-ENTMCNC: 28.4 GM/DL (ref 32.2–35.5)
MCV RBC AUTO: 91.1 FL (ref 80–94)
MONOCYTES # BLD: 8.1 %
MONOCYTES ABSOLUTE: 0.8 THOU/MM3 (ref 0.4–1.3)
NUCLEATED RED BLOOD CELLS: 0 /100 WBC
PLATELET # BLD: 186 THOU/MM3 (ref 130–400)
PMV BLD AUTO: 11.9 FL (ref 9.4–12.4)
POTASSIUM SERPL-SCNC: 3.2 MEQ/L (ref 3.5–5.2)
PREALBUMIN: 9.9 MG/DL (ref 20–40)
RBC # BLD: 5.6 MILL/MM3 (ref 4.7–6.1)
SEG NEUTROPHILS: 75.8 %
SEGMENTED NEUTROPHILS ABSOLUTE COUNT: 7.4 THOU/MM3 (ref 1.8–7.7)
SODIUM BLD-SCNC: 146 MEQ/L (ref 135–145)
WBC # BLD: 9.7 THOU/MM3 (ref 4.8–10.8)

## 2020-06-02 LAB
ANION GAP SERPL CALCULATED.3IONS-SCNC: 7 MEQ/L (ref 8–16)
BASOPHILS # BLD: 0.4 %
BASOPHILS ABSOLUTE: 0 THOU/MM3 (ref 0–0.1)
BUN BLDV-MCNC: 23 MG/DL (ref 7–22)
CALCIUM SERPL-MCNC: 8.7 MG/DL (ref 8.5–10.5)
CHLORIDE BLD-SCNC: 104 MEQ/L (ref 98–111)
CO2: 33 MEQ/L (ref 23–33)
CREAT SERPL-MCNC: 0.6 MG/DL (ref 0.4–1.2)
EOSINOPHIL # BLD: 0.9 %
EOSINOPHILS ABSOLUTE: 0.1 THOU/MM3 (ref 0–0.4)
ERYTHROCYTE [DISTWIDTH] IN BLOOD BY AUTOMATED COUNT: 19.9 % (ref 11.5–14.5)
ERYTHROCYTE [DISTWIDTH] IN BLOOD BY AUTOMATED COUNT: 64.5 FL (ref 35–45)
GFR SERPL CREATININE-BSD FRML MDRD: > 90 ML/MIN/1.73M2
GLUCOSE BLD-MCNC: 98 MG/DL (ref 70–108)
HCT VFR BLD CALC: 48.8 % (ref 42–52)
HEMOGLOBIN: 14 GM/DL (ref 14–18)
HYPOCHROMIA: PRESENT
IMMATURE GRANS (ABS): 0.19 THOU/MM3 (ref 0–0.07)
IMMATURE GRANULOCYTES: 1.8 %
LYMPHOCYTES # BLD: 11 %
LYMPHOCYTES ABSOLUTE: 1.1 THOU/MM3 (ref 1–4.8)
MAGNESIUM: 2.3 MG/DL (ref 1.6–2.4)
MCH RBC QN AUTO: 26.3 PG (ref 26–33)
MCHC RBC AUTO-ENTMCNC: 28.7 GM/DL (ref 32.2–35.5)
MCV RBC AUTO: 91.7 FL (ref 80–94)
MONOCYTES # BLD: 9.2 %
MONOCYTES ABSOLUTE: 1 THOU/MM3 (ref 0.4–1.3)
NUCLEATED RED BLOOD CELLS: 0 /100 WBC
PLATELET # BLD: 182 THOU/MM3 (ref 130–400)
PLATELET ESTIMATE: ADEQUATE
PMV BLD AUTO: 10.9 FL (ref 9.4–12.4)
POIKILOCYTES: ABNORMAL
POTASSIUM SERPL-SCNC: 3.8 MEQ/L (ref 3.5–5.2)
RBC # BLD: 5.32 MILL/MM3 (ref 4.7–6.1)
SCAN OF BLOOD SMEAR: NORMAL
SEG NEUTROPHILS: 76.7 %
SEGMENTED NEUTROPHILS ABSOLUTE COUNT: 8 THOU/MM3 (ref 1.8–7.7)
SODIUM BLD-SCNC: 144 MEQ/L (ref 135–145)
WBC # BLD: 10.4 THOU/MM3 (ref 4.8–10.8)

## 2020-06-03 ENCOUNTER — APPOINTMENT (OUTPATIENT)
Dept: GENERAL RADIOLOGY | Age: 50
End: 2020-06-03
Attending: INTERNAL MEDICINE
Payer: COMMERCIAL

## 2020-06-03 LAB
ANION GAP SERPL CALCULATED.3IONS-SCNC: 11 MEQ/L (ref 8–16)
BASOPHILS # BLD: 0.4 %
BASOPHILS ABSOLUTE: 0 THOU/MM3 (ref 0–0.1)
BUN BLDV-MCNC: 22 MG/DL (ref 7–22)
CALCIUM SERPL-MCNC: 8.8 MG/DL (ref 8.5–10.5)
CHLORIDE BLD-SCNC: 105 MEQ/L (ref 98–111)
CO2: 30 MEQ/L (ref 23–33)
CREAT SERPL-MCNC: 0.5 MG/DL (ref 0.4–1.2)
EOSINOPHIL # BLD: 1.4 %
EOSINOPHILS ABSOLUTE: 0.1 THOU/MM3 (ref 0–0.4)
ERYTHROCYTE [DISTWIDTH] IN BLOOD BY AUTOMATED COUNT: 19.6 % (ref 11.5–14.5)
ERYTHROCYTE [DISTWIDTH] IN BLOOD BY AUTOMATED COUNT: 63.8 FL (ref 35–45)
GFR SERPL CREATININE-BSD FRML MDRD: > 90 ML/MIN/1.73M2
GLUCOSE BLD-MCNC: 86 MG/DL (ref 70–108)
HCT VFR BLD CALC: 48.8 % (ref 42–52)
HEMOGLOBIN: 14.1 GM/DL (ref 14–18)
HYPOCHROMIA: PRESENT
IMMATURE GRANS (ABS): 0.16 THOU/MM3 (ref 0–0.07)
IMMATURE GRANULOCYTES: 1.7 %
LYMPHOCYTES # BLD: 11.9 %
LYMPHOCYTES ABSOLUTE: 1.1 THOU/MM3 (ref 1–4.8)
MCH RBC QN AUTO: 26.3 PG (ref 26–33)
MCHC RBC AUTO-ENTMCNC: 28.9 GM/DL (ref 32.2–35.5)
MCV RBC AUTO: 91 FL (ref 80–94)
MONOCYTES # BLD: 10.1 %
MONOCYTES ABSOLUTE: 0.9 THOU/MM3 (ref 0.4–1.3)
NUCLEATED RED BLOOD CELLS: 0 /100 WBC
PLATELET # BLD: 199 THOU/MM3 (ref 130–400)
PMV BLD AUTO: 11.3 FL (ref 9.4–12.4)
POTASSIUM SERPL-SCNC: 4 MEQ/L (ref 3.5–5.2)
RBC # BLD: 5.36 MILL/MM3 (ref 4.7–6.1)
SEG NEUTROPHILS: 74.5 %
SEGMENTED NEUTROPHILS ABSOLUTE COUNT: 7 THOU/MM3 (ref 1.8–7.7)
SODIUM BLD-SCNC: 146 MEQ/L (ref 135–145)
WBC # BLD: 9.4 THOU/MM3 (ref 4.8–10.8)

## 2020-06-03 PROCEDURE — 74018 RADEX ABDOMEN 1 VIEW: CPT

## 2020-06-06 ENCOUNTER — APPOINTMENT (OUTPATIENT)
Dept: GENERAL RADIOLOGY | Age: 50
End: 2020-06-06
Attending: INTERNAL MEDICINE
Payer: COMMERCIAL

## 2020-06-06 LAB
ANION GAP SERPL CALCULATED.3IONS-SCNC: 7 MEQ/L (ref 8–16)
BASOPHILS # BLD: 0.3 %
BASOPHILS ABSOLUTE: 0 THOU/MM3 (ref 0–0.1)
BUN BLDV-MCNC: 20 MG/DL (ref 7–22)
CALCIUM SERPL-MCNC: 8.5 MG/DL (ref 8.5–10.5)
CHLORIDE BLD-SCNC: 100 MEQ/L (ref 98–111)
CO2: 27 MEQ/L (ref 23–33)
CREAT SERPL-MCNC: 0.5 MG/DL (ref 0.4–1.2)
EOSINOPHIL # BLD: 1.3 %
EOSINOPHILS ABSOLUTE: 0.1 THOU/MM3 (ref 0–0.4)
ERYTHROCYTE [DISTWIDTH] IN BLOOD BY AUTOMATED COUNT: 19.2 % (ref 11.5–14.5)
ERYTHROCYTE [DISTWIDTH] IN BLOOD BY AUTOMATED COUNT: 63.6 FL (ref 35–45)
GFR SERPL CREATININE-BSD FRML MDRD: > 90 ML/MIN/1.73M2
GLUCOSE BLD-MCNC: 100 MG/DL (ref 70–108)
HCT VFR BLD CALC: 46.2 % (ref 42–52)
HEMOGLOBIN: 13.3 GM/DL (ref 14–18)
HYPOCHROMIA: PRESENT
IMMATURE GRANS (ABS): 0.09 THOU/MM3 (ref 0–0.07)
IMMATURE GRANULOCYTES: 1.3 %
LYMPHOCYTES # BLD: 21.6 %
LYMPHOCYTES ABSOLUTE: 1.5 THOU/MM3 (ref 1–4.8)
MCH RBC QN AUTO: 26.1 PG (ref 26–33)
MCHC RBC AUTO-ENTMCNC: 28.8 GM/DL (ref 32.2–35.5)
MCV RBC AUTO: 90.6 FL (ref 80–94)
MONOCYTES # BLD: 11.1 %
MONOCYTES ABSOLUTE: 0.8 THOU/MM3 (ref 0.4–1.3)
NUCLEATED RED BLOOD CELLS: 0 /100 WBC
PLATELET # BLD: 224 THOU/MM3 (ref 130–400)
PMV BLD AUTO: 10.9 FL (ref 9.4–12.4)
POTASSIUM SERPL-SCNC: 4.2 MEQ/L (ref 3.5–5.2)
PRO-BNP: 688.7 PG/ML (ref 0–900)
RBC # BLD: 5.1 MILL/MM3 (ref 4.7–6.1)
SEG NEUTROPHILS: 64.4 %
SEGMENTED NEUTROPHILS ABSOLUTE COUNT: 4.4 THOU/MM3 (ref 1.8–7.7)
SODIUM BLD-SCNC: 134 MEQ/L (ref 135–145)
WBC # BLD: 6.8 THOU/MM3 (ref 4.8–10.8)

## 2020-06-06 PROCEDURE — 74018 RADEX ABDOMEN 1 VIEW: CPT

## 2020-06-07 ENCOUNTER — APPOINTMENT (OUTPATIENT)
Dept: GENERAL RADIOLOGY | Age: 50
End: 2020-06-07
Attending: INTERNAL MEDICINE
Payer: COMMERCIAL

## 2020-06-07 PROCEDURE — 74018 RADEX ABDOMEN 1 VIEW: CPT

## 2020-06-08 LAB
ANION GAP SERPL CALCULATED.3IONS-SCNC: 8 MEQ/L (ref 8–16)
BASOPHILS # BLD: 0.8 %
BASOPHILS ABSOLUTE: 0 THOU/MM3 (ref 0–0.1)
BUN BLDV-MCNC: 12 MG/DL (ref 7–22)
CALCIUM SERPL-MCNC: 8.6 MG/DL (ref 8.5–10.5)
CHLORIDE BLD-SCNC: 98 MEQ/L (ref 98–111)
CO2: 30 MEQ/L (ref 23–33)
CREAT SERPL-MCNC: 0.6 MG/DL (ref 0.4–1.2)
EOSINOPHIL # BLD: 2.2 %
EOSINOPHILS ABSOLUTE: 0.1 THOU/MM3 (ref 0–0.4)
ERYTHROCYTE [DISTWIDTH] IN BLOOD BY AUTOMATED COUNT: 18.6 % (ref 11.5–14.5)
ERYTHROCYTE [DISTWIDTH] IN BLOOD BY AUTOMATED COUNT: 60.2 FL (ref 35–45)
GFR SERPL CREATININE-BSD FRML MDRD: > 90 ML/MIN/1.73M2
GLUCOSE BLD-MCNC: 79 MG/DL (ref 70–108)
HCT VFR BLD CALC: 45.9 % (ref 42–52)
HEMOGLOBIN: 13.5 GM/DL (ref 14–18)
IMMATURE GRANS (ABS): 0.09 THOU/MM3 (ref 0–0.07)
IMMATURE GRANULOCYTES: 1.8 %
LYMPHOCYTES # BLD: 23.3 %
LYMPHOCYTES ABSOLUTE: 1.2 THOU/MM3 (ref 1–4.8)
MCH RBC QN AUTO: 26.1 PG (ref 26–33)
MCHC RBC AUTO-ENTMCNC: 29.4 GM/DL (ref 32.2–35.5)
MCV RBC AUTO: 88.8 FL (ref 80–94)
MONOCYTES # BLD: 10.8 %
MONOCYTES ABSOLUTE: 0.6 THOU/MM3 (ref 0.4–1.3)
NUCLEATED RED BLOOD CELLS: 0 /100 WBC
PLATELET # BLD: 250 THOU/MM3 (ref 130–400)
PMV BLD AUTO: 11.9 FL (ref 9.4–12.4)
POTASSIUM SERPL-SCNC: 4.1 MEQ/L (ref 3.5–5.2)
RBC # BLD: 5.17 MILL/MM3 (ref 4.7–6.1)
SEG NEUTROPHILS: 61.1 %
SEGMENTED NEUTROPHILS ABSOLUTE COUNT: 3.1 THOU/MM3 (ref 1.8–7.7)
SODIUM BLD-SCNC: 136 MEQ/L (ref 135–145)
WBC # BLD: 5.1 THOU/MM3 (ref 4.8–10.8)

## 2020-06-09 ENCOUNTER — APPOINTMENT (OUTPATIENT)
Dept: INTERVENTIONAL RADIOLOGY/VASCULAR | Age: 50
End: 2020-06-09
Attending: INTERNAL MEDICINE
Payer: COMMERCIAL

## 2020-06-09 PROCEDURE — 93970 EXTREMITY STUDY: CPT

## 2020-06-10 ENCOUNTER — APPOINTMENT (OUTPATIENT)
Dept: GENERAL RADIOLOGY | Age: 50
End: 2020-06-10
Attending: INTERNAL MEDICINE
Payer: COMMERCIAL

## 2020-06-10 LAB
ANION GAP SERPL CALCULATED.3IONS-SCNC: 8 MEQ/L (ref 8–16)
BASOPHILS # BLD: 0.8 %
BASOPHILS ABSOLUTE: 0 THOU/MM3 (ref 0–0.1)
BUN BLDV-MCNC: 10 MG/DL (ref 7–22)
CALCIUM SERPL-MCNC: 8.7 MG/DL (ref 8.5–10.5)
CHLORIDE BLD-SCNC: 102 MEQ/L (ref 98–111)
CO2: 29 MEQ/L (ref 23–33)
CREAT SERPL-MCNC: 0.6 MG/DL (ref 0.4–1.2)
EOSINOPHIL # BLD: 3.1 %
EOSINOPHILS ABSOLUTE: 0.2 THOU/MM3 (ref 0–0.4)
ERYTHROCYTE [DISTWIDTH] IN BLOOD BY AUTOMATED COUNT: 18.6 % (ref 11.5–14.5)
ERYTHROCYTE [DISTWIDTH] IN BLOOD BY AUTOMATED COUNT: 58.8 FL (ref 35–45)
GFR SERPL CREATININE-BSD FRML MDRD: > 90 ML/MIN/1.73M2
GLUCOSE BLD-MCNC: 92 MG/DL (ref 70–108)
HCT VFR BLD CALC: 44 % (ref 42–52)
HEMOGLOBIN: 13.1 GM/DL (ref 14–18)
IMMATURE GRANS (ABS): 0.09 THOU/MM3 (ref 0–0.07)
IMMATURE GRANULOCYTES: 1.5 %
LYMPHOCYTES # BLD: 23.6 %
LYMPHOCYTES ABSOLUTE: 1.5 THOU/MM3 (ref 1–4.8)
MCH RBC QN AUTO: 26.4 PG (ref 26–33)
MCHC RBC AUTO-ENTMCNC: 29.8 GM/DL (ref 32.2–35.5)
MCV RBC AUTO: 88.5 FL (ref 80–94)
MONOCYTES # BLD: 12.3 %
MONOCYTES ABSOLUTE: 0.8 THOU/MM3 (ref 0.4–1.3)
NUCLEATED RED BLOOD CELLS: 0 /100 WBC
PLATELET # BLD: 229 THOU/MM3 (ref 130–400)
PMV BLD AUTO: 10.9 FL (ref 9.4–12.4)
POTASSIUM SERPL-SCNC: 3.5 MEQ/L (ref 3.5–5.2)
RBC # BLD: 4.97 MILL/MM3 (ref 4.7–6.1)
SEG NEUTROPHILS: 58.7 %
SEGMENTED NEUTROPHILS ABSOLUTE COUNT: 3.6 THOU/MM3 (ref 1.8–7.7)
SODIUM BLD-SCNC: 139 MEQ/L (ref 135–145)
WBC # BLD: 6.2 THOU/MM3 (ref 4.8–10.8)

## 2020-06-10 PROCEDURE — 74018 RADEX ABDOMEN 1 VIEW: CPT

## 2020-06-11 ENCOUNTER — APPOINTMENT (OUTPATIENT)
Dept: GENERAL RADIOLOGY | Age: 50
End: 2020-06-11
Attending: INTERNAL MEDICINE
Payer: COMMERCIAL

## 2020-06-11 LAB — POTASSIUM SERPL-SCNC: 3.8 MEQ/L (ref 3.5–5.2)

## 2020-06-11 PROCEDURE — 74018 RADEX ABDOMEN 1 VIEW: CPT

## 2020-06-12 ENCOUNTER — APPOINTMENT (OUTPATIENT)
Dept: GENERAL RADIOLOGY | Age: 50
End: 2020-06-12
Attending: INTERNAL MEDICINE
Payer: COMMERCIAL

## 2020-06-12 PROCEDURE — 74018 RADEX ABDOMEN 1 VIEW: CPT

## 2020-06-14 ENCOUNTER — APPOINTMENT (OUTPATIENT)
Dept: GENERAL RADIOLOGY | Age: 50
End: 2020-06-14
Attending: INTERNAL MEDICINE
Payer: COMMERCIAL

## 2020-06-14 PROCEDURE — 74018 RADEX ABDOMEN 1 VIEW: CPT

## 2020-06-15 ENCOUNTER — APPOINTMENT (OUTPATIENT)
Dept: GENERAL RADIOLOGY | Age: 50
End: 2020-06-15
Attending: INTERNAL MEDICINE
Payer: COMMERCIAL

## 2020-06-15 LAB
ALBUMIN SERPL-MCNC: 2.8 G/DL (ref 3.5–5.1)
ALLEN TEST: ABNORMAL
ANION GAP SERPL CALCULATED.3IONS-SCNC: 8 MEQ/L (ref 8–16)
BASE EXCESS (CALCULATED): 8.9 MMOL/L (ref -2.5–2.5)
BASOPHILS # BLD: 1.4 %
BASOPHILS ABSOLUTE: 0.1 THOU/MM3 (ref 0–0.1)
BUN BLDV-MCNC: 10 MG/DL (ref 7–22)
CALCIUM SERPL-MCNC: 8.9 MG/DL (ref 8.5–10.5)
CHLORIDE BLD-SCNC: 99 MEQ/L (ref 98–111)
CO2: 32 MEQ/L (ref 23–33)
COLLECTED BY:: ABNORMAL
CREAT SERPL-MCNC: 0.5 MG/DL (ref 0.4–1.2)
DEVICE: ABNORMAL
EOSINOPHIL # BLD: 4.5 %
EOSINOPHILS ABSOLUTE: 0.2 THOU/MM3 (ref 0–0.4)
ERYTHROCYTE [DISTWIDTH] IN BLOOD BY AUTOMATED COUNT: 18 % (ref 11.5–14.5)
ERYTHROCYTE [DISTWIDTH] IN BLOOD BY AUTOMATED COUNT: 58.5 FL (ref 35–45)
GFR SERPL CREATININE-BSD FRML MDRD: > 90 ML/MIN/1.73M2
GLUCOSE BLD-MCNC: 86 MG/DL (ref 70–108)
HCO3: 36 MMOL/L (ref 23–28)
HCT VFR BLD CALC: 45.6 % (ref 42–52)
HEMOGLOBIN: 13.3 GM/DL (ref 14–18)
IFIO2: 32
IMMATURE GRANS (ABS): 0.04 THOU/MM3 (ref 0–0.07)
IMMATURE GRANULOCYTES: 0.8 %
LYMPHOCYTES # BLD: 34.3 %
LYMPHOCYTES ABSOLUTE: 1.7 THOU/MM3 (ref 1–4.8)
MCH RBC QN AUTO: 26.1 PG (ref 26–33)
MCHC RBC AUTO-ENTMCNC: 29.2 GM/DL (ref 32.2–35.5)
MCV RBC AUTO: 89.4 FL (ref 80–94)
MONOCYTES # BLD: 16.8 %
MONOCYTES ABSOLUTE: 0.9 THOU/MM3 (ref 0.4–1.3)
NUCLEATED RED BLOOD CELLS: 0 /100 WBC
O2 SATURATION: 95 %
PCO2: 58 MMHG (ref 35–45)
PH BLOOD GAS: 7.4 (ref 7.35–7.45)
PLATELET # BLD: 206 THOU/MM3 (ref 130–400)
PMV BLD AUTO: 10.8 FL (ref 9.4–12.4)
PO2: 75 MMHG (ref 71–104)
POTASSIUM SERPL-SCNC: 3.7 MEQ/L (ref 3.5–5.2)
PREALBUMIN: 14.1 MG/DL (ref 20–40)
RBC # BLD: 5.1 MILL/MM3 (ref 4.7–6.1)
SEG NEUTROPHILS: 42.2 %
SEGMENTED NEUTROPHILS ABSOLUTE COUNT: 2.2 THOU/MM3 (ref 1.8–7.7)
SODIUM BLD-SCNC: 139 MEQ/L (ref 135–145)
SOURCE, BLOOD GAS: ABNORMAL
WBC # BLD: 5.1 THOU/MM3 (ref 4.8–10.8)

## 2020-06-15 PROCEDURE — 92611 MOTION FLUOROSCOPY/SWALLOW: CPT

## 2020-06-15 PROCEDURE — 74230 X-RAY XM SWLNG FUNCJ C+: CPT

## 2020-06-15 PROCEDURE — 2500000003 HC RX 250 WO HCPCS: Performed by: INTERNAL MEDICINE

## 2020-06-15 RX ADMIN — BARIUM SULFATE 30 ML: 400 PASTE ORAL at 11:23

## 2020-06-15 RX ADMIN — BARIUM SULFATE 30 ML: 0.81 POWDER, FOR SUSPENSION ORAL at 11:25

## 2020-06-15 RX ADMIN — BARIUM SULFATE 30 ML: 400 SUSPENSION ORAL at 11:24

## 2020-06-15 NOTE — PROGRESS NOTES
[address]  Modified Barium Swallow    SLP Individual Minutes  Time In: 4217  Time Out: 1751  Minutes: 27  Timed Code Treatment Minutes: 0 Minutes       Date: 6/15/2020  Patient Name: Curt Peacock      CSN: [id number]   : 1970  (44 y.o.)  Gender: male   Referring Physician:  Zee Francis MD  Diagnosis: respiratory Failure   Secondary Diagnosis: Tracheostomy, dysphagia, fall risk   Precautions: aspiration risk, fall risk   History of Present Illness/Injury: Per chart review; Patel Olson is a 44year-old black male who presented to [facility] on 2020 with complaints of shortness of breath that started today. Jolynn Estevez has a past medical history of hypertension, heart failure, schizophrenia, COPD.  patient underwent tracheostomy tube placement.  patient is stable post tracheostomy tube placement  patient will transition to [place] under the care of Dr. Rosey Guevara. 6/15/2020- Pt was referred for a MBS swallow study to further assess swallow function. has a past medical history of Acute on chronic systolic congestive heart failure ([address] Emphysema (subcutaneous) (surgical) resulting from a procedure, Hypertension, Pneumonia, and Schizophrenia ([address] Current Diet: NPO + NG tube     Pain: No pain reported. SUBJECTIVE:  Patient was alert, upright in bed for MBS. Pt agreeable & excited for MBS. Pt's RN was present at the time of MBS. OBJECTIVE:    Respiratory Status:  Tracheostomy and  + 2 Liters of 02 via nasal canula     Behavioral Observation:  Alert and Oriented    PATIENT WAS EVALUATED USING:  Barium: thin via tsp, cup drink, straw, drink, nectar via cup & straw, puree solids, and soft solids.      ORAL PREPARATION PHASE:  Impaired:  Slow Mastication and Decreased Bolus Control    ORAL PHASE: Impaired:  Uncoordinated AP Movement, Decreased Lingual Elevation and Decreased Tongue Based Retraction     ORAL PHASE SHAKILA

## 2020-06-17 LAB
ANION GAP SERPL CALCULATED.3IONS-SCNC: 7 MEQ/L (ref 8–16)
BASOPHILS # BLD: 1.9 %
BASOPHILS ABSOLUTE: 0.1 THOU/MM3 (ref 0–0.1)
BUN BLDV-MCNC: 8 MG/DL (ref 7–22)
CALCIUM SERPL-MCNC: 8.5 MG/DL (ref 8.5–10.5)
CHLORIDE BLD-SCNC: 106 MEQ/L (ref 98–111)
CO2: 26 MEQ/L (ref 23–33)
CREAT SERPL-MCNC: 0.4 MG/DL (ref 0.4–1.2)
EOSINOPHIL # BLD: 7.2 %
EOSINOPHILS ABSOLUTE: 0.3 THOU/MM3 (ref 0–0.4)
ERYTHROCYTE [DISTWIDTH] IN BLOOD BY AUTOMATED COUNT: 17.7 % (ref 11.5–14.5)
ERYTHROCYTE [DISTWIDTH] IN BLOOD BY AUTOMATED COUNT: 58.4 FL (ref 35–45)
GFR SERPL CREATININE-BSD FRML MDRD: > 90 ML/MIN/1.73M2
GLUCOSE BLD-MCNC: 73 MG/DL (ref 70–108)
HCT VFR BLD CALC: 46.1 % (ref 42–52)
HEMOGLOBIN: 13.5 GM/DL (ref 14–18)
IMMATURE GRANS (ABS): 0.03 THOU/MM3 (ref 0–0.07)
IMMATURE GRANULOCYTES: 0.7 %
LYMPHOCYTES # BLD: 39.3 %
LYMPHOCYTES ABSOLUTE: 1.7 THOU/MM3 (ref 1–4.8)
MCH RBC QN AUTO: 26.3 PG (ref 26–33)
MCHC RBC AUTO-ENTMCNC: 29.3 GM/DL (ref 32.2–35.5)
MCV RBC AUTO: 89.7 FL (ref 80–94)
MONOCYTES # BLD: 17.8 %
MONOCYTES ABSOLUTE: 0.8 THOU/MM3 (ref 0.4–1.3)
NUCLEATED RED BLOOD CELLS: 0 /100 WBC
PLATELET # BLD: 183 THOU/MM3 (ref 130–400)
PMV BLD AUTO: 11.2 FL (ref 9.4–12.4)
POTASSIUM SERPL-SCNC: 4.1 MEQ/L (ref 3.5–5.2)
RBC # BLD: 5.14 MILL/MM3 (ref 4.7–6.1)
SEG NEUTROPHILS: 33.1 %
SEGMENTED NEUTROPHILS ABSOLUTE COUNT: 1.4 THOU/MM3 (ref 1.8–7.7)
SODIUM BLD-SCNC: 139 MEQ/L (ref 135–145)
WBC # BLD: 4.3 THOU/MM3 (ref 4.8–10.8)

## 2020-06-19 LAB
ANION GAP SERPL CALCULATED.3IONS-SCNC: 5 MEQ/L (ref 8–16)
BUN BLDV-MCNC: 10 MG/DL (ref 7–22)
CALCIUM SERPL-MCNC: 8 MG/DL (ref 8.5–10.5)
CHLORIDE BLD-SCNC: 105 MEQ/L (ref 98–111)
CO2: 27 MEQ/L (ref 23–33)
CREAT SERPL-MCNC: 0.6 MG/DL (ref 0.4–1.2)
GFR SERPL CREATININE-BSD FRML MDRD: > 90 ML/MIN/1.73M2
GLUCOSE BLD-MCNC: 74 MG/DL (ref 70–108)
POTASSIUM SERPL-SCNC: 4.1 MEQ/L (ref 3.5–5.2)
SODIUM BLD-SCNC: 137 MEQ/L (ref 135–145)

## 2020-06-22 ENCOUNTER — APPOINTMENT (OUTPATIENT)
Dept: GENERAL RADIOLOGY | Age: 50
End: 2020-06-22
Attending: INTERNAL MEDICINE
Payer: COMMERCIAL

## 2020-06-22 PROCEDURE — 74230 X-RAY XM SWLNG FUNCJ C+: CPT

## 2020-06-22 PROCEDURE — 2500000003 HC RX 250 WO HCPCS: Performed by: INTERNAL MEDICINE

## 2020-06-22 PROCEDURE — 92611 MOTION FLUOROSCOPY/SWALLOW: CPT

## 2020-06-22 RX ADMIN — BARIUM SULFATE 20 ML: 0.81 POWDER, FOR SUSPENSION ORAL at 10:55

## 2020-06-22 RX ADMIN — BARIUM SULFATE 10 ML: 400 PASTE ORAL at 10:54

## 2020-12-10 NOTE — PROGRESS NOTES
12/10/20 Patient: Elieser Varner YOB: 1944 Date of Visit: 12/10/2020 MD [facility] [address] [facility] [phone] [id number] VIA In Basket Dear Stalin Dykes MD, Thank you for referring Ms. Elieser Varner to [address] [place] [address] for evaluation. My notes for this consultation are attached. If you have questions, please do not hesitate to call me. I look forward to following your patient along with you.  
 
 
Sincerely, 
 
Sushma Hernández MD 
 
 Discharge teaching and instructions for diagnosis/procedure of resp failure completed with patient using teachback method. AVS reviewed. Printed prescriptions given to patient. Patient voiced understanding regarding prescriptions, follow up appointments, and care of self at home.  Discharged in a wheelchair to  home with support per family

## 2021-12-11 ENCOUNTER — APPOINTMENT (OUTPATIENT)
Dept: GENERAL RADIOLOGY | Age: 51
DRG: 870 | End: 2021-12-11
Payer: MEDICARE

## 2021-12-11 ENCOUNTER — HOSPITAL ENCOUNTER (INPATIENT)
Age: 51
LOS: 12 days | Discharge: HOME HEALTH | DRG: 870 | End: 2021-12-23
Attending: INTERNAL MEDICINE
Payer: MEDICARE

## 2021-12-11 DIAGNOSIS — R59.0 LAD (LYMPHADENOPATHY), MEDIASTINAL: ICD-10-CM

## 2021-12-11 DIAGNOSIS — J69.0 ASPIRATION PNEUMONIA OF BOTH LOWER LOBES DUE TO GASTRIC SECRETIONS (HCC): ICD-10-CM

## 2021-12-11 DIAGNOSIS — J96.01 ACUTE RESPIRATORY FAILURE WITH HYPOXIA AND HYPERCAPNIA (HCC): Primary | ICD-10-CM

## 2021-12-11 DIAGNOSIS — J96.02 ACUTE RESPIRATORY FAILURE WITH HYPOXIA AND HYPERCAPNIA (HCC): Primary | ICD-10-CM

## 2021-12-11 DIAGNOSIS — J18.9 PNEUMONIA OF RIGHT LOWER LOBE DUE TO INFECTIOUS ORGANISM: ICD-10-CM

## 2021-12-11 LAB
ACINETOBACTER CALCOACETICUS-BAUMANNII BY PCR: NOT DETECTED
ADENOVIRUS BY PCR: NOT DETECTED
ALLEN TEST: POSITIVE
ALLEN TEST: POSITIVE
AMORPHOUS: ABNORMAL
AMPHETAMINE+METHAMPHETAMINE URINE SCREEN: NEGATIVE
ANION GAP SERPL CALCULATED.3IONS-SCNC: 10 MEQ/L (ref 8–16)
ANION GAP SERPL CALCULATED.3IONS-SCNC: 11 MEQ/L (ref 8–16)
ANION GAP SERPL CALCULATED.3IONS-SCNC: 8 MEQ/L (ref 8–16)
BACTERIA: ABNORMAL
BARBITURATE QUANTITATIVE URINE: NEGATIVE
BASE EXCESS (CALCULATED): 2.5 MMOL/L (ref -2.5–2.5)
BASE EXCESS (CALCULATED): 2.8 MMOL/L (ref -2.5–2.5)
BASOPHILS # BLD: 0.6 %
BASOPHILS ABSOLUTE: 0 THOU/MM3 (ref 0–0.1)
BENZODIAZEPINE QUANTITATIVE URINE: NEGATIVE
BILIRUBIN URINE: ABNORMAL
BLOOD, URINE: NEGATIVE
BUN BLDV-MCNC: 25 MG/DL (ref 7–22)
BUN BLDV-MCNC: 31 MG/DL (ref 7–22)
BUN BLDV-MCNC: 32 MG/DL (ref 7–22)
C-REACTIVE PROTEIN: 16.27 MG/DL (ref 0–1)
CALCIUM SERPL-MCNC: 7.8 MG/DL (ref 8.5–10.5)
CALCIUM SERPL-MCNC: 7.9 MG/DL (ref 8.5–10.5)
CALCIUM SERPL-MCNC: 9 MG/DL (ref 8.5–10.5)
CANNABINOID QUANTITATIVE URINE: POSITIVE
CASTS: ABNORMAL /LPF
CASTS: ABNORMAL /LPF
CHARACTER, URINE: ABNORMAL
CHLAMYDIA PNEUMONIAE BY PCR: NOT DETECTED
CHLORIDE BLD-SCNC: 96 MEQ/L (ref 98–111)
CHLORIDE BLD-SCNC: 98 MEQ/L (ref 98–111)
CHLORIDE BLD-SCNC: 99 MEQ/L (ref 98–111)
CO2: 26 MEQ/L (ref 23–33)
CO2: 28 MEQ/L (ref 23–33)
CO2: 29 MEQ/L (ref 23–33)
COCAINE METABOLITE QUANTITATIVE URINE: NEGATIVE
COLLECTED BY:: ABNORMAL
COLLECTED BY:: ABNORMAL
COLOR: ABNORMAL
CREAT SERPL-MCNC: 0.9 MG/DL (ref 0.4–1.2)
CREAT SERPL-MCNC: 1.1 MG/DL (ref 0.4–1.2)
CREAT SERPL-MCNC: 1.2 MG/DL (ref 0.4–1.2)
CREATININE URINE: 168.4 MG/DL
CRYSTALS: ABNORMAL
D-DIMER QUANTITATIVE: 630 NG/ML FEU (ref 0–500)
DEVICE: ABNORMAL
DEVICE: ABNORMAL
EKG ATRIAL RATE: 112 BPM
EKG P AXIS: 83 DEGREES
EKG P-R INTERVAL: 152 MS
EKG Q-T INTERVAL: 310 MS
EKG QRS DURATION: 76 MS
EKG QTC CALCULATION (BAZETT): 423 MS
EKG R AXIS: 117 DEGREES
EKG T AXIS: 52 DEGREES
EKG VENTRICULAR RATE: 112 BPM
ENTEROBACTER CLOACAE COMPLEX BY PCR: NOT DETECTED
EOSINOPHIL # BLD: 0 %
EOSINOPHILS ABSOLUTE: 0 THOU/MM3 (ref 0–0.4)
EPITHELIAL CELLS, UA: ABNORMAL /HPF
ERYTHROCYTE [DISTWIDTH] IN BLOOD BY AUTOMATED COUNT: 16.3 % (ref 11.5–14.5)
ERYTHROCYTE [DISTWIDTH] IN BLOOD BY AUTOMATED COUNT: 58.2 FL (ref 35–45)
ESCHERICHIA COLI BY PCR: NOT DETECTED
ETHYL ALCOHOL, SERUM: < 0.01 %
FERRITIN: 344 NG/ML (ref 22–322)
FLU A ANTIGEN: NEGATIVE
FLU B ANTIGEN: NEGATIVE
GFR SERPL CREATININE-BSD FRML MDRD: 77 ML/MIN/1.73M2
GFR SERPL CREATININE-BSD FRML MDRD: 85 ML/MIN/1.73M2
GFR SERPL CREATININE-BSD FRML MDRD: > 90 ML/MIN/1.73M2
GLUCOSE BLD-MCNC: 130 MG/DL (ref 70–108)
GLUCOSE BLD-MCNC: 145 MG/DL (ref 70–108)
GLUCOSE BLD-MCNC: 170 MG/DL (ref 70–108)
GLUCOSE BLD-MCNC: 181 MG/DL (ref 70–108)
GLUCOSE, URINE: NEGATIVE MG/DL
HAEMOPHILUS INFLUENZAE BY PCR: NOT DETECTED
HCO3: 35 MMOL/L (ref 23–28)
HCO3: 35 MMOL/L (ref 23–28)
HCT VFR BLD CALC: 57 % (ref 42–52)
HEMOGLOBIN: 17.8 GM/DL (ref 14–18)
ICTOTEST: NEGATIVE
IFIO2: 95
IFIO2: 95
IMMATURE GRANS (ABS): 0.27 THOU/MM3 (ref 0–0.07)
IMMATURE GRANULOCYTES: 4.3 %
INFLUENZA A BY PCR: NOT DETECTED
INFLUENZA B BY PCR: NOT DETECTED
KETONES, URINE: ABNORMAL
KLEBSIELLA AEROGENES BY PCR: NOT DETECTED
KLEBSIELLA OXYTOCA BY PCR: NOT DETECTED
KLEBSIELLA PNEUMONIAE GROUP BY PCR: NOT DETECTED
LACTIC ACID: 0.7 MMOL/L (ref 0.5–2)
LACTIC ACID: 2.4 MMOL/L (ref 0.5–2)
LD: 235 U/L (ref 100–190)
LEGIONELLA PNEUMOPHILIA BY PCR: NOT DETECTED
LEUKOCYTE ESTERASE, URINE: NEGATIVE
LYMPHOCYTES # BLD: 12.7 %
LYMPHOCYTES ABSOLUTE: 0.8 THOU/MM3 (ref 1–4.8)
MCH RBC QN AUTO: 30.4 PG (ref 26–33)
MCHC RBC AUTO-ENTMCNC: 31.2 GM/DL (ref 32.2–35.5)
MCV RBC AUTO: 97.3 FL (ref 80–94)
METAPNEUMOVIRUS BY PCR: NOT DETECTED
MONOCYTES # BLD: 11.6 %
MONOCYTES ABSOLUTE: 0.7 THOU/MM3 (ref 0.4–1.3)
MORAXELLA CATARRHALIS BY PCR: DETECTED
MRSA SCREEN RT-PCR: NEGATIVE
MUCUS: ABNORMAL
MYCOPLASMA PNEUMONIAE BY PCR: NOT DETECTED
NITRITE, URINE: NEGATIVE
NON-SARS CORONAVIRUS: NOT DETECTED
NUCLEATED RED BLOOD CELLS: 0 /100 WBC
O2 SATURATION: 99 %
O2 SATURATION: 99 %
OPIATES, URINE: NEGATIVE
OSMOLALITY CALCULATION: 280.3 MOSMOL/KG (ref 275–300)
OXYCODONE: NEGATIVE
PARAINFLUENZA VIRUS BY PCR: NOT DETECTED
PCO2: 85 MMHG (ref 35–45)
PCO2: 89 MMHG (ref 35–45)
PH BLOOD GAS: 7.21 (ref 7.35–7.45)
PH BLOOD GAS: 7.22 (ref 7.35–7.45)
PH UA: 5.5 (ref 5–9)
PHENCYCLIDINE QUANTITATIVE URINE: NEGATIVE
PLATELET # BLD: 204 THOU/MM3 (ref 130–400)
PMV BLD AUTO: 10.7 FL (ref 9.4–12.4)
PO2: 171 MMHG (ref 71–104)
PO2: 188 MMHG (ref 71–104)
POTASSIUM REFLEX MAGNESIUM: 5.1 MEQ/L (ref 3.5–5.2)
POTASSIUM SERPL-SCNC: 5.6 MEQ/L (ref 3.5–5.2)
POTASSIUM SERPL-SCNC: 5.7 MEQ/L (ref 3.5–5.2)
PRO-BNP: 2758 PG/ML (ref 0–900)
PROCALCITONIN: 0.17 NG/ML (ref 0.01–0.09)
PROTEIN UA: 300 MG/DL
PROTEUS SPECIES BY PCR: NOT DETECTED
PSEUDOMONAS AERUGINOSA BY PCR: NOT DETECTED
RBC # BLD: 5.86 MILL/MM3 (ref 4.7–6.1)
RBC URINE: ABNORMAL /HPF
REASON FOR REJECTION: NORMAL
REJECTED TEST: NORMAL
RESISTANT GENE CTX-M BY PCR: ABNORMAL
RESISTANT GENE IMP BY PCR: ABNORMAL
RESISTANT GENE KPC BY PCR: ABNORMAL
RESISTANT GENE MECA/C & MREJ BY PCR: ABNORMAL
RESISTANT GENE NDM BY PCR: ABNORMAL
RESISTANT GENE OXA-48-LIKE BY PCR: ABNORMAL
RESISTANT GENE VIM BY PCR: ABNORMAL
RESPIRATORY SYNCYTIAL VIRUS BY PCR: NOT DETECTED
RHINOVIRUS ENTEROVIRUS PCR: NOT DETECTED
SALICYLATE, SERUM: 0.4 MG/DL (ref 2–10)
SARS-COV-2, NAAT: NOT  DETECTED
SEG NEUTROPHILS: 70.8 %
SEGMENTED NEUTROPHILS ABSOLUTE COUNT: 4.4 THOU/MM3 (ref 1.8–7.7)
SERRATIA MARCESCENS BY PCR: NOT DETECTED
SET PEEP: 8 MMHG
SET PRESS SUPP: 12 CMH2O
SODIUM BLD-SCNC: 134 MEQ/L (ref 135–145)
SODIUM BLD-SCNC: 135 MEQ/L (ref 135–145)
SODIUM BLD-SCNC: 136 MEQ/L (ref 135–145)
SODIUM URINE: < 20 MEQ/L
SOURCE, BLOOD GAS: ABNORMAL
SOURCE, BLOOD GAS: ABNORMAL
SOURCE: ABNORMAL
SPECIFIC GRAVITY UA: > 1.03 (ref 1–1.03)
SPECIMEN ACCEPTABILITY: ABNORMAL
STAPH AUREUS BY PCR: NOT DETECTED
STREP AGALACTIAE BY PCR: NOT DETECTED
STREP PNEUMONIAE BY PCR: NOT DETECTED
STREP PYOGENES BY PCR: NOT DETECTED
TRIGL SERPL-MCNC: 75 MG/DL (ref 0–199)
TROPONIN T: < 0.01 NG/ML
TROPONIN T: < 0.01 NG/ML
TSH SERPL DL<=0.05 MIU/L-ACNC: 0.91 UIU/ML (ref 0.4–4.2)
UROBILINOGEN, URINE: 1 EU/DL (ref 0–1)
VANCOMYCIN RESISTANT ENTEROCOCCUS: NEGATIVE
WBC # BLD: 6.2 THOU/MM3 (ref 4.8–10.8)
WBC UA: ABNORMAL /HPF

## 2021-12-11 PROCEDURE — 2580000003 HC RX 258: Performed by: NURSE PRACTITIONER

## 2021-12-11 PROCEDURE — 87798 DETECT AGENT NOS DNA AMP: CPT

## 2021-12-11 PROCEDURE — 87635 SARS-COV-2 COVID-19 AMP PRB: CPT

## 2021-12-11 PROCEDURE — 99291 CRITICAL CARE FIRST HOUR: CPT | Performed by: FAMILY MEDICINE

## 2021-12-11 PROCEDURE — 6360000002 HC RX W HCPCS: Performed by: NURSE PRACTITIONER

## 2021-12-11 PROCEDURE — 94640 AIRWAY INHALATION TREATMENT: CPT

## 2021-12-11 PROCEDURE — 84478 ASSAY OF TRIGLYCERIDES: CPT

## 2021-12-11 PROCEDURE — 87631 RESP VIRUS 3-5 TARGETS: CPT

## 2021-12-11 PROCEDURE — 82077 ASSAY SPEC XCP UR&BREATH IA: CPT

## 2021-12-11 PROCEDURE — 6360000002 HC RX W HCPCS: Performed by: STUDENT IN AN ORGANIZED HEALTH CARE EDUCATION/TRAINING PROGRAM

## 2021-12-11 PROCEDURE — 81001 URINALYSIS AUTO W/SCOPE: CPT

## 2021-12-11 PROCEDURE — 6360000002 HC RX W HCPCS: Performed by: EMERGENCY MEDICINE

## 2021-12-11 PROCEDURE — 94660 CPAP INITIATION&MGMT: CPT

## 2021-12-11 PROCEDURE — 84484 ASSAY OF TROPONIN QUANT: CPT

## 2021-12-11 PROCEDURE — 36415 COLL VENOUS BLD VENIPUNCTURE: CPT

## 2021-12-11 PROCEDURE — 6360000002 HC RX W HCPCS: Performed by: FAMILY MEDICINE

## 2021-12-11 PROCEDURE — 82570 ASSAY OF URINE CREATININE: CPT

## 2021-12-11 PROCEDURE — 87541 LEGION PNEUMO DNA AMP PROB: CPT

## 2021-12-11 PROCEDURE — 0BH17EZ INSERTION OF ENDOTRACHEAL AIRWAY INTO TRACHEA, VIA NATURAL OR ARTIFICIAL OPENING: ICD-10-PCS | Performed by: INTERNAL MEDICINE

## 2021-12-11 PROCEDURE — 87500 VANOMYCIN DNA AMP PROBE: CPT

## 2021-12-11 PROCEDURE — 85379 FIBRIN DEGRADATION QUANT: CPT

## 2021-12-11 PROCEDURE — 84145 PROCALCITONIN (PCT): CPT

## 2021-12-11 PROCEDURE — 82803 BLOOD GASES ANY COMBINATION: CPT

## 2021-12-11 PROCEDURE — 80307 DRUG TEST PRSMV CHEM ANLYZR: CPT

## 2021-12-11 PROCEDURE — 87641 MR-STAPH DNA AMP PROBE: CPT

## 2021-12-11 PROCEDURE — 71045 X-RAY EXAM CHEST 1 VIEW: CPT

## 2021-12-11 PROCEDURE — 2500000003 HC RX 250 WO HCPCS: Performed by: NURSE PRACTITIONER

## 2021-12-11 PROCEDURE — 2500000003 HC RX 250 WO HCPCS: Performed by: EMERGENCY MEDICINE

## 2021-12-11 PROCEDURE — 99285 EMERGENCY DEPT VISIT HI MDM: CPT

## 2021-12-11 PROCEDURE — 6370000000 HC RX 637 (ALT 250 FOR IP): Performed by: NURSE PRACTITIONER

## 2021-12-11 PROCEDURE — 80179 DRUG ASSAY SALICYLATE: CPT

## 2021-12-11 PROCEDURE — 31500 INSERT EMERGENCY AIRWAY: CPT

## 2021-12-11 PROCEDURE — 87486 CHLMYD PNEUM DNA AMP PROBE: CPT

## 2021-12-11 PROCEDURE — 85025 COMPLETE CBC W/AUTO DIFF WBC: CPT

## 2021-12-11 PROCEDURE — 6360000002 HC RX W HCPCS

## 2021-12-11 PROCEDURE — 87804 INFLUENZA ASSAY W/OPTIC: CPT

## 2021-12-11 PROCEDURE — 87205 SMEAR GRAM STAIN: CPT

## 2021-12-11 PROCEDURE — 87040 BLOOD CULTURE FOR BACTERIA: CPT

## 2021-12-11 PROCEDURE — 82948 REAGENT STRIP/BLOOD GLUCOSE: CPT

## 2021-12-11 PROCEDURE — 93005 ELECTROCARDIOGRAM TRACING: CPT | Performed by: NURSE PRACTITIONER

## 2021-12-11 PROCEDURE — 83615 LACTATE (LD) (LDH) ENZYME: CPT

## 2021-12-11 PROCEDURE — 5A1955Z RESPIRATORY VENTILATION, GREATER THAN 96 CONSECUTIVE HOURS: ICD-10-PCS | Performed by: INTERNAL MEDICINE

## 2021-12-11 PROCEDURE — 96365 THER/PROPH/DIAG IV INF INIT: CPT

## 2021-12-11 PROCEDURE — 84443 ASSAY THYROID STIM HORMONE: CPT

## 2021-12-11 PROCEDURE — 2000000000 HC ICU R&B

## 2021-12-11 PROCEDURE — 87581 M.PNEUMON DNA AMP PROBE: CPT

## 2021-12-11 PROCEDURE — 96375 TX/PRO/DX INJ NEW DRUG ADDON: CPT

## 2021-12-11 PROCEDURE — 36600 WITHDRAWAL OF ARTERIAL BLOOD: CPT

## 2021-12-11 PROCEDURE — 6370000000 HC RX 637 (ALT 250 FOR IP): Performed by: FAMILY MEDICINE

## 2021-12-11 PROCEDURE — 87070 CULTURE OTHR SPECIMN AEROBIC: CPT

## 2021-12-11 PROCEDURE — 94002 VENT MGMT INPAT INIT DAY: CPT

## 2021-12-11 PROCEDURE — 84300 ASSAY OF URINE SODIUM: CPT

## 2021-12-11 PROCEDURE — 82728 ASSAY OF FERRITIN: CPT

## 2021-12-11 PROCEDURE — 86140 C-REACTIVE PROTEIN: CPT

## 2021-12-11 PROCEDURE — 6370000000 HC RX 637 (ALT 250 FOR IP): Performed by: EMERGENCY MEDICINE

## 2021-12-11 PROCEDURE — 80048 BASIC METABOLIC PNL TOTAL CA: CPT

## 2021-12-11 PROCEDURE — 93010 ELECTROCARDIOGRAM REPORT: CPT | Performed by: NUCLEAR MEDICINE

## 2021-12-11 PROCEDURE — 2580000003 HC RX 258: Performed by: FAMILY MEDICINE

## 2021-12-11 PROCEDURE — 83605 ASSAY OF LACTIC ACID: CPT

## 2021-12-11 PROCEDURE — 83880 ASSAY OF NATRIURETIC PEPTIDE: CPT

## 2021-12-11 RX ORDER — SODIUM CHLORIDE 9 MG/ML
25 INJECTION, SOLUTION INTRAVENOUS PRN
Status: DISCONTINUED | OUTPATIENT
Start: 2021-12-11 | End: 2021-12-23 | Stop reason: HOSPADM

## 2021-12-11 RX ORDER — PROPOFOL 10 MG/ML
INJECTION, EMULSION INTRAVENOUS
Status: COMPLETED
Start: 2021-12-11 | End: 2021-12-11

## 2021-12-11 RX ORDER — ALBUTEROL SULFATE 2.5 MG/3ML
10 SOLUTION RESPIRATORY (INHALATION) ONCE
Status: COMPLETED | OUTPATIENT
Start: 2021-12-11 | End: 2021-12-11

## 2021-12-11 RX ORDER — PROPOFOL 10 MG/ML
20 INJECTION, EMULSION INTRAVENOUS CONTINUOUS
Status: DISCONTINUED | OUTPATIENT
Start: 2021-12-11 | End: 2021-12-11

## 2021-12-11 RX ORDER — IPRATROPIUM BROMIDE AND ALBUTEROL SULFATE 2.5; .5 MG/3ML; MG/3ML
1 SOLUTION RESPIRATORY (INHALATION) EVERY 4 HOURS
Status: DISCONTINUED | OUTPATIENT
Start: 2021-12-11 | End: 2021-12-17

## 2021-12-11 RX ORDER — BUDESONIDE 0.5 MG/2ML
500 INHALANT ORAL 2 TIMES DAILY
Status: DISCONTINUED | OUTPATIENT
Start: 2021-12-11 | End: 2021-12-23 | Stop reason: HOSPADM

## 2021-12-11 RX ORDER — IPRATROPIUM BROMIDE AND ALBUTEROL SULFATE 2.5; .5 MG/3ML; MG/3ML
2 SOLUTION RESPIRATORY (INHALATION) ONCE
Status: COMPLETED | OUTPATIENT
Start: 2021-12-11 | End: 2021-12-11

## 2021-12-11 RX ORDER — SODIUM CHLORIDE 9 MG/ML
INJECTION, SOLUTION INTRAVENOUS CONTINUOUS
Status: DISCONTINUED | OUTPATIENT
Start: 2021-12-11 | End: 2021-12-17

## 2021-12-11 RX ORDER — RISPERIDONE 1 MG/1
2 TABLET, FILM COATED ORAL NIGHTLY
Status: DISCONTINUED | OUTPATIENT
Start: 2021-12-11 | End: 2021-12-23 | Stop reason: HOSPADM

## 2021-12-11 RX ORDER — METOPROLOL SUCCINATE 25 MG/1
25 TABLET, EXTENDED RELEASE ORAL DAILY
Status: DISCONTINUED | OUTPATIENT
Start: 2021-12-11 | End: 2021-12-11

## 2021-12-11 RX ORDER — ACETAMINOPHEN 650 MG/1
650 SUPPOSITORY RECTAL EVERY 6 HOURS PRN
Status: DISCONTINUED | OUTPATIENT
Start: 2021-12-11 | End: 2021-12-23 | Stop reason: HOSPADM

## 2021-12-11 RX ORDER — ROCURONIUM BROMIDE 10 MG/ML
INJECTION, SOLUTION INTRAVENOUS DAILY PRN
Status: COMPLETED | OUTPATIENT
Start: 2021-12-11 | End: 2021-12-11

## 2021-12-11 RX ORDER — ASPIRIN 325 MG
325 TABLET ORAL DAILY
Status: DISCONTINUED | OUTPATIENT
Start: 2021-12-11 | End: 2021-12-23 | Stop reason: HOSPADM

## 2021-12-11 RX ORDER — ACETAMINOPHEN 325 MG/1
650 TABLET ORAL ONCE
Status: COMPLETED | OUTPATIENT
Start: 2021-12-11 | End: 2021-12-11

## 2021-12-11 RX ORDER — SODIUM CHLORIDE 0.9 % (FLUSH) 0.9 %
10 SYRINGE (ML) INJECTION PRN
Status: DISCONTINUED | OUTPATIENT
Start: 2021-12-11 | End: 2021-12-23 | Stop reason: HOSPADM

## 2021-12-11 RX ORDER — FORMOTEROL FUMARATE 20 UG/2ML
20 SOLUTION RESPIRATORY (INHALATION) 2 TIMES DAILY
Status: DISCONTINUED | OUTPATIENT
Start: 2021-12-11 | End: 2021-12-21

## 2021-12-11 RX ORDER — 0.9 % SODIUM CHLORIDE 0.9 %
500 INTRAVENOUS SOLUTION INTRAVENOUS ONCE
Status: COMPLETED | OUTPATIENT
Start: 2021-12-11 | End: 2021-12-11

## 2021-12-11 RX ORDER — PROPOFOL 10 MG/ML
20 INJECTION, EMULSION INTRAVENOUS CONTINUOUS
Status: DISCONTINUED | OUTPATIENT
Start: 2021-12-11 | End: 2021-12-14

## 2021-12-11 RX ORDER — METHYLPREDNISOLONE SODIUM SUCCINATE 40 MG/ML
40 INJECTION, POWDER, LYOPHILIZED, FOR SOLUTION INTRAMUSCULAR; INTRAVENOUS EVERY 8 HOURS
Status: DISCONTINUED | OUTPATIENT
Start: 2021-12-11 | End: 2021-12-11

## 2021-12-11 RX ORDER — ETOMIDATE 2 MG/ML
INJECTION INTRAVENOUS DAILY PRN
Status: COMPLETED | OUTPATIENT
Start: 2021-12-11 | End: 2021-12-11

## 2021-12-11 RX ORDER — PANTOPRAZOLE SODIUM 40 MG/10ML
40 INJECTION, POWDER, LYOPHILIZED, FOR SOLUTION INTRAVENOUS 2 TIMES DAILY
Status: DISCONTINUED | OUTPATIENT
Start: 2021-12-12 | End: 2021-12-23 | Stop reason: HOSPADM

## 2021-12-11 RX ORDER — FAMOTIDINE 20 MG/1
20 TABLET, FILM COATED ORAL 2 TIMES DAILY
Status: DISCONTINUED | OUTPATIENT
Start: 2021-12-11 | End: 2021-12-11

## 2021-12-11 RX ORDER — PANTOPRAZOLE SODIUM 40 MG/10ML
80 INJECTION, POWDER, LYOPHILIZED, FOR SOLUTION INTRAVENOUS ONCE
Status: DISCONTINUED | OUTPATIENT
Start: 2021-12-11 | End: 2021-12-11 | Stop reason: SDUPTHER

## 2021-12-11 RX ORDER — ETOMIDATE 2 MG/ML
0.3 INJECTION INTRAVENOUS ONCE
Status: DISCONTINUED | OUTPATIENT
Start: 2021-12-11 | End: 2021-12-11

## 2021-12-11 RX ORDER — TADALAFIL 20 MG/1
40 TABLET ORAL DAILY
Status: DISCONTINUED | OUTPATIENT
Start: 2021-12-11 | End: 2021-12-23 | Stop reason: HOSPADM

## 2021-12-11 RX ORDER — PROPOFOL 10 MG/ML
20 INJECTION, EMULSION INTRAVENOUS ONCE
Status: COMPLETED | OUTPATIENT
Start: 2021-12-11 | End: 2021-12-11

## 2021-12-11 RX ORDER — ACETAMINOPHEN 325 MG/1
650 TABLET ORAL EVERY 6 HOURS PRN
Status: DISCONTINUED | OUTPATIENT
Start: 2021-12-11 | End: 2021-12-23 | Stop reason: HOSPADM

## 2021-12-11 RX ORDER — ROCURONIUM BROMIDE 10 MG/ML
INJECTION, SOLUTION INTRAVENOUS
Status: DISCONTINUED
Start: 2021-12-11 | End: 2021-12-11

## 2021-12-11 RX ORDER — IPRATROPIUM BROMIDE AND ALBUTEROL SULFATE 2.5; .5 MG/3ML; MG/3ML
3 SOLUTION RESPIRATORY (INHALATION) 4 TIMES DAILY
Status: DISCONTINUED | OUTPATIENT
Start: 2021-12-11 | End: 2021-12-11

## 2021-12-11 RX ORDER — METHYLPREDNISOLONE SODIUM SUCCINATE 125 MG/2ML
125 INJECTION, POWDER, LYOPHILIZED, FOR SOLUTION INTRAMUSCULAR; INTRAVENOUS ONCE
Status: COMPLETED | OUTPATIENT
Start: 2021-12-11 | End: 2021-12-11

## 2021-12-11 RX ORDER — METHYLPREDNISOLONE SODIUM SUCCINATE 40 MG/ML
40 INJECTION, POWDER, LYOPHILIZED, FOR SOLUTION INTRAMUSCULAR; INTRAVENOUS EVERY 8 HOURS
Status: DISCONTINUED | OUTPATIENT
Start: 2021-12-11 | End: 2021-12-17

## 2021-12-11 RX ORDER — SODIUM CHLORIDE 0.9 % (FLUSH) 0.9 %
10 SYRINGE (ML) INJECTION EVERY 12 HOURS SCHEDULED
Status: DISCONTINUED | OUTPATIENT
Start: 2021-12-11 | End: 2021-12-23 | Stop reason: HOSPADM

## 2021-12-11 RX ORDER — PRAVASTATIN SODIUM 40 MG
40 TABLET ORAL NIGHTLY
Status: DISCONTINUED | OUTPATIENT
Start: 2021-12-11 | End: 2021-12-23 | Stop reason: HOSPADM

## 2021-12-11 RX ADMIN — PROPOFOL 20 MG: 10 INJECTION, EMULSION INTRAVENOUS at 06:30

## 2021-12-11 RX ADMIN — PROPOFOL INJECTABLE EMULSION 40 MCG/KG/MIN: 10 INJECTION, EMULSION INTRAVENOUS at 21:21

## 2021-12-11 RX ADMIN — PROPOFOL INJECTABLE EMULSION 30 MCG/KG/MIN: 10 INJECTION, EMULSION INTRAVENOUS at 16:33

## 2021-12-11 RX ADMIN — FAMOTIDINE 20 MG: 10 INJECTION, SOLUTION INTRAVENOUS at 21:24

## 2021-12-11 RX ADMIN — SODIUM CHLORIDE 500 ML: 9 INJECTION, SOLUTION INTRAVENOUS at 14:17

## 2021-12-11 RX ADMIN — PROPOFOL INJECTABLE EMULSION 25 MCG/KG/MIN: 10 INJECTION, EMULSION INTRAVENOUS at 11:43

## 2021-12-11 RX ADMIN — CEFTRIAXONE SODIUM 1000 MG: 1 INJECTION, POWDER, FOR SOLUTION INTRAMUSCULAR; INTRAVENOUS at 03:13

## 2021-12-11 RX ADMIN — METHYLPREDNISOLONE SODIUM SUCCINATE 40 MG: 40 INJECTION, POWDER, FOR SOLUTION INTRAMUSCULAR; INTRAVENOUS at 17:49

## 2021-12-11 RX ADMIN — ACETAMINOPHEN 650 MG: 325 TABLET ORAL at 02:34

## 2021-12-11 RX ADMIN — SODIUM CHLORIDE: 9 INJECTION, SOLUTION INTRAVENOUS at 17:52

## 2021-12-11 RX ADMIN — ETOMIDATE 20 MG: 2 INJECTION INTRAVENOUS at 06:09

## 2021-12-11 RX ADMIN — METHYLPREDNISOLONE SODIUM SUCCINATE 125 MG: 125 INJECTION, POWDER, FOR SOLUTION INTRAMUSCULAR; INTRAVENOUS at 03:20

## 2021-12-11 RX ADMIN — PROPOFOL INJECTABLE EMULSION 20 MCG/KG/MIN: 10 INJECTION, EMULSION INTRAVENOUS at 07:08

## 2021-12-11 RX ADMIN — SODIUM CHLORIDE, PRESERVATIVE FREE 10 ML: 5 INJECTION INTRAVENOUS at 09:58

## 2021-12-11 RX ADMIN — SODIUM CHLORIDE: 9 INJECTION, SOLUTION INTRAVENOUS at 09:09

## 2021-12-11 RX ADMIN — BUDESONIDE 500 MCG: 0.5 INHALANT RESPIRATORY (INHALATION) at 22:02

## 2021-12-11 RX ADMIN — FAMOTIDINE 20 MG: 10 INJECTION, SOLUTION INTRAVENOUS at 09:56

## 2021-12-11 RX ADMIN — IPRATROPIUM BROMIDE AND ALBUTEROL SULFATE 1 AMPULE: .5; 3 SOLUTION RESPIRATORY (INHALATION) at 13:12

## 2021-12-11 RX ADMIN — IPRATROPIUM BROMIDE AND ALBUTEROL SULFATE 1 AMPULE: .5; 3 SOLUTION RESPIRATORY (INHALATION) at 17:07

## 2021-12-11 RX ADMIN — AZITHROMYCIN DIHYDRATE 500 MG: 500 INJECTION, POWDER, LYOPHILIZED, FOR SOLUTION INTRAVENOUS at 10:52

## 2021-12-11 RX ADMIN — ROCURONIUM BROMIDE 100 MG: 10 INJECTION, SOLUTION INTRAVENOUS at 06:10

## 2021-12-11 RX ADMIN — ENOXAPARIN SODIUM 40 MG: 100 INJECTION SUBCUTANEOUS at 09:54

## 2021-12-11 RX ADMIN — IPRATROPIUM BROMIDE AND ALBUTEROL SULFATE 2 AMPULE: .5; 3 SOLUTION RESPIRATORY (INHALATION) at 02:08

## 2021-12-11 RX ADMIN — ALBUTEROL SULFATE 10 MG: 2.5 SOLUTION RESPIRATORY (INHALATION) at 21:43

## 2021-12-11 RX ADMIN — SODIUM CHLORIDE 500 ML: 9 INJECTION, SOLUTION INTRAVENOUS at 05:47

## 2021-12-11 RX ADMIN — IPRATROPIUM BROMIDE AND ALBUTEROL SULFATE 1 AMPULE: .5; 3 SOLUTION RESPIRATORY (INHALATION) at 09:15

## 2021-12-11 RX ADMIN — BUDESONIDE 500 MCG: 0.5 INHALANT RESPIRATORY (INHALATION) at 09:15

## 2021-12-11 RX ADMIN — ASPIRIN 325 MG: 325 TABLET ORAL at 10:01

## 2021-12-11 ASSESSMENT — PULMONARY FUNCTION TESTS
PIF_VALUE: 27
PIF_VALUE: 26
PIF_VALUE: 25
PIF_VALUE: 26

## 2021-12-11 NOTE — ED NOTES
Pt and vs reassessed. RR labored. Pt resting in bed with eyes closed and responds to voice. Pt medicated per MAR and flu swab collected. Pt denies any other needs. No distress noted.  Pt stable at this time     Wilber JolleyGeising[facility]  12/11/21 [id number]

## 2021-12-11 NOTE — ED NOTES
Respiratory at bedside placing pt on BIPAP.  Jenny Begun at bedside assessing pt     Norma Humphreys RN  12/11/21 0121

## 2021-12-11 NOTE — ED NOTES
ED to inpatient nurses report    Chief Complaint   Patient presents with    Shortness of Breath    Chest Pain      Present to ED from home  LOC: alert and orientated to name, place, date  Vital signs   Vitals:    12/11/21 0602 12/11/21 0608 12/11/21 0611 12/11/21 0612   BP: 116/73      Pulse: 86 81 84 97   Resp: 23 25 12 18   Temp:       TempSrc:       SpO2: 96% 98% 98% 98%   Weight:       Height:          Oxygen Baseline 98%    Current needs required intubated   LDAs:   Peripheral IV 12/11/21 Left Antecubital (Active)   Site Assessment Dry; Clean;  Intact 12/11/21 0508   Line Status Normal saline locked 12/11/21 0508   Dressing Status Clean; Dry; Intact 12/11/21 0508   Dressing Intervention New 12/11/21 0155       Peripheral IV 12/11/21 Left Forearm (Active)   Site Assessment Clean; Dry; Intact 12/11/21 0508   Line Status Normal saline locked 12/11/21 0508   Dressing Status Clean; Dry; Intact 12/11/21 0508   Dressing Intervention New 12/11/21 0224     Mobility: Requires assistance * 1  Pending ED orders: none  Present condition: stable    Electronically signed by Patricia Love RN on 12/11/2021 at 6:17 AM       Patricia Love RN  12/11/21 8900

## 2021-12-11 NOTE — ED PROVIDER NOTES
[facility] Emergency [address] [place] [address]       Chief Complaint   Patient presents with    Shortness of Breath    Chest Pain       Nurses Notes reviewed and I agree except as noted in the HPI. HISTORY OF PRESENT ILLNESS    Vidhi Shepard laya 46 y.o. male who presents to the ED for evaluation of chest pain shortness of breath. Brother at bedside reports patient's been ill, for an unknown amount of time. Notes that nieces and nephews had a recent illness, but they are better now. Brother unable to provide any other significant history. Electron medical record reviewed, in 2020 the patient had respiratory failure, and required tracheostomy. Has a history of pulmonary hypertension, COPD, CHF, schizophrenia. No recent notes within the electronic record are identified. HPI was provided by the patient. REVIEW OF SYSTEMS     Review of Systems   Unable to perform ROS: Acuity of condition        PAST MEDICAL HISTORY     Past Medical History:   Diagnosis Date    Acute on chronic systolic congestive heart failure ([facility] [address] 4/4/2019    Emphysema (subcutaneous) (surgical) resulting from a procedure     Hypertension     Pneumonia     Schizophrenia ([facility] [address]        SURGICALHISTORY      has no past surgical history on file.     CURRENT MEDICATIONS       Previous Medications    ALBUTEROL (PROVENTIL) (2.5 MG/3ML) 0.083% NEBULIZER SOLUTION    Take 3 mLs by nebulization every 2 hours as needed for Wheezing    ASPIRIN 325 MG EC TABLET    Take 1 tablet by mouth daily    BUDESONIDE (PULMICORT) 0.5 MG/2ML NEBULIZER SUSPENSION    Take 2 mLs by nebulization 2 times daily    CARBAMAZEPINE (TEGRETOL) 200 MG TABLET    Take 200 mg by mouth 2 times daily    ENOXAPARIN (LOVENOX) 40 MG/0.4ML INJECTION    Inject 0.4 mLs into the skin every 24 hours    FAMOTIDINE (PEPCID) 20 MG/2ML INJECTION    Infuse 2 mLs intravenously 2 times daily    FLUCONAZOLE (DIFLUCAN) 400MG/200ML IN 0.9 % SODIUM CHLORIDE IVPB    Infuse 200 mLs intravenously every 24 hours    FORMOTEROL (PERFOROMIST) 20 MCG/2ML NEBULIZER SOLUTION    Take 2 mLs by nebulization 2 times daily    GLYCOPYRROLATE-FORMOTEROL (BEVESPI) 9-4.8 MCG/ACT AERO    Inhale 2 puffs into the lungs 2 times daily    IPRATROPIUM-ALBUTEROL (DUONEB) 0.5-2.5 (3) MG/3ML SOLN NEBULIZER SOLUTION    Inhale 3 mLs into the lungs every 4 hours    METOPROLOL SUCCINATE (TOPROL XL) 25 MG EXTENDED RELEASE TABLET    Take 1 tablet by mouth daily    ONDANSETRON (ZOFRAN) 4 MG/2ML INJECTION    Infuse 2 mLs intravenously every 6 hours as needed for Nausea or Vomiting    PRAVASTATIN (PRAVACHOL) 40 MG TABLET    Take 40 mg by mouth nightly    RISPERIDONE (RISPERDAL) 2 MG TABLET    Take 1 tablet by mouth nightly    TADALAFIL, PAH, 20 MG TABLET    Take 2 tablets by mouth daily       ALLERGIES     has No Known Allergies. FAMILY HISTORY     He indicated that his mother is alive. He indicated that his sister is alive. family history includes High Blood Pressure in his mother.     SOCIAL HISTORY       Social History     Socioeconomic History    Marital status: Single     Spouse name: Not on file    Number of children: 0    Years of education: Not on file    Highest education level: Not on file   Occupational History    Not on file   Tobacco Use    Smoking status: Current Every Day Smoker     Packs/day: 1.00     Years: 20.00     Pack years: 20.00     Types: Cigarettes    Smokeless tobacco: Never Used   Vaping Use    Vaping Use: Never used   Substance and Sexual Activity    Alcohol use: No    Drug use: No    Sexual activity: Not Currently   Other Topics Concern    Not on file   Social History Narrative    Not on file     Social Determinants of Health     Financial Resource Strain:     Difficulty of Paying Living Expenses: Not on file   Food Insecurity:     Worried About Running Out of Food in the Last Year: Not on file    Tiny of Food in the Last Year: Not on file   Transportation Needs:     Lack of Transportation (Medical): Not on file    Lack of Transportation (Non-Medical): Not on file   Physical Activity:     Days of Exercise per Week: Not on file    Minutes of Exercise per Session: Not on file   Stress:     Feeling of Stress : Not on file   Social Connections:     Frequency of Communication with Friends and Family: Not on file    Frequency of Social Gatherings with Friends and Family: Not on file    Attends Denominational Services: Not on file    Active Member of [address] [place] [address] [employer] or Organizations: Not on file    Attends Club or Organization Meetings: Not on file    Marital Status: Not on file   Intimate Partner Violence:     Fear of Current or Ex-Partner: Not on file    Emotionally Abused: Not on file    Physically Abused: Not on file    Sexually Abused: Not on file   Housing Stability:     Unable to Pay for Housing in the Last Year: Not on file    Number of [place] in the Last Year: Not on file    Unstable Housing in the Last Year: Not on file       PHYSICAL EXAM     INITIAL VITALS:  height is 6' (1.829 m) and weight is 270 lb (122.5 kg). His oral temperature is 101 °F (38.3 °C). His blood pressure is 116/71 and his pulse is 90. His respiration is 26 and oxygen saturation is 98%. Physical Exam  Vitals reviewed. Constitutional:       General: He is in acute distress. Appearance: He is ill-appearing. He is not diaphoretic. Interventions: Face mask in place. HENT:      Head: Normocephalic and atraumatic. Mouth/Throat:      Mouth: Mucous membranes are moist.   Eyes:      Extraocular Movements: Extraocular movements intact. Cardiovascular:      Rate and Rhythm: Regular rhythm. Tachycardia present. No extrasystoles are present. Heart sounds: No murmur heard. No gallop. Pulmonary:      Effort: Tachypnea, accessory muscle usage and respiratory distress present. Breath sounds: Decreased breath sounds and wheezing present. No rhonchi or rales.    Abdominal:      General: Bowel sounds are normal.      Palpations: Abdomen is soft. Musculoskeletal:      Cervical back: Normal range of motion. Skin:     General: Skin is warm and dry. Capillary Refill: Capillary refill takes less than 2 seconds. Neurological:      Mental Status: He is easily aroused. He is lethargic and disoriented. Psychiatric:         Mood and Affect: Mood normal.         Behavior: Behavior normal.         DIFFERENTIAL DIAGNOSIS:   COVID-19, pneumonia, influenza, CHF exacerbation, COPD exacerbation,    DIAGNOSTIC RESULTS     EKG: All EKG's are interpreted by the Emergency Department Physician who eithersigns or Co-signs this chart in the absence of a cardiologist.    EKG read and interpreted by myself with comparison to May 17, 2020 gives impression of normal sinus rhythm with heart rate of 112; interval 152; QRS 76;QTc 423; axis P-83, R-170, T-52. RADIOLOGY: non-plainfilm images(s) such as CT, Ultrasound and MRI are read by the radiologist.  Plain radiographic images are visualized and preliminarily interpreted by the emergency physician unless otherwise stated below. XR CHEST PORTABLE   Final Result   Impression:   1.  Right lower lobe pneumonia      This document has been electronically signed by: Delonte Anthony MD on    12/11/2021 02:56 AM            LABS:   Labs Reviewed   BASIC METABOLIC PANEL W/ REFLEX TO MG FOR LOW K - Abnormal; Notable for the following components:       Result Value    Chloride 96 (*)     Glucose 170 (*)     BUN 25 (*)     All other components within normal limits   BRAIN NATRIURETIC PEPTIDE - Abnormal; Notable for the following components:    Pro-BNP 2758.0 (*)     All other components within normal limits   CBC WITH AUTO DIFFERENTIAL - Abnormal; Notable for the following components:    Hematocrit 57.0 (*)     MCV 97.3 (*)     MCHC 31.2 (*)     RDW-CV 16.3 (*)     RDW-SD 58.2 (*)     Lymphocytes Absolute 0.8 (*)     Immature Grans (Abs) 0.27 (*)     All other components 26   Temp:       TempSrc:       SpO2: 100% 100% 98%    Weight:       Height:           MDM    Patient was seen and evaluated in the emergency department, appeared to be in acute respiratory distress upon my initial evaluation, vital signs reviewed, significant hypoxia noted, there was poor capillary refill at this time, patient was clamped down due to respiratory distress. Extremities were warmed up and oxygen level was reported to be improved but still below goal.  Patient was placed on BiPAP, initial blood gas was obtained approximately 1 hour after starting, and that time he was also treated with DuoNeb and methylprednisolone. Work of breathing improved, but blood gas was showing significant respiratory acidosis. Labs were reviewed, no leukocytosis noted, procalcitonin not elevated, CRP significantly elevated. X-ray identifies right lower lobe pneumonia, COVID-19 is negative. I discussed the case with Dr. Gabbi Luke, who accepts the patient for admission to his service. Repeat ABG was obtained, no significant improvement noted, patient's reassessed, more obtunded than upon arrival.  Discussed this with Dr. Gabbi Luke, will now admit to the ICU and will intubate the patient here in the ER. This was completed by Dr. Romy Lee please see her procedure note. My shift ended and the patient was transferred to the ICU. Medications   ipratropium-albuterol (DUONEB) nebulizer solution 2 ampule (2 ampules Inhalation Given 12/11/21 0208)   acetaminophen (TYLENOL) tablet 650 mg (650 mg Oral Given 12/11/21 0234)   cefTRIAXone (ROCEPHIN) 1000 mg IVPB in 50 mL D5W minibag (0 mg IntraVENous Stopped 12/11/21 0345)   methylPREDNISolone sodium (SOLU-MEDROL) injection 125 mg (125 mg IntraVENous Given 12/11/21 0320)       Patient was staffed with Dr. Romy Lee, who had a face-to-face interaction with the patient, she was involved with decision making and plan of care.   CRITICAL CARE:   There was a high probability of clinically significant/life threatening deterioration in this patient's condition which required my urgent intervention. Total critical care time was 45 minutes. This excludes any time for separately reportable procedures. CONSULTS:  Dr. Naseem Stapleton intensivist    PROCEDURES:  None    FINAL IMPRESSION     1. Acute respiratory failure with hypoxia and hypercapnia (HCC)    2. Pneumonia of right lower lobe due to infectious organism          DISPOSITION/PLAN   Patient admitted to the ICU    PATIENT REFERREDTO:  No follow-up provider specified. DISCHARGE MEDICATIONS:  New Prescriptions    No medications on file       (Please note that portions of this note were completed with a voice recognition program.  Efforts were made to edit the dictations but occasionally words are mis-transcribed.)      Provider:  I personally performed the services described in the documentation,reviewed and edited the documentation which was dictated to the scribe in my presence, and it accurately records my words and actions.     Chacorta Wang CNP 12/11/21 5:28 AM    Froylan Wang, APRN - CNP        Kodable, SAVANNAH - CNP  12/12/21 9297

## 2021-12-11 NOTE — PROGRESS NOTES
Comprehensive Nutrition Assessment    Type and Reason for Visit:  Initial (vent)    Nutrition Recommendations/Plan:   If u/a to extubate - recommend initiate TF - suggest 2 tri HN at 10 ml/hr; increase by 10 ml every 6 hours, as tolerated, to goal rate of 30 ml/hr. Recommend bolus 2.5 oz. Proteinex BID. Additional free water flush per MD.  Will monitor Diprivan rate vs. Need to adjust TF goal rate. Nutrition Assessment:    Pt. nutritionally compromised AEB NPO status d/t intubation. At risk for further nutrition compromise r/t respiratory failure, pneumonia and underlying medical condition (hx CHF, COPD, HTN, Schizophrenia). Nutrition recommendations/interventions as per above. Malnutrition Assessment:  Malnutrition Status:  Insufficient data    Context:  Acute Illness     Findings of the 6 clinical characteristics of malnutrition:  Energy Intake:  Unable to assess  Weight Loss:  Unable to assess     Body Fat Loss:  Unable to assess     Muscle Mass Loss:  Unable to assess    Fluid Accumulation:  Unable to assess     Strength:  Not Performed    Estimated Daily Nutrient Needs:  Energy (kcal):  0438-0955 kcals (20-25); Weight Used for Energy Requirements:   (83 kgm)     Protein (g):  125+ grams (1.5+); Weight Used for Protein Requirements:   (83 kgm)        Fluid (ml/day):  per MD; Method Used for Fluid Requirements:  Other (Comment)      Nutrition Related Findings:   Pt.  Intubated 12/11; sedated with Diprivan, +OGT, belly soft per RN; 12/11: Glucose 170, BUN 25, Cr 1.2, Potassium 5.1, CRP 16.27, , MAP 68; Rx includes Solumedrol, ATB; spoke with MD - wants to wait on getting TF started; just arrived to ICU    Wounds:  None       Current Nutrition Therapies:    Diet NPO  Additional Calorie Sources:   Diprivan at 18.4 ml/hr providing pt. with 486 kcals from IV lipid emulsion/24 hours    Anthropometric Measures:  · Height: 6' (182.9 cm)  · Current Body Weight: 182 lb 5.1 oz (82.7 kg) (12/11 no edema)   · Admission Body Weight: 182 lb 5.1 oz (82.7 kg) (12/11 no edema)    · Usual Body Weight:  (per EMR: 12/21/19: 270# 11.6 oz)     · Ideal Body Weight: 178 lbs;      · BMI: 24.7  · BMI Categories: Normal Weight (BMI 18.5-24. 9)       Nutrition Diagnosis:   · Inadequate oral intake related to impaired respiratory function as evidenced by NPO or clear liquid status due to medical condition, intubation      Nutrition Interventions:   Food and/or Nutrient Delivery:  Continue NPO (Start TF when ok with MD)  Nutrition Education/Counseling:  Education not appropriate   Coordination of Nutrition Care:  Continue to monitor while inpatient    Goals:  Pt. will receive EN in next 24-48 hours if remains intubated. Nutrition Monitoring and Evaluation:   Behavioral-Environmental Outcomes:  None Identified   Food/Nutrient Intake Outcomes:  Diet Advancement/Tolerance, Enteral Nutrition Intake/Tolerance  Physical Signs/Symptoms Outcomes:  Biochemical Data, GI Status, Fluid Status or Edema, Hemodynamic Status, Nutrition Focused Physical Findings, Skin, Weight     Discharge Planning:     Too soon to determine     Electronically signed by Alissa Cruz RD, LD on 12/11/21 at 10:00 AM EST    Contact: [phone]

## 2021-12-11 NOTE — PROGRESS NOTES
A size 8 endotracheal tube was successfully placed by Dr. Odalis Jaffe using a size 4 blade.  The Lot number fort he endotracheal tube was [id number]

## 2021-12-11 NOTE — ED PROVIDER NOTES
Attending Supervising Physicians Attestation Statement  I was present with the nurse practitioner during the history and exam. I discussed the findings and plans with the nurse practitioner and agree as documented in his note     Electronically signed by Chalino Barbosa MD on 12/11/21 at 3:15 AM JOVANNY Winter MD  12/11/21 [id number]

## 2021-12-11 NOTE — FLOWSHEET NOTE
Patient arrived to unit from ED via bed. Patient transferred to ICU bed and placed on continuous ICU bedside monitor. Patient admitted for Acute respiratory failure ([facility] [address] [J96.00]  Acute respiratory failure with hypoxia and hypercapnia ([facility] [address] [J96.01, J96.02]  Pneumonia of right lower lobe due to infectious organism [J18.9]. Vitals obtained. See flowsheets. Patient's IV access includes 18g left forearm, 20g left AC. Current infusions and rates of infusion include 0.9 @ gravity. Assessment completed by Daniel Lopez. Two nurse skin assessment completed by Daniel Lopez and Etelvina DACOSTA. See flowsheets for assessment details. Policies and procedures of ICU unable to be explained to patient at this time. Family member(s)/representative(s) present at time of admission include none. Patient rights explained to family member(s)/representatives and patient, as able. Patient/patient's family member(s)/representative(s) N/A to have physician notified of their admission. All questions posed by patient's family member(s)/representative(s) and patient answered at this time.

## 2021-12-11 NOTE — ED NOTES
Pt and vs reassessed. RR labored. Pt resting in bed with eyes closed and appears to be sleeping. No distress noted.  Pt stable at this time     Chilo Anaya, [address] [facility]  12/11/21 3505

## 2021-12-11 NOTE — ED NOTES
Pt and vs reassessed. RR labored. Pt resting in bed with eyes closed and responds to voice. Pt medicated per MAR. No distress noted.  Pt stable at this time     [facility]  12/11/21 8800

## 2021-12-11 NOTE — ED PROVIDER NOTES
Intubation    Date/Time: 12/11/2021 6:32 AM  Performed by: Timothy Bruno MD  Authorized by: Jacob Parisi MD     Consent:     Consent obtained:  Emergent situation    Alternatives discussed:  No treatment  Pre-procedure details:     Patient status:  Altered mental status    Mallampati score:  II    Pretreatment medications:  None    Paralytics:  Rocuronium  Procedure details:     Preoxygenation:  BiPAP    CPR in progress: no      Intubation method:  Oral    Oral intubation technique:  Video-assisted    Laryngoscope blade: Mac 3    Tube size (mm):  8.0    Tube type:  Cuffed    Number of attempts:  1    Ventilation between attempts: no      Cricoid pressure: no      Tube visualized through cords: yes    Placement assessment:     ETT to lip:  26    ETT to teeth:  25    Tube secured with:  ETT flores    Breath sounds:  Equal    Placement verification: chest rise, direct visualization, equal breath sounds, ETCO2 detector and tube exhalation      CXR findings:  ETT in proper place  Post-procedure details:     Patient tolerance of procedure:   Tolerated well, no immediate complications         Timothy Bruno MD  Resident  12/11/21 7676

## 2021-12-11 NOTE — H&P
ICU History & Physical       Patient: Sven Luke  YOB: 1970    MRN: [id number]     Acct: [de-identified]    PCP: Marietta Gaspar MD    Date of Admission: 12/11/2021    Date of Service: Patient seen / examined on 12/11/21 and admitted to inpatient with expected LOS greater than two midnights due to medical therapy. ASSESSMENT / PLAN:    Acute on chronic hypoxic and hypercapnic respiratory failure  Secondary to COPD exacerbation and RLL CAP. Low suspicion for PE despite mild D-Dimer elevation. On baseline 3L NC. Compliance with outpatient PAP therapy remains unclear. Intubated in the ED for severe hypoxia and persistent hypercapnia despite BiPAP trial. Currently on PCV. Target lung protective strategies. Wean as tolerated. RLL CAP  Present on CXR. Febrile. No leukocytosis. Low suspicion for sepsis despite mild tachycardia and tachypnea on arrival (in distress). LA 2.4 (severe hypoxia). Procalcitonin negative. Rocephin and azithromycin initiated (12/11). Blood cultures, respiratory cultures, molecular pneumonia panel, procalcitonin and MRSA/VRE screens pending. Tylenol as needed. Severe COPD, with acute exacerbation  Likely precipitated by CAP. PFTs from 3/2020 reviewed. Current outpatient regimen consists of bevespi, pulmicort, perforomist and prn albuterol/duonebs. Steroid and bronchodilator therapy initiated. Receiving azithromycin for management of community acquired pneumonia. Pulmonary hygiene in place. Moderate PAH  RVSP 55-60 mmHg per TTE 5/2020. Class 3? Tadalafil resumed. History of systolic CHF  EF recovered from 45-50% to 50-55% per TTE 5/2020. Clinically appears compensated. No outpatient diuretic therapies listed. Monitor I/O, daily weights. Mild LA elevation  In the setting of severe hypoxia. Low clinical suspicion for sepsis. Trend until resolution. Stage 2 MAGNO  Cr 1.2, eGFR 77 (baseline 0.4-0.6, > 90). BUN:Cr ratio approximately 20. Etiology unclear.  Does not appear volume overloaded. No outpatient nephrotoxins listed. Will trial gentle IVF hydration and monitor for improvement. Primary HTN  Controlled. Toprol resumed with parameters. Monitor BP. Hyperlipidemia  Statin resumed. Small pericardial effusion  Noted on TTE 5/2020. Schizophrenia  Risperdal, tegretol resumed. GERD  Pepcid resumed. Chronic tobacco abuse  Offer cessation counseling, NRT prior to discharge. Chest pain  Reported on arrival. No documented history of CAD. EKG demonstrates sinus tachycardia without evidence of ischemia. Troponin negative. Will repeat delta troponin in 6 hours. Maintain continuous telemetry. Chief Complaint: shortness of breath, chest pain    History of Present Illness:  45 y/o AA male PMHx severe COPD (FEV1 47% predicted; PFTs 3/2020), chronic combined respiratory failure (on baseline 3L NC and unspecified PAP therapy QHS), chronic tobacco abuse, moderate PAH (RVSP 55-60 mmHg per TTE 4/9567), chronic systolic CHF (EF recovered from 45 to 50-55% per TTE 5/2020), HTN and other PMHx as indicated below -- presents to [facility] with chief complaints of shortness of breath and chest pain. History obtained from the EMR (patient intubated/sedated on the ventilator). Limited additional history available. Patient presented to [facility] ED with complaints of SOB and chest pain, along with family reports that he had been ill for an unspecified amount of time. Per ED report, the patient's brother was unable to provide any additional significant history. Upon ED arrival, the patient was febrile (Tmax 101 degrees), normotensive, mildly tachycardic and tachypnic (RR 30s), with SpO2 40% on RA. Workup was remarkable for macrocytosis without anemia, normal WBC, Cr 1.2 (BUN 25, eGFR 77),  (A1C 6.0 in 12/2019), BNP 2758, CRP 16.3, , ferritin 344 and LA 2.4. Procalcitonin, troponin, rapid flu and COVID testing were negative. ABG (on BiPAP; FiO2 95%): 7.22 / 85 / 171 / 35. Dimer 630. EKG showed sinus tachycardia without evidence of ischemic. Portable CXR demonstrated a RLL infiltrate. The patient was placed on a non-re breather and escalated to BiPAP. He was subsequently intubated for failure to improve on BiPAP therapy. He was transported to the ICU for additional management and arrived in stable condition. Please refer to the A&P for additional details. Past Medical History:    Stage 3 COPD  Chronic combined respiratory failure  Pulmonary hypertension  Chronic tobacco abuse  Chronic systolic congestive heart failure  GERD  Hypertension  Hyperlipidemia  Schizophrenia    Past Surgical History:    History reviewed. No pertinent surgical history.      Medications Prior to Admission:  Medication Sig    ipratropium-albuterol (DUONEB) 0.5-2.5 (3) MG/3ML SOLN nebulizer solution Inhale 3 mLs into the lungs every 4 hours    risperiDONE (RISPERDAL) 2 MG tablet Take 1 tablet by mouth nightly    enoxaparin (LOVENOX) 40 MG/0.4ML injection Inject 0.4 mLs into the skin every 24 hours    famotidine (PEPCID) 20 MG/2ML injection Infuse 2 mLs intravenously 2 times daily    fluconazole (DIFLUCAN) 400MG/200ML in 0.9 % sodium chloride IVPB Infuse 200 mLs intravenously every 24 hours    glycopyrrolate-formoterol (BEVESPI) 9-4.8 MCG/ACT AERO Inhale 2 puffs into the lungs 2 times daily    ondansetron (ZOFRAN) 4 MG/2ML injection Infuse 2 mLs intravenously every 6 hours as needed for Nausea or Vomiting    Tadalafil, PAH, 20 MG tablet Take 2 tablets by mouth daily    formoterol (PERFOROMIST) 20 MCG/2ML nebulizer solution Take 2 mLs by nebulization 2 times daily    albuterol (PROVENTIL) (2.5 MG/3ML) 0.083% nebulizer solution Take 3 mLs by nebulization every 2 hours as needed for Wheezing    aspirin 325 MG EC tablet Take 1 tablet by mouth daily    pravastatin (PRAVACHOL) 40 MG tablet Take 40 mg by mouth nightly    budesonide (PULMICORT) 0.5 MG/2ML nebulizer suspension Take 2 mLs by nebulization 2 times daily    metoprolol succinate (TOPROL XL) 25 MG extended release tablet Take 1 tablet by mouth daily    carBAMazepine (TEGRETOL) 200 MG tablet Take 200 mg by mouth 2 times daily     Allergies:   Patient has no known allergies. Social History:  Socioeconomic History    Marital status: Single   Tobacco Use    Smoking status: Current Every Day Smoker     Packs/day: 1.00     Years: 20.00     Pack years: 20.00     Types: Cigarettes   Vaping Use   Substance and Sexual Activity    Alcohol use: No    Drug use: No     Family History:  Problem Relation Age of Onset    High Blood Pressure Mother      REVIEW OF SYSTEMS:  Unable to obtain (intubated/sedated on the ventilator)    PHYSICAL EXAM:  Vitals:    12/11/21 0608 12/11/21 0611 12/11/21 0612 12/11/21 0649   BP:    (!) 149/107   Pulse: 81 84 97    Resp: 25 12 18    Temp:       TempSrc:       SpO2: 98% 98% 98% 93%   Weight:    182 lb 5.1 oz (82.7 kg)   Height:         General appearance: Intubated / sedated on the mechanical ventilator. No acute distress. HEENT: Normocephalic / atraumatic. PERRL. Conjunctivae appear normal. Exophthalmos noted bilaterally. Neck: Supple. No JVD. Trachea midline. Respiratory: Breathing synchronously with the ventilator. Symmetric chest rise. Lung sounds diminished to auscultation (R>L). No obvious wheezes / rales / rhonchi. No retractions. Cardiovascular: RRR. Normal S1/S2. No murmurs / rubs / gallops. GI: Soft / non-distended. No apparent tenderness to palpation. BS present. : Small lesion noted to R testicle / no active drainage. Musculoskeletal: No cyanosis or edema. Skin: Warm / dry. No pallor / diaphoresis. Neurologic: Sedated. No gross involuntary movements. Psychiatric: Unable to assess. Capillary refill: Brisk bilaterally. Peripheral pulses: Normal bilaterally.     Labs:   Results for orders placed or performed during the hospital encounter of 12/11/21   COVID-19, Rapid    Specimen: Nasopharyngeal Swab Result Value Ref Range    SARS-CoV-2, NAAT NOT  DETECTED NOT DETECTED   Rapid influenza A/B antigens    Specimen: Nasopharyngeal   Result Value Ref Range    Flu A Antigen Negative NEGATIVE    Flu B Antigen Negative NEGATIVE   Basic Metabolic Panel w/ Reflex to MG   Result Value Ref Range    Sodium 136 135 - 145 meq/L    Potassium reflex Magnesium 5.1 3.5 - 5.2 meq/L    Chloride 96 (L) 98 - 111 meq/L    CO2 29 23 - 33 meq/L    Glucose 170 (H) 70 - 108 mg/dL    BUN 25 (H) 7 - 22 mg/dL    CREATININE 1.2 0.4 - 1.2 mg/dL    Calcium 9.0 8.5 - 10.5 mg/dL   Brain Natriuretic Peptide   Result Value Ref Range    Pro-BNP 2758.0 (H) 0.0 - 900.0 pg/mL   CBC Auto Differential   Result Value Ref Range    WBC 6.2 4.8 - 10.8 thou/mm3    RBC 5.86 4.70 - 6.10 mill/mm3    Hemoglobin 17.8 14.0 - 18.0 gm/dl    Hematocrit 57.0 (H) 42.0 - 52.0 %    MCV 97.3 (H) 80.0 - 94.0 fL    MCH 30.4 26.0 - 33.0 pg    MCHC 31.2 (L) 32.2 - 35.5 gm/dl    RDW-CV 16.3 (H) 11.5 - 14.5 %    RDW-SD 58.2 (H) 35.0 - 45.0 fL    Platelets 875 226 - 400 thou/mm3    MPV 10.7 9.4 - 12.4 fL    Seg Neutrophils 70.8 %    Lymphocytes 12.7 %    Monocytes 11.6 %    Eosinophils 0.0 %    Basophils 0.6 %    Immature Granulocytes 4.3 %    Segs Absolute 4.4 1.8 - 7.7 thou/mm3    Lymphocytes Absolute 0.8 (L) 1.0 - 4.8 thou/mm3    Monocytes Absolute 0.7 0.4 - 1.3 thou/mm3    Eosinophils Absolute 0.0 0.0 - 0.4 thou/mm3    Basophils Absolute 0.0 0.0 - 0.1 thou/mm3    Immature Grans (Abs) 0.27 (H) 0.00 - 0.07 thou/mm3    nRBC 0 /100 wbc   Troponin   Result Value Ref Range    Troponin T < 0.010 ng/ml   Lactic acid, plasma   Result Value Ref Range    Lactic Acid 2.4 (H) 0.5 - 2.0 mmol/L   C-reactive protein   Result Value Ref Range    CRP 16.27 (H) 0.00 - 1.00 mg/dl   Lactate dehydrogenase   Result Value Ref Range     (H) 100 - 190 U/L   Ferritin   Result Value Ref Range    Ferritin 344 (H) 22 - 322 ng/mL   Ethanol   Result Value Ref Range    ETHYL ALCOHOL, SERUM < 0.01 0.00 %   Salicylate Level   Result Value Ref Range    Salicylate, Serum 0.4 (L) 2.0 - 10.0 mg/dL   Blood gas, arterial   Result Value Ref Range    pH, Blood Gas 7.22 (L) 7.35 - 7.45    PCO2 85 (HH) 35 - 45 mmhg    PO2 171 (H) 71 - 104 mmhg    HCO3 35 (H) 23 - 28 mmol/l    Base Excess (Calculated) 2.8 (H) -2.5 - 2.5 mmol/l    O2 Sat 99 %    IFIO2 95     DEVICE BiPAP     SET PEEP 8.0 mmhg    SET PRESS SUPP 12 cmH2O    Nicholas Test Positive     Source: R Radial     COLLECTED BY: [id number]    Procalcitonin   Result Value Ref Range    Procalcitonin 0.17 (H) 0.01 - 0.09 ng/mL   SPECIMEN REJECTION   Result Value Ref Range    Rejected Test D-DMR     Reason for Rejection see below    D-Dimer, Quantitative   Result Value Ref Range    D-Dimer, Quant 630.00 (H) 0.00 - 500.00 ng/ml FEU   Anion Gap   Result Value Ref Range    Anion Gap 11.0 8.0 - 16.0 meq/L   Osmolality   Result Value Ref Range    Osmolality Calc 280.3 275.0 - 300.0 mOsmol/kg   Glomerular Filtration Rate, Estimated   Result Value Ref Range    Est, Glom Filt Rate 77 (A) ml/min/1.73m2   Blood Gas, Arterial   Result Value Ref Range    pH, Blood Gas 7.21 (L) 7.35 - 7.45    PCO2 89 (HH) 35 - 45 mmhg    PO2 188 (H) 71 - 104 mmhg    HCO3 35 (H) 23 - 28 mmol/l    Base Excess (Calculated) 2.5 -2.5 - 2.5 mmol/l    O2 Sat 99 %    IFIO2 95     DEVICE BiPAP     Nicholas Test Positive     Source: R Radial     COLLECTED BY: [id number]    EKG 12 Lead   Result Value Ref Range    Ventricular Rate 112 BPM    Atrial Rate 112 BPM    P-R Interval 152 ms    QRS Duration 76 ms    Q-T Interval 310 ms    QTc Calculation (Bazett) 423 ms    P Axis 83 degrees    R Axis 117 degrees    T Axis 52 degrees     Narrative & Impression  Sinus tachycardia  Right atrial enlargement  Right axis deviation  Pulmonary disease pattern  Abnormal ECG  When compared with ECG of 17-MAY-2020 09:26,  Nonspecific T wave abnormality no longer evident in Inferior leads  Nonspecific T wave abnormality no longer evident in Lateral leads     EKG: reviewed by me -- sinus tachycardia / no ischemic changes    Radiology:     XR CHEST PORTABLE  Result Date: 12/11/2021  Exam: One View of the chest Comparison: None Findings: Hilar and mediastinal contours are within normal limits. Cardiac silhouette is enlarged. No pneumothorax. No pleural fluid collection. Increased interstitial markings bilaterally. Right lower lobe pneumonia. Impression: 1.  Right lower lobe pneumonia This document has been electronically signed by: Tillie Bamberger, MD on 12/11/2021 02:56 AM    CODE STATUS: FULL    Critical Care Time: 50 minutes    Electronically signed by Lawrence Cr MD on 12/11/2021 at 7:20 AM

## 2021-12-11 NOTE — ED NOTES
Pt and vs reassessed. RR unlabored. Pt resting in bed with eyes closed and appears to be sleeping. No distress noted.  Pt stable at this time     [facility]  12/11/21 3959

## 2021-12-11 NOTE — PROGRESS NOTES
Pharmacy called and states they do not carry tadalafil - pt family to bring in patients supply- unable to reach family at this time 1350 weaning propofol per rass- pt awakens, becomes restless, tachypnea noted, moves all 4 extremities at random  . Follows no commands . Sedation increased accordingly due to increased work of breathing 1405 informed Gearldean Ill cnp of repeat bmp and also unable to obtain ordered rass due to respiratory status - orders received .

## 2021-12-11 NOTE — ED NOTES
Dr. Jessica Murdock and Dr. Rosey Knowles at bedside to intubate     Gerald Rodriguez RN  12/11/21 0691

## 2021-12-11 NOTE — Clinical Note
Patient Class: Inpatient [101]   REQUIRED: Diagnosis: Acute respiratory failure ([facility] [address] [518.81. ICD-9-CM]   Estimated Length of Stay: Estimated stay of more than 2 midnights   Admitting Provider: Luther Burt [[id number]]   Telemetry/Cardiac Monitoring Required?: Yes

## 2021-12-12 ENCOUNTER — APPOINTMENT (OUTPATIENT)
Dept: CT IMAGING | Age: 51
DRG: 870 | End: 2021-12-12
Payer: MEDICARE

## 2021-12-12 ENCOUNTER — APPOINTMENT (OUTPATIENT)
Dept: GENERAL RADIOLOGY | Age: 51
DRG: 870 | End: 2021-12-12
Payer: MEDICARE

## 2021-12-12 LAB
ALBUMIN SERPL-MCNC: 3.4 G/DL (ref 3.5–5.1)
ALLEN TEST: POSITIVE
ALLEN TEST: POSITIVE
ALP BLD-CCNC: 140 U/L (ref 38–126)
ALT SERPL-CCNC: 33 U/L (ref 11–66)
ANION GAP SERPL CALCULATED.3IONS-SCNC: 6 MEQ/L (ref 8–16)
ANION GAP SERPL CALCULATED.3IONS-SCNC: 9 MEQ/L (ref 8–16)
ANISOCYTOSIS: PRESENT
APTT: 34.3 SECONDS (ref 22–38)
AST SERPL-CCNC: 30 U/L (ref 5–40)
BASE EXCESS (CALCULATED): 2 MMOL/L (ref -2.5–2.5)
BASE EXCESS (CALCULATED): 2.7 MMOL/L (ref -2.5–2.5)
BASOPHILS # BLD: 0.4 %
BASOPHILS ABSOLUTE: 0.1 THOU/MM3 (ref 0–0.1)
BILIRUB SERPL-MCNC: 0.3 MG/DL (ref 0.3–1.2)
BILIRUBIN DIRECT: < 0.2 MG/DL (ref 0–0.3)
BUN BLDV-MCNC: 23 MG/DL (ref 7–22)
BUN BLDV-MCNC: 28 MG/DL (ref 7–22)
CALCIUM IONIZED: 1.07 MMOL/L (ref 1.12–1.32)
CALCIUM SERPL-MCNC: 7.8 MG/DL (ref 8.5–10.5)
CALCIUM SERPL-MCNC: 8.1 MG/DL (ref 8.5–10.5)
CHLORIDE BLD-SCNC: 101 MEQ/L (ref 98–111)
CHLORIDE BLD-SCNC: 103 MEQ/L (ref 98–111)
CO2: 27 MEQ/L (ref 23–33)
CO2: 30 MEQ/L (ref 23–33)
COLLECTED BY:: ABNORMAL
COLLECTED BY:: ABNORMAL
CREAT SERPL-MCNC: 0.8 MG/DL (ref 0.4–1.2)
CREAT SERPL-MCNC: 0.9 MG/DL (ref 0.4–1.2)
DEVICE: ABNORMAL
DEVICE: ABNORMAL
EOSINOPHIL # BLD: 0 %
EOSINOPHILS ABSOLUTE: 0 THOU/MM3 (ref 0–0.4)
ERYTHROCYTE [DISTWIDTH] IN BLOOD BY AUTOMATED COUNT: 17.3 % (ref 11.5–14.5)
ERYTHROCYTE [DISTWIDTH] IN BLOOD BY AUTOMATED COUNT: 66.1 FL (ref 35–45)
GFR SERPL CREATININE-BSD FRML MDRD: > 90 ML/MIN/1.73M2
GFR SERPL CREATININE-BSD FRML MDRD: > 90 ML/MIN/1.73M2
GLUCOSE BLD-MCNC: 120 MG/DL (ref 70–108)
GLUCOSE BLD-MCNC: 142 MG/DL (ref 70–108)
HCO3: 34 MMOL/L (ref 23–28)
HCO3: 37 MMOL/L (ref 23–28)
HCT VFR BLD CALC: 59 % (ref 42–52)
HEMOGLOBIN: 17.4 GM/DL (ref 14–18)
IFIO2: 70
IMMATURE GRANS (ABS): 0.21 THOU/MM3 (ref 0–0.07)
IMMATURE GRANULOCYTES: 1.1 %
INR BLD: 1.1 (ref 0.85–1.13)
LYMPHOCYTES # BLD: 2.1 %
LYMPHOCYTES ABSOLUTE: 0.4 THOU/MM3 (ref 1–4.8)
MAGNESIUM: 2.5 MG/DL (ref 1.6–2.4)
MCH RBC QN AUTO: 30.7 PG (ref 26–33)
MCHC RBC AUTO-ENTMCNC: 29.5 GM/DL (ref 32.2–35.5)
MCV RBC AUTO: 104.2 FL (ref 80–94)
MODE: ABNORMAL
MODE: ABNORMAL
MONOCYTES # BLD: 4.8 %
MONOCYTES ABSOLUTE: 0.9 THOU/MM3 (ref 0.4–1.3)
NUCLEATED RED BLOOD CELLS: 0 /100 WBC
O2 SATURATION: 94 %
O2 SATURATION: 96 %
PCO2: 85 MMHG (ref 35–45)
PCO2: 96 MMHG (ref 35–45)
PH BLOOD GAS: 7.19 (ref 7.35–7.45)
PH BLOOD GAS: 7.22 (ref 7.35–7.45)
PIP: 24 CMH2O
PIP: 28 CMH2O
PLATELET # BLD: 195 THOU/MM3 (ref 130–400)
PMV BLD AUTO: 10 FL (ref 9.4–12.4)
PO2: 104 MMHG (ref 71–104)
PO2: 87 MMHG (ref 71–104)
POTASSIUM REFLEX MAGNESIUM: 5.7 MEQ/L (ref 3.5–5.2)
POTASSIUM SERPL-SCNC: 5.8 MEQ/L (ref 3.5–5.2)
RBC # BLD: 5.66 MILL/MM3 (ref 4.7–6.1)
SEG NEUTROPHILS: 91.6 %
SEGMENTED NEUTROPHILS ABSOLUTE COUNT: 17.1 THOU/MM3 (ref 1.8–7.7)
SET PEEP: 12 MMHG
SET PEEP: 8 MMHG
SET RESPIRATORY RATE: 20 BPM
SODIUM BLD-SCNC: 137 MEQ/L (ref 135–145)
SODIUM BLD-SCNC: 139 MEQ/L (ref 135–145)
SOURCE, BLOOD GAS: ABNORMAL
SOURCE, BLOOD GAS: ABNORMAL
TOTAL PROTEIN: 7.6 G/DL (ref 6.1–8)
WBC # BLD: 18.7 THOU/MM3 (ref 4.8–10.8)

## 2021-12-12 PROCEDURE — 2700000000 HC OXYGEN THERAPY PER DAY

## 2021-12-12 PROCEDURE — 6370000000 HC RX 637 (ALT 250 FOR IP): Performed by: NURSE PRACTITIONER

## 2021-12-12 PROCEDURE — 83735 ASSAY OF MAGNESIUM: CPT

## 2021-12-12 PROCEDURE — 99291 CRITICAL CARE FIRST HOUR: CPT | Performed by: INTERNAL MEDICINE

## 2021-12-12 PROCEDURE — 6360000004 HC RX CONTRAST MEDICATION: Performed by: INTERNAL MEDICINE

## 2021-12-12 PROCEDURE — 85610 PROTHROMBIN TIME: CPT

## 2021-12-12 PROCEDURE — 6360000002 HC RX W HCPCS: Performed by: NURSE PRACTITIONER

## 2021-12-12 PROCEDURE — 82248 BILIRUBIN DIRECT: CPT

## 2021-12-12 PROCEDURE — 94640 AIRWAY INHALATION TREATMENT: CPT

## 2021-12-12 PROCEDURE — 80053 COMPREHEN METABOLIC PANEL: CPT

## 2021-12-12 PROCEDURE — 85025 COMPLETE CBC W/AUTO DIFF WBC: CPT

## 2021-12-12 PROCEDURE — 2580000003 HC RX 258: Performed by: FAMILY MEDICINE

## 2021-12-12 PROCEDURE — 82803 BLOOD GASES ANY COMBINATION: CPT

## 2021-12-12 PROCEDURE — 71045 X-RAY EXAM CHEST 1 VIEW: CPT

## 2021-12-12 PROCEDURE — 85730 THROMBOPLASTIN TIME PARTIAL: CPT

## 2021-12-12 PROCEDURE — 2580000003 HC RX 258: Performed by: NURSE PRACTITIONER

## 2021-12-12 PROCEDURE — 6360000002 HC RX W HCPCS: Performed by: FAMILY MEDICINE

## 2021-12-12 PROCEDURE — 36415 COLL VENOUS BLD VENIPUNCTURE: CPT

## 2021-12-12 PROCEDURE — 2000000000 HC ICU R&B

## 2021-12-12 PROCEDURE — 82330 ASSAY OF CALCIUM: CPT

## 2021-12-12 PROCEDURE — 6370000000 HC RX 637 (ALT 250 FOR IP): Performed by: FAMILY MEDICINE

## 2021-12-12 PROCEDURE — 94761 N-INVAS EAR/PLS OXIMETRY MLT: CPT

## 2021-12-12 PROCEDURE — 71275 CT ANGIOGRAPHY CHEST: CPT

## 2021-12-12 PROCEDURE — C9113 INJ PANTOPRAZOLE SODIUM, VIA: HCPCS | Performed by: FAMILY MEDICINE

## 2021-12-12 PROCEDURE — 94003 VENT MGMT INPAT SUBQ DAY: CPT

## 2021-12-12 PROCEDURE — 36600 WITHDRAWAL OF ARTERIAL BLOOD: CPT

## 2021-12-12 RX ORDER — CHLORHEXIDINE GLUCONATE 0.12 MG/ML
15 RINSE ORAL 2 TIMES DAILY
Status: DISCONTINUED | OUTPATIENT
Start: 2021-12-12 | End: 2021-12-23 | Stop reason: HOSPADM

## 2021-12-12 RX ORDER — 0.9 % SODIUM CHLORIDE 0.9 %
500 INTRAVENOUS SOLUTION INTRAVENOUS ONCE
Status: COMPLETED | OUTPATIENT
Start: 2021-12-12 | End: 2021-12-12

## 2021-12-12 RX ADMIN — IPRATROPIUM BROMIDE AND ALBUTEROL SULFATE 1 AMPULE: .5; 3 SOLUTION RESPIRATORY (INHALATION) at 06:35

## 2021-12-12 RX ADMIN — SODIUM CHLORIDE: 9 INJECTION, SOLUTION INTRAVENOUS at 12:24

## 2021-12-12 RX ADMIN — SODIUM CHLORIDE 500 ML: 9 INJECTION, SOLUTION INTRAVENOUS at 11:43

## 2021-12-12 RX ADMIN — IPRATROPIUM BROMIDE AND ALBUTEROL SULFATE 1 AMPULE: .5; 3 SOLUTION RESPIRATORY (INHALATION) at 20:23

## 2021-12-12 RX ADMIN — PROPOFOL INJECTABLE EMULSION 45 MCG/KG/MIN: 10 INJECTION, EMULSION INTRAVENOUS at 10:53

## 2021-12-12 RX ADMIN — METHYLPREDNISOLONE SODIUM SUCCINATE 40 MG: 40 INJECTION, POWDER, FOR SOLUTION INTRAMUSCULAR; INTRAVENOUS at 10:46

## 2021-12-12 RX ADMIN — PROPOFOL INJECTABLE EMULSION 40 MCG/KG/MIN: 10 INJECTION, EMULSION INTRAVENOUS at 13:46

## 2021-12-12 RX ADMIN — IPRATROPIUM BROMIDE AND ALBUTEROL SULFATE 1 AMPULE: .5; 3 SOLUTION RESPIRATORY (INHALATION) at 10:18

## 2021-12-12 RX ADMIN — METOPROLOL TARTRATE 25 MG: 25 TABLET, FILM COATED ORAL at 09:05

## 2021-12-12 RX ADMIN — PROPOFOL INJECTABLE EMULSION 40.31 MCG/KG/MIN: 10 INJECTION, EMULSION INTRAVENOUS at 18:29

## 2021-12-12 RX ADMIN — BUDESONIDE 500 MCG: 0.5 INHALANT RESPIRATORY (INHALATION) at 10:18

## 2021-12-12 RX ADMIN — SODIUM CHLORIDE, PRESERVATIVE FREE 10 ML: 5 INJECTION INTRAVENOUS at 09:06

## 2021-12-12 RX ADMIN — PROPOFOL INJECTABLE EMULSION 45 MCG/KG/MIN: 10 INJECTION, EMULSION INTRAVENOUS at 22:48

## 2021-12-12 RX ADMIN — IPRATROPIUM BROMIDE AND ALBUTEROL SULFATE 1 AMPULE: .5; 3 SOLUTION RESPIRATORY (INHALATION) at 17:52

## 2021-12-12 RX ADMIN — PROPOFOL INJECTABLE EMULSION 45 MCG/KG/MIN: 10 INJECTION, EMULSION INTRAVENOUS at 06:40

## 2021-12-12 RX ADMIN — METHYLPREDNISOLONE SODIUM SUCCINATE 40 MG: 40 INJECTION, POWDER, FOR SOLUTION INTRAMUSCULAR; INTRAVENOUS at 17:42

## 2021-12-12 RX ADMIN — PROPOFOL INJECTABLE EMULSION 40 MCG/KG/MIN: 10 INJECTION, EMULSION INTRAVENOUS at 02:32

## 2021-12-12 RX ADMIN — CHLORHEXIDINE GLUCONATE 0.12% ORAL RINSE 15 ML: 1.2 LIQUID ORAL at 13:40

## 2021-12-12 RX ADMIN — CEFTRIAXONE SODIUM 1000 MG: 1 INJECTION, POWDER, FOR SOLUTION INTRAMUSCULAR; INTRAVENOUS at 05:10

## 2021-12-12 RX ADMIN — SODIUM CHLORIDE: 9 INJECTION, SOLUTION INTRAVENOUS at 22:54

## 2021-12-12 RX ADMIN — IOPAMIDOL 80 ML: 755 INJECTION, SOLUTION INTRAVENOUS at 10:09

## 2021-12-12 RX ADMIN — AZITHROMYCIN DIHYDRATE 500 MG: 500 INJECTION, POWDER, LYOPHILIZED, FOR SOLUTION INTRAVENOUS at 09:15

## 2021-12-12 RX ADMIN — BUDESONIDE 500 MCG: 0.5 INHALANT RESPIRATORY (INHALATION) at 20:23

## 2021-12-12 RX ADMIN — METOPROLOL TARTRATE 25 MG: 25 TABLET, FILM COATED ORAL at 21:24

## 2021-12-12 RX ADMIN — PANTOPRAZOLE SODIUM 40 MG: 40 INJECTION, POWDER, FOR SOLUTION INTRAVENOUS at 09:05

## 2021-12-12 RX ADMIN — CHLORHEXIDINE GLUCONATE 0.12% ORAL RINSE 15 ML: 1.2 LIQUID ORAL at 21:25

## 2021-12-12 RX ADMIN — IPRATROPIUM BROMIDE AND ALBUTEROL SULFATE 1 AMPULE: .5; 3 SOLUTION RESPIRATORY (INHALATION) at 13:48

## 2021-12-12 RX ADMIN — RISPERIDONE 2 MG: 1 TABLET ORAL at 21:24

## 2021-12-12 RX ADMIN — SODIUM CHLORIDE 80 MG: 9 INJECTION, SOLUTION INTRAVENOUS at 00:24

## 2021-12-12 RX ADMIN — IPRATROPIUM BROMIDE AND ALBUTEROL SULFATE 1 AMPULE: .5; 3 SOLUTION RESPIRATORY (INHALATION) at 02:02

## 2021-12-12 RX ADMIN — CALCIUM GLUCONATE 1500 MG: 98 INJECTION, SOLUTION INTRAVENOUS at 16:40

## 2021-12-12 RX ADMIN — METHYLPREDNISOLONE SODIUM SUCCINATE 40 MG: 40 INJECTION, POWDER, FOR SOLUTION INTRAMUSCULAR; INTRAVENOUS at 01:46

## 2021-12-12 RX ADMIN — PRAVASTATIN SODIUM 40 MG: 40 TABLET ORAL at 21:24

## 2021-12-12 RX ADMIN — PANTOPRAZOLE SODIUM 40 MG: 40 INJECTION, POWDER, FOR SOLUTION INTRAVENOUS at 21:25

## 2021-12-12 RX ADMIN — SODIUM CHLORIDE, PRESERVATIVE FREE 10 ML: 5 INJECTION INTRAVENOUS at 21:25

## 2021-12-12 ASSESSMENT — PULMONARY FUNCTION TESTS
PIF_VALUE: 20
PIF_VALUE: 19
PIF_VALUE: 18
PIF_VALUE: 20
PIF_VALUE: 24
PIF_VALUE: 25

## 2021-12-12 ASSESSMENT — PAIN SCALES - GENERAL: PAINLEVEL_OUTOF10: 0

## 2021-12-12 NOTE — PROGRESS NOTES
Patient: Enrico Hem    Unit/Bed:4D-08/008-A  MRN: [id number]   PCP: Isela Stewart MD  Date of Admission: 12/11/2021    Assessment and Plan(All pulmonary edema, renal failure, PE, and respiratory failure diagnoses are acute in nature unless otherwise specified):        1. Acute on chronic hypoxic and hypercapnic respiratory failure: On baseline 3L NC. Secondary to COPD exacerbation and RLL CAP. Intubated in the ED for severe hypoxia and persistent hypercapnia despite BiPAP trial. Currently on PCV. Target lung protective strategies. Wean vent as tolerated. Wean sedation today. 2. Respiratory acidosis: ABG today showing persistent, and actually worsened, hypercapnia with PCO2 96.  7.19. Patient showing evidence of prolonged expiration on ventilator flow waveform. Hypercapnia persist despite adequate tidal volumes and minute ventilation of >11 L/min. Evidence of increased dead space ventilation. Chest x-ray findings do not completely explain the degree of dead space ventilation. Slightly elevated Ddimer on admission and patient had complained of chest pain at that time. Pro-BNP was acutely elevated on admission. Obtain CTA of the chest to rule out pulmonary embolism. Otherwise, resume Solu-Medrol for COPD exacerbation. Wean sedation as tolerated. Repeat ABG. 3. Chest pain: Reported on arrival. No documented history of CAD. EKG demonstrated sinus tachycardia without evidence of ischemia. Troponin negative x2. Obtain CTA chest as above to rule out PE. 4. RLL CAP: Present on CXR. Febrile. Leukocytosis. Rocephin and azithromycin initiated 12/11 (Day #2). Molecular pneumonia panel positive for Moraxella catarrhalis. Respiratory culture NGTD. Continue to follow cultures. 5. Severe COPD, with acute exacerbation: Likely precipitated by CAP. PFTs from 3/2020 reviewed. Current outpatient regimen consists of bevespi, pulmicort, perforomist and prn albuterol/duonebs. Steroid and bronchodilator therapy initiated. Receiving azithromycin for management of community acquired pneumonia. Pulmonary hygiene in place. 6. Moderate PAH: RVSP 55-60 mmHg per TTE 5/2020. Class 3? Tadalafil resumed. 7. History of systolic HFpEF: EF recovered from 45-50% to 50-55% per TTE 5/2020. Clinically appears compensated. No outpatient diuretic therapies listed. Monitor I/O, daily weights. 8. Acute hyperkalemia: Mild. Potassium 5.7. Likely related to acidosis. Interventions above. Vent settings optimized. Repeat ABG. Repeat BMP later today. 9. MAGNO: Improving. Cr 0.9, from 1.2 on admission; eGFR 77 on admission (baseline 0.4-0.6, > 90). Appears slightly hypovolemic. Spec gravity also noted to be >1.030 on admission. No outpatient nephrotoxins listed. Continue gentle IVF hydration and monitor for continued improvement. Administer 500 mL0.9% NS fluid bolus today with CTA chest study noted above. 10. HTN, history of: Controlled. Toprol resumed with parameters. Monitor BP.  11. Hyperlipidemia, history of: Statin. 12. Small pericardial effusion: Noted on TTE 5/2020. Normotensive. 13. Schizophrenia, history of: Risperdal, tegretol. 14. GERD, history of: Pepcid. 15. Chronic tobacco abuse: Offer cessation counseling, NRT prior to discharge. 16. Concern for GI bleeding (resolved): Overnight 12/11-12/12 patient reported to have had coffee ground emesis from NG/OG tube. GI was consulted and patient was placed on protonix BID. Lovenox prophylaxis was stopped. On exam, this appears to have resolved; output appears light brown now. H/H is stable. Nursing reports patient had been on LIWS, so possibility of small suction-related trauma is considered. Will resume lovenox vs anticoagulation pending results of CTA chest as above. 17. Mild lactic acidosis (resolved): In the setting of severe hypoxia. Resolved.       CC:  shortness of breath, chest pain  HPI: Miguel Box is a 45 y/o male active smoker with past medical history of severe stage III COPD (FEV1 47% predicted; PFTs 3/2020), chronic respiratory failure (on baseline 3L NC and unspecified PAP therapy QHS), moderate PAH (RVSP 55-60 mmHg per TTE 5/4309), chronic systolic HFpEF (EF recovered from 45 to 50-55% per TTE 5/2020), HTN, HLD, GERD, and schizophrenia. Patient presented to [facility] ED on 12/11/2021 with complaints of dyspnea and chest pain. He had presented with family that reported he had been ill for an unspecified amount of time. Further history was unable to be obtained from family, patient was unable to provide history upon presentation. In the ED, patient was febrile to 101, normotensive, mildly tachycardic, and tachypneic with respiratory rate in the 30s. He was hypoxic with this with SPO2 40% on room air. Patient had elevated CRP, LDH, ferritin, and lactic acidosis. Troponin was negative. Twelve-lead ECG was negative for signs of ischemia. D-dimer was slightly elevated at 630. COVID-19 screening was negative. ABG was obtained on BiPAP showing respiratory acidosis with PCO2 of 85. Chest x-ray obtained showed right lower lobe infiltrate concerning for pneumonia. Patient failed to improve on BiPAP therapy, and thus decision was made to intubate. Patient was admitted to ICU thereafter for further management of COPD exacerbation and right lower lobe pneumonia. He was started on azithromycin and Rocephin. Pneumonia panel subsequently resulted positive for Moraxella catarrhalis. Patient was also started on Solu-Medrol, and scheduled duo nebs for COPD exacerbation. For further details, please see assessment and plan. ROS: Unable to obtain as patient is intubated and sedated on mechanical ventilator. PMH:  Per HPI  SHX: Active smoker. No alcohol or substance use reported. FHX: Hypertension. Allergies: NKDA.    Medications:     sodium chloride      sodium chloride 100 mL/hr at 12/12/21 0700    propofol 45 mcg/kg/min (12/12/21 0700)     Jose J Bowen azithromycin  500 mg IntraVENous Q24H    budesonide  500 mcg Nebulization BID    [Held by provider] tiotropium-olodaterol  2 puff Inhalation Daily    pravastatin  40 mg Oral Nightly    risperiDONE  2 mg Oral Nightly    Tadalafil (PAH)  40 mg Oral Daily    [Held by provider] formoterol  20 mcg Nebulization BID    sodium chloride flush  10 mL IntraVENous 2 times per day    [Held by provider] enoxaparin  40 mg SubCUTAneous Daily    cefTRIAXone (ROCEPHIN) IV  1,000 mg IntraVENous Q24H    ipratropium-albuterol  1 ampule Inhalation Q4H    methylPREDNISolone  40 mg IntraVENous Q8H    [Held by provider] aspirin  325 mg Oral Daily    metoprolol tartrate  25 mg Oral BID    pantoprazole  40 mg IntraVENous BID       Vital Signs:   T: 100.0: P: 83 RR: 20 B/P: 112/70: FiO2: 70%: O2 Sat:97%: I/O: 2320/1750 (+570) GCS: 6  Body mass index is 25.41 kg/m². Raghav Cornelius PC: 16/8: TV: 531: RRTotal: 20: Ti:1 sec:     General:   Acute on chronically ill adult male. Appears stated age. HEENT:  normocephalic and atraumatic. No scleral icterus. PERR  Neck: supple. No Thyromegaly. Lungs: diminished throughout to auscultation. No wheezing. No retractions  Cardiac: RRR. No JVD. Abdomen: soft. Nontender. Extremities:  No clubbing, cyanosis, or edema x 4. Vasculature: capillary refill < 3 seconds. Palpable dorsalis pedis pulses. Skin:  warm and dry. Psych:  Deeply sedated. Affect appropriate  Lymph:  No supraclavicular adenopathy. Neurologic:  No focal deficit. No seizures. Data: (All radiographs, tracings, PFTs, and imaging are personally viewed and interpreted unless otherwise noted).  Telemetry: NSR. Rate 83. No ectopy.    Sodium 137 potassium 5.7 chloride 101 CO2 27 BUN 28 creatinine 0.9 glucose 142 calcium 8.1   Albumin 3.4 alk phos 140 ALT 33 AST 30 bilirubin 0.3 total protein 7.6   TSH 0.909   WBC 18.7 hemoglobin 17.4 hematocrit 59.0 platelets 884   INR 1.10 APTT 34.3   Respiratory culture 12/11: Preliminary normal david.  Pneumonia panel 12/11: Moraxella catarrhalis.  Blood culture 1 12/11: Preliminary no growth   Blood culture 2 12/11: Preliminary no growth   COVID-19 screen 12/11: Negative   Rapid influenza A/B antigens 12/11: Negative   Urinalysis 12/11: Dark yellow, small bilirubin, trace ketones, specific gravity >1.030, 300 protein, negative nitrates, negative leukocytes, 2-4 WBC, no bacteria.  ABG 12/11: pH 7.19 PCO2 96 PO2 104 HCO3 37   CXR 12/12/21 report: Decreased right lower lobe infiltrate versus alveolar edema. Prominent interstitial markings, similar to the prior study. Differential diagnosis include interstitial edema, infiltrate. Case discussed with Dr. Margarito Andrade.     Electronically signed by ZAN Reynaga

## 2021-12-13 LAB
ALBUMIN SERPL-MCNC: 3 G/DL (ref 3.5–5.1)
ALP BLD-CCNC: 104 U/L (ref 38–126)
ALT SERPL-CCNC: 22 U/L (ref 11–66)
ANION GAP SERPL CALCULATED.3IONS-SCNC: 6 MEQ/L (ref 8–16)
ANISOCYTOSIS: PRESENT
AST SERPL-CCNC: 15 U/L (ref 5–40)
BASOPHILS # BLD: 0.2 %
BASOPHILS ABSOLUTE: 0 THOU/MM3 (ref 0–0.1)
BILIRUB SERPL-MCNC: 0.2 MG/DL (ref 0.3–1.2)
BUN BLDV-MCNC: 19 MG/DL (ref 7–22)
CALCIUM IONIZED: 1.18 MMOL/L (ref 1.12–1.32)
CALCIUM SERPL-MCNC: 8.5 MG/DL (ref 8.5–10.5)
CHLORIDE BLD-SCNC: 102 MEQ/L (ref 98–111)
CO2: 31 MEQ/L (ref 23–33)
CREAT SERPL-MCNC: 0.6 MG/DL (ref 0.4–1.2)
EOSINOPHIL # BLD: 0 %
EOSINOPHILS ABSOLUTE: 0 THOU/MM3 (ref 0–0.4)
ERYTHROCYTE [DISTWIDTH] IN BLOOD BY AUTOMATED COUNT: 17 % (ref 11.5–14.5)
ERYTHROCYTE [DISTWIDTH] IN BLOOD BY AUTOMATED COUNT: 65.7 FL (ref 35–45)
GFR SERPL CREATININE-BSD FRML MDRD: > 90 ML/MIN/1.73M2
GLUCOSE BLD-MCNC: 121 MG/DL (ref 70–108)
HCT VFR BLD CALC: 51.7 % (ref 42–52)
HEMOGLOBIN: 15.2 GM/DL (ref 14–18)
IMMATURE GRANS (ABS): 0.14 THOU/MM3 (ref 0–0.07)
IMMATURE GRANULOCYTES: 1.2 %
LYMPHOCYTES # BLD: 2.9 %
LYMPHOCYTES ABSOLUTE: 0.3 THOU/MM3 (ref 1–4.8)
MCH RBC QN AUTO: 30.3 PG (ref 26–33)
MCHC RBC AUTO-ENTMCNC: 29.4 GM/DL (ref 32.2–35.5)
MCV RBC AUTO: 103.2 FL (ref 80–94)
MONOCYTES # BLD: 4.1 %
MONOCYTES ABSOLUTE: 0.5 THOU/MM3 (ref 0.4–1.3)
NUCLEATED RED BLOOD CELLS: 0 /100 WBC
PLATELET # BLD: 179 THOU/MM3 (ref 130–400)
PMV BLD AUTO: 9.4 FL (ref 9.4–12.4)
POTASSIUM SERPL-SCNC: 5.2 MEQ/L (ref 3.5–5.2)
RBC # BLD: 5.01 MILL/MM3 (ref 4.7–6.1)
SEG NEUTROPHILS: 91.6 %
SEGMENTED NEUTROPHILS ABSOLUTE COUNT: 10.6 THOU/MM3 (ref 1.8–7.7)
SODIUM BLD-SCNC: 139 MEQ/L (ref 135–145)
TOTAL PROTEIN: 6.9 G/DL (ref 6.1–8)
WBC # BLD: 11.6 THOU/MM3 (ref 4.8–10.8)

## 2021-12-13 PROCEDURE — C9113 INJ PANTOPRAZOLE SODIUM, VIA: HCPCS | Performed by: FAMILY MEDICINE

## 2021-12-13 PROCEDURE — 80053 COMPREHEN METABOLIC PANEL: CPT

## 2021-12-13 PROCEDURE — 6370000000 HC RX 637 (ALT 250 FOR IP): Performed by: FAMILY MEDICINE

## 2021-12-13 PROCEDURE — 6360000002 HC RX W HCPCS: Performed by: NURSE PRACTITIONER

## 2021-12-13 PROCEDURE — 6360000002 HC RX W HCPCS: Performed by: FAMILY MEDICINE

## 2021-12-13 PROCEDURE — 94761 N-INVAS EAR/PLS OXIMETRY MLT: CPT

## 2021-12-13 PROCEDURE — 85025 COMPLETE CBC W/AUTO DIFF WBC: CPT

## 2021-12-13 PROCEDURE — 94640 AIRWAY INHALATION TREATMENT: CPT

## 2021-12-13 PROCEDURE — 2580000003 HC RX 258: Performed by: FAMILY MEDICINE

## 2021-12-13 PROCEDURE — 99291 CRITICAL CARE FIRST HOUR: CPT | Performed by: INTERNAL MEDICINE

## 2021-12-13 PROCEDURE — 36415 COLL VENOUS BLD VENIPUNCTURE: CPT

## 2021-12-13 PROCEDURE — 82330 ASSAY OF CALCIUM: CPT

## 2021-12-13 PROCEDURE — 6370000000 HC RX 637 (ALT 250 FOR IP): Performed by: NURSE PRACTITIONER

## 2021-12-13 PROCEDURE — 2700000000 HC OXYGEN THERAPY PER DAY

## 2021-12-13 PROCEDURE — 2000000000 HC ICU R&B

## 2021-12-13 PROCEDURE — 94003 VENT MGMT INPAT SUBQ DAY: CPT

## 2021-12-13 RX ADMIN — IPRATROPIUM BROMIDE AND ALBUTEROL SULFATE 1 AMPULE: .5; 3 SOLUTION RESPIRATORY (INHALATION) at 09:40

## 2021-12-13 RX ADMIN — SODIUM CHLORIDE: 9 INJECTION, SOLUTION INTRAVENOUS at 09:22

## 2021-12-13 RX ADMIN — CEFTRIAXONE SODIUM 1000 MG: 1 INJECTION, POWDER, FOR SOLUTION INTRAMUSCULAR; INTRAVENOUS at 04:55

## 2021-12-13 RX ADMIN — METHYLPREDNISOLONE SODIUM SUCCINATE 40 MG: 40 INJECTION, POWDER, FOR SOLUTION INTRAMUSCULAR; INTRAVENOUS at 09:35

## 2021-12-13 RX ADMIN — PROPOFOL INJECTABLE EMULSION 50 MCG/KG/MIN: 10 INJECTION, EMULSION INTRAVENOUS at 19:29

## 2021-12-13 RX ADMIN — IPRATROPIUM BROMIDE AND ALBUTEROL SULFATE 1 AMPULE: .5; 3 SOLUTION RESPIRATORY (INHALATION) at 04:58

## 2021-12-13 RX ADMIN — SODIUM CHLORIDE: 9 INJECTION, SOLUTION INTRAVENOUS at 18:14

## 2021-12-13 RX ADMIN — IPRATROPIUM BROMIDE AND ALBUTEROL SULFATE 1 AMPULE: .5; 3 SOLUTION RESPIRATORY (INHALATION) at 21:37

## 2021-12-13 RX ADMIN — BUDESONIDE 500 MCG: 0.5 INHALANT RESPIRATORY (INHALATION) at 21:37

## 2021-12-13 RX ADMIN — PROPOFOL INJECTABLE EMULSION 45 MCG/KG/MIN: 10 INJECTION, EMULSION INTRAVENOUS at 15:32

## 2021-12-13 RX ADMIN — PANTOPRAZOLE SODIUM 40 MG: 40 INJECTION, POWDER, FOR SOLUTION INTRAVENOUS at 09:26

## 2021-12-13 RX ADMIN — ACETAMINOPHEN 650 MG: 325 TABLET ORAL at 09:36

## 2021-12-13 RX ADMIN — ACETAMINOPHEN 650 MG: 325 TABLET ORAL at 19:59

## 2021-12-13 RX ADMIN — METHYLPREDNISOLONE SODIUM SUCCINATE 40 MG: 40 INJECTION, POWDER, FOR SOLUTION INTRAMUSCULAR; INTRAVENOUS at 02:34

## 2021-12-13 RX ADMIN — PRAVASTATIN SODIUM 40 MG: 40 TABLET ORAL at 21:01

## 2021-12-13 RX ADMIN — PANTOPRAZOLE SODIUM 40 MG: 40 INJECTION, POWDER, FOR SOLUTION INTRAVENOUS at 21:01

## 2021-12-13 RX ADMIN — IPRATROPIUM BROMIDE AND ALBUTEROL SULFATE 1 AMPULE: .5; 3 SOLUTION RESPIRATORY (INHALATION) at 16:27

## 2021-12-13 RX ADMIN — METHYLPREDNISOLONE SODIUM SUCCINATE 40 MG: 40 INJECTION, POWDER, FOR SOLUTION INTRAMUSCULAR; INTRAVENOUS at 17:17

## 2021-12-13 RX ADMIN — BUDESONIDE 500 MCG: 0.5 INHALANT RESPIRATORY (INHALATION) at 09:40

## 2021-12-13 RX ADMIN — METOPROLOL TARTRATE 25 MG: 25 TABLET, FILM COATED ORAL at 21:01

## 2021-12-13 RX ADMIN — IPRATROPIUM BROMIDE AND ALBUTEROL SULFATE 1 AMPULE: .5; 3 SOLUTION RESPIRATORY (INHALATION) at 13:09

## 2021-12-13 RX ADMIN — CHLORHEXIDINE GLUCONATE 0.12% ORAL RINSE 15 ML: 1.2 LIQUID ORAL at 09:26

## 2021-12-13 RX ADMIN — PROPOFOL INJECTABLE EMULSION 45 MCG/KG/MIN: 10 INJECTION, EMULSION INTRAVENOUS at 02:20

## 2021-12-13 RX ADMIN — RISPERIDONE 2 MG: 1 TABLET ORAL at 21:01

## 2021-12-13 RX ADMIN — IPRATROPIUM BROMIDE AND ALBUTEROL SULFATE 1 AMPULE: .5; 3 SOLUTION RESPIRATORY (INHALATION) at 01:08

## 2021-12-13 RX ADMIN — CHLORHEXIDINE GLUCONATE 0.12% ORAL RINSE 15 ML: 1.2 LIQUID ORAL at 21:01

## 2021-12-13 RX ADMIN — SODIUM CHLORIDE, PRESERVATIVE FREE 10 ML: 5 INJECTION INTRAVENOUS at 09:27

## 2021-12-13 RX ADMIN — SODIUM CHLORIDE, PRESERVATIVE FREE 10 ML: 5 INJECTION INTRAVENOUS at 21:02

## 2021-12-13 RX ADMIN — PROPOFOL INJECTABLE EMULSION 49.38 MCG/KG/MIN: 10 INJECTION, EMULSION INTRAVENOUS at 22:59

## 2021-12-13 RX ADMIN — PROPOFOL INJECTABLE EMULSION 45 MCG/KG/MIN: 10 INJECTION, EMULSION INTRAVENOUS at 06:23

## 2021-12-13 RX ADMIN — METOPROLOL TARTRATE 25 MG: 25 TABLET, FILM COATED ORAL at 09:28

## 2021-12-13 RX ADMIN — AZITHROMYCIN DIHYDRATE 500 MG: 500 INJECTION, POWDER, LYOPHILIZED, FOR SOLUTION INTRAVENOUS at 09:24

## 2021-12-13 RX ADMIN — PROPOFOL INJECTABLE EMULSION 50 MCG/KG/MIN: 10 INJECTION, EMULSION INTRAVENOUS at 10:41

## 2021-12-13 ASSESSMENT — PULMONARY FUNCTION TESTS
PIF_VALUE: 27
PIF_VALUE: 19
PIF_VALUE: 23
PIF_VALUE: 19
PIF_VALUE: 23
PIF_VALUE: 23

## 2021-12-13 ASSESSMENT — PAIN SCALES - GENERAL
PAINLEVEL_OUTOF10: 0

## 2021-12-13 NOTE — CARE COORDINATION
12/13/21, 11:14 AM EST  DISCHARGE PLANNING EVALUATION:    Mil Mcgowan       Admitted: 12/11/2021/ [facility] day: 2   Location: -08/008-A Reason for admit: Acute respiratory failure ([facility] [address] [J96.00]  Acute respiratory failure with hypoxia and hypercapnia (HCC) [J96.01, J96.02]  Pneumonia of right lower lobe due to infectious organism [J18.9]   PMH:  has a past medical history of Acute on chronic systolic congestive heart failure ([facility] [address] Emphysema (subcutaneous) (surgical) resulting from a procedure, Hypertension, Pneumonia, and Schizophrenia ([facility] [address] To ED with chest pain and SOB, was placed on a non-re breather and escalated to BiPAP. He was subsequently intubated for failure to improve on BiPAP therapy. Procedure:  12/11 CXR: right lower PNA  12/11 Intubated     Barriers to Discharge:  Remains on vent:  PCV mode, rate 16, PEEP 10, FiO2 40% with sats 91%. Tmax 100.4 (B), Propofol gtt, IVF, Rocephin IV, PPI IV, Zithromax IV, NG for tube feedings, pandya care. WBC 11.6. PCP: Rea Do MD  Readmission Risk Score: 12.9 ( )%  Patient Goals/Plan/Treatment Preferences: Patient baseline O2 3 L nasal cannula from previous documentation; called and left message for Gina's mother Geno Mendes; no answer. Left message to return my call. Transportation/Food Security/Housekeeping Addressed:  No issues identified.

## 2021-12-13 NOTE — PROGRESS NOTES
CRITICAL CARE PROGRESS NOTE      Patient: Gena Overcast    Unit/Bed:4D-08/008-A  YOB: 1970  MRN: [id number]   PCP: Kate Wayne MD  Date of Admission: 12/11/2021  Chief Complaint:-Shortness of breath and chest pain. Assessment and Plan:    1. Acute on Chronic Respiratory Failure: This is secondary to COPD exacerbation and right lower lobe acquired pneumonia. Patient at baseline 3 L nasal cannula. He was intubated in the ED for severe hypoxemia and persistent hypercapnia despite BiPAP. Currently on PCV. Target lung protective strategies. Wean as tolerated  2. Respiratory Acidosis: ABG yesterday showed worsening hypercapnia with PCO2 of 96.  pH was 7. 19. CTA of the chest was obtained to rule out pulmonary embolism due to concern of increased dead space ventilation. This was negative for pulmonary embolism. Repeat ABG showed slight improvement. Patient continues on mechanical ventilation. 3. Severe COPD, with acute exacerbation: This is likely precipitated by community-acquired pneumonia. Patient currently on steroids and bronchodilator therapy. He is also receiving azithromycin for management of community-acquired pneumonia. Patient currently on Solu-Medrol and budesonide. Pulmonary hygiene in place  4. Community-acquired pneumonia of the right lower lobe: Shown on chest x-ray. Patient on Rocephin and azithromycin. Pneumonia panel positive for Moraxella catarrhalis respiratory culture negative to growth. 5. Chest Pain: This is reported on arrival.  EKG showed sinus tachycardia without evidence of ischemia. Troponin was negative x2. CTA chest ruled out PE. Continue to monitor  6. Moderate PAH: RVSP 55-60 mmHg per TTE 5/2020. Tadalafil resumed  7. History of systolic HFpEF: EF recovered to 5 50 to 55% on TTE 5/2020. Continue to monitor I's/O and daily weights  8. Small pericardial effusion:  Noted on TTE 5/2020. Normotensive  9. Hx of HTN: Controlled at this time.   On Toprol with parameters. 10. Hx of Schozphrenia:  On Risperdal, tegretol  11. Hx of Smoking:  Noted  12. Hx of Hyperlipidemia: On Statin  13. Hx of GERD:   On Pepcid. 14. Concern for GIB, resolved: Patient noted to have coffee-ground emesis from  NG/OG tube on 12/11/2021. GI was consulted patient was placed on Protonix twice daily. This is now resolved. H&H is stable. 15. Mild lactic acidosis, resolved:  Continue to monitor  16. MAGNO, resolved: Creatinine 0.6, down from 1.2 on admission. Gentle IV fluid hydration. Continue to monitor  17. Acute Hyperkalemia, resolved:  K 5.2      INITIAL H AND P AND ICU COURSE:  Patient is a 22-year-old male with past medical history of active smoker, stage III COPD, chronic respiratory failure on 3 L nasal cannula baseline, moderate PAH, chronic systolic heart failure with preserved ejection fraction, hypertension, hyperlipidemia, GERD, schizophrenia who presented to [facility] on 12/11/2021 with complaints of dyspnea and chest pain. Per HPI patient presented with family to report he had been ill for an unknown amount of time. Patient was unable to provide any history. In the ED patient was febrile to 101, normotensive, mildly tachycardic and tachypneic with respiratory rate in the 30s. He was hypoxic with SPO2 40% room air. He had elevated CRP, LDH, lactic acid, ferritin as well. His troponin was negative. EKG was negative for ischemia. COVID-19 was negative. ABG was obtained while the patient was on BiPAP showing respiratory acidosis with PCO2 85. Chest x-ray showed right lower lobe infiltrate concerning for pneumonia. Patient failed BiPAP and was intubated. He was made to the ICU for further management for COPD exacerbation right lower lobe. At that time he was started on azithromycin and Rocephin. Pain subsequently he is also positive for Moraxella Catarrhalis. He was also started on Solu-Medrol with DuoNeb's for COPD exacerbation.     12/13: No events overnight. Patient remains mechanically ventilated. Continues on antibiotics    Past Medical History:  Per HPI. Family History:  HTN. Social History: Patient is an active smoker. He had no reported history of substance use or alcohol use. .    ROS   Unable to assess as patient is mechanically ventilated and sedated    Scheduled Meds:   chlorhexidine  15 mL Mouth/Throat BID    calcium replacement protocol   Other RX Placeholder    azithromycin  500 mg IntraVENous Q24H    budesonide  500 mcg Nebulization BID    [Held by provider] tiotropium-olodaterol  2 puff Inhalation Daily    pravastatin  40 mg Oral Nightly    risperiDONE  2 mg Oral Nightly    Tadalafil (PAH)  40 mg Oral Daily    [Held by provider] formoterol  20 mcg Nebulization BID    sodium chloride flush  10 mL IntraVENous 2 times per day    [Held by provider] enoxaparin  40 mg SubCUTAneous Daily    cefTRIAXone (ROCEPHIN) IV  1,000 mg IntraVENous Q24H    ipratropium-albuterol  1 ampule Inhalation Q4H    methylPREDNISolone  40 mg IntraVENous Q8H    [Held by provider] aspirin  325 mg Oral Daily    metoprolol tartrate  25 mg Oral BID    pantoprazole  40 mg IntraVENous BID     Continuous Infusions:   sodium chloride      sodium chloride 100 mL/hr at 12/12/21 2254    propofol 45 mcg/kg/min (12/13/21 0623)       PHYSICAL EXAMINATION:  T: 99.9 . P:  78. RR:  24. B/P:  124/61. FiO2:  40 O2 Sat:  94.  I/O:  2,404/550  Body mass index is 25.41 kg/m². PC: 13/10 TV: 414: RRTotal: 16: Ti:1 sec:   General:   Acute on chronic ill appearing male. HEENT:  normocephalic and atraumatic. No scleral icterus. PERR  Neck: supple. No Thyromegaly. Lungs: clear to auscultation. No retractions  Cardiac: RRR. No JVD. Abdomen: soft. Nontender. Extremities:  No clubbing, cyanosis, or edema x 4. Vasculature: capillary refill < 3 seconds. Palpable dorsalis pedis pulses. Skin:  warm and dry. Psych: Unable to obtain as patient is intubated. Lymph:  No supraclavicular adenopathy. Neurologic:  No focal deficit. No seizures. Data: (All radiographs, tracings, PFTs, and imaging are personally viewed and interpreted unless otherwise noted).  Sodium 139, potassium 5.2, chloride 102, CO2 31, BUN 19, creatinine 0.6, anion gap 6, calcium 1.18, total protein 6.9   WBC 11.6, hemoglobin 15.2, hematocrit 51.7, platelet 202   CRP 17.88, , proBNP 2758, troponin less than 0.010, triglycerides 75   BUN 3.0, alk phos 104, ALT 22, AST 15   Pneumonia panel showed Moraxella Catarrhalis   Respiratory culture showing rare gram-positive cocci in chains with few gram-negative diplococci. Rare gramt positive bacilli and rare gram-negative bacilli   Blood cultures no growth preliminarily    Chest x-ray 12/12/2021 shows decreased right lower lobe infiltrate, with prominent interstitial markings.  CTA chest shows no evidence of pulmonary edema, but consolidation right upper lobe and perihilar region which may represent pneumonia. Mediastinal and right hilar adenopathy are worse compared to prior study.  Telemetry shows normal sinus rhythm    Meets Continued ICU Level Care Criteria:    [x] Yes   [] No - Transfer Planned to listed location:  [] HOSPITALIST CONTACTED- DR     Case and plan discussed with Dr. Johnathan Varner. Electronically signed by Brittany Lopez DO  Patient seen by me. Case discussed with resident physician. Continue with mechanical ventilator support. Continue with antibiotics. Continue to treat for COPD. Italicized font represents changes to the note made by me. CC time 35 minutes. Time was discontiguous. Time does not include procedures. Time does include my direct assessment of the patient and coordination of care.   Electronically signed by Carren Landau, MD on 12/13/2021 at 7:39 PM

## 2021-12-14 ENCOUNTER — APPOINTMENT (OUTPATIENT)
Dept: GENERAL RADIOLOGY | Age: 51
DRG: 870 | End: 2021-12-14
Payer: MEDICARE

## 2021-12-14 LAB
ALBUMIN SERPL-MCNC: 2.8 G/DL (ref 3.5–5.1)
ALP BLD-CCNC: 86 U/L (ref 38–126)
ALT SERPL-CCNC: 17 U/L (ref 11–66)
ANION GAP SERPL CALCULATED.3IONS-SCNC: 7 MEQ/L (ref 8–16)
AST SERPL-CCNC: 18 U/L (ref 5–40)
BILIRUB SERPL-MCNC: 0.2 MG/DL (ref 0.3–1.2)
BUN BLDV-MCNC: 19 MG/DL (ref 7–22)
CALCIUM SERPL-MCNC: 8.5 MG/DL (ref 8.5–10.5)
CHLORIDE BLD-SCNC: 101 MEQ/L (ref 98–111)
CO2: 28 MEQ/L (ref 23–33)
CREAT SERPL-MCNC: 0.5 MG/DL (ref 0.4–1.2)
ERYTHROCYTE [DISTWIDTH] IN BLOOD BY AUTOMATED COUNT: 16.5 % (ref 11.5–14.5)
ERYTHROCYTE [DISTWIDTH] IN BLOOD BY AUTOMATED COUNT: 60.2 FL (ref 35–45)
GFR SERPL CREATININE-BSD FRML MDRD: > 90 ML/MIN/1.73M2
GLUCOSE BLD-MCNC: 119 MG/DL (ref 70–108)
GRAM STAIN RESULT: NORMAL
HCT VFR BLD CALC: 52.8 % (ref 42–52)
HEMOGLOBIN: 16.4 GM/DL (ref 14–18)
MCH RBC QN AUTO: 30.5 PG (ref 26–33)
MCHC RBC AUTO-ENTMCNC: 31.1 GM/DL (ref 32.2–35.5)
MCV RBC AUTO: 98.1 FL (ref 80–94)
PLATELET # BLD: 216 THOU/MM3 (ref 130–400)
PMV BLD AUTO: 9.5 FL (ref 9.4–12.4)
POTASSIUM SERPL-SCNC: 5 MEQ/L (ref 3.5–5.2)
RBC # BLD: 5.38 MILL/MM3 (ref 4.7–6.1)
REASON FOR REJECTION: NORMAL
REJECTED TEST: NORMAL
RESPIRATORY CULTURE: NORMAL
SODIUM BLD-SCNC: 136 MEQ/L (ref 135–145)
TOTAL PROTEIN: 6.8 G/DL (ref 6.1–8)
TRIGL SERPL-MCNC: 96 MG/DL (ref 0–199)
WBC # BLD: 6.8 THOU/MM3 (ref 4.8–10.8)

## 2021-12-14 PROCEDURE — 94640 AIRWAY INHALATION TREATMENT: CPT

## 2021-12-14 PROCEDURE — C9113 INJ PANTOPRAZOLE SODIUM, VIA: HCPCS | Performed by: FAMILY MEDICINE

## 2021-12-14 PROCEDURE — 6360000002 HC RX W HCPCS: Performed by: NURSE PRACTITIONER

## 2021-12-14 PROCEDURE — 99291 CRITICAL CARE FIRST HOUR: CPT | Performed by: SURGERY

## 2021-12-14 PROCEDURE — 2000000000 HC ICU R&B

## 2021-12-14 PROCEDURE — 6370000000 HC RX 637 (ALT 250 FOR IP): Performed by: FAMILY MEDICINE

## 2021-12-14 PROCEDURE — 85027 COMPLETE CBC AUTOMATED: CPT

## 2021-12-14 PROCEDURE — 6370000000 HC RX 637 (ALT 250 FOR IP): Performed by: NURSE PRACTITIONER

## 2021-12-14 PROCEDURE — 71045 X-RAY EXAM CHEST 1 VIEW: CPT

## 2021-12-14 PROCEDURE — 6360000002 HC RX W HCPCS

## 2021-12-14 PROCEDURE — 94003 VENT MGMT INPAT SUBQ DAY: CPT

## 2021-12-14 PROCEDURE — 36415 COLL VENOUS BLD VENIPUNCTURE: CPT

## 2021-12-14 PROCEDURE — 94761 N-INVAS EAR/PLS OXIMETRY MLT: CPT

## 2021-12-14 PROCEDURE — 2700000000 HC OXYGEN THERAPY PER DAY

## 2021-12-14 PROCEDURE — 84478 ASSAY OF TRIGLYCERIDES: CPT

## 2021-12-14 PROCEDURE — 6360000002 HC RX W HCPCS: Performed by: FAMILY MEDICINE

## 2021-12-14 PROCEDURE — 80053 COMPREHEN METABOLIC PANEL: CPT

## 2021-12-14 PROCEDURE — 2580000003 HC RX 258: Performed by: FAMILY MEDICINE

## 2021-12-14 RX ORDER — FENTANYL CITRATE 50 UG/ML
50 INJECTION, SOLUTION INTRAMUSCULAR; INTRAVENOUS ONCE
Status: COMPLETED | OUTPATIENT
Start: 2021-12-14 | End: 2021-12-14

## 2021-12-14 RX ORDER — FENTANYL CITRATE 50 UG/ML
INJECTION, SOLUTION INTRAMUSCULAR; INTRAVENOUS
Status: COMPLETED
Start: 2021-12-14 | End: 2021-12-14

## 2021-12-14 RX ORDER — PROPOFOL 10 MG/ML
5-50 INJECTION, EMULSION INTRAVENOUS CONTINUOUS
Status: DISCONTINUED | OUTPATIENT
Start: 2021-12-14 | End: 2021-12-16

## 2021-12-14 RX ADMIN — PROPOFOL INJECTABLE EMULSION 49.38 MCG/KG/MIN: 10 INJECTION, EMULSION INTRAVENOUS at 03:20

## 2021-12-14 RX ADMIN — RISPERIDONE 2 MG: 1 TABLET ORAL at 22:00

## 2021-12-14 RX ADMIN — CEFTRIAXONE SODIUM 1000 MG: 1 INJECTION, POWDER, FOR SOLUTION INTRAMUSCULAR; INTRAVENOUS at 04:10

## 2021-12-14 RX ADMIN — METHYLPREDNISOLONE SODIUM SUCCINATE 40 MG: 40 INJECTION, POWDER, FOR SOLUTION INTRAMUSCULAR; INTRAVENOUS at 19:06

## 2021-12-14 RX ADMIN — IPRATROPIUM BROMIDE AND ALBUTEROL SULFATE 1 AMPULE: .5; 3 SOLUTION RESPIRATORY (INHALATION) at 12:22

## 2021-12-14 RX ADMIN — SODIUM CHLORIDE: 9 INJECTION, SOLUTION INTRAVENOUS at 03:21

## 2021-12-14 RX ADMIN — PANTOPRAZOLE SODIUM 40 MG: 40 INJECTION, POWDER, FOR SOLUTION INTRAVENOUS at 08:25

## 2021-12-14 RX ADMIN — BUDESONIDE 500 MCG: 0.5 INHALANT RESPIRATORY (INHALATION) at 08:55

## 2021-12-14 RX ADMIN — PROPOFOL 49.38 MCG/KG/MIN: 10 INJECTION, EMULSION INTRAVENOUS at 10:49

## 2021-12-14 RX ADMIN — SODIUM CHLORIDE, PRESERVATIVE FREE 10 ML: 5 INJECTION INTRAVENOUS at 22:00

## 2021-12-14 RX ADMIN — PROPOFOL INJECTABLE EMULSION 49.38 MCG/KG/MIN: 10 INJECTION, EMULSION INTRAVENOUS at 06:43

## 2021-12-14 RX ADMIN — METOPROLOL TARTRATE 25 MG: 25 TABLET, FILM COATED ORAL at 08:25

## 2021-12-14 RX ADMIN — CHLORHEXIDINE GLUCONATE 0.12% ORAL RINSE 15 ML: 1.2 LIQUID ORAL at 22:00

## 2021-12-14 RX ADMIN — METHYLPREDNISOLONE SODIUM SUCCINATE 40 MG: 40 INJECTION, POWDER, FOR SOLUTION INTRAMUSCULAR; INTRAVENOUS at 02:02

## 2021-12-14 RX ADMIN — CHLORHEXIDINE GLUCONATE 0.12% ORAL RINSE 15 ML: 1.2 LIQUID ORAL at 08:25

## 2021-12-14 RX ADMIN — PROPOFOL 50 MCG/KG/MIN: 10 INJECTION, EMULSION INTRAVENOUS at 18:51

## 2021-12-14 RX ADMIN — METHYLPREDNISOLONE SODIUM SUCCINATE 40 MG: 40 INJECTION, POWDER, FOR SOLUTION INTRAMUSCULAR; INTRAVENOUS at 08:25

## 2021-12-14 RX ADMIN — AZITHROMYCIN DIHYDRATE 500 MG: 500 INJECTION, POWDER, LYOPHILIZED, FOR SOLUTION INTRAVENOUS at 08:22

## 2021-12-14 RX ADMIN — BUDESONIDE 500 MCG: 0.5 INHALANT RESPIRATORY (INHALATION) at 21:03

## 2021-12-14 RX ADMIN — ACETAMINOPHEN 650 MG: 325 TABLET ORAL at 16:35

## 2021-12-14 RX ADMIN — ACETAMINOPHEN 650 MG: 325 TABLET ORAL at 08:36

## 2021-12-14 RX ADMIN — PANTOPRAZOLE SODIUM 40 MG: 40 INJECTION, POWDER, FOR SOLUTION INTRAVENOUS at 22:00

## 2021-12-14 RX ADMIN — IPRATROPIUM BROMIDE AND ALBUTEROL SULFATE 1 AMPULE: .5; 3 SOLUTION RESPIRATORY (INHALATION) at 05:03

## 2021-12-14 RX ADMIN — IPRATROPIUM BROMIDE AND ALBUTEROL SULFATE 1 AMPULE: .5; 3 SOLUTION RESPIRATORY (INHALATION) at 21:03

## 2021-12-14 RX ADMIN — PROPOFOL 50 MCG/KG/MIN: 10 INJECTION, EMULSION INTRAVENOUS at 23:25

## 2021-12-14 RX ADMIN — IPRATROPIUM BROMIDE AND ALBUTEROL SULFATE 1 AMPULE: .5; 3 SOLUTION RESPIRATORY (INHALATION) at 16:08

## 2021-12-14 RX ADMIN — METOPROLOL TARTRATE 25 MG: 25 TABLET, FILM COATED ORAL at 22:00

## 2021-12-14 RX ADMIN — PRAVASTATIN SODIUM 40 MG: 40 TABLET ORAL at 22:00

## 2021-12-14 RX ADMIN — PROPOFOL 50 MCG/KG/MIN: 10 INJECTION, EMULSION INTRAVENOUS at 15:30

## 2021-12-14 RX ADMIN — IPRATROPIUM BROMIDE AND ALBUTEROL SULFATE 1 AMPULE: .5; 3 SOLUTION RESPIRATORY (INHALATION) at 08:55

## 2021-12-14 RX ADMIN — SODIUM CHLORIDE: 9 INJECTION, SOLUTION INTRAVENOUS at 15:33

## 2021-12-14 RX ADMIN — FENTANYL CITRATE 50 MCG: 50 INJECTION INTRAMUSCULAR; INTRAVENOUS at 21:16

## 2021-12-14 RX ADMIN — SODIUM CHLORIDE, PRESERVATIVE FREE 10 ML: 5 INJECTION INTRAVENOUS at 08:27

## 2021-12-14 RX ADMIN — FENTANYL CITRATE 50 MCG: 50 INJECTION, SOLUTION INTRAMUSCULAR; INTRAVENOUS at 21:16

## 2021-12-14 ASSESSMENT — PAIN SCALES - GENERAL
PAINLEVEL_OUTOF10: 0
PAINLEVEL_OUTOF10: 7
PAINLEVEL_OUTOF10: 8
PAINLEVEL_OUTOF10: 8
PAINLEVEL_OUTOF10: 1
PAINLEVEL_OUTOF10: 4
PAINLEVEL_OUTOF10: 7
PAINLEVEL_OUTOF10: 1
PAINLEVEL_OUTOF10: 0
PAINLEVEL_OUTOF10: 0

## 2021-12-14 ASSESSMENT — PULMONARY FUNCTION TESTS
PIF_VALUE: 26
PIF_VALUE: 26
PIF_VALUE: 27
PIF_VALUE: 26
PIF_VALUE: 25
PIF_VALUE: 24

## 2021-12-14 NOTE — FLOWSHEET NOTE
12/14/21 1025   Encounter Summary   Services provided to: Patient   Referral/Consult From: Rounding   Continue Visiting Yes  (12/14 NR)   Complexity of Encounter Low   Length of Encounter 15 minutes   Routine   Type Initial   Assessment Unable to respond   Intervention Prayer   In my encounter with the 51yr old patient, I attempted to see the patient, but they were unresponsive at this time. No family was present in the room. I offered a prayer at the pts side. A  will attempt to see the patient at a later time as a follow up. The pt was admitted due to acute respiratory failure.

## 2021-12-14 NOTE — PROGRESS NOTES
CRITICAL CARE PROGRESS NOTE      Patient: Radha Walls    Unit/Bed:4D-08/008-A  YOB: 1970  MRN: [id number]   PCP: Jcaob Parisi MD  Date of Admission: 12/11/2021  Chief Complaint:-Shortness of breath and chest pain. Assessment and Plan:    1. Acute on Chronic Respiratory Failure: This is secondary to COPD exacerbation and right lower lobe acquired pneumonia. Patient at baseline 3 L nasal cannula. He was intubated in the ED for severe hypoxemia and persistent hypercapnia despite BiPAP. Currently on PCV. Target lung protective strategies. Wean as tolerated. 2. Respiratory Acidosis: ABG 12/12/2021 showed worsening hypercapnia with PCO2 of 96.  pH was 7. 19. CTA of the chest was obtained to rule out pulmonary embolism due to concern of increased dead space ventilation. This was negative for pulmonary embolism. Repeat ABG showed slight improvement. Patient continues on mechanical ventilation. 3. Severe COPD, with acute exacerbation: This is likely precipitated by community-acquired pneumonia. Patient currently on steroids and bronchodilator therapy. He is also receiving azithromycin for management of community-acquired pneumonia. Patient currently on Solu-Medrol and budesonide. Pulmonary hygiene in place. 4. Community-acquired pneumonia of the right lower lobe: Shown on chest x-ray. Patient on Rocephin and azithromycin. Pneumonia panel positive for Moraxella catarrhalis respiratory culture showed rare gram positive cocci in chains, few gram negative diplococci and rare gram positive bacilli. 5. Chest Pain: This is reported on arrival.  EKG showed sinus tachycardia without evidence of ischemia. Troponin was negative x2. CTA chest ruled out PE. Continue to monitor  6. Moderate PAH: RVSP 55-60 mmHg per TTE 5/2020. Tadalafil resumed  7. History of systolic HFpEF: EF recovered to 50 to 55% on TTE 5/2020. Continue to monitor I/O's and daily weights  8.  Small pericardial effusion: Noted on TTE 5/2020. Normotensive  9. Hx of HTN: Controlled at this time. On Toprol with parameters. 10. Hx of Schozphrenia:  On Risperdal, tegretol  11. Hx of Smoking:  Noted  12. Hx of Hyperlipidemia: On Statin  13. Hx of GERD:   On Pepcid. 14. Concern for GIB, resolved: Patient noted to have coffee-ground emesis from  NG/OG tube on 12/11/2021. GI was consulted patient was placed on Protonix twice daily. This is now resolved. H&H is stable. 15. Mild lactic acidosis, resolved:  Continue to monitor  16. MAGNO, resolved: Creatinine 0.6, down from 1.2 on admission. Gentle IV fluid hydration. Continue to monitor  17. Acute Hyperkalemia, resolved:  K 5.2      INITIAL H AND P AND ICU COURSE:  Patient is a 20-year-old male with past medical history of active smoker, stage III COPD, chronic respiratory failure on 3 L nasal cannula baseline, moderate PAH, chronic systolic heart failure with preserved ejection fraction, hypertension, hyperlipidemia, GERD, schizophrenia who presented to [facility] on 12/11/2021 with complaints of dyspnea and chest pain. Per HPI patient presented with family to report he had been ill for an unknown amount of time. Patient was unable to provide any history. In the ED patient was febrile to 101, normotensive, mildly tachycardic and tachypneic with respiratory rate in the 30s. He was hypoxic with SPO2 40% room air. He had elevated CRP, LDH, lactic acid, ferritin as well. His troponin was negative. EKG was negative for ischemia. COVID-19 was negative. ABG was obtained while the patient was on BiPAP showing respiratory acidosis with PCO2 85. Chest x-ray showed right lower lobe infiltrate concerning for pneumonia. Patient failed BiPAP and was intubated. He was made to the ICU for further management for COPD exacerbation right lower lobe. At that time he was started on azithromycin and Rocephin. Pain subsequently he is also positive for Moraxella Catarrhalis. He was also started on Solu-Medrol with DuoNeb's for COPD exacerbation. 12/13: No events overnight. Patient remains mechanically ventilated. Continues on antibiotics    12/14: No events overnight. Patient remains mechanically ventilated and unable to be weaned. Past Medical History:  Per HPI. Family History:  HTN. Social History: Patient is an active smoker. He had no reported history of substance use or alcohol use. .    ROS   Unable to assess as patient is mechanically ventilated and sedated    Scheduled Meds:   chlorhexidine  15 mL Mouth/Throat BID    calcium replacement protocol   Other RX Placeholder    azithromycin  500 mg IntraVENous Q24H    budesonide  500 mcg Nebulization BID    [Held by provider] tiotropium-olodaterol  2 puff Inhalation Daily    pravastatin  40 mg Oral Nightly    risperiDONE  2 mg Oral Nightly    Tadalafil (PAH)  40 mg Oral Daily    [Held by provider] formoterol  20 mcg Nebulization BID    sodium chloride flush  10 mL IntraVENous 2 times per day    [Held by provider] enoxaparin  40 mg SubCUTAneous Daily    cefTRIAXone (ROCEPHIN) IV  1,000 mg IntraVENous Q24H    ipratropium-albuterol  1 ampule Inhalation Q4H    methylPREDNISolone  40 mg IntraVENous Q8H    [Held by provider] aspirin  325 mg Oral Daily    metoprolol tartrate  25 mg Oral BID    pantoprazole  40 mg IntraVENous BID     Continuous Infusions:   propofol 50 mcg/kg/min (12/14/21 1530)    sodium chloride      sodium chloride 100 mL/hr at 12/14/21 1533       PHYSICAL EXAMINATION:  T: 100.2. P:  75. RR:  16  B/P:  130/78  FiO2:  50 O2 Sat:  95.  I/O:  4,454/1,500  Body mass index is 28.49 kg/m². PC: 26/10 TV: 423: RRTotal: 16: Ti:1 sec:   General:   Acute on chronic ill appearing male. HEENT:  normocephalic and atraumatic. No scleral icterus. PERR  Neck: supple. No Thyromegaly. Lungs: clear to auscultation. No retractions  Cardiac: RRR. No JVD. Abdomen: soft. Nontender.   Extremities:  No clubbing, cyanosis, or edema x 4. Vasculature: capillary refill < 3 seconds. Palpable dorsalis pedis pulses. Skin:  warm and dry. Psych: Unable to obtain as patient is intubated. Lymph:  No supraclavicular adenopathy. Neurologic:  No focal deficit. No seizures. Data: (All radiographs, tracings, PFTs, and imaging are personally viewed and interpreted unless otherwise noted).  Labs for today pending.  Triglyceride 96   12/13/2021: Sodium 139, potassium 5.2, chloride 102, CO2 31, BUN 19, creatinine 0.6, anion gap 6, calcium 1.18, total protein 6.9   12/13/2021: WBC 11.6, hemoglobin 15.2, hematocrit 51.7, platelet 732   CRP 79.56, , proBNP 2758, troponin less than 0.010, triglycerides 75   BUN 3.0, alk phos 104, ALT 22, AST 15   Pneumonia panel showed Moraxella Catarrhalis   Respiratory culture showing rare gram-positive cocci in chains with few gram-negative diplococci. Rare gramt positive bacilli and rare gram-negative bacilli   Blood cultures no growth preliminarily    Chest x-ray 12/12/2021 shows decreased right lower lobe infiltrate, with prominent interstitial markings.  CTA chest shows no evidence of pulmonary edema, but consolidation right upper lobe and perihilar region which may represent pneumonia. Mediastinal and right hilar adenopathy are worse compared to prior study.  Telemetry shows normal sinus rhythm    Meets Continued ICU Level Care Criteria:    [x] Yes   [] No - Transfer Planned to listed location:  [] HOSPITALIST CONTACTED-      Case and plan discussed with Dr. Candie Dickey       Electronically signed by Ashutosh Tan DO on 12/14/2021 at 4:40 PM      Case was discussed with the resident. Was seen independently by myself. Patient has been hemodynamically stable no pressors required. FiO2 of 50%. Patient 20/10: Volume 739. Will will start to wean pressure control settings as patient oxygenating well. CC time 35 minutes.   Time was discontiguous and does not include procedures. Continue antibiotics continue COPD treatment.   Electronically signed by Mckenna Acosta MD on 12/14/2021 at 7:18 PM

## 2021-12-15 LAB
ALBUMIN SERPL-MCNC: 2.8 G/DL (ref 3.5–5.1)
ALP BLD-CCNC: 82 U/L (ref 38–126)
ALT SERPL-CCNC: 17 U/L (ref 11–66)
ANION GAP SERPL CALCULATED.3IONS-SCNC: 7 MEQ/L (ref 8–16)
AST SERPL-CCNC: 14 U/L (ref 5–40)
BILIRUB SERPL-MCNC: 0.2 MG/DL (ref 0.3–1.2)
BUN BLDV-MCNC: 18 MG/DL (ref 7–22)
CALCIUM SERPL-MCNC: 8.2 MG/DL (ref 8.5–10.5)
CHLORIDE BLD-SCNC: 101 MEQ/L (ref 98–111)
CO2: 31 MEQ/L (ref 23–33)
CREAT SERPL-MCNC: 0.5 MG/DL (ref 0.4–1.2)
ERYTHROCYTE [DISTWIDTH] IN BLOOD BY AUTOMATED COUNT: 16.4 % (ref 11.5–14.5)
ERYTHROCYTE [DISTWIDTH] IN BLOOD BY AUTOMATED COUNT: 60 FL (ref 35–45)
GFR SERPL CREATININE-BSD FRML MDRD: > 90 ML/MIN/1.73M2
GLUCOSE BLD-MCNC: 107 MG/DL (ref 70–108)
HCT VFR BLD CALC: 47.7 % (ref 42–52)
HEMOGLOBIN: 14.8 GM/DL (ref 14–18)
MCH RBC QN AUTO: 30.5 PG (ref 26–33)
MCHC RBC AUTO-ENTMCNC: 31 GM/DL (ref 32.2–35.5)
MCV RBC AUTO: 98.1 FL (ref 80–94)
PLATELET # BLD: 209 THOU/MM3 (ref 130–400)
PMV BLD AUTO: 9.4 FL (ref 9.4–12.4)
POTASSIUM SERPL-SCNC: 4.7 MEQ/L (ref 3.5–5.2)
RBC # BLD: 4.86 MILL/MM3 (ref 4.7–6.1)
SODIUM BLD-SCNC: 139 MEQ/L (ref 135–145)
TOTAL PROTEIN: 5.9 G/DL (ref 6.1–8)
WBC # BLD: 6 THOU/MM3 (ref 4.8–10.8)

## 2021-12-15 PROCEDURE — 2580000003 HC RX 258: Performed by: FAMILY MEDICINE

## 2021-12-15 PROCEDURE — 94003 VENT MGMT INPAT SUBQ DAY: CPT

## 2021-12-15 PROCEDURE — 2500000003 HC RX 250 WO HCPCS: Performed by: FAMILY MEDICINE

## 2021-12-15 PROCEDURE — 94640 AIRWAY INHALATION TREATMENT: CPT

## 2021-12-15 PROCEDURE — 6360000002 HC RX W HCPCS: Performed by: FAMILY MEDICINE

## 2021-12-15 PROCEDURE — C9113 INJ PANTOPRAZOLE SODIUM, VIA: HCPCS | Performed by: FAMILY MEDICINE

## 2021-12-15 PROCEDURE — 6370000000 HC RX 637 (ALT 250 FOR IP): Performed by: FAMILY MEDICINE

## 2021-12-15 PROCEDURE — 99291 CRITICAL CARE FIRST HOUR: CPT | Performed by: INTERNAL MEDICINE

## 2021-12-15 PROCEDURE — 94761 N-INVAS EAR/PLS OXIMETRY MLT: CPT

## 2021-12-15 PROCEDURE — 2000000000 HC ICU R&B

## 2021-12-15 PROCEDURE — 80053 COMPREHEN METABOLIC PANEL: CPT

## 2021-12-15 PROCEDURE — 6370000000 HC RX 637 (ALT 250 FOR IP): Performed by: NURSE PRACTITIONER

## 2021-12-15 PROCEDURE — 2700000000 HC OXYGEN THERAPY PER DAY

## 2021-12-15 PROCEDURE — 85027 COMPLETE CBC AUTOMATED: CPT

## 2021-12-15 PROCEDURE — 6360000002 HC RX W HCPCS: Performed by: NURSE PRACTITIONER

## 2021-12-15 PROCEDURE — 36415 COLL VENOUS BLD VENIPUNCTURE: CPT

## 2021-12-15 RX ADMIN — CHLORHEXIDINE GLUCONATE 0.12% ORAL RINSE 15 ML: 1.2 LIQUID ORAL at 21:39

## 2021-12-15 RX ADMIN — AZITHROMYCIN DIHYDRATE 500 MG: 500 INJECTION, POWDER, LYOPHILIZED, FOR SOLUTION INTRAVENOUS at 09:42

## 2021-12-15 RX ADMIN — IPRATROPIUM BROMIDE AND ALBUTEROL SULFATE 1 AMPULE: .5; 3 SOLUTION RESPIRATORY (INHALATION) at 09:56

## 2021-12-15 RX ADMIN — SODIUM CHLORIDE: 9 INJECTION, SOLUTION INTRAVENOUS at 16:58

## 2021-12-15 RX ADMIN — BUDESONIDE 500 MCG: 0.5 INHALANT RESPIRATORY (INHALATION) at 09:56

## 2021-12-15 RX ADMIN — IPRATROPIUM BROMIDE AND ALBUTEROL SULFATE 1 AMPULE: .5; 3 SOLUTION RESPIRATORY (INHALATION) at 00:56

## 2021-12-15 RX ADMIN — METOPROLOL TARTRATE 25 MG: 25 TABLET, FILM COATED ORAL at 09:43

## 2021-12-15 RX ADMIN — IPRATROPIUM BROMIDE AND ALBUTEROL SULFATE 1 AMPULE: .5; 3 SOLUTION RESPIRATORY (INHALATION) at 20:41

## 2021-12-15 RX ADMIN — PROPOFOL 45 MCG/KG/MIN: 10 INJECTION, EMULSION INTRAVENOUS at 20:19

## 2021-12-15 RX ADMIN — METHYLPREDNISOLONE SODIUM SUCCINATE 40 MG: 40 INJECTION, POWDER, FOR SOLUTION INTRAMUSCULAR; INTRAVENOUS at 02:00

## 2021-12-15 RX ADMIN — RISPERIDONE 2 MG: 1 TABLET ORAL at 21:38

## 2021-12-15 RX ADMIN — PROPOFOL 20 MCG/KG/MIN: 10 INJECTION, EMULSION INTRAVENOUS at 06:21

## 2021-12-15 RX ADMIN — Medication 75 MCG/HR: at 16:57

## 2021-12-15 RX ADMIN — METHYLPREDNISOLONE SODIUM SUCCINATE 40 MG: 40 INJECTION, POWDER, FOR SOLUTION INTRAMUSCULAR; INTRAVENOUS at 10:28

## 2021-12-15 RX ADMIN — BUDESONIDE 500 MCG: 0.5 INHALANT RESPIRATORY (INHALATION) at 20:41

## 2021-12-15 RX ADMIN — ACETAMINOPHEN 650 MG: 650 SUPPOSITORY RECTAL at 09:44

## 2021-12-15 RX ADMIN — IPRATROPIUM BROMIDE AND ALBUTEROL SULFATE 1 AMPULE: .5; 3 SOLUTION RESPIRATORY (INHALATION) at 17:00

## 2021-12-15 RX ADMIN — CHLORHEXIDINE GLUCONATE 0.12% ORAL RINSE 15 ML: 1.2 LIQUID ORAL at 09:44

## 2021-12-15 RX ADMIN — PANTOPRAZOLE SODIUM 40 MG: 40 INJECTION, POWDER, FOR SOLUTION INTRAVENOUS at 21:39

## 2021-12-15 RX ADMIN — IPRATROPIUM BROMIDE AND ALBUTEROL SULFATE 1 AMPULE: .5; 3 SOLUTION RESPIRATORY (INHALATION) at 13:24

## 2021-12-15 RX ADMIN — PRAVASTATIN SODIUM 40 MG: 40 TABLET ORAL at 21:38

## 2021-12-15 RX ADMIN — Medication 25 MCG/HR: at 02:23

## 2021-12-15 RX ADMIN — PANTOPRAZOLE SODIUM 40 MG: 40 INJECTION, POWDER, FOR SOLUTION INTRAVENOUS at 09:44

## 2021-12-15 RX ADMIN — Medication 75 MCG/HR: at 09:46

## 2021-12-15 RX ADMIN — SODIUM CHLORIDE, PRESERVATIVE FREE 10 ML: 5 INJECTION INTRAVENOUS at 21:39

## 2021-12-15 RX ADMIN — SODIUM CHLORIDE: 9 INJECTION, SOLUTION INTRAVENOUS at 03:30

## 2021-12-15 RX ADMIN — FENTANYL CITRATE 75 MCG/HR: 50 INJECTION, SOLUTION INTRAMUSCULAR; INTRAVENOUS at 21:40

## 2021-12-15 RX ADMIN — SODIUM CHLORIDE, PRESERVATIVE FREE 10 ML: 5 INJECTION INTRAVENOUS at 09:45

## 2021-12-15 RX ADMIN — METHYLPREDNISOLONE SODIUM SUCCINATE 40 MG: 40 INJECTION, POWDER, FOR SOLUTION INTRAMUSCULAR; INTRAVENOUS at 18:19

## 2021-12-15 RX ADMIN — CEFTRIAXONE SODIUM 1000 MG: 1 INJECTION, POWDER, FOR SOLUTION INTRAMUSCULAR; INTRAVENOUS at 05:00

## 2021-12-15 RX ADMIN — PROPOFOL 45 MCG/KG/MIN: 10 INJECTION, EMULSION INTRAVENOUS at 11:45

## 2021-12-15 RX ADMIN — METOPROLOL TARTRATE 25 MG: 25 TABLET, FILM COATED ORAL at 21:38

## 2021-12-15 RX ADMIN — Medication 75 MCG/HR: at 21:29

## 2021-12-15 RX ADMIN — IPRATROPIUM BROMIDE AND ALBUTEROL SULFATE 1 AMPULE: .5; 3 SOLUTION RESPIRATORY (INHALATION) at 04:41

## 2021-12-15 RX ADMIN — PROPOFOL 45 MCG/KG/MIN: 10 INJECTION, EMULSION INTRAVENOUS at 15:35

## 2021-12-15 ASSESSMENT — PAIN SCALES - GENERAL
PAINLEVEL_OUTOF10: 0

## 2021-12-15 ASSESSMENT — PULMONARY FUNCTION TESTS
PIF_VALUE: 19
PIF_VALUE: 19
PIF_VALUE: 18
PIF_VALUE: 18
PIF_VALUE: 22
PIF_VALUE: 28

## 2021-12-15 NOTE — CARE COORDINATION
12/15/21, 11:31 AM EST    DISCHARGE ON GOING EVALUATION    Satish Encinas day: 4  Location: -08/008-A Reason for admit: Acute respiratory failure ([facility] [address] [J96.00]  Acute respiratory failure with hypoxia and hypercapnia ([facility] [address] [J96.01, J96.02]  Pneumonia of right lower lobe due to infectious organism [J18.9]   Procedure:   12/11 CXR: right lower PNA  12/11 Intubated   12/12 CTA Chest:   1. No evidence of pulmonary and less. 2. Consolidation in the right upper lobe at the perihilar region extending to the major fissure may represent pneumonia superimposed on the patient's interstitial process. 3. Worsening interstitial disease compared to 5/24/2020.   4. Mediastinal and right hilar adenopathy as worsened compared to prior CT     12/14 CXR:   1. There are patchy airspace opacities overlying the right mid and lower lung zones as well as the medial left lung base which likely represents multifocal pneumonia.        2. Mild hazy opacity at the right lung base may also represent a small right pleural effusion     Barriers to Discharge: Remains on vent w/ETT on PCV, Peep 8, FIO2 50%, sats 98%. Tmax 103. 3. NSR. Dietitian consulted. Unable to follow commands; DE x4 to painful stim. Telemetry, OG w/TF, pandya, SCDs. IVF, fentanyl @ 75 mcg/hr, diprivan @ 45 mcg/kg/min, IV zithromax, nebs, IV rocephin, IV solumedrol 40 mg Q8H, lopressor, IV protonix, pravastatin, risperdal, tadalafil, Electrolyte replacement protocols. Alb 2.8,   PCP: Kathryn Sen MD  Readmission Risk Score: 13 ( )%  Patient Goals/Plan/Treatment Preferences: Attempted to call Sandie's mother, Jewell Parisi, at [phone]; no answer, left generic message to call this CM. Still need meet & greet. Plan pending course - on vent.

## 2021-12-15 NOTE — PROGRESS NOTES
Comprehensive Nutrition Assessment    Type and Reason for Visit:  Reassess (TF check)    Nutrition Recommendations/Plan:   Per RN plan restart TF this morning. 2 tri HN with goal of 30 ml/hr as tolerated and bolus 2.5 oz. Proteinex BID with current diprivan rate. Additional free water flush per MD.  Will monitor Diprivan rate vs. Need to adjust TF goal rate. Monitor ability for bm. Pt day 4 admit. Continue weight checks. Varied weight this admit,   Nutrition Assessment:    Pt. nutritionally compromised AEB NPO status d/t intubation was receiving TF yesterday &  per RN plan restart TF this morning. At risk for further nutrition compromise r/t respiratory failure, pneumonia and underlying medical condition (hx CHF, COPD, HTN, Schizophrenia). Nutrition recommendations/interventions as per above. Malnutrition Assessment:  Malnutrition Status:  Insufficient data    Context:  Acute Illness     Findings of the 6 clinical characteristics of malnutrition:  Energy Intake:  Unable to assess  Weight Loss:  Unable to assess     Body Fat Loss:  Unable to assess     Muscle Mass Loss:  Unable to assess    Fluid Accumulation:  Unable to assess     Strength:  Not Performed    Estimated Daily Nutrient Needs:  Energy (kcal):  6949-1016 kcals (20-25); Weight Used for Energy Requirements:   (83 kgm)     Protein (g):  125+ grams (1.5+); Weight Used for Protein Requirements:   (83 kgm)        Fluid (ml/day):  per MD; Method Used for Fluid Requirements:  Other (Comment)      Nutrition Related Findings:    Pt. Intubated 12/11; sedated with Diprivan, per Kiley Casarez RN , plan restart TF this morning. Pt is NPO. 0.9 at 100 ml/hr   Monitor ability for bms, no bm recorded yet, day 4 admit. MAP 86, K+ 4.7- now wnl, BUN 18, Cr 0.5 ( BUN & Cr improved), glucose 107. meds include Diprivan, Solumedrol, ATB.      Wounds:  None       Current Nutrition Therapies:    Diet NPO  Plan restart TF  ADULT TUBE FEEDING; Orogastric; 2 tri HN; Continuous at 30 ml/hr & 1 bolus 2.5 oz. proteinex [id number]=1222 kcals TF ( 1440 TF + 208 protein+ 262 diprivan), 112 grams protein, ( 60 TF + 52 modular) , 157 grams CHO, 504 ml free H20 in 720 ml TF/24 hours. Free H20 flush per Dr  Additional Calorie Sources:   diprivan at 9.9 ml/hr = 261 kcals from IV lipid emulsion /24 hourrs    Anthropometric Measures:  · Height: 6' (182.9 cm)  · Current Body Weight: 210 lb 1.6 oz (95.3 kg) (12/14)   · Admission Body Weight: 182 lb 5.1 oz (82.7 kg) (12/11 no edema)    · Usual Body Weight:  (per EMR: 12/21/19: 270# 11.6 oz)     · Ideal Body Weight: 178 lbs;   · BMI: 28.5   · BMI Categories: Overweight (BMI 25.0-29.9) (varied weight this admit)       Nutrition Diagnosis:   · Inadequate oral intake related to impaired respiratory function as evidenced by NPO or clear liquid status due to medical condition, intubation      Nutrition Interventions:   Food and/or Nutrient Delivery:   (Per RN plan restart TF this morning)  Nutrition Education/Counseling:  Education not appropriate   Coordination of Nutrition Care:  Continue to monitor while inpatient    Goals:  TF to provide % of nutrient needs while pt is intubated. Nutrition Monitoring and Evaluation:   Behavioral-Environmental Outcomes:  None Identified   Food/Nutrient Intake Outcomes:  Enteral Nutrition Intake/Tolerance  Physical Signs/Symptoms Outcomes:  Biochemical Data, GI Status, Fluid Status or Edema, Hemodynamic Status, Nutrition Focused Physical Findings, Skin, Weight     Discharge Planning:     Too soon to determine     Electronically signed by Azeem Langford RD, LD on 12/15/21 at 10:37 AM EST    Contact: [phone]

## 2021-12-15 NOTE — PROGRESS NOTES
CRITICAL CARE PROGRESS NOTE      Patient: Italo Frances    Unit/Bed:4D-08/008-A  YOB: 1970  MRN: [id number]   PCP: Osito Alfaro MD  Date of Admission: 12/11/2021  Chief Complaint:-Shortness of breath and chest pain. Assessment and Plan:    1. Acute on Chronic Respiratory Failure: This is secondary to COPD exacerbation and right lower lobe acquired pneumonia. Patient at baseline 3 L nasal cannula. He was intubated in the ED for severe hypoxemia and persistent hypercapnia despite BiPAP. Currently on PCV. Target lung protective strategies. Wean as tolerated. 2. Severe COPD, with acute exacerbation: This is likely precipitated by community-acquired pneumonia. Patient currently on steroids and bronchodilator therapy. He is also receiving azithromycin for management of community-acquired pneumonia. Patient currently on Solu-Medrol and budesonide. Pulmonary hygiene in place. 3. Community-acquired pneumonia of the right lower lobe: Shown on chest x-ray. Patient on Rocephin and azithromycin. Pneumonia panel positive for Moraxella catarrhalis respiratory culture showed rare gram positive cocci in chains, few gram negative diplococci and rare gram positive bacilli. 4. Chest Pain: This is reported on arrival.  EKG showed sinus tachycardia without evidence of ischemia. Troponin was negative x2. CTA chest ruled out PE. Continue to monitor. 5. Moderate PAH: RVSP 55-60 mmHg per TTE 5/2020. Tadalafil resumed  6. History of systolic HFpEF: EF recovered to 50 to 55% on TTE 5/2020. Continue to monitor I/O's and daily weights  7. Small pericardial effusion:  Noted on TTE 5/2020. Normotensive  8. Hx of HTN: Controlled at this time. On Toprol with parameters. 9. Hx of Schozphrenia:  On Risperdal, tegretol  10. Hx of Smoking:  Noted  11. Hx of Hyperlipidemia: On Statin  12. Hx of GERD:   On Pepcid.    13. Concern for GIB, resolved: Patient noted to have coffee-ground emesis from  NG/OG tube on 12/11/2021. GI was consulted patient was placed on Protonix twice daily. This is now resolved. H&H is stable. 14. Mild lactic acidosis, resolved:  Continue to monitor  15. MAGNO, resolved: Creatinine 0.5, down from 1.2 on admission. Gentle IV fluid hydration. Continue to monitor  16. Acute Hyperkalemia, resolved: Continue to monitor      INITIAL H AND P AND ICU COURSE:  Patient is a 57-year-old male with past medical history of active smoker, stage III COPD, chronic respiratory failure on 3 L nasal cannula baseline, moderate PAH, chronic systolic heart failure with preserved ejection fraction, hypertension, hyperlipidemia, GERD, schizophrenia who presented to [facility] on 12/11/2021 with complaints of dyspnea and chest pain. Per HPI patient presented with family to report he had been ill for an unknown amount of time. Patient was unable to provide any history. In the ED patient was febrile to 101, normotensive, mildly tachycardic and tachypneic with respiratory rate in the 30s. He was hypoxic with SPO2 40% room air. He had elevated CRP, LDH, lactic acid, ferritin as well. His troponin was negative. EKG was negative for ischemia. COVID-19 was negative. ABG was obtained while the patient was on BiPAP showing respiratory acidosis with PCO2 85. Chest x-ray showed right lower lobe infiltrate concerning for pneumonia. Patient failed BiPAP and was intubated. He was made to the ICU for further management for COPD exacerbation right lower lobe. At that time he was started on azithromycin and Rocephin. Pain subsequently he is also positive for Moraxella Catarrhalis. He was also started on Solu-Medrol with DuoNeb's for COPD exacerbation. 12/13: No events overnight. Patient remains mechanically ventilated. Continues on antibiotics    12/14: No events overnight. Patient remains mechanically ventilated and unable to be weaned. 12/15: Patient required more sedation overnight.  Did spike fever today. Continues to be mechanically ventilated. Attempting to wean    Past Medical History:  Per HPI. Family History:  HTN. Social History: Patient is an active smoker. He had no reported history of substance use or alcohol use. .    ROS   Unable to assess as patient is mechanically ventilated and sedated    Scheduled Meds:   chlorhexidine  15 mL Mouth/Throat BID    calcium replacement protocol   Other RX Placeholder    azithromycin  500 mg IntraVENous Q24H    budesonide  500 mcg Nebulization BID    [Held by provider] tiotropium-olodaterol  2 puff Inhalation Daily    pravastatin  40 mg Oral Nightly    risperiDONE  2 mg Oral Nightly    Tadalafil (PAH)  40 mg Oral Daily    [Held by provider] formoterol  20 mcg Nebulization BID    sodium chloride flush  10 mL IntraVENous 2 times per day    [Held by provider] enoxaparin  40 mg SubCUTAneous Daily    cefTRIAXone (ROCEPHIN) IV  1,000 mg IntraVENous Q24H    ipratropium-albuterol  1 ampule Inhalation Q4H    methylPREDNISolone  40 mg IntraVENous Q8H    [Held by provider] aspirin  325 mg Oral Daily    metoprolol tartrate  25 mg Oral BID    pantoprazole  40 mg IntraVENous BID     Continuous Infusions:   propofol 45 mcg/kg/min (12/15/21 1145)    fentaNYL 500 mcg in sodium chloride 0.9% 100 ml infusion 75 mcg/hr (12/15/21 0946)    sodium chloride      sodium chloride 100 mL/hr at 12/15/21 0600       PHYSICAL EXAMINATION:  T: 99.1.  P:  64. RR:  16  B/P:  130/78  FiO2:  45 O2 Sat:  98.  I/O:  4,155/1,350  Body mass index is 28.49 kg/m². PC: 26/10 TV: 423: RRTotal: 16: Ti:1 sec:   General:   Acute on chronic ill appearing male. HEENT:  normocephalic and atraumatic. No scleral icterus. PERR  Neck: supple. No Thyromegaly. Lungs: clear to auscultation. No retractions  Cardiac: RRR. No JVD. Abdomen: soft. Nontender. Extremities:  No clubbing, cyanosis, or edema x 4. Vasculature: capillary refill < 3 seconds.  Palpable dorsalis pedis pulses. Skin:  warm and dry. Psych: Unable to obtain as patient is intubated. Lymph:  No supraclavicular adenopathy. Neurologic:  No focal deficit. No seizures. Data: (All radiographs, tracings, PFTs, and imaging are personally viewed and interpreted unless otherwise noted).  Sodium 139, potassium 4.7, chloride 1 1, CO2 31, BUN 18, creatinine 0.5, anion gap 7, glucose 107, calcium 8.2   Albumin 2.8, alk phos 82, ALT 17, AST 14, bilirubin 0.2   WBC 6.0, hemoglobin 14, hematocrit 47.7, platelets 137   Chest x-ray 12/14/2021 shows patchy airspace opacities bilaterally consistent with multifocal pneumonia along with possible small right pleural effusion.  Triglyceride 96    CRP 16.27, , proBNP 2758, troponin less than 0.010, triglycerides 75   BUN 3.0, alk phos 104, ALT 22, AST 15   Pneumonia panel showed Moraxella Catarrhalis   Respiratory culture showing rare gram-positive cocci in chains with few gram-negative diplococci. Rare gram positive bacilli and rare gram-negative bacilli   Blood cultures no growth preliminarily    Chest x-ray 12/12/2021 shows decreased right lower lobe infiltrate, with prominent interstitial markings.  CTA chest shows no evidence of pulmonary edema, but consolidation right upper lobe and perihilar region which may represent pneumonia. Mediastinal and right hilar adenopathy are worse compared to prior study.  Telemetry shows normal sinus rhythm    Meets Continued ICU Level Care Criteria:    [x] Yes   [] No - Transfer Planned to listed location:  [] HOSPITALIST CONTACTED-      Case and plan discussed with Dr. Ole Peoples       Electronically signed by Darshana Wilcox DO on 12/15/2021 at 3:22 PM  Patient seen by me. Case discussed with resident physician. On Rocephin for Moraxella catarrhalis. Continue to wean from mechanical ventilator as tolerated. Italicized font represents changes to the note made by me. CC time 35 minutes. Time was discontiguous.   Time does not include procedures. Time does include my direct assessment of the patient and coordination of care.   Electronically signed by Azael Crawford MD on 12/15/2021 at 6:39 PM

## 2021-12-16 LAB
ALBUMIN SERPL-MCNC: 3 G/DL (ref 3.5–5.1)
ALP BLD-CCNC: 76 U/L (ref 38–126)
ALT SERPL-CCNC: 15 U/L (ref 11–66)
ANION GAP SERPL CALCULATED.3IONS-SCNC: 8 MEQ/L (ref 8–16)
AST SERPL-CCNC: 12 U/L (ref 5–40)
BILIRUB SERPL-MCNC: 0.2 MG/DL (ref 0.3–1.2)
BLOOD CULTURE, ROUTINE: NORMAL
BLOOD CULTURE, ROUTINE: NORMAL
BUN BLDV-MCNC: 18 MG/DL (ref 7–22)
CALCIUM IONIZED: 1.22 MMOL/L (ref 1.12–1.32)
CALCIUM SERPL-MCNC: 8.3 MG/DL (ref 8.5–10.5)
CHLORIDE BLD-SCNC: 100 MEQ/L (ref 98–111)
CO2: 32 MEQ/L (ref 23–33)
CREAT SERPL-MCNC: 0.4 MG/DL (ref 0.4–1.2)
ERYTHROCYTE [DISTWIDTH] IN BLOOD BY AUTOMATED COUNT: 16.2 % (ref 11.5–14.5)
ERYTHROCYTE [DISTWIDTH] IN BLOOD BY AUTOMATED COUNT: 59.7 FL (ref 35–45)
GFR SERPL CREATININE-BSD FRML MDRD: > 90 ML/MIN/1.73M2
GLUCOSE BLD-MCNC: 115 MG/DL (ref 70–108)
GLUCOSE BLD-MCNC: 116 MG/DL (ref 70–108)
GLUCOSE BLD-MCNC: 119 MG/DL (ref 70–108)
HCT VFR BLD CALC: 50.5 % (ref 42–52)
HEMOGLOBIN: 15.2 GM/DL (ref 14–18)
MAGNESIUM: 2.3 MG/DL (ref 1.6–2.4)
MCH RBC QN AUTO: 30 PG (ref 26–33)
MCHC RBC AUTO-ENTMCNC: 30.1 GM/DL (ref 32.2–35.5)
MCV RBC AUTO: 99.8 FL (ref 80–94)
PLATELET # BLD: 209 THOU/MM3 (ref 130–400)
PMV BLD AUTO: 9.4 FL (ref 9.4–12.4)
POTASSIUM SERPL-SCNC: 4.7 MEQ/L (ref 3.5–5.2)
POTASSIUM SERPL-SCNC: 5.2 MEQ/L (ref 3.5–5.2)
RBC # BLD: 5.06 MILL/MM3 (ref 4.7–6.1)
SODIUM BLD-SCNC: 140 MEQ/L (ref 135–145)
TOTAL PROTEIN: 6.2 G/DL (ref 6.1–8)
WBC # BLD: 4.8 THOU/MM3 (ref 4.8–10.8)

## 2021-12-16 PROCEDURE — 2580000003 HC RX 258: Performed by: FAMILY MEDICINE

## 2021-12-16 PROCEDURE — 82330 ASSAY OF CALCIUM: CPT

## 2021-12-16 PROCEDURE — 83735 ASSAY OF MAGNESIUM: CPT

## 2021-12-16 PROCEDURE — 6360000002 HC RX W HCPCS: Performed by: NURSE PRACTITIONER

## 2021-12-16 PROCEDURE — 2580000003 HC RX 258: Performed by: INTERNAL MEDICINE

## 2021-12-16 PROCEDURE — 6370000000 HC RX 637 (ALT 250 FOR IP): Performed by: NURSE PRACTITIONER

## 2021-12-16 PROCEDURE — 6360000002 HC RX W HCPCS: Performed by: FAMILY MEDICINE

## 2021-12-16 PROCEDURE — 6370000000 HC RX 637 (ALT 250 FOR IP): Performed by: FAMILY MEDICINE

## 2021-12-16 PROCEDURE — C9113 INJ PANTOPRAZOLE SODIUM, VIA: HCPCS | Performed by: FAMILY MEDICINE

## 2021-12-16 PROCEDURE — 94003 VENT MGMT INPAT SUBQ DAY: CPT

## 2021-12-16 PROCEDURE — 2060000000 HC ICU INTERMEDIATE R&B

## 2021-12-16 PROCEDURE — 84132 ASSAY OF SERUM POTASSIUM: CPT

## 2021-12-16 PROCEDURE — 85027 COMPLETE CBC AUTOMATED: CPT

## 2021-12-16 PROCEDURE — 94640 AIRWAY INHALATION TREATMENT: CPT

## 2021-12-16 PROCEDURE — 94660 CPAP INITIATION&MGMT: CPT

## 2021-12-16 PROCEDURE — 6360000002 HC RX W HCPCS: Performed by: INTERNAL MEDICINE

## 2021-12-16 PROCEDURE — 6360000002 HC RX W HCPCS

## 2021-12-16 PROCEDURE — 36415 COLL VENOUS BLD VENIPUNCTURE: CPT

## 2021-12-16 PROCEDURE — 80053 COMPREHEN METABOLIC PANEL: CPT

## 2021-12-16 PROCEDURE — 82948 REAGENT STRIP/BLOOD GLUCOSE: CPT

## 2021-12-16 PROCEDURE — 92610 EVALUATE SWALLOWING FUNCTION: CPT

## 2021-12-16 PROCEDURE — 99291 CRITICAL CARE FIRST HOUR: CPT | Performed by: INTERNAL MEDICINE

## 2021-12-16 RX ORDER — LORAZEPAM 2 MG/ML
1 INJECTION INTRAMUSCULAR ONCE
Status: COMPLETED | OUTPATIENT
Start: 2021-12-16 | End: 2021-12-16

## 2021-12-16 RX ADMIN — CEFTRIAXONE SODIUM 1000 MG: 1 INJECTION, POWDER, FOR SOLUTION INTRAMUSCULAR; INTRAVENOUS at 06:27

## 2021-12-16 RX ADMIN — METHYLPREDNISOLONE SODIUM SUCCINATE 40 MG: 40 INJECTION, POWDER, FOR SOLUTION INTRAMUSCULAR; INTRAVENOUS at 17:27

## 2021-12-16 RX ADMIN — PROPOFOL 45 MCG/KG/MIN: 10 INJECTION, EMULSION INTRAVENOUS at 01:44

## 2021-12-16 RX ADMIN — PANTOPRAZOLE SODIUM 40 MG: 40 INJECTION, POWDER, FOR SOLUTION INTRAVENOUS at 08:11

## 2021-12-16 RX ADMIN — CHLORHEXIDINE GLUCONATE 0.12% ORAL RINSE 15 ML: 1.2 LIQUID ORAL at 08:11

## 2021-12-16 RX ADMIN — METOPROLOL TARTRATE 25 MG: 25 TABLET, FILM COATED ORAL at 21:17

## 2021-12-16 RX ADMIN — IPRATROPIUM BROMIDE AND ALBUTEROL SULFATE 1 AMPULE: .5; 3 SOLUTION RESPIRATORY (INHALATION) at 01:28

## 2021-12-16 RX ADMIN — IPRATROPIUM BROMIDE AND ALBUTEROL SULFATE 1 AMPULE: .5; 3 SOLUTION RESPIRATORY (INHALATION) at 12:45

## 2021-12-16 RX ADMIN — SODIUM CHLORIDE: 9 INJECTION, SOLUTION INTRAVENOUS at 22:42

## 2021-12-16 RX ADMIN — PANTOPRAZOLE SODIUM 40 MG: 40 INJECTION, POWDER, FOR SOLUTION INTRAVENOUS at 21:18

## 2021-12-16 RX ADMIN — IPRATROPIUM BROMIDE AND ALBUTEROL SULFATE 1 AMPULE: .5; 3 SOLUTION RESPIRATORY (INHALATION) at 09:44

## 2021-12-16 RX ADMIN — IPRATROPIUM BROMIDE AND ALBUTEROL SULFATE 1 AMPULE: .5; 3 SOLUTION RESPIRATORY (INHALATION) at 20:37

## 2021-12-16 RX ADMIN — RISPERIDONE 2 MG: 1 TABLET ORAL at 21:17

## 2021-12-16 RX ADMIN — METHYLPREDNISOLONE SODIUM SUCCINATE 40 MG: 40 INJECTION, POWDER, FOR SOLUTION INTRAMUSCULAR; INTRAVENOUS at 09:43

## 2021-12-16 RX ADMIN — BUDESONIDE 500 MCG: 0.5 INHALANT RESPIRATORY (INHALATION) at 09:44

## 2021-12-16 RX ADMIN — SODIUM CHLORIDE, PRESERVATIVE FREE 10 ML: 5 INJECTION INTRAVENOUS at 08:12

## 2021-12-16 RX ADMIN — PROPOFOL 44.94 MCG/KG/MIN: 10 INJECTION, EMULSION INTRAVENOUS at 05:34

## 2021-12-16 RX ADMIN — METHYLPREDNISOLONE SODIUM SUCCINATE 40 MG: 40 INJECTION, POWDER, FOR SOLUTION INTRAMUSCULAR; INTRAVENOUS at 01:45

## 2021-12-16 RX ADMIN — ACETAMINOPHEN 650 MG: 325 TABLET ORAL at 18:33

## 2021-12-16 RX ADMIN — LORAZEPAM 1 MG: 2 INJECTION INTRAMUSCULAR; INTRAVENOUS at 19:49

## 2021-12-16 RX ADMIN — IPRATROPIUM BROMIDE AND ALBUTEROL SULFATE 1 AMPULE: .5; 3 SOLUTION RESPIRATORY (INHALATION) at 05:19

## 2021-12-16 RX ADMIN — METOPROLOL TARTRATE 25 MG: 25 TABLET, FILM COATED ORAL at 08:11

## 2021-12-16 RX ADMIN — IPRATROPIUM BROMIDE AND ALBUTEROL SULFATE 1 AMPULE: .5; 3 SOLUTION RESPIRATORY (INHALATION) at 17:55

## 2021-12-16 RX ADMIN — SODIUM CHLORIDE: 9 INJECTION, SOLUTION INTRAVENOUS at 14:30

## 2021-12-16 RX ADMIN — FENTANYL CITRATE 75 MCG/HR: 50 INJECTION, SOLUTION INTRAMUSCULAR; INTRAVENOUS at 05:34

## 2021-12-16 RX ADMIN — PRAVASTATIN SODIUM 40 MG: 40 TABLET ORAL at 21:17

## 2021-12-16 RX ADMIN — BUDESONIDE 500 MCG: 0.5 INHALANT RESPIRATORY (INHALATION) at 20:37

## 2021-12-16 RX ADMIN — CHLORHEXIDINE GLUCONATE 0.12% ORAL RINSE 15 ML: 1.2 LIQUID ORAL at 21:17

## 2021-12-16 RX ADMIN — SODIUM CHLORIDE, PRESERVATIVE FREE 10 ML: 5 INJECTION INTRAVENOUS at 21:17

## 2021-12-16 ASSESSMENT — PULMONARY FUNCTION TESTS
PIF_VALUE: 19
PIF_VALUE: 18
PIF_VALUE: 18
PIF_VALUE: 14

## 2021-12-16 ASSESSMENT — PAIN SCALES - GENERAL
PAINLEVEL_OUTOF10: 0
PAINLEVEL_OUTOF10: 2

## 2021-12-16 NOTE — PROGRESS NOTES
[address] ICU 4D  Clinical Swallow Evaluation      SLP Individual Minutes  Time In: 3712  Time Out: 3973  Minutes: 14  Timed Code Treatment Minutes: 0 Minutes       Date: 2021  Patient Name: Sharon Tanner      CSN: [id number]   : 1970  (46 y.o.)  Gender: male   Referring Physician:  Rudy Ellis DO  Diagnosis: Acute Respiratory Failure  Secondary Diagnosis: Dysphagia     History of Present Illness/Injury: Patient admitted to [facility] with the above diagnosis. Per chart review, \"Patient is a 49-year-old male with past medical history of active smoker, stage III COPD, chronic respiratory failure on 3 L nasal cannula baseline, moderate PAH, chronic systolic heart failure with preserved ejection fraction, hypertension, hyperlipidemia, GERD, schizophrenia who presented to [facility] on 2021 with complaints of dyspnea and chest pain. Per HPI patient presented with family to report he had been ill for an unknown amount of time. Patient was unable to provide any history. In the ED patient was febrile to 101, normotensive, mildly tachycardic and tachypneic with respiratory rate in the 30s. He was hypoxic with SPO2 40% room air. He had elevated CRP, LDH, lactic acid, ferritin as well. His troponin was negative. EKG was negative for ischemia. COVID-19 was negative. ABG was obtained while the patient was on BiPAP showing respiratory acidosis with PCO2 85. Chest x-ray showed right lower lobe infiltrate concerning for pneumonia. Patient failed BiPAP and was intubated. He was made to the ICU for further management for COPD exacerbation right lower lobe. At that time he was started on azithromycin and Rocephin. Pain subsequently he is also positive for Moraxella Catarrhalis. He was also started on Solu-Medrol with DuoNeb's for COPD exacerbation.      : No events overnight. Patient remains mechanically ventilated.   Continues on antibiotics     12/14: No events overnight. Patient remains mechanically ventilated and unable to be weaned.     12/15: Patient required more sedation overnight. Did spike fever today. Continues to be mechanically ventilated. Attempting to wean     12/16: Patient remains on ventilator. We will extubate today. Patient has been afebrile. Continues on antibiotics. \"    ST consulted to complete clinical swallow evaluation to further assess swallow function and recommend safest level of po intake and/or need for instrumental evaluation s/p extubation following a 5 day intubation. Past Medical History:   Diagnosis Date    Acute on chronic systolic congestive heart failure ([address] 4/4/2019    Emphysema (subcutaneous) (surgical) resulting from a procedure     Hypertension     Pneumonia     Schizophrenia ([address]        SUBJECTIVE:  RN Ismael Swift approved completion of clinical swallow evaluation. Upon arrival to room, patient awake in bed with increased restlessness and confusion noted. Patient agreeable to complete po trials. Pleasant and cooperative throughout. OBJECTIVE:    Pain:  No pain reported. Current Diet: NPO pending completion of clinical swallow evaluation     Respiratory Status:  HFNC    Behavioral Observation:  Alert and Confused    Oral Mechanism Evaluation:      Facial / Labial WFL    Lingual WFL    Dentition Impaired Poor oral hygiene and missing some upper and lower dentition. Patient reports wearing upper/lower partials; however, not present at time of evaluation. Velum WFL    Vocal Quality WFL    Sensation Not Tested    Cough Not Tested      Patient Evaluated Using:  Ice chips, thin liquids, puree, and hard/coarse texture    Oral Phase:  Impaired:  Impaired AP Movement, Pocketing Left, Impaired Mastication and Reduced Bolus Formation    Pharyngeal Phase: WFL:  Pharyngeal phase appears WFL but cannot rule out pharyngeal phase deficits from a bedside swallowing evaluation alone.     Signs and Symptoms of Laryngeal Penetration/Aspiration: No signs/symptoms of aspiration evident in this evaluation, but cannot rule out silent aspiration. Impresssions: Patient presents with moderate oral dysphagia with inability to fully discern potential presence of pharyngeal phase deficits without formal instrumentation. Patient demonstrated evidence of adequate labial seal, adequate generation of intraoral pressure to suck via straw, decreased bolus control/containment, prolonged/uncoordinated mastication with ineffective textural breakdown, slow/incomplete bolus formation resulting in diffuse oral residue and pocketing in L sulci that eventually clears with liquid wash and/or presentation of puree trial, suspected timely swallow initiation, and no overt s/s of aspiration across all trials completed. Of course pharyngeal dysfunction and totality of airway invasion events cannot be formally assessed without presence of instrumental evaluation; however, at this time, further instrumental evaluation is not warranted. Patient's oropharyngeal swallow function appears functional to support goal for comfortable oral intake without distress. At this time, recommend patient initiate a puree diet with thin liquids with direct 1:1 assistance d/t increased confusion and restlessness. Anticipate quick diet advancement with improved overall mentation. *post evaluation, patient without respiratory distress upon leaving room; RN Ismael Swift notified re: clinical findings and recommendations from the assessment; verbal receptiveness noted           RECOMMENDATIONS/ASSESSMENT:  Instrumental Evaluation: Instrumental evaluation not indicated at this time.   Diet Recommendations:  Puree with Thin Liquids   Strategies:  Full Upright Position, Small Bite/Sip, Pulmonary Monitoring, Medications Crushed with Puree, Direct 1:1 Supervision, Alternate Solids and Liquids, Limit Distractions and Monitor for Fatigue   Rehabilitation Potential: good    EDUCATION:  Learner: Patient  Education:  Reviewed results and recommendations of this evaluation, Reviewed diet and strategies, Reviewed signs, symptoms and risks of aspiration, Reviewed ST goals and Plan of Care, Reviewed recommendations for follow-up, Education Related to Potential Risks and Complications Due to Impairment/Illness/Injury, Education Related to Prevention of Recurrence of Impairment/Illness/Injury and Education Related to [employer] and Wellness  Evaluation of Education: Needs further instruction, No evidence of learning and Family not present    PLAN:  Skilled SLP intervention on acute care 3-5 x per week or until goals met and/or pt plateaus in function. Specific interventions for next session may include: PO trials . PATIENT GOAL:    Did not state. Will further assess during treatment. SHORT TERM GOALS:  Short-term Goals  Timeframe for Short-term Goals: 2 weeks  Goal 1: Patient will consume a puree diet with thin liquids with direct 1:1 assistance with adequate endurance and no overt s/s of aspiration to assist with meeting nutrition/hydration needs  Goal 2: Patient will compelte advanced PO trials with ST only with timely mastication, adequate bolus formation, complete bolus clearance, adeqaute endurance and no overt s/s of aspiration to determine readiness for diet advancement  Goal 3: ST will monitor cognitive-linguistic skills and determine need for further [id number] evalaution should current mentation be a decline from baseline.     LONG TERM GOALS:  No long term goals recommended d/t short TAMIE Kowalski (Makenna Mahmood 587) [address]

## 2021-12-16 NOTE — CARE COORDINATION
12/16/21, 3:53 PM EST    DISCHARGE ON GOING EVALUATION    Satish Encinas day: 5  Location: -08/008-A Reason for admit: Acute respiratory failure ([facility] [J96.00]  Acute respiratory failure with hypoxia and hypercapnia ([facility] [J96.01, J96.02]  Pneumonia of right lower lobe due to infectious organism [J18.9]   Procedure:   12/11 CXR: right lower PNA  12/11 Intubated   12/12 CTA Chest:   1. No evidence of pulmonary and less. 2. Consolidation in the right upper lobe at the perihilar region extending to the major fissure may represent pneumonia superimposed on the patient's interstitial process. 3. Worsening interstitial disease compared to 5/24/2020.   4. Mediastinal and right hilar adenopathy as worsened compared to prior CT     12/16 Extubated    Barriers to Discharge: Extubated this afternoon to HFNC 60L, 100% FIO2. Started on pureed diet. Now on HFNC, 60L, 85% FIO2, sats 96%. Tmax 99.9. NSR. SLP. Dietitian following. Unable to follow commands; DE x4 to painful stim. Telemetry, pandya, SCDs. IVF, fentanyl @ 75 mcg/hr, nebs, IV rocephin, IV solumedrol 40 mg Q8H, lopressor, IV protonix, pravastatin, risperdal, tadalafil, Electrolyte replacement protocols. PCP: Miguel Michel MD  Readmission Risk Score: 12.5 ( )%  Patient Goals/Plan/Treatment Preferences: From home. Need meet & greet. Plan pending course. Need PT/OT when appropriate.

## 2021-12-16 NOTE — PROGRESS NOTES
monitor  MAGNO, resolved: Continue gentle IV fluid hydration. Acute Hyperkalemia, resolved: Continue to monitor      INITIAL H AND P AND ICU COURSE:  Patient is a 57-year-old male with past medical history of active smoker, stage III COPD, chronic respiratory failure on 3 L nasal cannula baseline, moderate PAH, chronic systolic heart failure with preserved ejection fraction, hypertension, hyperlipidemia, GERD, schizophrenia who presented to [facility] on 12/11/2021 with complaints of dyspnea and chest pain. Per HPI patient presented with family to report he had been ill for an unknown amount of time. Patient was unable to provide any history. In the ED patient was febrile to 101, normotensive, mildly tachycardic and tachypneic with respiratory rate in the 30s. He was hypoxic with SPO2 40% room air. He had elevated CRP, LDH, lactic acid, ferritin as well. His troponin was negative. EKG was negative for ischemia. COVID-19 was negative. ABG was obtained while the patient was on BiPAP showing respiratory acidosis with PCO2 85. Chest x-ray showed right lower lobe infiltrate concerning for pneumonia. Patient failed BiPAP and was intubated. He was made to the ICU for further management for COPD exacerbation right lower lobe. At that time he was started on azithromycin and Rocephin. Pain subsequently he is also positive for Moraxella Catarrhalis. He was also started on Solu-Medrol with DuoNeb's for COPD exacerbation. 12/13: No events overnight. Patient remains mechanically ventilated. Continues on antibiotics    12/14: No events overnight. Patient remains mechanically ventilated and unable to be weaned. 12/15: Patient required more sedation overnight. Did spike fever today. Continues to be mechanically ventilated. Attempting to wean    12/16: Patient remains on ventilator. We will extubate today. Patient has been afebrile. Continues on antibiotics.     Past Medical History:  Per tracings, PFTs, and imaging are personally viewed and interpreted unless otherwise noted). *  Sodium 140, potassium 5.2, chloride 100, CO2 32, BUN 18, creatinine 0.4, anion gap 8, glucose 115, calcium 8.3, protein 6.2  Albumin 3.0, alk phos 76, ALT 15, AST 12  WBC 4.8, hemoglobin 15.2, hematocrit 50.9, platelet 021  Chest x-ray 12/14/2021 shows patchy airspace opacities bilaterally consistent with multifocal pneumonia along with possible small right pleural effusion. Triglyceride 96   CRP 16.27, , proBNP 2758, troponin less than 0.010, triglycerides 75  BUN 3.0, alk phos 104, ALT 22, AST 15  Pneumonia panel showed Moraxella Catarrhalis  Respiratory culture showing rare gram-positive cocci in chains with few gram-negative diplococci. Rare gram positive bacilli and rare gram-negative bacilli  Blood cultures no growth preliminarily   Chest x-ray 12/12/2021 shows decreased right lower lobe infiltrate, with prominent interstitial markings. CTA chest shows no evidence of pulmonary edema, but consolidation right upper lobe and perihilar region which may represent pneumonia. Mediastinal and right hilar adenopathy are worse compared to prior study. Telemetry shows normal sinus rhythm    Meets Continued ICU Level Care Criteria:    [x] Yes   [] No - Transfer Planned to listed location:  [] HOSPITALIST CONTACTED-      Case and plan discussed with Dr. Guy Coleman       Electronically signed by Ashlie Morales DO on 12/16/2021 at 10:51 AM    Patient seen by me. Case discussed with resident physician. Patient did well with spontaneous breathing trial on 12/16/2021 and was extubated. Patient on day #5 of 7 of Rocephin. Discontinue sedating medicines to allow for further wakefulness. .  Italicized font represents changes to the note made by me. CC time 35 minutes. Time was discontiguous. Time does not include procedures. Time does include my direct assessment of the patient and coordination of care.   Electronically signed by Kaylen Mera MD on 12/16/2021 at 5:05 PM

## 2021-12-16 NOTE — PROGRESS NOTES
Patient has been successfully weaned from Mechanical Ventilation. RSBI before extubation was 50 with EtCO2 of na and SpO2 of 95 on 50% FiO2. Patient extubated and placed on 60L, 100%. Post extubation SpO2 is 100% with HR  87 bpm and RR 20 breaths/min. Patient had strong cough that was productive of clear  sputum. Extubation Well tolerated by patient. Katarina May

## 2021-12-17 LAB
ALBUMIN SERPL-MCNC: 3.1 G/DL (ref 3.5–5.1)
ALP BLD-CCNC: 81 U/L (ref 38–126)
ALT SERPL-CCNC: 23 U/L (ref 11–66)
ANION GAP SERPL CALCULATED.3IONS-SCNC: 9 MEQ/L (ref 8–16)
AST SERPL-CCNC: 29 U/L (ref 5–40)
BILIRUB SERPL-MCNC: 0.3 MG/DL (ref 0.3–1.2)
BUN BLDV-MCNC: 17 MG/DL (ref 7–22)
CALCIUM SERPL-MCNC: 8.8 MG/DL (ref 8.5–10.5)
CHLORIDE BLD-SCNC: 99 MEQ/L (ref 98–111)
CO2: 32 MEQ/L (ref 23–33)
CREAT SERPL-MCNC: 0.5 MG/DL (ref 0.4–1.2)
ERYTHROCYTE [DISTWIDTH] IN BLOOD BY AUTOMATED COUNT: 15.7 % (ref 11.5–14.5)
ERYTHROCYTE [DISTWIDTH] IN BLOOD BY AUTOMATED COUNT: 56.2 FL (ref 35–45)
GFR SERPL CREATININE-BSD FRML MDRD: > 90 ML/MIN/1.73M2
GLUCOSE BLD-MCNC: 98 MG/DL (ref 70–108)
HCT VFR BLD CALC: 50.5 % (ref 42–52)
HEMOGLOBIN: 15.5 GM/DL (ref 14–18)
MCH RBC QN AUTO: 29.8 PG (ref 26–33)
MCHC RBC AUTO-ENTMCNC: 30.7 GM/DL (ref 32.2–35.5)
MCV RBC AUTO: 97.1 FL (ref 80–94)
PLATELET # BLD: 248 THOU/MM3 (ref 130–400)
PMV BLD AUTO: 10 FL (ref 9.4–12.4)
POTASSIUM SERPL-SCNC: 4.8 MEQ/L (ref 3.5–5.2)
RBC # BLD: 5.2 MILL/MM3 (ref 4.7–6.1)
SODIUM BLD-SCNC: 140 MEQ/L (ref 135–145)
TOTAL PROTEIN: 6.7 G/DL (ref 6.1–8)
TRIGL SERPL-MCNC: 79 MG/DL (ref 0–199)
WBC # BLD: 9.5 THOU/MM3 (ref 4.8–10.8)

## 2021-12-17 PROCEDURE — 6360000002 HC RX W HCPCS: Performed by: PHYSICIAN ASSISTANT

## 2021-12-17 PROCEDURE — 94761 N-INVAS EAR/PLS OXIMETRY MLT: CPT

## 2021-12-17 PROCEDURE — 2060000000 HC ICU INTERMEDIATE R&B

## 2021-12-17 PROCEDURE — 85027 COMPLETE CBC AUTOMATED: CPT

## 2021-12-17 PROCEDURE — 2580000003 HC RX 258: Performed by: FAMILY MEDICINE

## 2021-12-17 PROCEDURE — 80053 COMPREHEN METABOLIC PANEL: CPT

## 2021-12-17 PROCEDURE — 6370000000 HC RX 637 (ALT 250 FOR IP): Performed by: FAMILY MEDICINE

## 2021-12-17 PROCEDURE — 6360000002 HC RX W HCPCS: Performed by: INTERNAL MEDICINE

## 2021-12-17 PROCEDURE — 6370000000 HC RX 637 (ALT 250 FOR IP): Performed by: INTERNAL MEDICINE

## 2021-12-17 PROCEDURE — 6360000002 HC RX W HCPCS: Performed by: FAMILY MEDICINE

## 2021-12-17 PROCEDURE — 84478 ASSAY OF TRIGLYCERIDES: CPT

## 2021-12-17 PROCEDURE — 2580000003 HC RX 258: Performed by: INTERNAL MEDICINE

## 2021-12-17 PROCEDURE — 6370000000 HC RX 637 (ALT 250 FOR IP): Performed by: NURSE PRACTITIONER

## 2021-12-17 PROCEDURE — C9113 INJ PANTOPRAZOLE SODIUM, VIA: HCPCS | Performed by: FAMILY MEDICINE

## 2021-12-17 PROCEDURE — 36415 COLL VENOUS BLD VENIPUNCTURE: CPT

## 2021-12-17 PROCEDURE — 2700000000 HC OXYGEN THERAPY PER DAY

## 2021-12-17 PROCEDURE — 94640 AIRWAY INHALATION TREATMENT: CPT

## 2021-12-17 PROCEDURE — 94660 CPAP INITIATION&MGMT: CPT

## 2021-12-17 RX ORDER — METHYLPREDNISOLONE SODIUM SUCCINATE 40 MG/ML
40 INJECTION, POWDER, LYOPHILIZED, FOR SOLUTION INTRAMUSCULAR; INTRAVENOUS DAILY
Status: DISCONTINUED | OUTPATIENT
Start: 2021-12-18 | End: 2021-12-20

## 2021-12-17 RX ORDER — LORAZEPAM 2 MG/ML
1 INJECTION INTRAMUSCULAR ONCE
Status: COMPLETED | OUTPATIENT
Start: 2021-12-17 | End: 2021-12-17

## 2021-12-17 RX ORDER — IPRATROPIUM BROMIDE AND ALBUTEROL SULFATE 2.5; .5 MG/3ML; MG/3ML
1 SOLUTION RESPIRATORY (INHALATION)
Status: DISCONTINUED | OUTPATIENT
Start: 2021-12-17 | End: 2021-12-20

## 2021-12-17 RX ORDER — LORAZEPAM 2 MG/ML
1 INJECTION INTRAMUSCULAR EVERY 6 HOURS PRN
Status: DISCONTINUED | OUTPATIENT
Start: 2021-12-17 | End: 2021-12-23 | Stop reason: HOSPADM

## 2021-12-17 RX ADMIN — IPRATROPIUM BROMIDE AND ALBUTEROL SULFATE 1 AMPULE: .5; 3 SOLUTION RESPIRATORY (INHALATION) at 16:00

## 2021-12-17 RX ADMIN — METHYLPREDNISOLONE SODIUM SUCCINATE 40 MG: 40 INJECTION, POWDER, FOR SOLUTION INTRAMUSCULAR; INTRAVENOUS at 20:11

## 2021-12-17 RX ADMIN — RISPERIDONE 2 MG: 1 TABLET ORAL at 20:11

## 2021-12-17 RX ADMIN — PANTOPRAZOLE SODIUM 40 MG: 40 INJECTION, POWDER, FOR SOLUTION INTRAVENOUS at 10:09

## 2021-12-17 RX ADMIN — IPRATROPIUM BROMIDE AND ALBUTEROL SULFATE 1 AMPULE: .5; 3 SOLUTION RESPIRATORY (INHALATION) at 00:50

## 2021-12-17 RX ADMIN — METOPROLOL TARTRATE 25 MG: 25 TABLET, FILM COATED ORAL at 10:12

## 2021-12-17 RX ADMIN — ASPIRIN 325 MG: 325 TABLET ORAL at 07:58

## 2021-12-17 RX ADMIN — CHLORHEXIDINE GLUCONATE 0.12% ORAL RINSE 15 ML: 1.2 LIQUID ORAL at 10:07

## 2021-12-17 RX ADMIN — ACETAMINOPHEN 650 MG: 325 TABLET ORAL at 20:11

## 2021-12-17 RX ADMIN — METHYLPREDNISOLONE SODIUM SUCCINATE 40 MG: 40 INJECTION, POWDER, FOR SOLUTION INTRAMUSCULAR; INTRAVENOUS at 01:31

## 2021-12-17 RX ADMIN — BUDESONIDE 500 MCG: 0.5 INHALANT RESPIRATORY (INHALATION) at 16:01

## 2021-12-17 RX ADMIN — IPRATROPIUM BROMIDE AND ALBUTEROL SULFATE 1 AMPULE: .5; 3 SOLUTION RESPIRATORY (INHALATION) at 20:17

## 2021-12-17 RX ADMIN — BUDESONIDE 500 MCG: 0.5 INHALANT RESPIRATORY (INHALATION) at 06:38

## 2021-12-17 RX ADMIN — PANTOPRAZOLE SODIUM 40 MG: 40 INJECTION, POWDER, FOR SOLUTION INTRAVENOUS at 20:11

## 2021-12-17 RX ADMIN — IPRATROPIUM BROMIDE AND ALBUTEROL SULFATE 1 AMPULE: .5; 3 SOLUTION RESPIRATORY (INHALATION) at 06:38

## 2021-12-17 RX ADMIN — LORAZEPAM 1 MG: 2 INJECTION INTRAMUSCULAR; INTRAVENOUS at 21:52

## 2021-12-17 RX ADMIN — METHYLPREDNISOLONE SODIUM SUCCINATE 40 MG: 40 INJECTION, POWDER, FOR SOLUTION INTRAMUSCULAR; INTRAVENOUS at 10:12

## 2021-12-17 RX ADMIN — SODIUM CHLORIDE, PRESERVATIVE FREE 10 ML: 5 INJECTION INTRAVENOUS at 20:10

## 2021-12-17 RX ADMIN — CEFTRIAXONE SODIUM 1000 MG: 1 INJECTION, POWDER, FOR SOLUTION INTRAMUSCULAR; INTRAVENOUS at 05:29

## 2021-12-17 RX ADMIN — PRAVASTATIN SODIUM 40 MG: 40 TABLET ORAL at 20:11

## 2021-12-17 RX ADMIN — ENOXAPARIN SODIUM 40 MG: 100 INJECTION SUBCUTANEOUS at 10:12

## 2021-12-17 RX ADMIN — SODIUM CHLORIDE, PRESERVATIVE FREE 10 ML: 5 INJECTION INTRAVENOUS at 10:09

## 2021-12-17 RX ADMIN — METOPROLOL TARTRATE 25 MG: 25 TABLET, FILM COATED ORAL at 20:11

## 2021-12-17 RX ADMIN — LORAZEPAM 1 MG: 2 INJECTION INTRAMUSCULAR; INTRAVENOUS at 01:47

## 2021-12-17 RX ADMIN — CHLORHEXIDINE GLUCONATE 0.12% ORAL RINSE 15 ML: 1.2 LIQUID ORAL at 20:11

## 2021-12-17 RX ADMIN — IPRATROPIUM BROMIDE AND ALBUTEROL SULFATE 1 AMPULE: .5; 3 SOLUTION RESPIRATORY (INHALATION) at 10:29

## 2021-12-17 ASSESSMENT — PAIN SCALES - GENERAL
PAINLEVEL_OUTOF10: 3
PAINLEVEL_OUTOF10: 0
PAINLEVEL_OUTOF10: 10

## 2021-12-17 ASSESSMENT — PAIN DESCRIPTION - ORIENTATION: ORIENTATION: OTHER (COMMENT)

## 2021-12-17 ASSESSMENT — PAIN DESCRIPTION - FREQUENCY: FREQUENCY: CONTINUOUS

## 2021-12-17 ASSESSMENT — PAIN DESCRIPTION - DESCRIPTORS: DESCRIPTORS: CONSTANT

## 2021-12-17 ASSESSMENT — PAIN DESCRIPTION - LOCATION: LOCATION: SHOULDER

## 2021-12-17 NOTE — PROGRESS NOTES
INTERNAL MEDICINE Progress Note  12/17/2021 12:28 PM  Subjective:   Admit Date: 12/11/2021  PCP: Jacob Parisi MD  Interval History:   MONIKA:  Admitted for SOB, acute on chronic resp insufficiency, req Vent support  Extubated yesterday, on oxygen, HFNC    Objective:   Vitals: /88   Pulse 87   Temp 98.1 °F (36.7 °C) (Oral)   Resp 24   Ht 6' (1.829 m)   Wt 210 lb 1.6 oz (95.3 kg)   SpO2 97%   BMI 28.49 kg/m²   General appearance: alert and cooperative with exam  HEENT: Pharynx: Teeth: multiple caries and missing teeth  Neck: no adenopathy, no carotid bruit and no JVD  Lungs: diminished breath sounds bilaterally  Heart: S1, S2 normal  Abdomen: soft, non-tender; bowel sounds normal; no masses,  no organomegaly  Extremities: no edema, redness or tenderness in the calves or thighs  Neurologic: Mental status: alertness: alert, orientation: person, place, affect: blunted, thought content exhibits flight of ideas      Medications:   Scheduled Meds:   chlorhexidine  15 mL Mouth/Throat BID    calcium replacement protocol   Other RX Placeholder    budesonide  500 mcg Nebulization BID    [Held by provider] tiotropium-olodaterol  2 puff Inhalation Daily    pravastatin  40 mg Oral Nightly    risperiDONE  2 mg Oral Nightly    Tadalafil (PAH)  40 mg Oral Daily    [Held by provider] formoterol  20 mcg Nebulization BID    sodium chloride flush  10 mL IntraVENous 2 times per day    enoxaparin  40 mg SubCUTAneous Daily    cefTRIAXone (ROCEPHIN) IV  1,000 mg IntraVENous Q24H    ipratropium-albuterol  1 ampule Inhalation Q4H    methylPREDNISolone  40 mg IntraVENous Q8H    aspirin  325 mg Oral Daily    metoprolol tartrate  25 mg Oral BID    pantoprazole  40 mg IntraVENous BID     Continuous Infusions:   sodium chloride      sodium chloride 100 mL/hr at 12/16/21 2242       Lab Results:   CBC:   Recent Labs     12/15/21  0424 12/16/21  0408 12/17/21  0522   WBC 6.0 4.8 9.5   HGB 14.8 15.2 15.5    209 248 BMP:    Recent Labs     12/15/21  0424 12/15/21  0424 12/16/21  0408 12/16/21  1436 12/17/21  0522     --  140  --  140   K 4.7   < > 5.2 4.7 4.8     --  100  --  99   CO2 31  --  32  --  32   BUN 18  --  18  --  17   CREATININE 0.5  --  0.4  --  0.5   GLUCOSE 107  --  115*  --  98    < > = values in this interval not displayed. Hepatic:   Recent Labs     12/15/21  0424 12/16/21  0408 12/17/21  0522   AST 14 12 29   ALT 17 15 23   BILITOT 0.2* 0.2* 0.3   ALKPHOS 82 76 81     Magnesium:    Lab Results   Component Value Date    MG 2.3 12/16/2021     HgBA1c:    Lab Results   Component Value Date    LABA1C 6.0 12/15/2019     TSH:    Lab Results   Component Value Date    TSH 0.909 12/11/2021       FOLATE:    Lab Results   Component Value Date    FOLATE 9.0 12/08/2016     FERRITIN:    Lab Results   Component Value Date    FERRITIN 344 12/11/2021       Assessment and Plan:   1. Acute on chronic resp failure  2. COPD with acute exac  3. Pneumonia  4. Mod PAH  5. HTN-systemic  6. Schizophrenia  7.  Chronic nicotine abuse    Cont bronchodilators, wean O2/HFNC  lovenox  Resume Tadalafil  Statin  PT/OT    Brittany Pelayo MD, MD

## 2021-12-17 NOTE — PROGRESS NOTES
Comprehensive Nutrition Assessment    Type and Reason for Visit:  Reassess    Nutrition Recommendations/Plan:   *Recommend a Multivitamin w/minerals daily. *Started Ensure Compact TID. *Diet per SLP & MD.  *Consider starting bowel medications d/t no bowel movement documented since admit. Nutrition Assessment: Pt slightly improving from a nutritional standpoint AEB pt extubated and advanced to Pureed diet on 12/16; however pt ate % of breakfast today. Remains at risk for further nutritional compromise r/t respiratory failure, pneumonia, dysphagia and underlying medical condition (hx CHF, COPD, HTN, Schizophrenia). Nutrition recommendations/interventions as per above. Malnutrition Assessment:  Malnutrition Status: At risk for malnutrition (Comment)    Context:  Acute Illness     Findings of the 6 clinical characteristics of malnutrition:  Energy Intake:  Mild decrease in energy intake (Comment) (less than 50% of energy intake x1 day)  Weight Loss:  Unable to assess (no weight hx per EMR)     Body Fat Loss:  No significant body fat loss     Muscle Mass Loss:  1 - Mild muscle mass loss Temples (temporalis)  Fluid Accumulation:  No significant fluid accumulation Extremities   Strength:  Not Performed    Estimated Daily Nutrient Needs:  Energy (kcal):  2724-7075 kcals (20-25); Weight Used for Energy Requirements:   (83 kgm)     Protein (g):  125+ grams (1.5+); Weight Used for Protein Requirements:   (83 kgm)        Fluid (ml/day):  per MD; Method Used for Fluid Requirements:  Other (Comment)     Nutrition Related Findings: pt intubated on 12/11 and extubated on 12/16; pt seen earlier this morning; he reports consuming less than 50% yesterday, however, pt ate % of breakfast today; pt amenable to try Ensure compact TID; pt denies N/V; no BM since admit; Rx includes: Rocephin. Wounds:  None       Current Nutrition Therapies:    ADULT TUBE FEEDING; Orogastric;  Other Tube Feeding (specify); 2 tri HN; Continuous; 10; Yes; 10; Q 6 hours; 30; 30; Q 4 hours; Protein; bolus 2.5 Oz. proteinex q12h  ADULT DIET; Dysphagia - Pureed  ADULT ORAL NUTRITION SUPPLEMENT; Breakfast, Lunch, Dinner; Standard 4 oz Oral Supplement    Anthropometric Measures:  · Height: 6' (182.9 cm)  · Current Body Weight: 210 lb 1.6 oz (95.3 kg) (12/14; + non-pitting edema in extremities)   · Admission Body Weight: 182 lb 5.1 oz (82.7 kg) (12/11 no edema)    · Usual Body Weight:  (per EMR: 12/21/19: 270# 11.6 oz)     · Ideal Body Weight: 178 lbs  · BMI: 28.5  · BMI Categories: Overweight (BMI 25.0-29.9) (varied weight this admit)       Nutrition Diagnosis:   · Inadequate oral intake related to inadequate protein-energy intake as evidenced by intake 0-25%    Nutrition Interventions:   Food and/or Nutrient Delivery:  Continue Current Diet, Start Oral Nutrition Supplement, Vitamin Supplement  Nutrition Education/Counseling:  Education initiated (Encouraged po intake of meals and ONS at best effort)   Coordination of Nutrition Care:  Continue to monitor while inpatient, Speech Therapy    Goals:  TF to provide % of nutrient needs while pt is intubated. Nutrition Monitoring and Evaluation:   Behavioral-Environmental Outcomes:  None Identified   Food/Nutrient Intake Outcomes:  Enteral Nutrition Intake/Tolerance  Physical Signs/Symptoms Outcomes:  Biochemical Data, Weight, Skin, Nutrition Focused Physical Findings, Fluid Status or Edema, GI Status, Chewing or Swallowing     Discharge Planning:     Too soon to determine     Electronically signed by Darylene Ober, MS, RD, LD on 12/17/21 at 11:15 AM EST    Contact: [phone]

## 2021-12-17 NOTE — CARE COORDINATION
DISCHARGE PLANNING UPDATE    Barriers to Discharge: COPD; Pulmonary following. From ICU (ventilator there).  HF 55% FIO2/60L, IV AB, IV Steroids continued    Discharge Plan: plans home w mother/POA Pat Teran; await therapy recommendations

## 2021-12-17 NOTE — PROGRESS NOTES
Physician Progress Note      Josefina Pacheco  CSN #:                  [id number]  :                       1970  ADMIT DATE:       2021 1:41 AM  DISCH DATE:  RESPONDING  PROVIDER #:        SERAFIN TOPPER          QUERY TEXT:    Pt admitted with Pneumonia , Acute resp. failure . Pt noted to have Temp 101,   tachycardia, lactic acid 2.4, CRP 16.27, WBC 6.2, 18.7, Pneumonia panel -   Moraxella catarrhalis by PCR. If possible, please make selection below,   document in the progress notes and discharge summary if you are evaluating and   /or treating any of the following: The medical record reflects the following:  Risk Factors: Chronic resp.  failure, COPD, Community Exposure, everyday   smoker  Clinical Indicators: Temp 101, tachycardia, lactic acid 2.4, CRP 16.27, WBC   6.2, 18.7, Pneumonia panel - Moraxella catarrhalis by PCR, Acute Rep. failure,   MAGNO  Treatment: admission, imaging, labs, Rocephin and azithromycin initiated   (). Blood cultures, respiratory cultures, molecular pneumonia panel,   procalcitonin and MRSA/VRE screens pending. Tylenol as needed, supplemental   oxygen, vent management    Thank Aston Carlton  RN, BSN, [address] [place] [address]   P: [phone]  F: [phone]  Options provided:  -- Sepsis was ruled out  -- Sepsis, present on admission  -- Sepsis not present on admission  -- Other - I will add my own diagnosis  -- Disagree - Not applicable / Not valid  -- Disagree - Clinically unable to determine / Unknown  -- Refer to Clinical Documentation Reviewer    PROVIDER RESPONSE TEXT:    This patient has sepsis which was present on admission.     Query created by: Dez Penaloza on 2021 7:30 AM      Electronically signed by:  Lul Gill 2021 5:13 AM

## 2021-12-18 LAB
ANION GAP SERPL CALCULATED.3IONS-SCNC: 7 MEQ/L (ref 8–16)
BUN BLDV-MCNC: 15 MG/DL (ref 7–22)
CALCIUM SERPL-MCNC: 8.7 MG/DL (ref 8.5–10.5)
CHLORIDE BLD-SCNC: 100 MEQ/L (ref 98–111)
CO2: 34 MEQ/L (ref 23–33)
CREAT SERPL-MCNC: 0.5 MG/DL (ref 0.4–1.2)
ERYTHROCYTE [DISTWIDTH] IN BLOOD BY AUTOMATED COUNT: 15.2 % (ref 11.5–14.5)
ERYTHROCYTE [DISTWIDTH] IN BLOOD BY AUTOMATED COUNT: 54.1 FL (ref 35–45)
GFR SERPL CREATININE-BSD FRML MDRD: > 90 ML/MIN/1.73M2
GLUCOSE BLD-MCNC: 96 MG/DL (ref 70–108)
HCT VFR BLD CALC: 48.7 % (ref 42–52)
HEMOGLOBIN: 15 GM/DL (ref 14–18)
MCH RBC QN AUTO: 29.7 PG (ref 26–33)
MCHC RBC AUTO-ENTMCNC: 30.8 GM/DL (ref 32.2–35.5)
MCV RBC AUTO: 96.4 FL (ref 80–94)
PLATELET # BLD: 257 THOU/MM3 (ref 130–400)
PMV BLD AUTO: 10 FL (ref 9.4–12.4)
POTASSIUM SERPL-SCNC: 4.4 MEQ/L (ref 3.5–5.2)
RBC # BLD: 5.05 MILL/MM3 (ref 4.7–6.1)
SODIUM BLD-SCNC: 141 MEQ/L (ref 135–145)
WBC # BLD: 8.5 THOU/MM3 (ref 4.8–10.8)

## 2021-12-18 PROCEDURE — C9113 INJ PANTOPRAZOLE SODIUM, VIA: HCPCS | Performed by: FAMILY MEDICINE

## 2021-12-18 PROCEDURE — 85027 COMPLETE CBC AUTOMATED: CPT

## 2021-12-18 PROCEDURE — 94660 CPAP INITIATION&MGMT: CPT

## 2021-12-18 PROCEDURE — 99233 SBSQ HOSP IP/OBS HIGH 50: CPT | Performed by: INTERNAL MEDICINE

## 2021-12-18 PROCEDURE — 6370000000 HC RX 637 (ALT 250 FOR IP): Performed by: INTERNAL MEDICINE

## 2021-12-18 PROCEDURE — 6360000002 HC RX W HCPCS: Performed by: INTERNAL MEDICINE

## 2021-12-18 PROCEDURE — 94761 N-INVAS EAR/PLS OXIMETRY MLT: CPT

## 2021-12-18 PROCEDURE — 2060000000 HC ICU INTERMEDIATE R&B

## 2021-12-18 PROCEDURE — 2580000003 HC RX 258: Performed by: INTERNAL MEDICINE

## 2021-12-18 PROCEDURE — 6370000000 HC RX 637 (ALT 250 FOR IP): Performed by: FAMILY MEDICINE

## 2021-12-18 PROCEDURE — 6370000000 HC RX 637 (ALT 250 FOR IP): Performed by: NURSE PRACTITIONER

## 2021-12-18 PROCEDURE — 94640 AIRWAY INHALATION TREATMENT: CPT

## 2021-12-18 PROCEDURE — 6360000002 HC RX W HCPCS: Performed by: FAMILY MEDICINE

## 2021-12-18 PROCEDURE — 36415 COLL VENOUS BLD VENIPUNCTURE: CPT

## 2021-12-18 PROCEDURE — 2700000000 HC OXYGEN THERAPY PER DAY

## 2021-12-18 PROCEDURE — 2580000003 HC RX 258: Performed by: FAMILY MEDICINE

## 2021-12-18 PROCEDURE — 80048 BASIC METABOLIC PNL TOTAL CA: CPT

## 2021-12-18 RX ORDER — RISPERIDONE 1 MG/1
1 TABLET, FILM COATED ORAL DAILY
Status: DISCONTINUED | OUTPATIENT
Start: 2021-12-18 | End: 2021-12-23 | Stop reason: HOSPADM

## 2021-12-18 RX ADMIN — IPRATROPIUM BROMIDE AND ALBUTEROL SULFATE 1 AMPULE: .5; 3 SOLUTION RESPIRATORY (INHALATION) at 15:55

## 2021-12-18 RX ADMIN — RISPERIDONE 1 MG: 1 TABLET ORAL at 08:46

## 2021-12-18 RX ADMIN — METHYLPREDNISOLONE SODIUM SUCCINATE 40 MG: 40 INJECTION, POWDER, FOR SOLUTION INTRAMUSCULAR; INTRAVENOUS at 08:56

## 2021-12-18 RX ADMIN — BUDESONIDE 500 MCG: 0.5 INHALANT RESPIRATORY (INHALATION) at 18:49

## 2021-12-18 RX ADMIN — PANTOPRAZOLE SODIUM 40 MG: 40 INJECTION, POWDER, FOR SOLUTION INTRAVENOUS at 08:47

## 2021-12-18 RX ADMIN — PANTOPRAZOLE SODIUM 40 MG: 40 INJECTION, POWDER, FOR SOLUTION INTRAVENOUS at 20:50

## 2021-12-18 RX ADMIN — CHLORHEXIDINE GLUCONATE 0.12% ORAL RINSE 15 ML: 1.2 LIQUID ORAL at 20:50

## 2021-12-18 RX ADMIN — CEFTRIAXONE SODIUM 1000 MG: 1 INJECTION, POWDER, FOR SOLUTION INTRAMUSCULAR; INTRAVENOUS at 05:03

## 2021-12-18 RX ADMIN — METOPROLOL TARTRATE 25 MG: 25 TABLET, FILM COATED ORAL at 08:47

## 2021-12-18 RX ADMIN — METOPROLOL TARTRATE 25 MG: 25 TABLET, FILM COATED ORAL at 20:51

## 2021-12-18 RX ADMIN — IPRATROPIUM BROMIDE AND ALBUTEROL SULFATE 1 AMPULE: .5; 3 SOLUTION RESPIRATORY (INHALATION) at 08:35

## 2021-12-18 RX ADMIN — IPRATROPIUM BROMIDE AND ALBUTEROL SULFATE 1 AMPULE: .5; 3 SOLUTION RESPIRATORY (INHALATION) at 12:20

## 2021-12-18 RX ADMIN — ENOXAPARIN SODIUM 40 MG: 100 INJECTION SUBCUTANEOUS at 08:54

## 2021-12-18 RX ADMIN — IPRATROPIUM BROMIDE AND ALBUTEROL SULFATE 1 AMPULE: .5; 3 SOLUTION RESPIRATORY (INHALATION) at 22:27

## 2021-12-18 RX ADMIN — SODIUM CHLORIDE, PRESERVATIVE FREE 10 ML: 5 INJECTION INTRAVENOUS at 20:50

## 2021-12-18 RX ADMIN — PRAVASTATIN SODIUM 40 MG: 40 TABLET ORAL at 20:51

## 2021-12-18 RX ADMIN — BUDESONIDE 500 MCG: 0.5 INHALANT RESPIRATORY (INHALATION) at 08:36

## 2021-12-18 RX ADMIN — CHLORHEXIDINE GLUCONATE 0.12% ORAL RINSE 15 ML: 1.2 LIQUID ORAL at 08:47

## 2021-12-18 RX ADMIN — ASPIRIN 325 MG: 325 TABLET ORAL at 08:47

## 2021-12-18 RX ADMIN — SODIUM CHLORIDE, PRESERVATIVE FREE 10 ML: 5 INJECTION INTRAVENOUS at 08:49

## 2021-12-18 RX ADMIN — RISPERIDONE 2 MG: 1 TABLET ORAL at 20:51

## 2021-12-18 ASSESSMENT — PAIN SCALES - GENERAL
PAINLEVEL_OUTOF10: 0
PAINLEVEL_OUTOF10: 0

## 2021-12-18 NOTE — PROGRESS NOTES
INTERNAL MEDICINE Progress Note  12/18/2021 12:52 PM  Subjective:   Admit Date: 12/11/2021  PCP: Daniela Shell MD  Interval History:     Admitted for SOB, acute on chronic resp insufficiency, req Vent support  D2 post extubation  Improving SOB, remains on oxygen, HFNC    Objective:   Vitals: /71   Pulse 70   Temp 99.1 °F (37.3 °C) (Oral)   Resp 18   Ht 6' (1.829 m)   Wt 210 lb 1.6 oz (95.3 kg)   SpO2 97%   BMI 28.49 kg/m²   General appearance: alert and cooperative with exam  HEENT:  Teeth: multiple caries and missing teeth  Neck: no JVD  Lungs: diminished breath sounds bilaterally  Heart: S1, S2 normal  Abdomen: soft, non-tender; bowel sounds normal; no masses,  no organomegaly  Extremities: no edema,   Neurologic:  alert, orientation: person, place, affect: normal, thought content exhibits logical conclusions      Medications:   Scheduled Meds:   risperiDONE  1 mg Oral Daily    ipratropium-albuterol  1 ampule Inhalation Q4H WA    methylPREDNISolone  40 mg IntraVENous Daily    chlorhexidine  15 mL Mouth/Throat BID    calcium replacement protocol   Other RX Placeholder    budesonide  500 mcg Nebulization BID    [Held by provider] tiotropium-olodaterol  2 puff Inhalation Daily    pravastatin  40 mg Oral Nightly    risperiDONE  2 mg Oral Nightly    Tadalafil (PAH)  40 mg Oral Daily    [Held by provider] formoterol  20 mcg Nebulization BID    sodium chloride flush  10 mL IntraVENous 2 times per day    enoxaparin  40 mg SubCUTAneous Daily    aspirin  325 mg Oral Daily    metoprolol tartrate  25 mg Oral BID    pantoprazole  40 mg IntraVENous BID     Continuous Infusions:   sodium chloride         Lab Results:   CBC:   Recent Labs     12/16/21  0408 12/17/21  0522 12/18/21  0504   WBC 4.8 9.5 8.5   HGB 15.2 15.5 15.0    248 257     BMP:    Recent Labs     12/16/21  0408 12/16/21  0408 12/16/21  1436 12/17/21  0522 12/18/21  0504     --   --  140 141   K 5.2   < > 4.7 4.8 4.4   --   --  99 100   CO2 32  --   --  32 34*   BUN 18  --   --  17 15   CREATININE 0.4  --   --  0.5 0.5   GLUCOSE 115*  --   --  98 96    < > = values in this interval not displayed. Hepatic:   Recent Labs     12/16/21  0408 12/17/21  0522   AST 12 29   ALT 15 23   BILITOT 0.2* 0.3   ALKPHOS 76 81     Magnesium:    Lab Results   Component Value Date    MG 2.3 12/16/2021     HgBA1c:    Lab Results   Component Value Date    LABA1C 6.0 12/15/2019     TSH:    Lab Results   Component Value Date    TSH 0.909 12/11/2021       FOLATE:    Lab Results   Component Value Date    FOLATE 9.0 12/08/2016     FERRITIN:    Lab Results   Component Value Date    FERRITIN 344 12/11/2021       Assessment and Plan:   1. Acute on chronic resp failure  2. COPD with acute exac  3. Pneumonia  4. Mod PAH  5. HTN-systemic  6. Schizophrenia  7.  Chronic nicotine abuse    Cont bronchodilators, wean O2/HFNC  lovenox  Tadalafil  Risperidol  Statin  PT/OT    Dangelo Wyman MD, MD

## 2021-12-18 NOTE — CONSULTS
[facility]    Patient - Joshua Marie   MRN -  [id number]   [facility] # - [de-identified]   - 1970      Date of Admission -  2021  1:41 AM  Date of evaluation -  2021  Room - Halima Hall MD Primary Care Physician - Marilu Blanco MD   Chief Complaint   Acute on chronic respiratory failure  COPD exacerbation  Right lower lobe pneumonia   Active Hospital Problem List      Active Hospital Problems    Diagnosis Date Noted    Pneumonia of right lower lobe due to infectious organism [J18.9]     Acute respiratory failure ([facility] [address] [J96.00] 2020     HPI   47 [facility] with past medical history of severe COPD (FEV1 47%) on home Oxygen 3 lpm, Schizophrenia, JORDAN/OHS, chronic tobacco abuse, moderate PAH (RVSP 55-60 mmHg), HTN and CHFrEF 45%,  presents to [facility] 21 with chief complaints of shortness of breath and chest pain. Upon ED arrival, the patient was febrile (Tmax 101 degrees), normotensive, mildly tachycardic and tachypnic (RR 30s), with SpO2 40% on RA. Workup was remarkable for macrocytosis without anemia, normal WBC, Cr 1.2 (BUN 25, eGFR 77),  (A1C 6.0 in 2019), BNP 2758, CRP 16.3, , ferritin 344 and LA 2.4. Procalcitonin, troponin, rapid flu and COVID testing were negative. ABG (on BiPAP; FiO2 95%): 7.22 / 85 / 171 / 35. Dimer 630. EKG showed sinus tachycardia without evidence of ischemic. Portable CXR demonstrated a RLL infiltrate. CT chest showed RML and RLL infiltrates. The patient was placed on a non-re breather and escalated to BiPAP. He was subsequently intubated for failure to improve on BiPAP therapy. He was transported to the ICU for additional management and arrived in stable condition. He was extubated 2 days ago.  Currently on Oxygen nasal canula 4 lpm.    Past Medical History         Diagnosis Date    Acute on chronic systolic congestive heart failure ([facility] [address] 4/4/2019    Emphysema (subcutaneous) (surgical) resulting from a procedure     Hypertension     Pneumonia     Schizophrenia [facility])       Past Surgical History     History reviewed. No pertinent surgical history. Diet    ADULT DIET; Dysphagia - Pureed  ADULT ORAL NUTRITION SUPPLEMENT; Breakfast, Lunch, Dinner; Standard 4 oz Oral Supplement  Allergies    Patient has no known allergies. Social History     Social History     Socioeconomic History    Marital status: Single     Spouse name: Not on file    Number of children: 0    Years of education: Not on file    Highest education level: Not on file   Occupational History    Not on file   Tobacco Use    Smoking status: Current Every Day Smoker     Packs/day: 1.00     Years: 20.00     Pack years: 20.00     Types: Cigarettes    Smokeless tobacco: Never Used   Vaping Use    Vaping Use: Never used   Substance and Sexual Activity    Alcohol use: No    Drug use: No    Sexual activity: Not Currently   Other Topics Concern    Not on file   Social History Narrative    Not on file     Social Determinants of Health     Financial Resource Strain:     Difficulty of Paying Living Expenses: Not on file   Food Insecurity:     Worried About Running Out of Food in the Last Year: Not on file    Tiny of Food in the Last Year: Not on file   Transportation Needs:     Lack of Transportation (Medical): Not on file    Lack of Transportation (Non-Medical):  Not on file   Physical Activity:     Days of Exercise per Week: Not on file    Minutes of Exercise per Session: Not on file   Stress:     Feeling of Stress : Not on file   Social Connections:     Frequency of Communication with Friends and Family: Not on file    Frequency of Social Gatherings with Friends and Family: Not on file    Attends Confucianist Services: Not on file    Active Member of Clubs or Organizations: Not on file    Attends Club or Organization Meetings: Not on file    Marital Status: Not on file Intimate Partner Violence:     Fear of Current or Ex-Partner: Not on file    Emotionally Abused: Not on file    Physically Abused: Not on file    Sexually Abused: Not on file   Housing Stability:     Unable to Pay for Housing in the Last Year: Not on file    Number of [place] in the Last Year: Not on file    Unstable Housing in the Last Year: Not on file     Family History          Problem Relation Age of Onset    High Blood Pressure Mother      Sleep History    History of JORDAN   ROS    General/Constitutional: No recent loss of weight or appetite changes. No fever or chills. HENT: Negative. Eyes: Negative. Upper respiratory tract: No nasal stuffiness or post nasal drip. Lower respiratory tract/ lungs: SOB and cough   Cardiovascular: No palpitations, chest pain or edema. Gastrointestinal: No nausea or vomiting. Neurological: No focal neurological weakness. Extremities: No tenderness. Musculoskeletal: no complaints  Genitourinary: No complaints. Hematological: Negative. Denies easy buising  Skin: No itching.    Meds    Current Medications    ipratropium-albuterol  1 ampule Inhalation Q4H WA    chlorhexidine  15 mL Mouth/Throat BID    calcium replacement protocol   Other RX Placeholder    budesonide  500 mcg Nebulization BID    [Held by provider] tiotropium-olodaterol  2 puff Inhalation Daily    pravastatin  40 mg Oral Nightly    risperiDONE  2 mg Oral Nightly    Tadalafil (PAH)  40 mg Oral Daily    [Held by provider] formoterol  20 mcg Nebulization BID    sodium chloride flush  10 mL IntraVENous 2 times per day    enoxaparin  40 mg SubCUTAneous Daily    cefTRIAXone (ROCEPHIN) IV  1,000 mg IntraVENous Q24H    methylPREDNISolone  40 mg IntraVENous Q8H    aspirin  325 mg Oral Daily    metoprolol tartrate  25 mg Oral BID    pantoprazole  40 mg IntraVENous BID     sodium chloride flush, sodium chloride, acetaminophen **OR** acetaminophen, albuterol  IV Drips/Infusions   sodium chloride       Vitals    Vitals    height is 6' (1.829 m) and weight is 210 lb 1.6 oz (95.3 kg). His oral temperature is 98.2 °F (36.8 °C). His blood pressure is 114/70 and his pulse is 91. His respiration is 26 and oxygen saturation is 92%. O2 Flow Rate (L/min): 60 L/min  I/O    Intake/Output Summary (Last 24 hours) at 12/17/2021 2102  Last data filed at 12/17/2021 2010  Gross per 24 hour   Intake 1630 ml   Output 5300 ml   Net -3670 ml     Patient Vitals for the past 96 hrs (Last 3 readings):   Weight   12/14/21 0600 210 lb 1.6 oz (95.3 kg)     Exam   Constitutional: Chronically ill patient, agitated   Head: Normocephalic and atraumatic. Mouth/Throat: Oropharynx is clear and moist.  No oral thrush. Eyes: Conjunctivae are normal. Pupils are equal, round, and reactive to light. No scleral icterus. Neck: Neck supple. No JVD or tracheal deviation present. Cardiovascular: Regular rate, regular rhythm, S1 and S2 with no murmur. No peripheral edema  Pulmonary/Chest: Diminished breath sounds bilateral, rhonchi    Abdominal: Soft. Bowel sounds audible. No distension or tenderness to palp  Musculoskeletal: Moves all extremities  Lymphadenopathy:  No cervical adenopathy. Neurological: Patient is alert but agitated, generalized weakness   Skin: Skin is warm and dry.     Labs   ABG  Lab Results   Component Value Date    PH 7.22 12/12/2021    PO2 87 12/12/2021    PCO2 85 12/12/2021    HCO3 34 12/12/2021    O2SAT 94 12/12/2021     Lab Results   Component Value Date    IFIO2 70 12/12/2021    MODE PC 12/12/2021    SETTIDVOL 480 05/30/2020    SETPEEP 8.0 12/12/2021     CBC  Recent Labs     12/15/21  0424 12/16/21  0408 12/17/21  0522   WBC 6.0 4.8 9.5   RBC 4.86 5.06 5.20   HGB 14.8 15.2 15.5   HCT 47.7 50.5 50.5   MCV 98.1* 99.8* 97.1*   MCH 30.5 30.0 29.8   MCHC 31.0* 30.1* 30.7*    209 248   MPV 9.4 9.4 10.0      BMP  Recent Labs     12/15/21  0424 12/15/21  0424 12/16/21  0408 12/16/21  1436 12/17/21  0522 Results   Component Value Date    PROCAL 0.17 12/11/2021    PROCAL 0.15 05/18/2020    PROCAL 0.15 05/17/2020     Radiology    CXR    CT Scans    (See actual reports for details)    Assessment   Acute on chronic hypoxic respiratory failure s/p Extubation 12/15/21  COPD exacerbation   RLL pneumonia- Aspiration   CHFrEF 45%  JORDAN   Secondary pulmonary hypertension  Schizophrenia  Recommendations     Continue antibiotics and steroids  Aspiration precautions  Swallow study  Oxygen by nasal canula to keep saturation above 92%  Duoneb prn  DVT and GI prophylaxis     Thank you for the consult and allowing us to participate in the care of your patient. Case discussed with nurse and patient/family. Questions and concerns addressed. Meds and Orders reviewed.     Electronically signed by     Dulce Lopez MD on 12/17/2021 at 9:02 PM

## 2021-12-19 PROCEDURE — 2580000003 HC RX 258: Performed by: FAMILY MEDICINE

## 2021-12-19 PROCEDURE — C9113 INJ PANTOPRAZOLE SODIUM, VIA: HCPCS | Performed by: FAMILY MEDICINE

## 2021-12-19 PROCEDURE — 94761 N-INVAS EAR/PLS OXIMETRY MLT: CPT

## 2021-12-19 PROCEDURE — 6360000002 HC RX W HCPCS: Performed by: INTERNAL MEDICINE

## 2021-12-19 PROCEDURE — 2700000000 HC OXYGEN THERAPY PER DAY

## 2021-12-19 PROCEDURE — 6370000000 HC RX 637 (ALT 250 FOR IP): Performed by: INTERNAL MEDICINE

## 2021-12-19 PROCEDURE — 94640 AIRWAY INHALATION TREATMENT: CPT

## 2021-12-19 PROCEDURE — 2060000000 HC ICU INTERMEDIATE R&B

## 2021-12-19 PROCEDURE — 6370000000 HC RX 637 (ALT 250 FOR IP): Performed by: FAMILY MEDICINE

## 2021-12-19 PROCEDURE — 6360000002 HC RX W HCPCS: Performed by: FAMILY MEDICINE

## 2021-12-19 PROCEDURE — 6370000000 HC RX 637 (ALT 250 FOR IP): Performed by: NURSE PRACTITIONER

## 2021-12-19 PROCEDURE — 99232 SBSQ HOSP IP/OBS MODERATE 35: CPT | Performed by: INTERNAL MEDICINE

## 2021-12-19 RX ADMIN — IPRATROPIUM BROMIDE AND ALBUTEROL SULFATE 1 AMPULE: .5; 3 SOLUTION RESPIRATORY (INHALATION) at 12:01

## 2021-12-19 RX ADMIN — PRAVASTATIN SODIUM 40 MG: 40 TABLET ORAL at 20:58

## 2021-12-19 RX ADMIN — METHYLPREDNISOLONE SODIUM SUCCINATE 40 MG: 40 INJECTION, POWDER, FOR SOLUTION INTRAMUSCULAR; INTRAVENOUS at 10:36

## 2021-12-19 RX ADMIN — CHLORHEXIDINE GLUCONATE 0.12% ORAL RINSE 15 ML: 1.2 LIQUID ORAL at 20:57

## 2021-12-19 RX ADMIN — SODIUM CHLORIDE, PRESERVATIVE FREE 10 ML: 5 INJECTION INTRAVENOUS at 20:57

## 2021-12-19 RX ADMIN — CHLORHEXIDINE GLUCONATE 0.12% ORAL RINSE 15 ML: 1.2 LIQUID ORAL at 10:35

## 2021-12-19 RX ADMIN — IPRATROPIUM BROMIDE AND ALBUTEROL SULFATE 1 AMPULE: .5; 3 SOLUTION RESPIRATORY (INHALATION) at 08:19

## 2021-12-19 RX ADMIN — IPRATROPIUM BROMIDE AND ALBUTEROL SULFATE 1 AMPULE: .5; 3 SOLUTION RESPIRATORY (INHALATION) at 20:38

## 2021-12-19 RX ADMIN — RISPERIDONE 2 MG: 1 TABLET ORAL at 20:58

## 2021-12-19 RX ADMIN — METOPROLOL TARTRATE 25 MG: 25 TABLET, FILM COATED ORAL at 20:58

## 2021-12-19 RX ADMIN — PANTOPRAZOLE SODIUM 40 MG: 40 INJECTION, POWDER, FOR SOLUTION INTRAVENOUS at 20:58

## 2021-12-19 RX ADMIN — BUDESONIDE 500 MCG: 0.5 INHALANT RESPIRATORY (INHALATION) at 18:12

## 2021-12-19 RX ADMIN — PANTOPRAZOLE SODIUM 40 MG: 40 INJECTION, POWDER, FOR SOLUTION INTRAVENOUS at 10:35

## 2021-12-19 RX ADMIN — RISPERIDONE 1 MG: 1 TABLET ORAL at 10:35

## 2021-12-19 RX ADMIN — ASPIRIN 325 MG: 325 TABLET ORAL at 10:35

## 2021-12-19 RX ADMIN — ENOXAPARIN SODIUM 40 MG: 100 INJECTION SUBCUTANEOUS at 10:35

## 2021-12-19 RX ADMIN — METOPROLOL TARTRATE 25 MG: 25 TABLET, FILM COATED ORAL at 10:35

## 2021-12-19 RX ADMIN — BUDESONIDE 500 MCG: 0.5 INHALANT RESPIRATORY (INHALATION) at 08:20

## 2021-12-19 RX ADMIN — IPRATROPIUM BROMIDE AND ALBUTEROL SULFATE 1 AMPULE: .5; 3 SOLUTION RESPIRATORY (INHALATION) at 16:43

## 2021-12-19 RX ADMIN — SODIUM CHLORIDE, PRESERVATIVE FREE 10 ML: 5 INJECTION INTRAVENOUS at 10:35

## 2021-12-19 ASSESSMENT — PAIN SCALES - GENERAL
PAINLEVEL_OUTOF10: 0
PAINLEVEL_OUTOF10: 0

## 2021-12-19 NOTE — PROGRESS NOTES
INTERNAL MEDICINE Progress Note  12/19/2021 12:26 PM  Subjective:   Admit Date: 12/11/2021  PCP: Kathryn Sen MD  Interval History:     Admitted for SOB, acute on chronic resp insufficiency, required Vent support  Doing well post extubation  Improving SOB,   remains on oxygen, HFNC    Objective:   Vitals: /63   Pulse 84   Temp 98.7 °F (37.1 °C) (Oral)   Resp 18   Ht 6' (1.829 m)   Wt 199 lb 4.8 oz (90.4 kg)   SpO2 97%   BMI 27.03 kg/m²   General appearance: alert and cooperative with exam  HEENT:  Teeth: multiple caries and missing teeth  Neck: no JVD  Lungs: diminished breath sounds bilaterally  Heart: S1, S2 normal  Abdomen: soft, non-tender; bowel sounds normal; no masses,  no organomegaly  Extremities: no edema,   Neurologic:  alert, orientation: person, place, affect: normal, thought content exhibits logical conclusions      Medications:   Scheduled Meds:   risperiDONE  1 mg Oral Daily    ipratropium-albuterol  1 ampule Inhalation Q4H WA    methylPREDNISolone  40 mg IntraVENous Daily    chlorhexidine  15 mL Mouth/Throat BID    calcium replacement protocol   Other RX Placeholder    budesonide  500 mcg Nebulization BID    [Held by provider] tiotropium-olodaterol  2 puff Inhalation Daily    pravastatin  40 mg Oral Nightly    risperiDONE  2 mg Oral Nightly    Tadalafil (PAH)  40 mg Oral Daily    [Held by provider] formoterol  20 mcg Nebulization BID    sodium chloride flush  10 mL IntraVENous 2 times per day    enoxaparin  40 mg SubCUTAneous Daily    aspirin  325 mg Oral Daily    metoprolol tartrate  25 mg Oral BID    pantoprazole  40 mg IntraVENous BID     Continuous Infusions:   sodium chloride         Lab Results:   CBC:   Recent Labs     12/17/21  0522 12/18/21  0504   WBC 9.5 8.5   HGB 15.5 15.0    257     BMP:    Recent Labs     12/16/21  1436 12/17/21  0522 12/18/21  0504   NA  --  140 141   K 4.7 4.8 4.4   CL  --  99 100   CO2  --  32 34*   BUN  --  17 15   CREATININE --  0.5 0.5   GLUCOSE  --  98 96     Hepatic:   Recent Labs     12/17/21  0522   AST 29   ALT 23   BILITOT 0.3   ALKPHOS 81     Magnesium:    Lab Results   Component Value Date    MG 2.3 12/16/2021     HgBA1c:    Lab Results   Component Value Date    LABA1C 6.0 12/15/2019     TSH:    Lab Results   Component Value Date    TSH 0.909 12/11/2021       FOLATE:    Lab Results   Component Value Date    FOLATE 9.0 12/08/2016     FERRITIN:    Lab Results   Component Value Date    FERRITIN 344 12/11/2021       Assessment and Plan:   1. Acute on chronic resp failure  2. COPD with acute exac  3. Pneumonia  4. Mod PAH  5. HTN-systemic  6. Schizophrenia  7.  Chronic nicotine abuse    wean O2/HFNC  lovenox  Tadalafil  Risperidol  Statin  PT/OT    Kathryn Sen MD, MD

## 2021-12-20 LAB
CALCIUM IONIZED: 1.07 MMOL/L (ref 1.12–1.32)
MAGNESIUM: 2 MG/DL (ref 1.6–2.4)

## 2021-12-20 PROCEDURE — 97162 PT EVAL MOD COMPLEX 30 MIN: CPT

## 2021-12-20 PROCEDURE — 6360000002 HC RX W HCPCS: Performed by: FAMILY MEDICINE

## 2021-12-20 PROCEDURE — 6360000002 HC RX W HCPCS: Performed by: INTERNAL MEDICINE

## 2021-12-20 PROCEDURE — C9113 INJ PANTOPRAZOLE SODIUM, VIA: HCPCS | Performed by: FAMILY MEDICINE

## 2021-12-20 PROCEDURE — 94669 MECHANICAL CHEST WALL OSCILL: CPT

## 2021-12-20 PROCEDURE — 92526 ORAL FUNCTION THERAPY: CPT | Performed by: SPEECH-LANGUAGE PATHOLOGIST

## 2021-12-20 PROCEDURE — 6370000000 HC RX 637 (ALT 250 FOR IP): Performed by: NURSE PRACTITIONER

## 2021-12-20 PROCEDURE — 36415 COLL VENOUS BLD VENIPUNCTURE: CPT

## 2021-12-20 PROCEDURE — 99232 SBSQ HOSP IP/OBS MODERATE 35: CPT | Performed by: INTERNAL MEDICINE

## 2021-12-20 PROCEDURE — 94660 CPAP INITIATION&MGMT: CPT

## 2021-12-20 PROCEDURE — 6370000000 HC RX 637 (ALT 250 FOR IP): Performed by: INTERNAL MEDICINE

## 2021-12-20 PROCEDURE — 97530 THERAPEUTIC ACTIVITIES: CPT

## 2021-12-20 PROCEDURE — 83735 ASSAY OF MAGNESIUM: CPT

## 2021-12-20 PROCEDURE — 2700000000 HC OXYGEN THERAPY PER DAY

## 2021-12-20 PROCEDURE — 6370000000 HC RX 637 (ALT 250 FOR IP): Performed by: FAMILY MEDICINE

## 2021-12-20 PROCEDURE — 82330 ASSAY OF CALCIUM: CPT

## 2021-12-20 PROCEDURE — APPSS30 APP SPLIT SHARED TIME 16-30 MINUTES: Performed by: NURSE PRACTITIONER

## 2021-12-20 PROCEDURE — 2580000003 HC RX 258: Performed by: FAMILY MEDICINE

## 2021-12-20 PROCEDURE — 2060000000 HC ICU INTERMEDIATE R&B

## 2021-12-20 PROCEDURE — 94640 AIRWAY INHALATION TREATMENT: CPT

## 2021-12-20 PROCEDURE — 94761 N-INVAS EAR/PLS OXIMETRY MLT: CPT

## 2021-12-20 RX ORDER — IPRATROPIUM BROMIDE AND ALBUTEROL SULFATE 2.5; .5 MG/3ML; MG/3ML
1 SOLUTION RESPIRATORY (INHALATION) 3 TIMES DAILY
Status: DISCONTINUED | OUTPATIENT
Start: 2021-12-20 | End: 2021-12-20

## 2021-12-20 RX ORDER — PREDNISONE 20 MG/1
40 TABLET ORAL DAILY
Status: COMPLETED | OUTPATIENT
Start: 2021-12-21 | End: 2021-12-23

## 2021-12-20 RX ORDER — IPRATROPIUM BROMIDE AND ALBUTEROL SULFATE 2.5; .5 MG/3ML; MG/3ML
1 SOLUTION RESPIRATORY (INHALATION)
Status: DISCONTINUED | OUTPATIENT
Start: 2021-12-20 | End: 2021-12-23

## 2021-12-20 RX ADMIN — PRAVASTATIN SODIUM 40 MG: 40 TABLET ORAL at 21:13

## 2021-12-20 RX ADMIN — CHLORHEXIDINE GLUCONATE 0.12% ORAL RINSE 15 ML: 1.2 LIQUID ORAL at 21:13

## 2021-12-20 RX ADMIN — BUDESONIDE 500 MCG: 0.5 INHALANT RESPIRATORY (INHALATION) at 16:45

## 2021-12-20 RX ADMIN — SODIUM CHLORIDE, PRESERVATIVE FREE 10 ML: 5 INJECTION INTRAVENOUS at 21:13

## 2021-12-20 RX ADMIN — METOPROLOL TARTRATE 25 MG: 25 TABLET, FILM COATED ORAL at 09:36

## 2021-12-20 RX ADMIN — RISPERIDONE 1 MG: 1 TABLET ORAL at 09:34

## 2021-12-20 RX ADMIN — METHYLPREDNISOLONE SODIUM SUCCINATE 40 MG: 40 INJECTION, POWDER, FOR SOLUTION INTRAMUSCULAR; INTRAVENOUS at 09:34

## 2021-12-20 RX ADMIN — BUDESONIDE 500 MCG: 0.5 INHALANT RESPIRATORY (INHALATION) at 07:25

## 2021-12-20 RX ADMIN — IPRATROPIUM BROMIDE AND ALBUTEROL SULFATE 1 AMPULE: .5; 3 SOLUTION RESPIRATORY (INHALATION) at 16:35

## 2021-12-20 RX ADMIN — ENOXAPARIN SODIUM 40 MG: 100 INJECTION SUBCUTANEOUS at 09:34

## 2021-12-20 RX ADMIN — IPRATROPIUM BROMIDE AND ALBUTEROL SULFATE 1 AMPULE: .5; 3 SOLUTION RESPIRATORY (INHALATION) at 07:25

## 2021-12-20 RX ADMIN — CHLORHEXIDINE GLUCONATE 0.12% ORAL RINSE 15 ML: 1.2 LIQUID ORAL at 09:34

## 2021-12-20 RX ADMIN — SODIUM CHLORIDE, PRESERVATIVE FREE 10 ML: 5 INJECTION INTRAVENOUS at 09:35

## 2021-12-20 RX ADMIN — ASPIRIN 325 MG: 325 TABLET ORAL at 09:34

## 2021-12-20 RX ADMIN — RISPERIDONE 2 MG: 1 TABLET ORAL at 21:13

## 2021-12-20 RX ADMIN — METOPROLOL TARTRATE 25 MG: 25 TABLET, FILM COATED ORAL at 21:25

## 2021-12-20 RX ADMIN — IPRATROPIUM BROMIDE AND ALBUTEROL SULFATE 1 AMPULE: .5; 3 SOLUTION RESPIRATORY (INHALATION) at 11:37

## 2021-12-20 RX ADMIN — PANTOPRAZOLE SODIUM 40 MG: 40 INJECTION, POWDER, FOR SOLUTION INTRAVENOUS at 21:13

## 2021-12-20 RX ADMIN — IPRATROPIUM BROMIDE AND ALBUTEROL SULFATE 1 AMPULE: .5; 3 SOLUTION RESPIRATORY (INHALATION) at 22:25

## 2021-12-20 RX ADMIN — PANTOPRAZOLE SODIUM 40 MG: 40 INJECTION, POWDER, FOR SOLUTION INTRAVENOUS at 09:34

## 2021-12-20 ASSESSMENT — PAIN SCALES - GENERAL
PAINLEVEL_OUTOF10: 0
PAINLEVEL_OUTOF10: 0

## 2021-12-20 NOTE — PROGRESS NOTES
INTERNAL MEDICINE Progress Note  12/20/2021 12:25 PM  Subjective:   Admit Date: 12/11/2021  PCP: Osito Alfaro MD  Interval History:     Improving SOB,   remains on oxygen, HFNC-being weaned down    Objective:   Vitals: /66   Pulse 66   Temp 98 °F (36.7 °C) (Oral)   Resp 20   Ht 6' (1.829 m)   Wt 199 lb 6.4 oz (90.4 kg)   SpO2 97%   BMI 27.04 kg/m²   General appearance: alert and cooperative with exam  HEENT:  Teeth: multiple caries and missing teeth  Neck: no JVD  Lungs: diminished breath sounds bilaterally  Heart: S1, S2 normal  Abdomen: soft, non-tender; bowel sounds normal; no masses,  no organomegaly  Extremities: no edema,   Neurologic:  alert, orientation: person, place, affect: normal, thought content exhibits logical conclusions      Medications:   Scheduled Meds:   ipratropium-albuterol  1 ampule Inhalation TID    risperiDONE  1 mg Oral Daily    methylPREDNISolone  40 mg IntraVENous Daily    chlorhexidine  15 mL Mouth/Throat BID    calcium replacement protocol   Other RX Placeholder    budesonide  500 mcg Nebulization BID    [Held by provider] tiotropium-olodaterol  2 puff Inhalation Daily    pravastatin  40 mg Oral Nightly    risperiDONE  2 mg Oral Nightly    Tadalafil (PAH)  40 mg Oral Daily    [Held by provider] formoterol  20 mcg Nebulization BID    sodium chloride flush  10 mL IntraVENous 2 times per day    enoxaparin  40 mg SubCUTAneous Daily    aspirin  325 mg Oral Daily    metoprolol tartrate  25 mg Oral BID    pantoprazole  40 mg IntraVENous BID     Continuous Infusions:   sodium chloride         Lab Results:   CBC:   Recent Labs     12/18/21  0504   WBC 8.5   HGB 15.0        BMP:    Recent Labs     12/18/21  0504      K 4.4      CO2 34*   BUN 15   CREATININE 0.5   GLUCOSE 96     Hepatic:   No results for input(s): AST, ALT, ALB, BILITOT, ALKPHOS in the last 72 hours.   Magnesium:    Lab Results   Component Value Date    MG 2.3 12/16/2021 HgBA1c:    Lab Results   Component Value Date    LABA1C 6.0 12/15/2019     TSH:    Lab Results   Component Value Date    TSH 0.909 12/11/2021       FOLATE:    Lab Results   Component Value Date    FOLATE 9.0 12/08/2016     FERRITIN:    Lab Results   Component Value Date    FERRITIN 344 12/11/2021       Assessment and Plan:   1. Acute on chronic resp failure  2. COPD with acute exac  3. Pneumonia  4. Mod PAH  5. HTN-systemic  6. Schizophrenia  7.  Chronic nicotine abuse    wean O2/HFNC  Lovenox, Tadalafil  Risperidol, Statin  PT/OT    Janay Lozano MD, MD

## 2021-12-20 NOTE — PROGRESS NOTES
[address] [facility] PHYSICAL THERAPY  EVALUATION  [facility] ICU STEPDOWN TELEMETRY 4K - 1L-93/126-F    Time In: 8012  Time Out: 1112  Timed Code Treatment Minutes: 8 Minutes  Minutes: 20          Date: 2021  Patient Name: Donald Renee,  Gender:  male        MRN: [id number]  : 1970  (46 y.o.)      Referring Practitioner: Kathryn Sen MD  Diagnosis: Acute respiratory failure  Additional Pertinent Hx: 47 [facility] with past medical history of severe COPD (FEV1 47%) on home Oxygen 3 lpm, Schizophrenia, JRODAN/OHS, chronic tobacco abuse, moderate PAH (RVSP 55-60 mmHg), HTN and CHFrEF 45%,  presents to [facility] 21 with chief complaints of shortness of breath and chest pain. Upon ED arrival, the patient was febrile (Tmax 101 degrees), normotensive, mildly tachycardic and tachypnic (RR 30s), with SpO2 40% on RA. Workup was remarkable for macrocytosis without anemia, normal WBC, Cr 1.2 (BUN 25, eGFR 77),  (A1C 6.0 in 2019), BNP 2758, CRP 16.3, , ferritin 344 and LA 2.4. Procalcitonin, troponin, rapid flu and COVID testing were negative. ABG (on BiPAP; FiO2 95%): 7.22 / 85 / 171 / 35. Dimer 630. EKG showed sinus tachycardia without evidence of ischemic. Portable CXR demonstrated a RLL infiltrate. CT chest showed RML and RLL infiltrates. The patient was placed on a non-re breather and escalated to BiPAP. He was subsequently intubated for failure to improve on BiPAP therapy. He was transported to the ICU for additional management and arrived in stable condition. He was extubated 2 days ago. Currently on Oxygen nasal canula 4 lpm.     Restrictions/Precautions:  Restrictions/Precautions: General Precautions,Fall Risk    Subjective:  Chart Reviewed: Yes  Patient assessed for rehabilitation services?: Yes  Family / Caregiver Present: No  Subjective: RN approved session.  Pt pleasant and agreeable to therapy    General:  Overall Orientation Status: Within Functional Limits  Follows Commands: Within Functional Limits    Vision: Impaired  Vision Exceptions: Wears glasses at all times    Hearing: Within functional limits         Pain: 0/10: denies pain     Vitals: Oxygen: HFNC; ranging from 11-23 LPM; 67-99% FiO2    Social/Functional History:    Lives With: Other (comment) (step-sister)  Type of Home: Apartment  Home Layout: One level  Home Access: Stairs to enter with rails  Entrance Stairs - Number of Steps: 13  Home Equipment:  (no equipment)                   Ambulation Assistance: Independent (no AD)  Transfer Assistance: Independent    Active : No     Additional Comments: questionable reliability    OBJECTIVE:  Range of Motion:  Bilateral Lower Extremity: WFL    Strength:  Bilateral Lower Extremity: grossly 4+/5    Balance:  Static Sitting Balance:  Stand By Assistance  Dynamic Sitting Balance: Contact Guard Assistance  Static Standing Balance: Contact Guard Assistance    Bed Mobility:  Not Tested    Transfers:  Sit to Stand: Contact Guard Assistance  Stand to Sit:Contact Guard Assistance    Ambulation:  Contact Guard Assistance  Distance: 5' x2; seated rest break between   Surface: Level Tile  Device:Rolling Walker  Gait Deviations: Wide Base of Support and Unsteady Gait    Exercise:  Patient was guided in 1 set(s) 10 reps of exercise to both lower extremities. Seated marches, Seated hamstring curls, Seated heel/toe raises and Long arc quads. Exercises were completed for increased independence with functional mobility. Functional Outcome Measures: Completed  AM-PAC Inpatient Mobility Raw Score : 17  AM-PAC Inpatient T-Scale Score : 42.13    ASSESSMENT:  Activity Tolerance:  Patient tolerance of  treatment: good. Pt is impulsive and requires moderate verbal cues for safety and sequencing. Pt demonstrates poor safety awareness. Treatment Initiated: Treatment and education initiated within context of evaluation.   Evaluation time included review of current medical information, gathering information related to past medical, social and functional history, completion of standardized testing, formal and informal observation of tasks, assessment of data and development of plan of care and goals. Treatment time included skilled education and facilitation of tasks to increase safety and independence with functional mobility for improved independence and quality of life. Assessment: Body structures, Functions, Activity limitations: Decreased functional mobility ,Decreased posture,Decreased balance,Decreased strength,Decreased endurance  Assessment: Pt demonstrates a decrease in baseline by way of bed mobility, transfers and ambulation secondary to decreased activity tolerance, strength, fatigue, and balance deficits. Pt will benefit from skilled PT services throughout admission and beyond hospital discharge for improvements in functional mobility and in order to decrease fall risk and return pt to [facility].    Prognosis: Good    REQUIRES PT FOLLOW UP: Yes    Discharge Recommendations:  Discharge Recommendations: Continue to assess pending progress,Patient would benefit from continued therapy after discharge    Patient Education:  PT Education: Plan of Care,PT Role,Functional Mobility Training,Goals,General Safety    Equipment Recommendations:  Equipment Needed: No    Plan:  Times per week: 3-5x GM  Times per day: Daily  Current Treatment Recommendations: Strengthening,Balance Training,Endurance Training,Gait Training,Functional Mobility Training    Goals:  Patient goals : none stated  Short term goals  Time Frame for Short term goals: by discharge  Short term goal 1: bed mobility with HOB flat ,no rails, mod I for increased functional ind  Short term goal 2: sit<>stand from various surfaces with LRD mod I for safe transfers  Short term goal 3: ambulate 48' with LRD mod I for safe household ambulation  Short term goal 4: navigate 10 steps with LRD mod I for safe enter/exit of home  Long term goals  Time Frame for Long term goals : NA d/t short ELOS    Following session, patient left in safe position with all fall risk precautions in place.     Adelaide Akhtar PT, DPT

## 2021-12-20 NOTE — PROGRESS NOTES
[place] for Pulmonary, Critical Care and Sleep Medicine    Patient - Carlos Kohler   MRN -  [id number]   [facility]   - 1970      Date of Admission -  2021  1:41 AM  Date of evaluation -  2021  Room - [facility] Day - 8  Consulting - Isha Michelle MD Primary Care Physician - Isha Michelle MD   Chief Complaint   Acute on chronic respiratory failure  COPD exacerbation  Right lower lobe pneumonia   Active Hospital Problem List      Active Hospital Problems    Diagnosis Date Noted    Pneumonia of right lower lobe due to infectious organism [J18.9]     Acute respiratory failure ([address] [J96.00] 2020     HPI   45 [facility] with past medical history of severe COPD (FEV1 47%) on home Oxygen 3 lpm, Schizophrenia, JORDAN/OHS, chronic tobacco abuse, moderate PAH (RVSP 55-60 mmHg), HTN and CHFrEF 45%,  presents to [facility] 21 with chief complaints of shortness of breath and chest pain. Upon ED arrival, the patient was febrile (Tmax 101 degrees), normotensive, mildly tachycardic and tachypnic (RR 30s), with SpO2 40% on RA. Workup was remarkable for macrocytosis without anemia, normal WBC, Cr 1.2 (BUN 25, eGFR 77),  (A1C 6.0 in 2019), BNP 2758, CRP 16.3, , ferritin 344 and LA 2.4. Procalcitonin, troponin, rapid flu and COVID testing were negative. ABG (on BiPAP; FiO2 95%): 7.22 / 85 / 171 / 35. Dimer 630. EKG showed sinus tachycardia without evidence of ischemic. Portable CXR demonstrated a RLL infiltrate. CT chest showed RML and RLL infiltrates. The patient was placed on a non-re breather and escalated to BiPAP. He was subsequently intubated for failure to improve on BiPAP therapy. He was transported to the ICU for additional management and arrived in stable condition. He was extubated 2 days ago.  Currently on Oxygen nasal canula 4 lpm.    Subjective/Last 24 hours     Remained on HFNC 3% FiO2  Less agitation and respiratory distress  Coughing Stability:     Unable to Pay for Housing in the Last Year: Not on file    Number of Places Lived in the Last Year: Not on file    Unstable Housing in the Last Year: Not on file     Family History          Problem Relation Age of Onset    High Blood Pressure Mother      Sleep History    History of JORDAN   Meds    Current Medications    risperiDONE  1 mg Oral Daily    ipratropium-albuterol  1 ampule Inhalation Q4H WA    methylPREDNISolone  40 mg IntraVENous Daily    chlorhexidine  15 mL Mouth/Throat BID    calcium replacement protocol   Other RX Placeholder    budesonide  500 mcg Nebulization BID    [Held by provider] tiotropium-olodaterol  2 puff Inhalation Daily    pravastatin  40 mg Oral Nightly    risperiDONE  2 mg Oral Nightly    Tadalafil (PAH)  40 mg Oral Daily    [Held by provider] formoterol  20 mcg Nebulization BID    sodium chloride flush  10 mL IntraVENous 2 times per day    enoxaparin  40 mg SubCUTAneous Daily    aspirin  325 mg Oral Daily    metoprolol tartrate  25 mg Oral BID    pantoprazole  40 mg IntraVENous BID     LORazepam, sodium chloride flush, sodium chloride, acetaminophen **OR** acetaminophen, albuterol  IV Drips/Infusions   sodium chloride       Vitals    Vitals    height is 6' (1.829 m) and weight is 199 lb 4.8 oz (90.4 kg). His oral temperature is 98.6 °F (37 °C). His blood pressure is 120/59 (abnormal) and his pulse is 80. His respiration is 16 and oxygen saturation is 95%. O2 Flow Rate (L/min): 55 L/min  I/O    Intake/Output Summary (Last 24 hours) at 12/19/2021 2235  Last data filed at 12/19/2021 1755  Gross per 24 hour   Intake 3354 ml   Output 2825 ml   Net 529 ml     Patient Vitals for the past 96 hrs (Last 3 readings):   Weight   12/19/21 0409 199 lb 4.8 oz (90.4 kg)     Exam   Constitutional: Chronically ill patient, agitated   Head: Normocephalic and atraumatic. Mouth/Throat: Oropharynx is clear and moist.  No oral thrush.    Eyes: Conjunctivae are normal. Pupils are equal, round, and reactive to light. No scleral icterus. Neck: Neck supple. No JVD or tracheal deviation present. Cardiovascular: Regular rate, regular rhythm, S1 and S2 with no murmur. No peripheral edema  Pulmonary/Chest: Diminished breath sounds bilateral, rhonchi    Abdominal: Soft. Bowel sounds audible. No distension or tenderness to palp  Musculoskeletal: Moves all extremities  Lymphadenopathy:  No cervical adenopathy. Neurological: Patient is alert but agitated, generalized weakness   Skin: Skin is warm and dry. Labs   ABG  Lab Results   Component Value Date    PH 7.22 12/12/2021    PO2 87 12/12/2021    PCO2 85 12/12/2021    HCO3 34 12/12/2021    O2SAT 94 12/12/2021     Lab Results   Component Value Date    IFIO2 70 12/12/2021    MODE PC 12/12/2021    SETTIDVOL 480 05/30/2020    SETPEEP 8.0 12/12/2021     CBC  Recent Labs     12/17/21  0522 12/18/21  0504   WBC 9.5 8.5   RBC 5.20 5.05   HGB 15.5 15.0   HCT 50.5 48.7   MCV 97.1* 96.4*   MCH 29.8 29.7   MCHC 30.7* 30.8*    257   MPV 10.0 10.0      BMP  Recent Labs     12/17/21  0522 12/18/21  0504    141   K 4.8 4.4   CL 99 100   CO2 32 34*   BUN 17 15   CREATININE 0.5 0.5   GLUCOSE 98 96   CALCIUM 8.8 8.7     LFT  Recent Labs     12/17/21  0522   AST 29   ALT 23   BILITOT 0.3   ALKPHOS 81     TROP  Lab Results   Component Value Date    TROPONINT < 0.010 12/11/2021    TROPONINT < 0.010 12/11/2021    TROPONINT 0.029 05/17/2020     BNP  Lab Results   Component Value Date    PROBNP 2758.0 12/11/2021    PROBNP 688.7 06/06/2020    PROBNP 550.9 05/26/2020     D-Dimer  Lab Results   Component Value Date    DDIMER 630.00 12/11/2021     Lactic Acid  No results for input(s): LACTA in the last 72 hours. INR  No results for input(s): INR, PROTIME in the last 72 hours. PTT  No results for input(s): APTT in the last 72 hours. Glucose  No results for input(s): POCGLU in the last 72 hours.   UA No results for input(s): Gus Mayfield, Rebbeca Ligas, MUCUS, PROTEINU, BLOODU, RBCUA, WBCUA, BACTERIA, NITRU, GLUCOSEU, BILIRUBINUR, UROBILINOGEN, KETUA, LABCAST, LABCASTTY, AMORPHOS in the last 72 hours. Invalid input(s): CRYSTALS. PFTs     Echo     Summary   Left ventricular size is normal and systolic function is mildly reduced. Ejection fraction was estimated at 50-55%. LV wall thickness is within   normal limits. Intraventricular septal wall compression during systole suggestive of RV   pressure overload. Markedly enlarged right atrium size. The right ventricle was not clearly visualized. Appears dilated. RVSP of 55-60 mm Hg consistent with moderate pulmonary hypertension. Small circumferential pericardial effusion without evidence of tamponade   physiology. IVC size is dilated with <50% respiratory collapse. Cultures    Procalcitonin  Lab Results   Component Value Date    PROCAL 0.17 12/11/2021    PROCAL 0.15 05/18/2020    PROCAL 0.15 05/17/2020     Radiology    CXR    CT Scans    (See actual reports for details)    Assessment   Acute on chronic hypoxic respiratory failure s/p Extubation 12/15/21  COPD exacerbation   RLL pneumonia- Aspiration   CHFrEF 45%  JORDAN   Secondary pulmonary hypertension  Schizophrenia  Recommendations     Continue antibiotics and steroids  Aspiration precautions, F/U swallow eval and recommendation  Oxygen by nasal canula to keep saturation above 92%  Duoneb prn      Thank you for the consult and allowing us to participate in the care of your patient. Case discussed with nurse and patient/family. Questions and concerns addressed. Meds and Orders reviewed.     Electronically signed by     Jolanta Allen MD on 12/19/2021 at 10:35 PM

## 2021-12-20 NOTE — PROGRESS NOTES
Called patient's sister, Main Glynn. Informed her of patient request that she be present at his hospital [facility] tomorrow. Also, informed her of his desire for \"Uncle Arcenio\" to be present. She stated that she would call him and let him know.

## 2021-12-20 NOTE — PROGRESS NOTES
[address] [facility] THERAPY  [facility] ICU STEPDOWN TELEMETRY 4K  DAILY NOTE    TIME   SLP Individual Minutes  Time In: 8635  Time Out: 6474  Minutes: 9  Timed Code Treatment Minutes: 0 Minutes       Date: 2021  Patient Name: Davis Todd      CSN: [id number]   : 1970  (46 y.o.)  Gender: male   Referring Physician:  Nanci Lewis DO  Diagnosis: Acute Respiratory Failure  Secondary Diagnosis: Dysphagia  Precautions: aspiration precautions, fall risk  Current Diet: Puree with thin liquids  Swallowing Strategies: Full Upright Position, Small Bite/Sip, Pulmonary Monitoring, Medications Crushed with Puree, Direct 1:1 Supervision, Alternate Solids and Liquids, Limit Distractions and Monitor for Fatigue              Date of Last MBS/FEES: Not Applicable    Pain:  No pain reported. Subjective:  Patient sitting up in the chair throughout. Patient cooperative. No family present. Patient currently on high flow oxygen. Short-Term Goals:  SHORT TERM GOAL #1:  Goal 1: Patient will consume a puree diet with thin liquids with direct 1:1 assistance with adequate endurance and no overt s/s of aspiration to assist with meeting nutrition/hydration needs  INTERVENTIONS:  Ramiro Hurst RN reports the patient has been tolerating his diet without overt difficulty. Patient completed trials of applesauce x 3 without overt difficulty. Good bolus formation and control. Patient impulsive with trials. Patient required min cues to take small bites and to alternate solids and liquids. Patient tolerated trials of thin liquids via cup x 7 without overt difficulty, however, required min cues to take 1 small sip at a time. Appeared to have timely swallows throughout trials of thin liquids.       SHORT TERM GOAL #2:  Goal 2: Patient will compelte advanced PO trials with ST only with timely mastication, adequate bolus formation, complete bolus clearance, adeqaute endurance and no overt s/s of aspiration to

## 2021-12-20 NOTE — FLOWSHEET NOTE
12/20/21 1529   Encounter Summary   Services provided to: Patient   Referral/Consult From: Babar   Continue Visiting Yes  (12/20 Protestant requested)   Complexity of Encounter Moderate   Length of Encounter 15 minutes   Routine   Type Follow up   Assessment Calm; Approachable   Intervention Nurtured hope;Prayer   Outcome Expressed gratitude   During my conversation with the 46 yr old patient, he expressed a interest in being baptized. I answered he questions and his concern was that he didn't want to get pneumonia. He was very polite and grateful. I attempted to contact his POA (his mother Anupama Bridges) but was unsuccessful. Plan: I will continue to reach out to the family and to set up a time for Confucianism.

## 2021-12-20 NOTE — RT PROTOCOL NOTE
RT Inhaler-Nebulizer Bronchodilator Protocol Note    There is a bronchodilator order in the chart from a provider indicating to follow the RT Bronchodilator Protocol and there is an Initiate RT Inhaler-Nebulizer Bronchodilator Protocol order as well (see protocol at bottom of note). CXR Findings:  No results found. The findings from the last RT Protocol Assessment were as follows:   History Pulmonary Disease: Chronic pulmonary disease  Respiratory Pattern: Dyspnea on exertion or RR 21-25 bpm  Breath Sounds: Slightly diminished and/or crackles  Cough: Strong, productive  Indication for Bronchodilator Therapy: Decreased or absent breath sounds,On home bronchodilators (Pt takes Duoneb Q4 at home.)  Bronchodilator Assessment Score: 7    Aerosolized bronchodilator medication orders have been revised according to the RT Inhaler-Nebulizer Bronchodilator Protocol below. Respiratory Therapist to perform RT Therapy Protocol Assessment initially then follow the protocol. Repeat RT Therapy Protocol Assessment PRN for score 0-3 or on second treatment, BID, and PRN for scores above 3. No Indications - adjust the frequency to every 6 hours PRN wheezing or bronchospasm, if no treatments needed after 48 hours then discontinue using Per Protocol order mode. If indication present, adjust the RT bronchodilator orders based on the Bronchodilator Assessment Score as indicated below. Use Inhaler orders unless patient has one or more of the following: on home nebulizer, not able to hold breath for 10 seconds, is not alert and oriented, cannot activate and use MDI correctly, or respiratory rate 25 breaths per minute or more, then use the equivalent nebulizer order(s) with same Frequency and PRN reasons based on the score. If a patient is on this medication at home then do not decrease Frequency below that used at home.     0-3 - enter or revise RT bronchodilator order(s) to equivalent RT Bronchodilator order with Frequency of every 4 hours PRN for wheezing or increased work of breathing using Per Protocol order mode. 4-6 - enter or revise RT Bronchodilator order(s) to two equivalent RT bronchodilator orders with one order with BID Frequency and one order with Frequency of every 4 hours PRN wheezing or increased work of breathing using Per Protocol order mode. 7-10 - enter or revise RT Bronchodilator order(s) to two equivalent RT bronchodilator orders with one order with TID Frequency and one order with Frequency of every 4 hours PRN wheezing or increased work of breathing using Per Protocol order mode. 11-13 - enter or revise RT Bronchodilator order(s) to one equivalent RT bronchodilator order with QID Frequency and an Albuterol order with Frequency of every 4 hours PRN wheezing or increased work of breathing using Per Protocol order mode. Greater than 13 - enter or revise RT Bronchodilator order(s) to one equivalent RT bronchodilator order with every 4 hours Frequency and an Albuterol order with Frequency of every 2 hours PRN wheezing or increased work of breathing using Per Protocol order mode. RT to enter RT Home Evaluation for COPD & MDI Assessment order using Per Protocol order mode.     Electronically signed by Sean Fairchild RCP on 12/20/2021 at 7:51 AM

## 2021-12-20 NOTE — PROGRESS NOTES
[place] [facility]    Patient - Shravan Arteaga   MRN -  [id number]   [facility]   - 1970      Date of Admission -  2021  1:41 AM  Date of evaluation -  2021  Room - Lyssa Samayoa MD Primary Care Physician - Myriam Duke MD   Reason for consult    Acute on chronic respiratory failure with hypoxia  [address] Problems    Diagnosis Date Noted    Aspiration pneumonia of both lower lobes due to gastric secretions ([facility] [address] [facility] [J69.0]     Pneumonia of right lower lobe due to infectious organism [J18.9]     Acute respiratory failure ([facility] [address] [J96.00] 2020     HPI   47 [facility] with past medical history of severe COPD (FEV1 47%) on home Oxygen 3 lpm, Schizophrenia, JORDAN/OHS, chronic tobacco abuse, moderate PAH (RVSP 55-60 mmHg), HTN and CHFrEF 45%,  p[facility] to [facility] with chief complaints of shortness of breath and chest pain. Upon ED arrival, the patient was febrile (Tmax 101 degrees), normotensive, mildly tachycardic and tachypnic (RR 30s), with SpO2 40% on RA. Workup was remarkable for macrocytosis without anemia, normal WBC, Cr 1.2 (BUN 25, eGFR 77),  (A1C 6.0 in 2019), BNP 2758, CRP 16.3, , ferritin 344 and LA 2.4. Procalcitonin, troponin, rapid flu and COVID testing were negative. ABG (on BiPAP; FiO2 95%): 7.22 / 85 / 171 / 35. Dimer 630. EKG showed sinus tachycardia without evidence of ischemic. Portable CXR demonstrated a RLL infiltrate. CT chest showed RML and RLL infiltrates. The patient was placed on a non-re breather and escalated to BiPAP. He was subsequently intubated for failure to improve on BiPAP therapy. He was transported to the ICU for additional management and arrived in stable condition. He was extubated 2 days ago.  Currently on Oxygen nasal canula 4 lpm.    Subjective/Last 24 hours   -On 4 LPM via NC  -Hx schizophrenia reorients fairly easily  -Denies SOB OOB to chair in no acute distress  ROS limited 2/2 mental status  Diet    ADULT DIET; Dysphagia - Pureed  ADULT ORAL NUTRITION SUPPLEMENT; Breakfast, Lunch, Dinner; Standard 4 oz Oral Supplement  Allergies    Patient has no known allergies. Social History     Social History     Socioeconomic History    Marital status: Single     Spouse name: Not on file    Number of children: 0    Years of education: Not on file    Highest education level: Not on file   Occupational History    Not on file   Tobacco Use    Smoking status: Current Every Day Smoker     Packs/day: 1.00     Years: 20.00     Pack years: 20.00     Types: Cigarettes    Smokeless tobacco: Never Used   Vaping Use    Vaping Use: Never used   Substance and Sexual Activity    Alcohol use: No    Drug use: No    Sexual activity: Not Currently   Other Topics Concern    Not on file   Social History Narrative    Not on file     Social Determinants of Health     Financial Resource Strain:     Difficulty of Paying Living Expenses: Not on file   Food Insecurity:     Worried About Running Out of Food in the Last Year: Not on file    Tiyn of Food in the Last Year: Not on file   Transportation Needs:     Lack of Transportation (Medical): Not on file    Lack of Transportation (Non-Medical):  Not on file   Physical Activity:     Days of Exercise per Week: Not on file    Minutes of Exercise per Session: Not on file   Stress:     Feeling of Stress : Not on file   Social Connections:     Frequency of Communication with Friends and Family: Not on file    Frequency of Social Gatherings with Friends and Family: Not on file    Attends Sabianist Services: Not on file    Active Member of Clubs or Organizations: Not on file    Attends Club or Organization Meetings: Not on file    Marital Status: Not on file   Intimate Partner Violence:     Fear of Current or Ex-Partner: Not on file   [employer] Abused: Not on file    Physically Abused: Not on file    Sexually Abused: Not on file   Housing Stability:     Unable to Pay for Housing in the Last Year: Not on file    Number of Places Lived in the Last Year: Not on file    Unstable Housing in the Last Year: Not on file     Family History          Problem Relation Age of Onset    High Blood Pressure Mother      Sleep History    History of JORDAN   Meds    Current Medications    ipratropium-albuterol  1 ampule Inhalation TID    [START ON 12/21/2021] predniSONE  40 mg Oral Daily    risperiDONE  1 mg Oral Daily    chlorhexidine  15 mL Mouth/Throat BID    calcium replacement protocol   Other RX Placeholder    budesonide  500 mcg Nebulization BID    [Held by provider] tiotropium-olodaterol  2 puff Inhalation Daily    pravastatin  40 mg Oral Nightly    risperiDONE  2 mg Oral Nightly    Tadalafil (PAH)  40 mg Oral Daily    [Held by provider] formoterol  20 mcg Nebulization BID    sodium chloride flush  10 mL IntraVENous 2 times per day    enoxaparin  40 mg SubCUTAneous Daily    aspirin  325 mg Oral Daily    metoprolol tartrate  25 mg Oral BID    pantoprazole  40 mg IntraVENous BID     LORazepam, sodium chloride flush, sodium chloride, acetaminophen **OR** acetaminophen, albuterol  IV Drips/Infusions   sodium chloride       Vitals    Vitals    height is 6' (1.829 m) and weight is 199 lb 6.4 oz (90.4 kg). His oral temperature is 98.8 °F (37.1 °C). His blood pressure is 99/61 and his pulse is 75. His respiration is 22 and oxygen saturation is 94%.      O2 Flow Rate (L/min): 4 L/min  I/O    Intake/Output Summary (Last 24 hours) at 12/20/2021 1728  Last data filed at 12/20/2021 1534  Gross per 24 hour   Intake 2461.71 ml   Output 2100 ml   Net 361.71 ml     Patient Vitals for the past 96 hrs (Last 3 readings):   Weight   12/20/21 0535 199 lb 6.4 oz (90.4 kg)   12/19/21 0409 199 lb 4.8 oz (90.4 kg)     Exam   Physical Exam   Constitutional: No distress on O2 per NC. Patient appears chronically ill  Head: Normocephalic and atraumatic. Mouth/Throat: Oropharynx is clear and moist.  Poor dentition  Eyes: Conjunctivae are normal. Pupils are equal, round. No scleral icterus. Neck: Neck supple. No tracheal deviation present. Cardiovascular: S1 and S2 with no murmur. No peripheral edema  Pulmonary/Chest: Normal effort with bilateral air entry, rhonchi and rales bilaterally. No stridor. No respiratory distress. Patient exhibits no tenderness. Abdominal: Soft. Bowel sounds audible. No distension or tenderness to palp. Musculoskeletal: Moves all extremities  Neurological: Patient is alert and follows simple commands disoriented but redirects easily    Labs   ABG  Lab Results   Component Value Date    PH 7.22 12/12/2021    PO2 87 12/12/2021    PCO2 85 12/12/2021    HCO3 34 12/12/2021    O2SAT 94 12/12/2021     Lab Results   Component Value Date    IFIO2 70 12/12/2021    MODE PC 12/12/2021    SETTIDVOL 480 05/30/2020    SETPEEP 8.0 12/12/2021     CBC  Recent Labs     12/18/21  0504   WBC 8.5   RBC 5.05   HGB 15.0   HCT 48.7   MCV 96.4*   MCH 29.7   MCHC 30.8*      MPV 10.0      BMP  Recent Labs     12/18/21  0504      K 4.4      CO2 34*   BUN 15   CREATININE 0.5   GLUCOSE 96   CALCIUM 8.7     LFT  No results for input(s): AST, ALT, ALB, BILITOT, ALKPHOS, LIPASE in the last 72 hours. Invalid input(s): AMYLASE  TROP  Lab Results   Component Value Date    TROPONINT < 0.010 12/11/2021    TROPONINT < 0.010 12/11/2021    TROPONINT 0.029 05/17/2020     BNP  Lab Results   Component Value Date    PROBNP 2758.0 12/11/2021    PROBNP 688.7 06/06/2020    PROBNP 550.9 05/26/2020     D-Dimer  Lab Results   Component Value Date    DDIMER 630.00 12/11/2021     Lactic Acid  No results for input(s): LACTA in the last 72 hours. INR  No results for input(s): INR, PROTIME in the last 72 hours. PTT  No results for input(s): APTT in the last 72 hours.   Glucose  No results for input(s): POCGLU in the last 72 hours. UA No results for input(s): SPECGRAV, PHUR, COLORU, CLARITYU, MUCUS, PROTEINU, BLOODU, RBCUA, WBCUA, BACTERIA, NITRU, GLUCOSEU, BILIRUBINUR, UROBILINOGEN, KETUA, LABCAST, LABCASTTY, AMORPHOS in the last 72 hours. Invalid input(s): CRYSTALS. PFTs     Echo     Summary   Left ventricular size is normal and systolic function is mildly reduced. Ejection fraction was estimated at 50-55%. LV wall thickness is within   normal limits. Intraventricular septal wall compression during systole suggestive of RV   pressure overload. Markedly enlarged right atrium size. The right ventricle was not clearly visualized. Appears dilated. RVSP of 55-60 mm Hg consistent with moderate pulmonary hypertension. Small circumferential pericardial effusion without evidence of tamponade   physiology. IVC size is dilated with <50% respiratory collapse. Cultures    Procalcitonin  Lab Results   Component Value Date    PROCAL 0.17 12/11/2021    PROCAL 0.15 05/18/2020    PROCAL 0.15 05/17/2020     Radiology    CXR    XR CHEST PORTABLE   12/14/2021   FINDINGS:  There is an endotracheal tube present with the tip overlying the midtrachea approximately 1.7 cm above the leny. There is an enteric tube coursing below the diaphragm, the distal portions of which are excluded from the current examination. There is moderate enlargement of the cardiomediastinal silhouette. There are patchy airspace opacities overlying the right mid and lower lung zones as well as the medial left lung base which likely represents multifocal pneumonia. Mild hazy opacity at the right lung base may also represent a small right pleural effusion. No pneumothorax is seen. Impression:  1. There are patchy airspace opacities overlying the right mid and lower lung zones as well as the medial left lung base which likely represents multifocal pneumonia.     2. Mild hazy opacity at the right lung base may also represent a small right pleural effusion. CT Scans    CTA CHEST W WO CONTRAST   12/12/2021   1. No evidence of pulmonary and less. 2. Consolidation in the right upper lobe at the perihilar region extending to the major fissure may represent pneumonia superimposed on the patient's interstitial process. 3. Worsening interstitial disease compared to 5/24/2020.   4. Mediastinal and right hilar adenopathy as worsened compared to prior CT. (See actual reports for details)    Assessment   -Acute on chronic hypoxic respiratory failure s/p Extubation 12/15/21  -COPD exacerbation   -RLL pneumonia- Aspiration   -CHFrEF 45%  -JORDAN   -Secondary pulmonary hypertension  -Schizophrenia  Recommendations   -Monitor SpO2 wean supplemental O2 to maintain SpO2 >90%  -Completed 5 days azithromycin and 8 days rocephin   -prednisone 40 gm PO Daily   -Continue Duonebs Q4 hrs WA with metaneb   -recommend judicious use of fluids is 4.5 L positive with Hx CHF and elevated ProBNP on admission   -Aspiration precautions-SLP following currently on pureed diet        Thank you for the consult and allowing us to participate in the care of your patient. Case discussed with nurse and patient/family. Questions and concerns addressed. Meds and Orders reviewed. Electronically signed by     SAVANNAH Wolfe CNP on 12/20/2021 at 5:28 PM    Copd exacerbation with respiratory failure  Extubeated  Continues \"sneaking\" cigarettes according to  Sister  Requesting  Islam,   Continue steroids and BD    Patient seen and examined independently by me. Above discussed and I agree with  CNP note Also see my additional comments. Labs, cultures, and radiographs when available were reviewed. Changes were made in the orders as necessary. I discussed patient concerns with Arturos R.NChi and instructions were given. Respiratory care issues addressed. Please see our orders for the updated patient care plan.     Electronically signed by Kimber Castillo MD on 12/20/2021 at 7:08 PM

## 2021-12-21 PROCEDURE — 94660 CPAP INITIATION&MGMT: CPT

## 2021-12-21 PROCEDURE — 6370000000 HC RX 637 (ALT 250 FOR IP): Performed by: INTERNAL MEDICINE

## 2021-12-21 PROCEDURE — 94640 AIRWAY INHALATION TREATMENT: CPT

## 2021-12-21 PROCEDURE — 94761 N-INVAS EAR/PLS OXIMETRY MLT: CPT

## 2021-12-21 PROCEDURE — 97166 OT EVAL MOD COMPLEX 45 MIN: CPT

## 2021-12-21 PROCEDURE — 6360000002 HC RX W HCPCS: Performed by: FAMILY MEDICINE

## 2021-12-21 PROCEDURE — 94760 N-INVAS EAR/PLS OXIMETRY 1: CPT

## 2021-12-21 PROCEDURE — 2060000000 HC ICU INTERMEDIATE R&B

## 2021-12-21 PROCEDURE — 2700000000 HC OXYGEN THERAPY PER DAY

## 2021-12-21 PROCEDURE — 94669 MECHANICAL CHEST WALL OSCILL: CPT

## 2021-12-21 PROCEDURE — 97535 SELF CARE MNGMENT TRAINING: CPT

## 2021-12-21 PROCEDURE — 2580000003 HC RX 258: Performed by: FAMILY MEDICINE

## 2021-12-21 PROCEDURE — 6370000000 HC RX 637 (ALT 250 FOR IP): Performed by: NURSE PRACTITIONER

## 2021-12-21 PROCEDURE — C9113 INJ PANTOPRAZOLE SODIUM, VIA: HCPCS | Performed by: FAMILY MEDICINE

## 2021-12-21 PROCEDURE — 99233 SBSQ HOSP IP/OBS HIGH 50: CPT | Performed by: INTERNAL MEDICINE

## 2021-12-21 PROCEDURE — 6370000000 HC RX 637 (ALT 250 FOR IP): Performed by: FAMILY MEDICINE

## 2021-12-21 PROCEDURE — APPSS30 APP SPLIT SHARED TIME 16-30 MINUTES: Performed by: NURSE PRACTITIONER

## 2021-12-21 RX ADMIN — ASPIRIN 325 MG: 325 TABLET ORAL at 08:29

## 2021-12-21 RX ADMIN — METOPROLOL TARTRATE 25 MG: 25 TABLET, FILM COATED ORAL at 20:28

## 2021-12-21 RX ADMIN — PREDNISONE 40 MG: 20 TABLET ORAL at 08:29

## 2021-12-21 RX ADMIN — PANTOPRAZOLE SODIUM 40 MG: 40 INJECTION, POWDER, FOR SOLUTION INTRAVENOUS at 08:29

## 2021-12-21 RX ADMIN — IPRATROPIUM BROMIDE AND ALBUTEROL SULFATE 1 AMPULE: .5; 3 SOLUTION RESPIRATORY (INHALATION) at 16:40

## 2021-12-21 RX ADMIN — IPRATROPIUM BROMIDE AND ALBUTEROL SULFATE 1 AMPULE: .5; 3 SOLUTION RESPIRATORY (INHALATION) at 10:50

## 2021-12-21 RX ADMIN — IPRATROPIUM BROMIDE AND ALBUTEROL SULFATE 1 AMPULE: .5; 3 SOLUTION RESPIRATORY (INHALATION) at 20:00

## 2021-12-21 RX ADMIN — CHLORHEXIDINE GLUCONATE 0.12% ORAL RINSE 15 ML: 1.2 LIQUID ORAL at 08:28

## 2021-12-21 RX ADMIN — METOPROLOL TARTRATE 25 MG: 25 TABLET, FILM COATED ORAL at 08:31

## 2021-12-21 RX ADMIN — PANTOPRAZOLE SODIUM 40 MG: 40 INJECTION, POWDER, FOR SOLUTION INTRAVENOUS at 20:28

## 2021-12-21 RX ADMIN — CHLORHEXIDINE GLUCONATE 0.12% ORAL RINSE 15 ML: 1.2 LIQUID ORAL at 20:28

## 2021-12-21 RX ADMIN — IPRATROPIUM BROMIDE AND ALBUTEROL SULFATE 1 AMPULE: .5; 3 SOLUTION RESPIRATORY (INHALATION) at 07:05

## 2021-12-21 RX ADMIN — ENOXAPARIN SODIUM 40 MG: 100 INJECTION SUBCUTANEOUS at 08:28

## 2021-12-21 RX ADMIN — BUDESONIDE 500 MCG: 0.5 INHALANT RESPIRATORY (INHALATION) at 16:42

## 2021-12-21 RX ADMIN — RISPERIDONE 2 MG: 1 TABLET ORAL at 21:54

## 2021-12-21 RX ADMIN — SODIUM CHLORIDE, PRESERVATIVE FREE 10 ML: 5 INJECTION INTRAVENOUS at 08:28

## 2021-12-21 RX ADMIN — BUDESONIDE 500 MCG: 0.5 INHALANT RESPIRATORY (INHALATION) at 07:05

## 2021-12-21 RX ADMIN — PRAVASTATIN SODIUM 40 MG: 40 TABLET ORAL at 20:28

## 2021-12-21 RX ADMIN — SODIUM CHLORIDE, PRESERVATIVE FREE 10 ML: 5 INJECTION INTRAVENOUS at 20:28

## 2021-12-21 NOTE — CARE COORDINATION
DISCHARGE PLANNING UPDATE    Barriers to Discharge: V-tach episode last 24h; monitor. Oxygen 3L continued (baseline).  [facility]    Discharge Plan:   plans home w mother/POA Geovanny Walls; await therapy recommendations, has [facility] home oxygen 3-4L

## 2021-12-21 NOTE — PROGRESS NOTES
INTERNAL MEDICINE Progress Note  12/21/2021 1:45 PM  Subjective:   Admit Date: 12/11/2021  PCP: Tawana Roman MD  Interval History:     Improving SOB,   on 3L oxygen via NC    Objective:   Vitals: /60   Pulse 73   Temp 97.4 °F (36.3 °C) (Oral)   Resp 18   Ht 6' (1.829 m)   Wt 206 lb 12.8 oz (93.8 kg)   SpO2 95%   BMI 28.05 kg/m²   General appearance: alert and cooperative with exam  HEENT:  Teeth: multiple caries and missing teeth  Neck: no JVD  Lungs: improved BS mateo  Heart: S1, S2 normal  Abdomen: soft, non-tender; bowel sounds normal; no masses,  no organomegaly  Extremities: no edema,   Neurologic:  alert, orientation: person, place, affect: normal,   thought content exhibits logical conclusions      Medications:   Scheduled Meds:   predniSONE  40 mg Oral Daily    ipratropium-albuterol  1 ampule Inhalation Q4H WA    influenza virus vaccine  0.5 mL IntraMUSCular Prior to discharge    risperiDONE  1 mg Oral Daily    chlorhexidine  15 mL Mouth/Throat BID    calcium replacement protocol   Other RX Placeholder    budesonide  500 mcg Nebulization BID    pravastatin  40 mg Oral Nightly    risperiDONE  2 mg Oral Nightly    Tadalafil (PAH)  40 mg Oral Daily    sodium chloride flush  10 mL IntraVENous 2 times per day    enoxaparin  40 mg SubCUTAneous Daily    aspirin  325 mg Oral Daily    metoprolol tartrate  25 mg Oral BID    pantoprazole  40 mg IntraVENous BID     Continuous Infusions:   sodium chloride         Lab Results:   CBC:   No results for input(s): WBC, HGB, PLT in the last 72 hours. BMP:    No results for input(s): NA, K, CL, CO2, BUN, CREATININE, GLUCOSE in the last 72 hours.   Magnesium:    Lab Results   Component Value Date    MG 2.0 12/20/2021     HgBA1c:    Lab Results   Component Value Date    LABA1C 6.0 12/15/2019     TSH:    Lab Results   Component Value Date    TSH 0.909 12/11/2021       FOLATE:    Lab Results   Component Value Date    FOLATE 9.0 12/08/2016     FERRITIN: Lab Results   Component Value Date    FERRITIN 344 12/11/2021       Assessment and Plan:   1. Acute on chronic resp failure  2. COPD with acute exac  3. Pneumonia  4. Mod PAH  5. HTN-systemic  6. Schizophrenia  7.  Chronic nicotine abuse    Cont Oxygen  Lovenox, Tadalafil  Risperidol, Statin  PT/OT  SS soha Blanco MD, MD

## 2021-12-21 NOTE — RT PROTOCOL NOTE
RT Inhaler-Nebulizer Bronchodilator Protocol Note    There is a bronchodilator order in the chart from a provider indicating to follow the RT Bronchodilator Protocol and there is an Initiate RT Inhaler-Nebulizer Bronchodilator Protocol order as well (see protocol at bottom of note). CXR Findings:  No results found. The findings from the last RT Protocol Assessment were as follows:   History Pulmonary Disease: Chronic pulmonary disease  Respiratory Pattern: Mild dyspnea at rest, irregular pattern, or RR 21-25 bpm  Breath Sounds: Inspiratory and expiratory or bilateral wheezing and/or rhonchi  Cough: Strong, productive  Indication for Bronchodilator Therapy: Decreased or absent breath sounds  Bronchodilator Assessment Score: 13    Aerosolized bronchodilator medication orders have been revised according to the RT Inhaler-Nebulizer Bronchodilator Protocol below. Respiratory Therapist to perform RT Therapy Protocol Assessment initially then follow the protocol. Repeat RT Therapy Protocol Assessment PRN for score 0-3 or on second treatment, BID, and PRN for scores above 3. No Indications - adjust the frequency to every 6 hours PRN wheezing or bronchospasm, if no treatments needed after 48 hours then discontinue using Per Protocol order mode. If indication present, adjust the RT bronchodilator orders based on the Bronchodilator Assessment Score as indicated below. Use Inhaler orders unless patient has one or more of the following: on home nebulizer, not able to hold breath for 10 seconds, is not alert and oriented, cannot activate and use MDI correctly, or respiratory rate 25 breaths per minute or more, then use the equivalent nebulizer order(s) with same Frequency and PRN reasons based on the score. If a patient is on this medication at home then do not decrease Frequency below that used at home.     0-3 - enter or revise RT bronchodilator order(s) to equivalent RT Bronchodilator order with Frequency of every 4 hours PRN for wheezing or increased work of breathing using Per Protocol order mode. 4-6 - enter or revise RT Bronchodilator order(s) to two equivalent RT bronchodilator orders with one order with BID Frequency and one order with Frequency of every 4 hours PRN wheezing or increased work of breathing using Per Protocol order mode. 7-10 - enter or revise RT Bronchodilator order(s) to two equivalent RT bronchodilator orders with one order with TID Frequency and one order with Frequency of every 4 hours PRN wheezing or increased work of breathing using Per Protocol order mode. 11-13 - enter or revise RT Bronchodilator order(s) to one equivalent RT bronchodilator order with QID Frequency and an Albuterol order with Frequency of every 4 hours PRN wheezing or increased work of breathing using Per Protocol order mode. Greater than 13 - enter or revise RT Bronchodilator order(s) to one equivalent RT bronchodilator order with every 4 hours Frequency and an Albuterol order with Frequency of every 2 hours PRN wheezing or increased work of breathing using Per Protocol order mode. RT to enter RT Home Evaluation for COPD & MDI Assessment order using Per Protocol order mode.     Electronically signed by Jeanne Conrad RCP on 12/21/2021 at 7:33 AM

## 2021-12-21 NOTE — CARE COORDINATION
DISCHARGE/PLANNING EVALUATION  12/21/21, 2:13 PM EST    Reason for Referral: Discharge planning  Mental Status: Alert and Oriented  Decision Making: Self with POA mother  Family/Social/Home Environment: Patient lives with his mother Victorina Carrillo. Patient has support from family and friends. Current Services including food security, transportation and housekeeping: Patient reported that he was independent at home and has supervision from his mother. Patient does not have any current services in the home. Current Equipment: None  Payment Source: Medicare  Concerns or Barriers to Discharge: None  Post acute provider list with quality measures, geographic area and applicable managed care information provided. Questions regarding selection process answered: Offered    Teach Back Method used with pateint regarding care plan and needs. Patient verbalize understanding of the plan of care and contribute to goal setting. Patient goals, treatment preferences and discharge plan: Patient plans to return home with mother and [facility]. He reported that his mother will transport him at discharge. SW called and left voicemail for [employer] and requested a call back. [facility] made referral and notified them of discharge tomorrow.        Electronically signed by ALICE De Jesus on 12/21/2021 at 2:13 PM

## 2021-12-21 NOTE — PROGRESS NOTES
Comprehensive Nutrition Assessment    Type and Reason for Visit:  Reassess    Nutrition Recommendations/Plan:   [place] food prefs. Diet texture per SLP    Nutrition Assessment:    Pt  improving from a nutritional standpoint AEB pt extubated and advanced to Pureed diet on 12/16; pt ate % of breakfast today. Remains at risk for further nutritional compromise r/t respiratory failure, pneumonia, dysphagia and underlying medical condition (hx CHF, COPD, HTN, Schizophrenia). Nutrition recommendations/interventions as per above. Malnutrition Assessment:  Malnutrition Status: At risk for malnutrition (Comment)    Context:  Acute Illness     Findings of the 6 clinical characteristics of malnutrition:  Energy Intake:  Mild decrease in energy intake (Comment) (less than 50% of energy intake x1 day)  Weight Loss:  Unable to assess (no weight hx per EMR)     Body Fat Loss:  No significant body fat loss     Muscle Mass Loss:  1 - Mild muscle mass loss Temples (temporalis)  Fluid Accumulation:  No significant fluid accumulation Extremities   Strength:  Not Performed    Estimated Daily Nutrient Needs:  Energy (kcal):  8083-0379 kcals (20-25); Weight Used for Energy Requirements:   (83 kgm)     Protein (g):  125+ grams (1.5+); Weight Used for Protein Requirements:   (83 kgm)        Fluid (ml/day):  per MD; Method Used for Fluid Requirements:  Other (Comment)      Nutrition Related Findings:     pt intubated on 12/11 and extubated on 12/16; pt seen earlier this morning; Nadine puree well, noted discharge planning. Wounds:  None       Current Nutrition Therapies:    ADULT DIET;  Dysphagia - Pureed  ADULT ORAL NUTRITION SUPPLEMENT; Breakfast, Lunch, Dinner; Standard 4 oz Oral Supplement    Anthropometric Measures:  · Height: 6' (182.9 cm)  · Current Body Weight: 206 lb 12.8 oz (93.8 kg) (12/21/21)   · Admission Body Weight: 182 lb 5.1 oz (82.7 kg) (12/11 no edema)    · Usual Body Weight:  (per EMR: 12/21/19: 270# 11.6 oz)     · Ideal Body Weight: 178 lbs;   · BMI: 28  ·   · BMI Categories: Overweight (BMI 25.0-29.9) (varied weight this admit)       Nutrition Diagnosis:   · Inadequate oral intake related to inadequate protein-energy intake as evidenced by intake 51-75%    Nutrition Interventions:   Food and/or Nutrient Delivery:  Continue Current Diet,Continue Oral Nutrition Supplement  Nutrition Education/Counseling:  Education initiated (Encouraged po intake of meals and ONS at best effort)   Coordination of Nutrition Care:  Continue to monitor while inpatient,Speech Therapy    Goals:  Pt to safely marj 75% or more most meals during LOS       Food/Nutrient Intake Outcomes:  Diet Advancement/Tolerance,Food and Nutrient Intake,Supplement Intake  Physical Signs/Symptoms Outcomes:  Chewing or Swallowing         Electronically signed by Maya PURCELL  [facility] on 12/21/21 at 3:00 PM EST    Contact: [phone]

## 2021-12-21 NOTE — PROGRESS NOTES
Brando Aguilar 58  INPATIENT OCCUPATIONAL THERAPY  STR ICU STEPDOWN TELEMETRY 4K  EVALUATION    Time:   Time In: 1220  Time Out: 1250  Timed Code Treatment Minutes: 15 Minutes  Minutes: 30          Date: 2021  Patient Name: Davis Todd,   Gender: male      MRN: [id number]  : 1970  (46 y.o.)  Referring Practitioner: Dr. Zee Sarmiento MD  Diagnosis: Acute Respiratory Failure  Additional Pertinent Hx: Pt with past medical history of severe COPD (FEV1 47%) on home Oxygen 3 lpm, Schizophrenia, JORDAN/OHS, chronic tobacco abuse, moderate PAH (RVSP 55-60 mmHg), HTN and CHFrEF 45%,  presents to [facility] 21 with chief complaints of shortness of breath and chest pain. Upon ED arrival, the patient was febrile (Tmax 101 degrees), normotensive, mildly tachycardic and tachypnic (RR 30s), with SpO2 40% on RA. Workup was remarkable for macrocytosis without anemia, normal WBC, Cr 1.2 (BUN 25, eGFR 77),  (A1C 6.0 in 2019), BNP 2758, CRP 16.3, , ferritin 344 and LA 2.4. Procalcitonin, troponin, rapid flu and COVID testing were negative. ABG (on BiPAP; FiO2 95%): 7.22 / 85 / 171 / 35. Dimer 630. EKG showed sinus tachycardia without evidence of ischemic. Portable CXR demonstrated a RLL infiltrate. CT chest showed RML and RLL infiltrates. The patient was placed on a non-re breather and escalated to BiPAP. He was subsequently intubated for failure to improve on BiPAP therapy. He was transported to the ICU for additional management and arrived in stable condition. Pt had to be intubated for 4 days. Restrictions/Precautions:  Restrictions/Precautions: General Precautions,Fall Risk    Subjective  Chart Reviewed: Maranda Laguna and Physical  Patient assessed for rehabilitation services?: Yes  Family / Caregiver Present: Yes (Uncle present at the end of the session.)    Subjective: Pleasant and cooperative  Comments: RN approved session.   Pt agreed to demonstrate his activity tolerance and completed some shaving with clippers. Pain:  Pain Assessment  Patient Currently in Pain: Denies    Vitals: Oxygen: Pt was using 3L of O2 and showing O2 saturation close to or above 90% throughout    Social/Functional History:  Lives With: Family,Other (comment) (step-sister)  Type of Home: Apartment  Home Layout: One level  Home Access: Stairs to enter with rails  Entrance Stairs - Number of Steps: 13  Home Equipment:  (no equipment)   Bathroom Accessibility: Accessible    Receives Help From: Family  ADL Assistance: Independent  Homemaking Assistance: Needs assistance  Ambulation Assistance: Independent  Transfer Assistance: Independent    Active : No  Patient's  Info: Pt's mother provides transportation or he uses public transportation  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 VISION:WFL    HEARING:  WFL    COGNITION: Decreased Safety Awareness    RANGE OF MOTION:  Bilateral Upper Extremity:  WFL    STRENGTH:  Bilateral Upper Extremity:  Impaired - 3+/5 deltoid; 3+/5 pectoral; 4/5 biceps/triceps    SENSATION:   WFL    ADL:   Feeding: Supervision. pt ate lunch without any difficulty  Grooming: Stand By Assistance. shaving himself while sitting and using clippers  Upper Extremity Dressing: Contact Guard Assistance.  don/doffing a hospital gown. BALANCE:  Sitting Balance:  Supervision. doing his self care while in the recliner chair  Standing Balance: Stand By Assistance. preparing to walk    BED MOBILITY:  Not Tested    TRANSFERS:  Sit to Stand:  Contact Guard Assistance. from the recliner chair  Stand to Sit: Stand By Assistance. to the chair    FUNCTIONAL MOBILITY:  Assistive Device: Rolling Walker  Assist Level:  Contact Guard Assistance. Distance: 28 ft to/from threshhold of room  Pt had an even gait but needed help with O2 management. Activity Tolerance:  Patient tolerance of  treatment: fair. Pt stood for 30 seconds while preparing to walk. Pt needed short rest breaks between activities. Pt's O2 saturation stayed at or above 90% for most of the session. Assessment:  Assessment: Patient would benefit from continued skilled OT services to address above deficits. He presents with acute respiratory failure. Pt was using O2 at home, per chart review. Pt was placed on 3L of O2 via nasal cannula at this time. Pt was doing his own self care and was ambulating without any AD prior to admission. He has worked. Unknown how recently pt was working. Pt indicated that he has been able to drive. Pt has several steps to access his apartment, per PT note. Pt demonstrated walking in his room while using a rolling walker with help only provided for handling his O2 line. Pt did self care close to his baseline. He did need rest breaks between activities. Pt reported having completed a spongebath while sitting at the sink earlier this day. Performance deficits / Impairments: Decreased functional mobility ,Decreased ADL status,Decreased endurance,Decreased safe awareness  Prognosis: Good  REQUIRES OT FOLLOW UP: Yes  Decision Making: Medium Complexity    Treatment Initiated: Treatment and education initiated within context of evaluation. Evaluation time included review of current medical information, gathering information related to past medical, social and functional history, completion of standardized testing, formal and informal observation of tasks, assessment of data and development of plan of care and goals. Treatment time included skilled education and facilitation of tasks to increase safety and independence with ADL's for improved functional independence and quality of life.     Pt had reported smoking cigars recently and cigarettes in the past.  He was encouraged to quit or at least reduce the amount that he has been smoking so that he can breathe more easily. Pt verbalized understanding. Further education about the dangers of continuing to smoke while using any oxygen at home will need to be provided. Discharge Recommendations:  Home with [address] would benefit from continued therapy after discharge    Patient Education:  OT Education: OT Yves Narayan of Care  Patient Education: Importance of reducing his smoking of cigars or cigarettes to  help his lungs recover from pneumonia    Equipment Recommendations: Other: Will continue to monitor    Plan:  Times per week: 5x  Current Treatment Recommendations: Functional Mobility Training,Endurance Training,Self-Care / ADL,Safety Education & Training  Plan Comment: Pt would benefit from continued skilled OT services when medically stable and discharged from Acute. HHOT recommended. Specific instructions for Next Treatment: Functioonal mobility; ADLs and endurance training; safety awareness. See long-term goal time frame for expected duration of plan of care. If no long-term goals established, a short length of stay is anticipated. Goals:  Patient goals : \"I want to get over the pneumonia and be able to do things for myself. \" pt states. Short term goals  Time Frame for Short term goals: By discharge  Short term goal 1: Pt will demonstrate functional mobility walking to/from the bathroom or in the kitchenette while using a walker safely and maintaining O2 > than 90% to prepare for doing self care and any light homemaking tasks. Short term goal 2: Pt will complete lower body dressing or a spongebath with setup A and min cues for taking rest breaks to increase his fatigue management while doing self care. Short term goal 3: Pt will complete BUE ROM/moderate resistance exercises while following proper breathing technique to increase his endurance and strength for ease of doing self care and any light homemaking.   Short term goal 4: Pt will complete toileting routine including transfers to/from the standard toilet seat with a grabbar with supervision to increase his independence with self care. Following session, patient left in safe position with all fall risk precautions in place.

## 2021-12-21 NOTE — PROGRESS NOTES
[place] [facility]    Patient - Yaneth Mariano   MRN -  [id number]   [facility]   - 1970      Date of Admission -  2021  1:41 AM  Date of evaluation -  2021  Room - Andie Cho MD Primary Care Physician - Brittany Pelayo MD   Reason for consult    Acute on chronic respiratory failure with hypoxia   [address] Problems    Diagnosis Date Noted    Aspiration pneumonia of both lower lobes due to gastric secretions ([facility] [address] [J69.0]     Pneumonia of right lower lobe due to infectious organism [J18.9]     Acute respiratory failure ([address] [J96.00] 2020     HPI   47 y/o [facility] American male with past medical history of severe COPD (FEV1 47%) on home Oxygen 3 lpm, Schizophrenia, JORDAN/OHS, chronic tobacco abuse, moderate PAH (RVSP 55-60 mmHg), HTN and CHFrEF 45%,  presents to [facility] 21 with chief complaints of shortness of breath and chest pain. Upon ED arrival, the patient was febrile (Tmax 101 degrees), normotensive, mildly tachycardic and tachypnic (RR 30s), with SpO2 40% on RA. Workup was remarkable for macrocytosis without anemia, normal WBC, Cr 1.2 (BUN 25, eGFR 77),  (A1C 6.0 in 2019), BNP 2758, CRP 16.3, , ferritin 344 and LA 2.4. Procalcitonin, troponin, rapid flu and COVID testing were negative. ABG (on BiPAP; FiO2 95%): 7.22 / 85 / 171 / 35. Dimer 630. EKG showed sinus tachycardia without evidence of ischemic. Portable CXR demonstrated a RLL infiltrate. CT chest showed RML and RLL infiltrates. The patient was placed on a non-re breather and escalated to BiPAP. He was subsequently intubated for failure to improve on BiPAP therapy. He was transported to the ICU for additional management and arrived in stable condition. He was extubated 2 days ago.  Currently on Oxygen nasal canula 4 lpm.    Subjective/Last 24 hours   -on 3 LPM via NC -reports SOB improved   -afebrile denies CP or hemoptysis   ROS limited 2/2 mental status  Diet    ADULT DIET; Dysphagia - Pureed  ADULT ORAL NUTRITION SUPPLEMENT; Breakfast, Lunch, Dinner; Standard 4 oz Oral Supplement  Allergies    Patient has no known allergies. Social History     Social History     Socioeconomic History    Marital status: Single     Spouse name: Not on file    Number of children: 0    Years of education: Not on file    Highest education level: Not on file   Occupational History    Not on file   Tobacco Use    Smoking status: Current Every Day Smoker     Packs/day: 1.00     Years: 20.00     Pack years: 20.00     Types: Cigarettes    Smokeless tobacco: Never Used   Vaping Use    Vaping Use: Never used   Substance and Sexual Activity    Alcohol use: No    Drug use: No    Sexual activity: Not Currently   Other Topics Concern    Not on file   Social History Narrative    Not on file     Social Determinants of Health     Financial Resource Strain:     Difficulty of Paying Living Expenses: Not on file   Food Insecurity:     Worried About Running Out of Food in the Last Year: Not on file    Tiny of Food in the Last Year: Not on file   Transportation Needs:     Lack of Transportation (Medical): Not on file    Lack of Transportation (Non-Medical):  Not on file   Physical Activity:     Days of Exercise per Week: Not on file    Minutes of Exercise per Session: Not on file   Stress:     Feeling of Stress : Not on file   Social Connections:     Frequency of Communication with Friends and Family: Not on file    Frequency of Social Gatherings with Friends and Family: Not on file    Attends [employer] Services: Not on file    Active Member of Clubs or Organizations: Not on file    Attends Club or Organization Meetings: Not on file    Marital Status: Not on file   Intimate Partner Violence:     Fear of Current or Ex-Partner: Not on file    Emotionally Abused: Not on file   Luisito Swift Physically Abused: Not on file    Sexually Abused: Not on file   Housing Stability:     Unable to Pay for Housing in the Last Year: Not on file    Number of Places Lived in the Last Year: Not on file    Unstable Housing in the Last Year: Not on file     Family History          Problem Relation Age of Onset    High Blood Pressure Mother      Sleep History    History of JORDAN   Meds    Current Medications    predniSONE  40 mg Oral Daily    ipratropium-albuterol  1 ampule Inhalation Q4H WA    influenza virus vaccine  0.5 mL IntraMUSCular Prior to discharge    risperiDONE  1 mg Oral Daily    chlorhexidine  15 mL Mouth/Throat BID    calcium replacement protocol   Other RX Placeholder    budesonide  500 mcg Nebulization BID    pravastatin  40 mg Oral Nightly    risperiDONE  2 mg Oral Nightly    Tadalafil (PAH)  40 mg Oral Daily    sodium chloride flush  10 mL IntraVENous 2 times per day    enoxaparin  40 mg SubCUTAneous Daily    aspirin  325 mg Oral Daily    metoprolol tartrate  25 mg Oral BID    pantoprazole  40 mg IntraVENous BID     LORazepam, sodium chloride flush, sodium chloride, acetaminophen **OR** acetaminophen, albuterol  IV Drips/Infusions   sodium chloride       Vitals    Vitals    height is 6' (1.829 m) and weight is 206 lb 12.8 oz (93.8 kg). His oral temperature is 98 °F (36.7 °C). His blood pressure is 108/55 (abnormal) and his pulse is 81. His respiration is 18 and oxygen saturation is 92%. O2 Flow Rate (L/min): 3 L/min  I/O    Intake/Output Summary (Last 24 hours) at 12/21/2021 1656  Last data filed at 12/21/2021 1504  Gross per 24 hour   Intake 2780 ml   Output 5000 ml   Net -2220 ml     Patient Vitals for the past 96 hrs (Last 3 readings):   Weight   12/21/21 0645 206 lb 12.8 oz (93.8 kg)   12/20/21 0535 199 lb 6.4 oz (90.4 kg)   12/19/21 0409 199 lb 4.8 oz (90.4 kg)     Exam   Physical Exam   Constitutional: No distress on O2 per NC. Patient appears chronically ill.   Head: Normocephalic and atraumatic. Mouth/Throat: Oropharynx is clear and moist. Poor dentition  Eyes: Conjunctivae are normal. Pupils are equal, round. No scleral icterus. Neck: Neck supple. No tracheal deviation present. Cardiovascular: S1 and S2 with no murmur. No peripheral edema  Pulmonary/Chest: Normal effort with bilateral air entry, faint rhonchi. No stridor. No respiratory distress. Patient exhibits no tenderness. Abdominal: Soft. Bowel sounds audible. No distension or tenderness to palp. Musculoskeletal: Moves all extremities  Neurological: Patient is alert and follows simple commands     Labs   ABG  Lab Results   Component Value Date    PH 7.22 12/12/2021    PO2 87 12/12/2021    PCO2 85 12/12/2021    HCO3 34 12/12/2021    O2SAT 94 12/12/2021     Lab Results   Component Value Date    IFIO2 70 12/12/2021    MODE PC 12/12/2021    SETTIDVOL 480 05/30/2020    SETPEEP 8.0 12/12/2021     CBC  No results for input(s): WBC, RBC, HGB, HCT, MCV, MCH, MCHC, RDW, PLT, MPV in the last 72 hours. BMP  Recent Labs     12/20/21  1902   MG 2.0     LFT  No results for input(s): AST, ALT, ALB, BILITOT, ALKPHOS, LIPASE in the last 72 hours. Invalid input(s): AMYLASE  TROP  Lab Results   Component Value Date    TROPONINT < 0.010 12/11/2021    TROPONINT < 0.010 12/11/2021    TROPONINT 0.029 05/17/2020     BNP  Lab Results   Component Value Date    PROBNP 2758.0 12/11/2021    PROBNP 688.7 06/06/2020    PROBNP 550.9 05/26/2020     D-Dimer  Lab Results   Component Value Date    DDIMER 630.00 12/11/2021     Lactic Acid  No results for input(s): LACTA in the last 72 hours. INR  No results for input(s): INR, PROTIME in the last 72 hours. PTT  No results for input(s): APTT in the last 72 hours. Glucose  No results for input(s): POCGLU in the last 72 hours.   UA No results for input(s): SPECGRAV, [facility] [address], [address], [address] [facility], [address] [facility], [address] Makenna Espinoza [facility] [address]94, 12 [facility], [address], [address] [place], [place] [address]gatito Adam, LABCAST, LABCASTTY, AMORPHOS in the last 72 hours. Invalid input(s): CRYSTALS. PFTs     Echo     Summary   Left ventricular size is normal and systolic function is mildly reduced. Ejection fraction was estimated at 50-55%. LV wall thickness is within   normal limits. Intraventricular septal wall compression during systole suggestive of RV   pressure overload. Markedly enlarged right atrium size. The right ventricle was not clearly visualized. Appears dilated. RVSP of 55-60 mm Hg consistent with moderate pulmonary hypertension. Small circumferential pericardial effusion without evidence of tamponade   physiology. IVC size is dilated with <50% respiratory collapse. Cultures    Procalcitonin  Lab Results   Component Value Date    PROCAL 0.17 12/11/2021    PROCAL 0.15 05/18/2020    PROCAL 0.15 05/17/2020     Radiology    CXR    XR CHEST PORTABLE   12/14/2021   FINDINGS:  There is an endotracheal tube present with the tip overlying the midtrachea approximately 1.7 cm above the leny. There is an enteric tube coursing below the diaphragm, the distal portions of which are excluded from the current examination. There is moderate enlargement of the cardiomediastinal silhouette. There are patchy airspace opacities overlying the right mid and lower lung zones as well as the medial left lung base which likely represents multifocal pneumonia. Mild hazy opacity at the right lung base may also represent a small right pleural effusion. No pneumothorax is seen. Impression:  1. There are patchy airspace opacities overlying the right mid and lower lung zones as well as the medial left lung base which likely represents multifocal pneumonia. 2. Mild hazy opacity at the right lung base may also represent a small right pleural effusion. CT Scans    CTA CHEST W WO CONTRAST   12/12/2021   1. No evidence of pulmonary and less.    2. Consolidation in the right upper lobe at the perihilar region extending to the major fissure may represent pneumonia superimposed on the patient's interstitial process. 3. Worsening interstitial disease compared to 5/24/2020.   4. Mediastinal and right hilar adenopathy as worsened compared to prior CT. (See actual reports for details)    Assessment   -Acute on chronic hypoxic respiratory failure s/p Extubation 12/15/21  -COPD exacerbation   -RLL pneumonia- Aspiration   -CHFrEF 45%  -JORDAN   -Secondary pulmonary hypertension  -Schizophrenia  Recommendations   -Monitor SpO2 wean supplemental O2 to maintain SpO2 >90%  -Completed 5 days azithromycin and 8 days rocephin   -prednisone 40 mg PO Daily   -Continue Duonebs Q4 hrs WA with metaneb   -recommend judicious use of fluids is 4.5 L positive with Hx CHF and elevated ProBNP on admission   -tobacco cessation discussed   -Concern for readmission due to questionable compliance with medical recommendations  -Aspiration precautions-SLP following currently on pureed diet    Case discussed with nurse and patient/family. Questions and concerns addressed. Meds and Orders reviewed. Electronically signed by   SAVANNAH Alberto - CNP on 12/21/2021 at 4:56 PM     Addendum by Dr. Candis Granda MD:  I have seen and examined the patient independently. Face to face evaluation and examination was performed. The above evaluation and note has been reviewed. Labs and radiographs were reviewed. I Have discussed with . Suresh Abel CNP about this patient in detail. The above assessment and plan has been reviewed. Please see my modifications mentioned below. Lab Results   Component Value Date    PH 7.22 12/12/2021    PCO2 85 12/12/2021    PO2 87 12/12/2021    HCO3 34 12/12/2021    O2SAT 94 12/12/2021     Lab Results   Component Value Date    IFIO2 70 12/12/2021    MODE PC 12/12/2021    SETTIDVOL 480 05/30/2020    SETPEEP 8.0 12/12/2021     My modifications:  - He is on 3LPM via nasal cannula.   CT scan of chest with IV contrast performed on 12 December 2021:  -Consolidation in the right upper lobe at the perihilar region extending to the major fissure may represent pneumonia superimposed on the patient's interstitial process  -Mediastinal and right hilar lymphadenopathy-most likely reactive process  -He completed a course of Rocephin and Zithromax    Plan:  Continue prednisone  Continue duo nebs  We will repeat arterial blood gas tomorrow morning at 6 AM on current BiPAP settings of 16 cm of water  Avoid all sedative medications if patient is drowsy  Aspiration precautions  -We will repeat CT scan of chest with IV contrast in 3 months to follow his pneumonia administer lymphadenopathy.   Will arrange for follow-up with Dr. Kinga Nagel MD in 3 months with above CT scan of chest    Rhianna Romero MD 12/21/2021 7:01 PM

## 2021-12-22 LAB
ALLEN TEST: POSITIVE
ANION GAP SERPL CALCULATED.3IONS-SCNC: 8 MEQ/L (ref 8–16)
BASE EXCESS (CALCULATED): 7.2 MMOL/L (ref -2.5–2.5)
BUN BLDV-MCNC: 11 MG/DL (ref 7–22)
CALCIUM SERPL-MCNC: 9 MG/DL (ref 8.5–10.5)
CHLORIDE BLD-SCNC: 99 MEQ/L (ref 98–111)
CO2: 32 MEQ/L (ref 23–33)
COLLECTED BY:: ABNORMAL
CREAT SERPL-MCNC: 0.6 MG/DL (ref 0.4–1.2)
DEVICE: ABNORMAL
ERYTHROCYTE [DISTWIDTH] IN BLOOD BY AUTOMATED COUNT: 14.6 % (ref 11.5–14.5)
ERYTHROCYTE [DISTWIDTH] IN BLOOD BY AUTOMATED COUNT: 52.6 FL (ref 35–45)
GFR SERPL CREATININE-BSD FRML MDRD: > 90 ML/MIN/1.73M2
GLUCOSE BLD-MCNC: 87 MG/DL (ref 70–108)
HCO3: 37 MMOL/L (ref 23–28)
HCT VFR BLD CALC: 50.9 % (ref 42–52)
HEMOGLOBIN: 15.7 GM/DL (ref 14–18)
IFIO2: 40
MCH RBC QN AUTO: 30.1 PG (ref 26–33)
MCHC RBC AUTO-ENTMCNC: 30.8 GM/DL (ref 32.2–35.5)
MCV RBC AUTO: 97.5 FL (ref 80–94)
MODE: ABNORMAL
O2 SATURATION: 78 %
PCO2: 72 MMHG (ref 35–45)
PH BLOOD GAS: 7.32 (ref 7.35–7.45)
PLATELET # BLD: 323 THOU/MM3 (ref 130–400)
PMV BLD AUTO: 9.7 FL (ref 9.4–12.4)
PO2: 48 MMHG (ref 71–104)
POTASSIUM SERPL-SCNC: 4.2 MEQ/L (ref 3.5–5.2)
RBC # BLD: 5.22 MILL/MM3 (ref 4.7–6.1)
SET PEEP: 6 MMHG
SODIUM BLD-SCNC: 139 MEQ/L (ref 135–145)
SOURCE, BLOOD GAS: ABNORMAL
WBC # BLD: 5.9 THOU/MM3 (ref 4.8–10.8)

## 2021-12-22 PROCEDURE — C9113 INJ PANTOPRAZOLE SODIUM, VIA: HCPCS | Performed by: FAMILY MEDICINE

## 2021-12-22 PROCEDURE — 99232 SBSQ HOSP IP/OBS MODERATE 35: CPT | Performed by: INTERNAL MEDICINE

## 2021-12-22 PROCEDURE — 2580000003 HC RX 258: Performed by: FAMILY MEDICINE

## 2021-12-22 PROCEDURE — 6370000000 HC RX 637 (ALT 250 FOR IP): Performed by: INTERNAL MEDICINE

## 2021-12-22 PROCEDURE — 6370000000 HC RX 637 (ALT 250 FOR IP): Performed by: FAMILY MEDICINE

## 2021-12-22 PROCEDURE — 6360000002 HC RX W HCPCS: Performed by: FAMILY MEDICINE

## 2021-12-22 PROCEDURE — 85027 COMPLETE CBC AUTOMATED: CPT

## 2021-12-22 PROCEDURE — 94660 CPAP INITIATION&MGMT: CPT

## 2021-12-22 PROCEDURE — 2060000000 HC ICU INTERMEDIATE R&B

## 2021-12-22 PROCEDURE — 80048 BASIC METABOLIC PNL TOTAL CA: CPT

## 2021-12-22 PROCEDURE — 94640 AIRWAY INHALATION TREATMENT: CPT

## 2021-12-22 PROCEDURE — 82803 BLOOD GASES ANY COMBINATION: CPT

## 2021-12-22 PROCEDURE — 36415 COLL VENOUS BLD VENIPUNCTURE: CPT

## 2021-12-22 PROCEDURE — 6370000000 HC RX 637 (ALT 250 FOR IP): Performed by: NURSE PRACTITIONER

## 2021-12-22 PROCEDURE — 97110 THERAPEUTIC EXERCISES: CPT

## 2021-12-22 PROCEDURE — 94669 MECHANICAL CHEST WALL OSCILL: CPT

## 2021-12-22 PROCEDURE — APPSS30 APP SPLIT SHARED TIME 16-30 MINUTES: Performed by: NURSE PRACTITIONER

## 2021-12-22 PROCEDURE — 36600 WITHDRAWAL OF ARTERIAL BLOOD: CPT

## 2021-12-22 RX ADMIN — SODIUM CHLORIDE, PRESERVATIVE FREE 10 ML: 5 INJECTION INTRAVENOUS at 21:30

## 2021-12-22 RX ADMIN — IPRATROPIUM BROMIDE AND ALBUTEROL SULFATE 1 AMPULE: .5; 3 SOLUTION RESPIRATORY (INHALATION) at 07:41

## 2021-12-22 RX ADMIN — CHLORHEXIDINE GLUCONATE 0.12% ORAL RINSE 15 ML: 1.2 LIQUID ORAL at 08:58

## 2021-12-22 RX ADMIN — RISPERIDONE 2 MG: 1 TABLET ORAL at 21:30

## 2021-12-22 RX ADMIN — ENOXAPARIN SODIUM 40 MG: 100 INJECTION SUBCUTANEOUS at 08:59

## 2021-12-22 RX ADMIN — CHLORHEXIDINE GLUCONATE 0.12% ORAL RINSE 15 ML: 1.2 LIQUID ORAL at 21:29

## 2021-12-22 RX ADMIN — RISPERIDONE 1 MG: 1 TABLET ORAL at 08:59

## 2021-12-22 RX ADMIN — SODIUM CHLORIDE, PRESERVATIVE FREE 10 ML: 5 INJECTION INTRAVENOUS at 08:58

## 2021-12-22 RX ADMIN — PRAVASTATIN SODIUM 40 MG: 40 TABLET ORAL at 21:30

## 2021-12-22 RX ADMIN — PANTOPRAZOLE SODIUM 40 MG: 40 INJECTION, POWDER, FOR SOLUTION INTRAVENOUS at 08:59

## 2021-12-22 RX ADMIN — BUDESONIDE 500 MCG: 0.5 INHALANT RESPIRATORY (INHALATION) at 07:41

## 2021-12-22 RX ADMIN — IPRATROPIUM BROMIDE AND ALBUTEROL SULFATE 1 AMPULE: .5; 3 SOLUTION RESPIRATORY (INHALATION) at 11:47

## 2021-12-22 RX ADMIN — BUDESONIDE 500 MCG: 0.5 INHALANT RESPIRATORY (INHALATION) at 17:08

## 2021-12-22 RX ADMIN — METOPROLOL TARTRATE 25 MG: 25 TABLET, FILM COATED ORAL at 08:59

## 2021-12-22 RX ADMIN — IPRATROPIUM BROMIDE AND ALBUTEROL SULFATE 1 AMPULE: .5; 3 SOLUTION RESPIRATORY (INHALATION) at 21:44

## 2021-12-22 RX ADMIN — ASPIRIN 325 MG: 325 TABLET ORAL at 08:59

## 2021-12-22 RX ADMIN — PANTOPRAZOLE SODIUM 40 MG: 40 INJECTION, POWDER, FOR SOLUTION INTRAVENOUS at 21:30

## 2021-12-22 RX ADMIN — METOPROLOL TARTRATE 25 MG: 25 TABLET, FILM COATED ORAL at 21:30

## 2021-12-22 RX ADMIN — IPRATROPIUM BROMIDE AND ALBUTEROL SULFATE 1 AMPULE: .5; 3 SOLUTION RESPIRATORY (INHALATION) at 17:08

## 2021-12-22 RX ADMIN — PREDNISONE 40 MG: 20 TABLET ORAL at 08:59

## 2021-12-22 ASSESSMENT — PAIN SCALES - GENERAL
PAINLEVEL_OUTOF10: 0

## 2021-12-22 NOTE — RT PROTOCOL NOTE
RT Inhaler-Nebulizer Bronchodilator Protocol Note    There is a bronchodilator order in the chart from a provider indicating to follow the RT Bronchodilator Protocol and there is an Initiate RT Inhaler-Nebulizer Bronchodilator Protocol order as well (see protocol at bottom of note). CXR Findings:  No results found. The findings from the last RT Protocol Assessment were as follows:   History Pulmonary Disease: Chronic pulmonary disease  Respiratory Pattern: Regular pattern and RR 12-20 bpm  Breath Sounds: Slightly diminished and/or crackles  Cough: Strong, spontaneous, non-productive  Indication for Bronchodilator Therapy: On home bronchodilators,Decreased or absent breath sounds  Bronchodilator Assessment Score: 4  No changes. Pt [facility]        Aerosolized bronchodilator medication orders have been revised according to the RT Inhaler-Nebulizer Bronchodilator Protocol below. Respiratory Therapist to perform RT Therapy Protocol Assessment initially then follow the protocol. Repeat RT Therapy Protocol Assessment PRN for score 0-3 or on second treatment, BID, and PRN for scores above 3. No Indications - adjust the frequency to every 6 hours PRN wheezing or bronchospasm, if no treatments needed after 48 hours then discontinue using Per Protocol order mode. If indication present, adjust the RT bronchodilator orders based on the Bronchodilator Assessment Score as indicated below. Use Inhaler orders unless patient has one or more of the following: on home nebulizer, not able to hold breath for 10 seconds, is not alert and oriented, cannot activate and use MDI correctly, or respiratory rate 25 breaths per minute or more, then use the equivalent nebulizer order(s) with same Frequency and PRN reasons based on the score. If a patient is on this medication at home then do not decrease Frequency below that used at home.     0-3 - enter or revise RT bronchodilator order(s) to equivalent RT Bronchodilator order with Frequency of every 4 hours PRN for wheezing or increased work of breathing using Per Protocol order mode. 4-6 - enter or revise RT Bronchodilator order(s) to two equivalent RT bronchodilator orders with one order with BID Frequency and one order with Frequency of every 4 hours PRN wheezing or increased work of breathing using Per Protocol order mode. 7-10 - enter or revise RT Bronchodilator order(s) to two equivalent RT bronchodilator orders with one order with TID Frequency and one order with Frequency of every 4 hours PRN wheezing or increased work of breathing using Per Protocol order mode. 11-13 - enter or revise RT Bronchodilator order(s) to one equivalent RT bronchodilator order with QID Frequency and an Albuterol order with Frequency of every 4 hours PRN wheezing or increased work of breathing using Per Protocol order mode. Greater than 13 - enter or revise RT Bronchodilator order(s) to one equivalent RT bronchodilator order with every 4 hours Frequency and an Albuterol order with Frequency of every 2 hours PRN wheezing or increased work of breathing using Per Protocol order mode. RT to enter RT Home Evaluation for COPD & MDI Assessment order using Per Protocol order mode.     Electronically signed by Shirin Bush RCP on 12/22/2021 at 7:46 AM

## 2021-12-22 NOTE — PROGRESS NOTES
[facility]    Patient - Doug Bruno   MRN -  [id number]   [facility]   - 1970      Date of Admission -  2021  1:41 AM  Date of evaluation -  2021  Room - Shahla Wen MD Primary Care Physician - Tawana Roman MD   Reason for consult    Acute on chronic respiratory failure with hypoxia   [address] Problems    Diagnosis Date Noted    Aspiration pneumonia of both lower lobes due to gastric secretions ([address] [J69.0]     Pneumonia of right lower lobe due to infectious organism [J18.9]     Acute respiratory failure ([address] [J96.00] 2020     HPI   47 y/o [facility] American male with past medical history of severe COPD (FEV1 47%) on home Oxygen 3 lpm, Schizophrenia, JORDAN/OHS, chronic tobacco abuse, moderate PAH (RVSP 55-60 mmHg), HTN and CHFrEF 45%,  presents to [facility] 21 with chief complaints of shortness of breath and chest pain. Upon ED arrival, the patient was febrile (Tmax 101 degrees), normotensive, mildly tachycardic and tachypnic (RR 30s), with SpO2 40% on RA. Workup was remarkable for macrocytosis without anemia, normal WBC, Cr 1.2 (BUN 25, eGFR 77),  (A1C 6.0 in 2019), BNP 2758, CRP 16.3, , ferritin 344 and LA 2.4. Procalcitonin, troponin, rapid flu and COVID testing were negative. ABG (on BiPAP; FiO2 95%): 7.22 / 85 / 171 / 35. Dimer 630. EKG showed sinus tachycardia without evidence of ischemic. Portable CXR demonstrated a RLL infiltrate. CT chest showed RML and RLL infiltrates. The patient was placed on a non-re breather and escalated to BiPAP. He was subsequently intubated for failure to improve on BiPAP therapy. He was transported to the ICU for additional management and arrived in stable condition. He was extubated 2 days ago.  Currently on Oxygen nasal canula 4 lpm.    Subjective/Last 24 hours   -on 3 LPM via NC  -reports SOB stable   -ABG this AM respiratory acidosis incompletely compensated   -Good output past 24 hrs   ROS limited 2/2 mental status  Diet    ADULT DIET; Dysphagia - Pureed  ADULT ORAL NUTRITION SUPPLEMENT; Breakfast, Lunch, Dinner; Standard 4 oz Oral Supplement  Allergies    Patient has no known allergies. Social History     Social History     Socioeconomic History    Marital status: Single     Spouse name: Not on file    Number of children: 0    Years of education: Not on file    Highest education level: Not on file   Occupational History    Not on file   Tobacco Use    Smoking status: Current Every Day Smoker     Packs/day: 1.00     Years: 20.00     Pack years: 20.00     Types: Cigarettes    Smokeless tobacco: Never Used   Vaping Use    Vaping Use: Never used   Substance and Sexual Activity    Alcohol use: No    Drug use: No    Sexual activity: Not Currently   Other Topics Concern    Not on file   Social History Narrative    Not on file     Social Determinants of Health     Financial Resource Strain:     Difficulty of Paying Living Expenses: Not on file   Food Insecurity:     Worried About Running Out of Food in the Last Year: Not on file    Tiny of Food in the Last Year: Not on file   Transportation Needs:     Lack of Transportation (Medical): Not on file    Lack of Transportation (Non-Medical):  Not on file   Physical Activity:     Days of Exercise per Week: Not on file    Minutes of Exercise per Session: Not on file   Stress:     Feeling of Stress : Not on file   Social Connections:     Frequency of Communication with Friends and Family: Not on file    Frequency of Social Gatherings with Friends and Family: Not on file    Attends Caodaism Services: Not on file    Active Member of Clubs or Organizations: Not on file    Attends Club or Organization Meetings: Not on file    Marital Status: Not on file   Intimate Partner Violence:     Fear of Current or Ex-Partner: Not on file    Emotionally Abused: Not on file    Physically Abused: Not on file    Sexually Abused: Not on file   Housing Stability:     Unable to Pay for Housing in the Last Year: Not on file    Number of Places Lived in the Last Year: Not on file    Unstable Housing in the Last Year: Not on file     Family History          Problem Relation Age of Onset    High Blood Pressure Mother      Sleep History    History of JORDAN   Meds    Current Medications    predniSONE  40 mg Oral Daily    ipratropium-albuterol  1 ampule Inhalation Q4H WA    influenza virus vaccine  0.5 mL IntraMUSCular Prior to discharge    risperiDONE  1 mg Oral Daily    chlorhexidine  15 mL Mouth/Throat BID    calcium replacement protocol   Other RX Placeholder    budesonide  500 mcg Nebulization BID    pravastatin  40 mg Oral Nightly    risperiDONE  2 mg Oral Nightly    Tadalafil (PAH)  40 mg Oral Daily    sodium chloride flush  10 mL IntraVENous 2 times per day    enoxaparin  40 mg SubCUTAneous Daily    aspirin  325 mg Oral Daily    metoprolol tartrate  25 mg Oral BID    pantoprazole  40 mg IntraVENous BID     LORazepam, sodium chloride flush, sodium chloride, acetaminophen **OR** acetaminophen, albuterol  IV Drips/Infusions   sodium chloride       Vitals    Vitals    height is 6' (1.829 m) and weight is 193 lb 6.4 oz (87.7 kg). His oral temperature is 98.2 °F (36.8 °C). His blood pressure is 96/55 (abnormal) and his pulse is 75. His respiration is 18 and oxygen saturation is 96%.      O2 Flow Rate (L/min): 3 L/min  I/O    Intake/Output Summary (Last 24 hours) at 12/22/2021 1222  Last data filed at 12/22/2021 1000  Gross per 24 hour   Intake 2423 ml   Output 5000 ml   Net -2577 ml     Patient Vitals for the past 96 hrs (Last 3 readings):   Weight   12/22/21 0645 193 lb 6.4 oz (87.7 kg)   12/21/21 0645 206 lb 12.8 oz (93.8 kg)   12/20/21 0535 199 lb 6.4 oz (90.4 kg)     Exam   Physical Exam   Constitutional: No distress on O2 per NC. Patient appears chronically ill  Head: Normocephalic and atraumatic. Mouth/Throat: Oropharynx is clear and moist.  Poor dentition  Eyes: Conjunctivae are normal. Pupils are equal, round. No scleral icterus. Neck: Neck supple. No tracheal deviation present. Cardiovascular: S1 and S2 with no murmur. No peripheral edema  Pulmonary/Chest: Normal effort with bilateral air entry, clear breath sounds. No stridor. No respiratory distress. Patient exhibits no tenderness. Abdominal: Soft. Bowel sounds audible. No distension or tenderness to palp. Musculoskeletal: Moves all extremities  Neurological: Patient is alert and follows simple commands. Noted impairment       Labs   ABG  Lab Results   Component Value Date    PH 7.32 12/22/2021    PO2 48 12/22/2021    PCO2 72 12/22/2021    HCO3 37 12/22/2021    O2SAT 78 12/22/2021     Lab Results   Component Value Date    IFIO2 40 12/22/2021    MODE BiLevel 12/22/2021    SETTIDVOL 480 05/30/2020    SETPEEP 6.0 12/22/2021     CBC  Recent Labs     12/22/21  0858   WBC 5.9   RBC 5.22   HGB 15.7   HCT 50.9   MCV 97.5*   MCH 30.1   MCHC 30.8*      MPV 9.7      BMP  Recent Labs     12/20/21  1902 12/22/21  0858   NA  --  139   K  --  4.2   CL  --  99   CO2  --  32   BUN  --  11   CREATININE  --  0.6   GLUCOSE  --  87   MG 2.0  --    CALCIUM  --  9.0     LFT  No results for input(s): AST, ALT, ALB, BILITOT, ALKPHOS, LIPASE in the last 72 hours. Invalid input(s): AMYLASE  TROP  Lab Results   Component Value Date    TROPONINT < 0.010 12/11/2021    TROPONINT < 0.010 12/11/2021    TROPONINT 0.029 05/17/2020     BNP  Lab Results   Component Value Date    PROBNP 2758.0 12/11/2021    PROBNP 688.7 06/06/2020    PROBNP 550.9 05/26/2020     D-Dimer  Lab Results   Component Value Date    DDIMER 630.00 12/11/2021     Lactic Acid  No results for input(s): LACTA in the last 72 hours. INR  No results for input(s): INR, PROTIME in the last 72 hours. PTT  No results for input(s):  APTT in the last 72 hours. Glucose  No results for input(s): POCGLU in the last 72 hours. UA No results for input(s): SPECGRAV, PHUR, COLORU, CLARITYU, MUCUS, PROTEINU, BLOODU, RBCUA, WBCUA, BACTERIA, NITRU, GLUCOSEU, BILIRUBINUR, UROBILINOGEN, KETUA, LABCAST, LABCASTTY, AMORPHOS in the last 72 hours. Invalid input(s): CRYSTALS. PFTs     Echo     Summary   Left ventricular size is normal and systolic function is mildly reduced. Ejection fraction was estimated at 50-55%. LV wall thickness is within   normal limits. Intraventricular septal wall compression during systole suggestive of RV   pressure overload. Markedly enlarged right atrium size. The right ventricle was not clearly visualized. Appears dilated. RVSP of 55-60 mm Hg consistent with moderate pulmonary hypertension. Small circumferential pericardial effusion without evidence of tamponade   physiology. IVC size is dilated with <50% respiratory collapse. Cultures    Procalcitonin  Lab Results   Component Value Date    PROCAL 0.17 12/11/2021    PROCAL 0.15 05/18/2020    PROCAL 0.15 05/17/2020     Radiology    CXR    XR CHEST PORTABLE   12/14/2021   FINDINGS:  There is an endotracheal tube present with the tip overlying the midtrachea approximately 1.7 cm above the leny. There is an enteric tube coursing below the diaphragm, the distal portions of which are excluded from the current examination. There is moderate enlargement of the cardiomediastinal silhouette. There are patchy airspace opacities overlying the right mid and lower lung zones as well as the medial left lung base which likely represents multifocal pneumonia. Mild hazy opacity at the right lung base may also represent a small right pleural effusion. No pneumothorax is seen. Impression:  1. There are patchy airspace opacities overlying the right mid and lower lung zones as well as the medial left lung base which likely represents multifocal pneumonia.     2. Mild hazy opacity at the right lung base may also represent a small right pleural effusion. CT Scans    CTA CHEST W WO CONTRAST   12/12/2021   1. No evidence of pulmonary and less. 2. Consolidation in the right upper lobe at the perihilar region extending to the major fissure may represent pneumonia superimposed on the patient's interstitial process. 3. Worsening interstitial disease compared to 5/24/2020.   4. Mediastinal and right hilar adenopathy as worsened compared to prior CT. (See actual reports for details)    Assessment   -Acute on chronic hypoxic respiratory failure s/p Extubation 12/15/21  -COPD exacerbation   -RLL pneumonia- Aspiration   -CHFrEF 45%  -JORDAN   -Secondary pulmonary hypertension  -Schizophrenia  Recommendations   -Monitor SpO2 wean supplemental O2 to maintain SpO2 >90%  -Completed 5 days azithromycin and 8 days rocephin   -prednisone 40 mg PO Daily   -Continue Duonebs Q4 hrs WA with metaneb   -tobacco cessation discussed   -Concern for readmission due to questionable compliance with medical recommendations  -Aspiration precautions-SLP following currently on pureed diet  -Will change BiPAP from 16/6 with BUR 12 to 18/6 with BUR of 16   -We will repeat arterial blood gas tomorrow morning at 6 AM on new BiPAP settings   -Avoid all sedative medications if patient is drowsy  -Aspiration precautions  -Attempt made to reach family regarding previous JORDAN treatment no answer informed RN [place] to notify service if family present today  -Order to obtain medical records from previous sleep study placed   -We will repeat CT scan of chest with IV contrast in 3 months to follow his pneumonia along with mediastinal  lymphadenopathy. Will arrange for follow-up with Dr. Aida Hoffmann MD in 3 months with above CT scan of chest    Case discussed with nurse and patient/family. Questions and concerns addressed. Meds and Orders reviewed.     Electronically signed by   SAVANNAH Jerome CNP on 12/22/2021 at

## 2021-12-22 NOTE — CARE COORDINATION
12/22/21, 2:41 PM EST  DISCHARGE PLANNING EVALUATION:    Samantha Clayton       Admitted: 12/11/2021/ [address] [address] day: 11   Location: [address] [place] [address]-A Reason for admit: Acute respiratory failure ([facility] [J96.00]  Acute respiratory failure with hypoxia and hypercapnia (HCC) [J96.01, J96.02]  Pneumonia of right lower lobe due to infectious organism [J18.9]   PMH:  has a past medical history of Acute on chronic systolic congestive heart failure ([facility] [address] Emphysema (subcutaneous) (surgical) resulting from a procedure, Hypertension, Pneumonia, and Schizophrenia ([facility] [address] Barriers to Discharge:  CO2 72, PH 7.32; monitor. Adjusting BIPAP today per Pulmonary. Oxygen 3L versus 40% FIO2 BIPAP continued  PCP: Zina Marquez MD  Readmission Risk Score: 9.5 ( )%    Patient Goals/Plan/Treatment Preferences:   plans home w mother/POA Maureen, [facility] (nsg, therapy, aide); await therapy recommendations, has [facility] home oxygen 3-4L, CPAP, nebulizer  Transportation/Food Security/Housekeeping Addressed:  No issues identified.

## 2021-12-22 NOTE — PROGRESS NOTES
[facility] ICU STEPDOWN TELEMETRY 4K  Occupational Therapy  Daily Note  Time:    Time In: 3974  Time Out: 1500  Timed Code Treatment Minutes: 25 Minutes  Minutes: 25          Date: 2021  Patient Name: Carlos Kohler,   Gender: male      Room: [facility]/024-A  MRN: [id number]  : 1970  (46 y.o.)  Referring Practitioner: Dr. Lori Pino MD  Diagnosis: Acute Respiratory Failure  Additional Pertinent Hx: Pt with past medical history of severe COPD (FEV1 47%) on home Oxygen 3 lpm, Schizophrenia, JORDAN/OHS, chronic tobacco abuse, moderate PAH (RVSP 55-60 mmHg), HTN and CHFrEF 45%,  presents to [facility] 21 with chief complaints of shortness of breath and chest pain. Upon ED arrival, the patient was febrile (Tmax 101 degrees), normotensive, mildly tachycardic and tachypnic (RR 30s), with SpO2 40% on RA. Workup was remarkable for macrocytosis without anemia, normal WBC, Cr 1.2 (BUN 25, eGFR 77),  (A1C 6.0 in 2019), BNP 2758, CRP 16.3, , ferritin 344 and LA 2.4. Procalcitonin, troponin, rapid flu and COVID testing were negative. ABG (on BiPAP; FiO2 95%): 7.22 / 85 / 171 / 35. Dimer 630. EKG showed sinus tachycardia without evidence of ischemic. Portable CXR demonstrated a RLL infiltrate. CT chest showed RML and RLL infiltrates. The patient was placed on a non-re breather and escalated to BiPAP. He was subsequently intubated for failure to improve on BiPAP therapy. He was transported to the ICU for additional management and arrived in stable condition. Pt had to be intubated for 4 days. Restrictions/Precautions:  Restrictions/Precautions: General Precautions,Fall Risk      SUBJECTIVE: cooperative. On 4 liters nasal canula    PAIN: 0/10: denied pain    Vitals: Oxygen: 95% on 4 liters of O2     COGNITION: Inattention    ADL:   Grooming: with set-up. Lower Extremity Dressing: with set-up. Toileting: with set-up.  with urinal .    BALANCE:  Sitting Balance:  Modified Independent. Standing Balance: Stand By Assistance. BED MOBILITY:  Not Tested    TRANSFERS:  Sit to Stand:  Stand By Assistance. min cues for safety and hand placement off of the RW   Stand to Sit: Stand by assist    FUNCTIONAL MOBILITY:  Assistive Device: Rolling Walker  Assist Level:  Contact Guard Assistance. Distance: within room x 50 feet. 3 standing rest breaks. mod cues initially for posture and staying within the RW. Pt required less cues during advanved mobility. Pt on 4 L O2 thorughout. ADDITIONAL ACTIVITIES:    Patient completed BUE strengthening exercises with skilled education on HEP: completed x 10 reps x 1 set with a light resistance theraband  in all joints and all planes in order to improve UE strength and activity tolerance required for BADL routine and toilet / shower transfers. Patient tolerated  Well , requiring min  rest breaks. Patient also required min  cues for attention to task and  technique. ASSESSMENT:     Activity Tolerance:  Patient tolerance of  treatment: good. Discharge Recommendations: Home with Home Health OT  Equipment Recommendations: Other: Will continue to monitor  Plan: Times per week: 5x  Specific instructions for Next Treatment: Functioonal mobility; ADLs and endurance training; safety awareness  Current Treatment Recommendations: Functional Mobility Training,Endurance Training,Self-Care / ADL,Safety Education & Training  Plan Comment: Pt would benefit from continued skilled OT services when medically stable and discharged from Acute. HHOT recommended. Patient Education  Patient Education: Home Exercise Program    Goals  Short term goals  Time Frame for Short term goals: By discharge  Short term goal 1: Pt will demonstrate functional mobility walking to/from the bathroom or in the kitchenette while using a walker safely and maintaining O2 > than 90% to prepare for doing self care and any light homemaking tasks.   Short term goal 2: Pt will complete lower body dressing or a spongebath with setup A and min cues for taking rest breaks to increase his fatigue management while doing self care. Short term goal 3: Pt will complete BUE ROM/moderate resistance exercises while following proper breathing technique to increase his endurance and strength for ease of doing self care and any light homemaking. Short term goal 4: Pt will complete toileting routine including transfers to/from the standard toilet seat with a grabbar with supervision to increase his independence with self care. Following session, patient left in safe position with all fall risk precautions in place.

## 2021-12-22 NOTE — PROGRESS NOTES
INTERNAL MEDICINE Progress Note  12/22/2021 11:38 AM  Subjective:   Admit Date: 12/11/2021  PCP: Michelle Leon MD  Interval History:     Remains on 3L oxygen via NC  abg shows persistent hypercarbia    Objective:   Vitals: BP (!) 96/55   Pulse 75   Temp 98.2 °F (36.8 °C) (Oral)   Resp 18   Ht 6' (1.829 m)   Wt 193 lb 6.4 oz (87.7 kg)   SpO2 96%   BMI 26.23 kg/m²   General appearance: alert and cooperative with exam  HEENT:  Teeth: multiple caries and missing teeth  Neck: no JVD  Lungs: diminished BS mateo  Heart: S1, S2 normal  Abdomen: soft, non-tender; bowel sounds normal; no masses,  no organomegaly  Extremities: no edema,   Neurologic:  alert, orientation: person, place, affect: normal,   thought content exhibits logical conclusions      Medications:   Scheduled Meds:   predniSONE  40 mg Oral Daily    ipratropium-albuterol  1 ampule Inhalation Q4H WA    influenza virus vaccine  0.5 mL IntraMUSCular Prior to discharge    risperiDONE  1 mg Oral Daily    chlorhexidine  15 mL Mouth/Throat BID    calcium replacement protocol   Other RX Placeholder    budesonide  500 mcg Nebulization BID    pravastatin  40 mg Oral Nightly    risperiDONE  2 mg Oral Nightly    Tadalafil (PAH)  40 mg Oral Daily    sodium chloride flush  10 mL IntraVENous 2 times per day    enoxaparin  40 mg SubCUTAneous Daily    aspirin  325 mg Oral Daily    metoprolol tartrate  25 mg Oral BID    pantoprazole  40 mg IntraVENous BID     Continuous Infusions:   sodium chloride         Lab Results:   CBC:   Recent Labs     12/22/21  0858   WBC 5.9   HGB 15.7        BMP:    Recent Labs     12/22/21  0858      K 4.2   CL 99   CO2 32   BUN 11   CREATININE 0.6   GLUCOSE 87     Magnesium:    Lab Results   Component Value Date    MG 2.0 12/20/2021     HgBA1c:    Lab Results   Component Value Date    LABA1C 6.0 12/15/2019     TSH:    Lab Results   Component Value Date    TSH 0.909 12/11/2021       FOLATE:    Lab Results Component Value Date    FOLATE 9.0 12/08/2016     FERRITIN:    Lab Results   Component Value Date    FERRITIN 344 12/11/2021 12/22/21 0646    Specimen Source: Blood gases     pH, Blood Gas 7.32 Low     PCO2 72 High Panic   mmhg    PO2 48 Low  mmhg    HCO3 37 High  mmol/l    Base Excess (Calculated) 7.2 High  mmol/l    O2 Sat 78 %    IFIO2 40    DEVICE BiPAP    Mode BiLevel    SET PEEP 6.0 mmhg    Nicholas Test Positive    Source: R Radial       Assessment and Plan:   1. Acute on chronic hypoxemic / hypercapnic resp failure  2. COPD with acute exac  3. Pneumonia, completed a/biotics  4. Mod PAH  5. HTN-systemic  6. Schizophrenia  7.  Chronic nicotine abuse    Cont Oxygen,   pulm service adjusting bipap settings  Cont Lovenox, Tadalafil  Risperidol, Statin  PT/OT    Ruperto Maxwell MD, MD

## 2021-12-23 VITALS
HEIGHT: 72 IN | SYSTOLIC BLOOD PRESSURE: 111 MMHG | HEART RATE: 86 BPM | BODY MASS INDEX: 25.6 KG/M2 | WEIGHT: 189 LBS | RESPIRATION RATE: 19 BRPM | TEMPERATURE: 98.2 F | OXYGEN SATURATION: 94 % | DIASTOLIC BLOOD PRESSURE: 59 MMHG

## 2021-12-23 LAB
ALLEN TEST: POSITIVE
BASE EXCESS (CALCULATED): 7.7 MMOL/L (ref -2.5–2.5)
BASE EXCESS MIXED: 8.5 MMOL/L (ref -2–3)
COLLECTED BY:: ABNORMAL
COLLECTED BY:: ABNORMAL
DEVICE: ABNORMAL
DEVICE: ABNORMAL
FIO2, MIXED VENOUS: 3
HCO3, MIXED: 36 MMOL/L (ref 23–28)
HCO3: 37 MMOL/L (ref 23–28)
IFIO2: 45
MODE: ABNORMAL
O2 SAT, MIXED: 83 %
O2 SATURATION: 98 %
PCO2, MIXED VENOUS: 57 MMHG (ref 41–51)
PCO2: 69 MMHG (ref 35–45)
PH BLOOD GAS: 7.34 (ref 7.35–7.45)
PH, MIXED: 7.41 (ref 7.31–7.41)
PO2 MIXED: 49 MMHG (ref 25–40)
PO2: 116 MMHG (ref 71–104)
SET PEEP: 6 MMHG
SET PRESS SUPP: 12 CMH2O
SET RESPIRATORY RATE: 16 BPM
SITE: ABNORMAL
SOURCE, BLOOD GAS: ABNORMAL

## 2021-12-23 PROCEDURE — 82803 BLOOD GASES ANY COMBINATION: CPT

## 2021-12-23 PROCEDURE — 99232 SBSQ HOSP IP/OBS MODERATE 35: CPT | Performed by: INTERNAL MEDICINE

## 2021-12-23 PROCEDURE — 97110 THERAPEUTIC EXERCISES: CPT

## 2021-12-23 PROCEDURE — 6370000000 HC RX 637 (ALT 250 FOR IP): Performed by: INTERNAL MEDICINE

## 2021-12-23 PROCEDURE — 2700000000 HC OXYGEN THERAPY PER DAY

## 2021-12-23 PROCEDURE — 94669 MECHANICAL CHEST WALL OSCILL: CPT

## 2021-12-23 PROCEDURE — 94761 N-INVAS EAR/PLS OXIMETRY MLT: CPT

## 2021-12-23 PROCEDURE — 94640 AIRWAY INHALATION TREATMENT: CPT

## 2021-12-23 PROCEDURE — 2580000003 HC RX 258: Performed by: FAMILY MEDICINE

## 2021-12-23 PROCEDURE — 94660 CPAP INITIATION&MGMT: CPT

## 2021-12-23 PROCEDURE — 97530 THERAPEUTIC ACTIVITIES: CPT

## 2021-12-23 PROCEDURE — 6370000000 HC RX 637 (ALT 250 FOR IP): Performed by: NURSE PRACTITIONER

## 2021-12-23 PROCEDURE — 6360000002 HC RX W HCPCS: Performed by: FAMILY MEDICINE

## 2021-12-23 PROCEDURE — C9113 INJ PANTOPRAZOLE SODIUM, VIA: HCPCS | Performed by: FAMILY MEDICINE

## 2021-12-23 PROCEDURE — 36600 WITHDRAWAL OF ARTERIAL BLOOD: CPT

## 2021-12-23 PROCEDURE — APPSS30 APP SPLIT SHARED TIME 16-30 MINUTES: Performed by: NURSE PRACTITIONER

## 2021-12-23 RX ORDER — RISPERIDONE 2 MG/1
2 TABLET, FILM COATED ORAL NIGHTLY
Qty: 60 TABLET | Refills: 3 | Status: SHIPPED | OUTPATIENT
Start: 2021-12-23 | End: 2021-12-23 | Stop reason: HOSPADM

## 2021-12-23 RX ORDER — RISPERIDONE 1 MG/1
1 TABLET, FILM COATED ORAL DAILY
Qty: 60 TABLET | Refills: 3 | Status: SHIPPED | OUTPATIENT
Start: 2021-12-24 | End: 2021-12-23

## 2021-12-23 RX ORDER — IPRATROPIUM BROMIDE AND ALBUTEROL SULFATE 2.5; .5 MG/3ML; MG/3ML
3 SOLUTION RESPIRATORY (INHALATION) 4 TIMES DAILY
Qty: 360 ML | Refills: 2 | Status: SHIPPED | OUTPATIENT
Start: 2021-12-23 | End: 2021-12-23 | Stop reason: SDUPTHER

## 2021-12-23 RX ORDER — RISPERIDONE 1 MG/1
TABLET, FILM COATED ORAL
Qty: 90 TABLET | Refills: 3 | Status: ON HOLD | OUTPATIENT
Start: 2021-12-23 | End: 2022-05-05 | Stop reason: HOSPADM

## 2021-12-23 RX ORDER — BUDESONIDE 0.5 MG/2ML
500 INHALANT ORAL 2 TIMES DAILY
Qty: 120 ML | Refills: 3 | Status: SHIPPED | OUTPATIENT
Start: 2021-12-23 | End: 2021-12-23 | Stop reason: SDUPTHER

## 2021-12-23 RX ORDER — ALBUTEROL SULFATE 2.5 MG/3ML
2.5 SOLUTION RESPIRATORY (INHALATION)
Qty: 120 EACH | Refills: 3 | Status: SHIPPED | OUTPATIENT
Start: 2021-12-23

## 2021-12-23 RX ORDER — IPRATROPIUM BROMIDE AND ALBUTEROL SULFATE 2.5; .5 MG/3ML; MG/3ML
3 SOLUTION RESPIRATORY (INHALATION) EVERY 6 HOURS PRN
Qty: 360 ML | Refills: 2 | Status: ON HOLD | OUTPATIENT
Start: 2021-12-23 | End: 2022-08-08 | Stop reason: HOSPADM

## 2021-12-23 RX ORDER — BUDESONIDE 0.5 MG/2ML
500 INHALANT ORAL 2 TIMES DAILY
Qty: 120 ML | Refills: 3 | Status: SHIPPED | OUTPATIENT
Start: 2021-12-23

## 2021-12-23 RX ORDER — TADALAFIL 20 MG/1
40 TABLET ORAL DAILY
Qty: 60 TABLET | Refills: 3 | Status: SHIPPED | OUTPATIENT
Start: 2021-12-23

## 2021-12-23 RX ORDER — RISPERIDONE 1 MG/1
TABLET, FILM COATED ORAL
Qty: 90 TABLET | Refills: 3 | Status: SHIPPED | OUTPATIENT
Start: 2021-12-23 | End: 2021-12-23

## 2021-12-23 RX ORDER — IPRATROPIUM BROMIDE AND ALBUTEROL SULFATE 2.5; .5 MG/3ML; MG/3ML
1 SOLUTION RESPIRATORY (INHALATION) 3 TIMES DAILY
Status: DISCONTINUED | OUTPATIENT
Start: 2021-12-23 | End: 2021-12-23 | Stop reason: HOSPADM

## 2021-12-23 RX ORDER — ALBUTEROL SULFATE 2.5 MG/3ML
2.5 SOLUTION RESPIRATORY (INHALATION)
Qty: 120 EACH | Refills: 3 | Status: SHIPPED | OUTPATIENT
Start: 2021-12-23 | End: 2021-12-23 | Stop reason: SDUPTHER

## 2021-12-23 RX ADMIN — CHLORHEXIDINE GLUCONATE 0.12% ORAL RINSE 15 ML: 1.2 LIQUID ORAL at 08:21

## 2021-12-23 RX ADMIN — SODIUM CHLORIDE, PRESERVATIVE FREE 10 ML: 5 INJECTION INTRAVENOUS at 08:21

## 2021-12-23 RX ADMIN — METOPROLOL TARTRATE 25 MG: 25 TABLET, FILM COATED ORAL at 08:20

## 2021-12-23 RX ADMIN — IPRATROPIUM BROMIDE AND ALBUTEROL SULFATE 1 AMPULE: .5; 3 SOLUTION RESPIRATORY (INHALATION) at 08:35

## 2021-12-23 RX ADMIN — ASPIRIN 325 MG: 325 TABLET ORAL at 08:21

## 2021-12-23 RX ADMIN — PANTOPRAZOLE SODIUM 40 MG: 40 INJECTION, POWDER, FOR SOLUTION INTRAVENOUS at 08:21

## 2021-12-23 RX ADMIN — ENOXAPARIN SODIUM 40 MG: 100 INJECTION SUBCUTANEOUS at 08:21

## 2021-12-23 RX ADMIN — PREDNISONE 40 MG: 20 TABLET ORAL at 08:20

## 2021-12-23 RX ADMIN — BUDESONIDE 500 MCG: 0.5 INHALANT RESPIRATORY (INHALATION) at 08:35

## 2021-12-23 RX ADMIN — RISPERIDONE 1 MG: 1 TABLET ORAL at 08:20

## 2021-12-23 ASSESSMENT — PAIN SCALES - GENERAL
PAINLEVEL_OUTOF10: 0
PAINLEVEL_OUTOF10: 0

## 2021-12-23 NOTE — DISCHARGE INSTR - COC
Continuity of Care Form    Patient Name: Nereida Sprague   :  1970  MRN:  [id number]    Admit date:  2021  Discharge date:  ***    Code Status Order: Full Code   Advance Directives:      Admitting Physician:  No admitting provider for patient encounter. PCP: Farida Wood MD    Discharging Nurse: [facility] Unit/Room#: 4K-24/024-A  Discharging Unit Phone Number: ***    Emergency Contact:   Extended Emergency Contact Information  Primary Emergency Contact: Ema Layne           [address] Phone: [phone]  Mobile Phone: [phone]  Relation: Brother/Sister  Secondary Emergency Contact: Cyril De La Torre           [address] Phone: [phone]  Mobile Phone: [phone]  Relation: Parent    Past Surgical History:  History reviewed. No pertinent surgical history.     Immunization History:   Immunization History   Administered Date(s) Administered    Influenza Virus Vaccine 2019    Influenza, Lavinia Root, 6 mo and older, IM, PF (Flulaval, Fluarix) 2019       Active Problems:  Patient Active Problem List   Diagnosis Code    Acute on chronic respiratory failure with hypercapnia (HCC) J96.22    Respiratory insufficiency/failure R06.89    Chronic obstructive pulmonary disease with acute exacerbation (HCC) J44.1    Acute on chronic systolic congestive heart failure (HCC) I50.23    Nicotine abuse Z72.0    Chest pain R07.9    Schizophreniform disorder ([address] F20.81    Respiratory failure ([facility] [address] [facility] J96.90    Smoker F17.200    Acute respiratory failure (HCC) J96.00    Acute on chronic respiratory failure (HCC) J96.20    Pneumonia of right lower lobe due to infectious organism J18.9    Aspiration pneumonia of both lower lobes due to gastric secretions (HCC) J69.0       Isolation/Infection:   Isolation            No Isolation          Patient Infection Status       Infection Onset Added Last Indicated Last Indicated By Review Planned Expiration Resolved Resolved By    None active    Resolved    COVID-19 (Rule Out) 21 COVID-19, Rapid (Ordered)   21 Rule-Out Test Resulted    COVID-19 (Rule Out) 20 COVID-19 (Ordered)   20 Rule-Out Test Resulted            Nurse Assessment:  Last Vital Signs: /75   Pulse 73   Temp 97.3 °F (36.3 °C) (Oral)   Resp 22   Ht 6' (1.829 m)   Wt 189 lb (85.7 kg)   SpO2 98%   BMI 25.63 kg/m²     Last documented pain score (0-10 scale): Pain Level: 0  Last Weight:   Wt Readings from Last 1 Encounters:   21 189 lb (85.7 kg)     Mental Status:  {IP PT MENTAL STATUS:}    IV Access:  { GINO IV ACCESS:298371355}    Nursing Mobility/ADLs:  Walking   {P DME MDI}  Transfer  {P DME FDHT:877414992}  Bathing  {CHP DME VUIV:976754762}  Dressing  {CHP DME LZIJ:914935993}  Toileting  {CHP DME JPKX:114264724}  Feeding  {P DME NVMX:583764975}  Med Admin  {P DME UHGA:870550198}  Med Delivery   { GINO MED Delivery:857280832}    Wound Care Documentation and Therapy:        Elimination:  Continence: Bowel: {YES / MZ:74924}  Bladder: {YES / BR:64269}  Urinary Catheter: {Urinary Catheter:294027979}   Colostomy/Ileostomy/Ileal Conduit: {YES / JU:17828}       Date of Last BM: ***    Intake/Output Summary (Last 24 hours) at 2021 1338  Last data filed at 2021 0748  Gross per 24 hour   Intake 2660 ml   Output 4725 ml   Net -2065 ml     I/O last 3 completed shifts:   In: 2443 [P.O.:3406; I.V.:20]  Out: 7400 [Urine:7400]    Safety Concerns:     [address] GINO Safety Concerns:059578632}    Impairments/Disabilities:      [address] Impairments/Disabilities:832240071}    Nutrition Therapy:  Current Nutrition Therapy:   [address] GINO Diet List:983773675}    Routes of Feeding: {CHP DME Other Feedings:293369497}  Liquids: {Slp liquid thickness:54795}  Daily Fluid Restriction: {CHP DME Yes amt example:503170505}  Last Modified Barium Swallow with Video (Video Swallowing Test): {Done Not Done XQGD:111461644}    Treatments at the Time of Hospital Discharge:   Respiratory Treatments: ***  Oxygen Therapy:  {Therapy; copd oxygen:42504}  Ventilator:    {MH CC Vent [facility]:047020479}    Rehab Therapies: {THERAPEUTIC INTERVENTION:0827101835}  Weight Bearing Status/Restrictions: { CC Weight Bearin}  Other Medical Equipment (for information only, NOT a DME order):  {EQUIPMENT:752911070}  Other Treatments: ***    Patient's personal belongings (please select all that are sent with patient):  {[facility] DME Belongings:770239384}    RN SIGNATURE:  {Esignature:879601967}    CASE MANAGEMENT/SOCIAL WORK SECTION    Inpatient Status Date: ***    Readmission Risk Assessment Score:  Readmission Risk              Risk of Unplanned Readmission:  16           Discharging to Facility/ Agency   Name:   Address:  Phone:  Fax:    Dialysis Facility (if applicable)   Name:  Address:  Dialysis Schedule:  Phone:  Fax:    / signature: {Esignature:110210587}    PHYSICIAN SECTION    Prognosis: {Prognosis:0326445587}    Condition at Discharge: [address] [place] [address] Patient Condition:064679880}    Rehab Potential (if transferring to Rehab): {Prognosis:6042224651}    Recommended Labs or Other Treatments After Discharge: ***    Physician Certification: I certify the above information and transfer of Doug   is necessary for the continuing treatment of the diagnosis listed and that he requires {Admit to Appropriate Level of Care:59156} for {GREATER/LESS:613521415} 30 days.      Update Admission H&P: {P DME Changes in [facility]:664634592}    PHYSICIAN SIGNATURE:  {Esignature:546162835}

## 2021-12-23 NOTE — DISCHARGE SUMMARY
Discharge Summary    Titi Mcpherson  :  1970  MRN:  [id number]    Admit date:  2021  Discharge date:      Admitting Physician:  No admitting provider for patient encounter. Discharge Diagnoses:      1. Acute on chronic hypoxemic / hypercapnic resp failure  2. COPD with acute exac  3. Pneumonia, completed a/biotics  4. Sepsis ass with above  5. Mod PAH  6. HTN-systemic  7. Schizophrenia  8. Chronic nicotine abuse    Patient Active Problem List   Diagnosis    Acute on chronic respiratory failure with hypercapnia (HCC)    Respiratory insufficiency/failure    Chronic obstructive pulmonary disease with acute exacerbation (HCC)    Acute on chronic systolic congestive heart failure (HCC)    Nicotine abuse    Chest pain    [facility]iform disorder ([facility] [address] [facility]    Respiratory failure ([facility] [address] [facility]    Smoker    Acute respiratory failure (HCC)    Acute on chronic respiratory failure ([facility] [address] [facility]    Pneumonia of right lower lobe due to infectious organism    Aspiration pneumonia of both lower lobes due to gastric secretions [facility])       Admission Condition:  critical  Discharged Condition:  good    Hospital Course:   Patient was admitted for respiratory failure with hypercarbia. He required intubation with vent support. He was treated with aggressive pulmonary toileting, bronchodilators, steroids and antibiotics. He was successfully extubated to HFNC. He was weaned to 3L oxygen via nc-his home baseline. Discharge Medications:         Medication List      CHANGE how you take these medications    ipratropium-albuterol 0.5-2.5 (3) MG/3ML Soln nebulizer solution  Commonly known as: DUONEB  Inhale 3 mLs into the lungs 4 times daily  What changed: when to take this     * risperiDONE 2 MG tablet  Commonly known as: RISPERDAL  Take 1 tablet by mouth nightly  What changed: Another medication with the same name was added. Make sure you understand how and when to take each.      * risperiDONE 1 MG tablet  Commonly known as: RISPERDAL  Take 1 tablet by mouth daily  Start taking on: December 24, 2021  What changed: You were already taking a medication with the same name, and this prescription was added. Make sure you understand how and when to take each. * This list has 2 medication(s) that are the same as other medications prescribed for you. Read the directions carefully, and ask your doctor or other care provider to review them with you.             CONTINUE taking these medications    albuterol (2.5 MG/3ML) 0.083% nebulizer solution  Commonly known as: PROVENTIL  Take 3 mLs by nebulization every 2 hours as needed for Wheezing     aspirin 325 MG EC tablet  Take 1 tablet by mouth daily     budesonide 0.5 MG/2ML nebulizer suspension  Commonly known as: Pulmicort  Take 2 mLs by nebulization 2 times daily     carBAMazepine 200 MG tablet  Commonly known as: TEGRETOL     glycopyrrolate-formoterol 9-4.8 MCG/ACT Aero  Commonly known as: BEVESPI  Inhale 2 puffs into the lungs 2 times daily     metoprolol succinate 25 MG extended release tablet  Commonly known as: TOPROL XL  Take 1 tablet by mouth daily     pravastatin 40 MG tablet  Commonly known as: PRAVACHOL     Tadalafil (PAH) 20 MG tablet  Take 2 tablets by mouth daily        STOP taking these medications    enoxaparin 40 MG/0.4ML injection  Commonly known as: LOVENOX     famotidine 20 MG/2ML injection  Commonly known as: PEPCID     fluconazole 400MG/200ML in 0.9 % sodium chloride IVPB  Commonly known as: DIFLUCAN     formoterol 20 MCG/2ML nebulizer solution  Commonly known as: PERFOROMIST     ondansetron 4 MG/2ML injection  Commonly known as: ZOFRAN           Where to Get Your Medications      These medications were sent to [address]  [address] [place], [place] [address] [facility], [address] [address]    Phone: [phone]   · albuterol (2.5 MG/3ML) 0.083% nebulizer solution  · budesonide 0.5 MG/2ML nebulizer suspension  · ipratropium-albuterol 0.5-2.5 (3) MG/3ML Soln nebulizer solution  · risperiDONE 1 MG tablet  · risperiDONE 2 MG tablet  · Tadalafil (PAH) 20 MG tablet         Consults:  pulmonary/intensive care    Significant Diagnostic Studies: labs: CBC:       Recent Labs     12/22/21  0858   WBC 5.9   HGB 15.7         BMP:        Recent Labs     12/22/21  0858      K 4.2   CL 99   CO2 32   BUN 11   CREATININE 0.6   GLUCOSE 87      Magnesium:          Lab Results   Component Value Date     MG 2.0 12/20/2021      HgBA1c:          Lab Results   Component Value Date     LABA1C 6.0 12/15/2019      TSH:          Lab Results   Component Value Date     TSH 0.909 12/11/2021         FOLATE:          Lab Results   Component Value Date     FOLATE 9.0 12/08/2016      FERRITIN:          Lab Results   Component Value Date     FERRITIN 344 12/11/2021              Ref. Range 12/22/2021 06:39 12/23/2021 06:44 12/23/2021 10:54   Source: Unknown R Radial R Radial     pH, Blood Gas Latest Ref Range: 7.35 - 7.45  7.32 (L) 7.34 (L)     PCO2 Latest Ref Range: 35 - 45 mmhg 72 (HH) 69 (H)     pO2 Latest Ref Range: 71 - 104 mmhg 48 (L) 116 (H)     HCO3 Latest Ref Range: 23 - 28 mmol/l 37 (H) 37 (H)     Base Excess (Calculated) Latest Ref Range: -2.5 - 2.5 mmol/l 7.2 (H) 7.7 (H)     O2 Sat Latest Units: % 78 98     Nicholas Test Unknown Positive Positive     IFIO2 Unknown 40 45     PCO2, MIXED VENOUS Latest Ref Range: 41 - 51 mmhg     57 (H)   PO2, Mixed Latest Ref Range: 25 - 40 mmhg     49 (H)   HCO3, Mixed Latest Ref Range: 23 - 28 mmol/l     36 (H)   Base Exc, Mixed Latest Ref Range: -2.0 - 3.0 mmol/l     8.5 (H)   O2 Sat, Mixed Latest Units: %     83   FIO2, MIXED VENOUS Unknown     3   PH MIXED Latest Ref Range: 7.31 - 7.41      7.41      Assessment and Plan:   9. Acute on chronic hypoxemic / hypercapnic resp failure  10. COPD with acute exac  11. Pneumonia, completed a/biotics  12. Sepsis ass with above  13.  Mod PAH  14. HTN-systemic  15. Schizophrenia  16. Chronic nicotine abuse     Cont Oxygen, home bipap  pulm service adjusted bipap settings  Cont  Tadalafil  Risperidol, Statin  Stable for dc home with [facility]. Treatments:   Vent support, bronchodilators, steroid, antibiotics. Disposition:   Home with [facility].     Signed:  Yun Rogers MD  12/23/2021, 12:34 PM

## 2021-12-23 NOTE — PROGRESS NOTES
[facility] ICU STEPDOWN TELEMETRY 4K  Occupational Therapy  Daily Note  Time:   Time In:   Time Out: 1130  Timed Code Treatment Minutes: 25 Minutes  Minutes: 25          Date: 2021  Patient Name: Carlos Kohler,   Gender: male      Room: [facility]24/024-A  MRN: [id number]  : 1970  (46 y.o.)   Referring Practitioner: Dr. Lori Pino MD  Diagnosis: Acute Respiratory Failure  Additional Pertinent Hx: Pt with past medical history of severe COPD (FEV1 47%) on home Oxygen 3 lpm, Schizophrenia, JORDAN/OHS, chronic tobacco abuse, moderate PAH (RVSP 55-60 mmHg), HTN and CHFrEF 45%,  presents to [facility] 21 with chief complaints of shortness of breath and chest pain. Upon ED arrival, the patient was febrile (Tmax 101 degrees), normotensive, mildly tachycardic and tachypnic (RR 30s), with SpO2 40% on RA. Workup was remarkable for macrocytosis without anemia, normal WBC, Cr 1.2 (BUN 25, eGFR 77),  (A1C 6.0 in 2019), BNP 2758, CRP 16.3, , ferritin 344 and LA 2.4. Procalcitonin, troponin, rapid flu and COVID testing were negative. ABG (on BiPAP; FiO2 95%): 7.22 / 85 / 171 / 35. Dimer 630. EKG showed sinus tachycardia without evidence of ischemic. Portable CXR demonstrated a RLL infiltrate. CT chest showed RML and RLL infiltrates. The patient was placed on a non-re breather and escalated to BiPAP. He was subsequently intubated for failure to improve on BiPAP therapy. He was transported to the ICU for additional management and arrived in stable condition. Pt had to be intubated for 4 days. Restrictions/Precautions:  Restrictions/Precautions: General Precautions,Fall Risk     SUBJECTIVE: Pt laying in bed upon arrival, pt agreeable to OT session, RN gave verbal approval for session, pt noted to be soiled with stool.     PAIN: 0/10:     Vitals: Oxygen: 93% with 3L, however unable to get reading following functional mob    COGNITION: Inattention, Decreased Problem Solving, Decreased Safety Awareness and Impulsive    ADL:   Bathing: Minimal Assistance. for thoroughness following a bowel movement  Toileting: Supervision. due to pt impulsive  Toilet Transfer: Supervision. due to pt impulsive. BALANCE:  Standing Balance: Contact Guard Assistance. for safety with BUE support on the walker    BED MOBILITY:  Supine to Sit: Stand By Assistance . TRANSFERS:  Sit to Stand:  Stand By Assistance. .  Stand to Sit: Stand By Assistance. .    FUNCTIONAL MOBILITY:  Assistive Device: Rolling Walker  Assist Level:  Contact Guard Assistance. Distance: HH distances with vc's for activity pacing    ASSESSMENT:     Activity Tolerance:  Patient tolerance of  treatment: good. Discharge Recommendations: Continue to assess pending progress  Equipment Recommendations: Other: Will continue to monitor  Plan: Times per week: 5x  Specific instructions for Next Treatment: Functioonal mobility; ADLs and endurance training; safety awareness  Current Treatment Recommendations: Functional Mobility Training,Endurance Training,Self-Care / ADL,Safety Education & Training  Plan Comment: Pt would benefit from continued skilled OT services when medically stable and discharged from Acute. HHOT recommended. Patient Education  Patient Education: Home Safety    Goals  Short term goals  Time Frame for Short term goals: By discharge  Short term goal 1: Pt will demonstrate functional mobility walking to/from the bathroom or in the kitchenette while using a walker safely and maintaining O2 > than 90% to prepare for doing self care and any light homemaking tasks. Short term goal 2: Pt will complete lower body dressing or a spongebath with setup A and min cues for taking rest breaks to increase his fatigue management while doing self care.   Short term goal 3: Pt will complete BUE ROM/moderate resistance exercises while following proper breathing technique to increase his endurance and strength for ease of doing self care and any light homemaking. Short term goal 4: Pt will complete toileting routine including transfers to/from the standard toilet seat with a grabbar with supervision to increase his independence with self care. Following session, patient left in safe position with all fall risk precautions in place.

## 2021-12-23 NOTE — CARE COORDINATION
12/23/21, 1:46 PM EST    Patient goals/plan/ treatment preferences discussed by  and . Patient goals/plan/ treatment preferences reviewed with patient/ family. Patient/ family verbalize understanding of discharge plan and are in agreement with goal/plan/treatment preferences. Understanding was demonstrated using the teach back method. AVS provided by RN at time of discharge, which includes all necessary medical information pertaining to the patients current course of illness, treatment, post-discharge goals of care, and treatment preferences. Services After Discharge  Services At/After Discharge: Nursing Services,PT ([facility] [address])   IMM Letter  IMM Letter given to Patient/Family/Significant other/Guardian/POA/by[de-identified] George Connolly CM  IMM Letter date given[de-identified] 12/23/21  IMM Letter time given[de-identified] 0926       SYDNEE called [facility], spoke with Nilesh Chacon, updated her on discharge home today, she denies needing anything further from .     Nurse reports family does not feel comfortable transporting patient, SYDNEE sent request for ambulette through [facility], nurse aware.

## 2021-12-23 NOTE — PROGRESS NOTES
CLINICAL PHARMACY: DISCHARGE MED RECONCILIATION/REVIEW    [facility]) [facility] Patient?: No  Total # of Interventions Recommended: 3 - Decreased Dose #: 1  - New Order #: 1  - Updated Order #: 1   -   Total # Interventions Accepted: 3  Intervention Severity:   - Level 1 Intervention Present?: No   - Level 2 #: 0   - Level 3 #: 3   Time Spent (min): 60    Additional Documentation:    Golden RamosD, BCPS   12/23/2021  6:05 PM

## 2021-12-23 NOTE — PROGRESS NOTES
[facility]    Patient - Vidhi Shepard   MRN -  [id number]   [facility]   - 1970      Date of Admission -  2021  1:41 AM  Date of evaluation -  2021  Room - Heron Villanueva MD Primary Care Physician - Daniela Shell MD   Reason for consult    Acute on chronic respiratory failure with hypoxia and hypercapnia  Active Hospital Problem List      Active Hospital Problems    Diagnosis Date Noted    Aspiration pneumonia of both lower lobes due to gastric secretions ([address] [J69.0]     Pneumonia of right lower lobe due to infectious organism [J18.9]     Acute respiratory failure ([address] [J96.00] 2020     HPI   45 y/o [facility] American male with past medical history of severe COPD (FEV1 47%) on home Oxygen 3 lpm, Schizophrenia, JORDAN/OHS, chronic tobacco abuse, moderate PAH (RVSP 55-60 mmHg), HTN and CHFrEF 45%,  presents to [facility] 21 with chief complaints of shortness of breath and chest pain. Upon ED arrival, the patient was febrile (Tmax 101 degrees), normotensive, mildly tachycardic and tachypnic (RR 30s), with SpO2 40% on RA. Workup was remarkable for macrocytosis without anemia, normal WBC, Cr 1.2 (BUN 25, eGFR 77),  (A1C 6.0 in 2019), BNP 2758, CRP 16.3, , ferritin 344 and LA 2.4. Procalcitonin, troponin, rapid flu and COVID testing were negative. ABG (on BiPAP; FiO2 95%): 7.22 / 85 / 171 / 35. Dimer 630. EKG showed sinus tachycardia without evidence of ischemic. Portable CXR demonstrated a RLL infiltrate. CT chest showed RML and RLL infiltrates. The patient was placed on a non-re breather and escalated to BiPAP. He was subsequently intubated for failure to improve on BiPAP therapy. He was transported to the ICU for additional management and arrived in stable condition. He was extubated 2 days ago.  Currently on Oxygen nasal canula 4 lpm.    Subjective/Last 24 hours   -On 3 LPM via NC  -ABG mixed improved   Results for Veryl Rise (MRN [id number]) as of 12/23/2021 11:32   Ref. Range 12/23/2021 10:54   PCO2, MIXED VENOUS Latest Ref Range: 41 - 51 mmhg 57 (H)   PO2, Mixed Latest Ref Range: 25 - 40 mmhg 49 (H)   HCO3, Mixed Latest Ref Range: 23 - 28 mmol/l 36 (H)   Base Exc, Mixed Latest Ref Range: -2.0 - 3.0 mmol/l 8.5 (H)   O2 Sat, Mixed Latest Units: % 83   FIO2, MIXED VENOUS Unknown 3   PH MIXED Latest Ref Range: 7.31 - 7.41  7.41   -Reports SOB stable  ROS limited 2/2 mental status  Diet    ADULT DIET; Dysphagia - Pureed  ADULT ORAL NUTRITION SUPPLEMENT; Breakfast, Lunch, Dinner; Standard 4 oz Oral Supplement  Allergies    Patient has no known allergies. Social History     Social History     Socioeconomic History    Marital status: Single     Spouse name: Not on file    Number of children: 0    Years of education: Not on file    Highest education level: Not on file   Occupational History    Not on file   Tobacco Use    Smoking status: Current Every Day Smoker     Packs/day: 1.00     Years: 20.00     Pack years: 20.00     Types: Cigarettes    Smokeless tobacco: Never Used   Vaping Use    Vaping Use: Never used   Substance and Sexual Activity    Alcohol use: No    Drug use: No    Sexual activity: Not Currently   Other Topics Concern    Not on file   Social History Narrative    Not on file     Social Determinants of Health     Financial Resource Strain:     Difficulty of Paying Living Expenses: Not on file   Food Insecurity:     Worried About Running Out of Food in the Last Year: Not on file    Tiny of Food in the Last Year: Not on file   Transportation Needs:     Lack of Transportation (Medical): Not on file    Lack of Transportation (Non-Medical):  Not on file   Physical Activity:     Days of Exercise per Week: Not on file    Minutes of Exercise per Session: Not on file   Stress:     Feeling of Stress : Not on file   Social Connections:     Frequency of Communication with Friends and Family: Not on file    Frequency of Social Gatherings with Friends and Family: Not on file    Attends Temple Services: Not on file    Active Member of Clubs or Organizations: Not on file    Attends Club or Organization Meetings: Not on file    Marital Status: Not on file   Intimate Partner Violence:     Fear of Current or Ex-Partner: Not on file    Emotionally Abused: Not on file    Physically Abused: Not on file    Sexually Abused: Not on file   Housing Stability:     Unable to Pay for Housing in the Last Year: Not on file    Number of Places Lived in the Last Year: Not on file    Unstable Housing in the Last Year: Not on file     Family History          Problem Relation Age of Onset    High Blood Pressure Mother      Sleep History    History of JORDAN   Meds    Current Medications    ipratropium-albuterol  1 ampule Inhalation TID    influenza virus vaccine  0.5 mL IntraMUSCular Prior to discharge    risperiDONE  1 mg Oral Daily    chlorhexidine  15 mL Mouth/Throat BID    calcium replacement protocol   Other RX Placeholder    budesonide  500 mcg Nebulization BID    pravastatin  40 mg Oral Nightly    risperiDONE  2 mg Oral Nightly    Tadalafil (PAH)  40 mg Oral Daily    sodium chloride flush  10 mL IntraVENous 2 times per day    enoxaparin  40 mg SubCUTAneous Daily    aspirin  325 mg Oral Daily    metoprolol tartrate  25 mg Oral BID    pantoprazole  40 mg IntraVENous BID     LORazepam, sodium chloride flush, sodium chloride, acetaminophen **OR** acetaminophen, albuterol  IV Drips/Infusions   sodium chloride       Vitals    Vitals    height is 6' (1.829 m) and weight is 189 lb (85.7 kg). His oral temperature is 97.8 °F (36.6 °C). His blood pressure is 122/80 and his pulse is 74. His respiration is 20 and oxygen saturation is 98%.      O2 Flow Rate (L/min): 3 L/min  I/O    Intake/Output Summary (Last 24 hours) at 12/23/2021 1131  Last data filed at 12/23/2021 8889  Gross per 24 hour   Intake 3013 ml   Output 7400 ml   Net -4387 ml     Patient Vitals for the past 96 hrs (Last 3 readings):   Weight   12/23/21 0337 189 lb (85.7 kg)   12/22/21 0645 193 lb 6.4 oz (87.7 kg)   12/21/21 0645 206 lb 12.8 oz (93.8 kg)     Exam   Physical Exam   Constitutional: No distress on O2 per NC. Patient appears chronically ill. Head: Normocephalic and atraumatic. Mouth/Throat: Oropharynx is clear and moist. Poor dentition  Eyes: Conjunctivae are normal. Pupils are equal, round. No scleral icterus. Neck: Neck supple. No tracheal deviation present. Cardiovascular: S1 and S2 with no murmur. No peripheral edema  Pulmonary/Chest: Normal effort with bilateral air entry, clear breath sounds. No stridor. No respiratory distress. Patient exhibits no tenderness. Abdominal: Soft. Bowel sounds audible. No distension or tenderness to palp. Musculoskeletal: Moves all extremities  Neurological: Patient is alert and follows simple commands. Hx schizoprenia    Labs   ABG  Lab Results   Component Value Date    PH 7.34 12/23/2021    PO2 116 12/23/2021    PCO2 69 12/23/2021    HCO3 37 12/23/2021    O2SAT 98 12/23/2021     Lab Results   Component Value Date    IFIO2 45 12/23/2021    MODE CPAP/PS 12/23/2021    SETTIDVOL 480 05/30/2020    SETPEEP 6.0 12/23/2021     CBC  Recent Labs     12/22/21  0858   WBC 5.9   RBC 5.22   HGB 15.7   HCT 50.9   MCV 97.5*   MCH 30.1   MCHC 30.8*      MPV 9.7      BMP  Recent Labs     12/20/21  1902 12/22/21  0858   NA  --  139   K  --  4.2   CL  --  99   CO2  --  32   BUN  --  11   CREATININE  --  0.6   GLUCOSE  --  87   MG 2.0  --    CALCIUM  --  9.0     LFT  No results for input(s): AST, ALT, ALB, BILITOT, ALKPHOS, LIPASE in the last 72 hours. Invalid input(s):   AMYLASE  TROP  Lab Results   Component Value Date    TROPONINT < 0.010 12/11/2021    TROPONINT < 0.010 12/11/2021    TROPONINT 0.029 05/17/2020     BNP  Lab Results   Component Value Date    PROBNP 2758.0 12/11/2021    PROBNP 688.7 06/06/2020    PROBNP 550.9 05/26/2020     D-Dimer  Lab Results   Component Value Date    DDIMER 630.00 12/11/2021     Lactic Acid  No results for input(s): LACTA in the last 72 hours. INR  No results for input(s): INR, PROTIME in the last 72 hours. PTT  No results for input(s): APTT in the last 72 hours. Glucose  No results for input(s): POCGLU in the last 72 hours. UA No results for input(s): SPECGRAV, PHUR, COLORU, CLARITYU, MUCUS, PROTEINU, BLOODU, RBCUA, WBCUA, BACTERIA, NITRU, GLUCOSEU, BILIRUBINUR, UROBILINOGEN, KETUA, LABCAST, LABCASTTY, AMORPHOS in the last 72 hours. Invalid input(s): CRYSTALS. PFTs     Echo     Summary   Left ventricular size is normal and systolic function is mildly reduced. Ejection fraction was estimated at 50-55%. LV wall thickness is within   normal limits. Intraventricular septal wall compression during systole suggestive of RV   pressure overload. Markedly enlarged right atrium size. The right ventricle was not clearly visualized. Appears dilated. RVSP of 55-60 mm Hg consistent with moderate pulmonary hypertension. Small circumferential pericardial effusion without evidence of tamponade   physiology. IVC size is dilated with <50% respiratory collapse. Cultures    Procalcitonin  Lab Results   Component Value Date    PROCAL 0.17 12/11/2021    PROCAL 0.15 05/18/2020    PROCAL 0.15 05/17/2020     Radiology    CXR    XR CHEST PORTABLE   12/14/2021   FINDINGS:  There is an endotracheal tube present with the tip overlying the midtrachea approximately 1.7 cm above the leny. There is an enteric tube coursing below the diaphragm, the distal portions of which are excluded from the current examination. There is moderate enlargement of the cardiomediastinal silhouette. There are patchy airspace opacities overlying the right mid and lower lung zones as well as the medial left lung base which likely represents multifocal pneumonia.  Mild hazy opacity at the right lung base may also represent a small right pleural effusion. No pneumothorax is seen. Impression:  1. There are patchy airspace opacities overlying the right mid and lower lung zones as well as the medial left lung base which likely represents multifocal pneumonia. 2. Mild hazy opacity at the right lung base may also represent a small right pleural effusion. CT Scans    CTA CHEST W WO CONTRAST   12/12/2021   1. No evidence of pulmonary and less. 2. Consolidation in the right upper lobe at the perihilar region extending to the major fissure may represent pneumonia superimposed on the patient's interstitial process. 3. Worsening interstitial disease compared to 5/24/2020.   4. Mediastinal and right hilar adenopathy as worsened compared to prior CT. (See actual reports for details)    Assessment   -Acute on chronic hypoxic respiratory failure s/p Extubation 12/15/21  -COPD exacerbation   -RLL pneumonia- Aspiration   -CHFrEF 45%  -JORDAN   -Secondary pulmonary hypertension  -Schizophrenia  Recommendations   -Monitor SpO2 wean supplemental O2 to maintain SpO2 >90%  -Completed 5 days azithromycin and completed 8 days rocephin   -prednisone 40 mg PO Daily completed  -Continue Duonebs Q4 hrs WA with metaneb   -tobacco cessation discussed   -Concern for readmission due to questionable compliance with medical recommendations  -Aspiration precautions-SLP following currently on pureed diet  -Will change BiPAP 22/6 with BUR of 18  -advised RN need to have family bring in home PAP device to obtain previous settings no records available as of yet on previous sleep history  -Avoid all sedative medications if patient is drowsy  -Aspiration precautions  -Order to obtain medical records from previous sleep study placed   -We will repeat CT scan of chest with IV contrast in 3 months to follow his pneumonia along with mediastinal  lymphadenopathy.   Will arrange for follow-up with Dr. Roland Cheng Lisa Alvarez MD in 3 months with above CT scan of chest    Case discussed with nurse and patient/family. Questions and concerns addressed. Meds and Orders reviewed. Electronically signed by   SAVANNAH Iyer CNP on 12/23/2021 at 11:31 AM     Addendum     Unable to obtain further information from home PAP device given presentation and history of COPD with respiratory failure presenting this admission in hypoxic and hypercapnic failure will place orders for noninvasive vent     -Due to the patient's PMH of Chronic respiratory failure secondary to Chronic Obstructive Pulmonary Disease the patient is being evaluated for non-invasive ventilator therapy, the patient has a medical necessity for this therapy due to progression of their disease state to a severe and/or potentially life threatening level. The ventilator is required to decrease work of breathing and improve pulmonary status. If the patient does not receive ventilator support it could lead to further exacerbations which can be potentially life threatening due to the severity of the disease and repeated hospitalizations. Last ABG PCO >52 at 57 previously 69    Last bedside spirometry showing severe obstruction          NIV Astral PS min 12 and PS max 18 EPAP 6 rate 18    Home O2 eval completed     A home oxygen evaluation has been completed.      [x]? Patient is an inpatient. It is expected that the patient will be discharged within the next 48 hours. Qualified provider to write order for home prescription if patient qualifies. Social service/care managers will arrange for home oxygen. If patient is active, arrange for Home Medical supplier to assess for Oxygen Conserving Device per pulse oximetry. []? Patient is an outpatient. Results will be faxed to the ordering provider. Qualified provider to write order for home prescription if patient qualifies and arranges for home oxygen.     Patient was placed on room air for 10 minutes.  SpO2 was 85 % on room air at rest. Patients SpO2 was below 89% and qualified for home oxygen. Oxygen was applied at 1 lpm via nasal cannula to maintain a SpO2 between 90-92% while at rest. Actual SpO2 was 90-91 %. Patient can ambulate for exercise flow rate. Patients was ambulated, SpO2 was 90-91% on 3 lpm to maintain SpO2 between 90-92% while exercising.        Note: For any SpO2 at 36% see policy and procedure for possible qualifications. DME for 1 LPM at rest and sleep and 3 LPM exertion     Electronically signed by SAVANNAH Deleon CNP on 12/23/2021 at 4:14 PM    Addendum by Dr. Rosalia Ojeda MD:  I have seen and examined the patient independently. Face to face evaluation and examination was performed. The above evaluation and note has been reviewed. Labs and radiographs were reviewed. I Have discussed with Mr. Dionne Foley CNP about this patient in detail. The above assessment and plan has been reviewed. Please see my modifications mentioned below. My modifications:  He remained on BiPAP    Lab Results   Component Value Date    PH 7.34 12/23/2021    PCO2 69 12/23/2021    PO2 116 12/23/2021    HCO3 37 12/23/2021    O2SAT 98 12/23/2021     Lab Results   Component Value Date    IFIO2 45 12/23/2021    MODE CPAP/PS 12/23/2021    SETTIDVOL 480 05/30/2020    SETPEEP 6.0 12/23/2021     Patient being evaluated for noninvasive ventilator with [facility] for his chronic respiratory failure.       Griselda Gandhi MD 12/23/2021 7:18 PM

## 2021-12-23 NOTE — CARE COORDINATION
Ga 15 day: 12     Barriers to Discharge: on BiPAP FiO2 45%, ABG's today,  Weaned to 3L/min with sats 98%. No fevers, med nebs, PPI, Prednisone, Risperdal. PT/OT. Patient Goals/Plan/Treatment Preferences: plans home with her mother and [facility] (therapy/nurse) and has home O2 thru [facility].

## 2021-12-23 NOTE — DISCHARGE INSTR - DIET
Good nutrition is important when healing from an illness, injury, or surgery. Follow any nutrition recommendations given to you during your hospital stay. If you were given an oral nutrition supplement while in the hospital, continue to take this supplement at home. You can take it with meals, in-between meals, and/or before bedtime. These supplements can be purchased at most local grocery stores, pharmacies, and chain [employer]-stores. If you have any questions about your diet or nutrition, call the hospital and ask for the dietitian. Dysphagia Level 1: Pureed Foods  The purpose of this diet is to provide foods that can be successfully and safely swallowed. This diet consists of foods that are easy to swallow because they are blended, whipped, or mashed until they are a pudding-like texture. Coarse textured foods such as raw fruits and vegetables or nuts should be avoided. All foods on this diet should be smooth and free of lumps. Because liquids may be difficult to swallow, they may require thickening. The diet may be adjusted to allow thickened or thin liquids, which are described below. A registered dietitian can individualize this diet to provide some favorite food items in a modified form. You will need to puree your foods until your registered dietitian, physician, or other health care professional advances the texture of your diet. Thickened Liquids  Thickened liquids may be purchased already mixed or made by adding commercial thickeners. Soups may be pureed in a  or strained to remove chunks or lumps, then thickened with flour, cornstarch, potato flakes, or commercial thickeners to a pudding consistency. Beverages such as milk, juices without pulp, coffee, tea, soda, carbonated beverages, alcoholic beverages, and nutritional supplements should be thickened to pudding consistency.    Frozen malts, yogurt, milk shakes, eggnog, nutritional supplements, ice cream, sherbet, plain regular or sugar-free gelatin or other foods that become thin liquid at body temperature (98°F) should be thickened to pudding consistency. Thin Liquids  Thin liquids include unthickened fruit juices, milk, coffee, tea, soda, carbonated beverages, alcoholic beverages, nutritional supplements, broth, and strained, unthickened soups. Frozen malts, yogurt, milk shakes, eggnog, nutritional supplements, ice cream, sherbet, plain regular or sugar-free gelatin or other foods that become thin liquid at body temperature (98°F) are thin liquids. Tips  Cooking and Preparation Tips  Add small amounts of gravy, sauce, vegetable juice or cooking water, fruit juice, milk, or half and half to foods, then puree them to the consistency of pudding. Add potato flakes or commercial thickeners to pureed foods that are too thin. Add dry milk powder to food to increase the calories and protein in that food. Prepare quantities of favorite food items and freeze them in portion sizes for use later. Reheat foods carefully so that a tough outer crust does not form on them. Foods Recommended  Food Group Foods Recommended   Grains Smooth cooked cereals such as farina and cream of wheat with small amounts of milk. Breads, rolls, crackers, pancakes, sweet rolls, [place] pastries, Western Nori toasts, muffins, well-cooked pasta, noodles, bread dressing, and rice that have been pureed to a pudding consistency. Vegetables Pureed vegetables, tomato sauce or tomato paste without seeds and seasoned as desired with butter, margarine, or oil. Mashed or pureed potatoes without skins seasoned with gravy, butter, margarine, or sour cream.   Fruits Pureed fruits, well-mashed fresh bananas or avocados. Milk and Milk Products Milk used to moisten foods, smooth puddings, custards, or yogurt. Meats and Other Protein Foods Pureed cooked meats, casseroles, Braunschweiger sausage, soufflés and other egg dishes.    Fats and Oils Butter, margarine, strained gravy, sour cream, mayonnaise, cream cheese, whipped topping, smooth sauces such as white sauce, cheese sauce, or hollandaise sauce. Foods Not Recommended  Food Group Foods Not Recommended   Grains Breads, rolls, crackers, biscuits, pancakes, waffles, Western Nori toast, muffins, and bread dressing, pasta, noodles, and rice that have not been pureed to pudding consistency. Dry cereals, oatmeal, or cooked cereals with lumps, seeds, or chunks. Vegetables Fresh, frozen, canned, or dried vegetables that have not been pureed. Tomatoes or tomato sauce with seeds. Fruits Whole fresh, frozen, canned, or dried fruits that have not been pureed. Watermelon with seeds. Milk and Milk Products Yogurt with pieces of fruits or nuts. Meat and Other Protein Foods Whole or ground meats, fish, or poultry. Dried or cooked lentils or legumes that have been cooked, but not mashed or pureed. Cheese, cottage cheese, or peanut butter unless incorporated into foods and pureed. Fried, scrambled, or hard-cooked eggs unless pureed. Fats and Oils All fats with coarse or chunky additives.      Pureed Foods Sample [place] Nutrient Info   Breakfast 1/2 cup orange juice, at prescribed consistency   1/2 cup farina   1/4 cup low-fat milk, for farina   1/2 teaspoon brown sugar, lump-free for farina   1 pureed, scrambled egg   1/2 muffin, blended or pureed   1 teaspoon butter, for muffin   1 cup smooth beverage (like milk or coffee)   Lunch 1/2 cup pureed tomato soup, made with milk   3 pureed saltine crackers   1/2 cup pureed meatloaf, with ketchup on top   1/2 cup mashed potatoes   1/4 cup gravy   1/2 cup pureed carrots and peas   1/2 cup vanilla pudding   1/2 cup pureed peaches   1 cup smooth beverage (like tea, coffee, or milk)   Evening Meal 1/2 cup pureed potato soup made with milk   3 pureed saltine crackers   1 cup pureed chicken noodle casserole   1/2 cup pureed green beans   1/2 cup pureed applesauce   6 oz smooth, whipped fruit-flavored yogurt   1 cup smooth beverage (like tea, coffee, or milk)

## 2021-12-23 NOTE — RT PROTOCOL NOTE
RT Inhaler-Nebulizer Bronchodilator Protocol Note    There is a bronchodilator order in the chart from a provider indicating to follow the RT Bronchodilator Protocol and there is an Initiate RT Inhaler-Nebulizer Bronchodilator Protocol order as well (see protocol at bottom of note). CXR Findings:  No results found. The findings from the last RT Protocol Assessment were as follows:   History Pulmonary Disease: Chronic pulmonary disease  Respiratory Pattern: Dyspnea on exertion or RR 21-25 bpm  Breath Sounds: Slightly diminished and/or crackles  Cough: Strong, productive  Indication for Bronchodilator Therapy: Decreased or absent breath sounds,On home bronchodilators  Bronchodilator Assessment Score: 7    Aerosolized bronchodilator medication orders have been revised according to the RT Inhaler-Nebulizer Bronchodilator Protocol below. Respiratory Therapist to perform RT Therapy Protocol Assessment initially then follow the protocol. Repeat RT Therapy Protocol Assessment PRN for score 0-3 or on second treatment, BID, and PRN for scores above 3. No Indications - adjust the frequency to every 6 hours PRN wheezing or bronchospasm, if no treatments needed after 48 hours then discontinue using Per Protocol order mode. If indication present, adjust the RT bronchodilator orders based on the Bronchodilator Assessment Score as indicated below. Use Inhaler orders unless patient has one or more of the following: on home nebulizer, not able to hold breath for 10 seconds, is not alert and oriented, cannot activate and use MDI correctly, or respiratory rate 25 breaths per minute or more, then use the equivalent nebulizer order(s) with same Frequency and PRN reasons based on the score. If a patient is on this medication at home then do not decrease Frequency below that used at home.     0-3 - enter or revise RT bronchodilator order(s) to equivalent RT Bronchodilator order with Frequency of every 4 hours PRN for wheezing or increased work of breathing using Per Protocol order mode. 4-6 - enter or revise RT Bronchodilator order(s) to two equivalent RT bronchodilator orders with one order with BID Frequency and one order with Frequency of every 4 hours PRN wheezing or increased work of breathing using Per Protocol order mode. 7-10 - enter or revise RT Bronchodilator order(s) to two equivalent RT bronchodilator orders with one order with TID Frequency and one order with Frequency of every 4 hours PRN wheezing or increased work of breathing using Per Protocol order mode. 11-13 - enter or revise RT Bronchodilator order(s) to one equivalent RT bronchodilator order with QID Frequency and an Albuterol order with Frequency of every 4 hours PRN wheezing or increased work of breathing using Per Protocol order mode. Greater than 13 - enter or revise RT Bronchodilator order(s) to one equivalent RT bronchodilator order with every 4 hours Frequency and an Albuterol order with Frequency of every 2 hours PRN wheezing or increased work of breathing using Per Protocol order mode. RT to enter RT Home Evaluation for COPD & MDI Assessment order using Per Protocol order mode.     Electronically signed by Antwan Chambers RCP on 12/23/2021 at 8:33 AM

## 2021-12-23 NOTE — CARE COORDINATION
Update: client needs TRILOGY; [facility] HME will set this up; collaborated w [place], Pulmonary, [place], [place]; updated [facility] covering  Electronically signed by Juanita Chaudhry RN on 12/23/2021 at 2:17 PM

## 2021-12-23 NOTE — PROGRESS NOTES
INTERNAL MEDICINE Progress Note  12/23/2021 12:28 PM  Subjective:   Admit Date: 12/11/2021  PCP: Sabrina Rousseau MD  Interval History:     Remains on 3L oxygen via NC  abg shows Improved hypercarbia    Objective:   Vitals: /75   Pulse 73   Temp 97.3 °F (36.3 °C) (Oral)   Resp 22   Ht 6' (1.829 m)   Wt 189 lb (85.7 kg)   SpO2 98%   BMI 25.63 kg/m²   General appearance: alert and cooperative with exam  HEENT:  Teeth: multiple caries and missing teeth  Neck: no JVD  Lungs: improved BS mateo  Heart: S1, S2 normal  Abdomen: soft, non-tender; bowel sounds normal; no masses,  no organomegaly  Extremities: no edema,   Neurologic:  alert, orientation: person, place, affect: normal,   thought content exhibits logical conclusions      Medications:   Scheduled Meds:   ipratropium-albuterol  1 ampule Inhalation TID    influenza virus vaccine  0.5 mL IntraMUSCular Prior to discharge    risperiDONE  1 mg Oral Daily    chlorhexidine  15 mL Mouth/Throat BID    calcium replacement protocol   Other RX Placeholder    budesonide  500 mcg Nebulization BID    pravastatin  40 mg Oral Nightly    risperiDONE  2 mg Oral Nightly    Tadalafil (PAH)  40 mg Oral Daily    sodium chloride flush  10 mL IntraVENous 2 times per day    enoxaparin  40 mg SubCUTAneous Daily    aspirin  325 mg Oral Daily    metoprolol tartrate  25 mg Oral BID    pantoprazole  40 mg IntraVENous BID     Continuous Infusions:   sodium chloride         Lab Results:   CBC:   Recent Labs     12/22/21  0858   WBC 5.9   HGB 15.7        BMP:    Recent Labs     12/22/21  0858      K 4.2   CL 99   CO2 32   BUN 11   CREATININE 0.6   GLUCOSE 87     Magnesium:    Lab Results   Component Value Date    MG 2.0 12/20/2021     HgBA1c:    Lab Results   Component Value Date    LABA1C 6.0 12/15/2019     TSH:    Lab Results   Component Value Date    TSH 0.909 12/11/2021       FOLATE:    Lab Results   Component Value Date    FOLATE 9.0 12/08/2016 FERRITIN:    Lab Results   Component Value Date    FERRITIN 344 12/11/2021          Ref. Range 12/22/2021 06:39 12/23/2021 06:44 12/23/2021 10:54   Source: Unknown R Radial R Radial    pH, Blood Gas Latest Ref Range: 7.35 - 7.45  7.32 (L) 7.34 (L)    PCO2 Latest Ref Range: 35 - 45 mmhg 72 (HH) 69 (H)    pO2 Latest Ref Range: 71 - 104 mmhg 48 (L) 116 (H)    HCO3 Latest Ref Range: 23 - 28 mmol/l 37 (H) 37 (H)    Base Excess (Calculated) Latest Ref Range: -2.5 - 2.5 mmol/l 7.2 (H) 7.7 (H)    O2 Sat Latest Units: % 78 98    Nicholas Test Unknown Positive Positive    IFIO2 Unknown 40 45    PCO2, MIXED VENOUS Latest Ref Range: 41 - 51 mmhg   57 (H)   PO2, Mixed Latest Ref Range: 25 - 40 mmhg   49 (H)   HCO3, Mixed Latest Ref Range: 23 - 28 mmol/l   36 (H)   Base Exc, Mixed Latest Ref Range: -2.0 - 3.0 mmol/l   8.5 (H)   O2 Sat, Mixed Latest Units: %   83   FIO2, MIXED VENOUS Unknown   3   PH MIXED Latest Ref Range: 7.31 - 7.41    7.41     Assessment and Plan:   1. Acute on chronic hypoxemic / hypercapnic resp failure  2. COPD with acute exac  3. Pneumonia, completed a/biotics  4. Mod PAH  5. HTN-systemic  6. Schizophrenia  7. Chronic nicotine abuse    Cont Oxygen, home bipap  pulm service adjusted bipap settings  Cont  Tadalafil  Risperidol, Statin  Stable for dc home with [facility]HS.     Sabrina Rousseau MD, MD

## 2021-12-23 NOTE — PROGRESS NOTES
A home oxygen evaluation has been completed. [x]Patient is an inpatient. It is expected that the patient will be discharged within the next 48 hours. Qualified provider to write order for home prescription if patient qualifies. Social service/care managers will arrange for home oxygen. If patient is active, arrange for Home Medical supplier to assess for Oxygen Conserving Device per pulse oximetry. []Patient is an outpatient. Results will be faxed to the ordering provider. Qualified provider to write order for home prescription if patient qualifies and arranges for home oxygen. Patient was placed on room air for 10 minutes. SpO2 was 85 % on room air at rest. Patients SpO2 was below 89% and qualified for home oxygen. Oxygen was applied at 1 lpm via nasal cannula to maintain a SpO2 between 90-92% while at rest. Actual SpO2 was 90-91 %. Patient can ambulate for exercise flow rate. Patients was ambulated, SpO2 was 90-91% on 3 lpm to maintain SpO2 between 90-92% while exercising. Note: For any SpO2 at 36% see policy and procedure for possible qualifications. doing well  good graft function and improving liver functions  immunosuppression tacro, mmf and steroids

## 2021-12-27 ENCOUNTER — TELEPHONE (OUTPATIENT)
Dept: PULMONOLOGY | Age: 51
End: 2021-12-27

## 2021-12-27 NOTE — TELEPHONE ENCOUNTER
Talked to [facility] at [facility], they did receive the order for the home vent. RT instructed pt's care givers two days in a row on how to use and operate equipment.

## 2022-04-18 ENCOUNTER — TELEPHONE (OUTPATIENT)
Dept: PULMONOLOGY | Age: 52
End: 2022-04-18

## 2022-04-18 NOTE — TELEPHONE ENCOUNTER
Pt. Missed his appointment on 3/29/2022. NIV was ordered during his hospitalization when he was discharged from [facility] in December 2021 at that time CM noted it was provided through [facility]. Inform family that we can reschedule patient to be seen and if they have received any other communication regarding his machine to let us know. Per chart review last download was in January 2022 and he was using his machine >4 hrs 17 days or 57%.      Let me know if the have any further questions

## 2022-04-18 NOTE — TELEPHONE ENCOUNTER
Patients sister called in and is requesting patient to be seen, she states he will lose his machine, you haven't even seen the patient yet. Sister was getting frustrated with me, I was asking who put him on the machine and where he gets his downloads at and she couldn't tell me anything. He is scheduled with you on May 2, 2022 just wanted to give you a heads up.

## 2022-04-30 ENCOUNTER — HOSPITAL ENCOUNTER (INPATIENT)
Age: 52
LOS: 5 days | Discharge: HOME HEALTH | DRG: 193 | End: 2022-05-05
Attending: INTERNAL MEDICINE | Admitting: INTERNAL MEDICINE
Payer: MEDICARE

## 2022-04-30 ENCOUNTER — APPOINTMENT (OUTPATIENT)
Dept: GENERAL RADIOLOGY | Age: 52
DRG: 193 | End: 2022-04-30
Payer: MEDICARE

## 2022-04-30 DIAGNOSIS — J18.9 PNEUMONIA OF BOTH LUNGS DUE TO INFECTIOUS ORGANISM, UNSPECIFIED PART OF LUNG: Primary | ICD-10-CM

## 2022-04-30 PROBLEM — J15.9 COMMUNITY ACQUIRED BACTERIAL PNEUMONIA: Status: ACTIVE | Noted: 2022-04-30

## 2022-04-30 LAB
ALBUMIN SERPL-MCNC: 3.6 G/DL (ref 3.5–5.1)
ALP BLD-CCNC: 65 U/L (ref 38–126)
ALT SERPL-CCNC: 6 U/L (ref 11–66)
ANION GAP SERPL CALCULATED.3IONS-SCNC: 11 MEQ/L (ref 8–16)
AST SERPL-CCNC: 10 U/L (ref 5–40)
BASE EXCESS MIXED: 1.4 MMOL/L (ref -2–3)
BASOPHILS # BLD: 0.2 %
BASOPHILS ABSOLUTE: 0 THOU/MM3 (ref 0–0.1)
BILIRUB SERPL-MCNC: 0.2 MG/DL (ref 0.3–1.2)
BUN BLDV-MCNC: 17 MG/DL (ref 7–22)
CALCIUM SERPL-MCNC: 9.1 MG/DL (ref 8.5–10.5)
CHLORIDE BLD-SCNC: 101 MEQ/L (ref 98–111)
CO2: 25 MEQ/L (ref 23–33)
COLLECTED BY:: NORMAL
CREAT SERPL-MCNC: 1 MG/DL (ref 0.4–1.2)
DEVICE: NORMAL
EOSINOPHIL # BLD: 0.1 %
EOSINOPHILS ABSOLUTE: 0 THOU/MM3 (ref 0–0.4)
ERYTHROCYTE [DISTWIDTH] IN BLOOD BY AUTOMATED COUNT: 14 % (ref 11.5–14.5)
ERYTHROCYTE [DISTWIDTH] IN BLOOD BY AUTOMATED COUNT: 51.9 FL (ref 35–45)
FIO2, MIXED VENOUS: 6
FLU A ANTIGEN: NEGATIVE
FLU B ANTIGEN: NEGATIVE
GFR SERPL CREATININE-BSD FRML MDRD: > 90 ML/MIN/1.73M2
GLUCOSE BLD-MCNC: 84 MG/DL (ref 70–108)
HCO3, MIXED: 27 MMOL/L (ref 23–28)
HCT VFR BLD CALC: 39.4 % (ref 42–52)
HEMOGLOBIN: 12.2 GM/DL (ref 14–18)
IMMATURE GRANS (ABS): 0.03 THOU/MM3 (ref 0–0.07)
IMMATURE GRANULOCYTES: 0.4 %
LYMPHOCYTES # BLD: 6.5 %
LYMPHOCYTES ABSOLUTE: 0.5 THOU/MM3 (ref 1–4.8)
MCH RBC QN AUTO: 31 PG (ref 26–33)
MCHC RBC AUTO-ENTMCNC: 31 GM/DL (ref 32.2–35.5)
MCV RBC AUTO: 100.3 FL (ref 80–94)
MONOCYTES # BLD: 7.8 %
MONOCYTES ABSOLUTE: 0.6 THOU/MM3 (ref 0.4–1.3)
NUCLEATED RED BLOOD CELLS: 0 /100 WBC
O2 SAT, MIXED: 48 %
OSMOLALITY CALCULATION: 274.6 MOSMOL/KG (ref 275–300)
PCO2, MIXED VENOUS: 44 MMHG (ref 41–51)
PH, MIXED: 7.39 (ref 7.31–7.41)
PLATELET # BLD: 155 THOU/MM3 (ref 130–400)
PMV BLD AUTO: 9.6 FL (ref 9.4–12.4)
PO2 MIXED: 27 MMHG (ref 25–40)
POTASSIUM SERPL-SCNC: 4.3 MEQ/L (ref 3.5–5.2)
PRO-BNP: 193.6 PG/ML (ref 0–900)
PROCALCITONIN: 0.6 NG/ML (ref 0.01–0.09)
RBC # BLD: 3.93 MILL/MM3 (ref 4.7–6.1)
SARS-COV-2, NAAT: NOT DETECTED
SEG NEUTROPHILS: 85 %
SEGMENTED NEUTROPHILS ABSOLUTE COUNT: 7.1 THOU/MM3 (ref 1.8–7.7)
SODIUM BLD-SCNC: 137 MEQ/L (ref 135–145)
TOTAL PROTEIN: 7.3 G/DL (ref 6.1–8)
TROPONIN T: < 0.01 NG/ML
WBC # BLD: 8.3 THOU/MM3 (ref 4.8–10.8)

## 2022-04-30 PROCEDURE — 87541 LEGION PNEUMO DNA AMP PROB: CPT

## 2022-04-30 PROCEDURE — 1200000000 HC SEMI PRIVATE

## 2022-04-30 PROCEDURE — 84484 ASSAY OF TROPONIN QUANT: CPT

## 2022-04-30 PROCEDURE — 87631 RESP VIRUS 3-5 TARGETS: CPT

## 2022-04-30 PROCEDURE — 2580000003 HC RX 258: Performed by: INTERNAL MEDICINE

## 2022-04-30 PROCEDURE — 84145 PROCALCITONIN (PCT): CPT

## 2022-04-30 PROCEDURE — 36415 COLL VENOUS BLD VENIPUNCTURE: CPT

## 2022-04-30 PROCEDURE — 83880 ASSAY OF NATRIURETIC PEPTIDE: CPT

## 2022-04-30 PROCEDURE — 99285 EMERGENCY DEPT VISIT HI MDM: CPT

## 2022-04-30 PROCEDURE — 6360000002 HC RX W HCPCS: Performed by: INTERNAL MEDICINE

## 2022-04-30 PROCEDURE — 6370000000 HC RX 637 (ALT 250 FOR IP): Performed by: INTERNAL MEDICINE

## 2022-04-30 PROCEDURE — 87635 SARS-COV-2 COVID-19 AMP PRB: CPT

## 2022-04-30 PROCEDURE — 93005 ELECTROCARDIOGRAM TRACING: CPT | Performed by: NURSE PRACTITIONER

## 2022-04-30 PROCEDURE — 87486 CHLMYD PNEUM DNA AMP PROBE: CPT

## 2022-04-30 PROCEDURE — 94761 N-INVAS EAR/PLS OXIMETRY MLT: CPT

## 2022-04-30 PROCEDURE — 87798 DETECT AGENT NOS DNA AMP: CPT

## 2022-04-30 PROCEDURE — 71046 X-RAY EXAM CHEST 2 VIEWS: CPT

## 2022-04-30 PROCEDURE — 82803 BLOOD GASES ANY COMBINATION: CPT

## 2022-04-30 PROCEDURE — 2700000000 HC OXYGEN THERAPY PER DAY

## 2022-04-30 PROCEDURE — 2500000003 HC RX 250 WO HCPCS: Performed by: INTERNAL MEDICINE

## 2022-04-30 PROCEDURE — 80053 COMPREHEN METABOLIC PANEL: CPT

## 2022-04-30 PROCEDURE — 87040 BLOOD CULTURE FOR BACTERIA: CPT

## 2022-04-30 PROCEDURE — 85025 COMPLETE CBC W/AUTO DIFF WBC: CPT

## 2022-04-30 PROCEDURE — 87581 M.PNEUMON DNA AMP PROBE: CPT

## 2022-04-30 PROCEDURE — 94640 AIRWAY INHALATION TREATMENT: CPT

## 2022-04-30 PROCEDURE — 87804 INFLUENZA ASSAY W/OPTIC: CPT

## 2022-04-30 RX ORDER — SODIUM CHLORIDE 0.9 % (FLUSH) 0.9 %
5-40 SYRINGE (ML) INJECTION PRN
Status: DISCONTINUED | OUTPATIENT
Start: 2022-04-30 | End: 2022-05-05 | Stop reason: HOSPADM

## 2022-04-30 RX ORDER — ALBUTEROL SULFATE 2.5 MG/3ML
2.5 SOLUTION RESPIRATORY (INHALATION)
Status: DISCONTINUED | OUTPATIENT
Start: 2022-04-30 | End: 2022-05-03

## 2022-04-30 RX ORDER — CARBAMAZEPINE 200 MG/1
200 TABLET ORAL 2 TIMES DAILY
Status: DISCONTINUED | OUTPATIENT
Start: 2022-04-30 | End: 2022-05-05 | Stop reason: HOSPADM

## 2022-04-30 RX ORDER — SODIUM CHLORIDE 0.9 % (FLUSH) 0.9 %
5-40 SYRINGE (ML) INJECTION EVERY 12 HOURS SCHEDULED
Status: DISCONTINUED | OUTPATIENT
Start: 2022-04-30 | End: 2022-05-05 | Stop reason: HOSPADM

## 2022-04-30 RX ORDER — PRAVASTATIN SODIUM 40 MG
40 TABLET ORAL NIGHTLY
Status: DISCONTINUED | OUTPATIENT
Start: 2022-04-30 | End: 2022-05-05 | Stop reason: HOSPADM

## 2022-04-30 RX ORDER — RISPERIDONE 1 MG/1
1 TABLET, FILM COATED ORAL 2 TIMES DAILY
Status: DISCONTINUED | OUTPATIENT
Start: 2022-04-30 | End: 2022-04-30 | Stop reason: DRUGHIGH

## 2022-04-30 RX ORDER — ACETAMINOPHEN 325 MG/1
650 TABLET ORAL EVERY 6 HOURS PRN
Status: DISCONTINUED | OUTPATIENT
Start: 2022-04-30 | End: 2022-05-05 | Stop reason: HOSPADM

## 2022-04-30 RX ORDER — POLYETHYLENE GLYCOL 3350 17 G/17G
17 POWDER, FOR SOLUTION ORAL DAILY PRN
Status: DISCONTINUED | OUTPATIENT
Start: 2022-04-30 | End: 2022-05-05 | Stop reason: HOSPADM

## 2022-04-30 RX ORDER — ONDANSETRON 2 MG/ML
4 INJECTION INTRAMUSCULAR; INTRAVENOUS EVERY 6 HOURS PRN
Status: DISCONTINUED | OUTPATIENT
Start: 2022-04-30 | End: 2022-05-05 | Stop reason: HOSPADM

## 2022-04-30 RX ORDER — RISPERIDONE 1 MG/1
2 TABLET, FILM COATED ORAL 2 TIMES DAILY
Status: DISCONTINUED | OUTPATIENT
Start: 2022-04-30 | End: 2022-05-05 | Stop reason: HOSPADM

## 2022-04-30 RX ORDER — BUDESONIDE 0.5 MG/2ML
500 INHALANT ORAL 2 TIMES DAILY
Status: DISCONTINUED | OUTPATIENT
Start: 2022-04-30 | End: 2022-05-03

## 2022-04-30 RX ORDER — ENOXAPARIN SODIUM 100 MG/ML
40 INJECTION SUBCUTANEOUS DAILY
Status: DISCONTINUED | OUTPATIENT
Start: 2022-04-30 | End: 2022-05-05 | Stop reason: HOSPADM

## 2022-04-30 RX ORDER — ACETAMINOPHEN 650 MG/1
650 SUPPOSITORY RECTAL EVERY 6 HOURS PRN
Status: DISCONTINUED | OUTPATIENT
Start: 2022-04-30 | End: 2022-05-05 | Stop reason: HOSPADM

## 2022-04-30 RX ORDER — ONDANSETRON 4 MG/1
4 TABLET, ORALLY DISINTEGRATING ORAL EVERY 8 HOURS PRN
Status: DISCONTINUED | OUTPATIENT
Start: 2022-04-30 | End: 2022-05-05 | Stop reason: HOSPADM

## 2022-04-30 RX ORDER — SODIUM CHLORIDE 9 MG/ML
INJECTION, SOLUTION INTRAVENOUS CONTINUOUS
Status: DISCONTINUED | OUTPATIENT
Start: 2022-04-30 | End: 2022-05-01

## 2022-04-30 RX ORDER — IPRATROPIUM BROMIDE AND ALBUTEROL SULFATE 2.5; .5 MG/3ML; MG/3ML
3 SOLUTION RESPIRATORY (INHALATION) 4 TIMES DAILY
Status: DISCONTINUED | OUTPATIENT
Start: 2022-04-30 | End: 2022-05-03

## 2022-04-30 RX ORDER — AZITHROMYCIN 250 MG/1
500 TABLET, FILM COATED ORAL EVERY 24 HOURS
Status: COMPLETED | OUTPATIENT
Start: 2022-04-30 | End: 2022-05-02

## 2022-04-30 RX ORDER — SODIUM CHLORIDE 9 MG/ML
INJECTION, SOLUTION INTRAVENOUS PRN
Status: DISCONTINUED | OUTPATIENT
Start: 2022-04-30 | End: 2022-05-05 | Stop reason: HOSPADM

## 2022-04-30 RX ORDER — TADALAFIL 20 MG/1
40 TABLET ORAL DAILY
Status: DISCONTINUED | OUTPATIENT
Start: 2022-05-01 | End: 2022-05-05 | Stop reason: HOSPADM

## 2022-04-30 RX ADMIN — IPRATROPIUM BROMIDE AND ALBUTEROL SULFATE 3 ML: .5; 3 SOLUTION RESPIRATORY (INHALATION) at 19:46

## 2022-04-30 RX ADMIN — RISPERIDONE 2 MG: 1 TABLET ORAL at 20:23

## 2022-04-30 RX ADMIN — CEFTRIAXONE SODIUM 1000 MG: 10 INJECTION, POWDER, FOR SOLUTION INTRAVENOUS at 15:34

## 2022-04-30 RX ADMIN — CARBAMAZEPINE 200 MG: 200 TABLET ORAL at 17:15

## 2022-04-30 RX ADMIN — IPRATROPIUM BROMIDE AND ALBUTEROL SULFATE 3 ML: .5; 3 SOLUTION RESPIRATORY (INHALATION) at 16:09

## 2022-04-30 RX ADMIN — ENOXAPARIN SODIUM 40 MG: 100 INJECTION SUBCUTANEOUS at 15:14

## 2022-04-30 RX ADMIN — AZITHROMYCIN MONOHYDRATE 500 MG: 250 TABLET ORAL at 15:34

## 2022-04-30 RX ADMIN — PRAVASTATIN SODIUM 40 MG: 40 TABLET ORAL at 20:23

## 2022-04-30 RX ADMIN — SODIUM CHLORIDE: 9 INJECTION, SOLUTION INTRAVENOUS at 14:43

## 2022-04-30 RX ADMIN — ACETAMINOPHEN 650 MG: 325 TABLET ORAL at 20:23

## 2022-04-30 RX ADMIN — BUDESONIDE 500 MCG: 0.5 INHALANT RESPIRATORY (INHALATION) at 17:03

## 2022-04-30 RX ADMIN — ASPIRIN 325 MG: 325 TABLET, COATED ORAL at 15:14

## 2022-04-30 ASSESSMENT — ENCOUNTER SYMPTOMS
SHORTNESS OF BREATH: 1
DIARRHEA: 0
CHEST TIGHTNESS: 0
VOMITING: 0
COUGH: 0
NAUSEA: 0

## 2022-04-30 ASSESSMENT — PAIN DESCRIPTION - ORIENTATION: ORIENTATION: MID

## 2022-04-30 ASSESSMENT — PAIN SCALES - GENERAL
PAINLEVEL_OUTOF10: 0
PAINLEVEL_OUTOF10: 8
PAINLEVEL_OUTOF10: 0

## 2022-04-30 ASSESSMENT — PAIN DESCRIPTION - DESCRIPTORS: DESCRIPTORS: STABBING

## 2022-04-30 ASSESSMENT — PAIN DESCRIPTION - LOCATION: LOCATION: CHEST;BACK

## 2022-04-30 ASSESSMENT — PAIN - FUNCTIONAL ASSESSMENT: PAIN_FUNCTIONAL_ASSESSMENT: 0-10

## 2022-04-30 NOTE — H&P
Internal Medicine  History and Physical    Patient: Manohar Rawls  MRN: [id number]      History Obtained From:  patient  PCP: Massimo Morton MD    CHIEF COMPLAINT:  Cough, SOB    HISTORY OF PRESENT ILLNESS:   The patient is a 46 y.o. male who presents with worsening cough and shortness of breath. Symptoms started on the day of presentation. Patient is a chronic smoker and history of COPD. He reports a dry cough. Admitted to [facility]. Denies any fever denies any chills. Account of the worsening shortness of breath he presented to emergency room. He had evaluation with a chest x-ray, cxr showed Interstitial alveolar infiltrates right upper and lower lobes and probably left base. He was admitted started on antibiotics. Past Medical History:        Diagnosis Date    Acute on chronic systolic congestive heart failure ([facility] [address] 4/4/2019    Emphysema (subcutaneous) (surgical) resulting from a procedure     Hypertension     Pneumonia     Schizophrenia ([facility] [address]        Past Surgical History:    History reviewed. No pertinent surgical history. Medications Prior to Admission:    Prior to Admission medications    Medication Sig Start Date End Date Taking? Authorizing Provider   Tadalafil, PAH, 20 MG tablet Take 2 tablets by mouth daily 12/23/21   Massimo Morton MD   Spacer/Aero-Holding Chambers CAREN 1 Device by Does not apply route daily 12/23/21   Massimo Morton MD   risperiDONE (RISPERDAL) 1 MG tablet Take 1 tablet by mouth in the morning and 2 tablets at night.  12/23/21   Massimo Morton MD   albuterol (PROVENTIL) (2.5 MG/3ML) 0.083% nebulizer solution Take 3 mLs by nebulization every 2 hours as needed for Wheezing 12/23/21   Elijah Diaz MD   budesonide (PULMICORT) 0.5 MG/2ML nebulizer suspension Take 2 mLs by nebulization 2 times daily 12/23/21   Massimo Morton MD   ipratropium-albuterol (DUONEB) 0.5-2.5 (3) MG/3ML SOLN nebulizer solution Inhale 3 mLs into the lungs every 6 hours as needed for Shortness of Breath 12/23/21   Bebeto Haynes MD   umeclidinium-vilanterol (ANORO ELLIPTA) 62.5-25 MCG/INH AEPB inhaler Inhale 1 puff into the lungs daily 12/23/21   Bebeto Haynes MD   aspirin 325 MG EC tablet Take 1 tablet by mouth daily 12/17/19   Bebeto Haynes MD   pravastatin (PRAVACHOL) 40 MG tablet Take 40 mg by mouth nightly    Historical Provider, MD   metoprolol succinate (TOPROL XL) 25 MG extended release tablet Take 1 tablet by mouth daily 3/6/17   Bebeto Haynes MD   carBAMazepine (TEGRETOL) 200 MG tablet Take 200 mg by mouth 2 times daily    Historical Provider, MD       Allergies:  Patient has no known allergies. Social History:   TOBACCO:   reports that he has been smoking cigarettes. He has a 20.00 pack-year smoking history. He has never used smokeless tobacco.  ETOH:   reports no history of alcohol use.       Family History:       Problem Relation Age of Onset    High Blood Pressure Mother        REVIEW OF SYSTEMS:  CONSTITUTIONAL:  positive for  fatigue and malaise  negative for  fevers and chills  EYES:  negative for  irritation and redness  HEENT:  negative for  sore mouth, sore throat and hoarseness  RESPIRATORY:  positive for  dry cough, dyspnea and wheezing  negative for  hemoptysis and chest pain  CARDIOVASCULAR:  negative for  palpitations, orthopnea, PND  GASTROINTESTINAL:  negative for nausea, vomiting and change in bowel habits  GENITOURINARY:  negative for frequency and dysuria  INTEGUMENT/BREAST:  negative  HEMATOLOGIC/LYMPHATIC:  negative for easy bruising and bleeding  ALLERGIC/IMMUNOLOGIC:  negative  ENDOCRINE:  negative for heat intolerance and cold intolerance  MUSCULOSKELETAL:  negative for  myalgias and arthralgias  NEUROLOGICAL:  positive for memory problems and weakness  negative for headaches, dizziness and seizures  BEHAVIOR/PSYCH:  positive for decreased energy level and fatigue and negative for increased agitation    Physical Exam:    Vitals: /68   Pulse 82   Temp 98.2 °F (36.8 °C) (Oral)   Resp 18   Ht 5' 10\" (1.778 m)   Wt 200 lb (90.7 kg)   SpO2 (!) 88%   BMI 28.70 kg/m²   CONSTITUTIONAL:  awake, alert, cooperative, no apparent distress, appears stated age  EYES:  extra-ocular muscles intact  ENT:  atraumatic, multiple carries  NECK:  supple, symmetrical, trachea midline  HEMATOLOGIC/LYMPHATICS:  no cervical lymphadenopathy and no supraclavicular lymphadenopathy  BACK:  symmetric  LUNGS:  tachypneic and diminished breath sounds throughout lungs  CARDIOVASCULAR:  normal S1 and S2 and no edema  ABDOMEN:  normal bowel sounds, soft, non-distended, non-tender and no hepatosplenomegally  CHEST/BREASTS:  no axillary or supraclavicular adenopathy  NEUROLOGIC:  Mental Status Exam:  Level of Alertness:   awake  Orientation:   person, place, time  Memory:   normal  Cranial Nerves:  cranial nerves II-XII are grossly intact  Motor Exam:  Motor exam is symmetrical 5 out of 5 all extremities bilaterally  SKIN:  no redness, warmth, or swelling      CBC:   Recent Labs     04/30/22  1044   WBC 8.3   HGB 12.2*        BMP:    Recent Labs     04/30/22  1044      K 4.3      CO2 25   BUN 17   CREATININE 1.0   GLUCOSE 84     Hepatic:   Recent Labs     04/30/22  1044   AST 10   ALT 6*   BILITOT 0.2*   ALKPHOS 65     Procalcitonin 0.60 High        -----------------------------------------------------------------  PA/lat CXR:     The heart size is normal.  The mediastinum is not widened.  Patchy interstitial and alveolar infiltrates right upper and lower lobe. Interstitial infiltrates or atelectasis left base. No definite effusion. Central pulmonary artery prominence suggesting   pulmonary arterial hypertension. No suspicious osseous lesions are present.       EKG leads overlie the chest.    Impression:     Interstitial alveolar infiltrates right upper and lower lobes and probably left base.         EKG  04/30/22 1012     Ventricular Rate 101 BPM    Atrial Rate 101 BPM    P-R Interval 146 ms    QRS Duration 92 ms    Q-T Interval 354 ms    QTc Calculation (Bazett) 459 ms    P Axis 48 degrees    R Axis 80 degrees    T Axis 51 degrees   Narrative:     Sinus tachycardia   Otherwise normal ECG   When compared with ECG of 11-DEC-2021 01:43,      04/30/22 1059    Specimen Source: Blood gases     PH MIXED 7.39    PCO2, MIXED VENOUS 44 mmhg    PO2, Mixed 27 mmhg    HCO3, Mixed 27 mmol/l    Base Exc, Mixed 1.4 mmol/l    O2 Sat, Mixed 48 %    FIO2, MIXED VENOUS 6    COLLECTED BY: [id number]    DEVICE Cannula         Assessment and Plan   1. CAP  2. Acute on chronic hypoxemic resp failure  3. COPD exac  4. Chronic nicotine abuse  5. schizophrenia    Cont IV rocephin, zithromax  Bronchodilators  Oxygen  Resume risperidol  Lovenox.     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

## 2022-04-30 NOTE — ED PROVIDER NOTES
[facility] Emergency Department    CHIEF COMPLAINT       Chief Complaint   Patient presents with    Shortness of Breath       Nurses Notes reviewed and I agree except as noted in the HPI. HISTORY OF PRESENT ILLNESS    Sultana Perdomo is a 46 y.o. male who presents to the ED for evaluation of shortness of breath. Patient notes symptoms began today. Note no fever cough. History of congestive heart failure, and pneumonia. Denies using cigarettes anymore. Unsure of any weight gain. Notes he is urinating with no difficulty. Unable to provide significant history due to shortness of breath at this time. HPI was provided by the patient. REVIEW OF SYSTEMS     Review of Systems   Unable to perform ROS: Acuity of condition   Constitutional: Negative for fever. Respiratory: Positive for shortness of breath. Negative for cough and chest tightness. Cardiovascular: Negative for chest pain and leg swelling. Gastrointestinal: Negative for diarrhea, nausea and vomiting. Genitourinary: Negative for decreased urine volume. PAST MEDICAL HISTORY     Past Medical History:   Diagnosis Date    Acute on chronic systolic congestive heart failure ([facility] [address] 4/4/2019    Emphysema (subcutaneous) (surgical) resulting from a procedure     Hypertension     Pneumonia     Schizophrenia ([facility] [address]        SURGICALHISTORY      has no past surgical history on file. CURRENT MEDICATIONS       Current Discharge Medication List      CONTINUE these medications which have NOT CHANGED    Details   Tadalafil, PAH, 20 MG tablet Take 2 tablets by mouth daily  Qty: 60 tablet, Refills: 3      Spacer/Aero-Holding Chambers CAREN 1 Device by Does not apply route daily  Qty: 1 each, Refills: 0      risperiDONE (RISPERDAL) 1 MG tablet Take 1 tablet by mouth in the morning and 2 tablets at night.   Qty: 90 tablet, Refills: 3      albuterol (PROVENTIL) (2.5 MG/3ML) 0.083% nebulizer solution Take 3 mLs by nebulization every 2 hours as needed for Wheezing  Qty: 120 each, Refills: 3      budesonide (PULMICORT) 0.5 MG/2ML nebulizer suspension Take 2 mLs by nebulization 2 times daily  Qty: 120 mL, Refills: 3      ipratropium-albuterol (DUONEB) 0.5-2.5 (3) MG/3ML SOLN nebulizer solution Inhale 3 mLs into the lungs every 6 hours as needed for Shortness of Breath  Qty: 360 mL, Refills: 2      umeclidinium-vilanterol (ANORO ELLIPTA) 62.5-25 MCG/INH AEPB inhaler Inhale 1 puff into the lungs daily  Qty: 1 each, Refills: 2      aspirin 325 MG EC tablet Take 1 tablet by mouth daily  Qty: 30 tablet, Refills: 3      pravastatin (PRAVACHOL) 40 MG tablet Take 40 mg by mouth nightly      metoprolol succinate (TOPROL XL) 25 MG extended release tablet Take 1 tablet by mouth daily  Qty: 30 tablet, Refills: 3      carBAMazepine (TEGRETOL) 200 MG tablet Take 200 mg by mouth 2 times daily             ALLERGIES     has No Known Allergies. FAMILY HISTORY     He indicated that his mother is alive. He indicated that his sister is alive. family history includes High Blood Pressure in his mother.     SOCIAL HISTORY       Social History     Socioeconomic History    Marital status: Single     Spouse name: Not on file    Number of children: 0    Years of education: Not on file    Highest education level: Not on file   Occupational History    Not on file   Tobacco Use    Smoking status: Current Every Day Smoker     Packs/day: 1.00     Years: 20.00     Pack years: 20.00     Types: Cigarettes    Smokeless tobacco: Never Used   Vaping Use    Vaping Use: Never used   Substance and Sexual Activity    Alcohol use: No    Drug use: No    Sexual activity: Not Currently   Other Topics Concern    Not on file   Social History Narrative    Not on file     Social Determinants of Health     Financial Resource Strain:     Difficulty of Paying Living Expenses: Not on file   Food Insecurity:     Worried About Running Out of Food in the Last Year: Not on file    Tiny of Food in the Last Year: Not on file   Transportation Needs:     Lack of Transportation (Medical): Not on file    Lack of Transportation (Non-Medical): Not on file   Physical Activity:     Days of Exercise per Week: Not on file    Minutes of Exercise per Session: Not on file   Stress:     Feeling of Stress : Not on file   Social Connections:     Frequency of Communication with Friends and Family: Not on file    Frequency of Social Gatherings with Friends and Family: Not on file    Attends Jew Services: Not on file    Active Member of [address] [place] [address] [place] or Organizations: Not on file    Attends Club or Organization Meetings: Not on file    Marital Status: Not on file   Intimate Partner Violence:     Fear of Current or Ex-Partner: Not on file    Emotionally Abused: Not on file    Physically Abused: Not on file    Sexually Abused: Not on file   Housing Stability:     Unable to Pay for Housing in the Last Year: Not on file    Number of [place] in the Last Year: Not on file    Unstable Housing in the Last Year: Not on file       PHYSICAL EXAM     INITIAL VITALS:  height is 5' 10\" (1.778 m) and weight is 200 lb (90.7 kg). His oral temperature is 100.2 °F (37.9 °C). His blood pressure is 97/52 (abnormal) and his pulse is 98. His respiration is 18 and oxygen saturation is 90%. Physical Exam  Vitals and nursing note reviewed. Constitutional:       General: He is in acute distress. Appearance: Normal appearance. He is well-developed and normal weight. He is not ill-appearing, toxic-appearing or diaphoretic. HENT:      Head: Normocephalic. Right Ear: External ear normal.      Left Ear: External ear normal.      Nose: Nose normal.      Mouth/Throat:      Mouth: Mucous membranes are moist.      Pharynx: Uvula midline. Eyes:      Conjunctiva/sclera: Conjunctivae normal.   Cardiovascular:      Rate and Rhythm: Regular rhythm. Tachycardia present.       Heart sounds: Normal heart sounds, S1 normal and S2 normal. No murmur heard. Pulmonary:      Effort: Pulmonary effort is normal. Tachypnea present. No respiratory distress. Breath sounds: Examination of the left-upper field reveals decreased breath sounds. Examination of the left-middle field reveals decreased breath sounds. Examination of the left-lower field reveals decreased breath sounds. Decreased breath sounds present. No rales. Chest:      Chest wall: No tenderness. Abdominal:      General: Bowel sounds are normal. There is no distension. Palpations: Abdomen is soft. Tenderness: There is no abdominal tenderness. Musculoskeletal:         General: Normal range of motion. Cervical back: Normal range of motion and neck supple. Skin:     General: Skin is warm and dry. Capillary Refill: Capillary refill takes less than 2 seconds. Coloration: Skin is not pale. Findings: No erythema or rash. Neurological:      Mental Status: He is alert and oriented to person, place, and time. GCS: GCS eye subscore is 4. GCS verbal subscore is 4. GCS motor subscore is 6. Cranial Nerves: Cranial nerves are intact. Sensory: Sensation is intact. Motor: Motor function is intact. Coordination: Coordination is intact. Psychiatric:         Behavior: Behavior normal.         Thought Content: Thought content normal.         Judgment: Judgment normal.         DIFFERENTIAL DIAGNOSIS:   Pleural effusion, pneumonia, CHF exacerbation, COPD,    DIAGNOSTIC RESULTS     EKG: All EKG's are interpreted by the Emergency Department Physician who eithersigns or Co-signs this chart in the absence of a cardiologist.    EKG read and interpreted by myself with comparison to 12/11/2021 gives impression of sinus tachycardia with heart rate of 101; interval 146; QRS 92;QTc 459; axis P-48, R-80, T-51.      RADIOLOGY: non-plainfilm images(s) such as CT, Ultrasound and MRI are read by the radiologist.  Plain radiographic images are visualized and preliminarily interpreted by the emergency physician unless otherwise stated below. XR CHEST (2 VW)   Final Result   Interstitial alveolar infiltrates right upper and lower lobes and probably left base. **This report has been created using voice recognition software. It may contain minor errors which are inherent in voice recognition technology. **      Final report electronically signed by Dr. Milka Mukherjee on 4/30/2022 10:35 AM            LABS:   Labs Reviewed   CBC WITH AUTO DIFFERENTIAL - Abnormal; Notable for the following components:       Result Value    RBC 3.93 (*)     Hemoglobin 12.2 (*)     Hematocrit 39.4 (*)     .3 (*)     MCHC 31.0 (*)     RDW-SD 51.9 (*)     Lymphocytes Absolute 0.5 (*)     All other components within normal limits   COMPREHENSIVE METABOLIC PANEL - Abnormal; Notable for the following components:     Total Bilirubin 0.2 (*)     ALT 6 (*)     All other components within normal limits   PROCALCITONIN - Abnormal; Notable for the following components:    Procalcitonin 0.60 (*)     All other components within normal limits   OSMOLALITY - Abnormal; Notable for the following components:    Osmolality Calc 274.6 (*)     All other components within normal limits   RAPID INFLUENZA A/B ANTIGENS   COVID-19, RAPID   CULTURE, BLOOD 1   CULTURE, BLOOD 2   PNEUMONIA PANEL, MOLECULAR   TROPONIN   BRAIN NATRIURETIC PEPTIDE   BLOOD GAS, VENOUS   ANION GAP   GLOMERULAR FILTRATION RATE, ESTIMATED   BASIC METABOLIC PANEL W/ REFLEX TO MG FOR LOW K   CBC WITH AUTO DIFFERENTIAL       EMERGENCY DEPARTMENT COURSE:   Vitals:    Vitals:    04/30/22 1325 04/30/22 1511 04/30/22 1609 04/30/22 2015   BP: (!) 107/57 108/68  (!) 97/52   Pulse: 94 82  98   Resp: 18 18  18   Temp: 98.4 °F (36.9 °C) 98.2 °F (36.8 °C)  100.2 °F (37.9 °C)   TempSrc: Oral Oral  Oral   SpO2: 92% 96% (!) 88% 90%   Weight:       Height:           MDM    Patient was seen and evaluated in the emergency department, patient appeared to be in acute distress associated shortness of breath. Vital signs reviewed, significant hypoxia noted on 3 L of oxygen, saturating only 80%, placed on 6 L came up to 90 to 93%. Patient was somewhat confused, difficult to obtain history. Labs and imaging were ordered noted pneumonia on chest x-ray, elevated procalcitonin, patient started on Rocephin and azithromycin. Discussed the case with Dr. Judy Self who accepts the patient for admission. Discussed my findings and plan of care with patient he is amenable with admission.   Medications   albuterol (PROVENTIL) nebulizer solution 2.5 mg (has no administration in time range)   aspirin EC tablet 325 mg (325 mg Oral Given 4/30/22 1514)   budesonide (PULMICORT) nebulizer suspension 500 mcg ( Nebulization Canceled Entry 4/30/22 2100)   carBAMazepine (TEGRETOL) tablet 200 mg (200 mg Oral Given 4/30/22 1715)   ipratropium-albuterol (DUONEB) nebulizer solution 3 mL (3 mLs Inhalation Given 4/30/22 1946)   pravastatin (PRAVACHOL) tablet 40 mg (40 mg Oral Given 4/30/22 2023)   Tadalafil (PAH) tablet 40 mg (has no administration in time range)   sodium chloride flush 0.9 % injection 5-40 mL (5 mLs IntraVENous Not Given 4/30/22 2030)   sodium chloride flush 0.9 % injection 5-40 mL (has no administration in time range)   0.9 % sodium chloride infusion (has no administration in time range)   enoxaparin (LOVENOX) injection 40 mg (40 mg SubCUTAneous Given 4/30/22 1514)   ondansetron (ZOFRAN-ODT) disintegrating tablet 4 mg (has no administration in time range)     Or   ondansetron (ZOFRAN) injection 4 mg (has no administration in time range)   polyethylene glycol (GLYCOLAX) packet 17 g (has no administration in time range)   acetaminophen (TYLENOL) tablet 650 mg (650 mg Oral Given 4/30/22 2023)     Or   acetaminophen (TYLENOL) suppository 650 mg ( Rectal See Alternative 4/30/22 2023)   0.9 % sodium chloride infusion ( IntraVENous New Bag 4/30/22 1443)   cefTRIAXone (ROCEPHIN) 1,000 mg in sterile water 10 mL IV syringe (1,000 mg IntraVENous Given 4/30/22 1534)     And   azithromycin (ZITHROMAX) tablet 500 mg (500 mg Oral Given 4/30/22 1534)   risperiDONE (RISPERDAL) tablet 2 mg (2 mg Oral Given 4/30/22 2023)       Patient was seenindependently by myself. The patient's final impression and disposition and plan was determined by myself. CRITICAL CARE:   None    CONSULTS:  Dr. [place]:  None    FINAL IMPRESSION     1. Pneumonia of both lungs due to infectious organism, unspecified part of lung          DISPOSITION/PLAN   Patient admitted  PATIENT REFERREDTO:  No follow-up provider specified. DISCHARGE MEDICATIONS:  Current Discharge Medication List          (Please note that portions of this note were completed with a voice recognition program.  Efforts were made to edit the dictations but occasionally words are mis-transcribed.)      Provider:  I personally performed the services described in the documentation,reviewed and edited the documentation which was dictated to the scribe in my presence, and it accurately records my words and actions.     Chacorta Wang CNP 04/30/22 8:48 PM    Elio Wang, APRN - CNP        [employer], APRCATHERINE - CNP  04/30/22 2048

## 2022-04-30 NOTE — ED NOTES
Arrives with complaints of SOB. States he normally wears 3L O2 via nc at all times with a history of COPD. SOB started this morning. Upon EMS arrival, pt walked out of his home to the ambulance with no O2 on. EMS reports a SpO2 at that time in the 70's. Pt was given a breathing treatment and placed on 4L O2 via nc prior to arrival. Upon arrival, pt taking very shallow breaths. SpO2 80% on 4L NC. Pt instructed to take deeper breaths. O2 bumped up to 6L via NC. No labored breathing noted. EKG completed upon arrival. IV established prior to arrival by ems.      Gayathri Salamanca RN  04/30/22 6239
Pt transported to [facility] on cart in stable condition. Floor contacted before transport and spoke with Marika Tran.      Milka Chase  04/30/22 7566
Male

## 2022-04-30 NOTE — PLAN OF CARE
Problem: Respiratory - Adult  Goal: Clear lung sounds  4/30/2022 1950 by Dinh Rdz RCP  Outcome: Progressing     Will continue treatments as ordered to improve lung aeration.

## 2022-04-30 NOTE — FLOWSHEET NOTE
[facility] PROGRESS NOTE      Patient: Eric Anderson  Room #: 7C-07/261-Y            YOB: 1970  Age: 46 y.o. Gender: male            Admit Date & Time: 4/30/2022  9:45 AM    Assessment:   responded to AD consult. Pt was not able to pass the orientation questions. Pt was somewhat disoriented. Pt's mother and sister were present. Pt has strong Worship beliefs. Interventions:   provided a listening and supportive present.  explored pt's ability to complete AD (See other note).  prayed with  Pt and pt's family.  spoke with pt's sister and pt's nurse about exploring legal guardianship. Outcomes:  Pt's sister and pt's nurse expressed understanding about the difference between legal guardianship and HCPOA. Plan:  1. Provide spiritual care and support. 2.  Follow up concerning pt's capability to complete AD or completion of legal guardianship. Electronically signed by Jojo Nichols on 4/30/2022 at 2:46 PM.  [address] [facility]  [phone]       04/30/22 1415   Encounter Summary   Encounter Overview/Reason  Initial Encounter   Service Provided For: Patient and family together   Referral/Consult From: Physician   Support System Parent; Family members   Complexity of Encounter Moderate   Begin Time   ([id number] [phone] [id number])   End Time    (1440)   Encounter    Type Initial Screen/Assessment   Spiritual/Emotional needs   Type Spiritual Support   Advance Care Planning   Type ACP conversation   Assessment/Intervention/Outcome   Assessment Anxious   Intervention Active listening;Explored Coping Skills/Resources   Outcome Acceptance;Comfort

## 2022-04-30 NOTE — ACP (ADVANCE CARE PLANNING)
Advance Care Planning     Advance Care Planning Inpatient Note  Spiritual Care Department    Today's Date: 4/30/2022  Unit: [facility] CCU-STEPDOWN 3B    Received request from IDT Member. Upon review of chart and communication with care team, request Health Care Provider's clarification of patient's decision making capacity. and Spiritual Care will defer advance care planning with patient at this time. . Patient and Pt's mother and sister was/were present in the room during visit. Goals of ACP Conversation:  Discuss advance care planning documents    Health Care Decision Makers:       Primary Decision Maker: Krystina Marsh - [facility] - [phone]    Summary:  Documented Next of Kin, per patient report    Advance Care Planning Documents (Patient Wishes):  None     Assessment:   responded to an AD consult. Pt was not able to answer all of the orientation questions and seemed disoriented.  spoke with pt's sister and pt's nurse about social work consult and the possibility of legal guardianship. Suggest  return tomorrow and reassess pt's level of orientation and to ensure social work has followed up on legal guardianship.      Interventions:  Provided education on documents for clarity and greater understanding    Care Preferences Communicated:   No    Outcomes/Plan:  Left AD forms and discussed Legal Guardianship    Electronically signed by Alvarez Pedro on 4/30/2022 at 2:53 PM

## 2022-04-30 NOTE — PLAN OF CARE
Problem: Respiratory - Adult  Goal: Clear lung sounds  Outcome: Progressing  Note:  Patient lung sounds are considered normal for their current lung condition. No signs of distress noted.  Current treatment regimen appropriate

## 2022-05-01 LAB
ACINETOBACTER CALCOACETICUS-BAUMANNII BY PCR: NOT DETECTED
ADENOVIRUS BY PCR: NOT DETECTED
ANION GAP SERPL CALCULATED.3IONS-SCNC: 11 MEQ/L (ref 8–16)
BASOPHILS # BLD: 0.4 %
BASOPHILS ABSOLUTE: 0 THOU/MM3 (ref 0–0.1)
BUN BLDV-MCNC: 11 MG/DL (ref 7–22)
CALCIUM SERPL-MCNC: 8.2 MG/DL (ref 8.5–10.5)
CHLAMYDIA PNEUMONIAE BY PCR: NOT DETECTED
CHLORIDE BLD-SCNC: 100 MEQ/L (ref 98–111)
CO2: 24 MEQ/L (ref 23–33)
CREAT SERPL-MCNC: 0.7 MG/DL (ref 0.4–1.2)
EKG ATRIAL RATE: 101 BPM
EKG P AXIS: 48 DEGREES
EKG P-R INTERVAL: 146 MS
EKG Q-T INTERVAL: 354 MS
EKG QRS DURATION: 92 MS
EKG QTC CALCULATION (BAZETT): 459 MS
EKG R AXIS: 80 DEGREES
EKG T AXIS: 51 DEGREES
EKG VENTRICULAR RATE: 101 BPM
ENTEROBACTER CLOACAE COMPLEX BY PCR: NOT DETECTED
EOSINOPHIL # BLD: 0.7 %
EOSINOPHILS ABSOLUTE: 0.1 THOU/MM3 (ref 0–0.4)
ERYTHROCYTE [DISTWIDTH] IN BLOOD BY AUTOMATED COUNT: 14.2 % (ref 11.5–14.5)
ERYTHROCYTE [DISTWIDTH] IN BLOOD BY AUTOMATED COUNT: 52.3 FL (ref 35–45)
ESCHERICHIA COLI BY PCR: NOT DETECTED
GFR SERPL CREATININE-BSD FRML MDRD: > 90 ML/MIN/1.73M2
GLUCOSE BLD-MCNC: 118 MG/DL (ref 70–108)
HAEMOPHILUS INFLUENZAE BY PCR: NOT DETECTED
HCT VFR BLD CALC: 37.1 % (ref 42–52)
HEMOGLOBIN: 11.6 GM/DL (ref 14–18)
IMMATURE GRANS (ABS): 0.03 THOU/MM3 (ref 0–0.07)
IMMATURE GRANULOCYTES: 0.4 %
INFLUENZA A BY PCR: NOT DETECTED
INFLUENZA B BY PCR: NOT DETECTED
KLEBSIELLA AEROGENES BY PCR: NOT DETECTED
KLEBSIELLA OXYTOCA BY PCR: NOT DETECTED
KLEBSIELLA PNEUMONIAE GROUP BY PCR: NOT DETECTED
LEGIONELLA PNEUMOPHILIA BY PCR: NOT DETECTED
LYMPHOCYTES # BLD: 13.1 %
LYMPHOCYTES ABSOLUTE: 1 THOU/MM3 (ref 1–4.8)
MCH RBC QN AUTO: 31.5 PG (ref 26–33)
MCHC RBC AUTO-ENTMCNC: 31.3 GM/DL (ref 32.2–35.5)
MCV RBC AUTO: 100.8 FL (ref 80–94)
METAPNEUMOVIRUS BY PCR: NOT DETECTED
MONOCYTES # BLD: 10.8 %
MONOCYTES ABSOLUTE: 0.8 THOU/MM3 (ref 0.4–1.3)
MORAXELLA CATARRHALIS BY PCR: DETECTED
MYCOPLASMA PNEUMONIAE BY PCR: NOT DETECTED
NON-SARS CORONAVIRUS: DETECTED
NUCLEATED RED BLOOD CELLS: 0 /100 WBC
PARAINFLUENZA VIRUS BY PCR: NOT DETECTED
PLATELET # BLD: 165 THOU/MM3 (ref 130–400)
PMV BLD AUTO: 10.4 FL (ref 9.4–12.4)
POTASSIUM REFLEX MAGNESIUM: 3.8 MEQ/L (ref 3.5–5.2)
PROTEUS SPECIES BY PCR: NOT DETECTED
PSEUDOMONAS AERUGINOSA BY PCR: NOT DETECTED
RBC # BLD: 3.68 MILL/MM3 (ref 4.7–6.1)
RESISTANT GENE CTX-M BY PCR: ABNORMAL
RESISTANT GENE IMP BY PCR: ABNORMAL
RESISTANT GENE KPC BY PCR: ABNORMAL
RESISTANT GENE MECA/C & MREJ BY PCR: ABNORMAL
RESISTANT GENE NDM BY PCR: ABNORMAL
RESISTANT GENE OXA-48-LIKE BY PCR: ABNORMAL
RESISTANT GENE VIM BY PCR: ABNORMAL
RESPIRATORY SYNCYTIAL VIRUS BY PCR: NOT DETECTED
RHINOVIRUS ENTEROVIRUS PCR: NOT DETECTED
SEG NEUTROPHILS: 74.6 %
SEGMENTED NEUTROPHILS ABSOLUTE COUNT: 5.7 THOU/MM3 (ref 1.8–7.7)
SERRATIA MARCESCENS BY PCR: NOT DETECTED
SODIUM BLD-SCNC: 135 MEQ/L (ref 135–145)
SOURCE: ABNORMAL
SPECIMEN ACCEPTABILITY: ABNORMAL
STAPH AUREUS BY PCR: NOT DETECTED
STREP AGALACTIAE BY PCR: NOT DETECTED
STREP PNEUMONIAE BY PCR: DETECTED
STREP PYOGENES BY PCR: NOT DETECTED
TROPONIN T: < 0.01 NG/ML
TSH SERPL DL<=0.05 MIU/L-ACNC: 0.69 UIU/ML (ref 0.4–4.2)
WBC # BLD: 7.6 THOU/MM3 (ref 4.8–10.8)

## 2022-05-01 PROCEDURE — 94760 N-INVAS EAR/PLS OXIMETRY 1: CPT

## 2022-05-01 PROCEDURE — 84484 ASSAY OF TROPONIN QUANT: CPT

## 2022-05-01 PROCEDURE — 93010 ELECTROCARDIOGRAM REPORT: CPT | Performed by: INTERNAL MEDICINE

## 2022-05-01 PROCEDURE — 2700000000 HC OXYGEN THERAPY PER DAY

## 2022-05-01 PROCEDURE — 80048 BASIC METABOLIC PNL TOTAL CA: CPT

## 2022-05-01 PROCEDURE — 2580000003 HC RX 258: Performed by: INTERNAL MEDICINE

## 2022-05-01 PROCEDURE — 1200000000 HC SEMI PRIVATE

## 2022-05-01 PROCEDURE — 84443 ASSAY THYROID STIM HORMONE: CPT

## 2022-05-01 PROCEDURE — 36415 COLL VENOUS BLD VENIPUNCTURE: CPT

## 2022-05-01 PROCEDURE — 94640 AIRWAY INHALATION TREATMENT: CPT

## 2022-05-01 PROCEDURE — 2500000003 HC RX 250 WO HCPCS: Performed by: INTERNAL MEDICINE

## 2022-05-01 PROCEDURE — 6370000000 HC RX 637 (ALT 250 FOR IP): Performed by: INTERNAL MEDICINE

## 2022-05-01 PROCEDURE — 6360000002 HC RX W HCPCS: Performed by: INTERNAL MEDICINE

## 2022-05-01 PROCEDURE — 85025 COMPLETE CBC W/AUTO DIFF WBC: CPT

## 2022-05-01 RX ORDER — PREDNISONE 20 MG/1
40 TABLET ORAL DAILY
Status: DISCONTINUED | OUTPATIENT
Start: 2022-05-01 | End: 2022-05-03

## 2022-05-01 RX ORDER — PANTOPRAZOLE SODIUM 40 MG/1
40 TABLET, DELAYED RELEASE ORAL
Status: DISCONTINUED | OUTPATIENT
Start: 2022-05-01 | End: 2022-05-05 | Stop reason: HOSPADM

## 2022-05-01 RX ADMIN — SODIUM CHLORIDE, PRESERVATIVE FREE 10 ML: 5 INJECTION INTRAVENOUS at 20:03

## 2022-05-01 RX ADMIN — ENOXAPARIN SODIUM 40 MG: 100 INJECTION SUBCUTANEOUS at 15:59

## 2022-05-01 RX ADMIN — RISPERIDONE 2 MG: 1 TABLET ORAL at 20:02

## 2022-05-01 RX ADMIN — IPRATROPIUM BROMIDE AND ALBUTEROL SULFATE 3 ML: .5; 3 SOLUTION RESPIRATORY (INHALATION) at 21:24

## 2022-05-01 RX ADMIN — BUDESONIDE 500 MCG: 0.5 INHALANT RESPIRATORY (INHALATION) at 21:24

## 2022-05-01 RX ADMIN — ALBUTEROL SULFATE 2.5 MG: 2.5 SOLUTION RESPIRATORY (INHALATION) at 12:14

## 2022-05-01 RX ADMIN — IPRATROPIUM BROMIDE AND ALBUTEROL SULFATE 3 ML: .5; 3 SOLUTION RESPIRATORY (INHALATION) at 09:37

## 2022-05-01 RX ADMIN — CEFTRIAXONE SODIUM 1000 MG: 10 INJECTION, POWDER, FOR SOLUTION INTRAVENOUS at 15:08

## 2022-05-01 RX ADMIN — ASPIRIN 325 MG: 325 TABLET, COATED ORAL at 08:20

## 2022-05-01 RX ADMIN — TADALAFIL 40 MG: 20 TABLET ORAL at 08:21

## 2022-05-01 RX ADMIN — AZITHROMYCIN MONOHYDRATE 500 MG: 250 TABLET ORAL at 15:08

## 2022-05-01 RX ADMIN — PREDNISONE 40 MG: 20 TABLET ORAL at 11:01

## 2022-05-01 RX ADMIN — CARBAMAZEPINE 200 MG: 200 TABLET ORAL at 20:02

## 2022-05-01 RX ADMIN — BUDESONIDE 500 MCG: 0.5 INHALANT RESPIRATORY (INHALATION) at 09:37

## 2022-05-01 RX ADMIN — PRAVASTATIN SODIUM 40 MG: 40 TABLET ORAL at 20:02

## 2022-05-01 RX ADMIN — RISPERIDONE 2 MG: 1 TABLET ORAL at 08:20

## 2022-05-01 RX ADMIN — CARBAMAZEPINE 200 MG: 200 TABLET ORAL at 08:20

## 2022-05-01 RX ADMIN — IPRATROPIUM BROMIDE AND ALBUTEROL SULFATE 3 ML: .5; 3 SOLUTION RESPIRATORY (INHALATION) at 17:37

## 2022-05-01 RX ADMIN — PANTOPRAZOLE SODIUM 40 MG: 40 TABLET, DELAYED RELEASE ORAL at 11:02

## 2022-05-01 RX ADMIN — SODIUM CHLORIDE: 9 INJECTION, SOLUTION INTRAVENOUS at 04:19

## 2022-05-01 ASSESSMENT — PAIN SCALES - GENERAL
PAINLEVEL_OUTOF10: 0

## 2022-05-01 NOTE — PLAN OF CARE
Problem: Pain  Goal: Verbalizes/displays adequate comfort level or baseline comfort level  Outcome: Progressing  Flowsheets (Taken 5/1/2022 0639)  Verbalizes/displays adequate comfort level or baseline comfort level:   Encourage patient to monitor pain and request assistance   Assess pain using appropriate pain scale     Problem: Discharge Planning  Goal: Discharge to home or other facility with appropriate resources  Outcome: Progressing  Flowsheets (Taken 5/1/2022 2310)  Discharge to home or other facility with appropriate resources: Identify barriers to discharge with patient and caregiver

## 2022-05-01 NOTE — PROGRESS NOTES
INTERNAL MEDICINE Progress Note  5/1/2022 9:36 AM  Subjective:   Admit Date: 4/30/2022  PCP: Nathalia Frank MD  Interval History:   Cough is less  Chest tightness    Objective:   Vitals: BP (!) 115/59   Pulse 79   Temp 98.7 °F (37.1 °C) (Oral)   Resp 16   Ht 5' 10\" (1.778 m)   Wt 200 lb 9.9 oz (91 kg)   SpO2 91%   BMI 28.79 kg/m²   General appearance: alert and cooperative with exam  HEENT: Head: atraumatic  Neck: no adenopathy, no carotid bruit and no JVD  Lungs: diminished breath sounds bilaterally  Heart: S1, S2 normal  Abdomen: soft, non-tender; bowel sounds normal; no masses,  no organomegaly  Extremities: no edema, redness or tenderness in the calves or thighs  Neurologic: Mental status: Alert, oriented, thought content appropriate      Medications:   Scheduled Meds:   aspirin  325 mg Oral Daily    budesonide  500 mcg Nebulization BID    carBAMazepine  200 mg Oral BID    ipratropium-albuterol  3 mL Inhalation 4x daily    pravastatin  40 mg Oral Nightly    Tadalafil (PAH)  40 mg Oral Daily    sodium chloride flush  5-40 mL IntraVENous 2 times per day    enoxaparin  40 mg SubCUTAneous Daily    cefTRIAXone (ROCEPHIN) IV  1,000 mg IntraVENous Q24H    And    azithromycin  500 mg Oral Q24H    risperiDONE  2 mg Oral BID     Continuous Infusions:   sodium chloride      sodium chloride 75 mL/hr at 05/01/22 0419       Lab Results:   CBC:   Recent Labs     04/30/22  1044 05/01/22  0333   WBC 8.3 7.6   HGB 12.2* 11.6*    165     BMP:    Recent Labs     04/30/22  1044 05/01/22  0333    135   K 4.3 3.8    100   CO2 25 24   BUN 17 11   CREATININE 1.0 0.7   GLUCOSE 84 118*     Hepatic:   Recent Labs     04/30/22  1044   AST 10   ALT 6*   BILITOT 0.2*   ALKPHOS 65     Troponin T < 0.010       Magnesium:    Lab Results   Component Value Date    MG 2.0 12/20/2021     HgBA1c:    Lab Results   Component Value Date    LABA1C 6.0 12/15/2019     TSH:    Lab Results   Component Value Date    TSH 0.909 12/11/2021     FOLATE:    Lab Results   Component Value Date    FOLATE 9.0 12/08/2016     FERRITIN:    Lab Results   Component Value Date    FERRITIN 344 12/11/2021     EKG      Assessment and Plan:   1. CAP  2. Acute on chronic hypoxemic resp failure  3. COPD exac  4.  Chronic nicotine abuse  5. schizophrenia     Cont IV rocephin, zithromax  Bronchodilators duonebs, prednisone  Cont Oxygen  cont risperidol  lovenox      Kyleigh Rivera MD, MD

## 2022-05-01 NOTE — PLAN OF CARE
Problem: Respiratory - Adult  Goal: Clear lung sounds  Outcome: Progressing  Note: Patient encouraged to DB&C to clear secretions.  QID bronchodilator to improve bilateral aeration

## 2022-05-02 PROCEDURE — 6370000000 HC RX 637 (ALT 250 FOR IP): Performed by: INTERNAL MEDICINE

## 2022-05-02 PROCEDURE — 94640 AIRWAY INHALATION TREATMENT: CPT

## 2022-05-02 PROCEDURE — 2700000000 HC OXYGEN THERAPY PER DAY

## 2022-05-02 PROCEDURE — 2580000003 HC RX 258: Performed by: INTERNAL MEDICINE

## 2022-05-02 PROCEDURE — 94761 N-INVAS EAR/PLS OXIMETRY MLT: CPT

## 2022-05-02 PROCEDURE — 2500000003 HC RX 250 WO HCPCS: Performed by: INTERNAL MEDICINE

## 2022-05-02 PROCEDURE — 6360000002 HC RX W HCPCS: Performed by: INTERNAL MEDICINE

## 2022-05-02 PROCEDURE — 1200000000 HC SEMI PRIVATE

## 2022-05-02 RX ADMIN — CEFTRIAXONE SODIUM 1000 MG: 10 INJECTION, POWDER, FOR SOLUTION INTRAVENOUS at 15:00

## 2022-05-02 RX ADMIN — BUDESONIDE 500 MCG: 0.5 INHALANT RESPIRATORY (INHALATION) at 08:29

## 2022-05-02 RX ADMIN — PREDNISONE 40 MG: 20 TABLET ORAL at 08:48

## 2022-05-02 RX ADMIN — TADALAFIL 40 MG: 20 TABLET ORAL at 11:46

## 2022-05-02 RX ADMIN — SODIUM CHLORIDE, PRESERVATIVE FREE 10 ML: 5 INJECTION INTRAVENOUS at 08:48

## 2022-05-02 RX ADMIN — PANTOPRAZOLE SODIUM 40 MG: 40 TABLET, DELAYED RELEASE ORAL at 05:36

## 2022-05-02 RX ADMIN — AZITHROMYCIN MONOHYDRATE 500 MG: 250 TABLET ORAL at 15:00

## 2022-05-02 RX ADMIN — ENOXAPARIN SODIUM 40 MG: 100 INJECTION SUBCUTANEOUS at 16:34

## 2022-05-02 RX ADMIN — PRAVASTATIN SODIUM 40 MG: 40 TABLET ORAL at 19:48

## 2022-05-02 RX ADMIN — RISPERIDONE 2 MG: 1 TABLET ORAL at 08:48

## 2022-05-02 RX ADMIN — IPRATROPIUM BROMIDE AND ALBUTEROL SULFATE 3 ML: .5; 3 SOLUTION RESPIRATORY (INHALATION) at 16:50

## 2022-05-02 RX ADMIN — IPRATROPIUM BROMIDE AND ALBUTEROL SULFATE 3 ML: .5; 3 SOLUTION RESPIRATORY (INHALATION) at 21:10

## 2022-05-02 RX ADMIN — ASPIRIN 325 MG: 325 TABLET, COATED ORAL at 08:48

## 2022-05-02 RX ADMIN — CARBAMAZEPINE 200 MG: 200 TABLET ORAL at 19:47

## 2022-05-02 RX ADMIN — RISPERIDONE 2 MG: 1 TABLET ORAL at 19:47

## 2022-05-02 RX ADMIN — IPRATROPIUM BROMIDE AND ALBUTEROL SULFATE 3 ML: .5; 3 SOLUTION RESPIRATORY (INHALATION) at 13:02

## 2022-05-02 RX ADMIN — CARBAMAZEPINE 200 MG: 200 TABLET ORAL at 08:48

## 2022-05-02 RX ADMIN — BUDESONIDE 500 MCG: 0.5 INHALANT RESPIRATORY (INHALATION) at 21:10

## 2022-05-02 RX ADMIN — IPRATROPIUM BROMIDE AND ALBUTEROL SULFATE 3 ML: .5; 3 SOLUTION RESPIRATORY (INHALATION) at 08:29

## 2022-05-02 RX ADMIN — SODIUM CHLORIDE, PRESERVATIVE FREE 10 ML: 5 INJECTION INTRAVENOUS at 19:48

## 2022-05-02 ASSESSMENT — PAIN SCALES - GENERAL
PAINLEVEL_OUTOF10: 0

## 2022-05-02 NOTE — FLOWSHEET NOTE
[facility] 88 PROGRESS NOTE      Patient: Camryn Blanco  Room #: 3N-36/574-Q            YOB: 1970  Age: 46 y.o. Gender: male            Admit Date & Time: 4/30/2022  9:45 AM    Assessment: Lizette Virk is a 46year old male who is in bed. Another  recommended that we follow up to see if  talked with him or with his mom about if mom wants to be his legal guardian or if there needs to be a court appointed one? Interventions: This was shared with his nurse Leonor and a message left on Yesenia TOLBERT, the 's phone      Plan:    1. Is Sandie capable of making his own decisions at this time? What about when he is unable to do so, who would make these decisions for him?        Electronically signed by Viridiana Lyles on 5/2/2022 at 2:16 PM.  [address] [facility]  [phone]

## 2022-05-02 NOTE — PLAN OF CARE
Problem: Respiratory - Adult  Goal: Clear lung sounds  5/1/2022 2127 by Davis Schmidt RCP  Outcome: Progressing  Note:  Patient lung sounds are considered normal for their current lung condition. No signs of distress noted.  Current treatment regimen appropriate

## 2022-05-02 NOTE — PROGRESS NOTES
0700  Assumed care of pt from [facility]. Pt sleeping and appears to be free of any distress. 0745  Pt on bedpan. 0800  Pt removed from bedpan and needed complete bed change. Bathed pt and up to chair. Bed linens changed full assessment complete. Unable to get the pt o2 sat above 82%, respiratory contacted, non-rebreather applied. Will continue to monitor. 0825 Pt o2 96% on 10L NRB. Pt back to NC 6L to eat breakfast 92%. Will continue to monitor. 0855  Pt continues to be compliant and is sitting in a chair at the bedside, NC o2 at 6L 94%. Will continue to monitor. 1000 pt remains in chair with pleasant demeanor, speaks to everyone that passes his room and asks for coffee. Provided some caffeine free ginger ale. Oxygen sat drops when coughing and laughing. Will continue to monitor.

## 2022-05-02 NOTE — PLAN OF CARE
Problem: Pain  Goal: Verbalizes/displays adequate comfort level or baseline comfort level  Outcome: Progressing  Flowsheets (Taken 5/1/2022 0639 by Lynn Gibbs, RN)  Verbalizes/displays adequate comfort level or baseline comfort level:   Encourage patient to monitor pain and request assistance   Assess pain using appropriate pain scale  Note: Pt denied pain so far this shift. Pain goal is to remain at 0/10. Pt instructed to notify nursing staff should pain arise. Call light in reach. Problem: Safety - Adult  Goal: Free from fall injury  Flowsheets (Taken 5/1/2022 4701 by Lynn Gibbs, RN)  Free From Fall Injury: Instruct family/caregiver on patient safety  Note: Bed locked & in low position, call light in reach, side-rails up x2, bed/chair alarm utilized, non-slip socks on when ambulating, reminded patient to use call light to call for assistance.

## 2022-05-02 NOTE — CARE COORDINATION
Admitted: 4/30/2022/ [address] day: 2   Location: [facility]21/021-A Reason for admit: Community acquired bacterial pneumonia [J15.9]   PMH:  has a past medical history of Acute on chronic systolic congestive heart failure ([facility] [address] Emphysema (subcutaneous) (surgical) resulting from a procedure, Hypertension, Pneumonia, and Schizophrenia ([facility] [address] Procedure:   4/30 CXR: Interstitial alveolar infiltrates right upper and lower lobes and probably left base. Barriers to Discharge:  Admitted through ED with cough and SOB. Found to have pneumonia. Sputum culture positive for Moraxella catarrhalis and strep pneumoniae by PCR. Also positive for Non-SARS Coronavirus. Rocephin iv daily, Zithromax iv daily. Lovenox. DuoNeb qid. PCP: Louie Naqvi MD  Readmission Risk Score: 15.7 ( )%    Patient Goals/Plan/Treatment Preferences: From home with family, has home O2, through [address] (verified with Stephenie Zhang at [address]). Pt states he is current with [facility], call placed to [facility] at [facility], pt is not current. Pt had been current with [facility], but they discharged in February.  Monitor for need for [place].        05/02/22 1137   Service Assessment   Patient Orientation Alert and Oriented   Cognition Alert   History Provided By Patient   Primary [address] Family Members;Parent   Patient's Healthcare Decision Maker is: Legal Next of Kin  (Mother listed as Primary decision maker)   PCP Verified by CM Yes   Prior Functional Level Independent in ADLs/IADLs   Current Functional Level Independent in ADLs/IADLs   Can patient return to prior living arrangement Yes   Ability to make needs known: Fair   Social/Functional History   Active  No   Mode of Transportation Bus   Discharge Planning   Patient expects to be discharged to: Apartment   Condition of Participation: Discharge Planning   The Plan for Transition of Care is related to the following treatment goals: Completion of antibiotics, O2 back to baseline of 3L/nc

## 2022-05-02 NOTE — PROGRESS NOTES
INTERNAL MEDICINE Progress Note  5/2/2022 7:10 PM  Subjective:   Admit Date: 4/30/2022  PCP: Topher Boogie MD  Interval History:   Feels better  SOB is better, cough-dry    Objective:   Vitals: BP (!) 101/55   Pulse 88   Temp 98.3 °F (36.8 °C) (Oral)   Resp 20   Ht 5' 10\" (1.778 m)   Wt 203 lb 14.8 oz (92.5 kg)   SpO2 98%   BMI 29.26 kg/m²   General appearance: alert and cooperative with exam  HEENT:  atraumatic  Neck: no JVD  Lungs: diminished breath sounds bilaterally  Heart: S1, S2 normal  Abdomen: soft, non-tender; bowel sounds normal; no masses,  no organomegaly  Extremities: no edema,   Neurologic: Alert, oriented, thought content appropriate      Medications:   Scheduled Meds:   predniSONE  40 mg Oral Daily    pantoprazole  40 mg Oral QAM AC    aspirin  325 mg Oral Daily    budesonide  500 mcg Nebulization BID    carBAMazepine  200 mg Oral BID    ipratropium-albuterol  3 mL Inhalation 4x daily    pravastatin  40 mg Oral Nightly    Tadalafil (PAH)  40 mg Oral Daily    sodium chloride flush  5-40 mL IntraVENous 2 times per day    enoxaparin  40 mg SubCUTAneous Daily    cefTRIAXone (ROCEPHIN) IV  1,000 mg IntraVENous Q24H    risperiDONE  2 mg Oral BID     Continuous Infusions:   sodium chloride         Lab Results:   CBC:   Recent Labs     04/30/22  1044 05/01/22  0333   WBC 8.3 7.6   HGB 12.2* 11.6*    165     BMP:    Recent Labs     04/30/22  1044 05/01/22  0333    135   K 4.3 3.8    100   CO2 25 24   BUN 17 11   CREATININE 1.0 0.7   GLUCOSE 84 118*     Hepatic:   Recent Labs     04/30/22  1044   AST 10   ALT 6*   BILITOT 0.2*   ALKPHOS 65     Troponin T < 0.010       Magnesium:    Lab Results   Component Value Date    MG 2.0 12/20/2021     HgBA1c:    Lab Results   Component Value Date    LABA1C 6.0 12/15/2019     TSH:    Lab Results   Component Value Date    TSH 0.686 05/01/2022     FOLATE:    Lab Results   Component Value Date    FOLATE 9.0 12/08/2016     FERRITIN: Lab Results   Component Value Date    FERRITIN 344 12/11/2021     EKG      Assessment and Plan:   1. CAP  2. Acute on chronic hypoxemic resp failure  3. COPD exac  4.  Chronic nicotine abuse  5. schizophrenia     Cont IV rocephin, zithromax  Bronchodilators duonebs,   prednisone  Cont Oxygen  cont risperidol  lovenox    Lacretia MD Ambrocio, MD

## 2022-05-02 NOTE — PROGRESS NOTES
Low Risk Nutrition Note     Recommendations/Plan:    Consider CHF diet as appropriate. Consider MVI as appropriate. Nutrition Assessment:     Pt admitted with Community Acquired Pneumonia, Acute on Chronic Respiratory Failure, COPD, Hx Chronic Nicotine Abuse, Schizophrenia Pt seen, goes off subject & talks about prayers. He reports good appetite, missing teeth noted. Labs: (5/1) Hemoglobin 11.6 . Meds: Deltasone, Zofran, Glycolax. Pt seen, good appetite & consuming % noted. BM x 1 (5/2)    Reason for Visit:  Initial,Positive Nutrition Screen (poor po/appetite, unplanned wt loss)    Current Nutrition Therapies:  ADULT DIET; Regular    Height:  5' 10\" (177.8 cm)    Current Body Weight:  203 lb 14.8 oz (92.5 kg) ((5/2) no edema)       Diagnosis:  No nutrition diagnosis at this time. Monitoring and Evaluation:  Patient will be monitored per nutrition standards of care. Consult Dietitian if nutrition intervention essential to patient care is needed.      Discharge Planning:  No needs      Electronically signed by Christina Guillen RD, LD on 5/2/22 at 4:29 PM EDT    Contact:  [phone]    Disaster Mode Active

## 2022-05-03 ENCOUNTER — APPOINTMENT (OUTPATIENT)
Dept: CT IMAGING | Age: 52
DRG: 193 | End: 2022-05-03
Payer: MEDICARE

## 2022-05-03 ENCOUNTER — APPOINTMENT (OUTPATIENT)
Dept: GENERAL RADIOLOGY | Age: 52
DRG: 193 | End: 2022-05-03
Payer: MEDICARE

## 2022-05-03 LAB
ALLEN TEST: POSITIVE
BASE EXCESS (CALCULATED): 2.7 MMOL/L (ref -2.5–2.5)
COLLECTED BY:: ABNORMAL
DEVICE: ABNORMAL
HCO3: 28 MMOL/L (ref 23–28)
IFIO2: 6
O2 SATURATION: 86 %
PCO2: 45 MMHG (ref 35–45)
PH BLOOD GAS: 7.41 (ref 7.35–7.45)
PO2: 52 MMHG (ref 71–104)
PRO-BNP: 164.7 PG/ML (ref 0–900)
SOURCE, BLOOD GAS: ABNORMAL

## 2022-05-03 PROCEDURE — 2580000003 HC RX 258: Performed by: INTERNAL MEDICINE

## 2022-05-03 PROCEDURE — 6360000002 HC RX W HCPCS: Performed by: INTERNAL MEDICINE

## 2022-05-03 PROCEDURE — 83880 ASSAY OF NATRIURETIC PEPTIDE: CPT

## 2022-05-03 PROCEDURE — 94640 AIRWAY INHALATION TREATMENT: CPT

## 2022-05-03 PROCEDURE — 99223 1ST HOSP IP/OBS HIGH 75: CPT | Performed by: INTERNAL MEDICINE

## 2022-05-03 PROCEDURE — 94761 N-INVAS EAR/PLS OXIMETRY MLT: CPT

## 2022-05-03 PROCEDURE — 36600 WITHDRAWAL OF ARTERIAL BLOOD: CPT

## 2022-05-03 PROCEDURE — 2700000000 HC OXYGEN THERAPY PER DAY

## 2022-05-03 PROCEDURE — 1200000000 HC SEMI PRIVATE

## 2022-05-03 PROCEDURE — 6370000000 HC RX 637 (ALT 250 FOR IP): Performed by: INTERNAL MEDICINE

## 2022-05-03 PROCEDURE — 36415 COLL VENOUS BLD VENIPUNCTURE: CPT

## 2022-05-03 PROCEDURE — 71260 CT THORAX DX C+: CPT

## 2022-05-03 PROCEDURE — 71045 X-RAY EXAM CHEST 1 VIEW: CPT

## 2022-05-03 PROCEDURE — 2500000003 HC RX 250 WO HCPCS: Performed by: INTERNAL MEDICINE

## 2022-05-03 PROCEDURE — 6360000004 HC RX CONTRAST MEDICATION: Performed by: INTERNAL MEDICINE

## 2022-05-03 PROCEDURE — 82803 BLOOD GASES ANY COMBINATION: CPT

## 2022-05-03 RX ORDER — METHYLPREDNISOLONE SODIUM SUCCINATE 40 MG/ML
40 INJECTION, POWDER, LYOPHILIZED, FOR SOLUTION INTRAMUSCULAR; INTRAVENOUS EVERY 12 HOURS
Status: COMPLETED | OUTPATIENT
Start: 2022-05-03 | End: 2022-05-04

## 2022-05-03 RX ORDER — IPRATROPIUM BROMIDE AND ALBUTEROL SULFATE 2.5; .5 MG/3ML; MG/3ML
1 SOLUTION RESPIRATORY (INHALATION)
Status: DISCONTINUED | OUTPATIENT
Start: 2022-05-03 | End: 2022-05-04

## 2022-05-03 RX ORDER — IPRATROPIUM BROMIDE AND ALBUTEROL SULFATE 2.5; .5 MG/3ML; MG/3ML
3 SOLUTION RESPIRATORY (INHALATION) 2 TIMES DAILY
Status: DISCONTINUED | OUTPATIENT
Start: 2022-05-03 | End: 2022-05-03

## 2022-05-03 RX ADMIN — ASPIRIN 325 MG: 325 TABLET, COATED ORAL at 11:33

## 2022-05-03 RX ADMIN — PREDNISONE 40 MG: 20 TABLET ORAL at 10:12

## 2022-05-03 RX ADMIN — ACETAMINOPHEN 650 MG: 325 TABLET ORAL at 11:33

## 2022-05-03 RX ADMIN — CARBAMAZEPINE 200 MG: 200 TABLET ORAL at 20:46

## 2022-05-03 RX ADMIN — IPRATROPIUM BROMIDE AND ALBUTEROL SULFATE 3 ML: .5; 3 SOLUTION RESPIRATORY (INHALATION) at 07:44

## 2022-05-03 RX ADMIN — CEFTRIAXONE SODIUM 1000 MG: 10 INJECTION, POWDER, FOR SOLUTION INTRAVENOUS at 17:30

## 2022-05-03 RX ADMIN — CARBAMAZEPINE 200 MG: 200 TABLET ORAL at 10:12

## 2022-05-03 RX ADMIN — IPRATROPIUM BROMIDE AND ALBUTEROL SULFATE 1 AMPULE: .5; 3 SOLUTION RESPIRATORY (INHALATION) at 11:52

## 2022-05-03 RX ADMIN — RISPERIDONE 2 MG: 1 TABLET ORAL at 20:46

## 2022-05-03 RX ADMIN — PRAVASTATIN SODIUM 40 MG: 40 TABLET ORAL at 20:46

## 2022-05-03 RX ADMIN — BUDESONIDE 500 MCG: 0.5 INHALANT RESPIRATORY (INHALATION) at 07:44

## 2022-05-03 RX ADMIN — ENOXAPARIN SODIUM 40 MG: 100 INJECTION SUBCUTANEOUS at 17:30

## 2022-05-03 RX ADMIN — RISPERIDONE 2 MG: 1 TABLET ORAL at 10:12

## 2022-05-03 RX ADMIN — TADALAFIL 40 MG: 20 TABLET ORAL at 10:12

## 2022-05-03 RX ADMIN — IPRATROPIUM BROMIDE AND ALBUTEROL SULFATE 1 AMPULE: .5; 3 SOLUTION RESPIRATORY (INHALATION) at 21:09

## 2022-05-03 RX ADMIN — BUDESONIDE 500 MCG: 0.5 INHALANT RESPIRATORY (INHALATION) at 21:09

## 2022-05-03 RX ADMIN — IPRATROPIUM BROMIDE AND ALBUTEROL SULFATE 1 AMPULE: .5; 3 SOLUTION RESPIRATORY (INHALATION) at 15:20

## 2022-05-03 RX ADMIN — IOPAMIDOL 80 ML: 755 INJECTION, SOLUTION INTRAVENOUS at 23:17

## 2022-05-03 RX ADMIN — SODIUM CHLORIDE, PRESERVATIVE FREE 10 ML: 5 INJECTION INTRAVENOUS at 20:46

## 2022-05-03 RX ADMIN — PANTOPRAZOLE SODIUM 40 MG: 40 TABLET, DELAYED RELEASE ORAL at 06:06

## 2022-05-03 RX ADMIN — SODIUM CHLORIDE, PRESERVATIVE FREE 10 ML: 5 INJECTION INTRAVENOUS at 10:13

## 2022-05-03 ASSESSMENT — PAIN DESCRIPTION - ORIENTATION
ORIENTATION: RIGHT

## 2022-05-03 ASSESSMENT — PAIN DESCRIPTION - LOCATION
LOCATION: RIB CAGE

## 2022-05-03 ASSESSMENT — PAIN SCALES - GENERAL
PAINLEVEL_OUTOF10: 0
PAINLEVEL_OUTOF10: 2
PAINLEVEL_OUTOF10: 3
PAINLEVEL_OUTOF10: 0
PAINLEVEL_OUTOF10: 0
PAINLEVEL_OUTOF10: 3
PAINLEVEL_OUTOF10: 0

## 2022-05-03 ASSESSMENT — PAIN - FUNCTIONAL ASSESSMENT
PAIN_FUNCTIONAL_ASSESSMENT: ACTIVITIES ARE NOT PREVENTED

## 2022-05-03 ASSESSMENT — PAIN DESCRIPTION - DESCRIPTORS
DESCRIPTORS: SHARP

## 2022-05-03 ASSESSMENT — PAIN DESCRIPTION - PAIN TYPE
TYPE: ACUTE PAIN

## 2022-05-03 ASSESSMENT — PAIN DESCRIPTION - FREQUENCY
FREQUENCY: INTERMITTENT

## 2022-05-03 ASSESSMENT — PAIN DESCRIPTION - ONSET
ONSET: ON-GOING

## 2022-05-03 NOTE — RT PROTOCOL NOTE
RT Inhaler-Nebulizer Bronchodilator Protocol Note    There is a bronchodilator order in the chart from a provider indicating to follow the RT Bronchodilator Protocol and there is an Initiate RT Inhaler-Nebulizer Bronchodilator Protocol order as well (see protocol at bottom of note). CXR Findings:  No results found. The findings from the last RT Protocol Assessment were as follows:   History Pulmonary Disease: Chronic pulmonary disease  Respiratory Pattern: Dyspnea on exertion or RR 21-25 bpm  Breath Sounds: Slightly diminished and/or crackles  Cough: Strong, spontaneous, non-productive  Indication for Bronchodilator Therapy:    Bronchodilator Assessment Score: 6    Aerosolized bronchodilator medication orders have been revised according to the RT Inhaler-Nebulizer Bronchodilator Protocol below. Respiratory Therapist to perform RT Therapy Protocol Assessment initially then follow the protocol. Repeat RT Therapy Protocol Assessment PRN for score 0-3 or on second treatment, BID, and PRN for scores above 3. No Indications - adjust the frequency to every 6 hours PRN wheezing or bronchospasm, if no treatments needed after 48 hours then discontinue using Per Protocol order mode. If indication present, adjust the RT bronchodilator orders based on the Bronchodilator Assessment Score as indicated below. Use Inhaler orders unless patient has one or more of the following: on home nebulizer, not able to hold breath for 10 seconds, is not alert and oriented, cannot activate and use MDI correctly, or respiratory rate 25 breaths per minute or more, then use the equivalent nebulizer order(s) with same Frequency and PRN reasons based on the score. If a patient is on this medication at home then do not decrease Frequency below that used at home.     0-3 - enter or revise RT bronchodilator order(s) to equivalent RT Bronchodilator order with Frequency of every 4 hours PRN for wheezing or increased work of breathing using Per Protocol order mode. 4-6 - enter or revise RT Bronchodilator order(s) to two equivalent RT bronchodilator orders with one order with BID Frequency and one order with Frequency of every 4 hours PRN wheezing or increased work of breathing using Per Protocol order mode. 7-10 - enter or revise RT Bronchodilator order(s) to two equivalent RT bronchodilator orders with one order with TID Frequency and one order with Frequency of every 4 hours PRN wheezing or increased work of breathing using Per Protocol order mode. 11-13 - enter or revise RT Bronchodilator order(s) to one equivalent RT bronchodilator order with QID Frequency and an Albuterol order with Frequency of every 4 hours PRN wheezing or increased work of breathing using Per Protocol order mode. Greater than 13 - enter or revise RT Bronchodilator order(s) to one equivalent RT bronchodilator order with every 4 hours Frequency and an Albuterol order with Frequency of every 2 hours PRN wheezing or increased work of breathing using Per Protocol order mode. RT to enter RT Home Evaluation for COPD & MDI Assessment order using Per Protocol order mode.     Electronically signed by Rebeka Yang RCP on 5/3/2022 at 8:02 AM

## 2022-05-03 NOTE — CARE COORDINATION
5/3/22, 8:59 AM EDT    DISCHARGE PLANNING EVALUATION    Met with Sandie yesterday. Had difficulty following his answers. Left a message for his mother this morning asking her to call back. Christos Oneal reports he lives with two of his sisters Claudene Patch and Emelia Denton, but he is trying to get a place of his own. He told SW that his sisters do the shopping, cooking and cleaning. His sister Traci Cary takes him to appointments. He reports that his only medical equipment is his cane. He told  that he is current with [facility], but that is not accurate. He did have [facility] in February, but they have since stopped. Update 2:19pm: Called and left another message for patients mother.

## 2022-05-03 NOTE — PLAN OF CARE
Problem: Pain  Goal: Verbalizes/displays adequate comfort level or baseline comfort level  Outcome: Progressing  Note: Pt currently denies any pain     Problem: Discharge Planning  Goal: Discharge to home or other facility with appropriate resources  Outcome: Progressing  Note: Discharge planning cont      Problem: Skin/Tissue Integrity  Goal: Absence of new skin breakdown  Description: 1. Monitor for areas of redness and/or skin breakdown  2. Assess vascular access sites hourly  3. Every 4-6 hours minimum:  Change oxygen saturation probe site  4. Every 4-6 hours:  If on nasal continuous positive airway pressure, respiratory therapy assess nares and determine need for appliance change or resting period.   Outcome: Progressing  Note: No new skin breakdown noted      Problem: Safety - Adult  Goal: Free from fall injury  Outcome: Progressing  Note: Pt free from falls, safety maintained      Problem: Respiratory - Adult  Goal: Clear lung sounds  5/2/2022 2116 by Ryan Julio RCP  Outcome: Progressing     Problem: Chronic Conditions and Co-morbidities  Goal: Patient's chronic conditions and co-morbidity symptoms are monitored and maintained or improved  Outcome: Progressing     Problem: ABCDS Injury Assessment  Goal: Absence of physical injury  Outcome: Progressing  Note: Pt free from injury, safety maintained

## 2022-05-03 NOTE — PROGRESS NOTES
05/03/22 0759   RT Protocol   History Pulmonary Disease 2   Respiratory pattern 2   Breath sounds 2   Cough 0   Bronchodilator Assessment Score 6

## 2022-05-03 NOTE — PLAN OF CARE
Problem: Respiratory - Adult  Goal: Clear lung sounds  Outcome: Progressing     Continue scheduled breathing tx's for maintenance and improved aeration of lungs

## 2022-05-03 NOTE — CONSULTS
[place] [facility] Pulmonary, Sleep and Critical Care Medicine      Patient - Paz Mtz   MRN -  [id number]   [facility] # - [de-identified]   - 1970      Date of Admission -  2022  9:45 AM  Date of evaluation -  5/3/2022  Room - 3B--A   Evelina Chang MD Primary Care Physician - Toya Oconnor MD     Problem List      Active Hospital Problems    Diagnosis Date Noted    Community acquired bacterial pneumonia [J15.9] 2022     Priority: Medium     Reason for Consult    For management of COPD and exacerbation. HPI   History Obtained From: Patient and electronic medical record. Paz Mtz is a 46 y.o. male  was initially admitted under hospitalist service. Pulmonary medicine was consulted for further management of COPD/exacerbation. He is a poor historian. Majority of the history obtained from reviewing the patient's chart. She is a 78-year-old pleasant gentleman with a past medical history of chronic tobacco smoking, severe COPD, chronic diastolic CHF, chronic hypoxic respiratory failure on LT OT, obesity hypoventilation and essential hypertension was admitted to [facility] in 2021 for decompressed congestive heart failure. He subsequently got discharged and staying at home. He started having progressive worsening of shortness of breath with associated cough and sputum production for the last few days. He was admitted under Dr. Ephraim Wilder MD service for COPD exacerbation. Pulmonary medicine was consulted for further evaluation. The patient is a 46 y.o. male who presented with worsening of shortness of breath; He is having shortness of breath: Yes  Onset: gradual   Duration: Few days  Diurnal variation: None. He denies orthopnea. He denies paroxysmal nocturnal dyspnea. His shortness of breath is associated with cough and wheezing.   He is having cough: Yes  Duration of cough: for few days  His cough is associated with sputum production: Yes   The sputum color: yellow  Hemoptysis:No  Diurnal variation: None. He gives a history of fever: No  Chills & rigors:No  Associated night sweats: No.      PMHx   Past Medical History      Diagnosis Date    Acute on chronic systolic congestive heart failure (HCC) 4/4/2019    Emphysema (subcutaneous) (surgical) resulting from a procedure     Hypertension     Pneumonia     Schizophrenia [facility])       Past Surgical History    History reviewed. No pertinent surgical history. Meds    Current Medications    ipratropium-albuterol  1 ampule Inhalation Q4H WA    predniSONE  40 mg Oral Daily    pantoprazole  40 mg Oral QAM AC    aspirin  325 mg Oral Daily    budesonide  500 mcg Nebulization BID    carBAMazepine  200 mg Oral BID    pravastatin  40 mg Oral Nightly    Tadalafil (PAH)  40 mg Oral Daily    sodium chloride flush  5-40 mL IntraVENous 2 times per day    enoxaparin  40 mg SubCUTAneous Daily    cefTRIAXone (ROCEPHIN) IV  1,000 mg IntraVENous Q24H    risperiDONE  2 mg Oral BID     albuterol, sodium chloride flush, sodium chloride, ondansetron **OR** ondansetron, polyethylene glycol, acetaminophen **OR** acetaminophen  IV Drips/Infusions   sodium chloride       Home Medications  Medications Prior to Admission: Tadalafil, PAH, 20 MG tablet, Take 2 tablets by mouth daily  Spacer/Aero-Holding Chambers CAREN, 1 Device by Does not apply route daily  risperiDONE (RISPERDAL) 1 MG tablet, Take 1 tablet by mouth in the morning and 2 tablets at night.   albuterol (PROVENTIL) (2.5 MG/3ML) 0.083% nebulizer solution, Take 3 mLs by nebulization every 2 hours as needed for Wheezing  budesonide (PULMICORT) 0.5 MG/2ML nebulizer suspension, Take 2 mLs by nebulization 2 times daily  ipratropium-albuterol (DUONEB) 0.5-2.5 (3) MG/3ML SOLN nebulizer solution, Inhale 3 mLs into the lungs every 6 hours as needed for Shortness of Breath  umeclidinium-vilanterol (ANORO ELLIPTA) 62.5-25 MCG/INH AEPB inhaler, Inhale 1 puff into the lungs daily  aspirin 325 MG EC tablet, Take 1 tablet by mouth daily  pravastatin (PRAVACHOL) 40 MG tablet, Take 40 mg by mouth nightly  metoprolol succinate (TOPROL XL) 25 MG extended release tablet, Take 1 tablet by mouth daily  carBAMazepine (TEGRETOL) 200 MG tablet, Take 200 mg by mouth 2 times daily  Diet    ADULT DIET; Regular  Allergies    Patient has no known allergies. Family History          Problem Relation Age of Onset    High Blood Pressure Mother      Sleep History    Never diagnosed with sleep apnea in the past    Social History     Social History     Tobacco Use    Smoking status: Current Every Day Smoker     Packs/day: 1.00     Years: 20.00     Pack years: 20.00     Types: Cigarettes    Smokeless tobacco: Never Used   Vaping Use    Vaping Use: Never used   Substance Use Topics    Alcohol use: No    Drug use: No       [place] of systems   General/Constitutional: No recent loss of weight or appetite changes. No fever or chills. HENT: Negative. Eyes: Negative. Upper respiratory tract: No nasal stuffiness with no post nasal drip. Lower respiratory tract/ lungs: See HPI for details. No hemoptysis. Cardiovascular: No palpitations or chest pain. Gastrointestinal: No nausea or vomiting. Neurological: No focal neurologiacal weakness. Extremities: No edema. Musculoskeletal: No complaints. Genitourinary: No complaints. Hematological: Negative. Psychiatric/Behavioral: Negative. Skin: No itching. Vitals     height is 5' 10\" (1.778 m) and weight is 203 lb 14.8 oz (92.5 kg). His oral temperature is 96.3 °F (35.7 °C). His blood pressure is 107/64 and his pulse is 84. His respiration is 16 and oxygen saturation is 93%. Body mass index is 29.26 kg/m².     SUPPLEMENTAL O2: O2 Flow Rate (L/min): 6 L/min       I/O        Intake/Output Summary (Last 24 hours) at 5/3/2022 1852  Last data filed at 5/3/2022 1718  Gross per 24 hour   Intake 1470 ml   Output 3520 ml   Net -2050 ml     I/O last 3 completed shifts: In: 3060 [P.O.:3040; I.V.:20]  Out: 4370 [Urine:4370]   Patient Vitals for the past 96 hrs (Last 3 readings):   Weight   05/02/22 0530 203 lb 14.8 oz (92.5 kg)   05/01/22 0400 200 lb 9.9 oz (91 kg)   04/30/22 0951 200 lb (90.7 kg)       Exam   General Appearance: moderately built, moderately nourished in no acute distress on O2 via nasal cannula at 6 L/min  HEENT: Normal, Head is normocephalic, atraumatic. Oropharynx is clear and moist.  No oral thrush. PERRL. Neck - Supple, No JVD present. No tracheal deviation. Lungs - Bilateral air entry present. Bilateral good breath sounds heard. Bilateral diffuse expiratory wheezes. No rales. Cardiovascular - Heart sounds are normal.  Regular rhythm normal rate without murmur, gallop or rub. Abdomen - Soft, nontender, nondistended, no masses or organomegaly  Neurologic - Awake, alert, oriented. There are no focal motor or sensory deficits  Extremities - No cyanosis, clubbing or edema. Musculoskeletal: Normal range of motion. Patient exhibits no tenderness. Lymphadenopathy:  No cervical adenopathy. Psychiatric: Patient  has a normal mood and affect. Skin - No bruising or bleeding. Labs  - Old records and notes have been reviewed in [facility]   ABG  Lab Results   Component Value Date    PH 7.41 05/03/2022    PO2 52 05/03/2022    PCO2 45 05/03/2022    HCO3 28 05/03/2022    O2SAT 86 05/03/2022     Lab Results   Component Value Date    IFIO2 6 05/03/2022    MODE CPAP/PS 12/23/2021    SETTIDVOL 480 05/30/2020    SETPEEP 6.0 12/23/2021     CBC  Recent Labs     05/01/22  0333   WBC 7.6   RBC 3.68*   HGB 11.6*   HCT 37.1*   .8*   MCH 31.5   MCHC 31.3*      MPV 10.4      BMP  Recent Labs     05/01/22  0333      K 3.8      CO2 24   BUN 11   CREATININE 0.7   GLUCOSE 118*   CALCIUM 8.2*     LFT  No results for input(s): AST, ALT, ALB, BILITOT, ALKPHOS, LIPASE in the last 72 hours.     Invalid input(s): AMYLASE  TROP  Lab Results   Component Value Date    TROPONINT < 0.010 05/01/2022    TROPONINT < 0.010 04/30/2022    TROPONINT < 0.010 12/11/2021     BNP  No results for input(s): BNP in the last 72 hours. Lactic Acid  No results for input(s): LACTA in the last 72 hours. INR  No results for input(s): INR, PROTIME in the last 72 hours. PTT  No results for input(s): APTT in the last 72 hours. Glucose  No results for input(s): POCGLU in the last 72 hours. UA No results for input(s): SPECGRAV, PHUR, COLORU, CLARITYU, MUCUS, PROTEINU, BLOODU, RBCUA, WBCUA, BACTERIA, NITRU, GLUCOSEU, BILIRUBINUR, UROBILINOGEN, KETUA, LABCAST, LABCASTTY, AMORPHOS in the last 72 hours. Invalid input(s): CRYSTALS. PFTs       Sleep studies   None in epic    Cultures    Pneumonia panel by molecular PCR dated 30 April 2022: Positive for Moraxella catarrhalis and Streptococcus pneumonia. Non-SARS coronavirus  Blood cultures x2 sets sent on 30 April 2022: No growth  Influenza for A & B swab performed on 30 April 2022: Negative  COVID-19 test performed on 30 April 2022: Not detected    Echocardiogram   Summary   Left ventricular size is normal and systolic function is mildly reduced.   Ejection fraction was estimated at 50-55%. LV wall thickness is within   normal limits.   Intraventricular septal wall compression during systole suggestive of RV   pressure overload.   Markedly enlarged right atrium size.   The right ventricle was not clearly visualized. Appears dilated.  RVSP of 55-60 mm Hg consistent with moderate pulmonary hypertension.   Small circumferential pericardial effusion without evidence of tamponade   physiology. [address] size is dilated with <50% respiratory collapse. Radiology    CXR  Chest x-ray portable view performed on 3 May 2022:  Narrative PROCEDURE: XR CHEST PORTABLE   CLINICAL INFORMATION: Sob.respiratory failure. COMPARISON: Chest radiograph dated 4/30/2022.    Stable bibasilar interstitial disease       CT Scans  (See actual reports for details)  CTA CHEST W WO CONTRAST   12/12/2021   1. No evidence of pulmonary and less. 2. Consolidation in the right upper lobe at the perihilar region extending to the major fissure may represent pneumonia superimposed on the patient's interstitial process. 3. Worsening interstitial disease compared to 5/24/2020.   4. Mediastinal and right hilar adenopathy as worsened compared to prior CT.        (See actual reports for details)    Assessment   -Acute exacerbation of COPD due to pneumonia with Moraxella catarrhalis and Streptococcus pneumonia. -Right lower lobe pneumonia with Moraxella catarrhalis and Streptococcus pneumonia  -Severe chronic obstructive pulmonary disease  -Chronic respiratory failure on LT OT  -Hx of tobacco abuse.  -Essential hypertension on treatment medications under control  -Schizophrenia. Plan   -We will change prednisone 40 mg to Solumedrol to 40mg IVBID  -Follow-up pending cultures  -We will increase Rocephin to 2g grams daily  -Duonebs 3ml via nebs Q4h while awake. -Discontinue Pulmicort nebulization  -Discontinue albuterol nebs  -Titrate Oxygen to keep Spo2 >90%. -Continue incentive spirometry every 4 hourly as tolerated  -CT scan of chest with IV contrast to further evaluate abnormal chest x-ray  -Deep Venous Thrombosis Prophylaxis: Lovenox 40 mg subcu daily. \"Thank you for asking us to see this patient\"     Case discussed with Registered nurse taking care of patient. - Sandie Carreraeducated about my impression and plan. He verbalizes understanding. Questions and concerns addressed.     Electronically signed by   Governor MD Olivia on 5/3/2022 at 6:52 PM

## 2022-05-03 NOTE — PLAN OF CARE
Problem: Respiratory - Adult  Goal: Clear lung sounds  5/3/2022 0757 by Oly Salomon RCP  Outcome: Progressing

## 2022-05-03 NOTE — PROGRESS NOTES
This RN to perform assessment and obtain vitals. Patient sleeping upon arrival to the room but easily to rouse. SpO2 87-88 on 6L NC. Encouraged patient to deep breathe with no change. Placed on NRB at *L with no change. Increased to 10L with no change. Increased to 12L and SpO2 increased to 92%. Patient alert and oriented and no signs of distress. Respirations even and unlabored. Patient repositioned. Will notify MD and continue to monitor. 0920 - New orders placed including a consult to Pulmonology. Dr. Bonny Mittal notified of the consult at this time.

## 2022-05-03 NOTE — PROGRESS NOTES
INTERNAL MEDICINE Progress Note  5/3/2022 11:08 AM  Subjective:   Admit Date: 4/30/2022  PCP: Eduardo Aldana MD  Interval History:       Increased Oxygen demand this am, on 6 L oxygen via nc  SOB is better, cough-dry  Does not look uncomfortable at bedside    Objective:   Vitals: /73   Pulse 73   Temp 98.1 °F (36.7 °C) (Oral)   Resp 18   Ht 5' 10\" (1.778 m)   Wt 203 lb 14.8 oz (92.5 kg)   SpO2 93%   BMI 29.26 kg/m²   General appearance: alert and cooperative with exam  HEENT:  atraumatic  Neck: no JVD  Lungs: diminished breath sounds bilaterally  Heart: S1, S2 normal  Abdomen: soft, non-tender; bowel sounds normal; no masses,  no organomegaly  Extremities: no edema,   Neurologic: Alert, oriented, thought content appropriate      Medications:   Scheduled Meds:   ipratropium-albuterol  1 ampule Inhalation Q4H WA    predniSONE  40 mg Oral Daily    pantoprazole  40 mg Oral QAM AC    aspirin  325 mg Oral Daily    budesonide  500 mcg Nebulization BID    carBAMazepine  200 mg Oral BID    pravastatin  40 mg Oral Nightly    Tadalafil (PAH)  40 mg Oral Daily    sodium chloride flush  5-40 mL IntraVENous 2 times per day    enoxaparin  40 mg SubCUTAneous Daily    cefTRIAXone (ROCEPHIN) IV  1,000 mg IntraVENous Q24H    risperiDONE  2 mg Oral BID     Continuous Infusions:   sodium chloride         Lab Results:   CBC:   Recent Labs     05/01/22  0333   WBC 7.6   HGB 11.6*        BMP:    Recent Labs     05/01/22  0333      K 3.8      CO2 24   BUN 11   CREATININE 0.7   GLUCOSE 118*     Hepatic:   No results for input(s): AST, ALT, ALB, BILITOT, ALKPHOS in the last 72 hours.   Troponin T < 0.010       Magnesium:    Lab Results   Component Value Date    MG 2.0 12/20/2021     HgBA1c:    Lab Results   Component Value Date    LABA1C 6.0 12/15/2019     TSH:    Lab Results   Component Value Date    TSH 0.686 05/01/2022     FOLATE:    Lab Results   Component Value Date    FOLATE 9.0 12/08/2016 FERRITIN:    Lab Results   Component Value Date    FERRITIN 344 12/11/2021     EKG      Assessment and Plan:   1. CAP  2. Acute on chronic hypoxemic resp failure, increased oxygen demand  3. H/o pulm HTN  4. COPD exac  5.  Chronic nicotine abuse  6. schizophrenia     Cont IV rocephin,  Bronchodilators duonebs, pulm eval   prednisone  Cont Oxygen  cont risperidol  lovenox    Richelle Johnson MD, MD

## 2022-05-03 NOTE — CARE COORDINATION
5/3/22, 7:59 AM EDT    DISCHARGE ON GOING EVALUATION    Satish Encinas day: 3  Location: -21/021-A Reason for admit: Community acquired bacterial pneumonia [J15.9]   Procedure:   4/30 CXR: Interstitial alveolar infiltrates right upper and lower lobes and probably left base.   Barriers to Discharge: IM following. Rocephin iv daily. Lovenox. DuoNebs qid, Prednisone daily. Completed Zithromax. O2 was at 6L/nc, now on NRB mask with sat of 91%. PCP: Chelle Feilciano MD  Readmission Risk Score: 12 ( )%  Patient Goals/Plan/Treatment Preferences: From home with family. Current with [facility] for home O2. Monitor for needs. SW following.

## 2022-05-04 PROCEDURE — 99233 SBSQ HOSP IP/OBS HIGH 50: CPT | Performed by: INTERNAL MEDICINE

## 2022-05-04 PROCEDURE — 1200000000 HC SEMI PRIVATE

## 2022-05-04 PROCEDURE — 6360000002 HC RX W HCPCS: Performed by: INTERNAL MEDICINE

## 2022-05-04 PROCEDURE — 97166 OT EVAL MOD COMPLEX 45 MIN: CPT

## 2022-05-04 PROCEDURE — APPSS30 APP SPLIT SHARED TIME 16-30 MINUTES: Performed by: NURSE PRACTITIONER

## 2022-05-04 PROCEDURE — 94761 N-INVAS EAR/PLS OXIMETRY MLT: CPT

## 2022-05-04 PROCEDURE — 6370000000 HC RX 637 (ALT 250 FOR IP): Performed by: INTERNAL MEDICINE

## 2022-05-04 PROCEDURE — 2500000003 HC RX 250 WO HCPCS: Performed by: INTERNAL MEDICINE

## 2022-05-04 PROCEDURE — 97535 SELF CARE MNGMENT TRAINING: CPT

## 2022-05-04 PROCEDURE — 2580000003 HC RX 258: Performed by: INTERNAL MEDICINE

## 2022-05-04 PROCEDURE — 2700000000 HC OXYGEN THERAPY PER DAY

## 2022-05-04 PROCEDURE — 94640 AIRWAY INHALATION TREATMENT: CPT

## 2022-05-04 RX ORDER — IPRATROPIUM BROMIDE AND ALBUTEROL SULFATE 2.5; .5 MG/3ML; MG/3ML
1 SOLUTION RESPIRATORY (INHALATION) 2 TIMES DAILY
Status: DISCONTINUED | OUTPATIENT
Start: 2022-05-04 | End: 2022-05-05 | Stop reason: HOSPADM

## 2022-05-04 RX ORDER — PREDNISONE 10 MG/1
10 TABLET ORAL DAILY
Status: DISCONTINUED | OUTPATIENT
Start: 2022-05-14 | End: 2022-05-05 | Stop reason: HOSPADM

## 2022-05-04 RX ORDER — PREDNISONE 20 MG/1
40 TABLET ORAL DAILY
Status: DISCONTINUED | OUTPATIENT
Start: 2022-05-05 | End: 2022-05-05 | Stop reason: HOSPADM

## 2022-05-04 RX ORDER — PREDNISONE 20 MG/1
20 TABLET ORAL DAILY
Status: DISCONTINUED | OUTPATIENT
Start: 2022-05-11 | End: 2022-05-05 | Stop reason: HOSPADM

## 2022-05-04 RX ADMIN — METHYLPREDNISOLONE SODIUM SUCCINATE 40 MG: 40 INJECTION, POWDER, FOR SOLUTION INTRAMUSCULAR; INTRAVENOUS at 23:32

## 2022-05-04 RX ADMIN — ASPIRIN 325 MG: 325 TABLET, COATED ORAL at 08:22

## 2022-05-04 RX ADMIN — SODIUM CHLORIDE, PRESERVATIVE FREE 10 ML: 5 INJECTION INTRAVENOUS at 20:18

## 2022-05-04 RX ADMIN — CARBAMAZEPINE 200 MG: 200 TABLET ORAL at 20:19

## 2022-05-04 RX ADMIN — RISPERIDONE 2 MG: 1 TABLET ORAL at 20:18

## 2022-05-04 RX ADMIN — CEFTRIAXONE SODIUM 2000 MG: 10 INJECTION, POWDER, FOR SOLUTION INTRAVENOUS at 14:46

## 2022-05-04 RX ADMIN — TADALAFIL 40 MG: 20 TABLET ORAL at 08:22

## 2022-05-04 RX ADMIN — IPRATROPIUM BROMIDE AND ALBUTEROL SULFATE 1 AMPULE: .5; 3 SOLUTION RESPIRATORY (INHALATION) at 07:45

## 2022-05-04 RX ADMIN — CARBAMAZEPINE 200 MG: 200 TABLET ORAL at 08:23

## 2022-05-04 RX ADMIN — METHYLPREDNISOLONE SODIUM SUCCINATE 40 MG: 40 INJECTION, POWDER, FOR SOLUTION INTRAMUSCULAR; INTRAVENOUS at 00:15

## 2022-05-04 RX ADMIN — PRAVASTATIN SODIUM 40 MG: 40 TABLET ORAL at 20:19

## 2022-05-04 RX ADMIN — SODIUM CHLORIDE, PRESERVATIVE FREE 10 ML: 5 INJECTION INTRAVENOUS at 08:22

## 2022-05-04 RX ADMIN — ENOXAPARIN SODIUM 40 MG: 100 INJECTION SUBCUTANEOUS at 17:01

## 2022-05-04 RX ADMIN — IPRATROPIUM BROMIDE AND ALBUTEROL SULFATE 1 AMPULE: .5; 3 SOLUTION RESPIRATORY (INHALATION) at 22:28

## 2022-05-04 RX ADMIN — RISPERIDONE 2 MG: 1 TABLET ORAL at 08:23

## 2022-05-04 RX ADMIN — PANTOPRAZOLE SODIUM 40 MG: 40 TABLET, DELAYED RELEASE ORAL at 06:29

## 2022-05-04 RX ADMIN — METHYLPREDNISOLONE SODIUM SUCCINATE 40 MG: 40 INJECTION, POWDER, FOR SOLUTION INTRAMUSCULAR; INTRAVENOUS at 11:14

## 2022-05-04 NOTE — PROGRESS NOTES
[facility] 58  INPATIENT OCCUPATIONAL THERAPY  [facility]-STEPDOWN 3B  EVALUATION    Time:   Time In: 1410  Time Out: 1428  Timed Code Treatment Minutes: 8 Minutes  Minutes: 18          Date: 2022  Patient Name: Kassandra Dominguez,   Gender: male      MRN: [id number]  : 1970  (46 y.o.)  Referring Practitioner: Enzo Foley MD  Diagnosis: community aquired bacterial pneumonia  Additional Pertinent Hx: Per H&P:The patient is a 46 y.o. male who presents with worsening cough and shortness of breath. Symptoms started on the day of presentation. Patient is a chronic smoker and history of COPD. He reports a dry cough. Admitted to wheezing. Denies any fever denies any chills. Account of the worsening shortness of breath he presented to emergency room. He had evaluation with a chest x-ray, cxr showed Interstitial alveolar infiltrates right upper and lower lobes and probably left base. Restrictions/Precautions:  Restrictions/Precautions: General Precautions,Fall Risk  Position Activity Restriction  Other position/activity restrictions: monitor O2    Subjective  Chart Reviewed: Yes,Orders,Progress Notes,History and Physical  Patient assessed for rehabilitation services?: Yes  Family / Caregiver Present: No    Subjective: Pt supine in bed upon arrival, agreeable to OT session. Pt slightly confused throughout. Pain: 0/10:     Vitals: Oxygen: 90% on 4L O2 via NC; RN requested titrating up to 6L with activity, with Sp02 then decreasing to 89% with ADL/functional mobility.  Once seated, quickly returned to 90% and O2 titrated back down to original 4L  Heart Rate: 92 bpm, remained stable    Social/Functional History:  Lives With: Other (comment) (mom and sister)  Type of Home: Apartment  Home Layout: One level  Home Equipment:  (none)   Bathroom Shower/Tub: Tub/Shower unit  Bathroom Toilet: Standard  Bathroom Equipment:  (none)       ADL Assistance: [address] [place] [address] Avenue: Needs assistance (family primarily completes)  Ambulation Assistance: Independent  Transfer Assistance: Independent    Active : No  Mode of Transportation: Bus     Additional Comments: Question accuracy of pt's reported PLOF. Pt reported did not have home O2. VISION:WFL    HEARING:  WFL    COGNITION: Decreased Insight, Impaired Memory, Decreased Problem Solving, Decreased Safety Awareness and Impulsive    RANGE OF MOTION:  Bilateral Upper Extremity:  WFL    STRENGTH:  Bilateral Upper Extremity:  WFL    ADL:   Feeding: Independent. Grooming: Supervision. standing sinkside to wash hands; required cue to complete prior to exiting bathroom  Toileting: Supervision.  hygiene, clothing mgmt  Toilet Transfer: Supervision. to/from standard toilet  ** Increased time to have bowel movement. BALANCE:  Sitting Balance:  Independent. Standing Balance: Supervision, Stand By Assistance. BED MOBILITY:  Supine to Sit: Modified Independent    Scooting: Independent    Impulsive, unable to be redirected to transition to opposite side of bed where there was less equipment blocking path. TRANSFERS:  Sit to Stand:  Supervision. Stand to Sit: Supervision. Impulsive    FUNCTIONAL MOBILITY:  Assistive Device: None  Assist Level:  Supervision. Distance: To and from bathroom  Cues for pursed lip breathing. Assist to manage O2 tubing as pt demoed poor awareness. Activity Tolerance:  Patient tolerance of  treatment: fair. Assessment:  Assessment: Pt presents requiring increased assistance for ADLs, transfers, and functional mobility compared to PLOF. Pt will continue to benefit from OT services to improve independence with these tasks, in addition to overall strength/endurance to facilitate return to PLOF.      Performance deficits / Impairments: Decreased functional mobility ,Decreased ADL status,Decreased strength,Decreased safe awareness,Decreased cognition,Decreased endurance  Prognosis: [address] OT FOLLOW-UP: Yes  Decision Making: Medium Complexity    Treatment Initiated: Treatment and education initiated within context of evaluation. Evaluation time included review of current medical information, gathering information related to past medical, social and functional history, completion of standardized testing, formal and informal observation of tasks, assessment of data and development of plan of care and goals. Treatment time included skilled education and facilitation of tasks to increase safety and independence with ADL's for improved functional independence and quality of life. Discharge Recommendations:  24 hour supervision or assist,Patient would benefit from continued therapy after discharge    Patient Education:     Patient Education  Education Given To: Patient  Education Provided: Role of [address] (increasing activity)  Education Method: Demonstration  Barriers to Learning: Cognition  Education Outcome: Verbalized understanding,Continued education needed    Equipment Recommendations: Other: will continue to monitor pending progress    Plan:  Times per Week: 3-5x  Current Treatment Recommendations: Strengthening,ROM,Functional mobility training,Endurance training,Balance training,Safety education & training,Patient/Caregiver education & training,Equipment evaluation, education, & procurement,Self-Care / ADL. See long-term goal time frame for expected duration of plan of care. If no long-term goals established, a short length of stay is anticipated. Goals:     Short Term Goals  Time Frame for Short term goals: by discharge  Short Term Goal 1: Pt will complete BADL tasks with MI, incorporating ECT prn, to increase independence and ease with self care tasks. Short Term Goal 2: Pt will increase activity tolerance for functional mobility household distances with MI and Sp02 >=90% in prep for ADL completion.   Short Term Goal 3: Pt will tolerate dynamic standing X5 minutes with MI in prep for sinkside grooming/showering tasks. Following session, patient left in safe position with all fall risk precautions in place.

## 2022-05-04 NOTE — PLAN OF CARE
Problem: Respiratory - Adult  Goal: Achieves optimal ventilation and oxygenation  5/4/2022 0801 by Merary Kelly RCP  Outcome: Progressing     Problem: Respiratory - Adult  Goal: Clear lung sounds  Outcome: Progressing   Continue O2 and breathing tx's to improve breath sounds, increase aeration and decrease WOB.

## 2022-05-04 NOTE — PLAN OF CARE
Problem: Respiratory - Adult  Goal: Achieves optimal ventilation and oxygenation  5/4/2022 1601 by Mary Shea RN  Outcome: Progressing     Problem: Pain  Goal: Verbalizes/displays adequate comfort level or baseline comfort level  Outcome: Adequate for Discharge     Problem: Discharge Planning  Goal: Discharge to home or other facility with appropriate resources  Outcome: Adequate for Discharge     Problem: Skin/Tissue Integrity  Goal: Absence of new skin breakdown  Description: 1. Monitor for areas of redness and/or skin breakdown  2. Assess vascular access sites hourly  3. Every 4-6 hours minimum:  Change oxygen saturation probe site  4. Every 4-6 hours:  If on nasal continuous positive airway pressure, respiratory therapy assess nares and determine need for appliance change or resting period.   Outcome: Adequate for Discharge     Problem: Safety - Adult  Goal: Free from fall injury  Outcome: Adequate for Discharge     Problem: Respiratory - Adult  Goal: Able to breathe comfortably  Description: Able to breathe comfortably  Outcome: Adequate for Discharge     Problem: Chronic Conditions and Co-morbidities  Goal: Patient's chronic conditions and co-morbidity symptoms are monitored and maintained or improved  Outcome: Adequate for Discharge     Problem: ABCDS Injury Assessment  Goal: Absence of physical injury  Outcome: Adequate for Discharge     Problem: Skin/Tissue Integrity - Adult  Goal: Skin integrity remains intact  Outcome: Adequate for Discharge     Problem: Metabolic/Fluid and Electrolytes - Adult  Goal: Electrolytes maintained within normal limits  Outcome: Adequate for Discharge     Problem: Skin/Tissue Integrity - Adult  Goal: Skin integrity remains intact  Outcome: Adequate for Discharge     Problem: Metabolic/Fluid and Electrolytes - Adult  Goal: Electrolytes maintained within normal limits  Outcome: Adequate for Discharge     Problem: Metabolic/Fluid and Electrolytes - Adult  Goal: Hemodynamic stability and optimal renal function maintained  Outcome: Adequate for Discharge     Problem: Neurosensory - Adult  Goal: Achieves maximal functionality and self care  Outcome: Adequate for Discharge

## 2022-05-04 NOTE — RT PROTOCOL NOTE
RT Inhaler-Nebulizer Bronchodilator Protocol Note    There is a bronchodilator order in the chart from a provider indicating to follow the RT Bronchodilator Protocol and there is an Initiate RT Inhaler-Nebulizer Bronchodilator Protocol order as well (see protocol at bottom of note). CXR Findings:  XR CHEST PORTABLE    Result Date: 5/3/2022  Stable bibasilar interstitial disease. **This report has been created using voice recognition software. It may contain minor errors which are inherent in voice recognition technology. ** Final report electronically signed by Dr. Cash Lal MD on 5/3/2022 2:10 PM      The findings from the last RT Protocol Assessment were as follows:   History Pulmonary Disease: Chronic pulmonary disease  Respiratory Pattern: Dyspnea on exertion or RR 21-25 bpm  Breath Sounds: Slightly diminished and/or crackles  Cough: Strong, spontaneous, non-productive  Indication for Bronchodilator Therapy: Decreased or absent breath sounds  Bronchodilator Assessment Score: 6    Aerosolized bronchodilator medication orders have been revised according to the RT Inhaler-Nebulizer Bronchodilator Protocol below. Respiratory Therapist to perform RT Therapy Protocol Assessment initially then follow the protocol. Repeat RT Therapy Protocol Assessment PRN for score 0-3 or on second treatment, BID, and PRN for scores above 3. No Indications - adjust the frequency to every 6 hours PRN wheezing or bronchospasm, if no treatments needed after 48 hours then discontinue using Per Protocol order mode. If indication present, adjust the RT bronchodilator orders based on the Bronchodilator Assessment Score as indicated below.   Use Inhaler orders unless patient has one or more of the following: on home nebulizer, not able to hold breath for 10 seconds, is not alert and oriented, cannot activate and use MDI correctly, or respiratory rate 25 breaths per minute or more, then use the equivalent nebulizer order(s) with same Frequency and PRN reasons based on the score. If a patient is on this medication at home then do not decrease Frequency below that used at home. 0-3 - enter or revise RT bronchodilator order(s) to equivalent RT Bronchodilator order with Frequency of every 4 hours PRN for wheezing or increased work of breathing using Per Protocol order mode. 4-6 - enter or revise RT Bronchodilator order(s) to two equivalent RT bronchodilator orders with one order with BID Frequency and one order with Frequency of every 4 hours PRN wheezing or increased work of breathing using Per Protocol order mode. 7-10 - enter or revise RT Bronchodilator order(s) to two equivalent RT bronchodilator orders with one order with TID Frequency and one order with Frequency of every 4 hours PRN wheezing or increased work of breathing using Per Protocol order mode. 11-13 - enter or revise RT Bronchodilator order(s) to one equivalent RT bronchodilator order with QID Frequency and an Albuterol order with Frequency of every 4 hours PRN wheezing or increased work of breathing using Per Protocol order mode. Greater than 13 - enter or revise RT Bronchodilator order(s) to one equivalent RT bronchodilator order with every 4 hours Frequency and an Albuterol order with Frequency of every 2 hours PRN wheezing or increased work of breathing using Per Protocol order mode. RT to enter RT Home Evaluation for COPD & MDI Assessment order using Per Protocol order mode.     Electronically signed by Frieda Beckwith RCP on 5/4/2022 at 8:02 AM

## 2022-05-04 NOTE — CARE COORDINATION
5/4/22, 12:10 PM EDT    DISCHARGE PLANNING EVALUATION    Left messages for patients mother and sister Kristin Bhandari. Needing to verify how much family can help at discharge. Update 12:24pm: Spoke with Sandie's sister Kristin Bhandari. She told SW that he is from home with his mother Katt Drake and sister Parminder Hernandez. They are able to help with all household chores. Nobody in their household drives. Kristin Bhandari takes them to all appointments and does their shopping. She told SYDNEE that she will help with decision making if Surinder Wood needs help, as their mother will not answer her phone. Will speak with Sandie to see if he is agreeable to home health at discharge. He had [facility] in February. Update 1:27pm: Patients sister Sabrina Wooten called. She lives with Surinder Wood. SW updated her on discharge plan.

## 2022-05-04 NOTE — PROGRESS NOTES
INTERNAL MEDICINE Progress Note  5/4/2022 12:48 PM  Subjective:   Admit Date: 4/30/2022  PCP: Zacarias Frederick MD  Interval History:     No SOB at rest  SOB is better, cough-dry    Objective:   Vitals: BP (!) 119/56   Pulse 74   Temp 98.1 °F (36.7 °C) (Oral)   Resp 18   Ht 5' 10\" (1.778 m)   Wt 203 lb 14.8 oz (92.5 kg)   SpO2 97%   BMI 29.26 kg/m²   General appearance: alert and cooperative with exam  HEENT:  atraumatic  Neck: no JVD  Lungs: diminished breath sounds bilaterally  Heart: S1, S2 normal  Abdomen: soft, non-tender; bowel sounds normal; no masses,  no organomegaly  Extremities: no edema,   Neurologic: Alert, oriented, thought content appropriate      Medications:   Scheduled Meds:   [START ON 5/5/2022] predniSONE  40 mg Oral Daily    Followed by   Gena Santos ON 5/8/2022] predniSONE  30 mg Oral Daily    Followed by   Gena Santos ON 5/11/2022] predniSONE  20 mg Oral Daily    Followed by   Gena Santos ON 5/14/2022] predniSONE  10 mg Oral Daily    ipratropium-albuterol  1 ampule Inhalation BID    methylPREDNISolone  40 mg IntraVENous Q12H    cefTRIAXone (ROCEPHIN) IV  2,000 mg IntraVENous Q24H    pantoprazole  40 mg Oral QAM AC    aspirin  325 mg Oral Daily    carBAMazepine  200 mg Oral BID    pravastatin  40 mg Oral Nightly    Tadalafil (PAH)  40 mg Oral Daily    sodium chloride flush  5-40 mL IntraVENous 2 times per day    enoxaparin  40 mg SubCUTAneous Daily    risperiDONE  2 mg Oral BID     Continuous Infusions:   sodium chloride         Lab Results:   CBC:   No results for input(s): WBC, HGB, PLT in the last 72 hours. BMP:    No results for input(s): NA, K, CL, CO2, BUN, CREATININE, GLUCOSE in the last 72 hours. Hepatic:   No results for input(s): AST, ALT, ALB, BILITOT, ALKPHOS in the last 72 hours.   Troponin T < 0.010       Magnesium:    Lab Results   Component Value Date    MG 2.0 12/20/2021     HgBA1c:    Lab Results   Component Value Date    LABA1C 6.0 12/15/2019     TSH:    Lab Results Component Value Date    TSH 0.686 05/01/2022     FOLATE:    Lab Results   Component Value Date    FOLATE 9.0 12/08/2016     FERRITIN:    Lab Results   Component Value Date    FERRITIN 344 12/11/2021     EKG    CT CHEST W CONTRAST   FINDINGS: There is no evidence of a pneumothorax or pneumomediastinum. Emphysematous changes are again noted bilaterally. Trace bilateral pleural   effusions are present. Confluent atelectasis is noted within the inferior   portions of the bilateral lower lobes. Focal interstitial prominence is   noted within the lateral aspect of the right upper lobe, seen on coronal   image 41 and axial image 41. This is nonspecific but could be due to   infection or edema. Borderline sized lymph node adjacent to the leny   measures 9-10 mm in short axis dimension on axial image 32. Cardiac size is within normal limits. Calcific plaque is noted within the   coronary arteries and thoracic aorta. Thoracic aorta is partially obscured   by motion/streak artifact, but is normal in caliber without evidence of an   aneurysm. There is no definite dissection. Gynecomastia is noted. Scans of the upper abdomen demonstrate no acute abnormalities. Mild   thickening of the left adrenal gland is noted. Mild nonspecific left   perinephric stranding is noted. There is no hydronephrosis. The bone windows demonstrate no acute abnormalities. Impression:     1. Confluent atelectasis in the bilateral lower lobes. 2. Focal interstitial prominence in the lateral aspect of the right upper   lobe. This is nonspecific but could be due to infection or edema. 3. Trace bilateral pleural effusions. 4. Additional findings are detailed above. Assessment and Plan:   1. CAP  2. Acute on chronic hypoxemic resp failure, increased oxygen demand  3. H/o pulm HTN  4. COPD exac  5.  Chronic nicotine abuse  6. schizophrenia     Cont antibiotics  Bronchodilators duonebs,   steroid  Cont Oxygen  cont erika  [facility]  PT/OT    Nazanin Arrieta MD, MD

## 2022-05-04 NOTE — CARE COORDINATION
Collaborative Discharge Planning    Manohar Rawls  :  1970  MRN:  [id number]    ADMIT DATE:  2022      Discharge Planning Discharge Planning  Support Systems: Family Members,Parent  Meds-to-Beds: Does the patient want to have any new prescriptions delivered to bedside prior to discharge?: Yes  Patient expects to be discharged to[de-identified] Apartment  White Board Notes /Social Work Whiteboard Notes  /Social Work Whiteboard:  [address] [facility] with family, considering [place]. Procedure    CXR: Interstitial alveolar infiltrates right upper and lower lobes and probably left base. Discharge Plan Home with home health    Discharge Milestones and Delays: Administering IV medications and Clinical status    IM following. Continues with Rocephin iv daily. Solumedrol iv q12hr, DuoNebs q4hr [employer]. Lovenox. O2 on at 6L/nc. Sats 98%. Requested nursing to begin to wean as able. SW following.      SIGNED:  Radha Gerber RN   2022, 1:37 PM

## 2022-05-04 NOTE — PROGRESS NOTES
[facility]      Patient - Deirdre March   MRN -  [id number]   [facility] # - [[id number]]   - 1970      Date of Admission -  2022  9:45 AM  Date of evaluation -  2022  Room - 3B--ANNAMARIA Pearson MD Primary Care Physician - Nathalia Frank MD     Problem List      Active Hospital Problems    Diagnosis Date Noted    Community acquired bacterial pneumonia [J15.9] 2022     Priority: Medium     Reason for Consult    For management of COPD and exacerbation. HPI   History Obtained From: Patient and electronic medical record. Deirdre March is a 46 y.o. male  was initially admitted under hospitalist service. Pulmonary medicine was consulted for further management of COPD/exacerbation. He is a poor historian. Majority of the history obtained from reviewing the patient's chart. She is a 49-year-old pleasant gentleman with a past medical history of chronic tobacco smoking, severe COPD, chronic diastolic CHF, chronic hypoxic respiratory failure on LT OT, obesity hypoventilation and essential hypertension was admitted to [facility] in 2021 for decompressed congestive heart failure. He subsequently got discharged and staying at home. He started having progressive worsening of shortness of breath with associated cough and sputum production for the last few days. He was admitted under Dr. Jose Cyr MD service for COPD exacerbation. Pulmonary medicine was consulted for further evaluation. The patient is a 46 y.o. male who presented with worsening of shortness of breath; He is having shortness of breath: Yes  Onset: gradual   Duration: Few days  Diurnal variation: None. He denies orthopnea. He denies paroxysmal nocturnal dyspnea. His shortness of breath is associated with cough and wheezing.   He is having cough: Yes  Duration of cough: for few days  His cough is associated with sputum production: Yes   The sputum color: yellow  Hemoptysis:No  Diurnal variation: None. He gives a history of fever: No  Chills & rigors:No  Associated night sweats: No.      Past 24 Hours    -Continues on O2 6LPM- 96%- need to wean  -Afebrile   -No acute events overnight  -Wheezing/SOB improved today     -All systems reviewed   PMHx   Past Medical History      Diagnosis Date    Acute on chronic systolic congestive heart failure (HCC) 4/4/2019    Emphysema (subcutaneous) (surgical) resulting from a procedure     Hypertension     Pneumonia     Schizophrenia [facility])       Past Surgical History    History reviewed. No pertinent surgical history. Meds    Current Medications    ipratropium-albuterol  1 ampule Inhalation Q4H WA    methylPREDNISolone  40 mg IntraVENous Q12H    cefTRIAXone (ROCEPHIN) IV  2,000 mg IntraVENous Q24H    pantoprazole  40 mg Oral QAM AC    aspirin  325 mg Oral Daily    carBAMazepine  200 mg Oral BID    pravastatin  40 mg Oral Nightly    Tadalafil (PAH)  40 mg Oral Daily    sodium chloride flush  5-40 mL IntraVENous 2 times per day    enoxaparin  40 mg SubCUTAneous Daily    risperiDONE  2 mg Oral BID     sodium chloride flush, sodium chloride, ondansetron **OR** ondansetron, polyethylene glycol, acetaminophen **OR** acetaminophen  IV Drips/Infusions   sodium chloride       Home Medications  Medications Prior to Admission: Tadalafil, PAH, 20 MG tablet, Take 2 tablets by mouth daily  Spacer/Aero-Holding Chambers CAREN, 1 Device by Does not apply route daily  risperiDONE (RISPERDAL) 1 MG tablet, Take 1 tablet by mouth in the morning and 2 tablets at night.   albuterol (PROVENTIL) (2.5 MG/3ML) 0.083% nebulizer solution, Take 3 mLs by nebulization every 2 hours as needed for Wheezing  budesonide (PULMICORT) 0.5 MG/2ML nebulizer suspension, Take 2 mLs by nebulization 2 times daily  ipratropium-albuterol (DUONEB) 0.5-2.5 (3) MG/3ML SOLN nebulizer solution, Inhale 3 mLs into the lungs every 6 hours as needed for Shortness of Breath  umeclidinium-vilanterol (ANORO ELLIPTA) 62.5-25 MCG/INH AEPB inhaler, Inhale 1 puff into the lungs daily  aspirin 325 MG EC tablet, Take 1 tablet by mouth daily  pravastatin (PRAVACHOL) 40 MG tablet, Take 40 mg by mouth nightly  metoprolol succinate (TOPROL XL) 25 MG extended release tablet, Take 1 tablet by mouth daily  carBAMazepine (TEGRETOL) 200 MG tablet, Take 200 mg by mouth 2 times daily  Diet    ADULT DIET; Regular  Allergies    Patient has no known allergies. Family History          Problem Relation Age of Onset    High Blood Pressure Mother      Sleep History    Never diagnosed with sleep apnea in the past    Social History     Social History     Tobacco Use    Smoking status: Current Every Day Smoker     Packs/day: 1.00     Years: 20.00     Pack years: 20.00     Types: Cigarettes    Smokeless tobacco: Never Used   Vaping Use    Vaping Use: Never used   Substance Use Topics    Alcohol use: No    Drug use: No       Vitals     height is 5' 10\" (1.778 m) and weight is 203 lb 14.8 oz (92.5 kg). His oral temperature is 98.8 °F (37.1 °C). His blood pressure is 119/65 and his pulse is 59. His respiration is 16 and oxygen saturation is 96%. Body mass index is 29.26 kg/m². SUPPLEMENTAL O2: O2 Flow Rate (L/min): 6 L/min       I/O        Intake/Output Summary (Last 24 hours) at 5/4/2022 0827  Last data filed at 5/4/2022 0622  Gross per 24 hour   Intake 720 ml   Output 4920 ml   Net -4200 ml     I/O last 3 completed shifts: In: 6286 [P.O.:1470]  Out: 6770 [EPICN:4903; Emesis/NG output:200]   Patient Vitals for the past 96 hrs (Last 3 readings):   Weight   05/02/22 0530 203 lb 14.8 oz (92.5 kg)   05/01/22 0400 200 lb 9.9 oz (91 kg)   04/30/22 0951 200 lb (90.7 kg)       Exam   General Appearance: moderately built, moderately nourished in no acute distress on O2 via nasal cannula at 6 L/min  HEENT: Normal, Head is normocephalic, atraumatic.  Oropharynx is clear and moist.  No oral thrush. PERRL. Neck - Supple, No JVD present. No tracheal deviation. Lungs - Bilateral air entry present. Bilateral good breath sounds heard. Bilateral diffuse expiratory wheezes. No rales. Cardiovascular - Heart sounds are normal.  Regular rhythm normal rate without murmur, gallop or rub. Abdomen - Soft, nontender, nondistended, no masses or organomegaly  Neurologic - Awake, alert, oriented. There are no focal motor or sensory deficits  Extremities - No cyanosis, clubbing or edema. Musculoskeletal: Normal range of motion. Patient exhibits no tenderness. Lymphadenopathy:  No cervical adenopathy. Psychiatric: Patient  has a normal mood and affect. Skin - No bruising or bleeding. Labs  - [facility]   ABG  Lab Results   Component Value Date    PH 7.41 05/03/2022    PO2 52 05/03/2022    PCO2 45 05/03/2022    HCO3 28 05/03/2022    O2SAT 86 05/03/2022     Lab Results   Component Value Date    IFIO2 6 05/03/2022    MODE CPAP/PS 12/23/2021    SETTIDVOL 480 05/30/2020    SETPEEP 6.0 12/23/2021     CBC  No results for input(s): WBC, RBC, HGB, HCT, MCV, MCH, MCHC, RDW, PLT, MPV in the last 72 hours. BMP  No results for input(s): NA, K, CL, CO2, BUN, CREATININE, GLUCOSE, MG, PHOS, CALCIUM, IONCA, MG in the last 72 hours. LFT  No results for input(s): AST, ALT, ALB, BILITOT, ALKPHOS, LIPASE in the last 72 hours. Invalid input(s): AMYLASE  TROP  Lab Results   Component Value Date    TROPONINT < 0.010 05/01/2022    TROPONINT < 0.010 04/30/2022    TROPONINT < 0.010 12/11/2021     BNP  No results for input(s): BNP in the last 72 hours. Lactic Acid  No results for input(s): LACTA in the last 72 hours. INR  No results for input(s): INR, PROTIME in the last 72 hours. PTT  No results for input(s): APTT in the last 72 hours. Glucose  No results for input(s): POCGLU in the last 72 hours.   UA No results for input(s): RAI, [address] [facility], ALEC, CLARITYU, MUCUS, PROTEINU, BLOODU, RBCUA, WBCUA, BACTERIA, NITRU, GLUCOSEU, BILIRUBINUR, UROBILINOGEN, KETUA, LABCAST, LABCASTTY, AMORPHOS in the last 72 hours. Invalid input(s): CRYSTALS. PFTs       Sleep studies   None in epic    Cultures    Pneumonia panel by molecular PCR dated 30 April 2022: Positive for Moraxella catarrhalis and Streptococcus pneumonia. Non-SARS coronavirus  Blood cultures x2 sets sent on 30 April 2022: No growth  Influenza for A & B swab performed on 30 April 2022: Negative  COVID-19 test performed on 30 April 2022: Not detected    Echocardiogram   Summary   Left ventricular size is normal and systolic function is mildly reduced.   Ejection fraction was estimated at 50-55%. LV wall thickness is within   normal limits.   Intraventricular septal wall compression during systole suggestive of RV   pressure overload.   Markedly enlarged right atrium size.   The right ventricle was not clearly visualized. Appears dilated.  RVSP of 55-60 mm Hg consistent with moderate pulmonary hypertension.   Small circumferential pericardial effusion without evidence of tamponade   physiology. [address] size is dilated with <50% respiratory collapse. Radiology    CXR  Chest x-ray portable view performed on 3 May 2022:  Narrative PROCEDURE: XR CHEST PORTABLE   CLINICAL INFORMATION: Sob.respiratory failure. COMPARISON: Chest radiograph dated 4/30/2022. Stable bibasilar interstitial disease       CT Scans  (See actual reports for details)  05/04/2022   CT CHEST WITHOUT CONTRAST   1. Confluent atelectasis in the bilateral lower lobes. 2. Focal interstitial prominence in the lateral aspect of the right upper lobe. This is nonspecific but could be due to infection or edema. 3. Trace bilateral pleural effusions. 4. Additional findings are detailed above. CTA CHEST W WO CONTRAST   12/12/2021   1. No evidence of pulmonary and less.    2. Consolidation in the right upper lobe at the perihilar region extending to the major fissure may represent pneumonia superimposed on the patient's interstitial process. 3. Worsening interstitial disease compared to 5/24/2020.   4. Mediastinal and right hilar adenopathy as worsened compared to prior CT.        (See actual reports for details)    Assessment   -Acute exacerbation of COPD due to pneumonia with Moraxella catarrhalis and Streptococcus pneumonia. -Right lower lobe pneumonia with Moraxella catarrhalis and Streptococcus pneumonia  -Severe chronic obstructive pulmonary disease  -Chronic respiratory failure on LT OT  -Hx of tobacco abuse.  -Essential hypertension on treatment medications under control  -Schizophrenia. -Full code     Plan   -Solumedrol to 40mg IVBID- will start Prednisone taper tomorrow 5/5  -Cultures reviewed   -Rocephin to 2g grams daily x 7 days total therapy   -Duonebs 3ml via nebs Q4h while awake. -Titrate Oxygen to keep Spo2 >90%. -Continue incentive spirometry every 4 hourly as tolerated  -CT scan of chest reviewed with improvement from December   -DVT prophylaxis: Lovenox   -Home O2 evaluation prior to dc home     Case discussed with Registered nurse taking care of patient. - Sandie Carreraeducated about my impression and plan. He verbalizes understanding. Questions and concerns addressed. Meds and orders reviewed     Electronically signed by   Bedelia Dubin, APRN - CNP on 5/4/2022 at 8:27 AM     Addendum by Dr. Yisel Carrillo MD:  I have seen and examined the patient independently. Face to face evaluation and examination was performed. The above evaluation and note has been reviewed. Labs and radiographs were reviewed. I Have discussed with Ms. Bedelia Dubin, APRN- CNP about this patient in detail. Physical exam was performed by me. See below for details. More than half of the total time for this encounter spent by me. The above assessment and plan has been reviewed. Please see my modifications mentioned below.      My modifications:    Physical Exam:  Constitutional: Patient appears in no distress on 6 L of oxygen via nasal cannula. Mouth/Throat: Oropharynx is clear and moist.    Neck: Neck supple. Cardiovascular: S1 and S2 heard. Pulmonary/Chest: Bilateral air entry present. Good breath sounds on both sides, diminished at bases. No wheezes. No rales. Abdominal: Soft. No tenderness. Extremities: Patient exhibits no edema. Neurological: Patient is awake and alert.       Assessment:  -Pulmonary hypertension with a right ventricular systolic pressure of 55 to 60 mmHg-most likely due to who group 3 with hx of  severe COPD VS other etiologies.  -Patient had a CT angiogram of chest performed on 12 December 2021-no evidence of pulmonary embolism      Atilio Franco MD 5/4/2022 7:31 PM
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
## 2022-05-05 VITALS
RESPIRATION RATE: 18 BRPM | WEIGHT: 203.93 LBS | BODY MASS INDEX: 29.19 KG/M2 | SYSTOLIC BLOOD PRESSURE: 112 MMHG | HEIGHT: 70 IN | TEMPERATURE: 97.5 F | HEART RATE: 86 BPM | DIASTOLIC BLOOD PRESSURE: 63 MMHG | OXYGEN SATURATION: 95 %

## 2022-05-05 PROBLEM — J96.21 ACUTE ON CHRONIC RESPIRATORY FAILURE WITH HYPOXIA (HCC): Status: ACTIVE | Noted: 2022-05-05

## 2022-05-05 PROCEDURE — 99232 SBSQ HOSP IP/OBS MODERATE 35: CPT | Performed by: INTERNAL MEDICINE

## 2022-05-05 PROCEDURE — APPSS30 APP SPLIT SHARED TIME 16-30 MINUTES: Performed by: NURSE PRACTITIONER

## 2022-05-05 PROCEDURE — 2580000003 HC RX 258: Performed by: INTERNAL MEDICINE

## 2022-05-05 PROCEDURE — 6370000000 HC RX 637 (ALT 250 FOR IP): Performed by: INTERNAL MEDICINE

## 2022-05-05 PROCEDURE — 94761 N-INVAS EAR/PLS OXIMETRY MLT: CPT

## 2022-05-05 PROCEDURE — 94640 AIRWAY INHALATION TREATMENT: CPT

## 2022-05-05 PROCEDURE — 6370000000 HC RX 637 (ALT 250 FOR IP): Performed by: NURSE PRACTITIONER

## 2022-05-05 RX ORDER — CEFDINIR 300 MG/1
300 CAPSULE ORAL EVERY 12 HOURS SCHEDULED
Status: DISCONTINUED | OUTPATIENT
Start: 2022-05-05 | End: 2022-05-05 | Stop reason: HOSPADM

## 2022-05-05 RX ORDER — PREDNISONE 20 MG/1
TABLET ORAL
Qty: 15 TABLET | Refills: 0 | Status: SHIPPED | OUTPATIENT
Start: 2022-05-05 | End: 2022-05-17

## 2022-05-05 RX ORDER — CEFDINIR 300 MG/1
300 CAPSULE ORAL EVERY 12 HOURS SCHEDULED
Status: DISCONTINUED | OUTPATIENT
Start: 2022-05-05 | End: 2022-05-05

## 2022-05-05 RX ORDER — RISPERIDONE 2 MG/1
2 TABLET, FILM COATED ORAL 2 TIMES DAILY
Qty: 60 TABLET | Refills: 3 | Status: SHIPPED | OUTPATIENT
Start: 2022-05-05

## 2022-05-05 RX ORDER — CEFDINIR 300 MG/1
300 CAPSULE ORAL EVERY 12 HOURS SCHEDULED
Qty: 3 CAPSULE | Refills: 0 | Status: SHIPPED | OUTPATIENT
Start: 2022-05-05 | End: 2022-05-07

## 2022-05-05 RX ORDER — AMOXICILLIN AND CLAVULANATE POTASSIUM 875; 125 MG/1; MG/1
1 TABLET, FILM COATED ORAL EVERY 12 HOURS SCHEDULED
Status: DISCONTINUED | OUTPATIENT
Start: 2022-05-05 | End: 2022-05-05

## 2022-05-05 RX ADMIN — CEFDINIR 300 MG: 300 CAPSULE ORAL at 11:39

## 2022-05-05 RX ADMIN — PREDNISONE 40 MG: 20 TABLET ORAL at 08:03

## 2022-05-05 RX ADMIN — ASPIRIN 325 MG: 325 TABLET, COATED ORAL at 08:03

## 2022-05-05 RX ADMIN — IPRATROPIUM BROMIDE AND ALBUTEROL SULFATE 1 AMPULE: .5; 3 SOLUTION RESPIRATORY (INHALATION) at 08:21

## 2022-05-05 RX ADMIN — TADALAFIL 40 MG: 20 TABLET ORAL at 08:05

## 2022-05-05 RX ADMIN — PANTOPRAZOLE SODIUM 40 MG: 40 TABLET, DELAYED RELEASE ORAL at 07:31

## 2022-05-05 RX ADMIN — RISPERIDONE 2 MG: 1 TABLET ORAL at 08:05

## 2022-05-05 RX ADMIN — SODIUM CHLORIDE, PRESERVATIVE FREE 10 ML: 5 INJECTION INTRAVENOUS at 08:05

## 2022-05-05 RX ADMIN — CARBAMAZEPINE 200 MG: 200 TABLET ORAL at 08:04

## 2022-05-05 NOTE — PROGRESS NOTES
[facility]      Patient - Broderick Betts   MRN -  [id number]   [facility] # - [de-identified]   - 1970      Date of Admission -  2022  9:45 AM  Date of evaluation -  2022  Room - 3B--ANNAMARIA Turner MD Primary Care Physician - Eduardo Aldana MD     Problem List      Active Hospital Problems    Diagnosis Date Noted    Community acquired bacterial pneumonia [J15.9] 2022     Priority: Medium     Reason for Consult    For management of COPD and exacerbation. HPI   History Obtained From: Patient and electronic medical record. Broderick Betts is a 46 y.o. male  was initially admitted under hospitalist service. Pulmonary medicine was consulted for further management of COPD/exacerbation. He is a poor historian. Majority of the history obtained from reviewing the patient's chart. She is a 75-year-old pleasant gentleman with a past medical history of chronic tobacco smoking, severe COPD, chronic diastolic CHF, chronic hypoxic respiratory failure on LT OT, obesity hypoventilation and essential hypertension was admitted to [facility] in 2021 for decompressed congestive heart failure. He subsequently got discharged and staying at home. He started having progressive worsening of shortness of breath with associated cough and sputum production for the last few days. He was admitted under Dr. Gary Patel MD service for COPD exacerbation. Pulmonary medicine was consulted for further evaluation. The patient is a 46 y.o. male who presented with worsening of shortness of breath; He is having shortness of breath: Yes  Onset: gradual   Duration: Few days  Diurnal variation: None. He denies orthopnea. He denies paroxysmal nocturnal dyspnea. His shortness of breath is associated with cough and wheezing.   He is having cough: Yes  Duration of cough: for few days  His cough is associated with sputum production: Yes   The sputum color: yellow  Hemoptysis:No  Diurnal variation: None. He gives a history of fever: No  Chills & rigors:No  Associated night sweats: No.      Past 24 Hours    -Continues on O2 5LPM- 94%- need to wean  -Wheezing/SOB improved today   -Probable DC home today     -All systems reviewed   PMHx   Past Medical History      Diagnosis Date    Acute on chronic systolic congestive heart failure (HCC) 4/4/2019    Emphysema (subcutaneous) (surgical) resulting from a procedure     Hypertension     Pneumonia     Schizophrenia [facility])       Past Surgical History    History reviewed. No pertinent surgical history. Meds    Current Medications    predniSONE  40 mg Oral Daily    Followed by   Jorden Lair ON 5/8/2022] predniSONE  30 mg Oral Daily    Followed by   Jorden Lair ON 5/11/2022] predniSONE  20 mg Oral Daily    Followed by   Jorden Lair ON 5/14/2022] predniSONE  10 mg Oral Daily    ipratropium-albuterol  1 ampule Inhalation BID    cefTRIAXone (ROCEPHIN) IV  2,000 mg IntraVENous Q24H    pantoprazole  40 mg Oral QAM AC    aspirin  325 mg Oral Daily    carBAMazepine  200 mg Oral BID    pravastatin  40 mg Oral Nightly    Tadalafil (PAH)  40 mg Oral Daily    sodium chloride flush  5-40 mL IntraVENous 2 times per day    enoxaparin  40 mg SubCUTAneous Daily    risperiDONE  2 mg Oral BID     sodium chloride flush, sodium chloride, ondansetron **OR** ondansetron, polyethylene glycol, acetaminophen **OR** acetaminophen  IV Drips/Infusions   sodium chloride       Home Medications  Medications Prior to Admission: Tadalafil, PAH, 20 MG tablet, Take 2 tablets by mouth daily  Spacer/Aero-Holding Spencer Farhat, 1 Device by Does not apply route daily  risperiDONE (RISPERDAL) 1 MG tablet, Take 1 tablet by mouth in the morning and 2 tablets at night.   albuterol (PROVENTIL) (2.5 MG/3ML) 0.083% nebulizer solution, Take 3 mLs by nebulization every 2 hours as needed for Wheezing  budesonide (PULMICORT) 0.5 MG/2ML nebulizer suspension, Take 2 mLs by nebulization 2 times daily  ipratropium-albuterol (DUONEB) 0.5-2.5 (3) MG/3ML SOLN nebulizer solution, Inhale 3 mLs into the lungs every 6 hours as needed for Shortness of Breath  umeclidinium-vilanterol (ANORO ELLIPTA) 62.5-25 MCG/INH AEPB inhaler, Inhale 1 puff into the lungs daily  aspirin 325 MG EC tablet, Take 1 tablet by mouth daily  pravastatin (PRAVACHOL) 40 MG tablet, Take 40 mg by mouth nightly  metoprolol succinate (TOPROL XL) 25 MG extended release tablet, Take 1 tablet by mouth daily  carBAMazepine (TEGRETOL) 200 MG tablet, Take 200 mg by mouth 2 times daily  Diet    ADULT DIET; Regular  Allergies    Patient has no known allergies. Family History          Problem Relation Age of Onset    High Blood Pressure Mother      Sleep History    Never diagnosed with sleep apnea in the past    Social History     Social History     Tobacco Use    Smoking status: Current Every Day Smoker     Packs/day: 1.00     Years: 20.00     Pack years: 20.00     Types: Cigarettes    Smokeless tobacco: Never Used   Vaping Use    Vaping Use: Never used   Substance Use Topics    Alcohol use: No    Drug use: No       Vitals     height is 5' 10\" (1.778 m) and weight is 203 lb 14.8 oz (92.5 kg). His oral temperature is 98.1 °F (36.7 °C). His blood pressure is 116/65 and his pulse is 85. His respiration is 18 and oxygen saturation is 94%. Body mass index is 29.26 kg/m². SUPPLEMENTAL O2: O2 Flow Rate (L/min): 5 L/min       I/O        Intake/Output Summary (Last 24 hours) at 5/5/2022 0945  Last data filed at 5/5/2022 0555  Gross per 24 hour   Intake 600 ml   Output 950 ml   Net -350 ml     I/O last 3 completed shifts:   In: 8466 [P.O.:1156]  Out: 4800 [Urine:4800]   Patient Vitals for the past 96 hrs (Last 3 readings):   Weight   05/02/22 0530 203 lb 14.8 oz (92.5 kg)       Exam   General Appearance: moderately built, moderately nourished in no acute distress on O2 via nasal cannula at 5 L/min  HEENT: Normal, Head is normocephalic, atraumatic. Oropharynx is clear and moist.  No oral thrush. PERRL. Neck - Supple, No JVD present. No tracheal deviation. Lungs - Bilateral air entry present. Bilateral good breath sounds heard. Bilateral diffuse expiratory wheezes. No rales. Cardiovascular - Heart sounds are normal.  Regular rhythm normal rate without murmur, gallop or rub. Abdomen - Soft, nontender, nondistended, no masses or organomegaly  Neurologic - Awake, alert, oriented. There are no focal motor or sensory deficits  Extremities - No cyanosis, clubbing or edema. Musculoskeletal: Normal range of motion. Patient exhibits no tenderness. Lymphadenopathy:  No cervical adenopathy. Psychiatric: Patient  has a normal mood and affect. Skin - No bruising or bleeding. Labs  - [facility] and [facility]   ABG  Lab Results   Component Value Date    PH 7.41 05/03/2022    PO2 52 05/03/2022    PCO2 45 05/03/2022    HCO3 28 05/03/2022    O2SAT 86 05/03/2022     Lab Results   Component Value Date    IFIO2 6 05/03/2022    MODE CPAP/PS 12/23/2021    SETTIDVOL 480 05/30/2020    SETPEEP 6.0 12/23/2021     CBC  No results for input(s): WBC, RBC, HGB, HCT, MCV, MCH, MCHC, RDW, PLT, MPV in the last 72 hours. BMP  No results for input(s): NA, K, CL, CO2, BUN, CREATININE, GLUCOSE, MG, PHOS, CALCIUM, IONCA, MG in the last 72 hours. LFT  No results for input(s): AST, ALT, ALB, BILITOT, ALKPHOS, LIPASE in the last 72 hours. Invalid input(s): AMYLASE  TROP  Lab Results   Component Value Date    TROPONINT < 0.010 05/01/2022    TROPONINT < 0.010 04/30/2022    TROPONINT < 0.010 12/11/2021     BNP  No results for input(s): BNP in the last 72 hours. Lactic Acid  No results for input(s): LACTA in the last 72 hours. INR  No results for input(s): INR, PROTIME in the last 72 hours. PTT  No results for input(s): APTT in the last 72 hours.   Glucose  No results for input(s): POCGLU in the last 72 hours. UA No results for input(s): SPECGRAV, PHUR, COLORU, CLARITYU, MUCUS, PROTEINU, BLOODU, RBCUA, WBCUA, BACTERIA, NITRU, GLUCOSEU, BILIRUBINUR, UROBILINOGEN, KETUA, LABCAST, LABCASTTY, AMORPHOS in the last 72 hours. Invalid input(s): CRYSTALS. PFTs       Sleep studies   None in epic    Cultures    Pneumonia panel by molecular PCR dated 30 April 2022: Positive for Moraxella catarrhalis and Streptococcus pneumonia. Non-SARS coronavirus  Blood cultures x2 sets sent on 30 April 2022: No growth  Influenza for A & B swab performed on 30 April 2022: Negative  COVID-19 test performed on 30 April 2022: Not detected    Echocardiogram   Summary   Left ventricular size is normal and systolic function is mildly reduced.   Ejection fraction was estimated at 50-55%. LV wall thickness is within   normal limits.   Intraventricular septal wall compression during systole suggestive of RV   pressure overload.   Markedly enlarged right atrium size.   The right ventricle was not clearly visualized. Appears dilated.  RVSP of 55-60 mm Hg consistent with moderate pulmonary hypertension.   Small circumferential pericardial effusion without evidence of tamponade   physiology. [address] size is dilated with <50% respiratory collapse. Radiology    CXR  Chest x-ray portable view performed on 3 May 2022:  Narrative PROCEDURE: XR CHEST PORTABLE   CLINICAL INFORMATION: Sob.respiratory failure. COMPARISON: Chest radiograph dated 4/30/2022. Stable bibasilar interstitial disease       CT Scans  (See actual reports for details)  05/04/2022   CT CHEST WITHOUT CONTRAST   1. Confluent atelectasis in the bilateral lower lobes. 2. Focal interstitial prominence in the lateral aspect of the right upper lobe. This is nonspecific but could be due to infection or edema. 3. Trace bilateral pleural effusions. 4. Additional findings are detailed above. CTA CHEST W WO CONTRAST   12/12/2021   1.  No evidence of pulmonary and less.   2. Consolidation in the right upper lobe at the perihilar region extending to the major fissure may represent pneumonia superimposed on the patient's interstitial process. 3. Worsening interstitial disease compared to 5/24/2020.   4. Mediastinal and right hilar adenopathy as worsened compared to prior CT.        (See actual reports for details)    Assessment   -Acute exacerbation of COPD due to pneumonia with Moraxella catarrhalis and Streptococcus pneumonia. -Right lower lobe pneumonia with Moraxella catarrhalis and Streptococcus pneumonia  -Severe chronic obstructive pulmonary disease  -Chronic respiratory failure on LT OT  -Hx of tobacco abuse.  -Essential hypertension on treatment medications under control  -Schizophrenia. -Pulmonary hypertension with a right ventricular systolic pressure of 55 to 60 mmHg-most likely due to who group 3 with hx of  severe COPD VS other etiologies.  -Patient had a CT angiogram of chest performed on 12 December 2021-no evidence of pulmonary embolism  -Full code     Plan   -Prednisone taper in place   -Cultures reviewed   -Rocephin DC start Omniceff 300 mg BID x 4 doses starting today   -Duonebs 3ml via nebs Q4h while awake. -Titrate Oxygen to keep Spo2 >90%. -Continue incentive spirometry every 4 hourly as tolerated  -CT scan of chest reviewed with improvement from December   -DVT prophylaxis: Lovenox   -Home O2 evaluation prior to dc home   -Probable home today   -Keeping appt 5/26/22 at 1pm with Chaz Armstrong CNP     Case discussed with Registered nurse taking care of patient. - Sandie Carreraeducated about my impression and plan. He verbalizes understanding. Questions and concerns addressed. Meds and orders reviewed   -Continue current home nebulizer and inhaler regimen     Electronically signed by   SAVANNAH Kaur - CNP on 5/5/2022 at 9:45 AM     Addendum by Dr. Darrow Sacks, MD:  I have seen and examined the patient independently.  Face to face evaluation and examination was performed. The above evaluation and note has been reviewed. Labs and radiographs were reviewed. I Have discussed with Ms. EMILY Alan CNP about this patient in detail. Physical exam was performed by me. See below for details. More than half of the total time for this encounter spent by me. The above assessment and plan has been reviewed. Please see my modifications mentioned below. My modifications:    Physical Exam:  Constitutional: Patient appears in no distress on 5LPM via nasal cannula. Mouth/Throat: Oropharynx is clear and moist.    Neck: Neck supple. Cardiovascular: S1 and S2 heard. Pulmonary/Chest: Bilateral air entry present. Good breath sounds on both sides, diminished at bases. No wheezes. No rales. Abdominal: Soft. No tenderness. Extremities: Patient exhibits no edema. Neurological: Patient is awake and alert. Plan:  Follow up as above. - Dominique Jamison educated about my impression and plan. He verbalizes understanding.     Devorah Spencer MD 5/5/2022 8:11 PM

## 2022-05-05 NOTE — PROGRESS NOTES
A home oxygen evaluation has been completed. [x]Patient is an inpatient. It is expected that the patient will be discharged within the next 48 hours. Qualified provider to write order for home prescription if patient qualifies. Social service/care managers will arrange for home oxygen. If patient is active, arrange for Home Medical supplier to assess for Oxygen Conserving Device per pulse oximetry. []Patient is an outpatient. Results will be faxed to the ordering provider. Qualified provider to write order for home prescription if patient qualifies and arranges for home oxygen. Patient was placed on room air for 5 minutes. SpO2 was 86 % on room air at rest. Patients SpO2 was below 89% and qualified for home oxygen. Oxygen was applied at 3 lpm via nasal cannula to maintain a SpO2 between 90-92% while at rest. Actual SpO2 was 92 %. Patient can ambulate for exercise flow rate. Patients was ambulated, SpO2 was 90% on 6 lpm to maintain SpO2 between 90-92% while exercising. If oxygen need is greater than 4 lpm the SpO2 on 4 lpm was 87%. Note: For any SpO2 at 53% see policy and procedure for possible qualifications.

## 2022-05-05 NOTE — CARE COORDINATION
5/5/22, 12:15 PM EDT    Patient goals/plan/ treatment preferences discussed by  and . Patient goals/plan/ treatment preferences reviewed with patient/ family. Patient/ family verbalize understanding of discharge plan and are in agreement with goal/plan/treatment preferences. Understanding was demonstrated using the teach back method. AVS provided by RN at time of discharge, which includes all necessary medical information pertaining to the patients current course of illness, treatment, post-discharge goals of care, and treatment preferences. Services At/After Discharge: Home Health, Nursing service, OT and PT       IMM Letter  IMM Letter given to Patient/Family/Significant other/Guardian/POA/by[de-identified] Sia Hutton RN CM  IMM Letter date given[de-identified] 05/05/22  IMM Letter time given[de-identified] 1     Sandie will be discharged today to home today where he lives with his Mom and sister. He will have new [facility] [address] for RN, PT and OT. [facility] and are aware patient will be discharged today. She can pull all the information she needs from Epic.

## 2022-05-05 NOTE — PROGRESS NOTES
INTERNAL MEDICINE Progress Note  5/5/2022 3:02 PM  Subjective:   Admit Date: 4/30/2022  PCP: Topher Boogie MD  Interval History:     Doing better today  No SOB at rest  cough-dry    Objective:   Vitals: /76   Pulse 88   Temp 97.5 °F (36.4 °C) (Oral)   Resp 18   Ht 5' 10\" (1.778 m)   Wt 203 lb 14.8 oz (92.5 kg)   SpO2 96%   BMI 29.26 kg/m²   General appearance: alert and cooperative with exam  HEENT:  atraumatic  Neck: no JVD  Lungs: improved AE matoe  Heart: S1, S2 normal  Abdomen: soft, non-tender; bowel sounds normal; no masses,  no organomegaly  Extremities: no edema,   Neurologic: Alert, oriented, thought content appropriate      Medications:   Scheduled Meds:   cefdinir  300 mg Oral 2 times per day    predniSONE  40 mg Oral Daily    Followed by   Miryam La ON 5/8/2022] predniSONE  30 mg Oral Daily    Followed by   Miryam La ON 5/11/2022] predniSONE  20 mg Oral Daily    Followed by   Miryam La ON 5/14/2022] predniSONE  10 mg Oral Daily    ipratropium-albuterol  1 ampule Inhalation BID    pantoprazole  40 mg Oral QAM AC    aspirin  325 mg Oral Daily    carBAMazepine  200 mg Oral BID    pravastatin  40 mg Oral Nightly    Tadalafil (PAH)  40 mg Oral Daily    sodium chloride flush  5-40 mL IntraVENous 2 times per day    enoxaparin  40 mg SubCUTAneous Daily    risperiDONE  2 mg Oral BID     Continuous Infusions:   sodium chloride         Lab Results:   CBC:   No results for input(s): WBC, HGB, PLT in the last 72 hours. BMP:    No results for input(s): NA, K, CL, CO2, BUN, CREATININE, GLUCOSE in the last 72 hours. Hepatic:   No results for input(s): AST, ALT, ALB, BILITOT, ALKPHOS in the last 72 hours.   Troponin T < 0.010       Magnesium:    Lab Results   Component Value Date    MG 2.0 12/20/2021     HgBA1c:    Lab Results   Component Value Date    LABA1C 6.0 12/15/2019     TSH:    Lab Results   Component Value Date    TSH 0.686 05/01/2022     FOLATE:    Lab Results   Component Value Date FOLATE 9.0 12/08/2016     FERRITIN:    Lab Results   Component Value Date    FERRITIN 344 12/11/2021     EKG    CT CHEST W CONTRAST   FINDINGS: There is no evidence of a pneumothorax or pneumomediastinum. Emphysematous changes are again noted bilaterally. Trace bilateral pleural   effusions are present. Confluent atelectasis is noted within the inferior   portions of the bilateral lower lobes. Focal interstitial prominence is   noted within the lateral aspect of the right upper lobe, seen on coronal   image 41 and axial image 41. This is nonspecific but could be due to   infection or edema. Borderline sized lymph node adjacent to the leny   measures 9-10 mm in short axis dimension on axial image 32. Cardiac size is within normal limits. Calcific plaque is noted within the   coronary arteries and thoracic aorta. Thoracic aorta is partially obscured   by motion/streak artifact, but is normal in caliber without evidence of an   aneurysm. There is no definite dissection. Gynecomastia is noted. Scans of the upper abdomen demonstrate no acute abnormalities. Mild   thickening of the left adrenal gland is noted. Mild nonspecific left   perinephric stranding is noted. There is no hydronephrosis. The bone windows demonstrate no acute abnormalities. Impression:     1. Confluent atelectasis in the bilateral lower lobes. 2. Focal interstitial prominence in the lateral aspect of the right upper   lobe. This is nonspecific but could be due to infection or edema. 3. Trace bilateral pleural effusions. 4. Additional findings are detailed above. Assessment and Plan:   1. CAP  2. Acute on chronic hypoxemic resp failure, increased oxygen demand  3. H/o pulm HTN  4. COPD exac  5. Chronic nicotine abuse  6. schizophrenia     Improved on antibiotics  Bronchodilators duonebs,   prednisone  cont risperidol  Will dc home . Patient has home oxygen.     Nazanin Arrieta MD, MD

## 2022-05-05 NOTE — PLAN OF CARE
Problem: Respiratory - Adult  Goal: Clear lung sounds  Outcome: Progressing     Problem: Respiratory - Adult  Goal: Achieves optimal ventilation and oxygenation  5/4/2022 2233 by Rosa Elena Armendariz [facility]  Outcome: Progressing    Continue breathing treatments to improve aeration. Patient currently on 4LNC with Spo2 > 90%. Patient states 4L is his baseline.

## 2022-05-05 NOTE — DISCHARGE SUMMARY
Discharge Summary    Dale De La Torre  :  1970  MRN:  [id number]    Admit date:  2022  Discharge date:      Admitting Physician:  Richelle Johnson MD    Discharge Diagnoses:    1. CAP  2. Acute on chronic hypoxemic resp failure, increased oxygen demand  3. H/o pulm HTN  4. COPD exac  5. Chronic nicotine abuse  6. schizophrenia      Admission Condition:  serious  Discharged Condition:  good    Hospital Course:   Patient was admitted and treated for Pneumonia, copd exacerbation with hypoxemic resp failure. He responded to antibiotics, steroids. Discharge Medications:         Medication List      START taking these medications    cefdinir 300 MG capsule  Commonly known as: OMNICEF  Take 1 capsule by mouth every 12 hours for 3 doses     predniSONE 20 MG tablet  Commonly known as: DELTASONE  Take 2 tablets by mouth daily for 3 days, THEN 1.5 tablets daily for 3 days, THEN 1 tablet daily for 3 days, THEN 0.5 tablets daily for 3 days. Start taking on:  May 5, 2022        CHANGE how you take these medications    risperiDONE 2 MG tablet  Commonly known as: RISPERDAL  Take 1 tablet by mouth 2 times daily  What changed:   · medication strength  · how much to take  · how to take this  · when to take this  · additional instructions        CONTINUE taking these medications    albuterol (2.5 MG/3ML) 0.083% nebulizer solution  Commonly known as: PROVENTIL  Take 3 mLs by nebulization every 2 hours as needed for Wheezing     aspirin 325 MG EC tablet  Take 1 tablet by mouth daily     budesonide 0.5 MG/2ML nebulizer suspension  Commonly known as: Pulmicort  Take 2 mLs by nebulization 2 times daily     carBAMazepine 200 MG tablet  Commonly known as: TEGRETOL     ipratropium-albuterol 0.5-2.5 (3) MG/3ML Soln nebulizer solution  Commonly known as: DUONEB  Inhale 3 mLs into the lungs every 6 hours as needed for Shortness of Breath     metoprolol succinate 25 MG extended release tablet  Commonly known as: TOPROL XL  Take 1 tablet by mouth daily     pravastatin 40 MG tablet  Commonly known as: PRAVACHOL     Spacer/Aero-Holding [employer]  1 Device by Does not apply route daily     Tadalafil (PAH) 20 MG tablet  Take 2 tablets by mouth daily     umeclidinium-vilanterol 62.5-25 MCG/INH Aepb inhaler  Commonly known as: ANORO ELLIPTA  Inhale 1 puff into the lungs daily           Where to Get Your Medications      These medications were sent to [address] [place] [address] , [address] [facility]  [address] [place] [address]  1st Floor, [address] [facility]    Phone: [phone]   · cefdinir 300 MG capsule  · predniSONE 20 MG tablet  · risperiDONE 2 MG tablet         Consults:  pulmonary/intensive care    Significant Diagnostic Studies: labs: Magnesium:          Lab Results   Component Value Date     MG 2.0 12/20/2021      HgBA1c:          Lab Results   Component Value Date     LABA1C 6.0 12/15/2019      TSH:          Lab Results   Component Value Date     TSH 0.686 05/01/2022      FOLATE:          Lab Results   Component Value Date     FOLATE 9.0 12/08/2016      FERRITIN:          Lab Results   Component Value Date     FERRITIN 344 12/11/2021      EKG    CT CHEST W CONTRAST   FINDINGS: There is no evidence of a pneumothorax or pneumomediastinum. Emphysematous changes are again noted bilaterally. Trace bilateral pleural   effusions are present. Confluent atelectasis is noted within the inferior   portions of the bilateral lower lobes. Focal interstitial prominence is   noted within the lateral aspect of the right upper lobe, seen on coronal   image 41 and axial image 41. This is nonspecific but could be due to   infection or edema. Borderline sized lymph node adjacent to the leny   measures 9-10 mm in short axis dimension on axial image 32. Cardiac size is within normal limits. Calcific plaque is noted within the   coronary arteries and thoracic aorta.  Thoracic aorta is partially obscured   by motion/streak artifact, but is normal in caliber without evidence of an   aneurysm. There is no definite dissection. Gynecomastia is noted. Scans of the upper abdomen demonstrate no acute abnormalities. Mild   thickening of the left adrenal gland is noted. Mild nonspecific left   perinephric stranding is noted. There is no hydronephrosis. The bone windows demonstrate no acute abnormalities. Impression:     1. Confluent atelectasis in the bilateral lower lobes. 2. Focal interstitial prominence in the lateral aspect of the right upper   lobe. This is nonspecific but could be due to infection or edema. 3. Trace bilateral pleural effusions. 4. Additional findings are detailed above.          Assessment and Plan:   7. CAP  8. Acute on chronic hypoxemic resp failure, increased oxygen demand  9. H/o pulm HTN  10. COPD exac  11. Chronic nicotine abuse  12. schizophrenia   Treatments:     Improved on antibiotics  Bronchodilators duonebs,   prednisone  cont risperidol  Will dc home . Patient has home oxygen. Signed            A home oxygen evaluation has been completed.      [x]? Patient is an inpatient. It is expected that the patient will be discharged within the next 48 hours. Qualified provider to write order for home prescription if patient qualifies. Social service/care managers will arrange for home oxygen. If patient is active, arrange for Home Medical supplier to assess for Oxygen Conserving Device per pulse oximetry. []? Patient is an outpatient. Results will be faxed to the ordering provider. Qualified provider to write order for home prescription if patient qualifies and arranges for home oxygen.        Patient was placed on room air for 5 minutes. SpO2 was 86 % on room air at rest. Patients SpO2 was below 89% and qualified for home oxygen. Oxygen was applied at 3 lpm via nasal cannula to maintain a SpO2 between 90-92% while at rest. Actual SpO2 was 92 %.  Patient can ambulate for exercise flow rate. Patients was ambulated, SpO2 was 90% on 6 lpm to maintain SpO2 between 90-92% while exercising.     If oxygen need is greater than 4 lpm the SpO2 on 4 lpm was 87%. Disposition:   Home.     Signed:  Deepika Hartman MD  5/5/2022, 3:09 PM

## 2022-05-06 LAB
BLOOD CULTURE, ROUTINE: NORMAL
BLOOD CULTURE, ROUTINE: NORMAL

## 2022-05-26 DIAGNOSIS — J18.9 PNEUMONIA OF BOTH LOWER LOBES DUE TO INFECTIOUS ORGANISM: Primary | ICD-10-CM

## 2022-05-28 ENCOUNTER — HOSPITAL ENCOUNTER (EMERGENCY)
Age: 52
Discharge: HOME | End: 2022-05-28
Attending: EMERGENCY MEDICINE
Payer: MEDICARE

## 2022-05-28 ENCOUNTER — APPOINTMENT (OUTPATIENT)
Dept: GENERAL RADIOLOGY | Age: 52
End: 2022-05-28
Payer: MEDICARE

## 2022-05-28 VITALS
RESPIRATION RATE: 18 BRPM | TEMPERATURE: 98.2 F | HEART RATE: 84 BPM | DIASTOLIC BLOOD PRESSURE: 73 MMHG | WEIGHT: 245 LBS | BODY MASS INDEX: 35.07 KG/M2 | OXYGEN SATURATION: 96 % | SYSTOLIC BLOOD PRESSURE: 125 MMHG | HEIGHT: 70 IN

## 2022-05-28 DIAGNOSIS — J44.1 COPD EXACERBATION (HCC): Primary | ICD-10-CM

## 2022-05-28 LAB
ANION GAP SERPL CALCULATED.3IONS-SCNC: 9 MEQ/L (ref 8–16)
BASOPHILS # BLD: 0.4 %
BASOPHILS ABSOLUTE: 0 THOU/MM3 (ref 0–0.1)
BUN BLDV-MCNC: 14 MG/DL (ref 7–22)
CALCIUM SERPL-MCNC: 8.5 MG/DL (ref 8.5–10.5)
CHLORIDE BLD-SCNC: 96 MEQ/L (ref 98–111)
CO2: 29 MEQ/L (ref 23–33)
CREAT SERPL-MCNC: 1 MG/DL (ref 0.4–1.2)
EKG ATRIAL RATE: 86 BPM
EKG P AXIS: 73 DEGREES
EKG P-R INTERVAL: 172 MS
EKG Q-T INTERVAL: 358 MS
EKG QRS DURATION: 102 MS
EKG QTC CALCULATION (BAZETT): 428 MS
EKG R AXIS: 81 DEGREES
EKG T AXIS: 77 DEGREES
EKG VENTRICULAR RATE: 86 BPM
EOSINOPHIL # BLD: 2.7 %
EOSINOPHILS ABSOLUTE: 0.2 THOU/MM3 (ref 0–0.4)
ERYTHROCYTE [DISTWIDTH] IN BLOOD BY AUTOMATED COUNT: 13.9 % (ref 11.5–14.5)
ERYTHROCYTE [DISTWIDTH] IN BLOOD BY AUTOMATED COUNT: 49.2 FL (ref 35–45)
GFR SERPL CREATININE-BSD FRML MDRD: > 90 ML/MIN/1.73M2
GLUCOSE BLD-MCNC: 89 MG/DL (ref 70–108)
HCT VFR BLD CALC: 42.9 % (ref 42–52)
HEMOGLOBIN: 13.8 GM/DL (ref 14–18)
IMMATURE GRANS (ABS): 0.01 THOU/MM3 (ref 0–0.07)
IMMATURE GRANULOCYTES: 0.1 %
LYMPHOCYTES # BLD: 30.5 %
LYMPHOCYTES ABSOLUTE: 2.3 THOU/MM3 (ref 1–4.8)
MCH RBC QN AUTO: 30.9 PG (ref 26–33)
MCHC RBC AUTO-ENTMCNC: 32.2 GM/DL (ref 32.2–35.5)
MCV RBC AUTO: 96.2 FL (ref 80–94)
MONOCYTES # BLD: 8.9 %
MONOCYTES ABSOLUTE: 0.7 THOU/MM3 (ref 0.4–1.3)
NUCLEATED RED BLOOD CELLS: 0 /100 WBC
OSMOLALITY CALCULATION: 268.2 MOSMOL/KG (ref 275–300)
PLATELET # BLD: 174 THOU/MM3 (ref 130–400)
PMV BLD AUTO: 9.5 FL (ref 9.4–12.4)
POTASSIUM REFLEX MAGNESIUM: 4.4 MEQ/L (ref 3.5–5.2)
PRO-BNP: 35.1 PG/ML (ref 0–900)
PROCALCITONIN: 0.07 NG/ML (ref 0.01–0.09)
RBC # BLD: 4.46 MILL/MM3 (ref 4.7–6.1)
SARS-COV-2, NAAT: NOT DETECTED
SEG NEUTROPHILS: 57.4 %
SEGMENTED NEUTROPHILS ABSOLUTE COUNT: 4.3 THOU/MM3 (ref 1.8–7.7)
SODIUM BLD-SCNC: 134 MEQ/L (ref 135–145)
TROPONIN T: < 0.01 NG/ML
WBC # BLD: 7.5 THOU/MM3 (ref 4.8–10.8)

## 2022-05-28 PROCEDURE — 84484 ASSAY OF TROPONIN QUANT: CPT

## 2022-05-28 PROCEDURE — 6370000000 HC RX 637 (ALT 250 FOR IP): Performed by: STUDENT IN AN ORGANIZED HEALTH CARE EDUCATION/TRAINING PROGRAM

## 2022-05-28 PROCEDURE — 71045 X-RAY EXAM CHEST 1 VIEW: CPT

## 2022-05-28 PROCEDURE — 93005 ELECTROCARDIOGRAM TRACING: CPT | Performed by: EMERGENCY MEDICINE

## 2022-05-28 PROCEDURE — 96374 THER/PROPH/DIAG INJ IV PUSH: CPT

## 2022-05-28 PROCEDURE — 93010 ELECTROCARDIOGRAM REPORT: CPT | Performed by: INTERNAL MEDICINE

## 2022-05-28 PROCEDURE — 85025 COMPLETE CBC W/AUTO DIFF WBC: CPT

## 2022-05-28 PROCEDURE — 6360000002 HC RX W HCPCS: Performed by: STUDENT IN AN ORGANIZED HEALTH CARE EDUCATION/TRAINING PROGRAM

## 2022-05-28 PROCEDURE — 84145 PROCALCITONIN (PCT): CPT

## 2022-05-28 PROCEDURE — 99285 EMERGENCY DEPT VISIT HI MDM: CPT

## 2022-05-28 PROCEDURE — 80048 BASIC METABOLIC PNL TOTAL CA: CPT

## 2022-05-28 PROCEDURE — 87635 SARS-COV-2 COVID-19 AMP PRB: CPT

## 2022-05-28 PROCEDURE — 83880 ASSAY OF NATRIURETIC PEPTIDE: CPT

## 2022-05-28 RX ORDER — METHYLPREDNISOLONE SODIUM SUCCINATE 125 MG/2ML
125 INJECTION, POWDER, LYOPHILIZED, FOR SOLUTION INTRAMUSCULAR; INTRAVENOUS ONCE
Status: COMPLETED | OUTPATIENT
Start: 2022-05-28 | End: 2022-05-28

## 2022-05-28 RX ORDER — IPRATROPIUM BROMIDE AND ALBUTEROL SULFATE 2.5; .5 MG/3ML; MG/3ML
1 SOLUTION RESPIRATORY (INHALATION) ONCE
Status: COMPLETED | OUTPATIENT
Start: 2022-05-28 | End: 2022-05-28

## 2022-05-28 RX ORDER — PREDNISONE 20 MG/1
40 TABLET ORAL DAILY
Qty: 8 TABLET | Refills: 0 | Status: SHIPPED | OUTPATIENT
Start: 2022-05-29 | End: 2022-06-02

## 2022-05-28 RX ADMIN — METHYLPREDNISOLONE SODIUM SUCCINATE 125 MG: 125 INJECTION, POWDER, FOR SOLUTION INTRAMUSCULAR; INTRAVENOUS at 01:49

## 2022-05-28 RX ADMIN — IPRATROPIUM BROMIDE AND ALBUTEROL SULFATE 1 AMPULE: .5; 3 SOLUTION RESPIRATORY (INHALATION) at 01:04

## 2022-05-28 ASSESSMENT — PAIN - FUNCTIONAL ASSESSMENT
PAIN_FUNCTIONAL_ASSESSMENT: NONE - DENIES PAIN
PAIN_FUNCTIONAL_ASSESSMENT: NONE - DENIES PAIN

## 2022-05-28 ASSESSMENT — ENCOUNTER SYMPTOMS
ABDOMINAL PAIN: 0
VOMITING: 0
CONSTIPATION: 0
DIARRHEA: 0
EYE PAIN: 0
NAUSEA: 0
COUGH: 1
SINUS PAIN: 0
SHORTNESS OF BREATH: 1
BACK PAIN: 0

## 2022-05-28 NOTE — ED NOTES
Pt requesting something to eat. RN informed pt he has to wait until breathing treatment complete.       Hollie Crespo RN  05/28/22 7046

## 2022-05-28 NOTE — ED PROVIDER NOTES
[address] [address]          Pt Name: Deirdre March  MRN: [id number]  [place] 1970  Date of evaluation: 5/28/2022  Treating Resident Physician: Jeyson Mireles MD  Supervising Physician[facility]       Chief Complaint   Patient presents with    Shortness of Breath     History obtained from the patient. HISTORY OF PRESENT ILLNESS    HPI  Deirdre March is a 46 y.o. male who presents to the emergency department for evaluation of shortness of breath. Patient states that he began having some shortness of breath yesterday. He states he uses 4 to 5 L supplemental oxygen nasal cannula at home. States he tried a breathing treatment yesterday without relief. Mentions productive cough without hemoptysis. States his chest feels \"congested \". Denies any headache fever chills abdominal pain nausea vomiting diarrhea constipation leg swelling. Patient denies any new Headache, Fever, Chills, Abdominal pain, Nausea, Vomiting, Diarrhea, Constipation and Leg swelling. The patient has no other acute complaints at this time. REVIEW OF SYSTEMS   Review of Systems   Constitutional: Negative for chills and fever. HENT: Negative for ear pain and sinus pain. Eyes: Negative for pain. Respiratory: Positive for cough and shortness of breath. Cardiovascular: Positive for chest pain. Negative for leg swelling. Gastrointestinal: Negative for abdominal pain, constipation, diarrhea, nausea and vomiting. Genitourinary: Negative for dysuria and flank pain. Musculoskeletal: Negative for back pain and neck pain. Skin: Negative for wound. Neurological: Negative for headaches. Psychiatric/Behavioral: Negative for confusion.          PAST MEDICAL AND SURGICAL HISTORY     Past Medical History:   Diagnosis Date    Acute on chronic systolic congestive heart failure ([facility] [address] 4/4/2019    Emphysema (subcutaneous) (surgical) resulting from a procedure     Hypertension     Pneumonia     Schizophrenia ([facility] [address]      History reviewed. No pertinent surgical history. MEDICATIONS   No current facility-administered medications for this encounter.     Current Outpatient Medications:     [START ON 5/29/2022] predniSONE (DELTASONE) 20 MG tablet, Take 2 tablets by mouth daily for 4 days, Disp: 8 tablet, Rfl: 0    risperiDONE (RISPERDAL) 2 MG tablet, Take 1 tablet by mouth 2 times daily, Disp: 60 tablet, Rfl: 3    Tadalafil, PAH, 20 MG tablet, Take 2 tablets by mouth daily, Disp: 60 tablet, Rfl: 3    Spacer/Aero-Holding [employer] CAREN, 1 Device by Does not apply route daily, Disp: 1 each, Rfl: 0    albuterol (PROVENTIL) (2.5 MG/3ML) 0.083% nebulizer solution, Take 3 mLs by nebulization every 2 hours as needed for Wheezing, Disp: 120 each, Rfl: 3    budesonide (PULMICORT) 0.5 MG/2ML nebulizer suspension, Take 2 mLs by nebulization 2 times daily, Disp: 120 mL, Rfl: 3    ipratropium-albuterol (DUONEB) 0.5-2.5 (3) MG/3ML SOLN nebulizer solution, Inhale 3 mLs into the lungs every 6 hours as needed for Shortness of Breath, Disp: 360 mL, Rfl: 2    umeclidinium-vilanterol (ANORO ELLIPTA) 62.5-25 MCG/INH AEPB inhaler, Inhale 1 puff into the lungs daily, Disp: 1 each, Rfl: 2    aspirin 325 MG EC tablet, Take 1 tablet by mouth daily, Disp: 30 tablet, Rfl: 3    pravastatin (PRAVACHOL) 40 MG tablet, Take 40 mg by mouth nightly, Disp: , Rfl:     metoprolol succinate (TOPROL XL) 25 MG extended release tablet, Take 1 tablet by mouth daily, Disp: 30 tablet, Rfl: 3    carBAMazepine (TEGRETOL) 200 MG tablet, Take 200 mg by mouth 2 times daily, Disp: , Rfl:       SOCIAL HISTORY     Social History     Social History Narrative    Not on file     Social History     Tobacco Use    Smoking status: Current Every Day Smoker     Packs/day: 1.00     Years: 20.00     Pack years: 20.00     Types: Cigarettes    Smokeless tobacco: Never Used   Vaping and dry. Neurological:      Mental Status: He is alert and oriented to person, place, and time. Mental status is at baseline. Psychiatric:         Mood and Affect: Mood normal.         Behavior: Behavior normal.         Thought Content: Thought content normal.         Judgment: Judgment normal.             MEDICAL DECISION MAKING   Initial Assessment:     49-year-old male with a history of COPD presenting to the ED for shortness of breath    Differential diagnoses include but not limited to: COPD CHF COVID viral illness pneumonia ACS ACS, arrhythmia, aortic dissection, cardiac tamponade, pneumothorax, PE, esophageal rupture, pneumonia, CHF, COPD, myocarditis, pericarditis, electrolyte abnormality, valvular heart disease, GERD, musculoskeletal    Plan:       EKG  Labs  Imaging: CXR  DuoNeb  Solumedrol  Discharge        Patient is a 46 y.o. male who was seen and evaluated in the emergency department for COPD exacerbation. Lab work was unremarkable. Imaging was unremarkable. We treated him symptomatic with DuoNeb as well as Solu-Medrol with improvement in his symptoms. He is on chronic supplemental oxygen at home and is not requiring any additional.  Patient's signs are stable. EKG showed no signs of acute ischemia. Patient discharged.                 ED RESULTS   Laboratory results:  Labs Reviewed   BASIC METABOLIC PANEL W/ REFLEX TO MG FOR LOW K - Abnormal; Notable for the following components:       Result Value    Sodium 134 (*)     Chloride 96 (*)     All other components within normal limits   CBC WITH AUTO DIFFERENTIAL - Abnormal; Notable for the following components:    RBC 4.46 (*)     Hemoglobin 13.8 (*)     MCV 96.2 (*)     RDW-SD 49.2 (*)     All other components within normal limits   OSMOLALITY - Abnormal; Notable for the following components:    Osmolality Calc 268.2 (*)     All other components within normal limits   COVID-19, RAPID   TROPONIN   BRAIN NATRIURETIC PEPTIDE   PROCALCITONIN   ANION GAP   GLOMERULAR FILTRATION RATE, ESTIMATED   PROCALCITONIN       Radiologic studies results:  XR CHEST PORTABLE   Final Result   1. Emphysematous disease. Redemonstration of interstitial densities    involving the bilateral lung bases. Findings are grossly stable involving    the left lung base but improved on the right when compared to prior    examination dated May 3, 2022. A findings may be chronic in etiology,    developing atypical infection or edema not excluded. This document has been electronically signed by: Temo López DO, MBA on    05/28/2022 01:05 AM          ED Medications administered this visit:   Medications   ipratropium-albuterol (DUONEB) nebulizer solution 1 ampule (1 ampule Inhalation Given 5/28/22 0104)   methylPREDNISolone sodium (SOLU-MEDROL) injection 125 mg (125 mg IntraVENous Given 5/28/22 0149)         ED COURSE     ED Course as of 05/28/22 0321   Sat May 28, 2022   [address]:  1. Emphysematous disease. Redemonstration of interstitial densities   involving the bilateral lung bases. Findings are grossly stable involving   the left lung base but improved on the right when compared to prior   examination dated May 3, 2022. A findings may be chronic in etiology,   developing atypical infection or edema not excluded. [CR]   0124 EKG shows no signs of acute ischemia [CR]   0154 Patient states that shortness of breath feels better. When asked if he wants another breathing treatment he says no. [CR]   3255 CBC shows hemoglobin 13. 8BNP troponin procalcitonin within normal limitsB MP showed sodium 134 [CR]   0225 COVID negative [CR]      ED Course User Index  [CR] Bonni Boas, MD        Strict return precautions and follow up instructions were discussed with the patient prior to discharge, with which the patient agrees.       MEDICATION CHANGES     New Prescriptions    PREDNISONE (DELTASONE) 20 MG TABLET    Take 2 tablets by mouth daily for 4 days         FINAL DISPOSITION Final diagnoses:   COPD exacerbation ([facility] [address]     Condition: condition: stable  Dispo: Discharge to home      This transcription was electronically signed. Parts of this transcriptions may have been dictated by use of voice recognition software and electronically transcribed, and parts may have been transcribed with the assistance of an ED scribe. The transcription may contain errors not detected in proofreading. Please refer to my supervising physician's documentation if my documentation differs.     Electronically Signed: Hamida Mistry MD, 05/28/22, 3:21 AM         Hamida Mistry MD  Resident  05/28/22 2174

## 2022-05-28 NOTE — ED NOTES
Pt resting in bed. Pt medicated per MAR. Pt states breathing is slightly better after treatment.  Provided pt with urinal.      Samm Sky RN  05/28/22 0150

## 2022-08-04 ENCOUNTER — HOSPITAL ENCOUNTER (INPATIENT)
Age: 52
LOS: 4 days | Discharge: HOME HEALTH | DRG: 189 | End: 2022-08-08
Attending: INTERNAL MEDICINE | Admitting: INTERNAL MEDICINE
Payer: MEDICARE

## 2022-08-04 ENCOUNTER — APPOINTMENT (OUTPATIENT)
Dept: GENERAL RADIOLOGY | Age: 52
DRG: 189 | End: 2022-08-04
Payer: MEDICARE

## 2022-08-04 DIAGNOSIS — J44.1 COPD EXACERBATION (HCC): Primary | ICD-10-CM

## 2022-08-04 DIAGNOSIS — J96.21 ACUTE ON CHRONIC RESPIRATORY FAILURE WITH HYPOXIA AND HYPERCAPNIA (HCC): ICD-10-CM

## 2022-08-04 DIAGNOSIS — J96.02 ACUTE RESPIRATORY FAILURE WITH HYPOXIA AND HYPERCAPNIA (HCC): ICD-10-CM

## 2022-08-04 DIAGNOSIS — J96.22 ACUTE ON CHRONIC RESPIRATORY FAILURE WITH HYPOXIA AND HYPERCAPNIA (HCC): ICD-10-CM

## 2022-08-04 DIAGNOSIS — J96.01 ACUTE RESPIRATORY FAILURE WITH HYPOXIA AND HYPERCAPNIA (HCC): ICD-10-CM

## 2022-08-04 LAB
ALBUMIN SERPL-MCNC: 4.4 G/DL (ref 3.5–5.1)
ALLEN TEST: POSITIVE
ALP BLD-CCNC: 72 U/L (ref 38–126)
ALT SERPL-CCNC: 14 U/L (ref 11–66)
ANION GAP SERPL CALCULATED.3IONS-SCNC: 9 MEQ/L (ref 8–16)
AST SERPL-CCNC: 16 U/L (ref 5–40)
BASE EXCESS (CALCULATED): 2 MMOL/L (ref -2.5–2.5)
BASE EXCESS MIXED: 2.4 MMOL/L (ref -2–3)
BASOPHILS # BLD: 0.7 %
BASOPHILS ABSOLUTE: 0 THOU/MM3 (ref 0–0.1)
BILIRUB SERPL-MCNC: < 0.2 MG/DL (ref 0.3–1.2)
BILIRUBIN DIRECT: < 0.2 MG/DL (ref 0–0.3)
BILIRUBIN URINE: NEGATIVE
BLOOD, URINE: NEGATIVE
BUN BLDV-MCNC: 11 MG/DL (ref 7–22)
CALCIUM SERPL-MCNC: 9 MG/DL (ref 8.5–10.5)
CHARACTER, URINE: CLEAR
CHLORIDE BLD-SCNC: 100 MEQ/L (ref 98–111)
CO2: 30 MEQ/L (ref 23–33)
COLLECTED BY:: ABNORMAL
COLLECTED BY:: ABNORMAL
COLOR: YELLOW
CREAT SERPL-MCNC: 0.8 MG/DL (ref 0.4–1.2)
DEVICE: ABNORMAL
DEVICE: ABNORMAL
EKG ATRIAL RATE: 88 BPM
EKG P AXIS: 47 DEGREES
EKG P-R INTERVAL: 168 MS
EKG Q-T INTERVAL: 370 MS
EKG QRS DURATION: 94 MS
EKG QTC CALCULATION (BAZETT): 447 MS
EKG R AXIS: 99 DEGREES
EKG T AXIS: 39 DEGREES
EKG VENTRICULAR RATE: 88 BPM
EOSINOPHIL # BLD: 1.8 %
EOSINOPHILS ABSOLUTE: 0.1 THOU/MM3 (ref 0–0.4)
ERYTHROCYTE [DISTWIDTH] IN BLOOD BY AUTOMATED COUNT: 15 % (ref 11.5–14.5)
ERYTHROCYTE [DISTWIDTH] IN BLOOD BY AUTOMATED COUNT: 51.7 FL (ref 35–45)
FIO2, MIXED VENOUS: 6
FLU A ANTIGEN: NEGATIVE
FLU B ANTIGEN: NEGATIVE
GFR SERPL CREATININE-BSD FRML MDRD: > 90 ML/MIN/1.73M2
GLUCOSE BLD-MCNC: 97 MG/DL (ref 70–108)
GLUCOSE URINE: NEGATIVE MG/DL
HCO3, MIXED: 32 MMOL/L (ref 23–28)
HCO3: 31 MMOL/L (ref 23–28)
HCT VFR BLD CALC: 52.8 % (ref 42–52)
HEMOGLOBIN: 16.8 GM/DL (ref 14–18)
IFIO2: 40
IMMATURE GRANS (ABS): 0.02 THOU/MM3 (ref 0–0.07)
IMMATURE GRANULOCYTES: 0.4 %
KETONES, URINE: ABNORMAL
LEUKOCYTE ESTERASE, URINE: NEGATIVE
LIPASE: 26.3 U/L (ref 5.6–51.3)
LYMPHOCYTES # BLD: 25.3 %
LYMPHOCYTES ABSOLUTE: 1.4 THOU/MM3 (ref 1–4.8)
MCH RBC QN AUTO: 29.7 PG (ref 26–33)
MCHC RBC AUTO-ENTMCNC: 31.8 GM/DL (ref 32.2–35.5)
MCV RBC AUTO: 93.3 FL (ref 80–94)
MODE: ABNORMAL
MONOCYTES # BLD: 10.1 %
MONOCYTES ABSOLUTE: 0.6 THOU/MM3 (ref 0.4–1.3)
NITRITE, URINE: NEGATIVE
NUCLEATED RED BLOOD CELLS: 0 /100 WBC
O2 SAT, MIXED: 62 %
O2 SATURATION: 94 %
OSMOLALITY CALCULATION: 276.9 MOSMOL/KG (ref 275–300)
PCO2, MIXED VENOUS: 64 MMHG (ref 41–51)
PCO2: 63 MMHG (ref 35–45)
PH BLOOD GAS: 7.3 (ref 7.35–7.45)
PH UA: 6 (ref 5–9)
PH, MIXED: 7.3 (ref 7.31–7.41)
PLATELET # BLD: 242 THOU/MM3 (ref 130–400)
PMV BLD AUTO: 9.6 FL (ref 9.4–12.4)
PO2 MIXED: 37 MMHG (ref 25–40)
PO2: 79 MMHG (ref 71–104)
POTASSIUM SERPL-SCNC: 4.4 MEQ/L (ref 3.5–5.2)
PRO-BNP: 129.9 PG/ML (ref 0–900)
PROTEIN UA: NEGATIVE
RBC # BLD: 5.66 MILL/MM3 (ref 4.7–6.1)
SARS-COV-2, NAAT: NOT  DETECTED
SEG NEUTROPHILS: 61.7 %
SEGMENTED NEUTROPHILS ABSOLUTE COUNT: 3.5 THOU/MM3 (ref 1.8–7.7)
SET PEEP: 6 MMHG
SET PRESS SUPP: 16 CMH2O
SITE: ABNORMAL
SODIUM BLD-SCNC: 139 MEQ/L (ref 135–145)
SOURCE, BLOOD GAS: ABNORMAL
SPECIFIC GRAVITY, URINE: 1.02 (ref 1–1.03)
TOTAL PROTEIN: 8 G/DL (ref 6.1–8)
TROPONIN T: < 0.01 NG/ML
UROBILINOGEN, URINE: 0.2 EU/DL (ref 0–1)
WBC # BLD: 5.7 THOU/MM3 (ref 4.8–10.8)

## 2022-08-04 PROCEDURE — 93005 ELECTROCARDIOGRAM TRACING: CPT | Performed by: INTERNAL MEDICINE

## 2022-08-04 PROCEDURE — 96374 THER/PROPH/DIAG INJ IV PUSH: CPT

## 2022-08-04 PROCEDURE — 1200000003 HC TELEMETRY R&B

## 2022-08-04 PROCEDURE — 2700000000 HC OXYGEN THERAPY PER DAY

## 2022-08-04 PROCEDURE — 6370000000 HC RX 637 (ALT 250 FOR IP): Performed by: INTERNAL MEDICINE

## 2022-08-04 PROCEDURE — 6360000002 HC RX W HCPCS: Performed by: INTERNAL MEDICINE

## 2022-08-04 PROCEDURE — 71045 X-RAY EXAM CHEST 1 VIEW: CPT

## 2022-08-04 PROCEDURE — 83690 ASSAY OF LIPASE: CPT

## 2022-08-04 PROCEDURE — 36600 WITHDRAWAL OF ARTERIAL BLOOD: CPT

## 2022-08-04 PROCEDURE — 94660 CPAP INITIATION&MGMT: CPT

## 2022-08-04 PROCEDURE — 85025 COMPLETE CBC W/AUTO DIFF WBC: CPT

## 2022-08-04 PROCEDURE — 82248 BILIRUBIN DIRECT: CPT

## 2022-08-04 PROCEDURE — 87804 INFLUENZA ASSAY W/OPTIC: CPT

## 2022-08-04 PROCEDURE — 93010 ELECTROCARDIOGRAM REPORT: CPT | Performed by: INTERNAL MEDICINE

## 2022-08-04 PROCEDURE — 99285 EMERGENCY DEPT VISIT HI MDM: CPT

## 2022-08-04 PROCEDURE — 82803 BLOOD GASES ANY COMBINATION: CPT

## 2022-08-04 PROCEDURE — 94761 N-INVAS EAR/PLS OXIMETRY MLT: CPT

## 2022-08-04 PROCEDURE — 6370000000 HC RX 637 (ALT 250 FOR IP): Performed by: NURSE PRACTITIONER

## 2022-08-04 PROCEDURE — 80053 COMPREHEN METABOLIC PANEL: CPT

## 2022-08-04 PROCEDURE — 83880 ASSAY OF NATRIURETIC PEPTIDE: CPT

## 2022-08-04 PROCEDURE — 94640 AIRWAY INHALATION TREATMENT: CPT

## 2022-08-04 PROCEDURE — 2580000003 HC RX 258: Performed by: INTERNAL MEDICINE

## 2022-08-04 PROCEDURE — 81003 URINALYSIS AUTO W/O SCOPE: CPT

## 2022-08-04 PROCEDURE — 84484 ASSAY OF TROPONIN QUANT: CPT

## 2022-08-04 PROCEDURE — 6360000002 HC RX W HCPCS: Performed by: NURSE PRACTITIONER

## 2022-08-04 PROCEDURE — 87635 SARS-COV-2 COVID-19 AMP PRB: CPT

## 2022-08-04 RX ORDER — ACETAMINOPHEN 325 MG/1
650 TABLET ORAL EVERY 4 HOURS PRN
Status: DISCONTINUED | OUTPATIENT
Start: 2022-08-04 | End: 2022-08-08 | Stop reason: HOSPADM

## 2022-08-04 RX ORDER — SODIUM CHLORIDE 0.9 % (FLUSH) 0.9 %
5-40 SYRINGE (ML) INJECTION PRN
Status: DISCONTINUED | OUTPATIENT
Start: 2022-08-04 | End: 2022-08-08 | Stop reason: HOSPADM

## 2022-08-04 RX ORDER — IPRATROPIUM BROMIDE AND ALBUTEROL SULFATE 2.5; .5 MG/3ML; MG/3ML
1 SOLUTION RESPIRATORY (INHALATION)
Status: DISCONTINUED | OUTPATIENT
Start: 2022-08-04 | End: 2022-08-04

## 2022-08-04 RX ORDER — RISPERIDONE 1 MG/1
2 TABLET, FILM COATED ORAL 2 TIMES DAILY
Status: DISCONTINUED | OUTPATIENT
Start: 2022-08-04 | End: 2022-08-08 | Stop reason: HOSPADM

## 2022-08-04 RX ORDER — ENOXAPARIN SODIUM 100 MG/ML
40 INJECTION SUBCUTANEOUS DAILY
Status: DISCONTINUED | OUTPATIENT
Start: 2022-08-04 | End: 2022-08-04 | Stop reason: SDUPTHER

## 2022-08-04 RX ORDER — ONDANSETRON 4 MG/1
4 TABLET, ORALLY DISINTEGRATING ORAL EVERY 8 HOURS PRN
Status: DISCONTINUED | OUTPATIENT
Start: 2022-08-04 | End: 2022-08-08 | Stop reason: HOSPADM

## 2022-08-04 RX ORDER — TADALAFIL 20 MG/1
40 TABLET ORAL DAILY
Status: DISCONTINUED | OUTPATIENT
Start: 2022-08-04 | End: 2022-08-08 | Stop reason: HOSPADM

## 2022-08-04 RX ORDER — IPRATROPIUM BROMIDE AND ALBUTEROL SULFATE 2.5; .5 MG/3ML; MG/3ML
1 SOLUTION RESPIRATORY (INHALATION) ONCE
Status: COMPLETED | OUTPATIENT
Start: 2022-08-04 | End: 2022-08-04

## 2022-08-04 RX ORDER — PRAVASTATIN SODIUM 40 MG
40 TABLET ORAL NIGHTLY
Status: DISCONTINUED | OUTPATIENT
Start: 2022-08-04 | End: 2022-08-08 | Stop reason: HOSPADM

## 2022-08-04 RX ORDER — METHYLPREDNISOLONE SODIUM SUCCINATE 125 MG/2ML
80 INJECTION, POWDER, LYOPHILIZED, FOR SOLUTION INTRAMUSCULAR; INTRAVENOUS ONCE
Status: COMPLETED | OUTPATIENT
Start: 2022-08-04 | End: 2022-08-04

## 2022-08-04 RX ORDER — BUDESONIDE 0.5 MG/2ML
500 INHALANT ORAL 2 TIMES DAILY
Status: DISCONTINUED | OUTPATIENT
Start: 2022-08-04 | End: 2022-08-08 | Stop reason: HOSPADM

## 2022-08-04 RX ORDER — ENOXAPARIN SODIUM 100 MG/ML
40 INJECTION SUBCUTANEOUS DAILY
Status: DISCONTINUED | OUTPATIENT
Start: 2022-08-04 | End: 2022-08-08 | Stop reason: HOSPADM

## 2022-08-04 RX ORDER — IPRATROPIUM BROMIDE AND ALBUTEROL SULFATE 2.5; .5 MG/3ML; MG/3ML
1 SOLUTION RESPIRATORY (INHALATION) 3 TIMES DAILY
Status: DISCONTINUED | OUTPATIENT
Start: 2022-08-04 | End: 2022-08-05

## 2022-08-04 RX ORDER — SODIUM CHLORIDE 9 MG/ML
INJECTION, SOLUTION INTRAVENOUS PRN
Status: DISCONTINUED | OUTPATIENT
Start: 2022-08-04 | End: 2022-08-08 | Stop reason: HOSPADM

## 2022-08-04 RX ORDER — METHYLPREDNISOLONE SODIUM SUCCINATE 40 MG/ML
40 INJECTION, POWDER, LYOPHILIZED, FOR SOLUTION INTRAMUSCULAR; INTRAVENOUS EVERY 8 HOURS
Status: DISCONTINUED | OUTPATIENT
Start: 2022-08-04 | End: 2022-08-06

## 2022-08-04 RX ORDER — ACETAMINOPHEN 325 MG/1
650 TABLET ORAL EVERY 4 HOURS PRN
Status: DISCONTINUED | OUTPATIENT
Start: 2022-08-04 | End: 2022-08-04 | Stop reason: SDUPTHER

## 2022-08-04 RX ORDER — CARBAMAZEPINE 200 MG/1
200 TABLET ORAL 2 TIMES DAILY
Status: DISCONTINUED | OUTPATIENT
Start: 2022-08-04 | End: 2022-08-08 | Stop reason: HOSPADM

## 2022-08-04 RX ORDER — ONDANSETRON 2 MG/ML
4 INJECTION INTRAMUSCULAR; INTRAVENOUS EVERY 6 HOURS PRN
Status: DISCONTINUED | OUTPATIENT
Start: 2022-08-04 | End: 2022-08-08 | Stop reason: HOSPADM

## 2022-08-04 RX ORDER — DOCUSATE SODIUM 100 MG/1
100 CAPSULE, LIQUID FILLED ORAL DAILY
Status: DISCONTINUED | OUTPATIENT
Start: 2022-08-04 | End: 2022-08-08 | Stop reason: HOSPADM

## 2022-08-04 RX ORDER — SODIUM CHLORIDE 0.9 % (FLUSH) 0.9 %
5-40 SYRINGE (ML) INJECTION EVERY 12 HOURS SCHEDULED
Status: DISCONTINUED | OUTPATIENT
Start: 2022-08-04 | End: 2022-08-08 | Stop reason: HOSPADM

## 2022-08-04 RX ADMIN — SODIUM CHLORIDE, PRESERVATIVE FREE 10 ML: 5 INJECTION INTRAVENOUS at 21:26

## 2022-08-04 RX ADMIN — ENOXAPARIN SODIUM 40 MG: 100 INJECTION SUBCUTANEOUS at 09:29

## 2022-08-04 RX ADMIN — RISPERIDONE 2 MG: 1 TABLET ORAL at 21:26

## 2022-08-04 RX ADMIN — CEFTRIAXONE SODIUM 1000 MG: 1 INJECTION, POWDER, FOR SOLUTION INTRAMUSCULAR; INTRAVENOUS at 09:47

## 2022-08-04 RX ADMIN — SODIUM CHLORIDE, PRESERVATIVE FREE 10 ML: 5 INJECTION INTRAVENOUS at 09:29

## 2022-08-04 RX ADMIN — PRAVASTATIN SODIUM 40 MG: 40 TABLET ORAL at 21:26

## 2022-08-04 RX ADMIN — IPRATROPIUM BROMIDE AND ALBUTEROL SULFATE 1 AMPULE: .5; 3 SOLUTION RESPIRATORY (INHALATION) at 03:29

## 2022-08-04 RX ADMIN — TADALAFIL 40 MG: 20 TABLET ORAL at 21:26

## 2022-08-04 RX ADMIN — IPRATROPIUM BROMIDE AND ALBUTEROL SULFATE 1 AMPULE: .5; 3 SOLUTION RESPIRATORY (INHALATION) at 21:39

## 2022-08-04 RX ADMIN — IPRATROPIUM BROMIDE AND ALBUTEROL SULFATE 1 AMPULE: .5; 3 SOLUTION RESPIRATORY (INHALATION) at 09:01

## 2022-08-04 RX ADMIN — IPRATROPIUM BROMIDE AND ALBUTEROL SULFATE 1 AMPULE: .5; 3 SOLUTION RESPIRATORY (INHALATION) at 13:38

## 2022-08-04 RX ADMIN — BUDESONIDE 500 MCG: 0.5 INHALANT RESPIRATORY (INHALATION) at 21:39

## 2022-08-04 RX ADMIN — METHYLPREDNISOLONE SODIUM SUCCINATE 80 MG: 125 INJECTION, POWDER, FOR SOLUTION INTRAMUSCULAR; INTRAVENOUS at 04:45

## 2022-08-04 RX ADMIN — METHYLPREDNISOLONE SODIUM SUCCINATE 40 MG: 40 INJECTION, POWDER, FOR SOLUTION INTRAMUSCULAR; INTRAVENOUS at 21:31

## 2022-08-04 RX ADMIN — DOCUSATE SODIUM 100 MG: 100 CAPSULE, LIQUID FILLED ORAL at 09:29

## 2022-08-04 RX ADMIN — METHYLPREDNISOLONE SODIUM SUCCINATE 40 MG: 40 INJECTION, POWDER, FOR SOLUTION INTRAMUSCULAR; INTRAVENOUS at 14:49

## 2022-08-04 RX ADMIN — CARBAMAZEPINE 200 MG: 200 TABLET ORAL at 21:26

## 2022-08-04 ASSESSMENT — ENCOUNTER SYMPTOMS
BACK PAIN: 0
COUGH: 1
ABDOMINAL PAIN: 0
RHINORRHEA: 0
EYE REDNESS: 0
SHORTNESS OF BREATH: 1
NAUSEA: 0
VOMITING: 0
CHEST TIGHTNESS: 1

## 2022-08-04 ASSESSMENT — PAIN - FUNCTIONAL ASSESSMENT: PAIN_FUNCTIONAL_ASSESSMENT: NONE - DENIES PAIN

## 2022-08-04 ASSESSMENT — PAIN SCALES - GENERAL
PAINLEVEL_OUTOF10: 0
PAINLEVEL_OUTOF10: 0

## 2022-08-04 NOTE — ED PROVIDER NOTES
[facility] Emergency Department    CHIEF COMPLAINT       Chief Complaint   Patient presents with    Shortness of Breath       Nurses Notes reviewed and I agree except as noted in the HPI. HISTORY OF PRESENT ILLNESS    Jojo Dow laya 46 y.o. male who presents to the ED for evaluation of shortness of breath. The patient reports that he has been getting more sob over the last two or three days. Fatigue. States fingers are tingling. No chest pain. No covid exposure. Upon arrival, SPO2 was 76% on room air. HPI was provided by the patient and parent    REVIEW OF SYSTEMS     Review of Systems   Constitutional:  Negative for chills, fatigue and fever. HENT:  Negative for congestion, ear discharge, ear pain, postnasal drip and rhinorrhea. Eyes:  Negative for redness. Respiratory:  Positive for cough, chest tightness and shortness of breath. Cardiovascular:  Negative for chest pain and leg swelling. Gastrointestinal:  Negative for abdominal pain, nausea and vomiting. Genitourinary:  Negative for difficulty urinating, dysuria, enuresis, flank pain and hematuria. Musculoskeletal:  Negative for back pain and joint swelling. Skin:  Negative for rash. Neurological:  Positive for numbness. Negative for dizziness, light-headedness and headaches. Psychiatric/Behavioral:  Negative for agitation, behavioral problems and confusion. All other systems negative except as noted. PAST MEDICAL HISTORY     Past Medical History:   Diagnosis Date    Acute on chronic systolic congestive heart failure ([address] 4/4/2019    Emphysema (subcutaneous) (surgical) resulting from a procedure     Hypertension     Pneumonia     Schizophrenia ([address]        SURGICALHISTORY      has no past surgical history on file.     CURRENT MEDICATIONS       Previous Medications    ALBUTEROL (PROVENTIL) (2.5 MG/3ML) 0.083% NEBULIZER SOLUTION    Take 3 mLs by nebulization every 2 hours as needed for Wheezing    ASPIRIN 325 MG EC TABLET    Take 1 tablet by mouth daily    BUDESONIDE (PULMICORT) 0.5 MG/2ML NEBULIZER SUSPENSION    Take 2 mLs by nebulization 2 times daily    CARBAMAZEPINE (TEGRETOL) 200 MG TABLET    Take 200 mg by mouth 2 times daily    IPRATROPIUM-ALBUTEROL (DUONEB) 0.5-2.5 (3) MG/3ML SOLN NEBULIZER SOLUTION    Inhale 3 mLs into the lungs every 6 hours as needed for Shortness of Breath    METOPROLOL SUCCINATE (TOPROL XL) 25 MG EXTENDED RELEASE TABLET    Take 1 tablet by mouth daily    PRAVASTATIN (PRAVACHOL) 40 MG TABLET    Take 40 mg by mouth nightly    RISPERIDONE (RISPERDAL) 2 MG TABLET    Take 1 tablet by mouth 2 times daily    SPACER/AERO-HOLDING CHAMBERS CAREN    1 Device by Does not apply route daily    TADALAFIL, PAH, 20 MG TABLET    Take 2 tablets by mouth daily    UMECLIDINIUM-VILANTEROL (ANORO ELLIPTA) 62.5-25 MCG/INH AEPB INHALER    Inhale 1 puff into the lungs daily       ALLERGIES     has No Known Allergies. FAMILY HISTORY     He indicated that his mother is alive. He indicated that his sister is alive. family history includes High Blood Pressure in his mother.     SOCIAL HISTORY       Social History     Socioeconomic History    Marital status: Single     Spouse name: Not on file    Number of children: 0    Years of education: Not on file    Highest education level: Not on file   Occupational History    Not on file   Tobacco Use    Smoking status: Every Day     Packs/day: 1.00     Years: 20.00     Pack years: 20.00     Types: Cigarettes    Smokeless tobacco: Never   Vaping Use    Vaping Use: Never used   Substance and Sexual Activity    Alcohol use: No    Drug use: No    Sexual activity: Not Currently   Other Topics Concern    Not on file   Social History Narrative    Not on file     Social Determinants of Health     Financial Resource Strain: Not on file   Food Insecurity: Not on file   Transportation Needs: Not on file   Physical Activity: Not on file   Stress: Not on file   Social Connections: Not on COVID    DIAGNOSTIC RESULTS     EKG: All EKG's are interpreted by the Emergency Department Physician who eithersigns or Co-signs this chart in the absence of a cardiologist.    Ventricular rate 88, LA interval 168, QRS 94, QT/QTc 370/447, normal sinus rhythm with a rightward axis    RADIOLOGY: non-plainfilm images(s) such as CT, Ultrasound and MRI are read by the radiologist.  Plain radiographic images are visualized and preliminarily interpreted by the emergency physician unless otherwise stated below. XR CHEST PORTABLE   Final Result   Impression:   No acute cardiopulmonary disease process identified. Emphysema. This document has been electronically signed by: Norma Dominguez MD on    08/04/2022 04:09 AM            LABS:   Labs Reviewed   CBC WITH AUTO DIFFERENTIAL - Abnormal; Notable for the following components:       Result Value    Hematocrit 52.8 (*)     MCHC 31.8 (*)     RDW-CV 15.0 (*)     RDW-SD 51.7 (*)     All other components within normal limits   BLOOD GAS, ARTERIAL - Abnormal; Notable for the following components:    pH, Blood Gas 7.30 (*)     PCO2 63 (*)     HCO3 31 (*)     All other components within normal limits   HEPATIC FUNCTION PANEL - Abnormal; Notable for the following components:     Total Bilirubin <0.2 (*)     All other components within normal limits   BLOOD GAS, VENOUS - Abnormal; Notable for the following components:    PH MIXED 7.30 (*)     PCO2, MIXED VENOUS 64 (*)     HCO3, Mixed 32 (*)     All other components within normal limits   URINALYSIS WITH REFLEX TO CULTURE - Abnormal; Notable for the following components:    Ketones, Urine TRACE (*)     All other components within normal limits   COVID-19, RAPID   RAPID INFLUENZA A/B ANTIGENS   BASIC METABOLIC PANEL   BRAIN NATRIURETIC PEPTIDE   TROPONIN   LIPASE   ANION GAP   GLOMERULAR FILTRATION RATE, ESTIMATED   OSMOLALITY   BLOOD GAS, ARTERIAL       EMERGENCY DEPARTMENT COURSE:   Vitals:    Vitals:    08/04/22 0305 08/04/22 2080 08/04/22 0357 08/04/22 0444   BP: 138/82  121/77 121/71   Pulse: 91  81 72   Resp: (!) 40 20 28 28   Temp: 97.7 °F (36.5 °C)      TempSrc: Axillary      SpO2: 95%  95% 92%                                 Internal Administration   First Dose      Second Dose           Last COVID Lab SARS-CoV-2, NAAT (no units)   Date Value   08/04/2022 NOT  DETECTED            MDM    Patient is seen evaluate in the emergency room for shortness of breath. Appropriate labs and imaging are ordered and reviewed. Patient was 76% upon arrival.  Placed on oxygen and ABG was drawn. ABG shows a elevated CO2 in the low PO2. Patient was placed on BiPAP. He tolerated it well. Was improving. I discussed the case with Dian Shukla who agrees to admit the patient. Patient is updated and is agreeable plan of care. No notes of  Admission Criteria type on file. Medications   ipratropium-albuterol (DUONEB) nebulizer solution 1 ampule (1 ampule Inhalation Given 8/4/22 0329)   methylPREDNISolone sodium (SOLU-MEDROL) injection 80 mg (80 mg IntraVENous Given 8/4/22 6985)       Please note that the patient was evaluated during a pandemic. All efforts were made for HIPPA compliance as well as provision of appropriate care. Patient was seen independently by myself. The patient's final impression and disposition and plan was determined by myself. Strict return precautions and follow up instructions were discussed with the patient prior to discharge, with which the patient agrees. Physical assessment findings, diagnostic testing(s) if applicable, and vital signs reviewed with patient/patient representative. Questions answered. Medications asdirected, including OTC medications for supportive care. Education provided on medications. Differential diagnosis(s) discussed with patient/patient representative. Home care/self care instructions reviewed withpatient/patient representative.   Patient is to follow-up with family care provider in 2-3 days if no improvement. Patient is to go to the emergency department if symptoms worsen. Patient/patient representative isaware of care plan, questions answered, verbalizes understanding and is in agreement. CRITICAL CARE:   None    CONSULTS:  None    PROCEDURES:  None    FINAL IMPRESSION     1. COPD exacerbation ([facility] [address]    2. Acute respiratory failure with hypoxia and hypercapnia ([facility] [address]          DISPOSITION/PLAN   DISPOSITION Decision To Admit 08/04/2022 04:26:05 AM      PATIENT REFERREDTO:  No follow-up provider specified.     DISCHARGE MEDICATIONS:  New Prescriptions    No medications on file       (Please note that portions of this note were completed with a voice recognition program.  Efforts were made to edit the dictations but occasionally words are mis-transcribed.)         SAVANNAH Goyal CNP, APRN - CNP  08/04/22 7622

## 2022-08-04 NOTE — ED NOTES
ED to inpatient nurses report    Chief Complaint   Patient presents with    Shortness of Breath      Present to ED from home  LOC: alert and orientated to name and place  Vital signs   Vitals:    08/04/22 0305 08/04/22 0337 08/04/22 0357 08/04/22 0444   BP: 138/82  121/77 121/71   Pulse: 91  81 72   Resp: (!) 40 20 28 28   Temp: 97.7 °F (36.5 °C)      TempSrc: Axillary      SpO2: 95%  95% 92%      Oxygen Baseline none    Current needs required  Bipap/Cpap Yes  LDAs:   Peripheral IV 08/04/22 Left Forearm (Active)   Site Assessment Clean, dry & intact 08/04/22 0305   Line Status Blood return noted; Flushed;Normal saline locked;Specimen collected 08/04/22 0305   Dressing Status New dressing applied;Clean, dry & intact 08/04/22 0305   Dressing Type Transparent 08/04/22 0305     Mobility: Requires assistance * 1  Pending ED orders: none  Present condition: stable      C-SSRS    Swallow Screening      Electronically signed by Liz Martínez RN on 8/4/2022 at 5:31 AM       Genaro Wade RN  08/04/22 5721

## 2022-08-04 NOTE — ED TRIAGE NOTES
Pt presents to the ED with c/o shortness of breath for a couple days. Pt family states pt has a history of emphysema.

## 2022-08-04 NOTE — FLOWSHEET NOTE
08/04/22 1827   Safe Environment   Safety Measures   (virtual safety rounds complete)   Virtual nurse rounds, patient in bed. Denies any needs at this time. No safety concerns identified. Call light within reach. Will continue to monitor.

## 2022-08-04 NOTE — H&P
Internal Medicine  History and Physical    Patient: Lazarus Watson  MRN: [id number]      History Obtained From:  patient, family member - mother  PCP: Jesusita Ortiz MD    CHIEF COMPLAINT:  SOB    HISTORY OF PRESENT ILLNESS:   The patient is a 46 y.o. male history of COPD on home oxygen, who presents with worsening shortness of breath in the last week. Symptoms progressed to where patient was unable to speak yesterday. Associated cough which is mainly dry. Associated wheezing. Denies any fever denies any chest pain. Patient continues to smoke. Seen in emergency room where patient was evaluated and treated for COPD exacerbation/acute on chronic hypoxemic respiratory failure. Treated with bronchodilators oxygen and steroid. Today he feels significantly improved. But still short of breath and wheezing with mild exertion. Past Medical History:        Diagnosis Date    Acute on chronic systolic congestive heart failure ([address] 4/4/2019    Emphysema (subcutaneous) (surgical) resulting from a procedure     Hypertension     Pneumonia     Schizophrenia [facility])        Past Surgical History:    No past surgical history on file. Medications Prior to Admission:    Prior to Admission medications    Medication Sig Start Date End Date Taking?  Authorizing Provider   risperiDONE (RISPERDAL) 2 MG tablet Take 1 tablet by mouth 2 times daily 5/5/22   Jesusita Ortiz MD   Tadalafil, PAH, 20 MG tablet Take 2 tablets by mouth daily 12/23/21   Jesusita Ortiz MD   Spacer/Aero-Holding Chambers CAREN 1 Device by Does not apply route daily 12/23/21   Jesusita Ortiz MD   albuterol (PROVENTIL) (2.5 MG/3ML) 0.083% nebulizer solution Take 3 mLs by nebulization every 2 hours as needed for Wheezing 12/23/21   Jesusita Ortiz MD   budesonide (PULMICORT) 0.5 MG/2ML nebulizer suspension Take 2 mLs by nebulization 2 times daily 12/23/21   Jesusita Ortiz MD   ipratropium-albuterol (DUONEB) 0.5-2.5 (3) MG/3ML SOLN nebulizer solution Inhale 3 mLs into the lungs every 6 hours as needed for Shortness of Breath 12/23/21   Mansoor Garcia MD   umeclidinium-vilanterol (ANORO ELLIPTA) 62.5-25 MCG/INH AEPB inhaler Inhale 1 puff into the lungs daily 12/23/21   Mansoor Garcia MD   aspirin 325 MG EC tablet Take 1 tablet by mouth daily 12/17/19   Mansoor Garcia MD   pravastatin (PRAVACHOL) 40 MG tablet Take 40 mg by mouth nightly    Historical Provider, MD   metoprolol succinate (TOPROL XL) 25 MG extended release tablet Take 1 tablet by mouth daily 3/6/17   Mansoor Garcia MD   carBAMazepine (TEGRETOL) 200 MG tablet Take 200 mg by mouth 2 times daily    Historical Provider, MD       Allergies:  Patient has no known allergies. Social History:   TOBACCO:   reports that he has been smoking cigarettes. He has a 20.00 pack-year smoking history. He has never used smokeless tobacco.  ETOH:   reports no history of alcohol use.       Family History:       Problem Relation Age of Onset    High Blood Pressure Mother        REVIEW OF SYSTEMS:  CONSTITUTIONAL:  positive for  fatigue and malaise  negative for  fevers and chills  EYES:  negative for  double vision, blind spots, and eye discharge  HEENT:  negative for  sore mouth, sore throat, and hoarseness  RESPIRATORY:  positive for  dry cough, dyspnea, and wheezing  negative for  hemoptysis and chest pain  CARDIOVASCULAR:  negative for  palpitations, orthopnea, PND, edema  GASTROINTESTINAL:  negative for nausea, vomiting, change in bowel habits, abdominal pain, abdominal mass, and abdominal distention  GENITOURINARY:  negative for frequency and decreased stream  INTEGUMENT/BREAST:  negative  HEMATOLOGIC/LYMPHATIC:  negative for easy bruising and bleeding  ALLERGIC/IMMUNOLOGIC:  negative  ENDOCRINE:  negative for heat intolerance and cold intolerance  MUSCULOSKELETAL:  negative  NEUROLOGICAL:  negative for headaches, dizziness, seizures, and memory problems  BEHAVIOR/PSYCH:  positive for decreased energy level, poor concentration, and fatigue and negative for poor appetite and increased appetite    Physical Exam:    Vitals: /74   Pulse 71   Temp 97.6 °F (36.4 °C) (Oral)   Resp 18   Ht 5' 11\" (1.803 m)   Wt 201 lb 11.5 oz (91.5 kg)   SpO2 94%   BMI 28.13 kg/m²   CONSTITUTIONAL:  awake, alert, cooperative, no apparent distress, and appears stated age  EYES:  extra-ocular muscles intact  ENT:  poor dentition and multiple carries  NECK:  supple, symmetrical, trachea midline  HEMATOLOGIC/LYMPHATICS:  no cervical lymphadenopathy and no supraclavicular lymphadenopathy  BACK:  symmetric  LUNGS:  no increased work of breathing and diminished breath sounds throughout lungs  CARDIOVASCULAR:  normal S1 and S2  ABDOMEN:  No scars, normal bowel sounds, soft, non-distended, non-tender, no masses palpated, no hepatosplenomegally  MUSCULOSKELETAL:  there is no redness, warmth, or swelling of the joints  NEUROLOGIC:  Mental Status Exam:  Level of Alertness:   awake  Orientation:   person, place, time  Attention/Concentration:  abnormal - short  Cranial Nerves:  cranial nerves II-XII are grossly intact  Motor Exam:  Motor exam is symmetrical 5 out of 5 all extremities bilaterally  SKIN:  normal skin color, texture, turgor      CBC:   Recent Labs     08/04/22  0250   WBC 5.7   HGB 16.8        BMP:    Recent Labs     08/04/22  0250      K 4.4      CO2 30   BUN 11   CREATININE 0.8   GLUCOSE 97     Hepatic:   Recent Labs     08/04/22  0250   AST 16   ALT 14   BILITOT <0.2*   ALKPHOS 72     Troponin: No results for input(s): TROPONINI in the last 72 hours.   08/04/22 0431    Specimen Source: Blood gases     pH, Blood Gas 7.30 Low     PCO2 63 High  mmhg    PO2 79 mmhg    HCO3 31 High  mmol/l    Base Excess (Calculated) 2.0 mmol/l    O2 Sat 94 %    IFIO2 40    DEVICE BiPAP    Mode BiLevel    SET PEEP 6.0 mmhg    SET PRESS SUPP 16 cmH2O    Nicholas Test Positive    Source: R Radial     SARS-CoV-2, NAAT NOT  DETECTED     Troponin T < 0.010 Pro-.9     PA/lat CXR:   Findings:   Mediastinum: Cardiac silhouette nonenlarged. Lungs: Hyperinflated consistent with emphysema. No focal infiltrate, mass,   or edema. Pleural : No pneumothorax or pleural effusion. Bones / Chest wall: No acute disease. Impression:     Impression:   No acute cardiopulmonary disease process identified. Emphysema. 08/04/22 0451    Specimen Source: Urine, clean catch     Glucose, Ur NEGATIVE mg/dl    Bilirubin Urine NEGATIVE    Ketones, Urine TRACE Abnormal     Specific Gravity, Urine 1.016    Blood, Urine NEGATIVE    pH, UA 6.0    Protein, UA NEGATIVE    Urobilinogen, Urine 0.2 eu/dl    Nitrite, Urine NEGATIVE    Leukocyte Esterase, Urine NEGATIVE    Color, UA YELLOW    Character, Urine CLEAR     Assessment and Plan   Acute exacerbation of her COPD  Acute on chronic hypoxemic and hypercapnic respiratory failure  Primary pulmonary hypertension, on tadalafil  Chronic ongoing nicotine abuse. Schizoaffective disorder    Continue bronchodilators DuoNeb aerosols. IV Solu-Medrol  Empiric antibiotics. Lovenox for DVT prophylaxis  Resume psychotropics  PT OT   evaluation.     Patient Active Problem List   Diagnosis Code    Acute on chronic respiratory failure with hypercapnia ([facility]) J96.22    Respiratory insufficiency/failure R06.89    Chronic obstructive pulmonary disease with acute exacerbation ([facility]) J44.1    Acute on chronic systolic congestive heart failure ([facility]) I50.23    Nicotine abuse Z72.0    Chest pain R07.9    Schizophreniform disorder ([facility]) F20.81    Respiratory failure ([facility]) J96.90    Smoker F17.200    Acute respiratory failure ([facility]) J96.00    Acute on chronic respiratory failure ([facility]) J96.20    Pneumonia of right lower lobe due to infectious organism J18.9    Aspiration pneumonia of both lower lobes due to gastric secretions ([facility]) J69.0    Community acquired bacterial pneumonia J15.9    Acute on chronic respiratory failure with hypoxia

## 2022-08-04 NOTE — ED NOTES
ED nurse-to-nurse bedside report    Chief Complaint   Patient presents with    Shortness of Breath      LOC: alert and orientated to name and place  Vital signs   Vitals:    08/04/22 0305   BP: 138/82   Pulse: 91   Resp: (!) 40   SpO2: 95%      Pain:    Pain Interventions: rest/reposition  Pain Goal: 0  Oxygen: No    Current needs required 6L NC; going to be placed on PAP   Telemetry: Yes  LDAs:   Peripheral IV 08/04/22 Left Forearm (Active)   Site Assessment Clean, dry & intact 08/04/22 0305   Line Status Blood return noted; Flushed;Normal saline locked;Specimen collected 08/04/22 0305   Dressing Status New dressing applied;Clean, dry & intact 08/04/22 0305   Dressing Type Transparent 08/04/22 0305     Continuous Infusions:   Mobility: Independent  Lerma Fall Risk Score: No flowsheet data found. Fall Interventions: bed lowest position, call light in reach, family at bedside. Report given to:  Nubia Chandra RN  08/04/22 3712

## 2022-08-04 NOTE — CONSULTS
succinate (TOPROL XL) 25 MG extended release tablet, Take 1 tablet by mouth daily  carBAMazepine (TEGRETOL) 200 MG tablet, Take 200 mg by mouth 2 times daily  Diet    ADULT DIET; Regular; No Added Salt (3-4 gm)  Allergies    Patient has no known allergies. Social History     Social History     Socioeconomic History    Marital status: Single     Spouse name: Not on file    Number of children: 0    Years of education: Not on file    Highest education level: Not on file   Occupational History    Not on file   Tobacco Use    Smoking status: Every Day     Packs/day: 1.00     Years: 20.00     Pack years: 20.00     Types: Cigarettes    Smokeless tobacco: Never   Vaping Use    Vaping Use: Never used   Substance and Sexual Activity    Alcohol use: No    Drug use: No    Sexual activity: Not Currently   Other Topics Concern    Not on file   Social History Narrative    Not on file     Social Determinants of Health     Financial Resource Strain: Not on file   Food Insecurity: Not on file   Transportation Needs: Not on file   Physical Activity: Not on file   Stress: Not on file   Social Connections: Not on file   Intimate Partner Violence: Not on file   Housing Stability: Not on file     Family History          Problem Relation Age of Onset    High Blood Pressure Mother      Sleep History    Never diagnosed with sleep apnea in the past.         Riview of systems   12 point review of systems negative unless otherwise specified in the HPI    Vitals     height is 5' 11\" (1.803 m) and weight is 201 lb 11.5 oz (91.5 kg). His axillary temperature is 97.2 °F (36.2 °C). His blood pressure is 110/69 and his pulse is 58. His respiration is 20 and oxygen saturation is 93%. Body mass index is 28.13 kg/m².     SUPPLEMENTAL O2: O2 Flow Rate (L/min): 3 L/min     I/O      Intake/Output Summary (Last 24 hours) at 8/5/2022 0412  Last data filed at 8/5/2022 0344  Gross per 24 hour   Intake 1327.21 ml   Output 2520 ml   Net -1192.79 ml     I/O last 3 completed shifts: In: 440 [P.O.:440]  Out: 1075 [Urine:1075]   Patient Vitals for the past 96 hrs (Last 3 readings):   Weight   08/04/22 0600 201 lb 11.5 oz (91.5 kg)       Exam   Nursing note and vitals reviewed. Constitutional: This is a middle-aged gentleman lying comfortably in bed in no acute distress.   HENT: Normocephalic, atraumatic, nares patent, moist mucosa, neck is supple  Eyes: Eyes equal and reactive to light, no abnormal extraocular eye movements  Cardiovascular: Normal S1-S2 with no murmurs rubs or gallops  Pulmonary/Chest: Diminished breath sounds bilaterally, crackles bilaterally in the lower lobes, minimal expiratory wheezes heard bilaterally  Abdominal: Soft, nontender, nondistended  Musculoskeletal: Able to move all 4 extremities spontaneously  Extremities: +2 pulses palpated bilaterally, no pitting edema  Neurological: No focal neurological deficits  Skin: Warm, dry, good skin turgor  Psychiatric: Normal thought content and insight; alert and oriented x4    Labs  - Old records and notes have been reviewed in [facility]   ABG  Lab Results   Component Value Date/Time    PH 7.30 08/04/2022 04:25 AM    PO2 79 08/04/2022 04:25 AM    PCO2 63 08/04/2022 04:25 AM    HCO3 31 08/04/2022 04:25 AM    O2SAT 94 08/04/2022 04:25 AM     Lab Results   Component Value Date/Time    IFIO2 40 08/04/2022 04:25 AM    MODE BiLevel 08/04/2022 04:25 AM    SETTIDVOL 480 05/30/2020 05:47 AM    SETPEEP 6.0 08/04/2022 04:25 AM     CBC  Recent Labs     08/04/22  0250   WBC 5.7   RBC 5.66   HGB 16.8   HCT 52.8*   MCV 93.3   MCH 29.7   MCHC 31.8*      MPV 9.6      BMP  Recent Labs     08/04/22  0250      K 4.4      CO2 30   BUN 11   CREATININE 0.8   GLUCOSE 97   CALCIUM 9.0     LFT  Recent Labs     08/04/22  0250   AST 16   ALT 14   BILITOT <0.2*   ALKPHOS 72   LIPASE 26.3     TROP  Lab Results   Component Value Date/Time    TROPONINT < 0.010 08/04/2022 02:50 AM    TROPONINT < 0.010 05/28/2022 12:25 AM TROPONINT < 0.010 05/01/2022 10:52 AM     BNP  No results for input(s): BNP in the last 72 hours. Lactic Acid  No results for input(s): LACTA in the last 72 hours. INR  No results for input(s): INR, PROTIME in the last 72 hours. PTT  No results for input(s): APTT in the last 72 hours. Glucose  No results for input(s): POCGLU in the last 72 hours. UA   Recent Labs     08/04/22  0418   PHUR 6.0   COLORU YELLOW   PROTEINU NEGATIVE   BLOODU NEGATIVE   NITRU NEGATIVE   GLUCOSEU NEGATIVE   BILIRUBINUR NEGATIVE   UROBILINOGEN 0.2   KETUA TRACE*   . PFTs       Sleep studies   None    Cultures    None    EKG   Normal sinus rhythm  Rightward axis  Borderline ECG  When compared with ECG of 28-MAY-2022 00:30,  Nonspecific T wave abnormality now evident in Anterior leads  Nonspecific T wave abnormality no longer evident in Lateral leads  Confirmed by Arleth Damian ([id number]) on 8/4/2022 8:26:13 PM  Echocardiogram       TTE procedure:ECHOCARDIOGRAM COMPLETE 2D W DOPPLER W COLOR. Procedure Date  Date: 05/18/2020 Start: 10:41 AM     Summary   Left ventricular size is normal and systolic function is mildly reduced. Ejection fraction was estimated at 50-55%. LV wall thickness is within   normal limits. Intraventricular septal wall compression during systole suggestive of RV   pressure overload. Markedly enlarged right atrium size. The right ventricle was not clearly visualized. Appears dilated. RVSP of 55-60 mm Hg consistent with moderate pulmonary hypertension. Small circumferential pericardial effusion without evidence of tamponade   physiology. IVC size is dilated with <50% respiratory collapse.       Signature      ----------------------------------------------------------------   Electronically signed by Daniel Harvey MD (Interpreting   physician) on 05/18/2020 at 04:20 PM   ----------------------------------------------------------------      Findings      Mitral Valve   The mitral valve was not well visualized . Trace mitral regurgitation is present. Aortic Valve   The aortic valve leaflets were not well visualized. Aortic valve appears tricuspid. Aortic valve leaflets are somewhat thickened. Tricuspid Valve   Tricuspid valve was not well visualized. Mild tricuspid regurgitation. Pulmonic Valve   The pulmonic valve was not well visualized . Trivial pulmonic regurgitation visualized. Left Atrium   Left atrial size was normal.      Left Ventricle   Left ventricular size is normal and systolic function is mildly reduced. Ejection fraction was estimated at 50-55%. LV wall thickness is within   normal limits. Intraventricular septal wall compression during systole suggestive of RV   pressure overload. Right Atrium   Markedly enlarged right atrium size. Right Ventricle   The right ventricle was not clearly visualized. Appears dilated. RVSP of 55-60 mm Hg consistent with moderate pulmonary hypertension. Pericardial Effusion   Small circumferential pericardial effusion without evidence of tamponade   physiology. Pleural Effusion   No evidence of pleural effusion. Aorta / Great Vessels   -Aortic root dimension within normal limits. -IVC size is dilated with <50% respiratory collapse.      M-Mode/2D Measurements & Calculations      LV Diastolic    LV Systolic Dimension: 3.8  AV Cusp Separation: 2.1 cmLA   Dimension: 5 cm cm                          Dimension: 3.7 cmAO Root   LV FS:24 %      LV Volume Diastolic: 347 ml Dimension: 3.6 cm   LV PW           LV Volume Systolic: 62 ml   Diastolic: 1.2  LV EDV/LV EDV Index: 118   cm              ml/46 m^2LV ESV/LV ESV   Septum          Index: 62 ml/24 m^2         RV Diastolic Dimension: 3.4 cm   Diastolic: 1.2  EF Calculated: 47.5 %   cm                                          LA/Aorta: 1.03                   LV Length: 7.5 cm           Ascending Aorta: 3.1 cm      LV Area   Diastolic: 40.9   cm^2   LV Area Systolic: 77.3   cm^2     Doppler Measurements & Calculations      MV Peak E-Wave: 58.7 cm/s  AV Peak Velocity: 102 LVOT Peak Velocity: 90.8   MV Peak A-Wave: 49.7 cm/s  cm/s                  cm/s   MV E/A Ratio: 1.18         AV Peak Gradient:     LVOT Peak Gradient: 3   MV Peak Gradient: 1.38     4.16 mmHg             mmHg   mmHg                                                    TV Peak E-Wave: 67.3 cm/s   MV Deceleration Time: 310                        TV Peak A-Wave: 51.8 cm/s   msec                              IVRT: 67 msec         TV Peak Gradient: 1.81                                                    mmHg   MV E' Septal Velocity:                           TR Velocity:327 cm/s   13.4 cm/s                  AV DVI (Vmax):0.89    TR Gradient:42.77 mmHg   MV A' Septal Velocity: 9.2                       PV Peak Velocity: 48.4   cm/s                                             cm/s   MV E' Lateral Velocity:                          PV Peak Gradient: 0.94   13.7 cm/s                                        mmHg   MV A' Lateral Velocity:   9.7 cm/s   E/E' septal: 4.38   E/E' lateral: 4.28    Radiology    CXR    8/4/2022      1V CHEST     No acute cardiopulmonary disease process identified. Emphysema. CT Scans    Wed May 4, 2022     CT CHEST WITHOUT CONTRAST     1. Confluent atelectasis in the bilateral lower lobes. 2. Focal interstitial prominence in the lateral aspect of the right upper lobe. This is nonspecific but could be due to infection or edema. 3. Trace bilateral pleural effusions. 4. Additional findings are detailed above.      (See actual reports for details)    Assessment   #Acute on chronic hypoxic and hypercapnic respiratory failure  #Acute exacerbation of chronic obstructive pulmonary disease  #Chronic tobacco use disorder  #Medication noncompliance  #Pulmonary hypertension, WHO classification 3, suspected  #Diastolic heart failure with reduced ejection fraction, no evidence of current decompensation    Plan   -Patient has had multiple hospitalizations for chronic obstructive pulmonary disease, likely secondary to noncompliance with medication regimen to maintain adequate respiration.  - The patient will be stabilized in the inpatient setting with corticosteroids, beta agonist therapy, muscarinic antagonist therapy, and supplemental oxygen  --Given patient's likely exacerbation of chronic obstructive pulmonary disease, may continue with ceftriaxone with the addition of azithromycin for a course of 5 days. - CT of the sinuses ordered for evaluation for possible infection which could contribute to his overall clinical picture. - The patient has returned to his baseline supplemental oxygen level, titrate to maintain SPO2 between 92 and 96%  - Patient counseled extensively on smoking cessation as well as medication compliance. He verbalized understanding  - Patient would benefit from outpatient follow-up in 2 weeks upon discharge for further evaluation and medication management  - Avoid sedative medications when possible to maintain adequate respiratory effort.  -Anticoagulation per primary team  -Patient is compliant and benefiting from BiPAP in the inpatient setting, continue as tolerated  - Given echocardiogram findings on 05/18/2020, there is a concern for pulmonary hypertension. Will likely need right heart catheterization to confirm the diagnosis. Patient will likely need outpatient cardiology follow-up to optimize therapy for diastolic heart failure  - Patient has stated that he does as well as does not have a continuous positive airway pressure machine. He was instructed that if he has one, he should bring it into the hospital for further evaluation and instruction on use. He verbalized understanding and is agreeable to the plan of care.   -Given the patient's evidence of heart failure, would likely benefit from continuous positive airway pressure if he is not already compliant and benefiting from the regimen. Recommend follow-up in the outpatient setting for further evaluation with polysomnography to establish if the patient is a candidate. \"Thank you for asking us to see this patient\"    Questions and concerns addressed. Electronically signed by   Aury Orellana MD IM RESIDENT on 8/5/2022 at 4:12 AM     Case discussed with pulmonology attending, Dr. Norma Sanabria. Patient seen by me independently including key components of medical care. Face to face evaluation and examination was performed. Case discussed with Dr. Aury Orellana MD-resident physician. Italicized font, if present,  represents changes to the note made by me. More than 50% of the encounter time involved with patient care by the Pulmonary & Critical care service team spent by me.     Lab Results   Component Value Date/Time    PH 7.30 08/04/2022 04:25 AM    PCO2 63 08/04/2022 04:25 AM    PO2 79 08/04/2022 04:25 AM    HCO3 31 08/04/2022 04:25 AM    O2SAT 94 08/04/2022 04:25 AM     Lab Results   Component Value Date/Time    IFIO2 40 08/04/2022 04:25 AM    MODE BiLevel 08/04/2022 04:25 AM    SETTIDVOL 480 05/30/2020 05:47 AM    SETPEEP 6.0 08/04/2022 04:25 AM     Please see my modifications mentioned below:  Patient is in no acute distress  Will discontinue Rocephin  Will send sputum for cultures and sensitivity  Continue doxycycline 100 mg p.o. twice daily for 7 days for COPD exacerbation  Continue BiPAP as needed during daytime and continuous at nighttime  He was advised to bring his home BiPAP machine for checkup  We will repeat arterial blood gas now to check the acid-base status allows us to  Patient educated about impression plan    Electronically signed by   Henrik Smith MD on 8/5/2022 at 9:54 AM

## 2022-08-04 NOTE — ED NOTES
Pt placed on 15L non-rebreather. Oxygen saturation increased to 97%.      Milka Yang RN  08/04/22 9066

## 2022-08-04 NOTE — PLAN OF CARE
Problem: Discharge Planning  Goal: Discharge to home or other facility with appropriate resources  8/4/2022 1337 by Karen Mesa RN  Outcome: Progressing     Problem: Pain  Goal: Verbalizes/displays adequate comfort level or baseline comfort level  8/4/2022 1337 by Karen Mesa RN  Outcome: Progressing     Problem: Safety - Adult  Goal: Free from fall injury  8/4/2022 1337 by Karen Mesa RN  Outcome: Progressing  Flowsheets (Taken 8/4/2022 1337)  Free From Fall Injury: Instruct family/caregiver on patient safety  Note: Pt remains free from falls. Bed alarm on, call light and personal items within reach. Problem: ABCDS Injury Assessment  Goal: Absence of physical injury  8/4/2022 1337 by Karen Mesa RN  Outcome: Progressing  Flowsheets (Taken 8/4/2022 1337)  Absence of Physical Injury: Implement safety measures based on patient assessment     Problem: Skin/Tissue Integrity  Goal: Absence of new skin breakdown  Description: 1. Monitor for areas of redness and/or skin breakdown  2. Assess vascular access sites hourly  3. Every 4-6 hours minimum:  Change oxygen saturation probe site  4. Every 4-6 hours:  If on nasal continuous positive airway pressure, respiratory therapy assess nares and determine need for appliance change or resting period. Outcome: Progressing  Note: Pt shows no signs of new skin breakdown. Q2 turns in place as patient allows. Pt turns self. Education provided on the importance of repositioning. Problem: Nutrition Deficit:  Goal: Optimize nutritional status  Outcome: Progressing  Flowsheets (Taken 8/4/2022 1337)  Nutrient intake appropriate for improving, restoring, or maintaining nutritional needs:   Assess nutritional status and recommend course of action   Recommend appropriate diets, oral nutritional supplements, and vitamin/mineral supplements   Monitor oral intake, labs, and treatment plans   Care plan reviewed with patient.   Patient verbalize understanding of the plan of care and contribute to goal setting.

## 2022-08-04 NOTE — FLOWSHEET NOTE
08/04/22 1449   Safe Environment   Safety Measures   (virtual safety rounds complete)   Virtual nurse rounds. Patient sitting up in bed, family at bedside. Staff at bedside.

## 2022-08-04 NOTE — RT PROTOCOL NOTE
medication at home then do not decrease Frequency below that used at home. 0-3 - enter or revise RT bronchodilator order(s) to equivalent RT Bronchodilator order with Frequency of every 4 hours PRN for wheezing or increased work of breathing using Per Protocol order mode. 4-6 - enter or revise RT Bronchodilator order(s) to two equivalent RT bronchodilator orders with one order with BID Frequency and one order with Frequency of every 4 hours PRN wheezing or increased work of breathing using Per Protocol order mode. 7-10 - enter or revise RT Bronchodilator order(s) to two equivalent RT bronchodilator orders with one order with TID Frequency and one order with Frequency of every 4 hours PRN wheezing or increased work of breathing using Per Protocol order mode. 11-13 - enter or revise RT Bronchodilator order(s) to one equivalent RT bronchodilator order with QID Frequency and an Albuterol order with Frequency of every 4 hours PRN wheezing or increased work of breathing using Per Protocol order mode. Greater than 13 - enter or revise RT Bronchodilator order(s) to one equivalent RT bronchodilator order with every 4 hours Frequency and an Albuterol order with Frequency of every 2 hours PRN wheezing or increased work of breathing using Per Protocol order mode. RT to enter RT Home Evaluation for COPD & MDI Assessment order using Per Protocol order mode.     Electronically signed by Nichole Vargas RCP on 8/4/2022 at 8:56 AM

## 2022-08-04 NOTE — CARE COORDINATION
8/4/22, 8:51 AM EDT  DISCHARGE PLANNING EVALUATION:    Bridgette Howard       Admitted: 8/4/2022/ [facility] day: 0   Location: -25/025-A Reason for admit: Respiratory failure ([facility] [address] [J96.90]  COPD exacerbation ([facility] [address] [J44.1]  Acute respiratory failure with hypoxia and hypercapnia ([facility] [address] [J96.01, J96.02]   PMH:  has a past medical history of Acute on chronic systolic congestive heart failure ([address] Emphysema (subcutaneous) (surgical) resulting from a procedure, Hypertension, Pneumonia, and Schizophrenia ([facility] [address] Procedure: 8/4 No acute cardiopulmonary disease process identified. Emphysema. Barriers to Discharge:  Admit from ER with SOB. SpO2 76% on room air on admission. ABGs pH 7.3, pCO2 63, pO2 79, HCO3 31. On BiAP 16/6. PCP: Bhavana Reyes MD  Readmission Risk Score: 12.6%  Patient's Healthcare Decision Maker: Legal Next of Kin    Patient Goals/Plan/Treatment Preferences: Spoke with Sandie. He lives at home with his sister and mother. He  wears 3 L O2 from [facility]. He would like to get home care services through [facility] as he has had them out in the past. His brother provides transportation. Verified PCP and insurance. Transportation/Food Security/Housekeeping Addressed:  No issues identified.

## 2022-08-05 ENCOUNTER — APPOINTMENT (OUTPATIENT)
Dept: CT IMAGING | Age: 52
DRG: 189 | End: 2022-08-05
Payer: MEDICARE

## 2022-08-05 LAB
ALLEN TEST: POSITIVE
ANION GAP SERPL CALCULATED.3IONS-SCNC: 9 MEQ/L (ref 8–16)
BASE EXCESS (CALCULATED): 2 MMOL/L (ref -2.5–2.5)
BASOPHILS # BLD: 0.2 %
BASOPHILS ABSOLUTE: 0 THOU/MM3 (ref 0–0.1)
BUN BLDV-MCNC: 7 MG/DL (ref 7–22)
CALCIUM SERPL-MCNC: 9.1 MG/DL (ref 8.5–10.5)
CHLORIDE BLD-SCNC: 102 MEQ/L (ref 98–111)
CO2: 27 MEQ/L (ref 23–33)
COLLECTED BY:: ABNORMAL
CREAT SERPL-MCNC: 0.6 MG/DL (ref 0.4–1.2)
DEVICE: ABNORMAL
EOSINOPHIL # BLD: 0 %
EOSINOPHILS ABSOLUTE: 0 THOU/MM3 (ref 0–0.4)
ERYTHROCYTE [DISTWIDTH] IN BLOOD BY AUTOMATED COUNT: 15.1 % (ref 11.5–14.5)
ERYTHROCYTE [DISTWIDTH] IN BLOOD BY AUTOMATED COUNT: 50.8 FL (ref 35–45)
GFR SERPL CREATININE-BSD FRML MDRD: > 90 ML/MIN/1.73M2
GLUCOSE BLD-MCNC: 107 MG/DL (ref 70–108)
HCO3: 26 MMOL/L (ref 23–28)
HCT VFR BLD CALC: 47 % (ref 42–52)
HEMOGLOBIN: 15.1 GM/DL (ref 14–18)
IFIO2: 5
IMMATURE GRANS (ABS): 0.02 THOU/MM3 (ref 0–0.07)
IMMATURE GRANULOCYTES: 0.4 %
LYMPHOCYTES # BLD: 22.1 %
LYMPHOCYTES ABSOLUTE: 1 THOU/MM3 (ref 1–4.8)
MCH RBC QN AUTO: 29.8 PG (ref 26–33)
MCHC RBC AUTO-ENTMCNC: 32.1 GM/DL (ref 32.2–35.5)
MCV RBC AUTO: 92.7 FL (ref 80–94)
MONOCYTES # BLD: 9.3 %
MONOCYTES ABSOLUTE: 0.4 THOU/MM3 (ref 0.4–1.3)
NUCLEATED RED BLOOD CELLS: 0 /100 WBC
O2 SATURATION: 93 %
PCO2: 39 MMHG (ref 35–45)
PH BLOOD GAS: 7.43 (ref 7.35–7.45)
PLATELET # BLD: 200 THOU/MM3 (ref 130–400)
PMV BLD AUTO: 9.4 FL (ref 9.4–12.4)
PO2: 63 MMHG (ref 71–104)
POTASSIUM REFLEX MAGNESIUM: 4.1 MEQ/L (ref 3.5–5.2)
RBC # BLD: 5.07 MILL/MM3 (ref 4.7–6.1)
SEG NEUTROPHILS: 68 %
SEGMENTED NEUTROPHILS ABSOLUTE COUNT: 3.2 THOU/MM3 (ref 1.8–7.7)
SODIUM BLD-SCNC: 138 MEQ/L (ref 135–145)
SOURCE, BLOOD GAS: ABNORMAL
WBC # BLD: 4.7 THOU/MM3 (ref 4.8–10.8)

## 2022-08-05 PROCEDURE — 6370000000 HC RX 637 (ALT 250 FOR IP): Performed by: INTERNAL MEDICINE

## 2022-08-05 PROCEDURE — 1200000003 HC TELEMETRY R&B

## 2022-08-05 PROCEDURE — 36600 WITHDRAWAL OF ARTERIAL BLOOD: CPT

## 2022-08-05 PROCEDURE — 6370000000 HC RX 637 (ALT 250 FOR IP)

## 2022-08-05 PROCEDURE — 80048 BASIC METABOLIC PNL TOTAL CA: CPT

## 2022-08-05 PROCEDURE — 70486 CT MAXILLOFACIAL W/O DYE: CPT

## 2022-08-05 PROCEDURE — 6360000002 HC RX W HCPCS: Performed by: INTERNAL MEDICINE

## 2022-08-05 PROCEDURE — 85025 COMPLETE CBC W/AUTO DIFF WBC: CPT

## 2022-08-05 PROCEDURE — 94660 CPAP INITIATION&MGMT: CPT

## 2022-08-05 PROCEDURE — 36415 COLL VENOUS BLD VENIPUNCTURE: CPT

## 2022-08-05 PROCEDURE — 2580000003 HC RX 258: Performed by: INTERNAL MEDICINE

## 2022-08-05 PROCEDURE — 2700000000 HC OXYGEN THERAPY PER DAY

## 2022-08-05 PROCEDURE — 99222 1ST HOSP IP/OBS MODERATE 55: CPT | Performed by: INTERNAL MEDICINE

## 2022-08-05 PROCEDURE — 94640 AIRWAY INHALATION TREATMENT: CPT

## 2022-08-05 PROCEDURE — 82803 BLOOD GASES ANY COMBINATION: CPT

## 2022-08-05 PROCEDURE — 94761 N-INVAS EAR/PLS OXIMETRY MLT: CPT

## 2022-08-05 RX ORDER — IPRATROPIUM BROMIDE AND ALBUTEROL SULFATE 2.5; .5 MG/3ML; MG/3ML
1 SOLUTION RESPIRATORY (INHALATION) EVERY 4 HOURS PRN
Status: DISCONTINUED | OUTPATIENT
Start: 2022-08-05 | End: 2022-08-08

## 2022-08-05 RX ORDER — IPRATROPIUM BROMIDE AND ALBUTEROL SULFATE 2.5; .5 MG/3ML; MG/3ML
1 SOLUTION RESPIRATORY (INHALATION) 2 TIMES DAILY
Status: DISCONTINUED | OUTPATIENT
Start: 2022-08-06 | End: 2022-08-08

## 2022-08-05 RX ORDER — DOXYCYCLINE HYCLATE 100 MG
100 TABLET ORAL EVERY 12 HOURS SCHEDULED
Status: DISCONTINUED | OUTPATIENT
Start: 2022-08-05 | End: 2022-08-08 | Stop reason: HOSPADM

## 2022-08-05 RX ADMIN — PRAVASTATIN SODIUM 40 MG: 40 TABLET ORAL at 21:52

## 2022-08-05 RX ADMIN — METHYLPREDNISOLONE SODIUM SUCCINATE 40 MG: 40 INJECTION, POWDER, FOR SOLUTION INTRAMUSCULAR; INTRAVENOUS at 06:22

## 2022-08-05 RX ADMIN — SODIUM CHLORIDE, PRESERVATIVE FREE 10 ML: 5 INJECTION INTRAVENOUS at 09:13

## 2022-08-05 RX ADMIN — SODIUM CHLORIDE, PRESERVATIVE FREE 10 ML: 5 INJECTION INTRAVENOUS at 21:52

## 2022-08-05 RX ADMIN — CEFTRIAXONE SODIUM 1000 MG: 1 INJECTION, POWDER, FOR SOLUTION INTRAMUSCULAR; INTRAVENOUS at 09:02

## 2022-08-05 RX ADMIN — DOCUSATE SODIUM 100 MG: 100 CAPSULE, LIQUID FILLED ORAL at 08:57

## 2022-08-05 RX ADMIN — DOXYCYCLINE HYCLATE 100 MG: 100 TABLET, COATED ORAL at 11:37

## 2022-08-05 RX ADMIN — IPRATROPIUM BROMIDE AND ALBUTEROL SULFATE 1 AMPULE: .5; 3 SOLUTION RESPIRATORY (INHALATION) at 14:49

## 2022-08-05 RX ADMIN — DOXYCYCLINE HYCLATE 100 MG: 100 TABLET, COATED ORAL at 21:52

## 2022-08-05 RX ADMIN — BUDESONIDE 500 MCG: 0.5 INHALANT RESPIRATORY (INHALATION) at 21:31

## 2022-08-05 RX ADMIN — RISPERIDONE 2 MG: 1 TABLET ORAL at 21:52

## 2022-08-05 RX ADMIN — RISPERIDONE 2 MG: 1 TABLET ORAL at 08:57

## 2022-08-05 RX ADMIN — IPRATROPIUM BROMIDE AND ALBUTEROL SULFATE 1 AMPULE: .5; 3 SOLUTION RESPIRATORY (INHALATION) at 21:30

## 2022-08-05 RX ADMIN — METHYLPREDNISOLONE SODIUM SUCCINATE 40 MG: 40 INJECTION, POWDER, FOR SOLUTION INTRAMUSCULAR; INTRAVENOUS at 21:54

## 2022-08-05 RX ADMIN — IPRATROPIUM BROMIDE AND ALBUTEROL SULFATE 1 AMPULE: .5; 3 SOLUTION RESPIRATORY (INHALATION) at 08:13

## 2022-08-05 RX ADMIN — TADALAFIL 40 MG: 20 TABLET ORAL at 08:57

## 2022-08-05 RX ADMIN — METHYLPREDNISOLONE SODIUM SUCCINATE 40 MG: 40 INJECTION, POWDER, FOR SOLUTION INTRAMUSCULAR; INTRAVENOUS at 14:25

## 2022-08-05 RX ADMIN — BUDESONIDE 500 MCG: 0.5 INHALANT RESPIRATORY (INHALATION) at 08:13

## 2022-08-05 RX ADMIN — ENOXAPARIN SODIUM 40 MG: 100 INJECTION SUBCUTANEOUS at 08:58

## 2022-08-05 RX ADMIN — CARBAMAZEPINE 200 MG: 200 TABLET ORAL at 08:57

## 2022-08-05 RX ADMIN — CARBAMAZEPINE 200 MG: 200 TABLET ORAL at 21:52

## 2022-08-05 NOTE — PLAN OF CARE
Problem: Respiratory - Adult  Goal: Clear lung sounds  Description: Clear lung sounds  8/5/2022 0816 by Jarad Hopson RCP  Outcome: Progressing   Continue with Duoneb and Pulmicort   Patient mutually agreed on goals.

## 2022-08-05 NOTE — PLAN OF CARE
Problem: Discharge Planning  Goal: Discharge to home or other facility with appropriate resources  8/5/2022 0115 by Hussein Mullen RN  Outcome: Progressing  Flowsheets (Taken 8/5/2022 0115)  Discharge to home or other facility with appropriate resources:   Identify barriers to discharge with patient and caregiver   Arrange for needed discharge resources and transportation as appropriate   Identify discharge learning needs (meds, wound care, etc)     Problem: Pain  Goal: Verbalizes/displays adequate comfort level or baseline comfort level  8/5/2022 0115 by Hussein Mullen RN  Outcome: Progressing  Problem: ABCDS Injury Assessment  Goal: Absence of physical injury  8/5/2022 0115 by Hussein Mullen RN  Outcome: Progressing  Flowsheets (Taken 8/4/2022 1337 by Roxi Hernández RN)  Absence of Physical Injury: Implement safety measures based on patient assessment     Problem: Skin/Tissue Integrity  Goal: Absence of new skin breakdown  Description: 1. Monitor for areas of redness and/or skin breakdown  2. Assess vascular access sites hourly  3. Every 4-6 hours minimum:  Change oxygen saturation probe site  4. Every 4-6 hours:  If on nasal continuous positive airway pressure, respiratory therapy assess nares and determine need for appliance change or resting period. 8/5/2022 0115 by Hussein Mullen RN  Outcome: Progressing  Note: No new skin lesions noted this shift. Patient encouraged to reposition every two hours. Skin assessments completed and ongoing.         Problem: Nutrition Deficit:  Goal: Optimize nutritional status  8/5/2022 0115 by Hussein Mullen RN  Outcome: Progressing    Problem: Safety - Adult  Goal: Free from fall injury  8/5/2022 0115 by Hussein Mullen RN  Outcome: Progressing  Free From Fall Injury: Instruct family/caregiver on patient safety

## 2022-08-05 NOTE — PLAN OF CARE
Problem: Respiratory - Adult  Goal: Clear lung sounds  Description: Clear lung sounds  Outcome: Progressing   Mutually agreed upon goals.

## 2022-08-05 NOTE — PLAN OF CARE
Problem: Discharge Planning  Goal: Discharge to home or other facility with appropriate resources  8/5/2022 1309 by Juan Vasquez RN  Outcome: Progressing  Flowsheets (Taken 8/5/2022 0910)  Discharge to home or other facility with appropriate resources:   Identify barriers to discharge with patient and caregiver   Arrange for needed discharge resources and transportation as appropriate   Identify discharge learning needs (meds, wound care, etc)   Refer to discharge planning if patient needs post-hospital services based on physician order or complex needs related to functional status, cognitive ability or social support system     Problem: Pain  Goal: Verbalizes/displays adequate comfort level or baseline comfort level  8/5/2022 1309 by Juan Vasquez RN  Outcome: Progressing  Flowsheets (Taken 8/5/2022 1309)  Verbalizes/displays adequate comfort level or baseline comfort level:   Encourage patient to monitor pain and request assistance   Assess pain using appropriate pain scale   Administer analgesics based on type and severity of pain and evaluate response     Problem: Safety - Adult  Goal: Free from fall injury  8/5/2022 1309 by Juan Vasquez RN  Outcome: Progressing  Flowsheets (Taken 8/5/2022 1309)  Free From Fall Injury: Instruct family/caregiver on patient safety  Note: Pt remains free from falls. Bed alarm on, call light and personal items within reach. Problem: ABCDS Injury Assessment  Goal: Absence of physical injury  8/5/2022 1309 by Juan Vasquez RN  Outcome: Progressing  Flowsheets (Taken 8/5/2022 1309)  Absence of Physical Injury: Implement safety measures based on patient assessment     Problem: Skin/Tissue Integrity  Goal: Absence of new skin breakdown  Description: 1. Monitor for areas of redness and/or skin breakdown  2. Assess vascular access sites hourly  3. Every 4-6 hours minimum:  Change oxygen saturation probe site  4.   Every 4-6 hours:  If on nasal continuous

## 2022-08-05 NOTE — CARE COORDINATION
8/5/22, 3:15 PM EDT    Patient goals/plan/ treatment preferences discussed by  and . Patient goals/plan/ treatment preferences reviewed with patient/ family. Patient/ family verbalize understanding of discharge plan and are in agreement with goal/plan/treatment preferences. Understanding was demonstrated using the teach back method. AVS provided by RN at time of discharge, which includes all necessary medical information pertaining to the patients current course of illness, treatment, post-discharge goals of care, and treatment preferences.      Services At/After Discharge: Home Health       IMM Letter  IMM Letter given to Patient/Family/Significant other/Guardian/POA/by[de-identified] pt access  IMM Letter date given[de-identified] 08/04/22  IMM Letter time given[de-identified] 6554

## 2022-08-05 NOTE — RT PROTOCOL NOTE
RT Inhaler-Nebulizer Bronchodilator Protocol Note    There is a bronchodilator order in the chart from a provider indicating to follow the RT Bronchodilator Protocol and there is an Initiate RT Inhaler-Nebulizer Bronchodilator Protocol order as well (see protocol at bottom of note). CXR Findings:  XR CHEST PORTABLE    Result Date: 8/4/2022  Impression: No acute cardiopulmonary disease process identified. Emphysema. This document has been electronically signed by: Mai Rocha MD on 08/04/2022 04:09 AM      The findings from the last RT Protocol Assessment were as follows:   History Pulmonary Disease: Chronic pulmonary disease  Respiratory Pattern: Dyspnea on exertion or RR 21-25 bpm  Breath Sounds: Slightly diminished and/or crackles  Cough: Strong, productive  Indication for Bronchodilator Therapy: Decreased or absent breath sounds  Bronchodilator Assessment Score: 7    Aerosolized bronchodilator medication orders have been revised according to the RT Inhaler-Nebulizer Bronchodilator Protocol below. Respiratory Therapist to perform RT Therapy Protocol Assessment initially then follow the protocol. Repeat RT Therapy Protocol Assessment PRN for score 0-3 or on second treatment, BID, and PRN for scores above 3. No Indications - adjust the frequency to every 6 hours PRN wheezing or bronchospasm, if no treatments needed after 48 hours then discontinue using Per Protocol order mode. If indication present, adjust the RT bronchodilator orders based on the Bronchodilator Assessment Score as indicated below. Use Inhaler orders unless patient has one or more of the following: on home nebulizer, not able to hold breath for 10 seconds, is not alert and oriented, cannot activate and use MDI correctly, or respiratory rate 25 breaths per minute or more, then use the equivalent nebulizer order(s) with same Frequency and PRN reasons based on the score.   If a patient is on this medication at home then do not decrease Frequency below that used at home. 0-3 - enter or revise RT bronchodilator order(s) to equivalent RT Bronchodilator order with Frequency of every 4 hours PRN for wheezing or increased work of breathing using Per Protocol order mode. 4-6 - enter or revise RT Bronchodilator order(s) to two equivalent RT bronchodilator orders with one order with BID Frequency and one order with Frequency of every 4 hours PRN wheezing or increased work of breathing using Per Protocol order mode. 7-10 - enter or revise RT Bronchodilator order(s) to two equivalent RT bronchodilator orders with one order with TID Frequency and one order with Frequency of every 4 hours PRN wheezing or increased work of breathing using Per Protocol order mode. 11-13 - enter or revise RT Bronchodilator order(s) to one equivalent RT bronchodilator order with QID Frequency and an Albuterol order with Frequency of every 4 hours PRN wheezing or increased work of breathing using Per Protocol order mode. Greater than 13 - enter or revise RT Bronchodilator order(s) to one equivalent RT bronchodilator order with every 4 hours Frequency and an Albuterol order with Frequency of every 2 hours PRN wheezing or increased work of breathing using Per Protocol order mode. RT to enter RT Home Evaluation for COPD & MDI Assessment order using Per Protocol order mode.     Electronically signed by Juan Goldberg RCP on 8/5/2022 at 8:17 AM

## 2022-08-05 NOTE — CARE COORDINATION
DISCHARGE/PLANNING EVALUATION  8/5/22, 2:39 PM EDT    Reason for Referral: Wants HH  Mental Status: Answers questions appropriately   Decision Making: Family assists with decision making as needed  Family/Social/Home Environment: Patient resides at home with his mother and sister. He stated that home is 2 floors but he can remain on the first floor. He stated that he has a tub/shower combo in the bathroom and does fine with that. Current Services including food security, transportation and housekeeping: Family assists. Sister drives. Current Equipment:None  Payment Source:Medicare  Concerns or Barriers to Discharge: NA  Post acute provider list with quality measures, geographic area and applicable managed care information provided. Questions regarding selection process answered:List provided. Teach Back Method used with Tory regarding care plan and options for discharge. Patient and Trae Sender (sister) verbalize understanding of the plan of care and contribute to goal setting. Patient goals, treatment preferences and discharge plan: Spoke with patient he stated to call his family for a home health agency. Called his sister Trae Sender and she stated to set him up with anyone. Reviewed chart and he has had [facility] in the past.  Sister confirmed that was fine to use them again. Referral made Susan Herrera stated that they can accept and needs AVS faxed to [phone] when discharged and agency called. Weekend discharge instructions left on chart.      Electronically signed by ALICE Morales on 8/5/2022 at 2:39 PM

## 2022-08-05 NOTE — PROGRESS NOTES
INTERNAL MEDICINE Progress Note  8/5/2022 12:23 PM  Subjective:   Admit Date: 8/4/2022  PCP: Denise Roach MD  Interval History:   appears comfortable at rest  MELGAR    Objective:   Vitals: /65   Pulse 77   Temp 98.3 °F (36.8 °C) (Oral)   Resp 18   Ht 5' 11\" (1.803 m)   Wt 201 lb 11.5 oz (91.5 kg)   SpO2 95%   BMI 28.13 kg/m²   General appearance: alert and cooperative with exam  HEENT: Head: Normocephalic, without obvious abnormality  Pharynx: Teeth: multiple caries  Neck: no adenopathy, no carotid bruit, no JVD, and supple, symmetrical, trachea midline  Lungs: diminished breath sounds bilaterally  Heart: S1, S2 normal  Abdomen: soft, non-tender; bowel sounds normal; no masses,  no organomegaly  Extremities: no edema, redness or tenderness in the calves or thighs  Neurologic: Mental status: Alert, oriented, thought content appropriate      Medications:   Scheduled Meds:   doxycycline hyclate  100 mg Oral 2 times per day    sodium chloride flush  5-40 mL IntraVENous 2 times per day    enoxaparin  40 mg SubCUTAneous Daily    docusate sodium  100 mg Oral Daily    methylPREDNISolone  40 mg IntraVENous Q8H    ipratropium-albuterol  1 ampule Inhalation TID    budesonide  500 mcg Nebulization BID    carBAMazepine  200 mg Oral BID    pravastatin  40 mg Oral Nightly    risperiDONE  2 mg Oral BID    Tadalafil (PAH)  40 mg Oral Daily     Continuous Infusions:   sodium chloride         Lab Results:   CBC:   Recent Labs     08/04/22 0250 08/05/22 0727   WBC 5.7 4.7*   HGB 16.8 15.1    200     BMP:    Recent Labs     08/04/22 0250 08/05/22 0727    138   K 4.4 4.1    102   CO2 30 27   BUN 11 7   CREATININE 0.8 0.6   GLUCOSE 97 107     Hepatic:   Recent Labs     08/04/22 0250   AST 16   ALT 14   BILITOT <0.2*   ALKPHOS 72       Magnesium:    Lab Results   Component Value Date/Time    MG 2.0 12/20/2021 07:02 PM     Uric Acid:  No components found for: URIC  HgBA1c:    Lab Results   Component Value Date/Time    LABA1C 6.0 12/15/2019 10:00 AM     TSH:    Lab Results   Component Value Date/Time    TSH 0.686 05/01/2022 10:52 AM     VITAMIN B12: No components found for: B12  FOLATE:    Lab Results   Component Value Date/Time    FOLATE 9.0 12/08/2016 10:18 AM     IRON:  No results found for: IRON  FERRITIN:    Lab Results   Component Value Date/Time    FERRITIN 344 12/11/2021 02:00 AM     08/05/22 1044    Specimen Source: Blood gases     pH, Blood Gas 7.43    PCO2 39 mmhg    PO2 63 Low  mmhg    HCO3 26 mmol/l    Base Excess (Calculated) 2.0 mmol/l    O2 Sat 93 %    IFIO2 5    DEVICE Cannula    Nicholas Test Positive    Source: R Radial         Assessment and Plan:   Acute exacerbation of COPD  Acute on chronic hypoxemic and hypercapnic respiratory failure  Primary pulmonary hypertension, on tadalafil  Chronic ongoing nicotine abuse. Schizoaffective disorder    Plan:  Continue bronchodilators DuoNeb aerosols.   IV Solu-Medrol  doxycycline  Lovenox for DVT prophylaxis  cont psychotropics  Home o2 veronicaal        Hayder Pineda MD, MD

## 2022-08-06 PROCEDURE — 99232 SBSQ HOSP IP/OBS MODERATE 35: CPT | Performed by: INTERNAL MEDICINE

## 2022-08-06 PROCEDURE — 6370000000 HC RX 637 (ALT 250 FOR IP): Performed by: INTERNAL MEDICINE

## 2022-08-06 PROCEDURE — 6360000002 HC RX W HCPCS: Performed by: INTERNAL MEDICINE

## 2022-08-06 PROCEDURE — 6370000000 HC RX 637 (ALT 250 FOR IP)

## 2022-08-06 PROCEDURE — 1200000003 HC TELEMETRY R&B

## 2022-08-06 PROCEDURE — 94640 AIRWAY INHALATION TREATMENT: CPT

## 2022-08-06 PROCEDURE — 2580000003 HC RX 258: Performed by: INTERNAL MEDICINE

## 2022-08-06 RX ORDER — PREDNISONE 20 MG/1
40 TABLET ORAL DAILY
Status: DISCONTINUED | OUTPATIENT
Start: 2022-08-06 | End: 2022-08-08 | Stop reason: HOSPADM

## 2022-08-06 RX ADMIN — IPRATROPIUM BROMIDE AND ALBUTEROL SULFATE 1 AMPULE: .5; 3 SOLUTION RESPIRATORY (INHALATION) at 08:56

## 2022-08-06 RX ADMIN — BUDESONIDE 500 MCG: 0.5 INHALANT RESPIRATORY (INHALATION) at 18:24

## 2022-08-06 RX ADMIN — ENOXAPARIN SODIUM 40 MG: 100 INJECTION SUBCUTANEOUS at 08:55

## 2022-08-06 RX ADMIN — DOXYCYCLINE HYCLATE 100 MG: 100 TABLET, COATED ORAL at 20:36

## 2022-08-06 RX ADMIN — METHYLPREDNISOLONE SODIUM SUCCINATE 40 MG: 40 INJECTION, POWDER, FOR SOLUTION INTRAMUSCULAR; INTRAVENOUS at 05:48

## 2022-08-06 RX ADMIN — SODIUM CHLORIDE, PRESERVATIVE FREE 10 ML: 5 INJECTION INTRAVENOUS at 20:36

## 2022-08-06 RX ADMIN — PRAVASTATIN SODIUM 40 MG: 40 TABLET ORAL at 20:36

## 2022-08-06 RX ADMIN — PREDNISONE 40 MG: 20 TABLET ORAL at 13:43

## 2022-08-06 RX ADMIN — CARBAMAZEPINE 200 MG: 200 TABLET ORAL at 20:36

## 2022-08-06 RX ADMIN — IPRATROPIUM BROMIDE AND ALBUTEROL SULFATE 1 AMPULE: .5; 3 SOLUTION RESPIRATORY (INHALATION) at 18:24

## 2022-08-06 RX ADMIN — RISPERIDONE 2 MG: 1 TABLET ORAL at 20:36

## 2022-08-06 RX ADMIN — BUDESONIDE 500 MCG: 0.5 INHALANT RESPIRATORY (INHALATION) at 08:56

## 2022-08-06 RX ADMIN — SODIUM CHLORIDE, PRESERVATIVE FREE 10 ML: 5 INJECTION INTRAVENOUS at 08:55

## 2022-08-06 RX ADMIN — CARBAMAZEPINE 200 MG: 200 TABLET ORAL at 08:55

## 2022-08-06 RX ADMIN — DOXYCYCLINE HYCLATE 100 MG: 100 TABLET, COATED ORAL at 08:55

## 2022-08-06 RX ADMIN — RISPERIDONE 2 MG: 1 TABLET ORAL at 08:55

## 2022-08-06 RX ADMIN — DOCUSATE SODIUM 100 MG: 100 CAPSULE, LIQUID FILLED ORAL at 08:55

## 2022-08-06 ASSESSMENT — PAIN SCALES - GENERAL: PAINLEVEL_OUTOF10: 0

## 2022-08-06 NOTE — PLAN OF CARE
Problem: Discharge Planning  Goal: Discharge to home or other facility with appropriate resources  8/6/2022 0942 by Ishmael Burden RN  Outcome: Progressing  Flowsheets (Taken 8/6/2022 0845)  Discharge to home or other facility with appropriate resources:   Identify barriers to discharge with patient and caregiver   Arrange for needed discharge resources and transportation as appropriate   Identify discharge learning needs (meds, wound care, etc)   Refer to discharge planning if patient needs post-hospital services based on physician order or complex needs related to functional status, cognitive ability or social support system     Problem: Pain  Goal: Verbalizes/displays adequate comfort level or baseline comfort level  8/6/2022 0942 by Ishmael Burden RN  Outcome: Progressing  Flowsheets (Taken 8/6/2022 0845)  Verbalizes/displays adequate comfort level or baseline comfort level:   Encourage patient to monitor pain and request assistance   Assess pain using appropriate pain scale   Administer analgesics based on type and severity of pain and evaluate response   Implement non-pharmacological measures as appropriate and evaluate response     Problem: Safety - Adult  Goal: Free from fall injury  8/6/2022 0942 by Ishmael Burden RN  Outcome: Progressing  Flowsheets (Taken 8/6/2022 0845)  Free From Fall Injury: Instruct family/caregiver on patient safety     Problem: ABCDS Injury Assessment  Goal: Absence of physical injury  8/6/2022 0942 by Ishmael Burden RN  Outcome: Progressing  Flowsheets (Taken 8/6/2022 0845)  Absence of Physical Injury: Implement safety measures based on patient assessment     Problem: Skin/Tissue Integrity  Goal: Absence of new skin breakdown  Description: 1. Monitor for areas of redness and/or skin breakdown  2. Assess vascular access sites hourly  3. Every 4-6 hours minimum:  Change oxygen saturation probe site  4.   Every 4-6 hours:  If on nasal continuous positive airway pressure, respiratory therapy assess nares and determine need for appliance change or resting period. 8/6/2022 6660 by Rico Birmingham RN  Outcome: Progressing     Problem: Nutrition Deficit:  Goal: Optimize nutritional status  8/6/2022 0942 by Rico Birmingham RN  Outcome: Progressing  Flowsheets (Taken 8/5/2022 2314 by Penny Larsen RN)  Nutrient intake appropriate for improving, restoring, or maintaining nutritional needs:   Assess nutritional status and recommend course of action   Monitor oral intake, labs, and treatment plans     Problem: Chronic Conditions and Co-morbidities  Goal: Patient's chronic conditions and co-morbidity symptoms are monitored and maintained or improved  8/6/2022 0942 by Rico Birmingham RN  Outcome: Progressing  Flowsheets (Taken 8/6/2022 0864)  Care Plan - Patient's Chronic Conditions and Co-Morbidity Symptoms are Monitored and Maintained or Improved:   Monitor and assess patient's chronic conditions and comorbid symptoms for stability, deterioration, or improvement   Collaborate with multidisciplinary team to address chronic and comorbid conditions and prevent exacerbation or deterioration   Update acute care plan with appropriate goals if chronic or comorbid symptoms are exacerbated and prevent overall improvement and discharge   Care plan reviewed with patient and family. Patient and family verbalize understanding of the plan of care and contribute to goal setting.

## 2022-08-06 NOTE — PROGRESS NOTES
[facility]      Patient - Tahir Gramajo   MRN -  [id number]   Acct # - [de-identified]   - 1970      Date of Admission -  2022  2:47 AM  Date of evaluation -  2022  Room - Juan Daniel Estrada MD Primary Care Physician - Anibal Hernandez MD     Problem List      Active Hospital Problems    Diagnosis Date Noted    Respiratory failure [facility]) [J96.90] 2020     Reason for Consult    \"COPD exacerbation/acute on chronic hypoxemic/hypercapnic respiratory failure\"  HPI   This is a 46year old male with a PMHx of tobacco use disorder, severe COPD, chronic diastolic CHF, OHS, HTN who presented to [address] [place] [address] on  with worsening shortness of breath. He endorses that it was a progressive decline in his respiratory function accompanied by cough with yellow/white sputum production. He denies hemoptysis. He endorses increasing use of his rescue inhalers. He does not use his nebulizer at home as he does not have the fluid. He is on oxygen continuously at home. He states that he uses 2 to 3 L continuously, with no change for exertion. He denies using any form of positive airway pressure at home. He denies headaches, changes in vision, chest pain, fever, chills, nausea, vomiting, or diarrhea. He endorses smoking tobacco and vaping. He states that he typically smokes 2 to 3 cigarettes/day. He denies any difficulty with eating, urinating, or defecating. He denies any recent illness or occupational exposures. He denies any other acute symptoms or concerns. In the ED, vitals were significant for respiratory rate of 40 and an SPO2 of 95% on 6 L of oxygen via nasal cannula. The patient was escalated to positive airway pressure with an FiO2 of 40 in order to maintain adequate oxygenation. VBG on admission showed pH mixed of 7.30, PCO2 64, PO2 37, bicarb 32. ABG 1 hour later showed pH 7.30, PCO2 63, PO2 79, bicarb 31.   The EC tablet, Take 1 tablet by mouth daily  pravastatin (PRAVACHOL) 40 MG tablet, Take 40 mg by mouth nightly  metoprolol succinate (TOPROL XL) 25 MG extended release tablet, Take 1 tablet by mouth daily  carBAMazepine (TEGRETOL) 200 MG tablet, Take 200 mg by mouth 2 times daily  Diet    ADULT DIET; Regular; No Added Salt (3-4 gm)  Allergies    Patient has no known allergies. Social History     Social History     Socioeconomic History    Marital status: Single     Spouse name: Not on file    Number of children: 0    Years of education: Not on file    Highest education level: Not on file   Occupational History    Not on file   Tobacco Use    Smoking status: Every Day     Packs/day: 1.00     Years: 20.00     Pack years: 20.00     Types: Cigarettes    Smokeless tobacco: Never   Vaping Use    Vaping Use: Never used   Substance and Sexual Activity    Alcohol use: No    Drug use: No    Sexual activity: Not Currently   Other Topics Concern    Not on file   Social History Narrative    Not on file     Social Determinants of Health     Financial Resource Strain: Not on file   Food Insecurity: Not on file   Transportation Needs: Not on file   Physical Activity: Not on file   Stress: Not on file   Social Connections: Not on file   Intimate Partner Violence: Not on file   Housing Stability: Not on file     Family History          Problem Relation Age of Onset    High Blood Pressure Mother      Sleep History    Never diagnosed with sleep apnea in the past.  Vitals     height is 5' 11\" (1.803 m) and weight is 208 lb 9.6 oz (94.6 kg). His oral temperature is 98.7 °F (37.1 °C). His blood pressure is 116/59 (abnormal) and his pulse is 86. His respiration is 18 and oxygen saturation is 93%. Body mass index is 29.09 kg/m².     SUPPLEMENTAL O2: O2 Flow Rate (L/min): 5 L/min     I/O      Intake/Output Summary (Last 24 hours) at 8/6/2022 1505  Last data filed at 8/6/2022 1330  Gross per 24 hour   Intake 1050 ml   Output 3025 ml   Net -1975 ml       I/O last 3 completed shifts: In: 2135.7 [P.O.:2040; I.V.:10; IV Piggyback:85.7]  Out: 1846 [MYYEX:2048]   Patient Vitals for the past 96 hrs (Last 3 readings):   Weight   08/06/22 0329 208 lb 9.6 oz (94.6 kg)   08/04/22 0600 201 lb 11.5 oz (91.5 kg)         Exam   Nursing note and vitals reviewed. Constitutional: This is a middle-aged gentleman lying comfortably in bed in no acute distress.   HENT: Normocephalic, atraumatic, nares patent, moist mucosa, neck is supple  Eyes: Eyes equal and reactive to light, no abnormal extraocular eye movements  Cardiovascular: Normal S1-S2 with no murmurs rubs or gallops  Pulmonary/Chest: Diminished breath sounds bilaterally, no added sounds   Abdominal: Soft, nontender, nondistended  Musculoskeletal: Able to move all 4 extremities spontaneously  Extremities: +2 pulses palpated bilaterally, no pitting edema  Neurological: No focal neurological deficits  Skin: Warm, dry, good skin turgor  Psychiatric: Normal thought content and insight; alert and oriented x4    Labs  - Old records and notes have been reviewed in [facility]   ABG  Lab Results   Component Value Date/Time    PH 7.43 08/05/2022 10:35 AM    PO2 63 08/05/2022 10:35 AM    PCO2 39 08/05/2022 10:35 AM    HCO3 26 08/05/2022 10:35 AM    O2SAT 93 08/05/2022 10:35 AM     Lab Results   Component Value Date/Time    IFIO2 5 08/05/2022 10:35 AM    MODE BiLevel 08/04/2022 04:25 AM    SETTIDVOL 480 05/30/2020 05:47 AM    SETPEEP 6.0 08/04/2022 04:25 AM     CBC  Recent Labs     08/04/22  0250 08/05/22 0727   WBC 5.7 4.7*   RBC 5.66 5.07   HGB 16.8 15.1   HCT 52.8* 47.0   MCV 93.3 92.7   MCH 29.7 29.8   MCHC 31.8* 32.1*    200   MPV 9.6 9.4        BMP  Recent Labs     08/04/22  0250 08/05/22  0727    138   K 4.4 4.1    102   CO2 30 27   BUN 11 7   CREATININE 0.8 0.6   GLUCOSE 97 107   CALCIUM 9.0 9.1       LFT  Recent Labs     08/04/22  0250   AST 16   ALT 14   BILITOT <0.2*   ALKPHOS 72   LIPASE 26.3 TROP  Lab Results   Component Value Date/Time    TROPONINT < 0.010 08/04/2022 02:50 AM    TROPONINT < 0.010 05/28/2022 12:25 AM    TROPONINT < 0.010 05/01/2022 10:52 AM     UA   Recent Labs     08/04/22  0418   PHUR 6.0   COLORU YELLOW   PROTEINU NEGATIVE   BLOODU NEGATIVE   NITRU NEGATIVE   GLUCOSEU NEGATIVE   BILIRUBINUR NEGATIVE   UROBILINOGEN 0.2   KETUA TRACE*       PFTs       Sleep studies   None    Cultures    None    EKG   Normal sinus rhythm  Rightward axis  Borderline ECG  When compared with ECG of 28-MAY-2022 00:30,  Nonspecific T wave abnormality now evident in Anterior leads  Nonspecific T wave abnormality no longer evident in Lateral leads  Confirmed by Hank Chowdary ([id number]) on 8/4/2022 8:26:13 PM  Echocardiogram       TTE procedure:ECHOCARDIOGRAM COMPLETE 2D W DOPPLER W COLOR. Procedure Date  Date: 05/18/2020 Start: 10:41 AM     Summary   Left ventricular size is normal and systolic function is mildly reduced. Ejection fraction was estimated at 50-55%. LV wall thickness is within   normal limits. Intraventricular septal wall compression during systole suggestive of RV   pressure overload. Markedly enlarged right atrium size. The right ventricle was not clearly visualized. Appears dilated. RVSP of 55-60 mm Hg consistent with moderate pulmonary hypertension. Small circumferential pericardial effusion without evidence of tamponade   physiology. IVC size is dilated with <50% respiratory collapse. Signature      ----------------------------------------------------------------   Electronically signed by Herbert Domingo MD (Interpreting   physician) on 05/18/2020 at 04:20 PM   ----------------------------------------------------------------      Findings      Mitral Valve   The mitral valve was not well visualized . Trace mitral regurgitation is present. Aortic Valve   The aortic valve leaflets were not well visualized. Aortic valve appears tricuspid.    Aortic valve leaflets are somewhat thickened. Tricuspid Valve   Tricuspid valve was not well visualized. Mild tricuspid regurgitation. Pulmonic Valve   The pulmonic valve was not well visualized . Trivial pulmonic regurgitation visualized. Left Atrium   Left atrial size was normal.      Left Ventricle   Left ventricular size is normal and systolic function is mildly reduced. Ejection fraction was estimated at 50-55%. LV wall thickness is within   normal limits. Intraventricular septal wall compression during systole suggestive of RV   pressure overload. Right Atrium   Markedly enlarged right atrium size. Right Ventricle   The right ventricle was not clearly visualized. Appears dilated. RVSP of 55-60 mm Hg consistent with moderate pulmonary hypertension. Pericardial Effusion   Small circumferential pericardial effusion without evidence of tamponade   physiology. Pleural Effusion   No evidence of pleural effusion. Aorta / Great Vessels   -Aortic root dimension within normal limits. -IVC size is dilated with <50% respiratory collapse.      M-Mode/2D Measurements & Calculations      LV Diastolic    LV Systolic Dimension: 3.8  AV Cusp Separation: 2.1 cmLA   Dimension: 5 cm cm                          Dimension: 3.7 cmAO Root   LV FS:24 %      LV Volume Diastolic: 038 ml Dimension: 3.6 cm   LV PW           LV Volume Systolic: 62 ml   Diastolic: 1.2  LV EDV/LV EDV Index: 118   cm              ml/46 m^2LV ESV/LV ESV   Septum          Index: 62 ml/24 m^2         RV Diastolic Dimension: 3.4 cm   Diastolic: 1.2  EF Calculated: 47.5 %   cm                                          LA/Aorta: 1.03                   LV Length: 7.5 cm           Ascending Aorta: 3.1 cm      LV Area   Diastolic: 52.3   cm^2   LV Area   Systolic: 60.7   cm^2     Doppler Measurements & Calculations      MV Peak E-Wave: 58.7 cm/s  AV Peak Velocity: 102 LVOT Peak Velocity: 90.8   MV Peak A-Wave: 49.7 cm/s  cm/s cm/s   MV E/A Ratio: 1.18         AV Peak Gradient:     LVOT Peak Gradient: 3   MV Peak Gradient: 1.38     4.16 mmHg             mmHg   mmHg                                                    TV Peak E-Wave: 67.3 cm/s   MV Deceleration Time: 310                        TV Peak A-Wave: 51.8 cm/s   msec                              IVRT: 67 msec         TV Peak Gradient: 1.81                                                    mmHg   MV E' Septal Velocity:                           TR Velocity:327 cm/s   13.4 cm/s                  AV DVI (Vmax):0.89    TR Gradient:42.77 mmHg   MV A' Septal Velocity: 9.2                       PV Peak Velocity: 48.4   cm/s                                             cm/s   MV E' Lateral Velocity:                          PV Peak Gradient: 0.94   13.7 cm/s                                        mmHg   MV A' Lateral Velocity:   9.7 cm/s   E/E' septal: 4.38   E/E' lateral: 4.28    Radiology    CXR    8/4/2022      1V CHEST     No acute cardiopulmonary disease process identified. Emphysema. CT Scans    Wed May 4, 2022     CT CHEST WITHOUT CONTRAST     1. Confluent atelectasis in the bilateral lower lobes. 2. Focal interstitial prominence in the lateral aspect of the right upper lobe. This is nonspecific but could be due to infection or edema. 3. Trace bilateral pleural effusions. 4. Additional findings are detailed above.      (See actual reports for details)    Assessment   #Acute on chronic hypoxic and hypercapnic respiratory failure  #Acute exacerbation of chronic obstructive pulmonary disease  #Chronic tobacco use disorder  #Medication noncompliance  #Pulmonary hypertension, WHO classification 3, suspected  #Diastolic heart failure with reduced ejection fraction, no evidence of current decompensation  Plan   -Patient has had multiple hospitalizations for chronic obstructive pulmonary disease, likely secondary to noncompliance with medication regimen to maintain adequate respiration.  - Continue Oxygen by nasal canula to keep saturation above 92%, patient was instructed to use Oxygen at least 16 hours a day due to pulmonary hypertension  -Duoneb prn   -Smoking cessation encouraged   - Outpatient follow-up in 2 weeks upon discharge for further evaluation and medication management,  consult to assess for recurrent exacerbation     Questions and concerns addressed.     Electronically signed by   Jeanine Brown MD IM RESIDENT on 8/6/2022 at 3:05 PM

## 2022-08-06 NOTE — PLAN OF CARE
Problem: Discharge Planning  Goal: Discharge to home or other facility with appropriate resources  8/5/2022 2314 by Kathy Hinds RN  Outcome: Progressing  Flowsheets (Taken 8/5/2022 2311)  Discharge to home or other facility with appropriate resources:   Identify barriers to discharge with patient and caregiver   Arrange for needed discharge resources and transportation as appropriate     Problem: Pain  Goal: Verbalizes/displays adequate comfort level or baseline comfort level  8/5/2022 2314 by Kathy Hinds RN  Outcome: Progressing  Flowsheets (Taken 8/5/2022 2311)  Verbalizes/displays adequate comfort level or baseline comfort level:   Encourage patient to monitor pain and request assistance   Assess pain using appropriate pain scale   Administer analgesics based on type and severity of pain and evaluate response     Problem: Safety - Adult  Goal: Free from fall injury  8/5/2022 2314 by Kathy Hinds RN  Outcome: Progressing  Flowsheets (Taken 8/5/2022 2311)  Free From Fall Injury: Instruct family/caregiver on patient safety     Problem: ABCDS Injury Assessment  Goal: Absence of physical injury  8/5/2022 2314 by Kathy Hinds RN  Outcome: Progressing  Flowsheets (Taken 8/5/2022 1309 by Kia Rock RN)  Absence of Physical Injury: Implement safety measures based on patient assessment     Problem: Skin/Tissue Integrity  Goal: Absence of new skin breakdown  Description: 1. Monitor for areas of redness and/or skin breakdown  2. Assess vascular access sites hourly  3. Every 4-6 hours minimum:  Change oxygen saturation probe site  4. Every 4-6 hours:  If on nasal continuous positive airway pressure, respiratory therapy assess nares and determine need for appliance change or resting period. 8/5/2022 2314 by Kathy Hinds RN  Outcome: Progressing  Note: No new skin lesions noted this shift. Patient encouraged to reposition every two hours. Skin assessments completed and ongoing. Problem: Nutrition Deficit:  Goal: Optimize nutritional status  8/5/2022 2314 by Johnnie Dow RN  Outcome: Progressing  Flowsheets (Taken 8/5/2022 2314)  Nutrient intake appropriate for improving, restoring, or maintaining nutritional needs:   Assess nutritional status and recommend course of action   Monitor oral intake, labs, and treatment plans  Note: ADULT DIET; Regular; No Added Salt (3-4 gm)         Problem: Chronic Conditions and Co-morbidities  Goal: Patient's chronic conditions and co-morbidity symptoms are monitored and maintained or improved  8/5/2022 2314 by Johnnie Dow RN  Outcome: Progressing  Flowsheets (Taken 8/5/2022 2314)  Care Plan - Patient's Chronic Conditions and Co-Morbidity Symptoms are Monitored and Maintained or Improved: Monitor and assess patient's chronic conditions and comorbid symptoms for stability, deterioration, or improvement  Care plan reviewed with patient. Patient verbalized understanding of the plan of care and contribute to goal setting.     Electronically signed by Johnnie Dow RN on 8/5/2022 at 11:18 PM

## 2022-08-06 NOTE — PROGRESS NOTES
INTERNAL MEDICINE Progress Note  8/6/2022 11:10 AM  Subjective:   Admit Date: 8/4/2022  PCP: Shalini Michael MD  Interval History:   No new c/o  MELGAR    Objective:   Vitals: BP (!) 140/80   Pulse 80   Temp 98 °F (36.7 °C) (Oral)   Resp 22   Ht 5' 11\" (1.803 m)   Wt 208 lb 9.6 oz (94.6 kg)   SpO2 90%   BMI 29.09 kg/m²   General appearance: alert and cooperative with exam  HEENT: Head: Normocephalic,   Pharynx: Teeth: multiple caries  Neck: no adenopathy, no carotid bruit, no JVD,   Lungs: diminished breath sounds bilaterally  Heart: S1, S2 normal  Abdomen: soft, non-tender; bowel sounds normal; no masses,  no organomegaly  Extremities: no edema, redness or tenderness in the calves or thighs  Neurologic: Alert, oriented, thought content appropriate      Medications:   Scheduled Meds:   doxycycline hyclate  100 mg Oral 2 times per day    ipratropium-albuterol  1 ampule Inhalation BID    sodium chloride flush  5-40 mL IntraVENous 2 times per day    enoxaparin  40 mg SubCUTAneous Daily    docusate sodium  100 mg Oral Daily    methylPREDNISolone  40 mg IntraVENous Q8H    budesonide  500 mcg Nebulization BID    carBAMazepine  200 mg Oral BID    pravastatin  40 mg Oral Nightly    risperiDONE  2 mg Oral BID    Tadalafil (PAH)  40 mg Oral Daily     Continuous Infusions:   sodium chloride         Lab Results:   CBC:   Recent Labs     08/04/22 0250 08/05/22 0727   WBC 5.7 4.7*   HGB 16.8 15.1    200       BMP:    Recent Labs     08/04/22 0250 08/05/22 0727    138   K 4.4 4.1    102   CO2 30 27   BUN 11 7   CREATININE 0.8 0.6   GLUCOSE 97 107       Hepatic:   Recent Labs     08/04/22 0250   AST 16   ALT 14   BILITOT <0.2*   ALKPHOS 72         Magnesium:    Lab Results   Component Value Date/Time    MG 2.0 12/20/2021 07:02 PM     Uric Acid:  No components found for: URIC  HgBA1c:    Lab Results   Component Value Date/Time    LABA1C 6.0 12/15/2019 10:00 AM     TSH:    Lab Results   Component Value Date/Time    TSH 0.686 05/01/2022 10:52 AM     VITAMIN B12: No components found for: B12  FOLATE:    Lab Results   Component Value Date/Time    FOLATE 9.0 12/08/2016 10:18 AM     IRON:  No results found for: IRON  FERRITIN:    Lab Results   Component Value Date/Time    FERRITIN 344 12/11/2021 02:00 AM     08/05/22 1044    Specimen Source: Blood gases     pH, Blood Gas 7.43    PCO2 39 mmhg    PO2 63 Low  mmhg    HCO3 26 mmol/l    Base Excess (Calculated) 2.0 mmol/l    O2 Sat 93 %    IFIO2 5    DEVICE Cannula    Nicholas Test Positive    Source: R Radial         Assessment and Plan:   Acute exacerbation of COPD  Acute on chronic hypoxemic and hypercapnic respiratory failure  Primary pulmonary hypertension, on tadalafil  Chronic ongoing nicotine abuse. Schizoaffective disorder    Plan:  Continue bronchodilators DuoNeb aerosols.   Po prednisone  doxycycline  Lovenox for DVT prophylaxis  cont psychotropics  Home o2 eval, prob home in am        Alix Mitchell MD, MD

## 2022-08-06 NOTE — RT PROTOCOL NOTE
RT Inhaler-Nebulizer Bronchodilator Protocol Note    There is a bronchodilator order in the chart from a provider indicating to follow the RT Bronchodilator Protocol and there is an Initiate RT Inhaler-Nebulizer Bronchodilator Protocol order as well (see protocol at bottom of note). CXR Findings:  XR CHEST PORTABLE    Result Date: 8/4/2022  Impression: No acute cardiopulmonary disease process identified. Emphysema. This document has been electronically signed by: Sal Flores MD on 08/04/2022 04:09 AM      The findings from the last RT Protocol Assessment were as follows:   History Pulmonary Disease: Chronic pulmonary disease  Respiratory Pattern: Regular pattern and RR 12-20 bpm  Breath Sounds: Slightly diminished and/or crackles  Cough: Strong, spontaneous, non-productive  Indication for Bronchodilator Therapy: Decreased or absent breath sounds  Bronchodilator Assessment Score: 4    Aerosolized bronchodilator medication orders have been revised according to the RT Inhaler-Nebulizer Bronchodilator Protocol below. Respiratory Therapist to perform RT Therapy Protocol Assessment initially then follow the protocol. Repeat RT Therapy Protocol Assessment PRN for score 0-3 or on second treatment, BID, and PRN for scores above 3. No Indications - adjust the frequency to every 6 hours PRN wheezing or bronchospasm, if no treatments needed after 48 hours then discontinue using Per Protocol order mode. If indication present, adjust the RT bronchodilator orders based on the Bronchodilator Assessment Score as indicated below. Use Inhaler orders unless patient has one or more of the following: on home nebulizer, not able to hold breath for 10 seconds, is not alert and oriented, cannot activate and use MDI correctly, or respiratory rate 25 breaths per minute or more, then use the equivalent nebulizer order(s) with same Frequency and PRN reasons based on the score.   If a patient is on this medication at home then do not decrease Frequency below that used at home. 0-3 - enter or revise RT bronchodilator order(s) to equivalent RT Bronchodilator order with Frequency of every 4 hours PRN for wheezing or increased work of breathing using Per Protocol order mode. 4-6 - enter or revise RT Bronchodilator order(s) to two equivalent RT bronchodilator orders with one order with BID Frequency and one order with Frequency of every 4 hours PRN wheezing or increased work of breathing using Per Protocol order mode. 7-10 - enter or revise RT Bronchodilator order(s) to two equivalent RT bronchodilator orders with one order with TID Frequency and one order with Frequency of every 4 hours PRN wheezing or increased work of breathing using Per Protocol order mode. 11-13 - enter or revise RT Bronchodilator order(s) to one equivalent RT bronchodilator order with QID Frequency and an Albuterol order with Frequency of every 4 hours PRN wheezing or increased work of breathing using Per Protocol order mode. Greater than 13 - enter or revise RT Bronchodilator order(s) to one equivalent RT bronchodilator order with every 4 hours Frequency and an Albuterol order with Frequency of every 2 hours PRN wheezing or increased work of breathing using Per Protocol order mode. RT to enter RT Home Evaluation for COPD & MDI Assessment order using Per Protocol order mode.     Electronically signed by Nicholas Colon RCP on 8/5/2022 at 9:38 PM

## 2022-08-07 PROCEDURE — 6370000000 HC RX 637 (ALT 250 FOR IP): Performed by: INTERNAL MEDICINE

## 2022-08-07 PROCEDURE — 99232 SBSQ HOSP IP/OBS MODERATE 35: CPT | Performed by: INTERNAL MEDICINE

## 2022-08-07 PROCEDURE — 1200000003 HC TELEMETRY R&B

## 2022-08-07 PROCEDURE — 2580000003 HC RX 258: Performed by: INTERNAL MEDICINE

## 2022-08-07 PROCEDURE — 6360000002 HC RX W HCPCS: Performed by: INTERNAL MEDICINE

## 2022-08-07 PROCEDURE — 94640 AIRWAY INHALATION TREATMENT: CPT

## 2022-08-07 PROCEDURE — 6370000000 HC RX 637 (ALT 250 FOR IP)

## 2022-08-07 RX ORDER — DOXYCYCLINE HYCLATE 100 MG
100 TABLET ORAL EVERY 12 HOURS SCHEDULED
Qty: 5 TABLET | Refills: 0 | Status: SHIPPED | OUTPATIENT
Start: 2022-08-07 | End: 2022-08-10

## 2022-08-07 RX ORDER — BUPROPION HYDROCHLORIDE 150 MG/1
150 TABLET ORAL EVERY MORNING
Qty: 30 TABLET | Refills: 3 | Status: SHIPPED | OUTPATIENT
Start: 2022-08-07

## 2022-08-07 RX ORDER — PREDNISONE 20 MG/1
40 TABLET ORAL DAILY
Qty: 10 TABLET | Refills: 0 | Status: SHIPPED | OUTPATIENT
Start: 2022-08-08 | End: 2022-08-13

## 2022-08-07 RX ADMIN — CARBAMAZEPINE 200 MG: 200 TABLET ORAL at 08:15

## 2022-08-07 RX ADMIN — BUDESONIDE 500 MCG: 0.5 INHALANT RESPIRATORY (INHALATION) at 16:50

## 2022-08-07 RX ADMIN — DOXYCYCLINE HYCLATE 100 MG: 100 TABLET, COATED ORAL at 21:09

## 2022-08-07 RX ADMIN — SODIUM CHLORIDE, PRESERVATIVE FREE 10 ML: 5 INJECTION INTRAVENOUS at 21:11

## 2022-08-07 RX ADMIN — ENOXAPARIN SODIUM 40 MG: 100 INJECTION SUBCUTANEOUS at 08:15

## 2022-08-07 RX ADMIN — PREDNISONE 40 MG: 20 TABLET ORAL at 08:15

## 2022-08-07 RX ADMIN — ACETAMINOPHEN 325MG 650 MG: 325 TABLET ORAL at 08:15

## 2022-08-07 RX ADMIN — RISPERIDONE 2 MG: 1 TABLET ORAL at 08:15

## 2022-08-07 RX ADMIN — CARBAMAZEPINE 200 MG: 200 TABLET ORAL at 21:09

## 2022-08-07 RX ADMIN — PRAVASTATIN SODIUM 40 MG: 40 TABLET ORAL at 21:09

## 2022-08-07 RX ADMIN — DOXYCYCLINE HYCLATE 100 MG: 100 TABLET, COATED ORAL at 08:15

## 2022-08-07 RX ADMIN — BUDESONIDE 500 MCG: 0.5 INHALANT RESPIRATORY (INHALATION) at 09:22

## 2022-08-07 RX ADMIN — SODIUM CHLORIDE, PRESERVATIVE FREE 10 ML: 5 INJECTION INTRAVENOUS at 08:15

## 2022-08-07 RX ADMIN — RISPERIDONE 2 MG: 1 TABLET ORAL at 21:09

## 2022-08-07 RX ADMIN — TADALAFIL 40 MG: 20 TABLET ORAL at 08:15

## 2022-08-07 RX ADMIN — DOCUSATE SODIUM 100 MG: 100 CAPSULE, LIQUID FILLED ORAL at 08:15

## 2022-08-07 RX ADMIN — IPRATROPIUM BROMIDE AND ALBUTEROL SULFATE 1 AMPULE: .5; 3 SOLUTION RESPIRATORY (INHALATION) at 16:49

## 2022-08-07 RX ADMIN — IPRATROPIUM BROMIDE AND ALBUTEROL SULFATE 1 AMPULE: .5; 3 SOLUTION RESPIRATORY (INHALATION) at 09:22

## 2022-08-07 ASSESSMENT — PAIN DESCRIPTION - ONSET: ONSET: ON-GOING

## 2022-08-07 ASSESSMENT — PAIN SCALES - GENERAL
PAINLEVEL_OUTOF10: 2
PAINLEVEL_OUTOF10: 0
PAINLEVEL_OUTOF10: 8

## 2022-08-07 ASSESSMENT — PAIN DESCRIPTION - LOCATION: LOCATION: BACK

## 2022-08-07 ASSESSMENT — PAIN DESCRIPTION - PAIN TYPE: TYPE: ACUTE PAIN

## 2022-08-07 ASSESSMENT — PAIN - FUNCTIONAL ASSESSMENT: PAIN_FUNCTIONAL_ASSESSMENT: ACTIVITIES ARE NOT PREVENTED

## 2022-08-07 ASSESSMENT — PAIN DESCRIPTION - FREQUENCY: FREQUENCY: CONTINUOUS

## 2022-08-07 ASSESSMENT — PAIN DESCRIPTION - ORIENTATION: ORIENTATION: MID;LOWER

## 2022-08-07 ASSESSMENT — PAIN DESCRIPTION - DESCRIPTORS: DESCRIPTORS: ACHING

## 2022-08-07 NOTE — DISCHARGE SUMMARY
Discharge Summary    Silvano Foster  :  1970  MRN:  [id number]    Admit date:  2022  Discharge date:      Admitting Physician:  Krys Stewart MD    Discharge Diagnoses:    Acute exacerbation of COPD  Acute on chronic hypoxemic and hypercapnic respiratory failure  Primary pulmonary hypertension, on tadalafil  Chronic ongoing nicotine abuse. Schizoaffective disorder        Patient Active Problem List   Diagnosis    Acute on chronic respiratory failure with hypercapnia (HCC)    Respiratory insufficiency/failure    Chronic obstructive pulmonary disease with acute exacerbation (HCC)    Acute on chronic systolic congestive heart failure (HCC)    Nicotine abuse    Chest pain    Schizophreniform disorder (HCC)    Respiratory failure (HCC)    Smoker    Acute respiratory failure ([address]    Acute on chronic respiratory failure (HCC)    Pneumonia of right lower lobe due to infectious organism    Aspiration pneumonia of both lower lobes due to gastric secretions ([address]    Community acquired bacterial pneumonia    Acute on chronic respiratory failure with hypoxia ([address]       Admission Condition:  serious  Discharged Condition:  good    Hospital Course:   Patient with a known history of COPD primary pulmonary hypertension on home oxygen also poorly compliant with ongoing chronic tobacco use and no usage of his home oxygen presents with worsening shortness of breath. Patient was treated with bronchodilators. Also NIPPV. Empiric antibiotics. He improved. He is deemed stable for discharge home. He has been advised to stop smoking. Added Wellbutrin to regimen. Patient also advised to comply with oxygen usage. Stop smoking and follow-up with pulmonary service and family MD in the outpatient setting. Discharge Medications:         Medication List        START taking these medications      buPROPion 150 MG extended release tablet  Commonly known as:  Wellbutrin XL  Take 1 tablet by mouth every morning     doxycycline hyclate 100 MG tablet  Commonly known as: VIBRA-TABS  Take 1 tablet by mouth in the morning and 1 tablet before bedtime. Do all this for 5 doses. predniSONE 20 MG tablet  Commonly known as: DELTASONE  Take 2 tablets by mouth in the morning for 5 days.   Start taking on: August 8, 2022            CONTINUE taking these medications      albuterol (2.5 MG/3ML) 0.083% nebulizer solution  Commonly known as: PROVENTIL  Take 3 mLs by nebulization every 2 hours as needed for Wheezing     aspirin 325 MG EC tablet  Take 1 tablet by mouth daily     budesonide 0.5 MG/2ML nebulizer suspension  Commonly known as: Pulmicort  Take 2 mLs by nebulization 2 times daily     carBAMazepine 200 MG tablet  Commonly known as: TEGRETOL     ipratropium-albuterol 0.5-2.5 (3) MG/3ML Soln nebulizer solution  Commonly known as: DUONEB  Inhale 3 mLs into the lungs every 6 hours as needed for Shortness of Breath     metoprolol succinate 25 MG extended release tablet  Commonly known as: TOPROL XL  Take 1 tablet by mouth daily     pravastatin 40 MG tablet  Commonly known as: PRAVACHOL     risperiDONE 2 MG tablet  Commonly known as: RISPERDAL  Take 1 tablet by mouth 2 times daily     Spacer/Aero-Holding Chambers [place]  1 Device by Does not apply route daily     Tadalafil (PAH) 20 MG tablet  Take 2 tablets by mouth daily     umeclidinium-vilanterol 62.5-25 MCG/INH Aepb inhaler  Commonly known as: ANORO ELLIPTA  Inhale 1 puff into the lungs daily               Where to Get Your Medications        These medications were sent to [address] [place] [address] [id number] [facility] [address] [facility] [phone] - F [phone]  [address] [place] [address]      Phone: [phone]   buPROPion 150 MG extended release tablet  doxycycline hyclate 100 MG tablet  predniSONE 20 MG tablet         Consults:  pulmonary/intensive care    Significant Diagnostic Studies: labs: CBC:       Recent Labs     08/05/22  0727   WBC 4.7*   HGB 15.1            BMP: Recent Labs     08/05/22  0727      K 4.1      CO2 27   BUN 7   CREATININE 0.6   GLUCOSE 107         Magnesium:          Lab Results   Component Value Date/Time     MG 2.0 12/20/2021 07:02 PM         HgBA1c:          Lab Results   Component Value Date/Time     LABA1C 6.0 12/15/2019 10:00 AM      TSH:          Lab Results   Component Value Date/Time     TSH 0.686 05/01/2022 10:52 AM      VITAMIN B12: No components found for: B12  FOLATE:          Lab Results   Component Value Date/Time     FOLATE 9.0 12/08/2016 10:18 AM      IRON:  No results found for: IRON  FERRITIN:          Lab Results   Component Value Date/Time     FERRITIN 344 12/11/2021 02:00 AM            08/05/22 1044     Specimen Source: Blood gases       pH, Blood Gas 7.43     PCO2 39 mmhg     PO2 63 Low  mmhg     HCO3 26 mmol/l     Base Excess (Calculated) 2.0 mmol/l     O2 Sat 93 %     IFIO2 5     DEVICE Cannula     Nicholas Test Positive     Source: R Radial            Assessment and Plan:   Acute exacerbation of COPD  Acute on chronic hypoxemic and hypercapnic respiratory failure  Primary pulmonary hypertension, on tadalafil  Chronic ongoing nicotine abuse. Schizoaffective disorder     Plan:  Continue bronchodilators DuoNeb aerosols. Po prednisone  Doxycycline po  cont psychotropics  Home o2 eval,  Stable for dc home. Compliance with Oxygen use advised and abstinence from tobacco.  Start wellbutrin. Treatments:     Continue bronchodilators DuoNeb aerosols. Po prednisone  Doxycycline po  cont psychotropics  Home o2 eval,  Stable for dc home. Compliance with Oxygen use advised and abstinence from tobacco.  Start wellbutrin. Disposition:  home.     Signed:  Rocio Del Rio MD  8/7/2022, 11:50 AM

## 2022-08-07 NOTE — PLAN OF CARE
Problem: Respiratory - Adult  Goal: Clear lung sounds  Description: Clear lung sounds  Outcome: Progressing   Continue therapy as ordered to achieve and maintain clear breath sounds and improve aeration. Pt requires 5 lpm nc. Pt mutually agrees with the plan of care.

## 2022-08-07 NOTE — PROGRESS NOTES
Additional attempt made to contact patient's family members regarding discharge order. Family members did not answer. Will re-attempt at later time.

## 2022-08-07 NOTE — DISCHARGE INSTR - DIET

## 2022-08-07 NOTE — PROGRESS NOTES
Third attempt to contact patient sister, Edgar Carlton , regarding discharge order and needing to clarify if his home BiPAP safe to use at home. Kate Caraballo did not answer the phone call. Voicemail left for her to return call ASAP.

## 2022-08-07 NOTE — PROGRESS NOTES
INTERNAL MEDICINE Progress Note  8/7/2022 11:44 AM  Subjective:   Admit Date: 8/4/2022  PCP: Diamond Lyon MD  Interval History:   No new c/o  On O2 via NC 3L    Objective:   Vitals: /81   Pulse 76   Temp 98 °F (36.7 °C) (Oral)   Resp 16   Ht 5' 11\" (1.803 m)   Wt 208 lb 6.4 oz (94.5 kg)   SpO2 93%   BMI 29.07 kg/m²   General appearance: alert and cooperative with exam  HEENT: Head: Normocephalic,   Pharynx: Teeth: multiple caries  Neck: no adenopathy, no carotid bruit, no JVD,   Lungs: diminished breath sounds bilaterally, no rhonchi  Heart: S1, S2 normal  Abdomen: soft, non-tender; bowel sounds normal; no masses,  no organomegaly  Extremities: no edema, redness or tenderness in the calves or thighs  Neurologic: Alert, oriented, thought content appropriate      Medications:   Scheduled Meds:   predniSONE  40 mg Oral Daily    doxycycline hyclate  100 mg Oral 2 times per day    ipratropium-albuterol  1 ampule Inhalation BID    sodium chloride flush  5-40 mL IntraVENous 2 times per day    enoxaparin  40 mg SubCUTAneous Daily    docusate sodium  100 mg Oral Daily    budesonide  500 mcg Nebulization BID    carBAMazepine  200 mg Oral BID    pravastatin  40 mg Oral Nightly    risperiDONE  2 mg Oral BID    Tadalafil (PAH)  40 mg Oral Daily     Continuous Infusions:   sodium chloride         Lab Results:   CBC:   Recent Labs     08/05/22  0727   WBC 4.7*   HGB 15.1          BMP:    Recent Labs     08/05/22  0727      K 4.1      CO2 27   BUN 7   CREATININE 0.6   GLUCOSE 107       Magnesium:    Lab Results   Component Value Date/Time    MG 2.0 12/20/2021 07:02 PM       HgBA1c:    Lab Results   Component Value Date/Time    LABA1C 6.0 12/15/2019 10:00 AM     TSH:    Lab Results   Component Value Date/Time    TSH 0.686 05/01/2022 10:52 AM     VITAMIN B12: No components found for: B12  FOLATE:    Lab Results   Component Value Date/Time    FOLATE 9.0 12/08/2016 10:18 AM     IRON:  No results found for: IRON  FERRITIN:    Lab Results   Component Value Date/Time    FERRITIN 344 12/11/2021 02:00 AM     08/05/22 1044    Specimen Source: Blood gases     pH, Blood Gas 7.43    PCO2 39 mmhg    PO2 63 Low  mmhg    HCO3 26 mmol/l    Base Excess (Calculated) 2.0 mmol/l    O2 Sat 93 %    IFIO2 5    DEVICE Cannula    Nicholas Test Positive    Source: R Radial         Assessment and Plan:   Acute exacerbation of COPD  Acute on chronic hypoxemic and hypercapnic respiratory failure  Primary pulmonary hypertension, on tadalafil  Chronic ongoing nicotine abuse. Schizoaffective disorder    Plan:  Continue bronchodilators DuoNeb aerosols. Po prednisone  Doxycycline po  cont psychotropics  Home o2 eval,  Stable for dc home. Compliance with Oxygen use advised and abstinence from tobacco.   Start wellbutrin.         Jessica Borja MD, MD

## 2022-08-07 NOTE — PROGRESS NOTES
significant for respiratory rate of 40 and an SPO2 of 95% on 6 L of oxygen via nasal cannula. The patient was escalated to positive airway pressure with an FiO2 of 40 in order to maintain adequate oxygenation. VBG on admission showed pH mixed of 7.30, PCO2 64, PO2 37, bicarb 32. ABG 1 hour later showed pH 7.30, PCO2 63, PO2 79, bicarb 31. The patient was admitted and started on ceftriaxone, IV Solu-Medrol 40 mg every 8, duo nebs 3 times daily, budesonide twice daily.       Subjective/Last 24 hours     No acute events overnight  Improved SOB, on Oxygen by nasal canula 4 LPM    Meds    Current Medications    predniSONE  40 mg Oral Daily    doxycycline hyclate  100 mg Oral 2 times per day    ipratropium-albuterol  1 ampule Inhalation BID    sodium chloride flush  5-40 mL IntraVENous 2 times per day    enoxaparin  40 mg SubCUTAneous Daily    docusate sodium  100 mg Oral Daily    budesonide  500 mcg Nebulization BID    carBAMazepine  200 mg Oral BID    pravastatin  40 mg Oral Nightly    risperiDONE  2 mg Oral BID    Tadalafil (PAH)  40 mg Oral Daily     ipratropium-albuterol, sodium chloride flush, sodium chloride, acetaminophen, ondansetron **OR** ondansetron  IV Drips/Infusions   sodium chloride       Home Medications  Medications Prior to Admission: risperiDONE (RISPERDAL) 2 MG tablet, Take 1 tablet by mouth 2 times daily  Tadalafil, PAH, 20 MG tablet, Take 2 tablets by mouth daily  Spacer/Aero-Holding Chambers [place], 1 Device by Does not apply route daily  albuterol (PROVENTIL) (2.5 MG/3ML) 0.083% nebulizer solution, Take 3 mLs by nebulization every 2 hours as needed for Wheezing  budesonide (PULMICORT) 0.5 MG/2ML nebulizer suspension, Take 2 mLs by nebulization 2 times daily  ipratropium-albuterol (DUONEB) 0.5-2.5 (3) MG/3ML SOLN nebulizer solution, Inhale 3 mLs into the lungs every 6 hours as needed for Shortness of Breath  umeclidinium-vilanterol (ANORO ELLIPTA) 62.5-25 MCG/INH AEPB inhaler, Inhale 1 puff into the lungs daily  aspirin 325 MG EC tablet, Take 1 tablet by mouth daily  pravastatin (PRAVACHOL) 40 MG tablet, Take 40 mg by mouth nightly  metoprolol succinate (TOPROL XL) 25 MG extended release tablet, Take 1 tablet by mouth daily  carBAMazepine (TEGRETOL) 200 MG tablet, Take 200 mg by mouth 2 times daily  Diet    ADULT DIET; Regular; No Added Salt (3-4 gm)  Allergies    Patient has no known allergies. Social History     Social History     Socioeconomic History    Marital status: Single     Spouse name: Not on file    Number of children: 0    Years of education: Not on file    Highest education level: Not on file   Occupational History    Not on file   Tobacco Use    Smoking status: Every Day     Packs/day: 1.00     Years: 20.00     Pack years: 20.00     Types: Cigarettes    Smokeless tobacco: Never   Vaping Use    Vaping Use: Never used   Substance and Sexual Activity    Alcohol use: No    Drug use: No    Sexual activity: Not Currently   Other Topics Concern    Not on file   Social History Narrative    Not on file     Social Determinants of Health     Financial Resource Strain: Not on file   Food Insecurity: Not on file   Transportation Needs: Not on file   Physical Activity: Not on file   Stress: Not on file   Social Connections: Not on file   Intimate Partner Violence: Not on file   Housing Stability: Not on file     Family History          Problem Relation Age of Onset    High Blood Pressure Mother      Sleep History    Never diagnosed with sleep apnea in the past.  Vitals     height is 5' 11\" (1.803 m) and weight is 208 lb 6.4 oz (94.5 kg). His oral temperature is 98 °F (36.7 °C). His blood pressure is 121/73 and his pulse is 86. His respiration is 16 and oxygen saturation is 93%. Body mass index is 29.07 kg/m².     SUPPLEMENTAL O2: O2 Flow Rate (L/min): 4 L/min     I/O      Intake/Output Summary (Last 24 hours) at 8/7/2022 1614  Last data filed at 8/7/2022 1200  Gross per 24 hour   Intake 780 ml   Output 2450 ml   Net -1670 ml       I/O last 3 completed shifts: In: 1357 [P.O.:1800; I.V.:20]  Out: 4500 [Urine:4500]   Patient Vitals for the past 96 hrs (Last 3 readings):   Weight   08/07/22 0341 208 lb 6.4 oz (94.5 kg)   08/06/22 0329 208 lb 9.6 oz (94.6 kg)   08/04/22 0600 201 lb 11.5 oz (91.5 kg)         Exam     Constitutional: Patient is alert and oriented, no respiratory distress  HENT: Normocephalic, atraumatic, nares patent, moist mucosa, neck is supple  Eyes: Eyes equal and reactive to light, no abnormal extraocular eye movements  Cardiovascular: Normal S1-S2 with no murmurs rubs or gallops  Pulmonary/Chest: Diminished breath sounds bilaterally, no added sounds   Abdominal: Soft, nontender, nondistended  Musculoskeletal: Able to move all 4 extremities spontaneously  Extremities: +2 pulses palpated bilaterally, no pitting edema  Neurological: No focal neurological deficits  Skin: Warm, dry, good skin turgor  Psychiatric: Normal thought content and insight; alert and oriented x4    Labs  - Old records and notes have been reviewed in [facility]   ABG  Lab Results   Component Value Date/Time    PH 7.43 08/05/2022 10:35 AM    PO2 63 08/05/2022 10:35 AM    PCO2 39 08/05/2022 10:35 AM    HCO3 26 08/05/2022 10:35 AM    O2SAT 93 08/05/2022 10:35 AM     Lab Results   Component Value Date/Time    IFIO2 5 08/05/2022 10:35 AM    MODE BiLevel 08/04/2022 04:25 AM    SETTIDVOL 480 05/30/2020 05:47 AM    SETPEEP 6.0 08/04/2022 04:25 AM     CBC  Recent Labs     08/05/22  0727   WBC 4.7*   RBC 5.07   HGB 15.1   HCT 47.0   MCV 92.7   MCH 29.8   MCHC 32.1*      MPV 9.4        BMP  Recent Labs     08/05/22  0727      K 4.1      CO2 27   BUN 7   CREATININE 0.6   GLUCOSE 107   CALCIUM 9.1       LFT  No results for input(s): AST, ALT, ALB, BILITOT, ALKPHOS, LIPASE in the last 72 hours. Invalid input(s):   AMYLASE    TROP  Lab Results   Component Value Date/Time    TROPONINT < 0.010 08/04/2022 02:50 AM    TROPONINT < 0.010 05/28/2022 12:25 AM    TROPONINT < 0.010 05/01/2022 10:52 AM     UA   No results for input(s): SPECGRAV, PHUR, COLORU, CLARITYU, MUCUS, PROTEINU, BLOODU, RBCUA, WBCUA, BACTERIA, NITRU, GLUCOSEU, BILIRUBINUR, UROBILINOGEN, KETUA, LABCAST, LABCASTTY, AMORPHOS in the last 72 hours. Invalid input(s): CRYSTALS    PFTs       Sleep studies   None    Cultures    None    EKG   Normal sinus rhythm  Rightward axis  Borderline ECG  When compared with ECG of 28-MAY-2022 00:30,  Nonspecific T wave abnormality now evident in Anterior leads  Nonspecific T wave abnormality no longer evident in Lateral leads  Confirmed by Naeem Babin ([id number]) on 8/4/2022 8:26:13 PM  Echocardiogram       TTE procedure:ECHOCARDIOGRAM COMPLETE 2D W DOPPLER W COLOR. Procedure Date  Date: 05/18/2020 Start: 10:41 AM     Summary   Left ventricular size is normal and systolic function is mildly reduced. Ejection fraction was estimated at 50-55%. LV wall thickness is within   normal limits. Intraventricular septal wall compression during systole suggestive of RV   pressure overload. Markedly enlarged right atrium size. The right ventricle was not clearly visualized. Appears dilated. RVSP of 55-60 mm Hg consistent with moderate pulmonary hypertension. Small circumferential pericardial effusion without evidence of tamponade   physiology. IVC size is dilated with <50% respiratory collapse. Signature      ----------------------------------------------------------------   Electronically signed by Yaritza Pagan MD (Interpreting   physician) on 05/18/2020 at 04:20 PM   ----------------------------------------------------------------      Findings      Mitral Valve   The mitral valve was not well visualized . Trace mitral regurgitation is present. Aortic Valve   The aortic valve leaflets were not well visualized. Aortic valve appears tricuspid. Aortic valve leaflets are somewhat thickened.       Tricuspid Valve Tricuspid valve was not well visualized. Mild tricuspid regurgitation. Pulmonic Valve   The pulmonic valve was not well visualized . Trivial pulmonic regurgitation visualized. Left Atrium   Left atrial size was normal.      Left Ventricle   Left ventricular size is normal and systolic function is mildly reduced. Ejection fraction was estimated at 50-55%. LV wall thickness is within   normal limits. Intraventricular septal wall compression during systole suggestive of RV   pressure overload. Right Atrium   Markedly enlarged right atrium size. Right Ventricle   The right ventricle was not clearly visualized. Appears dilated. RVSP of 55-60 mm Hg consistent with moderate pulmonary hypertension. Pericardial Effusion   Small circumferential pericardial effusion without evidence of tamponade   physiology. Pleural Effusion   No evidence of pleural effusion. Aorta / Great Vessels   -Aortic root dimension within normal limits. -IVC size is dilated with <50% respiratory collapse.      M-Mode/2D Measurements & Calculations      LV Diastolic    LV Systolic Dimension: 3.8  AV Cusp Separation: 2.1 cmLA   Dimension: 5 cm cm                          Dimension: 3.7 cmAO Root   LV FS:24 %      LV Volume Diastolic: 295 ml Dimension: 3.6 cm   LV PW           LV Volume Systolic: 62 ml   Diastolic: 1.2  LV EDV/LV EDV Index: 118   cm              ml/46 m^2LV ESV/LV ESV   Septum          Index: 62 ml/24 m^2         RV Diastolic Dimension: 3.4 cm   Diastolic: 1.2  EF Calculated: 47.5 %   cm                                          LA/Aorta: 1.03                   LV Length: 7.5 cm           Ascending Aorta: 3.1 cm      LV Area   Diastolic: 10.5   cm^2   LV Area   Systolic: 86.9   cm^2     Doppler Measurements & Calculations      MV Peak E-Wave: 58.7 cm/s  AV Peak Velocity: 102 LVOT Peak Velocity: 90.8   MV Peak A-Wave: 49.7 cm/s  cm/s                  cm/s   MV E/A Ratio: 1.18         AV Peak

## 2022-08-07 NOTE — PROGRESS NOTES
Third attempt to contact patient sister, Ezequiel Nixon, regarding discharge order and needing to clarify if his home BiPAP safe to use at home. Ezequiel Nixon did not answer the phone call. Voicemail left for her to return call ASAP.

## 2022-08-07 NOTE — PROGRESS NOTES
A home oxygen evaluation has been completed. [x]Patient is an inpatient. It is expected that the patient will be discharged within the next 48 hours. Qualified provider to write order for home prescription if patient qualifies. Social service/care managers will arrange for home oxygen. If patient is active, arrange for Home Medical supplier to assess for Oxygen Conserving Device per pulse oximetry. []Patient is an outpatient. Results will be faxed to the ordering provider. Qualified provider to write order for home prescription if patient qualifies and arranges for home oxygen. Patient was placed on room air for 20 minutes. SpO2 was 85 % on room air at rest. Patients SpO2 was below 89% and qualified for home oxygen. Oxygen was applied at 3 lpm via nasal cannula to maintain a SpO2 between 90-92% while at rest. Actual SpO2 was 90 %. Patient can ambulate for exercise flow rate. Patients was ambulated, SpO2 was 90% on 6 lpm to maintain SpO2 between 90-92% while exercising. If oxygen need is greater than 4 lpm the SpO2 on 4 lpm was 86. Note: For any SpO2 at 13% see policy and procedure for possible qualifications.

## 2022-08-07 NOTE — PLAN OF CARE
Problem: Discharge Planning  Goal: Discharge to home or other facility with appropriate resources  Outcome: Progressing  Flowsheets (Taken 8/7/2022 2406)  Discharge to home or other facility with appropriate resources:   Identify barriers to discharge with patient and caregiver   Identify discharge learning needs (meds, wound care, etc)   Arrange for needed discharge resources and transportation as appropriate     Problem: Pain  Goal: Verbalizes/displays adequate comfort level or baseline comfort level  Outcome: Progressing  Flowsheets (Taken 8/7/2022 0713)  Verbalizes/displays adequate comfort level or baseline comfort level:   Encourage patient to monitor pain and request assistance   Assess pain using appropriate pain scale   Administer analgesics based on type and severity of pain and evaluate response   Implement non-pharmacological measures as appropriate and evaluate response     Problem: Safety - Adult  Goal: Free from fall injury  Outcome: Progressing  Flowsheets  Taken 8/7/2022 0713  Free From Fall Injury: Instruct family/caregiver on patient safety  Taken 8/7/2022 0710  Free From Fall Injury: Instruct family/caregiver on patient safety     Problem: ABCDS Injury Assessment  Goal: Absence of physical injury  Outcome: Progressing  Flowsheets  Taken 8/7/2022 0713  Absence of Physical Injury: Implement safety measures based on patient assessment  Taken 8/7/2022 0710  Absence of Physical Injury: Implement safety measures based on patient assessment     Problem: Skin/Tissue Integrity  Goal: Absence of new skin breakdown  Description: 1. Monitor for areas of redness and/or skin breakdown  2. Assess vascular access sites hourly  3. Every 4-6 hours minimum:  Change oxygen saturation probe site  4. Every 4-6 hours:  If on nasal continuous positive airway pressure, respiratory therapy assess nares and determine need for appliance change or resting period.   Outcome: Progressing     Problem: Nutrition Deficit:  Goal: Optimize nutritional status  Outcome: Progressing  Flowsheets (Taken 8/7/2022 0713)  Nutrient intake appropriate for improving, restoring, or maintaining nutritional needs:   Assess nutritional status and recommend course of action   Monitor oral intake, labs, and treatment plans     Problem: Chronic Conditions and Co-morbidities  Goal: Patient's chronic conditions and co-morbidity symptoms are monitored and maintained or improved  Outcome: Progressing  Flowsheets (Taken 8/7/2022 0713)  Care Plan - Patient's Chronic Conditions and Co-Morbidity Symptoms are Monitored and Maintained or Improved:   Monitor and assess patient's chronic conditions and comorbid symptoms for stability, deterioration, or improvement   Collaborate with multidisciplinary team to address chronic and comorbid conditions and prevent exacerbation or deterioration   Update acute care plan with appropriate goals if chronic or comorbid symptoms are exacerbated and prevent overall improvement and discharge     Care plan reviewed with patient and family. Patient and family verbalize understanding of the plan of care and contribute to goal setting.

## 2022-08-07 NOTE — CARE COORDINATION
8/7/22, 2:19 PM EDT    DISCHARGE ON GOING EVALUATION    Satish Encinas day: 3  Location: [facility]25/025-A Reason for admit: Respiratory failure ([facility] [address] [J96.90]  COPD exacerbation ([facility] [address] [J44.1]  Acute respiratory failure with hypoxia and hypercapnia ([facility] [address] [J96.01, J96.02]     Barriers to Discharge: Discharge orders on chart. PCP: Paulina Granado MD  Readmission Risk Score: 13%  Patient Goals/Plan/Treatment Preferences: Continue Care notified of patient's discharge to home today, spoke to Ángel Uribe. 8/7/22, 2:21 PM EDT    Patient goals/plan/ treatment preferences discussed by  and . Patient goals/plan/ treatment preferences reviewed with patient/ family. Patient/ family verbalize understanding of discharge plan and are in agreement with goal/plan/treatment preferences. Understanding was demonstrated using the teach back method. AVS provided by RN at time of discharge, which includes all necessary medical information pertaining to the patients current course of illness, treatment, post-discharge goals of care, and treatment preferences.      Services At/After Discharge: Home Health and Nursing service       IMM Letter  IMM Letter given to Patient/Family/Significant other/Guardian/POA/by[de-identified] pt access  IMM Letter date given[de-identified] 08/04/22  IMM Letter time given[de-identified] 7999

## 2022-08-08 VITALS
BODY MASS INDEX: 29.37 KG/M2 | TEMPERATURE: 98.1 F | SYSTOLIC BLOOD PRESSURE: 112 MMHG | WEIGHT: 209.8 LBS | OXYGEN SATURATION: 97 % | HEART RATE: 73 BPM | HEIGHT: 71 IN | DIASTOLIC BLOOD PRESSURE: 67 MMHG | RESPIRATION RATE: 18 BRPM

## 2022-08-08 PROCEDURE — 94660 CPAP INITIATION&MGMT: CPT

## 2022-08-08 PROCEDURE — 6370000000 HC RX 637 (ALT 250 FOR IP): Performed by: INTERNAL MEDICINE

## 2022-08-08 PROCEDURE — 6370000000 HC RX 637 (ALT 250 FOR IP)

## 2022-08-08 PROCEDURE — 94761 N-INVAS EAR/PLS OXIMETRY MLT: CPT

## 2022-08-08 PROCEDURE — 2580000003 HC RX 258: Performed by: INTERNAL MEDICINE

## 2022-08-08 PROCEDURE — 6360000002 HC RX W HCPCS: Performed by: INTERNAL MEDICINE

## 2022-08-08 PROCEDURE — 94640 AIRWAY INHALATION TREATMENT: CPT

## 2022-08-08 PROCEDURE — 6370000000 HC RX 637 (ALT 250 FOR IP): Performed by: NURSE PRACTITIONER

## 2022-08-08 PROCEDURE — 2700000000 HC OXYGEN THERAPY PER DAY

## 2022-08-08 PROCEDURE — 99232 SBSQ HOSP IP/OBS MODERATE 35: CPT | Performed by: INTERNAL MEDICINE

## 2022-08-08 RX ADMIN — CARBAMAZEPINE 200 MG: 200 TABLET ORAL at 08:08

## 2022-08-08 RX ADMIN — PREDNISONE 40 MG: 20 TABLET ORAL at 08:09

## 2022-08-08 RX ADMIN — BUDESONIDE 500 MCG: 0.5 INHALANT RESPIRATORY (INHALATION) at 17:33

## 2022-08-08 RX ADMIN — TIOTROPIUM BROMIDE AND OLODATEROL 2 PUFF: 3.124; 2.736 SPRAY, METERED RESPIRATORY (INHALATION) at 12:46

## 2022-08-08 RX ADMIN — RISPERIDONE 2 MG: 1 TABLET ORAL at 08:08

## 2022-08-08 RX ADMIN — ENOXAPARIN SODIUM 40 MG: 100 INJECTION SUBCUTANEOUS at 08:11

## 2022-08-08 RX ADMIN — BUDESONIDE 500 MCG: 0.5 INHALANT RESPIRATORY (INHALATION) at 08:14

## 2022-08-08 RX ADMIN — DOCUSATE SODIUM 100 MG: 100 CAPSULE, LIQUID FILLED ORAL at 08:08

## 2022-08-08 RX ADMIN — IPRATROPIUM BROMIDE AND ALBUTEROL SULFATE 1 AMPULE: .5; 3 SOLUTION RESPIRATORY (INHALATION) at 08:14

## 2022-08-08 RX ADMIN — SODIUM CHLORIDE, PRESERVATIVE FREE 10 ML: 5 INJECTION INTRAVENOUS at 08:10

## 2022-08-08 RX ADMIN — TADALAFIL 40 MG: 20 TABLET ORAL at 08:08

## 2022-08-08 RX ADMIN — DOXYCYCLINE HYCLATE 100 MG: 100 TABLET, COATED ORAL at 08:09

## 2022-08-08 ASSESSMENT — PAIN SCALES - GENERAL
PAINLEVEL_OUTOF10: 0

## 2022-08-08 NOTE — CARE COORDINATION
8/8/22, 10:03 AM EDT    Patient goals/plan/ treatment preferences discussed by  and . Patient goals/plan/ treatment preferences reviewed with patient/ family. Patient/ family verbalize understanding of discharge plan and are in agreement with goal/plan/treatment preferences. Understanding was demonstrated using the teach back method. AVS provided by RN at time of discharge, which includes all necessary medical information pertaining to the patients current course of illness, treatment, post-discharge goals of care, and treatment preferences. Services At/After Discharge: Home Health, [facility]    Patient to discharge today with [facility]. SW called and notified [facility]. RN made aware.         IMM Letter  IMM Letter given to Patient/Family/Significant other/Guardian/POA/by[de-identified] Tosha Gleason RN   IMM Letter date given[de-identified] 08/08/22  IMM Letter time given[de-identified] [id number]

## 2022-08-08 NOTE — PLAN OF CARE
Problem: Respiratory - Adult  Goal: Clear lung sounds  Description: Clear lung sounds  Outcome: Progressing   Continue treatments to help improve aeration. Patient mutually agreed on goals.

## 2022-08-08 NOTE — FLOWSHEET NOTE
08/08/22 1053   Safe Environment   Safety Measures   (virtual safety rounds complete)   Virtual nurse rounds, patient sitting on edge of bed. Denies pain or needs at this time. No safety concerns identified. Call light within reach. Will continue to monitor.

## 2022-08-08 NOTE — PLAN OF CARE
Problem: Discharge Planning  Goal: Discharge to home or other facility with appropriate resources  Outcome: Progressing  Flowsheets (Taken 8/7/2022 2011)  Discharge to home or other facility with appropriate resources:   Identify barriers to discharge with patient and caregiver   Arrange for needed discharge resources and transportation as appropriate   Arrange for interpreters to assist at discharge as needed     Problem: Pain  Goal: Verbalizes/displays adequate comfort level or baseline comfort level  Outcome: Progressing  Flowsheets (Taken 8/7/2022 2106 by Marialuisa Burnett RN)  Verbalizes/displays adequate comfort level or baseline comfort level:   Encourage patient to monitor pain and request assistance   Assess pain using appropriate pain scale   Administer analgesics based on type and severity of pain and evaluate response   Implement non-pharmacological measures as appropriate and evaluate response     Problem: Safety - Adult  Goal: Free from fall injury  Outcome: Progressing  Flowsheets (Taken 8/7/2022 2354)  Free From Fall Injury: Instruct family/caregiver on patient safety     Problem: Respiratory - Adult  Goal: Clear lung sounds  Description: Clear lung sounds  8/7/2022 1619 by Sridhar Sheth RCP  Outcome: Progressing     Problem: ABCDS Injury Assessment  Goal: Absence of physical injury  Outcome: Progressing  Flowsheets (Taken 8/7/2022 2357)  Absence of Physical Injury: Implement safety measures based on patient assessment     Problem: Skin/Tissue Integrity  Goal: Absence of new skin breakdown  Description: 1. Monitor for areas of redness and/or skin breakdown  2. Assess vascular access sites hourly  3. Every 4-6 hours minimum:  Change oxygen saturation probe site  4. Every 4-6 hours:  If on nasal continuous positive airway pressure, respiratory therapy assess nares and determine need for appliance change or resting period.   Outcome: Progressing  Note: No new evidence of skin breakdown Problem: Nutrition Deficit:  Goal: Optimize nutritional status  Outcome: Progressing  Flowsheets (Taken 8/8/2022 0147)  Nutrient intake appropriate for improving, restoring, or maintaining nutritional needs:   Assess nutritional status and recommend course of action   Monitor oral intake, labs, and treatment plans   Recommend appropriate diets, oral nutritional supplements, and vitamin/mineral supplements     Problem: Chronic Conditions and Co-morbidities  Goal: Patient's chronic conditions and co-morbidity symptoms are monitored and maintained or improved  Outcome: Progressing  Flowsheets (Taken 8/7/2022 2011)  Care Plan - Patient's Chronic Conditions and Co-Morbidity Symptoms are Monitored and Maintained or Improved:   Monitor and assess patient's chronic conditions and comorbid symptoms for stability, deterioration, or improvement   Collaborate with multidisciplinary team to address chronic and comorbid conditions and prevent exacerbation or deterioration    Care plan reviewed with patient. Patient verbalizes understanding of the care plan and contributed to goal setting.

## 2022-08-08 NOTE — PROGRESS NOTES
1225: RN called patient's mother Crystal Schuler in regards about patient being discharged. No answer. RN left a voicemail for Crystal Schuler to call back. 1227: RN called patient's sister Cecily Lombard in regards to patient being discharged. No answer RN left a voicemail. 1230: Rn called Liz Bond patient's sister in regards to patient being discharged. Liz Ulrich very thankful for update and will let Crystal Schuler know that patient will be discharged. Liz Ulrich will check to see if there is extra tubing and a mask for patient's home CPAP. RN talked to Vicyk Schultz CNP with Pulmonology and \[facility] [place] [facility]\"" - [facility] will bring up new mask and tubing for home CPAP. 1830: RN called Cecily Lombard (sister) in regards to see if anyone was planning on coming up to discharge patient. Cecily Lombard informed RN that she would be on way up to discharge patient home. 1934: RN went over all discharge instructions with patient and patient's sister Cecily Lombard. RN answered all questions with no further questions at this time. Patient discharged with all personal belongings, discharge instructions, and prescription medications. RN informed patient on making follow-up appointments with providers. Patient acknowledged RN. Patient discharged home with family. Patient's sister Cecily Lombard discharged home.

## 2022-08-08 NOTE — DISCHARGE SUMMARY
Discharge Summary    Kelle Hansen  :  1970  MRN:  [id number]    Admit date:  2022  Discharge date:      Admitting Physician:  Mansoor Garcia MD    Discharge Diagnoses:    Acute exacerbation of COPD  Acute on chronic hypoxemic and hypercapnic respiratory failure  Primary pulmonary hypertension, on tadalafil  Chronic ongoing nicotine abuse. Schizoaffective disorder        Patient Active Problem List   Diagnosis    Acute on chronic respiratory failure with hypercapnia (HCC)    Respiratory insufficiency/failure    Chronic obstructive pulmonary disease with acute exacerbation (HCC)    Acute on chronic systolic congestive heart failure (HCC)    Nicotine abuse    Chest pain    Schizophreniform disorder (HCC)    Respiratory failure (HCC)    Smoker    Acute respiratory failure ([address]    Acute on chronic respiratory failure (HCC)    Pneumonia of right lower lobe due to infectious organism    Aspiration pneumonia of both lower lobes due to gastric secretions ([address]    Community acquired bacterial pneumonia    Acute on chronic respiratory failure with hypoxia ([address]       Admission Condition:  serious  Discharged Condition:  good    Hospital Course:   Patient with a known history of COPD primary pulmonary hypertension on home oxygen also poorly compliant with ongoing chronic tobacco use and no usage of his home oxygen presents with worsening shortness of breath. Patient was treated with bronchodilators. Also NIPPV. Empiric antibiotics. He improved. He is deemed stable for discharge home. He has been advised to stop smoking. Added Wellbutrin to regimen. Patient also advised to comply with oxygen usage. Stop smoking and follow-up with pulmonary service and family MD in the outpatient setting. Discharge Medications:         Medication List        START taking these medications      buPROPion 150 MG extended release tablet  Commonly known as:  Wellbutrin XL  Take 1 tablet by mouth every morning     doxycycline hyclate 100 MG tablet  Commonly known as: VIBRA-TABS  Take 1 tablet by mouth in the morning and 1 tablet before bedtime. Do all this for 5 doses. predniSONE 20 MG tablet  Commonly known as: DELTASONE  Take 2 tablets by mouth in the morning for 5 days.             CONTINUE taking these medications      albuterol (2.5 MG/3ML) 0.083% nebulizer solution  Commonly known as: PROVENTIL  Take 3 mLs by nebulization every 2 hours as needed for Wheezing     aspirin 325 MG EC tablet  Take 1 tablet by mouth daily     budesonide 0.5 MG/2ML nebulizer suspension  Commonly known as: Pulmicort  Take 2 mLs by nebulization 2 times daily     carBAMazepine 200 MG tablet  Commonly known as: TEGRETOL     metoprolol succinate 25 MG extended release tablet  Commonly known as: TOPROL XL  Take 1 tablet by mouth daily     pravastatin 40 MG tablet  Commonly known as: PRAVACHOL     risperiDONE 2 MG tablet  Commonly known as: RISPERDAL  Take 1 tablet by mouth 2 times daily     Spacer/Aero-Holding Chambers [place]  1 Device by Does not apply route daily     Tadalafil (PAH) 20 MG tablet  Take 2 tablets by mouth daily     umeclidinium-vilanterol 62.5-25 MCG/INH Aepb inhaler  Commonly known as: ANORO ELLIPTA  Inhale 1 puff into the lungs daily            STOP taking these medications      ipratropium-albuterol 0.5-2.5 (3) MG/3ML Soln nebulizer solution  Commonly known as: DUONEB               Where to Get Your Medications        These medications were sent to [id number] [place], [address] [place] [address]      Phone: [phone]   buPROPion 150 MG extended release tablet  doxycycline hyclate 100 MG tablet  predniSONE 20 MG tablet         Consults:  pulmonary/intensive care    Significant Diagnostic Studies: labs: CBC:       Recent Labs     08/05/22 0727   WBC 4.7*   HGB 15.1            BMP:        Recent Labs     08/05/22 0727      K 4.1      CO2 27   BUN 7   CREATININE 0.6   GLUCOSE 107         Magnesium:          Lab Results   Component Value Date/Time     MG 2.0 2021 07:02 PM         HgBA1c:          Lab Results   Component Value Date/Time     LABA1C 6.0 12/15/2019 10:00 AM      TSH:          Lab Results   Component Value Date/Time     TSH 0.686 2022 10:52 AM      VITAMIN B12: No components found for: B12  FOLATE:          Lab Results   Component Value Date/Time     FOLATE 9.0 2016 10:18 AM      IRON:  No results found for: IRON  FERRITIN:          Lab Results   Component Value Date/Time     FERRITIN 344 2021 02:00 AM            22 1044     Specimen Source: Blood gases       pH, Blood Gas 7.43     PCO2 39 mmhg     PO2 63 Low  mmhg     HCO3 26 mmol/l     Base Excess (Calculated) 2.0 mmol/l     O2 Sat 93 %     IFIO2 5     DEVICE Cannula     Nicholas Test Positive     Source: R Radial            Assessment and Plan:   Acute exacerbation of COPD  Acute on chronic hypoxemic and hypercapnic respiratory failure  Primary pulmonary hypertension, on tadalafil  Chronic ongoing nicotine abuse. Schizoaffective disorder     Plan:  Continue bronchodilators DuoNeb aerosols. Po prednisone  Doxycycline po  cont psychotropics  Home o2 eval,  Compliance with Oxygen use advised and abstinence from tobacco.  Start wellbutrin. Treatments:     Continue bronchodilators DuoNeb aerosols. Po prednisone  Doxycycline po  cont psychotropics  Home o2 eval,  Stable for dc home. Compliance with Oxygen use advised and abstinence from tobacco.  Start wellbutrin. Disposition:  home.     Signed:  Asher Davis MD  2022, 7:17 PM

## 2022-08-08 NOTE — PROGRESS NOTES
[facility]      Patient - Татьяна Bledsoe   MRN -  [id number]   [facility] # - [de-identified]   - 1970      Date of Admission -  2022  2:47 AM  Date of evaluation -  2022  Room - [facility] Day - 4  Consulting - Rocio Del Rio MD Primary Care Physician - Rocio Del Rio MD     Problem List      Active Hospital Problems    Diagnosis Date Noted    Acute on chronic respiratory failure with hypoxia [facility]) [J96.21] 2022     Priority: Medium    Smoker [F17.200]     Respiratory failure ([address] [J96.90] 2020    Schizophreniform disorder ([address] [F20.81] 2019    Chronic obstructive pulmonary disease with acute exacerbation ([address] [J44.1] 2019    Acute on chronic respiratory failure with hypercapnia ([address] [J96.22] 2019     Reason for Consult    COPD exacerbation with acute on chronic hypoxic hypercapnic respiratory failure  HPI   This is a 46year old male with a PMHx of tobacco use disorder, severe COPD, chronic diastolic CHF, OHS, HTN who presented to [address] [place] [address] on  with worsening shortness of breath. He endorses that it was a progressive decline in his respiratory function accompanied by cough with yellow/white sputum production. He denies hemoptysis. He endorses increasing use of his rescue inhalers. He does not use his nebulizer at home as he does not have the fluid. He is on oxygen continuously at home. He states that he uses 2 to 3 L continuously, with no change for exertion. He denies using any form of positive airway pressure at home. He denies headaches, changes in vision, chest pain, fever, chills, nausea, vomiting, or diarrhea. He endorses smoking tobacco and vaping. He states that he typically smokes 2 to 3 cigarettes/day. He denies any difficulty with eating, urinating, or defecating. He denies any recent illness or occupational exposures. He denies any other acute symptoms or concerns.     In the ED, vitals were significant for respiratory rate of 40 and an SPO2 of 95% on 6 L of oxygen via nasal cannula. The patient was escalated to positive airway pressure with an FiO2 of 40 in order to maintain adequate oxygenation. VBG on admission showed pH mixed of 7.30, PCO2 64, PO2 37, bicarb 32. ABG 1 hour later showed pH 7.30, PCO2 63, PO2 79, bicarb 31. The patient was admitted and started on ceftriaxone, IV Solu-Medrol 40 mg every 8, duo nebs 3 times daily, budesonide twice daily.       Subjective/Last 24 hours   -On 3 LPM via NC   -reports SOB near baseline  -Denies wheezing   -Home astral machine at bedside no tubing or mask noted   -Oriented x 3 noted history of schizophrenia   All other systems reviewed    Meds    Current Medications    predniSONE  40 mg Oral Daily    doxycycline hyclate  100 mg Oral 2 times per day    ipratropium-albuterol  1 ampule Inhalation BID    sodium chloride flush  5-40 mL IntraVENous 2 times per day    enoxaparin  40 mg SubCUTAneous Daily    docusate sodium  100 mg Oral Daily    budesonide  500 mcg Nebulization BID    carBAMazepine  200 mg Oral BID    pravastatin  40 mg Oral Nightly    risperiDONE  2 mg Oral BID    Tadalafil (PAH)  40 mg Oral Daily     ipratropium-albuterol, sodium chloride flush, sodium chloride, acetaminophen, ondansetron **OR** ondansetron  IV Drips/Infusions   sodium chloride       Home Medications  Medications Prior to Admission: risperiDONE (RISPERDAL) 2 MG tablet, Take 1 tablet by mouth 2 times daily  Tadalafil, PAH, 20 MG tablet, Take 2 tablets by mouth daily  Spacer/Aero-Holding Chambers CAREN, 1 Device by Does not apply route daily  albuterol (PROVENTIL) (2.5 MG/3ML) 0.083% nebulizer solution, Take 3 mLs by nebulization every 2 hours as needed for Wheezing  budesonide (PULMICORT) 0.5 MG/2ML nebulizer suspension, Take 2 mLs by nebulization 2 times daily  ipratropium-albuterol (DUONEB) 0.5-2.5 (3) MG/3ML SOLN nebulizer solution, Inhale 3 mLs into the lungs every 6 hours as 08-Apr-2019 16:37 needed for Shortness of Breath  umeclidinium-vilanterol (ANORO ELLIPTA) 62.5-25 MCG/INH AEPB inhaler, Inhale 1 puff into the lungs daily  aspirin 325 MG EC tablet, Take 1 tablet by mouth daily  pravastatin (PRAVACHOL) 40 MG tablet, Take 40 mg by mouth nightly  metoprolol succinate (TOPROL XL) 25 MG extended release tablet, Take 1 tablet by mouth daily  carBAMazepine (TEGRETOL) 200 MG tablet, Take 200 mg by mouth 2 times daily  Diet    ADULT DIET; Regular; No Added Salt (3-4 gm)  Allergies    Patient has no known allergies. Social History     Social History     Socioeconomic History    Marital status: Single     Spouse name: Not on file    Number of children: 0    Years of education: Not on file    Highest education level: Not on file   Occupational History    Not on file   Tobacco Use    Smoking status: Every Day     Packs/day: 1.00     Years: 20.00     Pack years: 20.00     Types: Cigarettes    Smokeless tobacco: Never   Vaping Use    Vaping Use: Never used   Substance and Sexual Activity    Alcohol use: No    Drug use: No    Sexual activity: Not Currently   Other Topics Concern    Not on file   Social History Narrative    Not on file     Social Determinants of Health     Financial Resource Strain: Not on file   Food Insecurity: Not on file   Transportation Needs: Not on file   Physical Activity: Not on file   Stress: Not on file   Social Connections: Not on file   Intimate Partner Violence: Not on file   Housing Stability: Not on file     Family History          Problem Relation Age of Onset    High Blood Pressure Mother      Sleep History    Never diagnosed with sleep apnea in the past.  Vitals     height is 5' 11\" (1.803 m) and weight is 209 lb 12.8 oz (95.2 kg). His oral temperature is 97.7 °F (36.5 °C). His blood pressure is 133/76 and his pulse is 62. His respiration is 18 and oxygen saturation is 96%. Body mass index is 29.26 kg/m².     SUPPLEMENTAL O2: O2 Flow Rate (L/min): 3 L/min     I/O Intake/Output Summary (Last 24 hours) at 8/8/2022 1018  Last data filed at 8/8/2022 0935  Gross per 24 hour   Intake 410 ml   Output 3125 ml   Net -2715 ml       I/O last 3 completed shifts: In: 80 [P.O.:760; I.V.:20]  Out: 4325 [Urine:4325]   Patient Vitals for the past 96 hrs (Last 3 readings):   Weight   08/08/22 0420 209 lb 12.8 oz (95.2 kg)   08/07/22 0341 208 lb 6.4 oz (94.5 kg)   08/06/22 0329 208 lb 9.6 oz (94.6 kg)         Exam   Physical Exam   Constitutional: No distress on O2 per NC. Patient appears moderately built and  moderately nourished. Head: Normocephalic and atraumatic. Mouth/Throat: Oropharynx is clear and moist.  No oral thrush. Eyes: Conjunctivae are normal. Pupils are equal, round. No scleral icterus. Neck: Neck supple. No tracheal deviation present. Cardiovascular: S1 and S2 with no murmur. No peripheral edema  Pulmonary/Chest: Normal effort with bilateral air entry, clear breath sounds. No stridor. No respiratory distress. Patient exhibits no tenderness. Abdominal: Soft. Bowel sounds audible. No distension or tenderness to palp. Musculoskeletal: Moves all extremities  Neurological: Patient is alert and oriented to person, place, and month/year, noted intermittent tangential speech     Labs  - Old records and notes have been reviewed in [facility]   ABG  Lab Results   Component Value Date/Time    PH 7.43 08/05/2022 10:35 AM    PO2 63 08/05/2022 10:35 AM    PCO2 39 08/05/2022 10:35 AM    HCO3 26 08/05/2022 10:35 AM    O2SAT 93 08/05/2022 10:35 AM     Lab Results   Component Value Date/Time    IFIO2 5 08/05/2022 10:35 AM    MODE BiLevel 08/04/2022 04:25 AM    SETTIDVOL 480 05/30/2020 05:47 AM    SETPEEP 6.0 08/04/2022 04:25 AM     CBC  No results for input(s): WBC, RBC, HGB, HCT, MCV, MCH, MCHC, RDW, PLT, MPV in the last 72 hours. BMP  No results for input(s): NA, K, CL, CO2, BUN, CREATININE, GLUCOSE, MG, PHOS, CALCIUM, IONCA, MG in the last 72 hours.     LFT  No results for input(s): AST, ALT, ALB, BILITOT, ALKPHOS, LIPASE in the last 72 hours. Invalid input(s): AMYLASE    TROP  Lab Results   Component Value Date/Time    TROPONINT < 0.010 08/04/2022 02:50 AM    TROPONINT < 0.010 05/28/2022 12:25 AM    TROPONINT < 0.010 05/01/2022 10:52 AM     UA   No results for input(s): Estle Maryjane, COLORU, CLARITYU, MUCUS, PROTEINU, BLOODU, RBCUA, WBCUA, BACTERIA, NITRU, GLUCOSEU, BILIRUBINUR, UROBILINOGEN, KETUA, LABCAST, LABCASTTY, AMORPHOS in the last 72 hours. Invalid input(s): CRYSTALS    PFTs         Sleep studies   None    Cultures    None    EKG   Normal sinus rhythm  Rightward axis  Borderline ECG  When compared with ECG of 28-MAY-2022 00:30,  Nonspecific T wave abnormality now evident in Anterior leads  Nonspecific T wave abnormality no longer evident in Lateral leads  Confirmed by Hank Chowdary ([id number]) on 8/4/2022 8:26:13 PM  Echocardiogram       TTE procedure:ECHOCARDIOGRAM COMPLETE 2D W DOPPLER W COLOR. Procedure Date  Date: 05/18/2020 Start: 10:41 AM     Summary   Left ventricular size is normal and systolic function is mildly reduced. Ejection fraction was estimated at 50-55%. LV wall thickness is within   normal limits. Intraventricular septal wall compression during systole suggestive of RV   pressure overload. Markedly enlarged right atrium size. The right ventricle was not clearly visualized. Appears dilated. RVSP of 55-60 mm Hg consistent with moderate pulmonary hypertension. Small circumferential pericardial effusion without evidence of tamponade   physiology. IVC size is dilated with <50% respiratory collapse. Signature      ----------------------------------------------------------------   Electronically signed by Herbert Domingo MD (Interpreting   physician) on 05/18/2020 at 04:20 PM   ----------------------------------------------------------------      Findings      Mitral Valve   The mitral valve was not well visualized .    Trace mitral regurgitation is present. Aortic Valve   The aortic valve leaflets were not well visualized. Aortic valve appears tricuspid. Aortic valve leaflets are somewhat thickened. Tricuspid Valve   Tricuspid valve was not well visualized. Mild tricuspid regurgitation. Pulmonic Valve   The pulmonic valve was not well visualized . Trivial pulmonic regurgitation visualized. Left Atrium   Left atrial size was normal.      Left Ventricle   Left ventricular size is normal and systolic function is mildly reduced. Ejection fraction was estimated at 50-55%. LV wall thickness is within   normal limits. Intraventricular septal wall compression during systole suggestive of RV   pressure overload. Right Atrium   Markedly enlarged right atrium size. Right Ventricle   The right ventricle was not clearly visualized. Appears dilated. RVSP of 55-60 mm Hg consistent with moderate pulmonary hypertension. Pericardial Effusion   Small circumferential pericardial effusion without evidence of tamponade   physiology. Pleural Effusion   No evidence of pleural effusion. Aorta / Great Vessels   -Aortic root dimension within normal limits. -IVC size is dilated with <50% respiratory collapse.      M-Mode/2D Measurements & Calculations      LV Diastolic    LV Systolic Dimension: 3.8  AV Cusp Separation: 2.1 cmLA   Dimension: 5 cm cm                          Dimension: 3.7 cmAO Root   LV FS:24 %      LV Volume Diastolic: 263 ml Dimension: 3.6 cm   LV PW           LV Volume Systolic: 62 ml   Diastolic: 1.2  LV EDV/LV EDV Index: 118   cm              ml/46 m^2LV ESV/LV ESV   Septum          Index: 62 ml/24 m^2         RV Diastolic Dimension: 3.4 cm   Diastolic: 1.2  EF Calculated: 47.5 %   cm                                          LA/Aorta: 1.03                   LV Length: 7.5 cm           Ascending Aorta: 3.1 cm      LV Area   Diastolic: 11.5   cm^2   LV Area   Systolic: 13.9   cm^2     Doppler Measurements & Calculations      MV Peak E-Wave: 58.7 cm/s  AV Peak Velocity: 102 LVOT Peak Velocity: 90.8   MV Peak A-Wave: 49.7 cm/s  cm/s                  cm/s   MV E/A Ratio: 1.18         AV Peak Gradient:     LVOT Peak Gradient: 3   MV Peak Gradient: 1.38     4.16 mmHg             mmHg   mmHg                                                    TV Peak E-Wave: 67.3 cm/s   MV Deceleration Time: 310                        TV Peak A-Wave: 51.8 cm/s   msec                              IVRT: 67 msec         TV Peak Gradient: 1.81                                                    mmHg   MV E' Septal Velocity:                           TR Velocity:327 cm/s   13.4 cm/s                  AV DVI (Vmax):0.89    TR Gradient:42.77 mmHg   MV A' Septal Velocity: 9.2                       PV Peak Velocity: 48.4   cm/s                                             cm/s   MV E' Lateral Velocity:                          PV Peak Gradient: 0.94   13.7 cm/s                                        mmHg   MV A' Lateral Velocity:   9.7 cm/s   E/E' septal: 4.38   E/E' lateral: 4.28    Radiology    CXR    1V CHEST     08/04/2022     Findings: Mediastinum: Cardiac silhouette nonenlarged. Lungs: Hyperinflated consistent with emphysema. No focal infiltrate, mass, or edema. Pleural : No pneumothorax or pleural effusion. Bones / Chest wall: No acute disease. Impression:   No acute cardiopulmonary disease process identified. Emphysema. 8/4/2022      1V CHEST     No acute cardiopulmonary disease process identified. Emphysema. CT Scans    Wed May 4, 2022     CT CHEST WITHOUT CONTRAST     1. Confluent atelectasis in the bilateral lower lobes. 2. Focal interstitial prominence in the lateral aspect of the right upper lobe. This is nonspecific but could be due to infection or edema. 3. Trace bilateral pleural effusions. 4. Additional findings are detailed above.      (See actual reports for details)    Home O2 eval   8/7/2022  By [facility]    A home oxygen evaluation has been completed. [x]Patient is an inpatient. It is expected that the patient will be discharged within the next 48 hours. Qualified provider to write order for home prescription if patient qualifies. Social service/care managers will arrange for home oxygen. If patient is active, arrange for Home Medical supplier to assess for Oxygen Conserving Device per pulse oximetry. []Patient is an outpatient. Results will be faxed to the ordering provider. Qualified provider to write order for home prescription if patient qualifies and arranges for home oxygen. Patient was placed on room air for 20 minutes. SpO2 was 85 % on room air at rest. Patients SpO2 was below 89% and qualified for home oxygen. Oxygen was applied at 3 lpm via nasal cannula to maintain a SpO2 between 90-92% while at rest. Actual SpO2 was 90 %. Patient can ambulate for exercise flow rate. Patients was ambulated, SpO2 was 90% on 6 lpm to maintain SpO2 between 90-92% while exercising. If oxygen need is greater than 4 lpm the SpO2 on 4 lpm was 86. Note: For any SpO2 at 49% see policy and procedure for possible qualifications.      Assessment   -Acute on chronic hypoxic and hypercapnic respiratory failure-on NIV started Dec 2021-NIV Astral PS min 12 and PS max 18 EPAP 6 rate 18  -Acute exacerbation of chronic obstructive pulmonary disease  -Chronic tobacco use disorder  -Medication noncompliance  -Pulmonary hypertension, WHO classification 3, suspected  -Diastolic heart failure with reduced ejection fraction, no evidence of current decompensation  Plan   -Continue Oxygen by nasal canula to keep saturation above 92%,   -Home O2 evaluation completed 3 LPM at rest and 6 LPM exertion  -start stiolto 2 puff Daily   -continue budesonide nebs  mcg   -prednisone 40 mg PO Daily x 5 days  -Will discuss with CM and DME regarding tubing and mask for NIV so he can be discharged home per RN family reports the equipment was soiled so it was thrown away-Spoke with Macarena Pascual from [facility] their staff will bring new tubing mask and head strap  -Will plan to continue home NIV with previous settings NIV Astral PS min 12 and PS max 18 EPAP 6 rate 18  -Smoking cessation encouraged   -Stable from Pulmonary standpoint, schedule patient for follow-[facility] at [facility]. [facility] in 3 months with Dr. Stacy Marmolejo MD    Patient was evaluated today for the diagnosis of COPD. I entered a DME order for home oxygen because the diagnosis and testing requires the patient to have supplemental oxygen. Condition will improve or be benefited by oxygen use. The patient is  able to perform good mobility in a home setting and therefore does require the use of a portable oxygen system. The need for this equipment was discussed with the patient and he understands and is in agreement. Questions and concerns addressed. Electronically signed by   SAVANNAH Schmidt CNP on 8/8/2022 at 10:18 AM     Vitals:    08/08/22 0803 08/08/22 0814 08/08/22 1121 08/08/22 1546   BP: 133/76  (!) 109/56 112/67   Pulse: 62 62 74 73   Resp: 18 18 18 18   Temp: 97.7 °F (36.5 °C)  98 °F (36.7 °C) 98.1 °F (36.7 °C)   TempSrc: Oral  Oral Oral   SpO2: 95% 96% 94% 97%   Weight:       Height:         Looks at baseline on 3L  SOB improved, afebrile  Chest decreased BS but no wheezes  DME will replace home vent interface and tubing  Stable to DC home from pulm standpoint  Follow up already scheduled with Dr Richy Epperson will s/o at this time    Patient seen and examined independently by me. Above discussed and I agree with  CNP note Also see my additional comments. Labs, cultures, and radiographs when available were reviewed. Changes were made in the orders as necessary. I discussed patient concerns with Marlyn BRAMBILA and instructions were given. Respiratory care issues addressed.   Please see our orders for the updated patient care plan.    Electronically signed by     Elvia Law MD on 8/8/2022 at 3:58 PM

## 2022-08-08 NOTE — PROGRESS NOTES
INTERNAL MEDICINE Progress Note  8/8/2022 7:16 PM  Subjective:   Admit Date: 8/4/2022  PCP: Evelio Mccann MD  Interval History:     Dc held yesterday- no family to pick patient up  No new c/o  On O2 via NC 3L    Objective:   Vitals: /67   Pulse 73   Temp 98.1 °F (36.7 °C) (Oral)   Resp 18   Ht 5' 11\" (1.803 m)   Wt 209 lb 12.8 oz (95.2 kg)   SpO2 97%   BMI 29.26 kg/m²   General appearance: alert and cooperative with exam  HEENT: Head: Normocephalic,   Pharynx: Teeth: multiple caries  Neck: no adenopathy, no carotid bruit, no JVD,   Lungs: diminished breath sounds bilaterally, no rhonchi  Heart: S1, S2 normal  Abdomen: soft, non-tender; bowel sounds normal; no masses,  no organomegaly  Extremities: no edema, redness or tenderness in the calves or thighs  Neurologic: Alert, oriented, thought content appropriate      Medications:   Scheduled Meds:   tiotropium-olodaterol  2 puff Inhalation Daily    predniSONE  40 mg Oral Daily    doxycycline hyclate  100 mg Oral 2 times per day    sodium chloride flush  5-40 mL IntraVENous 2 times per day    enoxaparin  40 mg SubCUTAneous Daily    docusate sodium  100 mg Oral Daily    budesonide  500 mcg Nebulization BID    carBAMazepine  200 mg Oral BID    pravastatin  40 mg Oral Nightly    risperiDONE  2 mg Oral BID    Tadalafil (PAH)  40 mg Oral Daily     Continuous Infusions:   sodium chloride         Lab Results:   CBC:   No results for input(s): WBC, HGB, PLT in the last 72 hours. BMP:    No results for input(s): NA, K, CL, CO2, BUN, CREATININE, GLUCOSE in the last 72 hours.     Magnesium:    Lab Results   Component Value Date/Time    MG 2.0 12/20/2021 07:02 PM       HgBA1c:    Lab Results   Component Value Date/Time    LABA1C 6.0 12/15/2019 10:00 AM     TSH:    Lab Results   Component Value Date/Time    TSH 0.686 05/01/2022 10:52 AM     VITAMIN B12: No components found for: B12  FOLATE:    Lab Results   Component Value Date/Time    FOLATE 9.0 12/08/2016 10:18 AM IRON:  No results found for: IRON  FERRITIN:    Lab Results   Component Value Date/Time    FERRITIN 344 12/11/2021 02:00 AM     08/05/22 1044    Specimen Source: Blood gases     pH, Blood Gas 7.43    PCO2 39 mmhg    PO2 63 Low  mmhg    HCO3 26 mmol/l    Base Excess (Calculated) 2.0 mmol/l    O2 Sat 93 %    IFIO2 5    DEVICE Cannula    Nicholas Test Positive    Source: R Radial         Assessment and Plan:   Acute exacerbation of COPD  Acute on chronic hypoxemic and hypercapnic respiratory failure  Primary pulmonary hypertension, on tadalafil  Chronic ongoing nicotine abuse. Schizoaffective disorder    Plan:  Continue bronchodilators DuoNeb aerosols. Po prednisone  Doxycycline po  cont psychotropics  Compliance with Oxygen use advised and abstinence from tobacco.   Start wellbutrin. Dc home, clinically stable.         Cuate Almeida MD, MD

## 2022-11-07 ENCOUNTER — HOSPITAL ENCOUNTER (EMERGENCY)
Age: 52
Discharge: HOME | End: 2022-11-07
Attending: EMERGENCY MEDICINE
Payer: MEDICARE

## 2022-11-07 ENCOUNTER — APPOINTMENT (OUTPATIENT)
Dept: GENERAL RADIOLOGY | Age: 52
End: 2022-11-07
Payer: MEDICARE

## 2022-11-07 VITALS
HEART RATE: 73 BPM | OXYGEN SATURATION: 95 % | RESPIRATION RATE: 20 BRPM | TEMPERATURE: 98.2 F | DIASTOLIC BLOOD PRESSURE: 83 MMHG | SYSTOLIC BLOOD PRESSURE: 133 MMHG

## 2022-11-07 DIAGNOSIS — Z99.81 SUPPLEMENTAL OXYGEN DEPENDENT: ICD-10-CM

## 2022-11-07 DIAGNOSIS — J44.9 CHRONIC OBSTRUCTIVE PULMONARY DISEASE, UNSPECIFIED COPD TYPE (HCC): Primary | ICD-10-CM

## 2022-11-07 LAB
ALBUMIN SERPL-MCNC: 4.4 G/DL (ref 3.5–5.1)
ALP BLD-CCNC: 67 U/L (ref 38–126)
ALT SERPL-CCNC: 7 U/L (ref 11–66)
ANION GAP SERPL CALCULATED.3IONS-SCNC: 5 MEQ/L (ref 8–16)
AST SERPL-CCNC: 10 U/L (ref 5–40)
BASOPHILS # BLD: 0.4 %
BASOPHILS ABSOLUTE: 0 THOU/MM3 (ref 0–0.1)
BILIRUB SERPL-MCNC: 0.3 MG/DL (ref 0.3–1.2)
BILIRUBIN DIRECT: < 0.2 MG/DL (ref 0–0.3)
BUN BLDV-MCNC: 18 MG/DL (ref 7–22)
CALCIUM SERPL-MCNC: 8.4 MG/DL (ref 8.5–10.5)
CARBAMAZEPINE, TOTAL: 9 MCG/ML (ref 2–10)
CHLORIDE BLD-SCNC: 95 MEQ/L (ref 98–111)
CO2: 33 MEQ/L (ref 23–33)
CREAT SERPL-MCNC: 0.8 MG/DL (ref 0.4–1.2)
EOSINOPHIL # BLD: 1.8 %
EOSINOPHILS ABSOLUTE: 0.1 THOU/MM3 (ref 0–0.4)
ERYTHROCYTE [DISTWIDTH] IN BLOOD BY AUTOMATED COUNT: 15.5 % (ref 11.5–14.5)
ERYTHROCYTE [DISTWIDTH] IN BLOOD BY AUTOMATED COUNT: 51.2 FL (ref 35–45)
GFR SERPL CREATININE-BSD FRML MDRD: > 60 ML/MIN/1.73M2
GLUCOSE BLD-MCNC: 93 MG/DL (ref 70–108)
HCT VFR BLD CALC: 49.6 % (ref 42–52)
HEMOGLOBIN: 15.8 GM/DL (ref 14–18)
IMMATURE GRANS (ABS): 0.01 THOU/MM3 (ref 0–0.07)
IMMATURE GRANULOCYTES: 0.2 %
LACTIC ACID, SEPSIS: 0.6 MMOL/L (ref 0.5–1.9)
LYMPHOCYTES # BLD: 28.3 %
LYMPHOCYTES ABSOLUTE: 1.6 THOU/MM3 (ref 1–4.8)
MAGNESIUM: 2.1 MG/DL (ref 1.6–2.4)
MCH RBC QN AUTO: 29.1 PG (ref 26–33)
MCHC RBC AUTO-ENTMCNC: 31.9 GM/DL (ref 32.2–35.5)
MCV RBC AUTO: 91.3 FL (ref 80–94)
MONOCYTES # BLD: 7.2 %
MONOCYTES ABSOLUTE: 0.4 THOU/MM3 (ref 0.4–1.3)
NUCLEATED RED BLOOD CELLS: 0 /100 WBC
PLATELET # BLD: 183 THOU/MM3 (ref 130–400)
PMV BLD AUTO: 9.7 FL (ref 9.4–12.4)
POTASSIUM REFLEX MAGNESIUM: 5.1 MEQ/L (ref 3.5–5.2)
PRO-BNP: 21.1 PG/ML (ref 0–900)
RBC # BLD: 5.43 MILL/MM3 (ref 4.7–6.1)
SEG NEUTROPHILS: 62.1 %
SEGMENTED NEUTROPHILS ABSOLUTE COUNT: 3.5 THOU/MM3 (ref 1.8–7.7)
SODIUM BLD-SCNC: 133 MEQ/L (ref 135–145)
TOTAL PROTEIN: 7.3 G/DL (ref 6.1–8)
TROPONIN T: < 0.01 NG/ML
WBC # BLD: 5.7 THOU/MM3 (ref 4.8–10.8)

## 2022-11-07 PROCEDURE — 94640 AIRWAY INHALATION TREATMENT: CPT

## 2022-11-07 PROCEDURE — 83880 ASSAY OF NATRIURETIC PEPTIDE: CPT

## 2022-11-07 PROCEDURE — 87040 BLOOD CULTURE FOR BACTERIA: CPT

## 2022-11-07 PROCEDURE — 80156 ASSAY CARBAMAZEPINE TOTAL: CPT

## 2022-11-07 PROCEDURE — 83605 ASSAY OF LACTIC ACID: CPT

## 2022-11-07 PROCEDURE — 94761 N-INVAS EAR/PLS OXIMETRY MLT: CPT

## 2022-11-07 PROCEDURE — 2700000000 HC OXYGEN THERAPY PER DAY

## 2022-11-07 PROCEDURE — 6370000000 HC RX 637 (ALT 250 FOR IP): Performed by: STUDENT IN AN ORGANIZED HEALTH CARE EDUCATION/TRAINING PROGRAM

## 2022-11-07 PROCEDURE — 93005 ELECTROCARDIOGRAM TRACING: CPT | Performed by: STUDENT IN AN ORGANIZED HEALTH CARE EDUCATION/TRAINING PROGRAM

## 2022-11-07 PROCEDURE — 93010 ELECTROCARDIOGRAM REPORT: CPT | Performed by: NUCLEAR MEDICINE

## 2022-11-07 PROCEDURE — 80076 HEPATIC FUNCTION PANEL: CPT

## 2022-11-07 PROCEDURE — 85025 COMPLETE CBC W/AUTO DIFF WBC: CPT

## 2022-11-07 PROCEDURE — 36415 COLL VENOUS BLD VENIPUNCTURE: CPT

## 2022-11-07 PROCEDURE — 84484 ASSAY OF TROPONIN QUANT: CPT

## 2022-11-07 PROCEDURE — 71045 X-RAY EXAM CHEST 1 VIEW: CPT

## 2022-11-07 PROCEDURE — 99285 EMERGENCY DEPT VISIT HI MDM: CPT

## 2022-11-07 PROCEDURE — 83735 ASSAY OF MAGNESIUM: CPT

## 2022-11-07 PROCEDURE — 80048 BASIC METABOLIC PNL TOTAL CA: CPT

## 2022-11-07 RX ORDER — IPRATROPIUM BROMIDE AND ALBUTEROL SULFATE 2.5; .5 MG/3ML; MG/3ML
2 SOLUTION RESPIRATORY (INHALATION) ONCE
Status: COMPLETED | OUTPATIENT
Start: 2022-11-07 | End: 2022-11-07

## 2022-11-07 RX ORDER — METHYLPREDNISOLONE SODIUM SUCCINATE 125 MG/2ML
125 INJECTION, POWDER, LYOPHILIZED, FOR SOLUTION INTRAMUSCULAR; INTRAVENOUS ONCE
Status: DISCONTINUED | OUTPATIENT
Start: 2022-11-07 | End: 2022-11-07

## 2022-11-07 RX ADMIN — IPRATROPIUM BROMIDE AND ALBUTEROL SULFATE 2 AMPULE: .5; 3 SOLUTION RESPIRATORY (INHALATION) at 15:55

## 2022-11-07 ASSESSMENT — ENCOUNTER SYMPTOMS
DIARRHEA: 0
CONSTIPATION: 0
SHORTNESS OF BREATH: 1
WHEEZING: 0
COLOR CHANGE: 0
CHEST TIGHTNESS: 0
ABDOMINAL DISTENTION: 0
COUGH: 1
BACK PAIN: 0
NAUSEA: 0
RHINORRHEA: 0
SORE THROAT: 0
VOMITING: 0
ABDOMINAL PAIN: 0

## 2022-11-07 ASSESSMENT — PAIN - FUNCTIONAL ASSESSMENT
PAIN_FUNCTIONAL_ASSESSMENT: NONE - DENIES PAIN

## 2022-11-07 NOTE — ED TRIAGE NOTES
Pt presents to  ED with c/o SOB. Pt came into ED without his O2 on, pt reports he is suppose to be on 3L nasal cannula but was not wearing it. EKG complete. Dr. Shirin Hill at bedside.

## 2022-11-07 NOTE — DISCHARGE INSTRUCTIONS
Wear your oxygen, follow-up with pulmonary as planned tomorrow. Return to ED if symptoms worse or other new concern.

## 2022-11-07 NOTE — ED PROVIDER NOTES
[facility] ENCOUNTER          Pt Name: Gisella Angelo  MRN: [id number]  [place] 1970  Date of Evaluation: 11/7/2022  Treating Resident Physician: Vincenzo Sarkar MD  Supervising Physician: Abraham Pham DO    CHIEF COMPLAINT       Chief Complaint   Patient presents with    Shortness of Breath     History obtained from mother, chart review, and the patient. HISTORY OF PRESENT ILLNESS    HPI    Gisella Angelo is a 45 y.o. male with a past medical history significant for COPD, chronic respiratory failure, schizophreniform, tobacco smoker , presents to the emergency department for evaluation of shortness of breath. Worsening shortness of breath over the past couple days. Patient arrives without his home oxygen. According to family has not been wearing this. No fever, has a chronic cough however this is nonproductive. The patient has no other acute complaints at this time. REVIEW OF SYSTEMS   Review of Systems   Constitutional:  Positive for fatigue. Negative for activity change, appetite change, chills, diaphoresis, fever and unexpected weight change. HENT:  Negative for congestion, rhinorrhea and sore throat. Respiratory:  Positive for cough and shortness of breath. Negative for chest tightness and wheezing. Cardiovascular:  Negative for chest pain, palpitations and leg swelling. Gastrointestinal:  Negative for abdominal distention, abdominal pain, constipation, diarrhea, nausea and vomiting. Genitourinary:  Negative for dysuria, hematuria and urgency. Musculoskeletal:  Negative for back pain and neck pain. Skin:  Negative for color change, pallor, rash and wound. Allergic/Immunologic: Negative for environmental allergies, food allergies and immunocompromised state. Neurological:  Negative for dizziness, syncope, weakness, numbness and headaches. Hematological:  Negative for adenopathy. Does not bruise/bleed easily. Psychiatric/Behavioral:  Negative for agitation and confusion. All other systems reviewed and are negative. PAST MEDICAL AND SURGICAL HISTORY     Past Medical History:   Diagnosis Date    Acute on chronic systolic congestive heart failure ([facility] [address] 4/4/2019    Emphysema (subcutaneous) (surgical) resulting from a procedure     Hypertension     Pneumonia     Schizophrenia ([facility] [address]      No past surgical history on file. MEDICATIONS   No current facility-administered medications for this encounter.     Current Outpatient Medications:     buPROPion (WELLBUTRIN XL) 150 MG extended release tablet, Take 1 tablet by mouth every morning, Disp: 30 tablet, Rfl: 3    risperiDONE (RISPERDAL) 2 MG tablet, Take 1 tablet by mouth 2 times daily, Disp: 60 tablet, Rfl: 3    Tadalafil, PAH, 20 MG tablet, Take 2 tablets by mouth daily, Disp: 60 tablet, Rfl: 3    Spacer/Aero-Holding Chambers CAREN, 1 Device by Does not apply route daily, Disp: 1 each, Rfl: 0    albuterol (PROVENTIL) (2.5 MG/3ML) 0.083% nebulizer solution, Take 3 mLs by nebulization every 2 hours as needed for Wheezing, Disp: 120 each, Rfl: 3    budesonide (PULMICORT) 0.5 MG/2ML nebulizer suspension, Take 2 mLs by nebulization 2 times daily, Disp: 120 mL, Rfl: 3    umeclidinium-vilanterol (ANORO ELLIPTA) 62.5-25 MCG/INH AEPB inhaler, Inhale 1 puff into the lungs daily, Disp: 1 each, Rfl: 2    aspirin 325 MG EC tablet, Take 1 tablet by mouth daily, Disp: 30 tablet, Rfl: 3    pravastatin (PRAVACHOL) 40 MG tablet, Take 40 mg by mouth nightly, Disp: , Rfl:     metoprolol succinate (TOPROL XL) 25 MG extended release tablet, Take 1 tablet by mouth daily, Disp: 30 tablet, Rfl: 3    carBAMazepine (TEGRETOL) 200 MG tablet, Take 200 mg by mouth 2 times daily, Disp: , Rfl:       SOCIAL HISTORY     Social History     Social History Narrative    Not on file     Social History     Tobacco Use    Smoking status: Every Day     Packs/day: 1.00     Years: 20.00     Pack years: 20.00 Types: Cigarettes    Smokeless tobacco: Never   Vaping Use    Vaping Use: Never used   Substance Use Topics    Alcohol use: No    Drug use: No         ALLERGIES   No Known Allergies      FAMILY HISTORY     Family History   Problem Relation Age of Onset    High Blood Pressure Mother          PREVIOUS RECORDS   Previous records reviewed:  prior ED visits . PHYSICAL EXAM     ED Triage Vitals   BP Temp Temp Source Heart Rate Resp SpO2 Height Weight   11/07/22 1519 11/07/22 1519 11/07/22 1519 11/07/22 1519 11/07/22 1519 11/07/22 1515 -- --   108/69 98.2 °F (36.8 °C) Oral 84 21 (!) 69 %       Initial vital signs and nursing assessment reviewed and abnormal from hypoxia . There is no height or weight on file to calculate BMI. Pulsoximetry is abnormal per my interpretation. Additional Vital Signs:  Vitals:    11/07/22 1824   BP: 133/83   Pulse: 73   Resp: 20   Temp:    SpO2: 95%       Physical Exam  Constitutional:       General: He is not in acute distress. Appearance: Normal appearance. He is normal weight. He is not ill-appearing, toxic-appearing or diaphoretic. Interventions: Nasal cannula in place. HENT:      Head: Normocephalic and atraumatic. Nose: Nose normal.      Mouth/Throat:      Mouth: Mucous membranes are moist.      Pharynx: Oropharynx is clear. Eyes:      Conjunctiva/sclera: Conjunctivae normal.   Cardiovascular:      Rate and Rhythm: Normal rate and regular rhythm. Pulses: Normal pulses. Heart sounds: Normal heart sounds. Pulmonary:      Effort: Pulmonary effort is normal.      Breath sounds: Normal breath sounds. No decreased breath sounds, wheezing, rhonchi or rales. Abdominal:      General: Abdomen is flat. Bowel sounds are normal.      Palpations: Abdomen is soft. Tenderness: There is no abdominal tenderness. Musculoskeletal:      Cervical back: Normal range of motion. Skin:     General: Skin is warm and dry.       Capillary Refill: Capillary refill takes less than 2 seconds. Neurological:      Mental Status: He is alert and oriented to person, place, and time. MEDICAL DECISION MAKING   Initial Differential Diagnosis: (includes but is not limited to)  Non-adherence to oxygen  COPD Exacerbation  Chronic COPD  Pneumonia  COVID-19  Influenza    Plan:   ECG  Monitor  Oxygen  IV  Duoneb x 2  Chest x-ray  CBC, BMP, Lft, Trop, BNP  Swabs for COVID-19 and Influenza. Summary:  Patient has a history of nonadherence to his home oxygen. Arrived in his pulse ox was 69% on room air. Came back up on his home dose of oxygen of 3 L. Work-up was unremarkable, he was given a DuoNeb with some improvement in his respiratory status. Does not appear to be have a COPD exacerbation but rather from what family is reporting nonadherence to his home oxygen. He also only has an oxygen concentrator at home and does not have any oxygen bottles to use for transport. Patient we discharged home with ambulate with oxygen or an ambulance with oxygen so he does not desaturate on his way home. He already has scheduled appointment to see his pulmonologist tomorrow.     ED RESULTS   Laboratory results:  Labs Reviewed   CBC WITH AUTO DIFFERENTIAL - Abnormal; Notable for the following components:       Result Value    MCHC 31.9 (*)     RDW-CV 15.5 (*)     RDW-SD 51.2 (*)     All other components within normal limits   BASIC METABOLIC PANEL W/ REFLEX TO MG FOR LOW K - Abnormal; Notable for the following components:    Sodium 133 (*)     Chloride 95 (*)     Calcium 8.4 (*)     All other components within normal limits   HEPATIC FUNCTION PANEL - Abnormal; Notable for the following components:    ALT 7 (*)     All other components within normal limits   ANION GAP - Abnormal; Notable for the following components:    Anion Gap 5.0 (*)     All other components within normal limits   COVID-19 & INFLUENZA COMBO   CULTURE, BLOOD 1   CULTURE, BLOOD 2   MAGNESIUM   TROPONIN   LACTATE, SEPSIS GLOMERULAR FILTRATION RATE, ESTIMATED   CARBAMAZEPINE LEVEL, TOTAL   BRAIN NATRIURETIC PEPTIDE       Radiologic studies results:  XR CHEST PORTABLE   Final Result   Prominent interstitial opacities are present throughout both lungs. This could be due to pulmonary vascular congestion although atypical pneumonia could have a similar appearance. **This report has been created using voice recognition software. It may contain minor errors which are inherent in voice recognition technology. **      Final report electronically signed by Dr Milvia Martin on 11/7/2022 3:38 PM          ED Medications administered this visit:   Medications   ipratropium-albuterol (DUONEB) nebulizer solution 2 ampule (2 ampules Inhalation Given 11/7/22 3675)         ED COURSE          Strict return precautions and follow up instructions were discussed with the patient prior to discharge, with which the patient agrees. PROCEDURES   Procedures      MEDICATION CHANGES     New Prescriptions    No medications on file         FINAL DISPOSITION     Final diagnoses:   Chronic obstructive pulmonary disease, unspecified COPD type ([facility] [address]   Supplemental oxygen dependent       Condition: condition: fair  Dispo: Discharge to home      This transcription was electronically signed. Parts of this transcriptions may have been dictated by use of voice recognition software and electronically transcribed, and parts may have been transcribed with the assistance of an ED scribe. The transcription may contain errors not detected in proofreading. Please refer to my supervising physician's documentation if my documentation differs.     Electronically Signed: Ashley Acosta MD, 11/07/22, 8:34 PM          Adeola Duong MD  Resident  11/07/22 9194

## 2022-11-09 LAB
EKG ATRIAL RATE: 84 BPM
EKG P AXIS: 61 DEGREES
EKG P-R INTERVAL: 178 MS
EKG Q-T INTERVAL: 366 MS
EKG QRS DURATION: 96 MS
EKG QTC CALCULATION (BAZETT): 432 MS
EKG R AXIS: 96 DEGREES
EKG T AXIS: 68 DEGREES
EKG VENTRICULAR RATE: 84 BPM

## 2022-11-12 LAB
BLOOD CULTURE, ROUTINE: NORMAL
BLOOD CULTURE, ROUTINE: NORMAL

## 2022-12-26 NOTE — PROGRESS NOTES
[facility] [address]  Pulmonary medicine clinic initial follow up note/ Alin Marvin is an established patient of [facility] in the past.  He was evaluated by me during his hospitalization to [place] [address] in 2021. He lost for follow up with my Pulmonary clinic. He was seen by me for the last time in 2021. He came today for THE [facility] medicine reevaluation. Patient: Susan Srinivasan  : 1970      Chief complaint/[place]: Susan Srinivasan is a 45 y.o. old male came for further evaluation regarding his severe COPD and shortness of breath. He was evaluated by me in the past in 2021 for acute on chronic hypoxic respiratory failure. Patient was intubated and was subsequently extubated on 15 December 2020. Patient was prescribed with a noninvasive ventilation in the past.    45 y/o [facility] American male with past medical history of severe COPD (FEV1 47%) on home Oxygen 2lpm at rest and 4 L of oxygen with exercise, Schizophrenia, JORDAN/OHS, chronic tobacco abuse, moderate PAH (RVSP 55-60 mmHg), HTN and CHFrEF 45% was recently admitted and discharged from [place] [address] under Dr. Rossy Rocha MD service. Patient was treated for COPD exacerbation due to right middle lobe and lower lobe pneumonia. He was intubated for failure to improve on BiPAP therapy. He was subsequently extubated and discharged home. He is currently staying at home with his mother. Currently using following inhalers/nebulization:  Pulmicort 0.5 mg 1 nebulization twice daily  DuoNebs 3 Ana [place] nebulization every 4 hours while awake. Is currently using 2 L of oxygen at rest, during sleep and 4 L of oxygen via nasal cannula with exercise. He also uses home noninvasive ventilator machine for his COPD at home. He follows with a [employer] company. On today's questioning:  He admits to minimal cough with clear expectoration.   He denies hemoptysis. He denies fever or chills. He admits to recent hospitalization to [facility] under Chelsea Olvera MD service. He was discharged on 15 January 2023    He is using his prescribed inhalers with excellent compliance. He is using his rescue inhaler rarely. He denies recent loss of weight or appetite changes. He denies recent decline in functional status. He was ever diagnosed with following pulmonary diseases:  Asthma:NO  Pulmonary fibrosis:NO  Sarcoidosis:NO  Pulmonary embolism: NO   History of DVT in the past: NO    Pulmonary hypertension:NO  Pleural effusion/s in the past:NO    He ever diagnosed with connective tissue diseases including Systemic lupus Erythematosus, Rheumatoid arthritis etc:NO    He ever diagnosed with pulmonary tuberculosis in the past:NO  He ever recently exposed to patients with tuberculosis:NO  He ever recently travelled to endemic places of tuberculosis or out side [place]:NO  His ever tested for PPD in the past: Unknown    He ever diagnosed with COVID-19 infection in the past:No.      Social History:  Occupation:  He is current working: No  Type of profession: He is currently on Social Security disability for his schizophrenia. He used to work at [employer] in the past.                  Social History     Tobacco Use    Smoking status: Former     Years: 30.00     Types: Cigars, Cigarettes     Quit date: 10/2022     Years since quittin.3    Smokeless tobacco: Never   Vaping Use    Vaping Use: Never used   Substance Use Topics    Alcohol use: No    Drug use: No   He gave a history of tobacco smoking for 30 years with 1 pack of cigarettes per day. He used to smoke cigars. He quit smoking in 2022.     History of recreational or IV drug use in the past:NO  History of Alcohol use: No.       History of exposure to coal mines/coal dust: NO  History of exposure to foundry dust/welding: NO  History of exposure to quarry/silica/sandblasting: NO  History of exposure to asbestos/working with breaks/ships: NO  History of exposure to farm dust: NO  History of recent travel to long distances: NO  History of exposure to birds, pigeons, or chickens in the past:NO  Pet animals at home:No    History of pulmonary embolism in the past: No            History of DVT in the past:No                             Review of Systems:   General/Constitutional: No recent loss of weight or appetite changes. No fever or chills. HENT: Negative. Eyes: Negative. Upper respiratory tract: No nasal stuffiness or post nasal drip. Lower respiratory tract/ lungs: See HPI  Cardiovascular: No palpitations or chest pain. Gastrointestinal: No nausea or vomiting. Neurological: No focal neurologiacal weakness. Extremities: No edema. Musculoskeletal: No complaints. Genitourinary: No complaints. Hematological: Negative. Psychiatric/Behavioral: Negative. Skin: No itching. Current Medications:          Past Medical History:   Diagnosis Date    Acute on chronic systolic congestive heart failure ([facility] [address] 4/4/2019    Emphysema (subcutaneous) (surgical) resulting from a procedure     Hypertension     Pneumonia     Schizophrenia ([facility] [address]        No past surgical history on file. No Known Allergies    Current Outpatient Medications   Medication Sig Dispense Refill    ipratropium-albuterol (DUONEB) 0.5-2.5 (3) MG/3ML SOLN nebulizer solution Inhale 3 mLs into the lungs every 4 hours (while awake) 360 mL     docusate sodium (COLACE, DULCOLAX) 100 MG CAPS Take 100 mg by mouth daily      enoxaparin (LOVENOX) 40 MG/0.4ML Inject 0.4 mLs into the skin every 24 hours      famotidine (PEPCID) 20 MG tablet Take 1 tablet by mouth 2 times daily 60 tablet 3    methylPREDNISolone sodium (SOLU-MEDROL) 40 MG injection Infuse 1 mL intravenously in the morning and 1 mL in the evening.       traZODone (DESYREL) 100 MG tablet take 1 tablet by mouth at bedtime      potassium chloride (KLOR-CON M) 20 MEQ extended release tablet take 1 tablet by mouth once daily WITH BREAKFAST      haloperidol decanoate (HALDOL DECANOATE) 50 MG/ML injection inject 1 milliliter intramuscularly every 2 WEEKS      furosemide (LASIX) 20 MG tablet take 1 tablet by mouth once daily      buPROPion (WELLBUTRIN XL) 150 MG extended release tablet Take 1 tablet by mouth every morning 30 tablet 3    risperiDONE (RISPERDAL) 2 MG tablet Take 1 tablet by mouth 2 times daily 60 tablet 3    Spacer/Aero-Holding Chambers CAREN 1 Device by Does not apply route daily 1 each 0    albuterol (PROVENTIL) (2.5 MG/3ML) 0.083% nebulizer solution Take 3 mLs by nebulization every 2 hours as needed for Wheezing 120 each 3    budesonide (PULMICORT) 0.5 MG/2ML nebulizer suspension Take 2 mLs by nebulization 2 times daily 120 mL 3    aspirin 325 MG EC tablet Take 1 tablet by mouth daily 30 tablet 3    pravastatin (PRAVACHOL) 40 MG tablet Take 40 mg by mouth nightly      carBAMazepine (TEGRETOL) 200 MG tablet Take 200 mg by mouth 2 times daily       No current facility-administered medications for this visit. Family History   Problem Relation Age of Onset    High Blood Pressure Mother            Physical Exam:    VITALS:  /68 (Site: Left Upper Arm, Position: Sitting, Cuff Size: Large Adult)   Pulse 86   Temp 98.3 °F (36.8 °C)   Ht 5' 10\" (1.778 m)   Wt 210 lb 9.6 oz (95.5 kg)   SpO2 92% Comment: 4L/02 at rest  BMI 30.22 kg/m²   Nursing note and vitals reviewed. Constitutional: Patient appears moderately built and moderately nourished. No distress. Patient is oriented to person, place, and time. HENT:   Head: Normocephalic and atraumatic. Right Ear: External ear normal.   Left Ear: External ear normal.   Mouth/Throat: Oropharynx is clear and moist.  No oral thrush. Eyes: Conjunctivae are normal. Pupils are equal, round, and reactive to light. No scleral icterus. Neck: Neck supple. No JVD present. No tracheal deviation present.    Cardiovascular: Normal rate, regular rhythm, normal heart sounds. No murmur heard. Pulmonary/Chest: Effort normal and breath sounds normal. No stridor. No respiratory distress. Bilateral occasional expiratory wheezes. No rales. Patient exhibits no tenderness. Abdominal: Soft. Patient exhibits no distension. No tenderness. Musculoskeletal: Normal range of motion. Extremities: Patient exhibits no edema and no tenderness. Lymphadenopathy:  No cervical adenopathy. Neurological: Patient is alert and oriented to person, place, and time. Skin: Skin is warm and dry. Patient is not diaphoretic. Psychiatric: Patient  has a normal mood and affect. Patient behavior is normal.     Neck Circumference -   15  Mallampati - 4      Diagnostic Data:    Radiological Data:  CXR   XR CHEST PORTABLE   12/14/2021   Impression:  1. There are patchy airspace opacities overlying the right mid and lower lung zones as well as the medial left lung base which likely represents multifocal pneumonia. 2. Mild hazy opacity at the right lung base may also represent a small right pleural effusion. CT Scans     CTA CHEST W WO CONTRAST   12/12/2021   1. No evidence of pulmonary and less. 2. Consolidation in the right upper lobe at the perihilar region extending to the major fissure may represent pneumonia superimposed on the patient's interstitial process. 3. Worsening interstitial disease compared to 5/24/2020.   4. Mediastinal and right hilar adenopathy as worsened compared to prior CT. (See actual reports for details)       Pulmonary function tests: Performed on 4 March 2020      Echo     Summary   Left ventricular size is normal and systolic function is mildly reduced. Ejection fraction was estimated at 50-55%. LV wall thickness is within   normal limits. Intraventricular septal wall compression during systole suggestive of RV   pressure overload. Markedly enlarged right atrium size.    The right ventricle was not clearly visualized. Appears dilated. RVSP of 55-60 mm Hg consistent with moderate pulmonary hypertension. Small circumferential pericardial effusion without evidence of tamponade   physiology. IVC size is dilated with <50% respiratory collapse. CT angiogram of chest performed on 3 January 2023:   No pulmonary emboli or pulmonary infiltrates. Subcutaneous edema which may indicate third space       Chest x-ray portable view performed on 3 January 2023:  1. No consolidation. 2. Diffuse interstitial thickening likely representing a component of interstitial edema. CT scan of sinuses performed on 5 August 2022:    1. Minimal mucosal inflammation at the left ostiomeatal unit. 2. Rightward deviation of the nasal septum. 3. Periodontal disease. Six Minute Walk Test done on 1/24/23 at 9:20 AM EST    Susan Srinivasan 1970    He needs 2 home O2 at rest. He needs 6 LPM of home O2 with exercise. He will be evaluated for nocturnal home O2 requirement- see separte order. Please see scanned six minute walk test document in media for details with the above date. DME Medical Necessity Documentation    Patient was seen in my clinic on 1/24/23 for the diagnosis COPD. I am prescribing oxygen because the diagnosis and testing requires the patient to have oxygen in the home. Condition will improve or be benefited by oxygen use. The patient is  able to perform good mobility in a home setting and therefore does require the use of a portable oxygen system. Assessment:  -Severe chronic obstructive pulmonary disease under moderate control. Patient was recently admitted for COPD exacerbation to [facility] and was subsequently discharged.  -Chronic hypoxic respiratory failure on portable noninvasive ventilator at home/BiPAP. He was recently admitted and got intubated respiratory failure.   He got extubated on 12/15/21  -History of right middle lobe and right lower lobe pneumonia-improved  -Pulmonary hypertension with right ventricular systolic pressure of 55 to 60 mmHg according to echocardiogram performed on 18 May 2020. Most likely due to [facility] group 3 I.e COPD. -Schizophrenia on treatment with medications  -He gave a history of tobacco smoking for 30 years with 1 pack of cigarettes per day. He used to smoke cigars. He quit smoking in November 2022.  -Essential hypertension on treatment medication      Recommendations/Plan:  -Continue Pulmicort 0.5 mg 1 nebulization twice daily.  -Start patient on Brovana ( Arformoterol) 15mcg/2ml via nebs bid.  -Continue DuoNebs 3ml via nebulization every 6 hours as needed for shortness of breath.  -He had a difficulty in tolerating inhalers due to his a history of underlying schizophrenia and difficulty in hand mouth coordination and using inhalers. Patient is benefiting from nebulization therapy will continue.  -He was instructed to use Albuterol HFA  inhaler 2 puffs Q6h prn or Albuterol 2.5mg nebs Q6h prn (the nebulizer) at one time as rescue medication not both at the same time. He verbalizes understanding.   -Patient advised to continue current nebulization medications and keep good compliance. Patient verbalizes understanding.  - Patient educated to update his pneumococcal vaccine with family physician and take influenza vaccine in coming season with out fail. Patient verbalizes understanding.  -We will schedule patient for spirometry before next visit.  -He will be considered for pulmonary rehab therapy for his COPD at next visit after reviewing recent spirometry results.  -Will make a follow-up appointment with [facility] sleep clinic to review his noninvasive ventilator therapy/BiPAP therapy for his underlying COPD. Please obtain his old sleep study reports [place] Dianna Sarmiento[place] [facility] [place].  -Kobe Hubbard needs 2LPM of home O2 at rest. He needs 6LPM of home O2 with exercise.   He is currently using a noninvasive ventilator/BiPAP machine at nighttime  - Schedule patient for follow up with my clinic in 3months with recommended test I.e spirometry with BD response. Patient advised to make early appointment if needed. - Patient and his mother were educated about my impression and plan. They verbalizes understanding.      -I personally reviewed updated the Past medical hx, Past surgical hx,Social hx, Family hx, Medications, Allergies in the discrete data section of the patient chart along with labs, Pulmonary medicine,Sleep medicine related, Pathological, Microbiological and Radiological investigations. Total time spent interviewing the patient and/or family, evaluating lab data and X-ray data and processing orders was 45 Minutes. I personally spent more than 50% of the appointment time face to face with the patient providing counseling and coordinating the patient's care.

## 2023-01-03 ENCOUNTER — HOSPITAL ENCOUNTER (INPATIENT)
Age: 53
LOS: 2 days | Discharge: NURSING, REHAB OR LONG-TERM CARE | DRG: 189 | End: 2023-01-05
Attending: EMERGENCY MEDICINE | Admitting: INTERNAL MEDICINE
Payer: MEDICARE

## 2023-01-03 ENCOUNTER — APPOINTMENT (OUTPATIENT)
Dept: CT IMAGING | Age: 53
DRG: 189 | End: 2023-01-03
Payer: MEDICARE

## 2023-01-03 ENCOUNTER — APPOINTMENT (OUTPATIENT)
Dept: GENERAL RADIOLOGY | Age: 53
DRG: 189 | End: 2023-01-03
Payer: MEDICARE

## 2023-01-03 DIAGNOSIS — J96.01 ACUTE HYPOXEMIC RESPIRATORY FAILURE (HCC): Primary | ICD-10-CM

## 2023-01-03 DIAGNOSIS — J96.21 ACUTE ON CHRONIC RESPIRATORY FAILURE WITH HYPOXIA (HCC): ICD-10-CM

## 2023-01-03 PROBLEM — J96.11 HYPOXEMIC RESPIRATORY FAILURE, CHRONIC (HCC): Status: ACTIVE | Noted: 2023-01-03

## 2023-01-03 LAB
ALBUMIN SERPL-MCNC: 4 G/DL (ref 3.5–5.1)
ALLEN TEST: ABNORMAL
ALP BLD-CCNC: 71 U/L (ref 38–126)
ALT SERPL-CCNC: 15 U/L (ref 11–66)
ANION GAP SERPL CALCULATED.3IONS-SCNC: 8 MEQ/L (ref 8–16)
AST SERPL-CCNC: 12 U/L (ref 5–40)
BACTERIA: ABNORMAL /HPF
BASE EXCESS (CALCULATED): 12.4 MMOL/L (ref -2.5–2.5)
BASOPHILS # BLD: 0.5 %
BASOPHILS ABSOLUTE: 0 THOU/MM3 (ref 0–0.1)
BILIRUB SERPL-MCNC: 0.2 MG/DL (ref 0.3–1.2)
BILIRUBIN DIRECT: < 0.2 MG/DL (ref 0–0.3)
BILIRUBIN URINE: ABNORMAL
BLOOD, URINE: NEGATIVE
BUN BLDV-MCNC: 13 MG/DL (ref 7–22)
CALCIUM SERPL-MCNC: 9.2 MG/DL (ref 8.5–10.5)
CASTS 2: ABNORMAL /LPF
CASTS UA: ABNORMAL /LPF
CHARACTER, URINE: CLEAR
CHLORIDE BLD-SCNC: 94 MEQ/L (ref 98–111)
CO2: 38 MEQ/L (ref 23–33)
COLLECTED BY:: ABNORMAL
COLOR: ABNORMAL
CREAT SERPL-MCNC: 0.7 MG/DL (ref 0.4–1.2)
CRYSTALS, UA: ABNORMAL
DEVICE: ABNORMAL
EKG ATRIAL RATE: 98 BPM
EKG P AXIS: 41 DEGREES
EKG P-R INTERVAL: 152 MS
EKG Q-T INTERVAL: 354 MS
EKG QRS DURATION: 86 MS
EKG QTC CALCULATION (BAZETT): 451 MS
EKG R AXIS: 99 DEGREES
EKG T AXIS: 44 DEGREES
EKG VENTRICULAR RATE: 98 BPM
EOSINOPHIL # BLD: 0.7 %
EOSINOPHILS ABSOLUTE: 0 THOU/MM3 (ref 0–0.4)
EPITHELIAL CELLS, UA: ABNORMAL /HPF
ERYTHROCYTE [DISTWIDTH] IN BLOOD BY AUTOMATED COUNT: 14.5 % (ref 11.5–14.5)
ERYTHROCYTE [DISTWIDTH] IN BLOOD BY AUTOMATED COUNT: 53 FL (ref 35–45)
GFR SERPL CREATININE-BSD FRML MDRD: > 60 ML/MIN/1.73M2
GLUCOSE BLD-MCNC: 127 MG/DL (ref 70–108)
GLUCOSE BLD-MCNC: 96 MG/DL (ref 70–108)
GLUCOSE URINE: NEGATIVE MG/DL
HCO3: 43 MMOL/L (ref 23–28)
HCT VFR BLD CALC: 48.8 % (ref 42–52)
HEMOGLOBIN: 15.1 GM/DL (ref 14–18)
ICTOTEST: NEGATIVE
IFIO2: 8
IMMATURE GRANS (ABS): 0.02 THOU/MM3 (ref 0–0.07)
IMMATURE GRANULOCYTES: 0.4 %
INFLUENZA A: NOT DETECTED
INFLUENZA B: NOT DETECTED
KETONES, URINE: ABNORMAL
LACTIC ACID: 1.5 MMOL/L (ref 0.5–2)
LEUKOCYTE ESTERASE, URINE: ABNORMAL
LIPASE: 25.7 U/L (ref 5.6–51.3)
LYMPHOCYTES # BLD: 13.9 %
LYMPHOCYTES ABSOLUTE: 0.8 THOU/MM3 (ref 1–4.8)
MCH RBC QN AUTO: 30.6 PG (ref 26–33)
MCHC RBC AUTO-ENTMCNC: 30.9 GM/DL (ref 32.2–35.5)
MCV RBC AUTO: 98.8 FL (ref 80–94)
MISCELLANEOUS 2: ABNORMAL
MONOCYTES # BLD: 9.2 %
MONOCYTES ABSOLUTE: 0.5 THOU/MM3 (ref 0.4–1.3)
NITRITE, URINE: NEGATIVE
NUCLEATED RED BLOOD CELLS: 0 /100 WBC
O2 SATURATION: 89 %
OSMOLALITY CALCULATION: 281.1 MOSMOL/KG (ref 275–300)
PCO2: 80 MMHG (ref 35–45)
PH BLOOD GAS: 7.34 (ref 7.35–7.45)
PH UA: 6.5 (ref 5–9)
PLATELET # BLD: 166 THOU/MM3 (ref 130–400)
PMV BLD AUTO: 10.9 FL (ref 9.4–12.4)
PO2: 63 MMHG (ref 71–104)
POTASSIUM SERPL-SCNC: 4 MEQ/L (ref 3.5–5.2)
PRO-BNP: 716.5 PG/ML (ref 0–124)
PROCALCITONIN: 0.06 NG/ML (ref 0.01–0.09)
PROTEIN UA: 30
RBC # BLD: 4.94 MILL/MM3 (ref 4.7–6.1)
RBC URINE: ABNORMAL /HPF
RENAL EPITHELIAL, UA: ABNORMAL
SARS-COV-2 RNA, RT PCR: NOT DETECTED
SEG NEUTROPHILS: 75.3 %
SEGMENTED NEUTROPHILS ABSOLUTE COUNT: 4.1 THOU/MM3 (ref 1.8–7.7)
SODIUM BLD-SCNC: 140 MEQ/L (ref 135–145)
SOURCE, BLOOD GAS: ABNORMAL
SPECIFIC GRAVITY, URINE: > 1.03 (ref 1–1.03)
TOTAL PROTEIN: 7.5 G/DL (ref 6.1–8)
TROPONIN T: < 0.01 NG/ML
UROBILINOGEN, URINE: 1 EU/DL (ref 0–1)
WBC # BLD: 5.5 THOU/MM3 (ref 4.8–10.8)
WBC UA: ABNORMAL /HPF
YEAST: ABNORMAL

## 2023-01-03 PROCEDURE — 87641 MR-STAPH DNA AMP PROBE: CPT

## 2023-01-03 PROCEDURE — 82948 REAGENT STRIP/BLOOD GLUCOSE: CPT

## 2023-01-03 PROCEDURE — 99285 EMERGENCY DEPT VISIT HI MDM: CPT

## 2023-01-03 PROCEDURE — 6360000002 HC RX W HCPCS: Performed by: INTERNAL MEDICINE

## 2023-01-03 PROCEDURE — 81001 URINALYSIS AUTO W/SCOPE: CPT

## 2023-01-03 PROCEDURE — 93010 ELECTROCARDIOGRAM REPORT: CPT | Performed by: NUCLEAR MEDICINE

## 2023-01-03 PROCEDURE — 36600 WITHDRAWAL OF ARTERIAL BLOOD: CPT

## 2023-01-03 PROCEDURE — 87636 SARSCOV2 & INF A&B AMP PRB: CPT

## 2023-01-03 PROCEDURE — 93005 ELECTROCARDIOGRAM TRACING: CPT | Performed by: NURSE PRACTITIONER

## 2023-01-03 PROCEDURE — 84484 ASSAY OF TROPONIN QUANT: CPT

## 2023-01-03 PROCEDURE — 83880 ASSAY OF NATRIURETIC PEPTIDE: CPT

## 2023-01-03 PROCEDURE — 87070 CULTURE OTHR SPECIMN AEROBIC: CPT

## 2023-01-03 PROCEDURE — 94660 CPAP INITIATION&MGMT: CPT

## 2023-01-03 PROCEDURE — 80053 COMPREHEN METABOLIC PANEL: CPT

## 2023-01-03 PROCEDURE — 84145 PROCALCITONIN (PCT): CPT

## 2023-01-03 PROCEDURE — 94640 AIRWAY INHALATION TREATMENT: CPT

## 2023-01-03 PROCEDURE — 85025 COMPLETE CBC W/AUTO DIFF WBC: CPT

## 2023-01-03 PROCEDURE — 94761 N-INVAS EAR/PLS OXIMETRY MLT: CPT

## 2023-01-03 PROCEDURE — 6360000002 HC RX W HCPCS: Performed by: EMERGENCY MEDICINE

## 2023-01-03 PROCEDURE — 82248 BILIRUBIN DIRECT: CPT

## 2023-01-03 PROCEDURE — 83690 ASSAY OF LIPASE: CPT

## 2023-01-03 PROCEDURE — 2580000003 HC RX 258: Performed by: INTERNAL MEDICINE

## 2023-01-03 PROCEDURE — 6370000000 HC RX 637 (ALT 250 FOR IP): Performed by: EMERGENCY MEDICINE

## 2023-01-03 PROCEDURE — 82803 BLOOD GASES ANY COMBINATION: CPT

## 2023-01-03 PROCEDURE — 6360000004 HC RX CONTRAST MEDICATION: Performed by: EMERGENCY MEDICINE

## 2023-01-03 PROCEDURE — 96374 THER/PROPH/DIAG INJ IV PUSH: CPT

## 2023-01-03 PROCEDURE — 87500 VANOMYCIN DNA AMP PROBE: CPT

## 2023-01-03 PROCEDURE — 71045 X-RAY EXAM CHEST 1 VIEW: CPT

## 2023-01-03 PROCEDURE — 71275 CT ANGIOGRAPHY CHEST: CPT

## 2023-01-03 PROCEDURE — 87040 BLOOD CULTURE FOR BACTERIA: CPT

## 2023-01-03 PROCEDURE — 2700000000 HC OXYGEN THERAPY PER DAY

## 2023-01-03 PROCEDURE — 6370000000 HC RX 637 (ALT 250 FOR IP): Performed by: INTERNAL MEDICINE

## 2023-01-03 PROCEDURE — 2060000000 HC ICU INTERMEDIATE R&B

## 2023-01-03 PROCEDURE — 36415 COLL VENOUS BLD VENIPUNCTURE: CPT

## 2023-01-03 PROCEDURE — 83605 ASSAY OF LACTIC ACID: CPT

## 2023-01-03 RX ORDER — DOCUSATE SODIUM 100 MG/1
100 CAPSULE, LIQUID FILLED ORAL DAILY
Status: DISCONTINUED | OUTPATIENT
Start: 2023-01-03 | End: 2023-01-05 | Stop reason: HOSPADM

## 2023-01-03 RX ORDER — POTASSIUM CHLORIDE 20 MEQ/1
40 TABLET, EXTENDED RELEASE ORAL PRN
Status: DISCONTINUED | OUTPATIENT
Start: 2023-01-03 | End: 2023-01-05 | Stop reason: HOSPADM

## 2023-01-03 RX ORDER — POTASSIUM CHLORIDE 20 MEQ/1
TABLET, EXTENDED RELEASE ORAL
COMMUNITY
Start: 2022-11-17

## 2023-01-03 RX ORDER — LOSARTAN POTASSIUM 100 MG/1
TABLET ORAL
Status: ON HOLD | COMMUNITY
Start: 2022-11-17 | End: 2023-01-05 | Stop reason: HOSPADM

## 2023-01-03 RX ORDER — IPRATROPIUM BROMIDE AND ALBUTEROL SULFATE 2.5; .5 MG/3ML; MG/3ML
1 SOLUTION RESPIRATORY (INHALATION) ONCE
Status: DISCONTINUED | OUTPATIENT
Start: 2023-01-03 | End: 2023-01-03

## 2023-01-03 RX ORDER — NITROGLYCERIN 0.4 MG/1
0.4 TABLET SUBLINGUAL EVERY 5 MIN PRN
Status: DISCONTINUED | OUTPATIENT
Start: 2023-01-03 | End: 2023-01-03 | Stop reason: SDUPTHER

## 2023-01-03 RX ORDER — ACETAMINOPHEN 325 MG/1
650 TABLET ORAL EVERY 4 HOURS PRN
Status: DISCONTINUED | OUTPATIENT
Start: 2023-01-03 | End: 2023-01-05 | Stop reason: HOSPADM

## 2023-01-03 RX ORDER — FUROSEMIDE 20 MG/1
TABLET ORAL
COMMUNITY
Start: 2022-11-17

## 2023-01-03 RX ORDER — IPRATROPIUM BROMIDE AND ALBUTEROL SULFATE 2.5; .5 MG/3ML; MG/3ML
1 SOLUTION RESPIRATORY (INHALATION) ONCE
Status: COMPLETED | OUTPATIENT
Start: 2023-01-03 | End: 2023-01-03

## 2023-01-03 RX ORDER — POTASSIUM CHLORIDE 7.45 MG/ML
10 INJECTION INTRAVENOUS PRN
Status: DISCONTINUED | OUTPATIENT
Start: 2023-01-03 | End: 2023-01-05 | Stop reason: HOSPADM

## 2023-01-03 RX ORDER — HALOPERIDOL DECANOATE 50 MG/ML
INJECTION INTRAMUSCULAR
COMMUNITY
Start: 2022-10-30

## 2023-01-03 RX ORDER — INSULIN LISPRO 100 [IU]/ML
0-4 INJECTION, SOLUTION INTRAVENOUS; SUBCUTANEOUS
Status: DISCONTINUED | OUTPATIENT
Start: 2023-01-04 | End: 2023-01-05 | Stop reason: HOSPADM

## 2023-01-03 RX ORDER — POLYETHYLENE GLYCOL 3350 17 G/17G
17 POWDER, FOR SOLUTION ORAL DAILY
Status: DISCONTINUED | OUTPATIENT
Start: 2023-01-03 | End: 2023-01-05 | Stop reason: HOSPADM

## 2023-01-03 RX ORDER — IPRATROPIUM BROMIDE AND ALBUTEROL SULFATE 2.5; .5 MG/3ML; MG/3ML
1 SOLUTION RESPIRATORY (INHALATION)
Status: DISCONTINUED | OUTPATIENT
Start: 2023-01-04 | End: 2023-01-05 | Stop reason: HOSPADM

## 2023-01-03 RX ORDER — METHYLPREDNISOLONE SODIUM SUCCINATE 125 MG/2ML
125 INJECTION, POWDER, LYOPHILIZED, FOR SOLUTION INTRAMUSCULAR; INTRAVENOUS ONCE
Status: COMPLETED | OUTPATIENT
Start: 2023-01-03 | End: 2023-01-03

## 2023-01-03 RX ORDER — ONDANSETRON 2 MG/ML
4 INJECTION INTRAMUSCULAR; INTRAVENOUS EVERY 6 HOURS PRN
Status: DISCONTINUED | OUTPATIENT
Start: 2023-01-03 | End: 2023-01-05 | Stop reason: HOSPADM

## 2023-01-03 RX ORDER — INSULIN LISPRO 100 [IU]/ML
0-4 INJECTION, SOLUTION INTRAVENOUS; SUBCUTANEOUS NIGHTLY
Status: DISCONTINUED | OUTPATIENT
Start: 2023-01-03 | End: 2023-01-05 | Stop reason: HOSPADM

## 2023-01-03 RX ORDER — ASPIRIN 81 MG/1
81 TABLET, CHEWABLE ORAL DAILY
Status: DISCONTINUED | OUTPATIENT
Start: 2023-01-03 | End: 2023-01-05 | Stop reason: HOSPADM

## 2023-01-03 RX ORDER — METHYLPREDNISOLONE SODIUM SUCCINATE 40 MG/ML
40 INJECTION, POWDER, LYOPHILIZED, FOR SOLUTION INTRAMUSCULAR; INTRAVENOUS EVERY 8 HOURS
Status: DISCONTINUED | OUTPATIENT
Start: 2023-01-04 | End: 2023-01-05

## 2023-01-03 RX ORDER — FAMOTIDINE 20 MG/1
20 TABLET, FILM COATED ORAL 2 TIMES DAILY
Status: DISCONTINUED | OUTPATIENT
Start: 2023-01-03 | End: 2023-01-05 | Stop reason: HOSPADM

## 2023-01-03 RX ORDER — ALBUTEROL SULFATE 2.5 MG/3ML
2.5 SOLUTION RESPIRATORY (INHALATION)
Status: DISCONTINUED | OUTPATIENT
Start: 2023-01-03 | End: 2023-01-05 | Stop reason: HOSPADM

## 2023-01-03 RX ORDER — NITROGLYCERIN 0.4 MG/1
0.4 TABLET SUBLINGUAL EVERY 5 MIN PRN
Status: DISCONTINUED | OUTPATIENT
Start: 2023-01-03 | End: 2023-01-04

## 2023-01-03 RX ORDER — FUROSEMIDE 10 MG/ML
20 INJECTION INTRAMUSCULAR; INTRAVENOUS DAILY
Status: DISCONTINUED | OUTPATIENT
Start: 2023-01-03 | End: 2023-01-05 | Stop reason: HOSPADM

## 2023-01-03 RX ORDER — TRAZODONE HYDROCHLORIDE 100 MG/1
TABLET ORAL
COMMUNITY
Start: 2022-11-18

## 2023-01-03 RX ORDER — ENOXAPARIN SODIUM 100 MG/ML
40 INJECTION SUBCUTANEOUS EVERY 24 HOURS
Status: DISCONTINUED | OUTPATIENT
Start: 2023-01-03 | End: 2023-01-05 | Stop reason: HOSPADM

## 2023-01-03 RX ADMIN — ENOXAPARIN SODIUM 40 MG: 100 INJECTION SUBCUTANEOUS at 22:24

## 2023-01-03 RX ADMIN — IOPAMIDOL 80 ML: 755 INJECTION, SOLUTION INTRAVENOUS at 19:16

## 2023-01-03 RX ADMIN — FUROSEMIDE 20 MG: 10 INJECTION, SOLUTION INTRAMUSCULAR; INTRAVENOUS at 22:24

## 2023-01-03 RX ADMIN — CEFTRIAXONE SODIUM 1000 MG: 1 INJECTION, POWDER, FOR SOLUTION INTRAMUSCULAR; INTRAVENOUS at 23:45

## 2023-01-03 RX ADMIN — IPRATROPIUM BROMIDE AND ALBUTEROL SULFATE 1 AMPULE: .5; 3 SOLUTION RESPIRATORY (INHALATION) at 17:34

## 2023-01-03 RX ADMIN — METHYLPREDNISOLONE SODIUM SUCCINATE 125 MG: 125 INJECTION, POWDER, FOR SOLUTION INTRAMUSCULAR; INTRAVENOUS at 17:47

## 2023-01-03 RX ADMIN — ASPIRIN 81 MG: 81 TABLET, CHEWABLE ORAL at 22:24

## 2023-01-03 RX ADMIN — FAMOTIDINE 20 MG: 20 TABLET ORAL at 22:24

## 2023-01-03 ASSESSMENT — PAIN SCALES - GENERAL: PAINLEVEL_OUTOF10: 9

## 2023-01-03 ASSESSMENT — PAIN - FUNCTIONAL ASSESSMENT: PAIN_FUNCTIONAL_ASSESSMENT: 0-10

## 2023-01-03 ASSESSMENT — PAIN DESCRIPTION - LOCATION: LOCATION: CHEST

## 2023-01-03 NOTE — ED NOTES
ED nurse-to-nurse bedside report    Chief Complaint   Patient presents with    Cough    Shortness of Breath      LOC: alert and orientated to name and place  Vital signs   Vitals:    01/03/23 1734 01/03/23 1748 01/03/23 1752 01/03/23 1754   BP:   97/68    Pulse: 96  96    Resp: 24 16 22    Temp:       TempSrc:   Oral    SpO2: 94%  98%    Weight:    210 lb (95.3 kg)   Height:    5' 11\" (1.803 m)      Pain:    Pain Interventions:   Pain Goal:   Oxygen: Yes    Current needs required BIPAP   Telemetry: Yes  LDAs:   Peripheral IV 01/03/23 Right Antecubital (Active)   Site Assessment Clean, dry & intact 01/03/23 1843   Line Status Brisk blood return;Flushed;Normal saline locked 01/03/23 1843   Dressing Status Clean, dry & intact; New dressing applied 01/03/23 1843   Dressing Type Transparent 01/03/23 1843   Dressing Intervention New 01/03/23 1843     Continuous Infusions:   Mobility: Requires assistance * 1  Lerma Fall Risk Score: No flowsheet data found.   Fall Interventions: Side rails up, call light in reach  Report given to: To Howell 45, RN  01/03/23 8466

## 2023-01-03 NOTE — ED NOTES
This nurse informed of patient pulling out IV. New IV access established. CT scan informed of new IV placed. Patient placed back on monitors after removing stickers.      Veronica Vega RN  01/03/23 0568

## 2023-01-03 NOTE — ED NOTES
Presents to ER with concern of cough. Family reports pt has been feeling SOB the last 3 days. Pt reports he has had a cough. Upon initial encounter pt restless in wheelchair. intial oxygen reading 47%. Pt appears fatigued. skin pale and cool     Sharon Hendricks RN  01/03/23 [id number]

## 2023-01-04 LAB
ANION GAP SERPL CALCULATED.3IONS-SCNC: 8 MEQ/L (ref 8–16)
BUN BLDV-MCNC: 13 MG/DL (ref 7–22)
CALCIUM SERPL-MCNC: 8.6 MG/DL (ref 8.5–10.5)
CHLORIDE BLD-SCNC: 94 MEQ/L (ref 98–111)
CO2: 38 MEQ/L (ref 23–33)
CREAT SERPL-MCNC: 0.8 MG/DL (ref 0.4–1.2)
ERYTHROCYTE [DISTWIDTH] IN BLOOD BY AUTOMATED COUNT: 14.4 % (ref 11.5–14.5)
ERYTHROCYTE [DISTWIDTH] IN BLOOD BY AUTOMATED COUNT: 53.4 FL (ref 35–45)
GFR SERPL CREATININE-BSD FRML MDRD: > 60 ML/MIN/1.73M2
GLUCOSE BLD-MCNC: 113 MG/DL (ref 70–108)
GLUCOSE BLD-MCNC: 114 MG/DL (ref 70–108)
GLUCOSE BLD-MCNC: 119 MG/DL (ref 70–108)
GLUCOSE BLD-MCNC: 128 MG/DL (ref 70–108)
GLUCOSE BLD-MCNC: 97 MG/DL (ref 70–108)
HCT VFR BLD CALC: 49.8 % (ref 42–52)
HEMOGLOBIN: 14.9 GM/DL (ref 14–18)
MCH RBC QN AUTO: 30.1 PG (ref 26–33)
MCHC RBC AUTO-ENTMCNC: 29.9 GM/DL (ref 32.2–35.5)
MCV RBC AUTO: 100.6 FL (ref 80–94)
MRSA SCREEN RT-PCR: NEGATIVE
OSMOLALITY CALCULATION: 281.2 MOSMOL/KG (ref 275–300)
PLATELET # BLD: 163 THOU/MM3 (ref 130–400)
PMV BLD AUTO: 10.5 FL (ref 9.4–12.4)
POTASSIUM SERPL-SCNC: 4.8 MEQ/L (ref 3.5–5.2)
RBC # BLD: 4.95 MILL/MM3 (ref 4.7–6.1)
SODIUM BLD-SCNC: 140 MEQ/L (ref 135–145)
VANCOMYCIN RESISTANT ENTEROCOCCUS: NEGATIVE
WBC # BLD: 3.4 THOU/MM3 (ref 4.8–10.8)

## 2023-01-04 PROCEDURE — 6360000002 HC RX W HCPCS: Performed by: INTERNAL MEDICINE

## 2023-01-04 PROCEDURE — 82948 REAGENT STRIP/BLOOD GLUCOSE: CPT

## 2023-01-04 PROCEDURE — 2700000000 HC OXYGEN THERAPY PER DAY

## 2023-01-04 PROCEDURE — 6370000000 HC RX 637 (ALT 250 FOR IP): Performed by: INTERNAL MEDICINE

## 2023-01-04 PROCEDURE — 80048 BASIC METABOLIC PNL TOTAL CA: CPT

## 2023-01-04 PROCEDURE — 36415 COLL VENOUS BLD VENIPUNCTURE: CPT

## 2023-01-04 PROCEDURE — 2580000003 HC RX 258: Performed by: INTERNAL MEDICINE

## 2023-01-04 PROCEDURE — 94640 AIRWAY INHALATION TREATMENT: CPT

## 2023-01-04 PROCEDURE — 85027 COMPLETE CBC AUTOMATED: CPT

## 2023-01-04 PROCEDURE — 94761 N-INVAS EAR/PLS OXIMETRY MLT: CPT

## 2023-01-04 PROCEDURE — 2060000000 HC ICU INTERMEDIATE R&B

## 2023-01-04 PROCEDURE — 94660 CPAP INITIATION&MGMT: CPT

## 2023-01-04 RX ADMIN — IPRATROPIUM BROMIDE AND ALBUTEROL SULFATE 1 AMPULE: .5; 3 SOLUTION RESPIRATORY (INHALATION) at 11:00

## 2023-01-04 RX ADMIN — IPRATROPIUM BROMIDE AND ALBUTEROL SULFATE 1 AMPULE: .5; 3 SOLUTION RESPIRATORY (INHALATION) at 15:00

## 2023-01-04 RX ADMIN — ENOXAPARIN SODIUM 40 MG: 100 INJECTION SUBCUTANEOUS at 20:36

## 2023-01-04 RX ADMIN — METHYLPREDNISOLONE SODIUM SUCCINATE 40 MG: 40 INJECTION, POWDER, FOR SOLUTION INTRAMUSCULAR; INTRAVENOUS at 01:25

## 2023-01-04 RX ADMIN — ASPIRIN 81 MG: 81 TABLET, CHEWABLE ORAL at 08:24

## 2023-01-04 RX ADMIN — METHYLPREDNISOLONE SODIUM SUCCINATE 40 MG: 40 INJECTION, POWDER, FOR SOLUTION INTRAMUSCULAR; INTRAVENOUS at 17:34

## 2023-01-04 RX ADMIN — DOCUSATE SODIUM 100 MG: 100 CAPSULE, LIQUID FILLED ORAL at 08:24

## 2023-01-04 RX ADMIN — POLYETHYLENE GLYCOL 3350 17 G: 17 POWDER, FOR SOLUTION ORAL at 08:24

## 2023-01-04 RX ADMIN — METHYLPREDNISOLONE SODIUM SUCCINATE 40 MG: 40 INJECTION, POWDER, FOR SOLUTION INTRAMUSCULAR; INTRAVENOUS at 08:24

## 2023-01-04 RX ADMIN — FAMOTIDINE 20 MG: 20 TABLET ORAL at 20:36

## 2023-01-04 RX ADMIN — FAMOTIDINE 20 MG: 20 TABLET ORAL at 08:24

## 2023-01-04 RX ADMIN — FUROSEMIDE 20 MG: 10 INJECTION, SOLUTION INTRAMUSCULAR; INTRAVENOUS at 08:24

## 2023-01-04 RX ADMIN — CEFTRIAXONE SODIUM 1000 MG: 1 INJECTION, POWDER, FOR SOLUTION INTRAMUSCULAR; INTRAVENOUS at 20:40

## 2023-01-04 NOTE — PROGRESS NOTES
Virtual nurse completed admission with patient and family. Patient is alert and  confused . Medication reconciliation completed with family. Bedside nurse is in the room. Call light in reach. Virtual nursing explained to patient and services are accepted.

## 2023-01-04 NOTE — CARE COORDINATION
Case Management Assessment  Initial Evaluation    Date/Time of Evaluation: 1/4/2023 4:06 PM  Assessment Completed by: Elier Wren RN    If patient is discharged prior to next notation, then this note serves as note for discharge by case management. Patient Name: Lucas Fleming                   YOB: 1970  Diagnosis: Hypoxemic respiratory failure, chronic ([facility] [address] [J96.11]  Acute hypoxemic respiratory failure ([facility] [address] [J96.01]                   Date / Time: 1/3/2023  5:16 PM  Location: [address] [place] [address]     Patient Admission Status: Inpatient   Readmission Risk (Low < 19, Mod (19-27), High > 27): Readmission Risk Score: 13.9    Current PCP: Veronica Gardner MD  PCP verified by CM? Yes    Chart Reviewed: Yes      History Provided by: Medical Record, Patient  Patient Orientation: Alert and Oriented    Patient Cognition: Alert    Hospitalization in the last 30 days (Readmission):  No    If yes, Readmission Assessment in CM Navigator will be completed. Advance Directives:      Code Status: Prior   Patient's Primary Decision Maker is: Legal Next of Kin    Primary Decision Maker: Elba Kramer [facility] - [phone]    Discharge Planning:    Patient lives with: Alone Type of Home: House  Primary Care Giver: Self  Patient Support Systems include: Family Members   Current Financial resources: Medicare, Medicaid  Current community resources: None  Current services prior to admission: C-pap, Home Care            Current DME:              Type of Home Care services:  [place], OT, PT, Skilled Therapy, Nursing Services    ADLS  Prior functional level: Independent in ADLs/IADLs  Current functional level: Independent in ADLs/IADLs    Family can provide assistance at DC: Yes  Would you like Case Management to discuss the discharge plan with any other family members/significant others, and if so, who?  No  Plans to Return to Present Housing: Yes  Other Identified Issues/Barriers to RETURNING to current housing: yes  Potential Assistance needed at discharge: [address] DME:    Patient expects to discharge to: [facility] for transportation at discharge: Family    Financial    Payor: [address] / Plan: MEDICARE PART A AND B / Product Type: *No Product type* /     Does insurance require precert for SNF: No    Potential assistance Purchasing Medications: No  Meds-to-Beds request: Yes      [facility] [id number], [address] [phone] - F [phone]  [facility] [address]  Phone: [phone] Fax: [phone]      Notes:    Factors facilitating achievement of predicted outcomes: Family support, Caregiver support, Cooperative, and Has needed Durable Medical Equipment at home    Barriers to discharge: Decreased endurance    Additional Case Management Notes:   SOB  History: CHF/COPD/Schizophrenia  35% FIO2 BIPAP v Oxygen 6L, IV AB, IV Diuresing, IV Steroids        The Plan for Transition of Care is related to the following treatment goals of Hypoxemic respiratory failure, chronic (HCC) [J96.11]  Acute hypoxemic respiratory failure (HCC) [J96.01]    Patient Goals/Plan/Treatment Preferences: lives w mother Raghav Elliott, sister Boaz Zapien transports, has [facility] home oxygen 3-6L, Trilogy NIV, nebulizer  Transportation/Food Security/Housekeeping Addressed: No issues identified.      Teagan Herrera RN  Case Management Department

## 2023-01-04 NOTE — PLAN OF CARE
Problem: Discharge Planning  Goal: Discharge to home or other facility with appropriate resources  Outcome: Progressing  Flowsheets  Taken 1/4/2023 0149 by Jodee Gonzalez RN  Discharge to home or other facility with appropriate resources:   Identify barriers to discharge with patient and caregiver   Arrange for interpreters to assist at discharge as needed   Identify discharge learning needs (meds, wound care, etc)  Taken 1/3/2023 2206 by Antonia Huynh RN  Discharge to home or other facility with appropriate resources: Identify discharge learning needs (meds, wound care, etc)     Problem: Safety - Adult  Goal: Free from fall injury  Outcome: Progressing  Flowsheets (Taken 1/4/2023 0149)  Free From Fall Injury: Instruct family/caregiver on patient safety     Problem: ABCDS Injury Assessment  Goal: Absence of physical injury  Outcome: Progressing  Flowsheets (Taken 1/4/2023 0149)  Absence of Physical Injury: Implement safety measures based on patient assessment     Problem: Pain  Goal: Verbalizes/displays adequate comfort level or baseline comfort level  Outcome: Progressing  Flowsheets (Taken 1/4/2023 0149)  Verbalizes/displays adequate comfort level or baseline comfort level:   Encourage patient to monitor pain and request assistance   Assess pain using appropriate pain scale     Problem: Chronic Conditions and Co-morbidities  Goal: Patient's chronic conditions and co-morbidity symptoms are monitored and maintained or improved  Outcome: Progressing  Flowsheets (Taken 1/4/2023 0149)  Care Plan - Patient's Chronic Conditions and Co-Morbidity Symptoms are Monitored and Maintained or Improved:   Monitor and assess patient's chronic conditions and comorbid symptoms for stability, deterioration, or improvement   Collaborate with multidisciplinary team to address chronic and comorbid conditions and prevent exacerbation or deterioration   Update acute care plan with appropriate goals if chronic or comorbid symptoms are exacerbated and prevent overall improvement and discharge     Problem: Skin/Tissue Integrity  Goal: Absence of new skin breakdown  Description: 1. Monitor for areas of redness and/or skin breakdown  2. Assess vascular access sites hourly  3. Every 4-6 hours minimum:  Change oxygen saturation probe site  4. Every 4-6 hours:  If on nasal continuous positive airway pressure, respiratory therapy assess nares and determine need for appliance change or resting period.   Outcome: Progressing     Problem: Respiratory - Adult  Goal: Achieves optimal ventilation and oxygenation  Outcome: Progressing  Flowsheets (Taken 1/4/2023 0149)  Achieves optimal ventilation and oxygenation:   Assess for changes in respiratory status   Position to facilitate oxygenation and minimize respiratory effort   Initiate smoking cessation protocol as indicated   Assess for changes in mentation and behavior   Oxygen supplementation based on oxygen saturation or arterial blood gases     Problem: Metabolic/Fluid and Electrolytes - Adult  Goal: Electrolytes maintained within normal limits  Outcome: Progressing  Flowsheets (Taken 1/4/2023 0149)  Electrolytes maintained within normal limits: Monitor labs and assess patient for signs and symptoms of electrolyte imbalances

## 2023-01-04 NOTE — H&P
Internal Medicine  History and Physical    Patient: Jasmin May  MRN: [id number]      History Obtained From:  patient, electronic medical record  PCP: Andrews Dale MD    CHIEF COMPLAINT: Shortness of breath, cough, hypoxemia. HISTORY OF PRESENT ILLNESS:   The patient is a 45 y.o. male with a history of COPD on home oxygen, history of pulmonary hypertension who presents with worsening shortness of breath and cough and hypoxemia from home. Patient continues to smoke. Is also noted to be poorly compliant with his medication. In the last few days he developed increased shortness of breath and cough. the cough is dry. He denies chest pain. He denies fever,  he denies nausea and vomiting. In the emergency room his blood gas showed a marked hypoxemia and hypercarbia. Subsequently started on BiPAP, bronchodilators and admitted for further treatment. Past Medical History:        Diagnosis Date    Acute on chronic systolic congestive heart failure ([facility] [address] 4/4/2019    Emphysema (subcutaneous) (surgical) resulting from a procedure     Hypertension     Pneumonia     Schizophrenia [facility])        Past Surgical History:    No past surgical history on file. Medications Prior to Admission:    Prior to Admission medications    Medication Sig Start Date End Date Taking?  Authorizing Provider   traZODone (DESYREL) 100 MG tablet take 1 tablet by mouth at bedtime 11/18/22   Historical Provider, MD   potassium chloride (KLOR-CON M) 20 MEQ extended release tablet take 1 tablet by mouth once daily WITH BREAKFAST 11/17/22   Historical Provider, MD   losartan (COZAAR) 100 MG tablet take 1 tablet by mouth once daily 11/17/22   Historical Provider, MD   haloperidol decanoate (HALDOL DECANOATE) 50 MG/ML injection inject 1 milliliter intramuscularly every 2 WEEKS 10/30/22   Historical Provider, MD   furosemide (LASIX) 20 MG tablet take 1 tablet by mouth once daily 11/17/22   Historical Provider, MD   buPROPion (WELLBUTRIN XL) 150 MG extended release tablet Take 1 tablet by mouth every morning 8/7/22   Danya Arguello MD   risperiDONE (RISPERDAL) 2 MG tablet Take 1 tablet by mouth 2 times daily 5/5/22   Danya Arguello MD   Tadalafil, PAH, 20 MG tablet Take 2 tablets by mouth daily 12/23/21   Danya Arguello MD   Spacer/Aero-Holding Chambers CAREN 1 Device by Does not apply route daily 12/23/21   Danya Arguello MD   albuterol (PROVENTIL) (2.5 MG/3ML) 0.083% nebulizer solution Take 3 mLs by nebulization every 2 hours as needed for Wheezing 12/23/21   Danya Arguello MD   budesonide (PULMICORT) 0.5 MG/2ML nebulizer suspension Take 2 mLs by nebulization 2 times daily 12/23/21   Danya Arguello MD   umeclidinium-vilanterol (ANORO ELLIPTA) 62.5-25 MCG/INH AEPB inhaler Inhale 1 puff into the lungs daily 12/23/21   Danya Arguello MD   ipratropium-albuterol (DUONEB) 0.5-2.5 (3) MG/3ML SOLN nebulizer solution Inhale 3 mLs into the lungs every 6 hours as needed for Shortness of Breath 12/23/21 8/8/22  Danya Arguello MD   aspirin 325 MG EC tablet Take 1 tablet by mouth daily 12/17/19   Danya Arguello MD   pravastatin (PRAVACHOL) 40 MG tablet Take 40 mg by mouth nightly    Historical Provider, MD   metoprolol succinate (TOPROL XL) 25 MG extended release tablet Take 1 tablet by mouth daily 3/6/17   Danya Arguello MD   carBAMazepine (TEGRETOL) 200 MG tablet Take 200 mg by mouth 2 times daily    Historical Provider, MD       Allergies:  Patient has no known allergies. Social History:   TOBACCO:   reports that he has been smoking cigarettes. He has a 20.00 pack-year smoking history. He has never used smokeless tobacco.  ETOH:   reports no history of alcohol use.       Family History:       Problem Relation Age of Onset    High Blood Pressure Mother        REVIEW OF SYSTEMS:  CONSTITUTIONAL:  positive for  fatigue and malaise  negative for  fevers and chills  EYES:  negative for  irritation, redness, and icterus  HEENT:  negative for  sore throat  RESPIRATORY: positive for  dry cough, dyspnea, and wheezing  negative for  chest pain  CARDIOVASCULAR:  negative for  palpitations, orthopnea, PND, edema  GASTROINTESTINAL:  negative for nausea, vomiting, change in bowel habits, abdominal pain, abdominal mass, and abdominal distention  GENITOURINARY:  negative for frequency and dysuria  INTEGUMENT/BREAST:  negative  ENDOCRINE:  negative for heat intolerance and cold intolerance  MUSCULOSKELETAL:  negative for  myalgias and arthralgias  NEUROLOGICAL:  negative for headaches, dizziness, and seizures  BEHAVIOR/PSYCH:  negative for increased agitation and anxiety    Physical Exam:    Vitals: /68   Pulse 73   Temp 98.4 °F (36.9 °C) (Axillary)   Resp 24   Ht 5' 11\" (1.803 m)   Wt 195 lb 3.2 oz (88.5 kg)   SpO2 97%   BMI 27.22 kg/m²   CONSTITUTIONAL:  awake, alert, cooperative, and mild distress  EYES:  extra-ocular muscles intact  ENT:  atraumatic, poor dentition and multiple carries  NECK:  supple, symmetrical, trachea midline  HEMATOLOGIC/LYMPHATICS:  no cervical lymphadenopathy and no supraclavicular lymphadenopathy  BACK:  symmetric  LUNGS:  tachypneic and diminished breath sounds throughout lungs  CARDIOVASCULAR:  normal S1 and S2  ABDOMEN:  normal bowel sounds, soft, non-distended, non-tender, and no hepatosplenomegally  MUSCULOSKELETAL:  there is no redness, warmth, or swelling of the joints  NEUROLOGIC:  Mental Status Exam:  Level of Alertness:   awake  Orientation:   person, place  Cranial Nerves:  cranial nerves II-XII are grossly intact  Motor Exam:  Motor exam is symmetrical 5 out of 5 all extremities bilaterally  SKIN:  normal skin color, texture, turgor      CBC:   Recent Labs     01/03/23  1730 01/04/23  0443   WBC 5.5 3.4*   HGB 15.1 14.9    163     BMP:    Recent Labs     01/03/23  1730 01/04/23  0443    140   K 4.0 4.8   CL 94* 94*   CO2 38* 38*   BUN 13 13   CREATININE 0.7 0.8   GLUCOSE 127* 128*     Hepatic:   Recent Labs 01/03/23  1730   AST 12   ALT 15   BILITOT 0.2*   ALKPHOS 71     Blood Gas, Arterial [[id number]] (Abnormal)    Collected: 01/03/23 1741    Updated: 01/03/23 1747    Specimen Source: Blood gases     pH, Blood Gas 7.34 Low     PCO2 80 High Panic   mmhg    PO2 63 Low  mmhg    HCO3 43 High  mmol/l    Base Excess (Calculated) 12.4 High  mmol/l    O2 Sat 89 %    IFIO2 8    DEVICE AeroTx    Nicholas Test N/A    Source: R Brach     Troponin T < 0.010     Procalcitonin 0.06     INFLUENZA A NOT DETECTED     INFLUENZA B NOT DETECTED     SARS-CoV-2 RNA, RT PCR NOT DETECTED     Pro-.5 High        -----------------------------------------------------------------  PA/lat CXR: 1. No consolidation. 2. Diffuse interstitial thickening likely representing a component of   interstitial edema. EKG:  : 01/03/23 1839     Ventricular Rate 98 BPM    Atrial Rate 98 BPM    P-R Interval 152 ms    QRS Duration 86 ms    Q-T Interval 354 ms    QTc Calculation (Bazett) 451 ms    P Axis 41 degrees    R Axis 99 degrees    T Axis 44 degrees   Narrative:     Sinus rhythm, 98 bpm, with occasional Premature ventricular complexes   Rightward axis      ECHO Summary   Left ventricular size is normal and systolic function is mildly reduced. Ejection fraction was estimated at 50-55%. LV wall thickness is within   normal limits. Intraventricular septal wall compression during systole suggestive of RV   pressure overload. Markedly enlarged right atrium size. The right ventricle was not clearly visualized. Appears dilated. RVSP of 55-60 mm Hg consistent with moderate pulmonary hypertension. Small circumferential pericardial effusion without evidence of tamponade   physiology. IVC size is dilated with <50% respiratory collapse.       Signature   ----------------------------------------------------------------   Electronically signed by Ed Driscoll MD (Interpreting   physician) on 05/18/2020 at 04:20 PM    Assessment and Plan   Acute on chronic hypoxemic and hypercapnic resp failure  COPD exacerbation  PAH  Non compliant with meds    Cont BIPAP  Duoneb inh  Steroid empiric antibiotic  Pulm consult. Lovenox. Reg diet.     Patient Active Problem List   Diagnosis Code    Acute on chronic respiratory failure with hypercapnia ([facility]) J96.22    Respiratory insufficiency/failure R06.89    Chronic obstructive pulmonary disease with acute exacerbation ([facility]) J44.1    Acute on chronic systolic congestive heart failure ([facility]) I50.23    Nicotine abuse Z72.0    Chest pain R07.9    Schizophreniform disorder ([facility]) F20.81    Respiratory failure ([facility]) J96.90    Smoker F17.200    Acute respiratory failure ([facility]) J96.00    Acute on chronic respiratory failure ([facility]) J96.20    Pneumonia of right lower lobe due to infectious organism J18.9    Aspiration pneumonia of both lower lobes due to gastric secretions ([facility]) J69.0    Community acquired bacterial pneumonia J15.9    Acute on chronic respiratory failure with hypoxia ([facility]) J96.21    Hypoxemic respiratory failure, chronic ([facility]) J96.11       Bonny Branch MD, MD  Admitting Internist

## 2023-01-04 NOTE — FLOWSHEET NOTE
01/04/23 1042   Safe Environment   Safety Measures   (Virtual RN rounds complete)   PT lying in bed with eyes closed, no distress noted at this time

## 2023-01-04 NOTE — ED NOTES
Patient resting in bed with family at bedside. Respirations easy and unlabored. Denies further needs at this time. Call light within reach.        Amador Valdivia RN  01/03/23 7896

## 2023-01-04 NOTE — CARE COORDINATION
01/04/23 1522   Service Assessment   Patient Orientation Alert and Oriented   Cognition Alert   History Provided By Patient;Medical Record   Primary Caregiver Self   Accompanied By/Relationship none   Support Systems Family Members   Patient's Healthcare Decision Maker is: Legal Next of Augustine Goldstein   PCP Verified by CM Yes   Last Visit to PCP Within last 6 months   Prior Functional Level Independent in ADLs/IADLs; Bathing;Cooking;Housework; Mobility; Other (see comment)  (mother Misty Corbett helps w shopping)   Current Functional Level Independent in ADLs/IADLs   Can patient return to prior living arrangement Yes   Ability to make needs known: Fair   Family able to assist with home care needs: No   Would you like for me to discuss the discharge plan with any other family members/significant others, and if so, who? No   Financial Resources Medicare   Community Resources None   CM/SW Referral Behavioral management problem   Social/Functional History   Lives With Alone   ADL Assistance Independent   Homemaking Assistance Independent   Ambulation Assistance Independent   Transfer Assistance Independent   Active  Yes   Mode of Transportation SUV   Occupation Retired   Discharge Planning   Type of [address] [place] [address] Prior To Admission [address]   DME Ordered? No   Potential Assistance Purchasing Medications No   Type of Home Care Services Aide Services;OT;PT;Skilled Therapy;Nursing Services   Patient expects to be discharged to: House   Follow Up Appointment: Best Day/Time  Monday PM   One/Two Story Residence Two story   Services At/After Discharge   Transition of Care Consult (CM Consult) [facility] Discharge Home Health;OT;PT;Nursing services; [address] Resource Information Provided?  No   Confirm Follow Up Transport Family   Condition of Participation: Discharge Planning   The Plan for Transition of Care is related to the following treatment goals: Anemia   The Patient and/or Patient Representative was provided with a Choice of Provider? Patient   The Patient and/Or Patient Representative agree with the Discharge Plan? Yes   Freedom of Choice list was provided with basic dialogue that supports the patient's individualized plan of care/goals, treatment preferences, and shares the quality data associated with the providers?   Yes

## 2023-01-04 NOTE — ED NOTES
ED to inpatient nurses report    Chief Complaint   Patient presents with    Cough    Shortness of Breath      Present to ED from home  LOC: alert and orientated to name, place, date  Vital signs   Vitals:    01/03/23 1748 01/03/23 1752 01/03/23 1754 01/03/23 1930   BP:  97/68  109/66   Pulse:  96  82   Resp: 16 22 19   Temp:       TempSrc:  Oral     SpO2:  98%  97%   Weight:   210 lb (95.3 kg)    Height:   5' 11\" (1.803 m)       Oxygen Baseline 3LNC    Current needs required- bipap   LDAs:   Peripheral IV 01/03/23 Right Antecubital (Active)   Site Assessment Clean, dry & intact 01/03/23 1843   Line Status Brisk blood return;Flushed;Normal saline locked 01/03/23 1843   Dressing Status Clean, dry & intact; New dressing applied 01/03/23 1843   Dressing Type Transparent 01/03/23 1843   Dressing Intervention New 01/03/23 1843     Mobility: Requires assistance * 1  Pending ED orders: none  Present condition: stable    C-SSRS Risk of Suicide: No Risk  Swallow Screening    Preferred Language: English     Electronically signed by Chung Yadav RN on 1/3/2023 at 7:41 PM       Chung Yadav RN  01/03/23 1943

## 2023-01-04 NOTE — ED NOTES
Patient resting in bed with family at bedside. Patients urine sent to lab. Vitals are stable at this point. Patient has no other needs at this time.      Kendrick Oneill RN  01/03/23 4968

## 2023-01-04 NOTE — ED PROVIDER NOTES
[facility] [address] [address] EMERGENCY DEPT  [address] [facility] [address]  Phone: [phone]      CHIEF COMPLAINT       Chief Complaint   Patient presents with    Cough    Shortness of Breath       Nurses Notes reviewed and I agree except as noted in the HPI. HISTORY OF PRESENT ILLNESS    Merline Mccloud is a 45 y.o. male. Presents the emergency department for acute dyspnea. He was noted to be very hypoxic by the nursing staff initially and I was asked to go see the patient right away. Patient is visibly dyspneic. Has wheezing and diminishment bilaterally and is not able to provide much history. REVIEW OF SYSTEMS         No chest pain, no fever he has some chest pain that is somewhat due to coughing. It appears to be bilateral.  Has not had a fever. Remainder of review of systems is otherwise reviewed as negative. PAST MEDICAL HISTORY    has a past medical history of Acute on chronic systolic congestive heart failure ([facility] [address] Emphysema (subcutaneous) (surgical) resulting from a procedure, Hypertension, Pneumonia, and Schizophrenia ([address] [address] SURGICAL HISTORY      has no past surgical history on file.     CURRENT MEDICATIONS       Previous Medications    ALBUTEROL (PROVENTIL) (2.5 MG/3ML) 0.083% NEBULIZER SOLUTION    Take 3 mLs by nebulization every 2 hours as needed for Wheezing    ASPIRIN 325 MG EC TABLET    Take 1 tablet by mouth daily    BUDESONIDE (PULMICORT) 0.5 MG/2ML NEBULIZER SUSPENSION    Take 2 mLs by nebulization 2 times daily    BUPROPION (WELLBUTRIN XL) 150 MG EXTENDED RELEASE TABLET    Take 1 tablet by mouth every morning    CARBAMAZEPINE (TEGRETOL) 200 MG TABLET    Take 200 mg by mouth 2 times daily    METOPROLOL SUCCINATE (TOPROL XL) 25 MG EXTENDED RELEASE TABLET    Take 1 tablet by mouth daily    PRAVASTATIN (PRAVACHOL) 40 MG TABLET    Take 40 mg by mouth nightly    RISPERIDONE (RISPERDAL) 2 MG TABLET    Take 1 tablet by mouth 2 times daily    SPACER/AERO-HOLDING CHAMBERS CAREN    1 Device by Does not apply route daily    TADALAFIL, PAH, 20 MG TABLET    Take 2 tablets by mouth daily    UMECLIDINIUM-VILANTEROL (ANORO ELLIPTA) 62.5-25 MCG/INH AEPB INHALER    Inhale 1 puff into the lungs daily       ALLERGIES     has No Known Allergies. FAMILY HISTORY     He indicated that his mother is alive. He indicated that his sister is alive. family history includes High Blood Pressure in his mother. SOCIAL HISTORY      reports that he has been smoking cigarettes. He has a 20.00 pack-year smoking history. He has never used smokeless tobacco. He reports that he does not drink alcohol and does not use drugs. PHYSICAL EXAM     INITIAL VITALS:  height is 5' 11\" (1.803 m) and weight is 210 lb (95.3 kg). His temperature is 97.8 °F (36.6 °C). His blood pressure is 97/68 and his pulse is 96. His respiration is 22 and oxygen saturation is 98%. Constitutional: ill appearing  Eyes:  Pupils are equal and reactive,  HENT:  Atraumatic appearing  oropharynx moist, no pharyngeal exudates. Neck- normal range of motion,  supple   Respiratory: There is marked diminishment bilaterally in all fields. Cardiovascular: regular,  GI:  Non tender, no rigidity, rebound or guarding  Musculoskeletal:  2/4 distal pulses, +2  pitting edema  Integument: warm and dry  Neurologic:  Alert & oriented x 3        DIAGNOSTIC RESULTS     EKG: All EKG's are interpreted by the Emergency Department Physician who either signs or Co-signs this chart in the absence of a cardiologist.  EKG interpreted by me showing sinus rhythm at a rate of 98, QRS of 86, QTc of 4-51, axis of 99 with PVCs noted and some underlying background movement.     RADIOLOGY: non-plain film images(s) such as CT, Ultrasound and MRI are read by the radiologist.  Chest x-ray interpreted by the radiologist showing diffuse interstitial thickening likely representing component of interstitial edema    LABS:   Labs Reviewed   BASIC METABOLIC PANEL - Abnormal; Notable for the following components:       Result Value    Chloride 94 (*)     CO2 38 (*)     Glucose 127 (*)     All other components within normal limits   CBC WITH AUTO DIFFERENTIAL - Abnormal; Notable for the following components:    MCV 98.8 (*)     MCHC 30.9 (*)     RDW-SD 53.0 (*)     Lymphocytes Absolute 0.8 (*)     All other components within normal limits   HEPATIC FUNCTION PANEL - Abnormal; Notable for the following components: Total Bilirubin 0.2 (*)     All other components within normal limits   BLOOD GAS, ARTERIAL - Abnormal; Notable for the following components:    pH, Blood Gas 7.34 (*)     PCO2 80 (*)     PO2 63 (*)     HCO3 43 (*)     Base Excess (Calculated) 12.4 (*)     All other components within normal limits   BRAIN NATRIURETIC PEPTIDE - Abnormal; Notable for the following components:    Pro-.5 (*)     All other components within normal limits   COVID-19 & INFLUENZA COMBO   CULTURE, BLOOD 1   CULTURE, BLOOD 2   LACTIC ACID   LIPASE   PROCALCITONIN   TROPONIN   ANION GAP   GLOMERULAR FILTRATION RATE, ESTIMATED   OSMOLALITY       EMERGENCY DEPARTMENT COURSE:   Vitals:    Vitals:    01/03/23 1734 01/03/23 1748 01/03/23 1752 01/03/23 1754   BP:   97/68    Pulse: 96  96    Resp: 24 16 22    Temp:       TempSrc:   Oral    SpO2: 94%  98%    Weight:    210 lb (95.3 kg)   Height:    5' 11\" (1.803 m)     Patient does have a history of underlying COPD. May have some underlying CHF as well. Has been under the care of , as an outpatient. He has had some recent visits for COPD exacerbations but is presentation today is considerably worse. He is more hypoxic than he has been in the past and the blood gas does show that he is also hypercarbic.  pH is borderline. So we are starting the patient on BiPAP. He remains alert.     We have put him in for a CTA of the chest which is still pending at this time to rule out pulmonary embolism which would also be in the differential.    Patient has received breathing treatments and IV steroids. The case was discussed with Dr. Tammy Cuellar to arrange for admission      CRITICAL CARE:   CRITICAL CARE: There was a high probability of clinically significant/life threatening deterioration in this patient's condition which required my urgent intervention. Total critical care time was 30 minutes. This excludes any time for separately reportable procedures. CONSULTS:  Dr Temo Laboy:  None    FINAL IMPRESSION      1.  Acute hypoxemic respiratory failure [facility])          DISPOSITION/PLAN   Admitted    DISCHARGE MEDICATIONS:  New Prescriptions    No medications on file       (Please note that portions of this note were completed with a voice recognition program.  Efforts were made to edit the dictations but occasionally words are mis-transcribed.)    Dilip Bragg, [address] [place] [address] DO Ce  01/03/23 1922

## 2023-01-05 VITALS
RESPIRATION RATE: 27 BRPM | WEIGHT: 208 LBS | BODY MASS INDEX: 29.12 KG/M2 | OXYGEN SATURATION: 93 % | HEART RATE: 65 BPM | HEIGHT: 71 IN | TEMPERATURE: 98.2 F | SYSTOLIC BLOOD PRESSURE: 98 MMHG | DIASTOLIC BLOOD PRESSURE: 61 MMHG

## 2023-01-05 LAB
AEROBIC CULTURE: NORMAL
ALLEN TEST: POSITIVE
ANION GAP SERPL CALCULATED.3IONS-SCNC: 7 MEQ/L (ref 8–16)
BASE EXCESS (CALCULATED): 11.6 MMOL/L (ref -2.5–2.5)
BUN BLDV-MCNC: 15 MG/DL (ref 7–22)
CALCIUM SERPL-MCNC: 8.6 MG/DL (ref 8.5–10.5)
CHLORIDE BLD-SCNC: 93 MEQ/L (ref 98–111)
CO2: 36 MEQ/L (ref 23–33)
COLLECTED BY:: ABNORMAL
CREAT SERPL-MCNC: 0.6 MG/DL (ref 0.4–1.2)
DEVICE: ABNORMAL
GFR SERPL CREATININE-BSD FRML MDRD: > 60 ML/MIN/1.73M2
GLUCOSE BLD-MCNC: 105 MG/DL (ref 70–108)
GLUCOSE BLD-MCNC: 115 MG/DL (ref 70–108)
GLUCOSE BLD-MCNC: 116 MG/DL (ref 70–108)
GLUCOSE BLD-MCNC: 82 MG/DL (ref 70–108)
HCO3: 40 MMOL/L (ref 23–28)
IFIO2: 35
O2 SATURATION: 92 %
PCO2: 65 MMHG (ref 35–45)
PH BLOOD GAS: 7.4 (ref 7.35–7.45)
PO2: 65 MMHG (ref 71–104)
POTASSIUM SERPL-SCNC: 4.9 MEQ/L (ref 3.5–5.2)
SET PEEP: 6 MMHG
SET PRESS SUPP: 14 CMH2O
SET RESPIRATORY RATE: 10 BPM
SODIUM BLD-SCNC: 136 MEQ/L (ref 135–145)
SOURCE, BLOOD GAS: ABNORMAL

## 2023-01-05 PROCEDURE — 6370000000 HC RX 637 (ALT 250 FOR IP): Performed by: STUDENT IN AN ORGANIZED HEALTH CARE EDUCATION/TRAINING PROGRAM

## 2023-01-05 PROCEDURE — 82948 REAGENT STRIP/BLOOD GLUCOSE: CPT

## 2023-01-05 PROCEDURE — 6370000000 HC RX 637 (ALT 250 FOR IP): Performed by: INTERNAL MEDICINE

## 2023-01-05 PROCEDURE — 36600 WITHDRAWAL OF ARTERIAL BLOOD: CPT

## 2023-01-05 PROCEDURE — 94660 CPAP INITIATION&MGMT: CPT

## 2023-01-05 PROCEDURE — 82803 BLOOD GASES ANY COMBINATION: CPT

## 2023-01-05 PROCEDURE — 99222 1ST HOSP IP/OBS MODERATE 55: CPT | Performed by: INTERNAL MEDICINE

## 2023-01-05 PROCEDURE — 94761 N-INVAS EAR/PLS OXIMETRY MLT: CPT

## 2023-01-05 PROCEDURE — 36415 COLL VENOUS BLD VENIPUNCTURE: CPT

## 2023-01-05 PROCEDURE — 94640 AIRWAY INHALATION TREATMENT: CPT

## 2023-01-05 PROCEDURE — 6360000002 HC RX W HCPCS: Performed by: INTERNAL MEDICINE

## 2023-01-05 PROCEDURE — 2700000000 HC OXYGEN THERAPY PER DAY

## 2023-01-05 PROCEDURE — 80048 BASIC METABOLIC PNL TOTAL CA: CPT

## 2023-01-05 RX ORDER — FAMOTIDINE 20 MG/1
20 TABLET, FILM COATED ORAL 2 TIMES DAILY
Qty: 60 TABLET | Refills: 3 | DISCHARGE
Start: 2023-01-05

## 2023-01-05 RX ORDER — METHYLPREDNISOLONE SODIUM SUCCINATE 40 MG/ML
40 INJECTION, POWDER, LYOPHILIZED, FOR SOLUTION INTRAMUSCULAR; INTRAVENOUS EVERY 12 HOURS
Status: DISCONTINUED | OUTPATIENT
Start: 2023-01-05 | End: 2023-01-05 | Stop reason: HOSPADM

## 2023-01-05 RX ORDER — AZITHROMYCIN 250 MG/1
500 TABLET, FILM COATED ORAL DAILY
Status: DISCONTINUED | OUTPATIENT
Start: 2023-01-05 | End: 2023-01-05 | Stop reason: HOSPADM

## 2023-01-05 RX ORDER — METHYLPREDNISOLONE SODIUM SUCCINATE 40 MG/ML
40 INJECTION, POWDER, LYOPHILIZED, FOR SOLUTION INTRAMUSCULAR; INTRAVENOUS EVERY 12 HOURS
DISCHARGE
Start: 2023-01-05

## 2023-01-05 RX ORDER — PSEUDOEPHEDRINE HCL 30 MG
100 TABLET ORAL DAILY
DISCHARGE
Start: 2023-01-06

## 2023-01-05 RX ORDER — ENOXAPARIN SODIUM 100 MG/ML
40 INJECTION SUBCUTANEOUS EVERY 24 HOURS
DISCHARGE
Start: 2023-01-05

## 2023-01-05 RX ORDER — AZITHROMYCIN 500 MG/1
500 TABLET, FILM COATED ORAL DAILY
Qty: 2 TABLET | Refills: 0 | DISCHARGE
Start: 2023-01-06 | End: 2023-01-08

## 2023-01-05 RX ORDER — IPRATROPIUM BROMIDE AND ALBUTEROL SULFATE 2.5; .5 MG/3ML; MG/3ML
3 SOLUTION RESPIRATORY (INHALATION)
Qty: 360 ML | DISCHARGE
Start: 2023-01-05

## 2023-01-05 RX ADMIN — FUROSEMIDE 20 MG: 10 INJECTION, SOLUTION INTRAMUSCULAR; INTRAVENOUS at 08:46

## 2023-01-05 RX ADMIN — IPRATROPIUM BROMIDE AND ALBUTEROL SULFATE 1 AMPULE: .5; 3 SOLUTION RESPIRATORY (INHALATION) at 09:20

## 2023-01-05 RX ADMIN — IPRATROPIUM BROMIDE AND ALBUTEROL SULFATE 1 AMPULE: .5; 3 SOLUTION RESPIRATORY (INHALATION) at 17:00

## 2023-01-05 RX ADMIN — ASPIRIN 81 MG: 81 TABLET, CHEWABLE ORAL at 08:46

## 2023-01-05 RX ADMIN — IPRATROPIUM BROMIDE AND ALBUTEROL SULFATE 1 AMPULE: .5; 3 SOLUTION RESPIRATORY (INHALATION) at 00:17

## 2023-01-05 RX ADMIN — FAMOTIDINE 20 MG: 20 TABLET ORAL at 08:46

## 2023-01-05 RX ADMIN — AZITHROMYCIN MONOHYDRATE 500 MG: 250 TABLET ORAL at 13:20

## 2023-01-05 RX ADMIN — DOCUSATE SODIUM 100 MG: 100 CAPSULE, LIQUID FILLED ORAL at 08:46

## 2023-01-05 RX ADMIN — METHYLPREDNISOLONE SODIUM SUCCINATE 40 MG: 40 INJECTION, POWDER, FOR SOLUTION INTRAMUSCULAR; INTRAVENOUS at 01:52

## 2023-01-05 RX ADMIN — IPRATROPIUM BROMIDE AND ALBUTEROL SULFATE 1 AMPULE: .5; 3 SOLUTION RESPIRATORY (INHALATION) at 13:34

## 2023-01-05 RX ADMIN — METHYLPREDNISOLONE SODIUM SUCCINATE 40 MG: 40 INJECTION, POWDER, FOR SOLUTION INTRAMUSCULAR; INTRAVENOUS at 08:46

## 2023-01-05 ASSESSMENT — ENCOUNTER SYMPTOMS
GASTROINTESTINAL NEGATIVE: 1
ALLERGIC/IMMUNOLOGIC NEGATIVE: 1
SHORTNESS OF BREATH: 1
WHEEZING: 1
EYES NEGATIVE: 1

## 2023-01-05 NOTE — CONSULTS
[facility]      Patient - Dolly Santos   MRN -  [id number]   [facility] # - [de-identified]   - 1970      Date of Admission -  1/3/2023  5:16 PM  Date of evaluation -  2023  Room - Michael Walsh MD Primary Care Physician - Denise Herrera MD     Problem List      Active Hospital Problems    Diagnosis Date Noted    Hypoxemic respiratory failure, chronic [facility]) [J96.11] 2023     Priority: Medium     Reason for Consult    COPD exacerbation with acute on chronic hypoxic toxemic/hypercapnic respiratory failure  HPI   History Obtained From: Patient and electronic medical record. Dolly Santos is a 45 y.o. male who presented to [facility] on 1/3/2023 and was shortness of breath. Patient is a current everyday smoker with a past medical history significant for chronic hypoxic respiratory failure, severe COPD, chronic diastolic CHF, OHS, hypertension. Patient was on BiPAP at the time of evaluation and a poor historian due to lethargic during the interview. He reports that he is been having some fevers, sweats, chills starting approximately 3 days prior. He has had a productive cough since that time with purulent sputum. He does endorse daily tobacco smoking and vaping and has an extensive smoking history. Of note patient was last admitted for similar complaint on 2022 and underwent a course of steroids and therapy with BiPAP. He is on 2 to 3 L continuously at home but notes that he has had worsening dyspnea on exertion. Of note this is the patient's third admission for COPD and pneumonia in the past year      In the ED patient was notably hypoxic on arrival with SPO2 47%. Initial ABG demonstrated significant hypercarbia with a pH of 7.34 PCO2 of 80 PO2 of 63.       PMHx   Past Medical History      Diagnosis Date    Acute on chronic systolic congestive heart failure ([address] 2019    Emphysema (subcutaneous) (surgical) resulting from a procedure     Hypertension     Pneumonia     Schizophrenia [facility])       Past Surgical History    No past surgical history on file.   Meds    Current Medications    aspirin  81 mg Oral Daily    cefTRIAXone (ROCEPHIN) IV  1,000 mg IntraVENous Q24H    docusate sodium  100 mg Oral Daily    enoxaparin  40 mg SubCUTAneous Q24H    famotidine  20 mg Oral BID    methylPREDNISolone  40 mg IntraVENous Q8H    polyethylene glycol  17 g Oral Daily    furosemide  20 mg IntraVENous Daily    ipratropium-albuterol  1 ampule Inhalation Q4H WA    insulin lispro  0-4 Units SubCUTAneous TID WC    insulin lispro  0-4 Units SubCUTAneous Nightly     acetaminophen, ondansetron, albuterol, potassium chloride **OR** potassium alternative oral replacement **OR** potassium chloride  IV Drips/Infusions    Home Medications  Medications Prior to Admission: traZODone (DESYREL) 100 MG tablet, take 1 tablet by mouth at bedtime  potassium chloride (KLOR-CON M) 20 MEQ extended release tablet, take 1 tablet by mouth once daily WITH BREAKFAST  losartan (COZAAR) 100 MG tablet, take 1 tablet by mouth once daily  haloperidol decanoate (HALDOL DECANOATE) 50 MG/ML injection, inject 1 milliliter intramuscularly every 2 WEEKS  furosemide (LASIX) 20 MG tablet, take 1 tablet by mouth once daily  buPROPion (WELLBUTRIN XL) 150 MG extended release tablet, Take 1 tablet by mouth every morning  risperiDONE (RISPERDAL) 2 MG tablet, Take 1 tablet by mouth 2 times daily  Tadalafil, PAH, 20 MG tablet, Take 2 tablets by mouth daily  Spacer/Aero-Holding Chambers CAREN, 1 Device by Does not apply route daily  albuterol (PROVENTIL) (2.5 MG/3ML) 0.083% nebulizer solution, Take 3 mLs by nebulization every 2 hours as needed for Wheezing  budesonide (PULMICORT) 0.5 MG/2ML nebulizer suspension, Take 2 mLs by nebulization 2 times daily  umeclidinium-vilanterol (ANORO ELLIPTA) 62.5-25 MCG/INH AEPB inhaler, Inhale 1 puff into the lungs daily  aspirin 325 MG EC tablet, Take 1 tablet by mouth daily  pravastatin (PRAVACHOL) 40 MG tablet, Take 40 mg by mouth nightly  metoprolol succinate (TOPROL XL) 25 MG extended release tablet, Take 1 tablet by mouth daily  carBAMazepine (TEGRETOL) 200 MG tablet, Take 200 mg by mouth 2 times daily  Diet    ADULT DIET; Regular  Allergies    Patient has no known allergies. Social History     Social History     Socioeconomic History    Marital status: Single     Spouse name: Not on file    Number of children: 0    Years of education: Not on file    Highest education level: Not on file   Occupational History    Not on file   Tobacco Use    Smoking status: Every Day     Packs/day: 1.00     Years: 20.00     Pack years: 20.00     Types: Cigarettes    Smokeless tobacco: Never   Vaping Use    Vaping Use: Never used   Substance and Sexual Activity    Alcohol use: No    Drug use: No    Sexual activity: Not Currently   Other Topics Concern    Not on file   Social History Narrative    Not on file     Social Determinants of Health     Financial Resource Strain: Not on file   Food Insecurity: Not on file   Transportation Needs: Not on file   Physical Activity: Not on file   Stress: Not on file   Social Connections: Not on file   Intimate Partner Violence: Not on file   Housing Stability: Not on file     Family History          Problem Relation Age of Onset    High Blood Pressure Mother      Sleep History    Never diagnosed with sleep apnea in the past.    Review of systems   Review of Systems   Constitutional:  Positive for fatigue and fever. HENT: Negative. Eyes: Negative. Respiratory:  Positive for shortness of breath and wheezing. Cardiovascular: Negative. Gastrointestinal: Negative. Endocrine: Negative. Genitourinary: Negative. Musculoskeletal: Negative. Skin: Negative. Allergic/Immunologic: Negative. Neurological: Negative. Hematological: Negative. Psychiatric/Behavioral: Negative. Vitals     height is 5' 11\" (1.803 m) and weight is 208 lb (94.3 kg). His axillary temperature is 97.7 °F (36.5 °C). His blood pressure is 113/68 and his pulse is 58. His respiration is 30 and oxygen saturation is 94%. Body mass index is 29.01 kg/m². SUPPLEMENTAL O2: O2 Flow Rate (L/min): 6 L/min     I/O      Intake/Output Summary (Last 24 hours) at 1/5/2023 0806  Last data filed at 1/5/2023 0304  Gross per 24 hour   Intake 3400 ml   Output 2375 ml   Net 1025 ml     I/O last 3 completed shifts: In: 3400 [P.O.:3400]  Out: 3933 [[facility]0152]   Patient Vitals for the past 96 hrs (Last 3 readings):   Weight   01/05/23 0300 208 lb (94.3 kg)   01/03/23 2125 195 lb 3.2 oz (88.5 kg)   01/03/23 1754 210 lb (95.3 kg)       Exam   Physical Exam  Constitutional:       Appearance: He is obese. HENT:      Head: Normocephalic and atraumatic. Nose: Nose normal. No congestion or rhinorrhea. Eyes:      General: No scleral icterus. Extraocular Movements: Extraocular movements intact. Conjunctiva/sclera: Conjunctivae normal.      Pupils: Pupils are equal, round, and reactive to light. Cardiovascular:      Rate and Rhythm: Normal rate and regular rhythm. Pulses: Normal pulses. Heart sounds: Normal heart sounds. No murmur heard. Pulmonary:      Effort: Respiratory distress present. Breath sounds: Wheezing and rales present. Abdominal:      General: Abdomen is flat. Bowel sounds are normal.      Tenderness: There is no abdominal tenderness. There is no guarding or rebound. Musculoskeletal:         General: Normal range of motion. Cervical back: Normal range of motion and neck supple. Right lower leg: No edema. Left lower leg: No edema. Lymphadenopathy:      Cervical: No cervical adenopathy. Skin:     General: Skin is warm and dry. Capillary Refill: Capillary refill takes less than 2 seconds. Neurological:      General: No focal deficit present.       Mental Status: He is alert and oriented to person, place, and time. Mental status is at baseline. Psychiatric:         Mood and Affect: Mood normal.         Behavior: Behavior normal.        Labs  - Old records and notes have been reviewed in [facility]UAN HAY[facility]  Lab Results   Component Value Date/Time    PH 7.34 01/03/2023 05:41 PM    PO2 63 01/03/2023 05:41 PM    PCO2 80 01/03/2023 05:41 PM    HCO3 43 01/03/2023 05:41 PM    O2SAT 89 01/03/2023 05:41 PM     Lab Results   Component Value Date/Time    IFIO2 8 01/03/2023 05:41 PM    MODE BiLevel 08/04/2022 04:25 AM    SETTIDVOL 480 05/30/2020 05:47 AM    SETPEEP 6.0 08/04/2022 04:25 AM     CBC  Recent Labs     01/03/23  1730 01/04/23  0443   WBC 5.5 3.4*   RBC 4.94 4.95   HGB 15.1 14.9   HCT 48.8 49.8   MCV 98.8* 100.6*   MCH 30.6 30.1   MCHC 30.9* 29.9*    163   MPV 10.9 10.5      BMP  Recent Labs     01/03/23  1730 01/04/23 0443 01/05/23  0437    140 136   K 4.0 4.8 4.9   CL 94* 94* 93*   CO2 38* 38* 36*   BUN 13 13 15   CREATININE 0.7 0.8 0.6   GLUCOSE 127* 128* 105   CALCIUM 9.2 8.6 8.6     LFT  Recent Labs     01/03/23  1730   AST 12   ALT 15   BILITOT 0.2*   ALKPHOS 71   LIPASE 25.7     TROP  Lab Results   Component Value Date/Time    TROPONINT < 0.010 01/03/2023 05:30 PM    TROPONINT < 0.010 11/07/2022 03:33 PM    TROPONINT < 0.010 08/04/2022 02:50 AM     BNP  No results for input(s): BNP in the last 72 hours. Lactic Acid  Recent Labs     01/03/23  1730   LACTA 1.5     INR  No results for input(s): INR, PROTIME in the last 72 hours. PTT  No results for input(s): APTT in the last 72 hours. Glucose  Recent Labs     01/04/23  1214 01/04/23  1628 01/04/23 1946   POCGLU 114* 113* 119*     UA   Recent Labs     01/03/23 1942   PHUR 6.5   COLORU DK YELLOW*   PROTEINU 30*   BLOODU NEGATIVE   RBCUA 0-2   WBCUA 5-9   BACTERIA NONE SEEN   NITRU NEGATIVE   GLUCOSEU NEGATIVE   BILIRUBINUR SMALL*   UROBILINOGEN 1.0   KETUA TRACE*   .     PFTs        Sleep studies   None in Epic    Cultures    COVID19 negative 1/3/23  Influenza A/B negative 1/3/23  BC negative x2 NGT 1/3/23  Culture Aerobic NGT 1/3/23  MRSA negative 1/3/23      EKG     Echocardiogram    Summary   Left ventricular size is normal and systolic function is mildly reduced. Ejection fraction was estimated at 50-55%. LV wall thickness is within   normal limits. Intraventricular septal wall compression during systole suggestive of RV   pressure overload. Markedly enlarged right atrium size. The right ventricle was not clearly visualized. Appears dilated. RVSP of 55-60 mm Hg consistent with moderate pulmonary hypertension. Small circumferential pericardial effusion without evidence of tamponade   physiology. IVC size is dilated with <50% respiratory collapse. Signature      ----------------------------------------------------------------   Electronically signed by Jonah Dennison MD (Interpreting   physician) on 05/18/2020 at 04:20 PM    Radiology    CXR    1. No consolidation. 2. Diffuse interstitial thickening likely representing a component of interstitial edema. This document has been electronically signed by: Geraldine Mcleod MD on 01/03/2023 06:07 PM         CT Scans     No pulmonary emboli or pulmonary infiltrates. Subcutaneous edema which may indicate third space          Final report electronically signed by Dr. Mac Nogueira on 1/3/2023 7:40 PM         (See actual reports for details)    Assessment   Acute on Chronic Hypoxic/hypercapnic respiratory failure on 2-3L baseline  Acute COPD exacerbation  Chronic Tobacco Use disorder  Group 3 PAH  Chronic HFpEF NYHA class III    Plan   Currently on BiPAP 14/6 FiO2 35% Titrate to 18/6 for persistent hypercarbia  Continue Solumedrol 40mg BID  Continue DuoNeb q4H WA  Continue empiric Abx with Rocephin and azithromycin  Acapella  Lasix per primary service    \"Thank you for asking us to see this patient\"    Questions and concerns addressed.     Electronically signed by   Rosa Kramer DO PGY-3 on 1/5/2023 at 8:06 AM     Addendum by Dr. Lita Page MD:  Patient seen by me independently including key components of medical care. Face to face evaluation and examination was performed. Case discussed with [address] Aneta Cash DO-resident physician. Italicized font, if present,  represents changes to the note made by me. More than 50% of the encounter time involved with patient care by the Pulmonary & Critical care service team spent by me. Please see my modifications mentioned below:  ABG in Am  - Sebastian Edwards educated about my impression and plan. He verbalizes understanding.     Electronically signed by   Janell Cline MD on 1/5/2023 at 3:07 PM

## 2023-01-05 NOTE — PROGRESS NOTES
INTERNAL MEDICINE Progress Note  1/5/2023 5:00 PM  Subjective:   Admit Date: 1/3/2023  PCP: Mike Arreola MD  Interval History:   tolerating Bipap  Alert, calm  Objective:   Vitals: BP 98/61   Pulse 73   Temp 98.2 °F (36.8 °C) (Axillary)   Resp 20   Ht 5' 11\" (1.803 m)   Wt 208 lb (94.3 kg)   SpO2 94%   BMI 29.01 kg/m²   General appearance: alert and cooperative with exam  HEENT: Head: atraumatic  Neck: no adenopathy, no carotid bruit, and no JVD  Lungs: diminished breath sounds bilaterally  Heart: S1, S2 normal  Abdomen: soft, non-tender; bowel sounds normal; no masses,  no organomegaly  Extremities: no edema, redness or tenderness in the calves or thighs  Neurologic: Mental status: Alert, oriented, thought content appropriate      Medications:   Scheduled Meds:   azithromycin  500 mg Oral Daily    methylPREDNISolone  40 mg IntraVENous Q12H    aspirin  81 mg Oral Daily    cefTRIAXone (ROCEPHIN) IV  1,000 mg IntraVENous Q24H    docusate sodium  100 mg Oral Daily    enoxaparin  40 mg SubCUTAneous Q24H    famotidine  20 mg Oral BID    polyethylene glycol  17 g Oral Daily    furosemide  20 mg IntraVENous Daily    ipratropium-albuterol  1 ampule Inhalation Q4H WA    insulin lispro  0-4 Units SubCUTAneous TID WC    insulin lispro  0-4 Units SubCUTAneous Nightly     Continuous Infusions:    Lab Results:   CBC:   Recent Labs     01/03/23  1730 01/04/23  0443   WBC 5.5 3.4*   HGB 15.1 14.9    163     BMP:    Recent Labs     01/03/23  1730 01/04/23  0443 01/05/23  0437    140 136   K 4.0 4.8 4.9   CL 94* 94* 93*   CO2 38* 38* 36*   BUN 13 13 15   CREATININE 0.7 0.8 0.6   GLUCOSE 127* 128* 105     Hepatic:   Recent Labs     01/03/23  1730   AST 12   ALT 15   BILITOT 0.2*   ALKPHOS 71       Magnesium:    Lab Results   Component Value Date/Time    MG 2.1 11/07/2022 03:33 PM       HgBA1c:    Lab Results   Component Value Date/Time    LABA1C 6.0 12/15/2019 10:00 AM     TSH:    Lab Results   Component Value Date/Time    TSH 0.686 05/01/2022 10:52 AM     VITAMIN B12: No components found for: B12  FOLATE:    Lab Results   Component Value Date/Time    FOLATE 9.0 12/08/2016 10:18 AM     IRON:  No results found for: IRON  FERRITIN:    Lab Results   Component Value Date/Time    FERRITIN 344 12/11/2021 02:00 AM      Latest Reference Range & Units 1/3/23 17:41 1/5/23 10:42   Source:  R Brach R Radial   pH, Blood Gas 7.35 - 7.45  7.34 (L) 7.40   PCO2 35 - 45 mmhg 80 (HH) 65 (H)   pO2 71 - 104 mmhg 63 (L) 65 (L)   HCO3 23 - 28 mmol/l 43 (H) 40 (H)   Base Excess (Calculated) -2.5 - 2.5 mmol/l 12.4 (H) 11.6 (H)   O2 Sat % 89 92   Nicholas Test  N/A Positive   IFIO2  8 35   SET RESPIRATORY RATE bpm  10   SET PRESS SUPP cmH2O  14   SET PEEP mmhg  6.0   DEVICE  AeroTx BiPAP   COLLECTED BY:  [id number]   [facility]ARE AT [facility]): Data is critically high  (L): Data is abnormally low  (H): Data is abnormally high      Assessment and Plan:   Acute on chronic hypoxemic and hypercapnic resp failure, improving  COPD exacerbation  PAH  Non compliant with meds    Plan:  Currently on BiPAP 14/6 FiO2 35% Titrate to 18/6 for persistent hypercarbia-per pulm  Continue Solumedrol 40mg BID  Duoneb inh  Cont  empiric antibiotic  Lovenox. Pt cleared for LTAC admission-Dr Castaneda aware.       Danya Arguello MD, MD

## 2023-01-05 NOTE — CARE COORDINATION
1/5/23, 9:19 AM EST  DISCHARGE PLANNING EVALUATION    SW consult deferred at this time. Possible [facility] referral. Chase Sharp will follow for additional needs.

## 2023-01-05 NOTE — CARE COORDINATION
Collaborative Discharge Planning    Darci Leigh  :  1970  MRN:  [id number]    ADMIT DATE:  1/3/2023      Discharge Planning Discharge Planning  Type of Residence: House  Living Arrangements: Alone  Support Systems: Family Members  Current Services Prior To Admission: C-pap, Home Care  Potential Assistance Needed: Home Care  DME Ordered?: No  Potential Assistance Purchasing Medications: No  Type of Home Care Services: Aide Services, OT, PT, Skilled Therapy, Nursing Services  Patient expects to be discharged to[de-identified] LTAC  Follow Up Appointment: Best Day/Time : Monday PM  One/Two Story Residence: Two story  # of Interior Steps: 18  Height of Each Step (in): 6.5 Entitle  Interior Rails: Both  Lift Chair Available: No  History of falls?: Yes  White Board Notes /Social Work Whiteboard Notes  /Social Work Whiteboard: 1/4 CM; plans home w mother Kolton Roach, sister Barber Mckinley transports, [facility] w [facility], nsg, aide, therapy; SW referral; has [facility] home oxygen 3-6L, Trilogy    Discharge Plan Home with family  lives w mother Kolton Roach, sister Barber Mckinley transports, has [facility] home oxygen 3-6L, Trilogy NIV, nebulizer; monitor full LTACH referral  Discharge Milestones and Delays: Clinical status  SOB  History: CHF/COPD/Schizophrenia, Smoker    35% FIO2 BIPAP v Oxygen 6L, IV AB, IV Diuresing, IV Steroids    CO2 65; staff reports quick oxygen desaturations    Await LTACH evaluation; collaborated w client, sister Barber Mckinley, mother Kolton Roach, Attending, [facility] Liaison, [employer], [address], Pulmonary    Update: [facility] can accept; collaborated w Attending, Heide Mendez, [address] Liaison      SIGNED:  Ty Ford RN   2023, 1:23 PM        23, 3:48 PM EST    Patient goals/plan/ treatment preferences discussed by  and . Patient goals/plan/ treatment preferences reviewed with patient/ family.   Patient/ family verbalize understanding of discharge plan and are in agreement with goal/plan/treatment preferences. Understanding was demonstrated using the teach back method. AVS provided by RN at time of discharge, which includes all necessary medical information pertaining to the patients current course of illness, treatment, post-discharge goals of care, and treatment preferences.      Services At/After Discharge: LTAC       IMM Letter  IMM Letter given to Patient/Family/Significant other/Guardian/POA/by[de-identified] patient access  IMM Letter date given[de-identified] 01/03/23  IMM Letter time given[de-identified] 1924

## 2023-01-05 NOTE — PLAN OF CARE
Problem: Respiratory - Adult  Goal: Achieves optimal ventilation and oxygenation  1/5/2023 1451 by Bessie Brandt, [facility]  Outcome: Progressing  Pt continues on bipap 18/6, bu rr 10, 35% FiO2  Duoneb q4 w/a    Unable to get agreement for goals because no family is present and patient cannot respond.   Pt sleeping

## 2023-01-05 NOTE — DISCHARGE INSTR - COC
Continuity of Care Form    Patient Name: Darci Leigh   :  1970  MRN:  [id number]    Admit date:  1/3/2023  Discharge date:  2023    Code Status Order: Full Code   Advance Directives:     Admitting Physician:  Mayur Galdamez MD  PCP: Mayur Galdamez MD    Discharging Nurse: Josie Faw[address] Unit/Room#: 5J-67/944-J  Discharging Unit Phone Number: [phone]    Emergency Contact:   Extended Emergency Contact Information  Primary Emergency Contact: Ramiro CARABALLO[address] Phone: [phone]  Mobile Phone: [phone]  Relation: Brother/Sister  Secondary Emergency Contact: Hemant CARABALLO [address] Phone: [phone]  Mobile Phone: [phone]  Relation: Parent    Past Surgical History:  No past surgical history on file.     Immunization History:   Immunization History   Administered Date(s) Administered    Influenza Virus Vaccine 2019    Influenza, Nahum Starch, 6 mo and older, IM, PF (Flulaval, Fluarix) 2019       Active Problems:  Patient Active Problem List   Diagnosis Code    Acute on chronic respiratory failure with hypercapnia (HCC) J96.22    Respiratory insufficiency/failure R06.89    Chronic obstructive pulmonary disease with acute exacerbation (HCC) J44.1    Acute on chronic systolic congestive heart failure (HCC) I50.23    Nicotine abuse Z72.0    Chest pain R07.9    Schizophreniform disorder ([address] F20.81    Respiratory failure (HCC) J96.90    Smoker F17.200    Acute respiratory failure (HCC) J96.00    Acute on chronic respiratory failure (HCC) J96.20    Pneumonia of right lower lobe due to infectious organism J18.9    Aspiration pneumonia of both lower lobes due to gastric secretions ([address] J69.0    Community acquired bacterial pneumonia J15.9    Acute on chronic respiratory failure with hypoxia ([address] J96.21    Hypoxemic respiratory failure, chronic ([address] J96.11       Isolation/Infection: Isolation            No Isolation          Patient Infection Status       Infection Onset Added Last Indicated Last Indicated By Review Planned Expiration Resolved Resolved By    None active    Resolved    COVID-19 (Rule Out) 23 COVID-19 & Influenza Combo (Ordered)   23 Rule-Out Test Resulted    COVID-19 (Rule Out) 22 COVID-19 & Influenza Combo (Ordered)   22     COVID-19 (Rule Out) 22 COVID-19 (Ordered)   22 Rule-Out Test Resulted    COVID-19 (Rule Out) 22 COVID-19, Rapid (Ordered)   22 Rule-Out Test Resulted    COVID-19 (Rule Out) 22 COVID-19, Rapid (Ordered)   22 Rule-Out Test Resulted    COVID-19 (Rule Out) 21 COVID-19, Rapid (Ordered)   21 Rule-Out Test Resulted    COVID-19 (Rule Out) 20 COVID-19 (Ordered)   20 Rule-Out Test Resulted            Nurse Assessment:  Last Vital Signs: /62   Pulse 73   Temp 97.9 °F (36.6 °C) (Axillary)   Resp 18   Ht 5' 11\" (1.803 m)   Wt 208 lb (94.3 kg)   SpO2 91%   BMI 29.01 kg/m²     Last documented pain score (0-10 scale): Pain Level: 9  Last Weight:   Wt Readings from Last 1 Encounters:   23 208 lb (94.3 kg)     Mental Status:  oriented, alert, and confusion at times    IV Access:  - Peripheral IV - site  R Antecubital, insertion date: 2023    Nursing Mobility/ADLs:  Walking   Assisted  Transfer  Assisted  Bathing  Assisted  Dressing  Assisted  Toileting  Assisted  Feeding  Independent  Med Admin  Assisted  Med Delivery   whole    Wound Care Documentation and Therapy:        Elimination:  Continence:    Bowel: No  Bladder: No  Urinary Catheter: None   Colostomy/Ileostomy/Ileal Conduit: No       Date of Last BM: 2023    Intake/Output Summary (Last 24 hours) at 2023 1601  Last data filed at 2023 1334  Gross per 24 hour   Intake 3060 ml   Output 2895 ml   Net 165 ml     I/O last 3 completed shifts: In: [address] [P.O.:3400]  Out: Makenna [address] 906 [SCINQ:6243]    Safety Concerns: At Risk for Falls and Aspiration Risk    Impairments/Disabilities:      Cognition    Nutrition Therapy:  Current Nutrition Therapy:   - Oral Diet:  General    Routes of Feeding: Oral  Liquids: Thin Liquids  Daily Fluid Restriction: no  Last Modified Barium Swallow with Video (Video Swallowing Test): not done    Treatments at the Time of Hospital Discharge:   Respiratory Treatments: Duoneb  Oxygen Therapy:   positive airway pressure 35%  Ventilator:    - BiPAP   IPAP: 18 cmH20, CPAP/EPAP: 6 cmH2O continuous and off for meals and meds    Rehab Therapies: Physical Therapy and Occupational Therapy  Weight Bearing Status/Restrictions: No weight bearing restrictions  Other Medical Equipment (for information only, NOT a DME order):  walker  Other Treatments:     Patient's personal belongings (please select all that are sent with patient):  None    RN SIGNATURE:  Electronically signed by Brennan Guadalupe RN on 1/5/23 at 4:09 PM EST    CASE MANAGEMENT/SOCIAL WORK SECTION    Inpatient Status Date: ***    Readmission Risk Assessment Score:  Readmission Risk              Risk of Unplanned Readmission:  18           Discharging to Facility/ Agency   Name:   Address:  Phone:  Fax:    Dialysis Facility (if applicable)   Name:  Address:  Dialysis Schedule:  Phone:  Fax:    / signature: {Esignature:[id number]}    PHYSICIAN SECTION    Prognosis: Good    Condition at Discharge: Stable    Rehab Potential (if transferring to Rehab): Good    Recommended Labs or Other Treatments After Discharge: ABG in am    Physician Certification: I certify the above information and transfer of Dwaine Evans  is necessary for the continuing treatment of the diagnosis listed and that he requires LTAC for less 30 days.      Update Admission H&P: No change in H&P    PHYSICIAN SIGNATURE: Electronically signed by Blane Garland MD on 1/5/23 at 5:09 PM EST

## 2023-01-05 NOTE — DISCHARGE INSTR - DIET

## 2023-01-05 NOTE — PLAN OF CARE
Problem: Discharge Planning  Goal: Discharge to home or other facility with appropriate resources  Outcome: Progressing  Flowsheets (Taken 1/4/2023 2000)  Discharge to home or other facility with appropriate resources: Identify barriers to discharge with patient and caregiver     Problem: Safety - Adult  Goal: Free from fall injury  Outcome: Progressing     Problem: ABCDS Injury Assessment  Goal: Absence of physical injury  Outcome: Progressing     Problem: Pain  Goal: Verbalizes/displays adequate comfort level or baseline comfort level  Outcome: Progressing     Problem: Chronic Conditions and Co-morbidities  Goal: Patient's chronic conditions and co-morbidity symptoms are monitored and maintained or improved  Outcome: Progressing  Flowsheets (Taken 1/4/2023 2000)  Care Plan - Patient's Chronic Conditions and Co-Morbidity Symptoms are Monitored and Maintained or Improved: Monitor and assess patient's chronic conditions and comorbid symptoms for stability, deterioration, or improvement     Problem: Skin/Tissue Integrity  Goal: Absence of new skin breakdown  Description: 1. Monitor for areas of redness and/or skin breakdown  2. Assess vascular access sites hourly  3. Every 4-6 hours minimum:  Change oxygen saturation probe site  4. Every 4-6 hours:  If on nasal continuous positive airway pressure, respiratory therapy assess nares and determine need for appliance change or resting period.   Outcome: Progressing     Problem: Respiratory - Adult  Goal: Achieves optimal ventilation and oxygenation  Outcome: Progressing     Problem: Metabolic/Fluid and Electrolytes - Adult  Goal: Electrolytes maintained within normal limits  Outcome: Progressing  Flowsheets (Taken 1/4/2023 2000)  Electrolytes maintained within normal limits: Monitor labs and assess patient for signs and symptoms of electrolyte imbalances

## 2023-01-05 NOTE — DISCHARGE SUMMARY
Discharge Summary    Naomi Cohen  :  1970  MRN:  [id number]    Admit date:  1/3/2023  Discharge date:      Admitting Physician:  Acacia Cr MD    Discharge Diagnoses:    Acute on chronic hypoxemic and hypercapnic resp failure, improving  COPD exacerbation  PAH  schizophrenia      Patient Active Problem List   Diagnosis    Acute on chronic respiratory failure with hypercapnia (HCC)    Respiratory insufficiency/failure    Chronic obstructive pulmonary disease with acute exacerbation (HCC)    Acute on chronic systolic congestive heart failure (HCC)    Nicotine abuse    Chest pain    Schizophreniform disorder ([address]    Respiratory failure ([address]    Smoker    Acute respiratory failure ([address]    Acute on chronic respiratory failure ([address]    Pneumonia of right lower lobe due to infectious organism    Aspiration pneumonia of both lower lobes due to gastric secretions ([address]    Community acquired bacterial pneumonia    Acute on chronic respiratory failure with hypoxia (HCC)    Hypoxemic respiratory failure, chronic ([address]       Admission Condition:  serious  Discharged Condition:  good    Hospital Course:   Patient was admitted for shortness of breath, worsening hypoxemia. Has a history of chronic respiratory failure related to COPD. He was treated for COPD exacerbation. He required high flow oxygen. Subsequently was transitioned to [facility] for continued care attempt at weaning off high flow oxygen. He remained stable at discharge.     Discharge Medications:         Medication List        START taking these medications      azithromycin 500 MG tablet  Commonly known as: ZITHROMAX  Take 1 tablet by mouth daily for 2 doses  Start taking on: 2023     docusate 100 MG Caps  Commonly known as: COLACE, DULCOLAX  Take 100 mg by mouth daily  Start taking on: 2023     enoxaparin 40 MG/0.4ML  Commonly known as: LOVENOX  Inject 0.4 mLs into the skin every 24 hours     famotidine 20 MG tablet  Commonly known as: PEPCID  Take 1 tablet by mouth 2 times daily     methylPREDNISolone sodium 40 MG injection  Commonly known as: SOLU-MEDROL  Infuse 1 mL intravenously in the morning and 1 mL in the evening. CHANGE how you take these medications      ipratropium-albuterol 0.5-2.5 (3) MG/3ML Soln nebulizer solution  Commonly known as: DUONEB  Inhale 3 mLs into the lungs every 4 hours (while awake)  What changed:   when to take this  reasons to take this            CONTINUE taking these medications      albuterol (2.5 MG/3ML) 0.083% nebulizer solution  Commonly known as: PROVENTIL  Take 3 mLs by nebulization every 2 hours as needed for Wheezing     aspirin 325 MG EC tablet  Take 1 tablet by mouth daily     budesonide 0.5 MG/2ML nebulizer suspension  Commonly known as: Pulmicort  Take 2 mLs by nebulization 2 times daily     buPROPion 150 MG extended release tablet  Commonly known as:  Wellbutrin XL  Take 1 tablet by mouth every morning     carBAMazepine 200 MG tablet  Commonly known as: TEGRETOL     furosemide 20 MG tablet  Commonly known as: LASIX     haloperidol decanoate 50 MG/ML injection  Commonly known as: HALDOL DECANOATE     potassium chloride 20 MEQ extended release tablet  Commonly known as: KLOR-CON M     pravastatin 40 MG tablet  Commonly known as: PRAVACHOL     risperiDONE 2 MG tablet  Commonly known as: RISPERDAL  Take 1 tablet by mouth 2 times daily     Spacer/Aero-[employer]olding [employer]  1 Device by Does not apply route daily     traZODone 100 MG tablet  Commonly known as: DESYREL            STOP taking these medications      losartan 100 MG tablet  Commonly known as: COZAAR     metoprolol succinate 25 MG extended release tablet  Commonly known as: TOPROL XL     Tadalafil (PAH) 20 MG tablet     umeclidinium-vilanterol 62.5-25 MCG/INH Aepb inhaler  Commonly known as: ANORO ELLIPTA               Where to Get Your Medications        Information about where to get these medications is not yet available Ask your nurse or doctor about these medications  azithromycin 500 MG tablet  docusate 100 MG Caps  enoxaparin 40 MG/0.4ML  famotidine 20 MG tablet  ipratropium-albuterol 0.5-2.5 (3) MG/3ML Soln nebulizer solution  methylPREDNISolone sodium 40 MG injection         Consults:  pulmonary/intensive care    Significant Diagnostic Studies: labs: CBC:        Recent Labs     01/03/23 1730 01/04/23  0443   WBC 5.5 3.4*   HGB 15.1 14.9    163      BMP:          Recent Labs     01/03/23  1730 01/04/23  0443 01/05/23  0437    140 136   K 4.0 4.8 4.9   CL 94* 94* 93*   CO2 38* 38* 36*   BUN 13 13 15   CREATININE 0.7 0.8 0.6   GLUCOSE 127* 128* 105      Hepatic:       Recent Labs     01/03/23 1730   AST 12   ALT 15   BILITOT 0.2*   ALKPHOS 71         Magnesium:          Lab Results   Component Value Date/Time     MG 2.1 11/07/2022 03:33 PM         HgBA1c:          Lab Results   Component Value Date/Time     LABA1C 6.0 12/15/2019 10:00 AM      TSH:          Lab Results   Component Value Date/Time     TSH 0.686 05/01/2022 10:52 AM      VITAMIN B12: No components found for: B12  FOLATE:          Lab Results   Component Value Date/Time     FOLATE 9.0 12/08/2016 10:18 AM      IRON:  No results found for: IRON  FERRITIN:          Lab Results   Component Value Date/Time     FERRITIN 344 12/11/2021 02:00 AM        Latest Reference Range & Units 1/3/23 17:41 1/5/23 10:42   Source:   R Brach R Radial   pH, Blood Gas 7.35 - 7.45  7.34 (L) 7.40   PCO2 35 - 45 mmhg 80 (HH) 65 (H)   pO2 71 - 104 mmhg 63 (L) 65 (L)   HCO3 23 - 28 mmol/l 43 (H) 40 (H)   Base Excess (Calculated) -2.5 - 2.5 mmol/l 12.4 (H) 11.6 (H)   O2 Sat % 89 92   Nicholas Test   N/A Positive   IFIO2   8 35   SET RESPIRATORY RATE bpm   10   SET PRESS SUPP cmH2O   14   SET PEEP mmhg   6.0   DEVICE   AeroTx BiPAP   COLLECTED BY:   [id number]   [facility]): Data is critically high  (L): Data is abnormally low  (H): Data is abnormally high        Assessment and Plan: Acute on chronic hypoxemic and hypercapnic resp failure, improving  COPD exacerbation  PAH  Non compliant with meds     Treatments:  Plan:  Currently on BiPAP 14/6 FiO2 35% Titrate to 18/6 for persistent hypercarbia-per pulm  Continue Solumedrol 40mg BID  Duoneb inh  Cont  empiric antibiotic  Lovenox.   Pt cleared for LTAC admission       Disposition:   LTAC    Signed:  Harvey Neri MD  1/5/2023, 5:58 PM

## 2023-01-08 LAB
BLOOD CULTURE, ROUTINE: NORMAL
BLOOD CULTURE, ROUTINE: NORMAL

## 2023-01-24 ENCOUNTER — OFFICE VISIT (OUTPATIENT)
Dept: PULMONOLOGY | Age: 53
End: 2023-01-24
Payer: MEDICARE

## 2023-01-24 VITALS
DIASTOLIC BLOOD PRESSURE: 68 MMHG | WEIGHT: 210.6 LBS | SYSTOLIC BLOOD PRESSURE: 108 MMHG | OXYGEN SATURATION: 92 % | TEMPERATURE: 98.3 F | HEIGHT: 70 IN | HEART RATE: 86 BPM | BODY MASS INDEX: 30.15 KG/M2

## 2023-01-24 DIAGNOSIS — J44.9 STAGE 3 SEVERE COPD BY GOLD CLASSIFICATION (HCC): Primary | ICD-10-CM

## 2023-01-24 DIAGNOSIS — Z87.891 PERSONAL HISTORY OF TOBACCO USE, PRESENTING HAZARDS TO HEALTH: ICD-10-CM

## 2023-01-24 DIAGNOSIS — J44.9 STAGE 3 SEVERE COPD BY GOLD CLASSIFICATION (HCC): ICD-10-CM

## 2023-01-24 PROCEDURE — G8417 CALC BMI ABV UP PARAM F/U: HCPCS | Performed by: INTERNAL MEDICINE

## 2023-01-24 PROCEDURE — 99215 OFFICE O/P EST HI 40 MIN: CPT | Performed by: INTERNAL MEDICINE

## 2023-01-24 PROCEDURE — G8484 FLU IMMUNIZE NO ADMIN: HCPCS | Performed by: INTERNAL MEDICINE

## 2023-01-24 PROCEDURE — 3023F SPIROM DOC REV: CPT | Performed by: INTERNAL MEDICINE

## 2023-01-24 PROCEDURE — G8427 DOCREV CUR MEDS BY ELIG CLIN: HCPCS | Performed by: INTERNAL MEDICINE

## 2023-01-24 PROCEDURE — 3017F COLORECTAL CA SCREEN DOC REV: CPT | Performed by: INTERNAL MEDICINE

## 2023-01-24 PROCEDURE — 1111F DSCHRG MED/CURRENT MED MERGE: CPT | Performed by: INTERNAL MEDICINE

## 2023-01-24 PROCEDURE — 1036F TOBACCO NON-USER: CPT | Performed by: INTERNAL MEDICINE

## 2023-01-24 RX ORDER — ARFORMOTEROL TARTRATE 15 UG/2ML
15 SOLUTION RESPIRATORY (INHALATION) 2 TIMES DAILY
Qty: 120 ML | Refills: 11 | Status: SHIPPED | OUTPATIENT
Start: 2023-01-24 | End: 2024-01-24

## 2023-01-24 RX ORDER — IPRATROPIUM BROMIDE AND ALBUTEROL SULFATE 2.5; .5 MG/3ML; MG/3ML
3 SOLUTION RESPIRATORY (INHALATION) EVERY 6 HOURS PRN
Qty: 360 ML | Refills: 9 | Status: SHIPPED | OUTPATIENT
Start: 2023-01-24 | End: 2024-01-24

## 2023-01-24 RX ORDER — BUDESONIDE 0.5 MG/2ML
500 INHALANT ORAL 2 TIMES DAILY
Qty: 120 ML | Refills: 11 | Status: SHIPPED | OUTPATIENT
Start: 2023-01-24 | End: 2024-01-24

## 2023-01-24 RX ORDER — ALBUTEROL SULFATE 90 UG/1
2 AEROSOL, METERED RESPIRATORY (INHALATION) EVERY 6 HOURS PRN
Qty: 18 G | Refills: 8 | Status: SHIPPED | OUTPATIENT
Start: 2023-01-24 | End: 2024-01-24

## 2023-01-24 RX ORDER — IPRATROPIUM BROMIDE AND ALBUTEROL SULFATE 2.5; .5 MG/3ML; MG/3ML
3 SOLUTION RESPIRATORY (INHALATION) EVERY 6 HOURS PRN
Qty: 360 ML | Status: SHIPPED | DISCHARGE
Start: 2023-01-24 | End: 2023-01-24 | Stop reason: SDUPTHER

## 2023-01-24 NOTE — PATIENT INSTRUCTIONS
Recommendations/Plan:  -Continue Pulmicort 0.5 mg 1 nebulization twice daily.  -Start patient on Brovana ( Arformoterol) 15mcg/2ml via nebs bid.  -Continue DuoNebs 3ml via nebulization every 6 hours as needed for shortness of breath.  -He had a difficulty in tolerating inhalers due to his a history of underlying schizophrenia and difficulty in hand mouth coordination and using inhalers. Patient is benefiting from nebulization therapy will continue.  -He was instructed to use Albuterol HFA  inhaler 2 puffs Q6h prn or Albuterol 2.5mg nebs Q6h prn (the nebulizer) at one time as rescue medication not both at the same time. He verbalizes understanding.   -Patient advised to continue current nebulization medications and keep good compliance. Patient verbalizes understanding.  - Patient educated to update his pneumococcal vaccine with family physician and take influenza vaccine in coming season with out fail. Patient verbalizes understanding.  -We will schedule patient for spirometry before next visit.  -He will be considered for pulmonary rehab therapy for his COPD at next visit after reviewing recent spirometry results.  -Will make a follow-up appointment with [facility] sleep clinic to review his noninvasive ventilator therapy/BiPAP therapy for his underlying COPD. Please obtain his old sleep study reports from Dr. Ashley Groves, MDs office for review.  -Jojo Dow needs 2LPM of home O2 at rest. He needs 6LPM of home O2 with exercise. He is currently using a noninvasive ventilator/BiPAP machine at nighttime  - Schedule patient for follow up with my clinic in 3months with recommended test I.e spirometry with BD response. Patient advised to make early appointment if needed. - Patient and his mother were educated about my impression and plan. They verbalizes understanding.

## 2023-01-24 NOTE — PROGRESS NOTES
Neck Circumference -   15  Mallampati - 4    Lung Nodule Screening     [] Qualifies    [] Does not qualify   [] Declined    [] Completed

## 2023-02-23 ENCOUNTER — HOSPITAL ENCOUNTER (INPATIENT)
Age: 53
LOS: 4 days | Discharge: ANOTHER HOSPITAL | DRG: 189 | End: 2023-02-28
Attending: EMERGENCY MEDICINE | Admitting: INTERNAL MEDICINE
Payer: MEDICARE

## 2023-02-23 DIAGNOSIS — J44.1 COPD EXACERBATION (HCC): Primary | ICD-10-CM

## 2023-02-23 DIAGNOSIS — R59.0 LAD (LYMPHADENOPATHY), MEDIASTINAL: ICD-10-CM

## 2023-02-23 DIAGNOSIS — R07.9 CHEST PAIN, UNSPECIFIED TYPE: ICD-10-CM

## 2023-02-23 DIAGNOSIS — J15.9 COMMUNITY ACQUIRED BACTERIAL PNEUMONIA: ICD-10-CM

## 2023-02-23 PROCEDURE — 93005 ELECTROCARDIOGRAM TRACING: CPT | Performed by: STUDENT IN AN ORGANIZED HEALTH CARE EDUCATION/TRAINING PROGRAM

## 2023-02-23 PROCEDURE — 96374 THER/PROPH/DIAG INJ IV PUSH: CPT

## 2023-02-23 PROCEDURE — 99285 EMERGENCY DEPT VISIT HI MDM: CPT

## 2023-02-23 ASSESSMENT — PAIN SCALES - GENERAL: PAINLEVEL_OUTOF10: 10

## 2023-02-23 ASSESSMENT — PAIN - FUNCTIONAL ASSESSMENT: PAIN_FUNCTIONAL_ASSESSMENT: 0-10

## 2023-02-23 ASSESSMENT — PAIN DESCRIPTION - ORIENTATION: ORIENTATION: MID

## 2023-02-23 ASSESSMENT — PAIN DESCRIPTION - LOCATION: LOCATION: CHEST

## 2023-02-23 ASSESSMENT — PAIN DESCRIPTION - FREQUENCY: FREQUENCY: CONTINUOUS

## 2023-02-23 ASSESSMENT — PAIN DESCRIPTION - DESCRIPTORS: DESCRIPTORS: STABBING

## 2023-02-24 ENCOUNTER — APPOINTMENT (OUTPATIENT)
Dept: CT IMAGING | Age: 53
DRG: 189 | End: 2023-02-24
Payer: MEDICARE

## 2023-02-24 ENCOUNTER — APPOINTMENT (OUTPATIENT)
Dept: GENERAL RADIOLOGY | Age: 53
DRG: 189 | End: 2023-02-24
Payer: MEDICARE

## 2023-02-24 PROBLEM — J44.1 COPD EXACERBATION (HCC): Status: ACTIVE | Noted: 2023-02-24

## 2023-02-24 LAB
ALBUMIN SERPL BCG-MCNC: 4.5 G/DL (ref 3.5–5.1)
ALP SERPL-CCNC: 117 U/L (ref 38–126)
ALT SERPL W/O P-5'-P-CCNC: 12 U/L (ref 11–66)
ANION GAP SERPL CALC-SCNC: 11 MEQ/L (ref 8–16)
ARTERIAL PATENCY WRIST A: POSITIVE
AST SERPL-CCNC: 9 U/L (ref 5–40)
BASE EXCESS BLDA CALC-SCNC: 7.2 MMOL/L (ref -2.5–2.5)
BASE EXCESS BLDA CALC-SCNC: 8 MMOL/L (ref -2.5–2.5)
BASE EXCESS BLDA CALC-SCNC: 8.9 MMOL/L (ref -2–3)
BASOPHILS ABSOLUTE: 0 THOU/MM3 (ref 0–0.1)
BASOPHILS NFR BLD AUTO: 0.4 %
BDY SITE: ABNORMAL
BDY SITE: ABNORMAL
BILIRUB SERPL-MCNC: 0.2 MG/DL (ref 0.3–1.2)
BREATHS SETTING VENT: 20 BPM
BUN SERPL-MCNC: 12 MG/DL (ref 7–22)
CA-I BLD ISE-SCNC: 1.2 MMOL/L (ref 1.12–1.32)
CALCIUM SERPL-MCNC: 9.9 MG/DL (ref 8.5–10.5)
CHLORIDE BLD-SCNC: 98 MEQ/L (ref 98–109)
CHLORIDE SERPL-SCNC: 97 MEQ/L (ref 98–111)
CO2 SERPL-SCNC: 35 MEQ/L (ref 23–33)
COLLECTED BY:: ABNORMAL
CREAT SERPL-MCNC: 0.6 MG/DL (ref 0.4–1.2)
DEPRECATED RDW RBC AUTO: 48.7 FL (ref 35–45)
DEVICE: ABNORMAL
EOSINOPHIL NFR BLD AUTO: 1.2 %
EOSINOPHILS ABSOLUTE: 0.1 THOU/MM3 (ref 0–0.4)
ERYTHROCYTE [DISTWIDTH] IN BLOOD BY AUTOMATED COUNT: 13.7 % (ref 11.5–14.5)
FIO2 ON VENT O2 ANALYZER: 45 %
FIO2 ON VENT O2 ANALYZER: 60 %
FIO2 ON VENT O2 ANALYZER: 90 %
FLUAV RNA RESP QL NAA+PROBE: NOT DETECTED
FLUBV RNA RESP QL NAA+PROBE: NOT DETECTED
GFR SERPL CREATININE-BSD FRML MDRD: > 60 ML/MIN/1.73M2
GLUCOSE BLD STRIP.AUTO-MCNC: 124 MG/DL (ref 70–108)
GLUCOSE BLD-MCNC: 104 MG/DL (ref 70–108)
GLUCOSE SERPL-MCNC: 136 MG/DL (ref 70–108)
HCO3 BLDA-SCNC: 36 MMOL/L (ref 23–28)
HCO3 BLDA-SCNC: 36 MMOL/L (ref 23–28)
HCO3 BLDA-SCNC: 40 MMOL/L (ref 23–28)
HCT VFR BLD AUTO: 46.4 % (ref 42–52)
HGB BLD-MCNC: 14.3 GM/DL (ref 14–18)
IMM GRANULOCYTES # BLD AUTO: 0.14 THOU/MM3 (ref 0–0.07)
IMM GRANULOCYTES NFR BLD AUTO: 1.2 %
L PNEUMO1 AG UR QL IA.RAPID: NEGATIVE
LACTATE BLD-SCNC: 1.3 MMOL/L (ref 0.5–1.9)
LYMPHOCYTES ABSOLUTE: 1.6 THOU/MM3 (ref 1–4.8)
LYMPHOCYTES NFR BLD AUTO: 13.2 %
MCH RBC QN AUTO: 29.7 PG (ref 26–33)
MCHC RBC AUTO-ENTMCNC: 30.8 GM/DL (ref 32.2–35.5)
MCV RBC AUTO: 96.3 FL (ref 80–94)
MONOCYTES ABSOLUTE: 0.6 THOU/MM3 (ref 0.4–1.3)
MONOCYTES NFR BLD AUTO: 5.3 %
NEUTROPHILS NFR BLD AUTO: 78.7 %
NRBC BLD AUTO-RTO: 0 /100 WBC
NT-PROBNP SERPL IA-MCNC: 114.7 PG/ML (ref 0–124)
OSMOLALITY SERPL CALC.SUM OF ELEC: 286.8 MOSMOL/KG (ref 275–300)
PCO2 BLDA: 67 MMHG (ref 35–45)
PCO2 BLDA: 71 MMHG (ref 35–45)
PCO2 TEMP ADJ BLDMV: 89 MMHG (ref 41–51)
PEEP SETTING VENT: 6 MMHG
PEEP SETTING VENT: 8 MMHG
PH BLDA: 7.32 [PH] (ref 7.35–7.45)
PH BLDA: 7.34 [PH] (ref 7.35–7.45)
PH BLDMV: 7.27 [PH] (ref 7.31–7.41)
PIP: 18 CMH2O
PIP: 20 CMH2O
PLATELET # BLD AUTO: 333 THOU/MM3 (ref 130–400)
PMV BLD AUTO: 9.1 FL (ref 9.4–12.4)
PO2 BLDA: 108 MMHG (ref 71–104)
PO2 BLDA: 52 MMHG (ref 71–104)
PO2 BLDMV: 42 MMHG (ref 25–40)
POC CREATININE WHOLE BLOOD: 0.9 MG/DL (ref 0.5–1.2)
POTASSIUM BLD-SCNC: 4.6 MEQ/L (ref 3.5–4.9)
POTASSIUM SERPL-SCNC: 4.1 MEQ/L (ref 3.5–5.2)
PRESSURE SUPPORT SETTING VENT: 24 CMH2O
PROCALCITONIN SERPL IA-MCNC: 0.07 NG/ML (ref 0.01–0.09)
PROT SERPL-MCNC: 8.4 G/DL (ref 6.1–8)
RBC # BLD AUTO: 4.82 MILL/MM3 (ref 4.7–6.1)
SAO2 % BLDA: 82 %
SAO2 % BLDA: 97 %
SAO2 % BLDMV: 66 %
SARS-COV-2 RNA RESP QL NAA+PROBE: NOT DETECTED
SEGMENTED NEUTROPHILS ABSOLUTE COUNT: 9.5 THOU/MM3 (ref 1.8–7.7)
SET PEEP: 8 MMHG
SET RESPIRATORY RATE: 24 BPM
SITE: ABNORMAL
SODIUM BLD-SCNC: 139 MEQ/L (ref 138–146)
SODIUM SERPL-SCNC: 143 MEQ/L (ref 135–145)
STREP PNEUMO AG, UR: NEGATIVE
TROPONIN T: < 0.01 NG/ML
VENTILATION MODE VENT: ABNORMAL
WBC # BLD AUTO: 12.1 THOU/MM3 (ref 4.8–10.8)

## 2023-02-24 PROCEDURE — 84132 ASSAY OF SERUM POTASSIUM: CPT

## 2023-02-24 PROCEDURE — 71045 X-RAY EXAM CHEST 1 VIEW: CPT

## 2023-02-24 PROCEDURE — 87636 SARSCOV2 & INF A&B AMP PRB: CPT

## 2023-02-24 PROCEDURE — 85025 COMPLETE CBC W/AUTO DIFF WBC: CPT

## 2023-02-24 PROCEDURE — 82330 ASSAY OF CALCIUM: CPT

## 2023-02-24 PROCEDURE — 80053 COMPREHEN METABOLIC PANEL: CPT

## 2023-02-24 PROCEDURE — 82435 ASSAY OF BLOOD CHLORIDE: CPT

## 2023-02-24 PROCEDURE — 6360000002 HC RX W HCPCS: Performed by: STUDENT IN AN ORGANIZED HEALTH CARE EDUCATION/TRAINING PROGRAM

## 2023-02-24 PROCEDURE — 87040 BLOOD CULTURE FOR BACTERIA: CPT

## 2023-02-24 PROCEDURE — 87449 NOS EACH ORGANISM AG IA: CPT

## 2023-02-24 PROCEDURE — 83605 ASSAY OF LACTIC ACID: CPT

## 2023-02-24 PROCEDURE — 6360000002 HC RX W HCPCS: Performed by: INTERNAL MEDICINE

## 2023-02-24 PROCEDURE — 2580000003 HC RX 258: Performed by: INTERNAL MEDICINE

## 2023-02-24 PROCEDURE — 83880 ASSAY OF NATRIURETIC PEPTIDE: CPT

## 2023-02-24 PROCEDURE — 99222 1ST HOSP IP/OBS MODERATE 55: CPT | Performed by: INTERNAL MEDICINE

## 2023-02-24 PROCEDURE — 6370000000 HC RX 637 (ALT 250 FOR IP): Performed by: INTERNAL MEDICINE

## 2023-02-24 PROCEDURE — 82947 ASSAY GLUCOSE BLOOD QUANT: CPT

## 2023-02-24 PROCEDURE — 36415 COLL VENOUS BLD VENIPUNCTURE: CPT

## 2023-02-24 PROCEDURE — 84295 ASSAY OF SERUM SODIUM: CPT

## 2023-02-24 PROCEDURE — 94761 N-INVAS EAR/PLS OXIMETRY MLT: CPT

## 2023-02-24 PROCEDURE — 6360000004 HC RX CONTRAST MEDICATION: Performed by: EMERGENCY MEDICINE

## 2023-02-24 PROCEDURE — 84145 PROCALCITONIN (PCT): CPT

## 2023-02-24 PROCEDURE — 2700000000 HC OXYGEN THERAPY PER DAY

## 2023-02-24 PROCEDURE — 2140000000 HC CCU INTERMEDIATE R&B

## 2023-02-24 PROCEDURE — 94640 AIRWAY INHALATION TREATMENT: CPT

## 2023-02-24 PROCEDURE — 93010 ELECTROCARDIOGRAM REPORT: CPT | Performed by: NUCLEAR MEDICINE

## 2023-02-24 PROCEDURE — 82948 REAGENT STRIP/BLOOD GLUCOSE: CPT

## 2023-02-24 PROCEDURE — 36600 WITHDRAWAL OF ARTERIAL BLOOD: CPT

## 2023-02-24 PROCEDURE — 87899 AGENT NOS ASSAY W/OPTIC: CPT

## 2023-02-24 PROCEDURE — 94660 CPAP INITIATION&MGMT: CPT

## 2023-02-24 PROCEDURE — 84484 ASSAY OF TROPONIN QUANT: CPT

## 2023-02-24 PROCEDURE — 82803 BLOOD GASES ANY COMBINATION: CPT

## 2023-02-24 PROCEDURE — 71275 CT ANGIOGRAPHY CHEST: CPT

## 2023-02-24 PROCEDURE — 82565 ASSAY OF CREATININE: CPT

## 2023-02-24 RX ORDER — DOCUSATE SODIUM 100 MG/1
100 CAPSULE, LIQUID FILLED ORAL DAILY
Status: DISCONTINUED | OUTPATIENT
Start: 2023-02-24 | End: 2023-02-28 | Stop reason: HOSPADM

## 2023-02-24 RX ORDER — ALBUTEROL SULFATE 2.5 MG/3ML
2.5 SOLUTION RESPIRATORY (INHALATION) ONCE
Status: COMPLETED | OUTPATIENT
Start: 2023-02-24 | End: 2023-02-24

## 2023-02-24 RX ORDER — ONDANSETRON 4 MG/1
4 TABLET, ORALLY DISINTEGRATING ORAL EVERY 8 HOURS PRN
Status: DISCONTINUED | OUTPATIENT
Start: 2023-02-24 | End: 2023-02-28 | Stop reason: HOSPADM

## 2023-02-24 RX ORDER — AZITHROMYCIN 250 MG/1
500 TABLET, FILM COATED ORAL DAILY
Status: DISPENSED | OUTPATIENT
Start: 2023-02-24 | End: 2023-02-25

## 2023-02-24 RX ORDER — ONDANSETRON 2 MG/ML
4 INJECTION INTRAMUSCULAR; INTRAVENOUS EVERY 6 HOURS PRN
Status: DISCONTINUED | OUTPATIENT
Start: 2023-02-24 | End: 2023-02-28 | Stop reason: HOSPADM

## 2023-02-24 RX ORDER — METHYLPREDNISOLONE SODIUM SUCCINATE 40 MG/ML
40 INJECTION, POWDER, LYOPHILIZED, FOR SOLUTION INTRAMUSCULAR; INTRAVENOUS EVERY 12 HOURS
Status: DISCONTINUED | OUTPATIENT
Start: 2023-02-25 | End: 2023-02-27

## 2023-02-24 RX ORDER — ASPIRIN 81 MG/1
81 TABLET, CHEWABLE ORAL DAILY
Status: DISCONTINUED | OUTPATIENT
Start: 2023-02-24 | End: 2023-02-28 | Stop reason: HOSPADM

## 2023-02-24 RX ORDER — NITROGLYCERIN 0.4 MG/1
0.4 TABLET SUBLINGUAL EVERY 5 MIN PRN
Status: DISCONTINUED | OUTPATIENT
Start: 2023-02-24 | End: 2023-02-28 | Stop reason: HOSPADM

## 2023-02-24 RX ORDER — CARBAMAZEPINE 200 MG/1
200 TABLET ORAL 2 TIMES DAILY
Status: DISCONTINUED | OUTPATIENT
Start: 2023-02-24 | End: 2023-02-28 | Stop reason: HOSPADM

## 2023-02-24 RX ORDER — AZITHROMYCIN 250 MG/1
250 TABLET, FILM COATED ORAL DAILY
Status: COMPLETED | OUTPATIENT
Start: 2023-02-25 | End: 2023-02-28

## 2023-02-24 RX ORDER — DOXYCYCLINE HYCLATE 100 MG
100 TABLET ORAL EVERY 12 HOURS SCHEDULED
Status: DISCONTINUED | OUTPATIENT
Start: 2023-02-24 | End: 2023-02-24

## 2023-02-24 RX ORDER — PANTOPRAZOLE SODIUM 40 MG/1
40 TABLET, DELAYED RELEASE ORAL
Status: DISCONTINUED | OUTPATIENT
Start: 2023-02-24 | End: 2023-02-28 | Stop reason: HOSPADM

## 2023-02-24 RX ORDER — METHYLPREDNISOLONE SODIUM SUCCINATE 125 MG/2ML
125 INJECTION, POWDER, LYOPHILIZED, FOR SOLUTION INTRAMUSCULAR; INTRAVENOUS ONCE
Status: COMPLETED | OUTPATIENT
Start: 2023-02-24 | End: 2023-02-24

## 2023-02-24 RX ORDER — IPRATROPIUM BROMIDE AND ALBUTEROL SULFATE 2.5; .5 MG/3ML; MG/3ML
1 SOLUTION RESPIRATORY (INHALATION)
Status: DISCONTINUED | OUTPATIENT
Start: 2023-02-24 | End: 2023-02-24 | Stop reason: CLARIF

## 2023-02-24 RX ORDER — NICOTINE 21 MG/24HR
1 PATCH, TRANSDERMAL 24 HOURS TRANSDERMAL NIGHTLY
Status: DISCONTINUED | OUTPATIENT
Start: 2023-02-24 | End: 2023-02-28 | Stop reason: HOSPADM

## 2023-02-24 RX ORDER — BUPROPION HYDROCHLORIDE 150 MG/1
150 TABLET ORAL EVERY MORNING
Status: DISCONTINUED | OUTPATIENT
Start: 2023-02-24 | End: 2023-02-28 | Stop reason: HOSPADM

## 2023-02-24 RX ORDER — ALBUTEROL SULFATE 2.5 MG/3ML
2.5 SOLUTION RESPIRATORY (INHALATION)
Status: DISCONTINUED | OUTPATIENT
Start: 2023-02-24 | End: 2023-02-27

## 2023-02-24 RX ORDER — METHYLPREDNISOLONE SODIUM SUCCINATE 40 MG/ML
40 INJECTION, POWDER, LYOPHILIZED, FOR SOLUTION INTRAMUSCULAR; INTRAVENOUS EVERY 6 HOURS
Status: DISCONTINUED | OUTPATIENT
Start: 2023-02-24 | End: 2023-02-24

## 2023-02-24 RX ORDER — PRAVASTATIN SODIUM 40 MG
40 TABLET ORAL NIGHTLY
Status: DISCONTINUED | OUTPATIENT
Start: 2023-02-24 | End: 2023-02-28 | Stop reason: HOSPADM

## 2023-02-24 RX ORDER — ACETAMINOPHEN 325 MG/1
650 TABLET ORAL EVERY 4 HOURS PRN
Status: DISCONTINUED | OUTPATIENT
Start: 2023-02-24 | End: 2023-02-28 | Stop reason: HOSPADM

## 2023-02-24 RX ORDER — ENOXAPARIN SODIUM 100 MG/ML
40 INJECTION SUBCUTANEOUS DAILY
Status: DISCONTINUED | OUTPATIENT
Start: 2023-02-24 | End: 2023-02-28 | Stop reason: HOSPADM

## 2023-02-24 RX ORDER — RISPERIDONE 1 MG/1
2 TABLET ORAL 2 TIMES DAILY
Status: DISCONTINUED | OUTPATIENT
Start: 2023-02-24 | End: 2023-02-28 | Stop reason: HOSPADM

## 2023-02-24 RX ADMIN — PRAVASTATIN SODIUM 40 MG: 40 TABLET ORAL at 21:00

## 2023-02-24 RX ADMIN — IPRATROPIUM BROMIDE 0.5 MG: 0.5 SOLUTION RESPIRATORY (INHALATION) at 11:31

## 2023-02-24 RX ADMIN — ALBUTEROL SULFATE 2.5 MG: 2.5 SOLUTION RESPIRATORY (INHALATION) at 19:51

## 2023-02-24 RX ADMIN — ALBUTEROL SULFATE 2.5 MG: 2.5 SOLUTION RESPIRATORY (INHALATION) at 17:40

## 2023-02-24 RX ADMIN — CARBAMAZEPINE 200 MG: 200 TABLET ORAL at 21:00

## 2023-02-24 RX ADMIN — ALBUTEROL SULFATE 2.5 MG: 2.5 SOLUTION RESPIRATORY (INHALATION) at 11:31

## 2023-02-24 RX ADMIN — ALBUTEROL SULFATE 2.5 MG: 2.5 SOLUTION RESPIRATORY (INHALATION) at 02:14

## 2023-02-24 RX ADMIN — ENOXAPARIN SODIUM 40 MG: 100 INJECTION SUBCUTANEOUS at 11:48

## 2023-02-24 RX ADMIN — IPRATROPIUM BROMIDE 0.5 MG: 0.5 SOLUTION RESPIRATORY (INHALATION) at 02:14

## 2023-02-24 RX ADMIN — METHYLPREDNISOLONE SODIUM SUCCINATE 40 MG: 40 INJECTION, POWDER, FOR SOLUTION INTRAMUSCULAR; INTRAVENOUS at 11:48

## 2023-02-24 RX ADMIN — RISPERIDONE 2 MG: 1 TABLET ORAL at 21:00

## 2023-02-24 RX ADMIN — CEFTRIAXONE SODIUM 1000 MG: 1 INJECTION, POWDER, FOR SOLUTION INTRAMUSCULAR; INTRAVENOUS at 11:52

## 2023-02-24 RX ADMIN — IPRATROPIUM BROMIDE 0.5 MG: 0.5 SOLUTION RESPIRATORY (INHALATION) at 17:40

## 2023-02-24 RX ADMIN — METHYLPREDNISOLONE SODIUM SUCCINATE 125 MG: 125 INJECTION, POWDER, FOR SOLUTION INTRAMUSCULAR; INTRAVENOUS at 02:19

## 2023-02-24 RX ADMIN — IPRATROPIUM BROMIDE 0.5 MG: 0.5 SOLUTION RESPIRATORY (INHALATION) at 19:51

## 2023-02-24 RX ADMIN — IOPAMIDOL 80 ML: 755 INJECTION, SOLUTION INTRAVENOUS at 00:42

## 2023-02-24 ASSESSMENT — PAIN - FUNCTIONAL ASSESSMENT
PAIN_FUNCTIONAL_ASSESSMENT: 0-10
PAIN_FUNCTIONAL_ASSESSMENT: NONE - DENIES PAIN

## 2023-02-24 ASSESSMENT — PAIN SCALES - GENERAL: PAINLEVEL_OUTOF10: 10

## 2023-02-24 NOTE — PLAN OF CARE
Problem: Discharge Planning  Goal: Discharge to home or other facility with appropriate resources  2/24/2023 1720 by Sammi Amaral RN  Outcome: Progressing  Flowsheets (Taken 2/24/2023 1720)  Discharge to home or other facility with appropriate resources:   Identify barriers to discharge with patient and caregiver   Identify discharge learning needs (meds, wound care, etc)   Refer to discharge planning if patient needs post-hospital services based on physician order or complex needs related to functional status, cognitive ability or social support system   Arrange for needed discharge resources and transportation as appropriate  2/24/2023 1716 by Sammi Amaral RN  Outcome: Progressing  [address] (Taken 2/24/2023 1344 by Shaun Shearer RN)  Discharge to home or other facility with appropriate resources: Identify barriers to discharge with patient and caregiver     Problem: Respiratory - Adult  Goal: Clear lung sounds  2/24/2023 1136 by Chris Moody RCP  Outcome: Progressing  Goal: Achieves optimal ventilation and oxygenation  2/24/2023 1720 by Sammi Amaral RN  Outcome: Progressing  Flowsheets (Taken 2/24/2023 1720)  Achieves optimal ventilation and oxygenation:   Assess for changes in respiratory status   Position to facilitate oxygenation and minimize respiratory effort   Respiratory therapy support as indicated   Assess for changes in mentation and behavior   Oxygen supplementation based on oxygen saturation or arterial blood gases   Encourage broncho-pulmonary hygiene including cough, deep breathe, incentive spirometry   Assess and instruct to report shortness of breath or any respiratory difficulty  2/24/2023 1716 by Sammi Amaral RN  Outcome: Progressing  Goal: Able to breathe comfortably  Description: Able to breathe comfortably  Outcome: Progressing  Goal: Blood gas, arterial, within specified parameters  Outcome: Progressing     Problem: Skin/Tissue Integrity  Goal: Absence of new skin breakdown  Description: 1. Monitor for areas of redness and/or skin breakdown  2. Assess vascular access sites hourly  3. Every 4-6 hours minimum:  Change oxygen saturation probe site  4. Every 4-6 hours:  If on nasal continuous positive airway pressure, respiratory therapy assess nares and determine need for appliance change or resting period. 2/24/2023 1720 by Nathalia Arguello RN  Outcome: Progressing  2/24/2023 1716 by Nathalia Arguello RN  Outcome: Progressing     Problem: Safety - Adult  Goal: Free from fall injury  2/24/2023 1720 by Nathalia Arguello RN  Outcome: Progressing  Flowsheets (Taken 2/24/2023 1720)  Free From Fall Injury: Instruct family/caregiver on patient safety  2/24/2023 1716 by Nathalia Arguello RN  Outcome: Progressing     Problem: ABCDS Injury Assessment  Goal: Absence of physical injury  2/24/2023 1720 by Nathalia Arguello RN  Outcome: Progressing  Flowsheets (Taken 2/24/2023 1720)  Absence of Physical Injury: Implement safety measures based on patient assessment  2/24/2023 1716 by Nathalia Arguello RN  Outcome: Progressing   Care plan reviewed with patient. Patient verbalizes understanding of the care plan and contributed to goal setting.

## 2023-02-24 NOTE — ED NOTES
IV access established. Labs collected. COVID/Flu swab collected.      Lashaun Mcintosh RN  02/24/23 3429

## 2023-02-24 NOTE — RT PROTOCOL NOTE
RT Inhaler-Nebulizer Bronchodilator Protocol Note    There is a bronchodilator order in the chart from a provider indicating to follow the RT Bronchodilator Protocol and there is an Initiate RT Inhaler-Nebulizer Bronchodilator Protocol order as well (see protocol at bottom of note). CXR Findings:  XR CHEST PORTABLE    Result Date: 2/24/2023  Impression: Stable prominent reticular densities bilaterally may reflect interstitial edema or fibrosis. Cardiac silhouette mildly enlarged. Emphysema. This document has been electronically signed by: Raymond Savage MD on 02/24/2023 12:32 AM      The findings from the last RT Protocol Assessment were as follows:   History Pulmonary Disease: Chronic pulmonary disease  Respiratory Pattern: Mild dyspnea at rest, irregular pattern, or RR 21-25 bpm  Breath Sounds: Slightly diminished and/or crackles  Cough: Strong, spontaneous, non-productive  Indication for Bronchodilator Therapy: Decreased or absent breath sounds  Bronchodilator Assessment Score: 8    Aerosolized bronchodilator medication orders have been revised according to the RT Inhaler-Nebulizer Bronchodilator Protocol below. Respiratory Therapist to perform RT Therapy Protocol Assessment initially then follow the protocol. Repeat RT Therapy Protocol Assessment PRN for score 0-3 or on second treatment, BID, and PRN for scores above 3. No Indications - adjust the frequency to every 6 hours PRN wheezing or bronchospasm, if no treatments needed after 48 hours then discontinue using Per Protocol order mode. If indication present, adjust the RT bronchodilator orders based on the Bronchodilator Assessment Score as indicated below.   Use Inhaler orders unless patient has one or more of the following: on home nebulizer, not able to hold breath for 10 seconds, is not alert and oriented, cannot activate and use MDI correctly, or respiratory rate 25 breaths per minute or more, then use the equivalent nebulizer order(s) with same Frequency and PRN reasons based on the score. If a patient is on this medication at home then do not decrease Frequency below that used at home. 0-3 - enter or revise RT bronchodilator order(s) to equivalent RT Bronchodilator order with Frequency of every 4 hours PRN for wheezing or increased work of breathing using Per Protocol order mode. 4-6 - enter or revise RT Bronchodilator order(s) to two equivalent RT bronchodilator orders with one order with BID Frequency and one order with Frequency of every 4 hours PRN wheezing or increased work of breathing using Per Protocol order mode. 7-10 - enter or revise RT Bronchodilator order(s) to two equivalent RT bronchodilator orders with one order with TID Frequency and one order with Frequency of every 4 hours PRN wheezing or increased work of breathing using Per Protocol order mode. 11-13 - enter or revise RT Bronchodilator order(s) to one equivalent RT bronchodilator order with QID Frequency and an Albuterol order with Frequency of every 4 hours PRN wheezing or increased work of breathing using Per Protocol order mode. Greater than 13 - enter or revise RT Bronchodilator order(s) to one equivalent RT bronchodilator order with every 4 hours Frequency and an Albuterol order with Frequency of every 2 hours PRN wheezing or increased work of breathing using Per Protocol order mode. RT to enter RT Home Evaluation for COPD & MDI Assessment order using Per Protocol order mode.     Electronically signed by Louise Ferris RCP on 2/24/2023 at 9:48 AM

## 2023-02-24 NOTE — ED NOTES
Pt transported to [facility] by cart in stable condition. Called [facility] and informed them that the patient was on their way to the unit.       Paula Christie LPN  31/95/71 1833

## 2023-02-24 NOTE — H&P
Internal Medicine  History and Physical    Patient: Geremias Guerin  MRN: [id number]      History Obtained From:  patient, Mother over the phone  PCP: Marco Almonte MD    CHIEF COMPLAINT:  SOB    HISTORY OF PRESENT ILLNESS:   The patient is a 45 y.o. male who presents with worsening SOB, cough. Cough is mainly dry, no associated fever, no chills, no overt CP. He has COPD, a smoker, on home Nippv device, ? Compliance with use. In ED, he was found to be hypoxemic with significant CO2 retention. He was treated with albuterol / atrovents and started on BiPAP. Past Medical History:        Diagnosis Date    Acute on chronic systolic congestive heart failure ([facility] [address] 4/4/2019    Emphysema (subcutaneous) (surgical) resulting from a procedure     Hypertension     Pneumonia     Schizophrenia [facility])        Past Surgical History:    History reviewed. No pertinent surgical history. Medications Prior to Admission:    Prior to Admission medications    Medication Sig Start Date End Date Taking? Authorizing Provider   ipratropium-albuterol (DUONEB) 0.5-2.5 (3) MG/3ML SOLN nebulizer solution Inhale 3 mLs into the lungs every 6 hours as needed for Shortness of Breath 1/24/23 1/24/24  Sean Gutierres MD   budesonide (PULMICORT) 0.5 MG/2ML nebulizer suspension Take 2 mLs by nebulization 2 times daily 1/24/23 1/24/24  Sean Gutierres MD   arformoterol tartrate (BROVANA) 15 MCG/2ML NEBU Take 2 mLs by nebulization in the morning and 2 mLs in the evening.  1/24/23 1/24/24  Sean Gutierres MD   albuterol sulfate HFA (VENTOLIN HFA) 108 (90 Base) MCG/ACT inhaler Inhale 2 puffs into the lungs every 6 hours as needed for Wheezing or Shortness of Breath 1/24/23 1/24/24  Sean Gutierres MD   docusate sodium (COLACE, DULCOLAX) 100 MG CAPS Take 100 mg by mouth daily 1/6/23   Marco Almonte MD   enoxaparin (LOVENOX) 40 MG/0.4ML Inject 0.4 mLs into the skin every 24 hours 1/5/23   Marco Almonte MD   famotidine (PEPCID) 20 MG tablet Take 1 tablet by mouth 2 times daily 1/5/23   Yu Coles MD   methylPREDNISolone sodium (SOLU-MEDROL) 40 MG injection Infuse 1 mL intravenously in the morning and 1 mL in the evening. 1/5/23   Yu Coles MD   traZODone (DESYREL) 100 MG tablet take 1 tablet by mouth at bedtime 11/18/22   Historical Provider, MD   potassium chloride (KLOR-CON M) 20 MEQ extended release tablet take 1 tablet by mouth once daily WITH BREAKFAST 11/17/22   Historical Provider, MD   haloperidol decanoate (HALDOL DECANOATE) 50 MG/ML injection inject 1 milliliter intramuscularly every 2 WEEKS 10/30/22   Historical Provider, MD   furosemide (LASIX) 20 MG tablet take 1 tablet by mouth once daily 11/17/22   Historical Provider, MD   buPROPion (WELLBUTRIN XL) 150 MG extended release tablet Take 1 tablet by mouth every morning 8/7/22   Yu Coles MD   risperiDONE (RISPERDAL) 2 MG tablet Take 1 tablet by mouth 2 times daily 5/5/22   Yu Coles MD   Spacer/Aero-Holding Chambers CAREN 1 Device by Does not apply route daily 12/23/21   Yu Coles MD   aspirin 325 MG EC tablet Take 1 tablet by mouth daily 12/17/19   Yu Coles MD   pravastatin (PRAVACHOL) 40 MG tablet Take 40 mg by mouth nightly    Historical Provider, MD   carBAMazepine (TEGRETOL) 200 MG tablet Take 200 mg by mouth 2 times daily    Historical Provider, MD       Allergies:  Patient has no known allergies. Social History:   TOBACCO:   reports that he quit smoking about 4 months ago. His smoking use included cigars and cigarettes. He has never used smokeless tobacco.  ETOH:   reports no history of alcohol use.       Family History:       Problem Relation Age of Onset    High Blood Pressure Mother        REVIEW OF SYSTEMS:  CONSTITUTIONAL:  positive for  fatigue and malaise  negative for  fevers and chills  EYES:  negative for  redness and icterus  HEENT:  negative for  sore mouth, sore throat, and hoarseness  RESPIRATORY:  positive for  dry cough, dyspnea, and wheezing  negative for  chest pain and pleuritic pain  CARDIOVASCULAR:  positive for  dyspnea  negative for  orthopnea, PND, exertional chest pressure/discomfort  GASTROINTESTINAL:  negative for nausea, vomiting, change in bowel habits, abdominal pain, abdominal mass, and abdominal distention  GENITOURINARY:  negative for frequency and dysuria  INTEGUMENT/BREAST:  negative  HEMATOLOGIC/LYMPHATIC:  negative for easy bruising and bleeding  ALLERGIC/IMMUNOLOGIC:  negative  ENDOCRINE:  negative for heat intolerance and cold intolerance  MUSCULOSKELETAL:  negative  NEUROLOGICAL:  negative for headaches, dizziness, and seizures  BEHAVIOR/PSYCH:  positive for increased sleep, decreased energy level, depressed mood, and fatigue and negative for poor appetite    Physical Exam:    Vitals: BP (!) 99/57   Pulse 72   Temp 97.9 °F (36.6 °C) (Axillary)   Resp 24   Ht 5' 10\" (1.778 m)   Wt 194 lb 3.2 oz (88.1 kg)   SpO2 100%   BMI 27.86 kg/m²   CONSTITUTIONAL:  fatigued, lethargy, cooperative, mild distress, and appears stated age  EYES:  extra-ocular muscles intact  ENT:  atraumatic, on BIPAP  NECK:  supple, symmetrical, trachea midline  HEMATOLOGIC/LYMPHATICS:  no cervical lymphadenopathy and no supraclavicular lymphadenopathy  BACK:  symmetric  LUNGS:  Moderate respiratory distress and diminished breath sounds throughout lungs  CARDIOVASCULAR:  normal S1 and S2 and no edema  ABDOMEN:  No scars, normal bowel sounds, soft, non-distended, non-tender, no masses palpated, no hepatosplenomegally  MUSCULOSKELETAL:  there is no redness, warmth, or swelling of the joints  NEUROLOGIC:  Mental Status Exam:  Level of Alertness:   lethargic  Orientation:   person, place  Cranial Nerves:  cranial nerves II-XII are grossly intact  Motor Exam:  Motor exam is symmetrical 5 out of 5 all extremities bilaterally  Sensory:  Sensory intact  SKIN:  normal skin color, texture, turgor      CBC:   Recent Labs     02/24/23  0020 WBC 12.1*   HGB 14.3        BMP:    Recent Labs     23  0020      K 4.1   CL 97*   CO2 35*   BUN 12   CREATININE 0.6   GLUCOSE 136*     Hepatic:   Recent Labs     23  0020   AST 9   ALT 12   BILITOT 0.2*   ALKPHOS 117     SARS-CoV-2 RNA, RT PCR NOT DETECTED     INFLUENZA A NOT DETECTED     INFLUENZA B NOT DETECTED     Pro-.7     PA/lat CXR: Stable prominent reticular densities bilaterally may reflect interstitial   edema or fibrosis. Cardiac silhouette mildly enlarged. Emphysema. CTA CHEST [address] [[id number]]    Collected: 23    Updated: 23    Narrative:     CTA chest with 3-D postprocessing     Comparison: 1/3/23. Chest radiograph of the same day. Findings:   Study quality is adequate for the diagnosis of pulmonary embolism. No pulmonary embolism. Upper limit of normal sized heart. RV/LV ratio is normal. Moderate   calcified coronary artery disease. No aortic dissection or aneurysm. Mild calcified atherosclerotic disease. Mediastinal and hilar lymph nodes measure up to 1.0 cm in short axis. Advanced destructive centrilobular emphysema. Moderate paraseptal   emphysema. Moderate bronchial wall thickening. There is a mild amount of   bilateral ground-glass and nodular opacities, new since the prior study. Questionable trace interlobular septal thickening. No pneumothorax or pleural effusion. No acute osseous or soft tissue abnormality. Bilateral gynecomastia   No acute pathology in the imaged portion of the upper abdomen. Impression:     Impression:   No pulmonary embolism. Mild amount of bilateral ground-glass and nodular opacities which are new   since 1/3/23 and likely infectious/inflammatory. Three-month follow-up   chest CT can be performed to confirm resolution. Questionable concurrent mild cardiogenic pulmonary edema.            Ventricular Rate 94 BPM    Atrial Rate 94 BPM    P-R Interval 148 ms    QRS Duration 96 ms    Q-T Interval 338 ms    QTc Calculation (Bazett) 422 ms    P Axis 39 degrees    R Axis 90 degrees    T Axis 65 degrees   Narrative:     Normal sinus rhythm   Rightward axis   Nonspecific T wave abnormality       Blood gas, venous [[id number]] (Abnormal)    Collected: 02/24/23 0426    Updated: 02/24/23 0433    Specimen Source: Blood gases     PH MIXED 7.27 Low     PCO2, MIXED VENOUS 89 High  mmhg    PO2, Mixed 42 High  mmhg    HCO3, Mixed 40 High  mmol/l    Base Exc, Mixed 8.9 High  mmol/l    O2 Sat, Mixed 66 %    FIO2, MIXED VENOUS 90    COLLECTED BY: [id number]    DEVICE BiPAP    SET PEEP 8.0 mmhg    PIP 18 cmH2O    SET RESPIRATORY RATE 24        Assessment and Plan   Acute on chronic hypercapnic / hypoxemic resp failure  COPD exacerbation  Schizoaffective disorder    Bipap, O2  Duoneb aerosols qid and albuterol prn  Solumedrol  Rocephin  Lovenox  Resume risperidol  F/up abg    Patient Active Problem List   Diagnosis Code    Acute on chronic respiratory failure with hypercapnia ([facility]) J96.22    Respiratory insufficiency/failure R06.89    Chronic obstructive pulmonary disease with acute exacerbation ([facility]) J44.1    Acute on chronic systolic congestive heart failure ([facility]) I50.23    Nicotine abuse Z72.0    Chest pain R07.9    Schizophreniform disorder ([facility]) F20.81    Respiratory failure ([facility]) J96.90    Smoker F17.200    Acute respiratory failure ([facility]) J96.00    Acute on chronic respiratory failure ([facility]) J96.20    Pneumonia of right lower lobe due to infectious organism J18.9    Aspiration pneumonia of both lower lobes due to gastric secretions ([facility]) J69.0    Community acquired bacterial pneumonia J15.9    Acute on chronic respiratory failure with hypoxia ([facility]) J96.21    Hypoxemic respiratory failure, chronic ([facility]) J96.11    COPD exacerbation ([facility]) J44.1       Qamar Baez MD, MD  Admitting Internist

## 2023-02-24 NOTE — RT PROTOCOL NOTE
RT Inhaler-Nebulizer Bronchodilator Protocol Note    There is a bronchodilator order in the chart from a provider indicating to follow the RT Bronchodilator Protocol and there is an Initiate RT Inhaler-Nebulizer Bronchodilator Protocol order as well (see protocol at bottom of note). CXR Findings:  XR CHEST PORTABLE    Result Date: 2/24/2023  Impression: Stable prominent reticular densities bilaterally may reflect interstitial edema or fibrosis. Cardiac silhouette mildly enlarged. Emphysema. This document has been electronically signed by: Jayesh Higgins MD on 02/24/2023 12:32 AM      The findings from the last RT Protocol Assessment were as follows:   History Pulmonary Disease: Chronic pulmonary disease  Respiratory Pattern: Mild dyspnea at rest, irregular pattern, or RR 21-25 bpm  Breath Sounds: Severe inspiratory and expiratory wheezing or severely diminished  Cough: Strong, spontaneous, non-productive  Indication for Bronchodilator Therapy: Decreased or absent breath sounds  Bronchodilator Assessment Score: 14    Aerosolized bronchodilator medication orders have been revised according to the RT Inhaler-Nebulizer Bronchodilator Protocol below. Respiratory Therapist to perform RT Therapy Protocol Assessment initially then follow the protocol. Repeat RT Therapy Protocol Assessment PRN for score 0-3 or on second treatment, BID, and PRN for scores above 3. No Indications - adjust the frequency to every 6 hours PRN wheezing or bronchospasm, if no treatments needed after 48 hours then discontinue using Per Protocol order mode. If indication present, adjust the RT bronchodilator orders based on the Bronchodilator Assessment Score as indicated below.   Use Inhaler orders unless patient has one or more of the following: on home nebulizer, not able to hold breath for 10 seconds, is not alert and oriented, cannot activate and use MDI correctly, or respiratory rate 25 breaths per minute or more, then use the equivalent nebulizer order(s) with same Frequency and PRN reasons based on the score. If a patient is on this medication at home then do not decrease Frequency below that used at home. 0-3 - enter or revise RT bronchodilator order(s) to equivalent RT Bronchodilator order with Frequency of every 4 hours PRN for wheezing or increased work of breathing using Per Protocol order mode. 4-6 - enter or revise RT Bronchodilator order(s) to two equivalent RT bronchodilator orders with one order with BID Frequency and one order with Frequency of every 4 hours PRN wheezing or increased work of breathing using Per Protocol order mode. 7-10 - enter or revise RT Bronchodilator order(s) to two equivalent RT bronchodilator orders with one order with TID Frequency and one order with Frequency of every 4 hours PRN wheezing or increased work of breathing using Per Protocol order mode. 11-13 - enter or revise RT Bronchodilator order(s) to one equivalent RT bronchodilator order with QID Frequency and an Albuterol order with Frequency of every 4 hours PRN wheezing or increased work of breathing using Per Protocol order mode. Greater than 13 - enter or revise RT Bronchodilator order(s) to one equivalent RT bronchodilator order with every 4 hours Frequency and an Albuterol order with Frequency of every 2 hours PRN wheezing or increased work of breathing using Per Protocol order mode. RT to enter RT Home Evaluation for COPD & MDI Assessment order using Per Protocol order mode.     Electronically signed by Chano Cordova RCP on 2/24/2023 at 9:47 AM

## 2023-02-24 NOTE — ED NOTES
Pt vitals collected. Pt denies any needs at this time. Pt call light in reach.      Alyce Johnson RN  02/24/23 7981

## 2023-02-24 NOTE — ED NOTES
Pt vitals collected. Pt resting in bed with eyes closed at this time. Call light in reach.      Kev Santiago RN  02/24/23 7009

## 2023-02-24 NOTE — CARE COORDINATION
Case Management Assessment  Initial Evaluation    Date/Time of Evaluation: 2/24/2023 2:34 PM  Assessment Completed by: Susan Mejia RN    If patient is discharged prior to next notation, then this note serves as note for discharge by case management. Patient Name: June Flores                   YOB: 1970  Diagnosis: COPD exacerbation ([facility] [address] [J44.1]  Chest pain, unspecified type [R07.9]                   Date / Time: 2/23/2023 11:43 PM  Location: [address] [place] [address]     Patient Admission Status: Inpatient   Readmission Risk Low 0-14, Mod 15-19), High > 20: Readmission Risk Score: 19.3    Current PCP: Charmaine French MD  PCP verified by CM? Yes    Chart Reviewed: Yes      History Provided by: Patient, Medical Record  Patient Orientation: Alert and Oriented    Patient Cognition: Alert    Hospitalization in the last 30 days (Readmission):  No    If yes, Readmission Assessment in CM Navigator will be completed. Advance Directives:      Code Status: Full Code   Patient's Primary Decision Maker is: Patient Declined (Legal Next of Kin Remains as Decision Maker)    Primary Decision Maker: [address] [place] [address] [phone]    Secondary Decision Maker: Oly Estrella - Brother/Sister - [phone]    Secondary Decision Maker: Salbador Chand Brother/Sister - [phone]    Discharge Planning:    Patient lives with: Family Members Type of Home: Apartment  Primary Care Giver: Self (Mother helps)  Patient Support Systems include: Spouse/Significant Other   Current Financial resources: Medicaid, Medicare  Current community resources: None  Current services prior to admission: Home Bipap            Current DME: Other (Comment), Oxygen Therapy (Comment) ([facility] and Home O2 through  H[facility]. O2 2L and 6L)            Type of Home Care services:  None    ADLS  Prior functional level: Independent in ADLs/IADLs  Current functional level: Independent in ADLs/IADLs    Family can provide assistance at DC:  Yes  Would you like Case Management to discuss the discharge plan with any other family members/significant others, and if so, who? Yes (Mom)  Plans to Return to Present Housing: Yes  Other Identified Issues/Barriers to RETURNING to current housing: Unknown  Potential Assistance needed at discharge: N/A            Potential DME:    Patient expects to discharge to: [address] [place] [address] for transportation at discharge: Family    Financial    Payor: Tory Kidd / Plan: MEDICARE PART A AND B / Product Type: *No Product type* /     Does insurance require precert for SNF: No    Potential assistance Purchasing Medications: No  Meds-to-Beds request:        [id number] - [place], [address] [phone] -  [phone]  [address] [place] [address]  Phone: [phone] Fax: [phone]      Notes:    Factors facilitating achievement of predicted outcomes: Family support and Has needed Durable Medical Equipment at home    Barriers to discharge: Medical complications and Medication managment    Additional Case Management Notes: Admitted through ED with SOB, found to be hypoxic in ED, 78% on RA. Placed on NRB at 15L and then continuous BiPAP, which continues. ABG's: pH 7.32, pCO2 71, pO2 108, HCO3 36, BE 7.2. Proventil/Atrovent aerosols q4hr WA and prn, baby ASA, Wellbutrin, Tegretol, Rocephin iv daily, Colace, Lovenox, Solu-medrol 40 mg iv q6hr, Protonix po daily, Risperdal bid, Pravachol. Consulted Pulmonology. Per Pulm, ok for BiPAP to be off for only 2 hours at a time. Procedure:   2/24 CXR: Stable prominent reticular densities bilaterally may reflect interstitial edema or fibrosis. Cardiac silhouette mildly enlarged. Emphysema. 2/24 CTA chest: No pulmonary embolism. Mild amount of bilateral ground-glass and nodular opacities which are new since 1/3/23 and likely infectious/inflammatory. Questionable concurrent mild cardiogenic pulmonary edema.       The Plan for Transition of Care is related to the following treatment goals of COPD exacerbation (HCC) [J44.1]  Chest pain, unspecified type [R07.9]    Patient Goals/Plan/Treatment Preferences: Pt is from home with mom. Has NIV ([facility]) and home O2 through [facility]. Monitor for needs. Transportation/Food Security/Housekeeping Addressed: No issues identified.      Fabiana Chung RN  Case Management Department

## 2023-02-24 NOTE — ED NOTES
ED nurse-to-nurse bedside report    Chief Complaint   Patient presents with    Chest Pain    Shortness of Breath      LOC: alert and orientated to name, place, date  Vital signs   Vitals:    02/23/23 2353 02/24/23 0015 02/24/23 0017   BP: 115/69  120/77   Pulse: 98  (!) 102   Resp: 20  (!) 32   Temp: 98 °F (36.7 °C)     TempSrc: Oral     SpO2:  (S) (!) 47% (S) 100%   Weight: 203 lb (92.1 kg)     Height: 5' 10\" (1.778 m)        Pain:    Pain Interventions: None  Pain Goal: 4  Oxygen: Yes    Current needs required NRB   Telemetry: Yes  LDAs:   Peripheral IV 02/24/23 Right Antecubital (Active)     Continuous Infusions:   Mobility: Requires assistance * 1  Lerma Fall Risk Score: No flowsheet data found. Fall Interventions: Side rails up, call light in reach.   Report given to: Caridad [address] [place] [address], RN  02/24/23 6900

## 2023-02-24 NOTE — ED NOTES
Respiratory has been notified 4 times to place pt on bipap and to collect VBG with no answer from respiratory. OLESYA Jacques contacted the house supervisor to contact respiratory.     Krzysztof Joseph RN  02/24/23 1849

## 2023-02-24 NOTE — ED NOTES
Pt vitals collected. Pt denies any needs at this time. Call light in reach.       Huang Wayne RN  02/24/23 8711

## 2023-02-24 NOTE — ED PROVIDER NOTES
[facility],[address] DEPT  EMERGENCY DEPARTMENT ENCOUNTER      Pt Name: Owen Bonner  MRN: [id number]  [place] 1970  Date of evaluation: 2/23/2023  Resident Physician: Sawyer Neumann MD  Supervising Physician: Dr. Ximena Fraga MD    CHIEF COMPLAINT       Chief Complaint   Patient presents with    Chest Pain    Shortness of Breath     HISTORY OF PRESENT ILLNESS    HPI  Owen Bonner is a 45 y.o. male with past medical history of severe COPD, CHF, schizophrenia who presents to the emergency department for evaluation of chest pain, shortness of breath. Patient presents to the ED with family member for chest pain. Feeling member at bedside is somewhat of a poor historian as he is not sure of patient history. Patient is able to answer some of his own questions however not all. Patient states that approximately 1030 this morning he was watching TV when he started developing some chest pain. Chest pain was 10 out of 10 sharp and stabbing in nature. Patient states that he did not come to the emergency room right away because he did not have a ride. Patient called family member to get him a ride and subsequently arrived to the ED at around midnight. Per family member, patient is nasal cannula at home but did not bring his oxygen with him to the ED so therefore patient has been without oxygen since leaving the house. On ED arrival, SPO2 was 50% on room air. Improved to 78% on nonrebreather initially. Over time, patient did improve on the nonrebreather. When SPO2 was going up to the 90s, patient became more responsive and able to answer more questions. Patient states that he has not had any illnesses recently however has had a sick contact that had the flu. Of note, patient was most recently hospitalized on 1/3/2023 for acute hypoxic respiratory failure secondary to COPD exacerbation requiring high flow.   Per chart review, patient did recently go to pulmonologist on 1/24/2023 and was advised to be on 2 L nasal cannula at home, 6 L nasal cannula during exercise and BiPAP at night. Last echo done on 5/17/2020 shows EF 50 to 55%    The patient has no other acute complaints at this time. PAST MEDICAL AND SURGICAL HISTORY     Past Medical History:   Diagnosis Date    Acute on chronic systolic congestive heart failure ([facility] [address] 4/4/2019    Emphysema (subcutaneous) (surgical) resulting from a procedure     Hypertension     Pneumonia     Schizophrenia ([facility]      History reviewed. No pertinent surgical history.     MEDICATIONS     Current Facility-Administered Medications:     albuterol (PROVENTIL) nebulizer solution 2.5 mg, 2.5 mg, Nebulization, Once, Timi Patino MD    ipratropium (ATROVENT) 0.02 % nebulizer solution 0.5 mg, 0.5 mg, Nebulization, Once, Timi Patino MD    methylPREDNISolone sodium (SOLU-MEDROL) injection 125 mg, 125 mg, IntraVENous, Once, Timi Patino MD    albuterol (PROVENTIL) nebulizer solution 2.5 mg, 2.5 mg, Nebulization, Once, Timi Patino MD    ipratropium (ATROVENT) 0.02 % nebulizer solution 0.5 mg, 0.5 mg, Nebulization, Once, Timi Patino MD    Current Outpatient Medications:     ipratropium-albuterol (DUONEB) 0.5-2.5 (3) MG/3ML SOLN nebulizer solution, Inhale 3 mLs into the lungs every 6 hours as needed for Shortness of Breath, Disp: 360 mL, Rfl: 9    budesonide (PULMICORT) 0.5 MG/2ML nebulizer suspension, Take 2 mLs by nebulization 2 times daily, Disp: 120 mL, Rfl: 11    arformoterol tartrate (BROVANA) 15 MCG/2ML NEBU, Take 2 mLs by nebulization in the morning and 2 mLs in the evening., Disp: 120 mL, Rfl: 11    albuterol sulfate HFA (VENTOLIN HFA) 108 (90 Base) MCG/ACT inhaler, Inhale 2 puffs into the lungs every 6 hours as needed for Wheezing or Shortness of Breath, Disp: 18 g, Rfl: 8    docusate sodium (COLACE, DULCOLAX) 100 MG CAPS, Take 100 mg by mouth daily, Disp: , Rfl:     enoxaparin (LOVENOX) 40 MG/0.4ML, Inject 0.4 mLs into the skin every 24 hours, Disp: , Rfl:     famotidine (PEPCID) 20 MG tablet, Take 1 tablet by mouth 2 times daily, Disp: 60 tablet, Rfl: 3    methylPREDNISolone sodium (SOLU-MEDROL) 40 MG injection, Infuse 1 mL intravenously in the morning and 1 mL in the evening., Disp: , Rfl:     traZODone (DESYREL) 100 MG tablet, take 1 tablet by mouth at bedtime, Disp: , Rfl:     potassium chloride (KLOR-CON M) 20 MEQ extended release tablet, take 1 tablet by mouth once daily WITH BREAKFAST, Disp: , Rfl:     haloperidol decanoate (HALDOL DECANOATE) 50 MG/ML injection, inject 1 milliliter intramuscularly every 2 WEEKS, Disp: , Rfl:     furosemide (LASIX) 20 MG tablet, take 1 tablet by mouth once daily, Disp: , Rfl:     buPROPion (WELLBUTRIN XL) 150 MG extended release tablet, Take 1 tablet by mouth every morning, Disp: 30 tablet, Rfl: 3    risperiDONE (RISPERDAL) 2 MG tablet, Take 1 tablet by mouth 2 times daily, Disp: 60 tablet, Rfl: 3    Spacer/Aero-Holding Chambers CAREN, 1 Device by Does not apply route daily, Disp: 1 each, Rfl: 0    aspirin 325 MG EC tablet, Take 1 tablet by mouth daily, Disp: 30 tablet, Rfl: 3    pravastatin (PRAVACHOL) 40 MG tablet, Take 40 mg by mouth nightly, Disp: , Rfl:     carBAMazepine (TEGRETOL) 200 MG tablet, Take 200 mg by mouth 2 times daily, Disp: , Rfl:     Previous Medications    ALBUTEROL SULFATE HFA (VENTOLIN HFA) 108 (90 BASE) MCG/ACT INHALER    Inhale 2 puffs into the lungs every 6 hours as needed for Wheezing or Shortness of Breath    ARFORMOTEROL TARTRATE (BROVANA) 15 MCG/2ML NEBU    Take 2 mLs by nebulization in the morning and 2 mLs in the evening.     ASPIRIN 325 MG EC TABLET    Take 1 tablet by mouth daily    BUDESONIDE (PULMICORT) 0.5 MG/2ML NEBULIZER SUSPENSION    Take 2 mLs by nebulization 2 times daily    BUPROPION (WELLBUTRIN XL) 150 MG EXTENDED RELEASE TABLET    Take 1 tablet by mouth every morning    CARBAMAZEPINE (TEGRETOL) 200 MG TABLET    Take 200 mg by mouth 2 times daily    DOCUSATE SODIUM (COLACE, DULCOLAX) 100 MG CAPS    Take 100 mg by mouth daily    ENOXAPARIN (LOVENOX) 40 MG/0.4ML    Inject 0.4 mLs into the skin every 24 hours    FAMOTIDINE (PEPCID) 20 MG TABLET    Take 1 tablet by mouth 2 times daily    FUROSEMIDE (LASIX) 20 MG TABLET    take 1 tablet by mouth once daily    HALOPERIDOL DECANOATE (HALDOL DECANOATE) 50 MG/ML INJECTION    inject 1 milliliter intramuscularly every 2 WEEKS    IPRATROPIUM-ALBUTEROL (DUONEB) 0.5-2.5 (3) MG/3ML SOLN NEBULIZER SOLUTION    Inhale 3 mLs into the lungs every 6 hours as needed for Shortness of Breath    METHYLPREDNISOLONE SODIUM (SOLU-MEDROL) 40 MG INJECTION    Infuse 1 mL intravenously in the morning and 1 mL in the evening.     POTASSIUM CHLORIDE (KLOR-CON M) 20 MEQ EXTENDED RELEASE TABLET    take 1 tablet by mouth once daily WITH BREAKFAST    PRAVASTATIN (PRAVACHOL) 40 MG TABLET    Take 40 mg by mouth nightly    RISPERIDONE (RISPERDAL) 2 MG TABLET    Take 1 tablet by mouth 2 times daily    SPACER/AERO-HOLDING CHAMBERS CAREN    1 Device by Does not apply route daily    TRAZODONE (DESYREL) 100 MG TABLET    take 1 tablet by mouth at bedtime       SOCIAL HISTORY     Social History     Social History Narrative    Not on file     Social History     Tobacco Use    Smoking status: Former     Years: 30.00     Types: Cigars, Cigarettes     Quit date: 10/2022     Years since quittin.4    Smokeless tobacco: Never   Vaping Use    Vaping Use: Never used   Substance Use Topics    Alcohol use: No    Drug use: No       ALLERGIES   No Known Allergies    FAMILY HISTORY     Family History   Problem Relation Age of Onset    High Blood Pressure Mother        PHYSICAL EXAM     ED Triage Vitals   BP Temp Temp Source Heart Rate Resp SpO2 Height Weight   23 2353 23 2353 23 2353 02/23/23 2353 02/23/23 2353 02/24/23 0015 23   115/69 98 °F (36.7 °C) Oral 98 20 (S) (!) 47 % 5' 10\" (1.778 m) 203 lb (92.1 kg)     Initial vital signs and nursing assessment reviewed and abnormal from hypoxia . Body mass index is 29.13 kg/m². Additional Vital Signs:  Vitals:    02/24/23 0120   BP: 125/78   Pulse: (!) 102   Resp: 18   Temp:    SpO2: 100%       Physical Exam  Constitutional:       General: He is in acute distress. Appearance: He is normal weight. He is ill-appearing and toxic-appearing. He is not diaphoretic. Comments: Initially obtunded, improved with O2   HENT:      Head: Normocephalic and atraumatic. Eyes:      Extraocular Movements: Extraocular movements intact. Cardiovascular:      Rate and Rhythm: Regular rhythm. Tachycardia present. Pulmonary:      Effort: Tachypnea, accessory muscle usage and respiratory distress present. Breath sounds: Examination of the right-upper field reveals decreased breath sounds. Examination of the left-upper field reveals decreased breath sounds. Examination of the right-middle field reveals decreased breath sounds. Examination of the left-middle field reveals decreased breath sounds. Examination of the right-lower field reveals decreased breath sounds. Examination of the left-lower field reveals decreased breath sounds. Decreased breath sounds present. No wheezing. Abdominal:      Palpations: Abdomen is soft. Musculoskeletal:         General: Normal range of motion. Cervical back: Normal range of motion and neck supple. Right lower leg: Tenderness present. Left lower leg: Tenderness present. Comments: Calf tenderness b/l   Skin:     General: Skin is warm and dry.      ED RESULTS   Laboratory results (none if blank):  Labs Reviewed   CBC WITH AUTO DIFFERENTIAL - Abnormal; Notable for the following components:       Result Value    WBC 12.1 (*)     MCV 96.3 (*)     MCHC 30.8 (*)     RDW-SD 48.7 (*)     MPV 9.1 (*)     Segs Absolute 9.5 (*)     Immature Grans (Abs) 0.14 (*)     All other components within normal limits   COMPREHENSIVE METABOLIC PANEL - Abnormal; Notable for the following components:    Glucose 136 (*)     Chloride 97 (*)     CO2 35 (*)     Total Protein 8.4 (*)     Total Bilirubin 0.2 (*)     All other components within normal limits   COVID-19 & INFLUENZA COMBO   TROPONIN   BRAIN NATRIURETIC PEPTIDE   ANION GAP   GLOMERULAR FILTRATION RATE, ESTIMATED   OSMOLALITY   BLOOD GAS, VENOUS     All laboratory results are individually reviewed and interpreted by me. See ED course below for results interpretation if applicable. (Any cultures that may have been sent were not resulted at the time of this patient ED visit)    Radiologic studies results available at the moment of this note (None if blank):  XR CHEST PORTABLE   Final Result   Impression:   Stable prominent reticular densities bilaterally may reflect interstitial    edema or fibrosis. Cardiac silhouette mildly enlarged. Emphysema. This document has been electronically signed by: Peter Aguilar MD on    02/24/2023 12:32 AM      CTA CHEST [address]    (Results Pending)     See ED course below for my interpretation if applicable. All radiology images independently reviewed by me in addition to interpretation provided by the radiologist.    EKG interpretation (none if blank):  Normal sinus rhythm. Right axis deviation. IA interval 148, narrow QRS, QTc 422. No ST depressions or elevations  All EKG results are individually reviewed and interpreted by me. All EKGs are also interpreted by our Cardiology department, final interpretation may not be available as of the writing of this note. INITIAL ASSESSMENT   Comorbid conditions pertinent to this ED encounter:  COPD, acute respiratory failure, aspiration pneumonia, congestive heart failure    Differential Diagnosis includes but is not limited to:  ACS, MI, congestive heart failure, CHF exacerbation, pneumonia, COVID, flu, PE, COPD exacerbation    Although some of these diagnoses are unlikely, they were consider in my medical decision making.     Decision Rules/Clinical Scores utilized: Wells Criteria for PE and Not Applicable. Code Status:  Not addressed during this ED visit    Social determinants of health impacting treatment or disposition:   Was unable to get transportation to the ED earlier today    PREVIOUS RECORDS  AND EXTERNAL INFORMATION REVIEWED   History obtained from: Family member at bedside and the patient. Pertinent previous and/or external records reviewed:  Patient last hospitalized on 1/5/2023 for acute hypoxic respiratory failure, COPD exacerbation . Case discussed with specialties other than Emergency Medicine: Hospitalist    ED COURSE   ED Medications administered this visit (None if left blank):   Medications   albuterol (PROVENTIL) nebulizer solution 2.5 mg (has no administration in time range)   ipratropium (ATROVENT) 0.02 % nebulizer solution 0.5 mg (has no administration in time range)   methylPREDNISolone sodium (SOLU-MEDROL) injection 125 mg (has no administration in time range)   albuterol (PROVENTIL) nebulizer solution 2.5 mg (has no administration in time range)   ipratropium (ATROVENT) 0.02 % nebulizer solution 0.5 mg (has no administration in time range)   iopamidol (ISOVUE-370) 76 % injection 80 mL (80 mLs IntraVENous Given 2/24/23 0042)       ED Course as of 02/24/23 0149   Fri Feb 24, 2023   0045 WBC(!): 12.1 [EL]   0046 XR CHEST PORTABLE  Impression:  Stable prominent reticular densities bilaterally may reflect interstitial   edema or fibrosis. Cardiac silhouette mildly enlarged. Emphysema. [EL]   0108 Troponin T: < 0.010 [EL]   0108 Pro-BNP: 114.7 [EL]   0133 Covid/flu neg [EL]      ED Course User Index  [EL] Merline Cotto MD       CRITICAL CARE:  Critical Care Diagnosis. acute hypoxic respiratory failure Requiring frequent monitoring and reassessment during ED course    PROCEDURES: (None if blank)  Procedures:     MEDICATION CHANGES     New Prescriptions    No medications on file       FINAL DISPOSITION and MEDICAL DECISION MAKING   Medical Decision Making  24-year-old male past medical history significant for multiple admissions for COPD exacerbation arrives to the ED due to chief complaint of chest pain and shortness of breath. On ED arrival, SPO2 50% on room air, improved to 78% on 15 L nonrebreather. Initially patient was obtunded however with improvement of saturations, patient became more alert. On physical exam, patient was arousable however lethargic. Lung sounds were diminished bilaterally. No audible wheezing. Patient was tachycardic on auscultation with no obvious murmurs auscultated. Bilateral lower extremities were tender to palpation. As patient was hypoxic, tachypneic, tachycardic, with tender calves, concern for pulmonary embolism. CTA was obtained which is currently pending at this time. Cardiac work-up consistent of chest x-ray showing mild cardiomegaly, emphysema and stable densities bilaterally. Troponin was negative. proBNP 114.7. COVID and flu were negative. At this time, it is presumed that patient has a COPD exacerbation pending CTA for PE rule out. At this time, Solu-Medrol, albuterol/ipratropium is ordered. VBG is pending as well. Pending these results, patient will ultimately need admission for his respiratory distress. Patient is signed out to resident physician at this time. Shared Decision-Making was performed, disposition discussed with the patient/family and questions answered. Outpatient follow up (If applicable):  No follow-up provider specified. Not applicable      FINAL DIAGNOSES:  Final diagnoses:   COPD exacerbation (HCC)   Chest pain, unspecified type       Condition: condition: fair  Dispo: signed out to Dr. Vianey Johnson        This transcription was electronically signed. It was dictated by use of voice recognition software and electronically transcribed. The transcription may contain errors not detected in proofreading.        Burgess Lewis, MD  Resident  02/24/23 7233

## 2023-02-24 NOTE — ED TRIAGE NOTES
Patient presents to ED via family member for chest pain that started approx 10:30 this morning. Per pt, he was watching TV while this started. 10/10 stabbing chest pain. Pt 50% RA upon arrival. Non-rebreather applied and resp called . Per family member, pt is normally on 6 lpm at home but did not bring any oxygen and pt has been without oxygen since leaving the house. 78% on NRB. Dr. Dangelo Larsen at bedside.

## 2023-02-24 NOTE — PROGRESS NOTES
Patient arrived to 3B33 via transport and respiratory on continuous bipap 90% FiO2 at this time patient is minimally responsive to this RN's questions. Patient opens eyes for a brief period of time and squeezes bilateral hands and wiggles toes. This RN notified Dr. Malini Vail of patients arrival to unit and condition. Unable to complete admission assessment at this time due to patient lethargy. This RN called patient's listed contact via phone and left voicemail to help complete admission paperwork. 1000: respiratory at bedside alongside with this RN noting brief intermittent episodes of apnea. Perfect serve message sent to Dr. Malini Vail requesting back up rate to bipap and setting adjustments and received orders to modify bipap settings and to repeat VBG in one hour after adjustments.

## 2023-02-24 NOTE — ED NOTES
Pt vitals collected. Pt denies any needs at this time. Pt call light in reach.      Madyson Kapoor RN  02/24/23 9425

## 2023-02-24 NOTE — ED NOTES
ED to inpatient nurses report    Chief Complaint   Patient presents with    Chest Pain    Shortness of Breath      Present to ED from home  LOC: alert and orientated to name and place  Vital signs   Vitals:    02/24/23 0224 02/24/23 0331 02/24/23 0341 02/24/23 0428   BP: 124/72 127/82  135/84   Pulse: 93 88  92   Resp: 24 24 27 23   Temp:       TempSrc:       SpO2: 99% 100%  100%   Weight:       Height:          Oxygen Baseline 2L nasal cannula    Current needs required 60L/min bipap Bipap/Cpap Yes  LDAs:   Peripheral IV 02/24/23 Right Antecubital (Active)   Site Assessment Clean, dry & intact 02/24/23 0331   Line Status Flushed 02/24/23 0331   Phlebitis Assessment No symptoms 02/24/23 0331   Infiltration Assessment 0 02/24/23 0331   Dressing Status Clean, dry & intact 02/24/23 0331   Dressing Type Transparent 02/24/23 0331     Mobility: Requires assistance * 1  Pending ED orders: none  Present condition: stable      Electronically signed by Denia Romo RN on 2/24/2023 at 4:35 AM        Denia Romo [facility]  02/24/23 7144

## 2023-02-24 NOTE — CONSULTS
[facility]      Patient - Galilea Chapman   MRN -  [id number]   [facility]   - 1970      Date of Admission -  2023 11:43 PM  Date of evaluation -  2023  Room - 3B-32/[id number]-ANNAMARIA Ellison MD Primary Care Physician - Nancy Plunkett MD     Problem List      Active Hospital Problems    Diagnosis Date Noted    COPD exacerbation [facility]) [J44.1] 2023     Priority: Medium     Reason for Consult    Respiratory failure   HPI   History Obtained From electronic medical record. Galilea Chapman is a 45 y.o. male who presented for evaluation of shortness of breath and dry cough. PMHx significant for severe COPD (FEV1 47%), chronic hypoxic respiratory failure on 2L NC at rest and 4L with exercise, JORDAN/OHS, moderate PAH (RSVP 55-60 mmHg), chronic tobacco use, HFrEF 45%. Unable to obtain history from patient, he is on BiPAP with a full face mask on. On presentation to the ER, he was c/o shortness of breath and dry cough. He was found to be hypoxic with SpO2 47%. Venous blood gas was also done revealed pH 7.27, pCO2 89, bicarb 40. Bipap was applied. Of note, patient was admitted in 2022 for COPD exacerbation due to pneumonia, he did not respond to BiPAP and was intubated. He recently saw Pulmonology on 2023, he was started on Brovana nebs. Patient is supposed to be on BiPAP for his underlying COPD at home, unsure if he is adherent to BiPAP. Questionable adherence to medications as well. PMHx   Past Medical History      Diagnosis Date    Acute on chronic systolic congestive heart failure ([address] 2019    Emphysema (subcutaneous) (surgical) resulting from a procedure     Hypertension     Pneumonia     Schizophrenia [facility])       Past Surgical History    History reviewed. No pertinent surgical history.   Meds    Current Medications    aspirin  81 mg Oral Daily    cefTRIAXone (ROCEPHIN) IV  1,000 mg IntraVENous Q24H enoxaparin  40 mg SubCUTAneous Daily    methylPREDNISolone  40 mg IntraVENous Q6H    pantoprazole  40 mg Oral QAM AC    risperiDONE  2 mg Oral BID    albuterol  2.5 mg Nebulization Q4H WA    And    ipratropium  0.5 mg Nebulization Q4H WA    carBAMazepine  200 mg Oral BID    docusate sodium  100 mg Oral Daily    buPROPion  150 mg Oral QAM    pravastatin  40 mg Oral Nightly     acetaminophen, nitroGLYCERIN, ondansetron, ondansetron, albuterol  IV Drips/Infusions    Home Medications  Medications Prior to Admission: ipratropium-albuterol (DUONEB) 0.5-2.5 (3) MG/3ML SOLN nebulizer solution, Inhale 3 mLs into the lungs every 6 hours as needed for Shortness of Breath  budesonide (PULMICORT) 0.5 MG/2ML nebulizer suspension, Take 2 mLs by nebulization 2 times daily  arformoterol tartrate (BROVANA) 15 MCG/2ML NEBU, Take 2 mLs by nebulization in the morning and 2 mLs in the evening. albuterol sulfate HFA (VENTOLIN HFA) 108 (90 Base) MCG/ACT inhaler, Inhale 2 puffs into the lungs every 6 hours as needed for Wheezing or Shortness of Breath  docusate sodium (COLACE, DULCOLAX) 100 MG CAPS, Take 100 mg by mouth daily  enoxaparin (LOVENOX) 40 MG/0.4ML, Inject 0.4 mLs into the skin every 24 hours  famotidine (PEPCID) 20 MG tablet, Take 1 tablet by mouth 2 times daily  methylPREDNISolone sodium (SOLU-MEDROL) 40 MG injection, Infuse 1 mL intravenously in the morning and 1 mL in the evening.   traZODone (DESYREL) 100 MG tablet, take 1 tablet by mouth at bedtime  potassium chloride (KLOR-CON M) 20 MEQ extended release tablet, take 1 tablet by mouth once daily WITH BREAKFAST  haloperidol decanoate (HALDOL DECANOATE) 50 MG/ML injection, inject 1 milliliter intramuscularly every 2 WEEKS  furosemide (LASIX) 20 MG tablet, take 1 tablet by mouth once daily  buPROPion (WELLBUTRIN XL) 150 MG extended release tablet, Take 1 tablet by mouth every morning  risperiDONE (RISPERDAL) 2 MG tablet, Take 1 tablet by mouth 2 times daily  Spacer/Aero-Holding Doreene Simpers, 1 Device by Does not apply route daily  aspirin 325 MG EC tablet, Take 1 tablet by mouth daily  pravastatin (PRAVACHOL) 40 MG tablet, Take 40 mg by mouth nightly  carBAMazepine (TEGRETOL) 200 MG tablet, Take 200 mg by mouth 2 times daily  Diet    ADULT DIET; Regular  Allergies    Patient has no known allergies. Social History     Social History     Socioeconomic History    Marital status: Single     Spouse name: Not on file    Number of children: 0    Years of education: Not on file    Highest education level: Not on file   Occupational History    Not on file   Tobacco Use    Smoking status: Former     Years: 30.00     Types: Cigars, Cigarettes     Quit date: 10/2022     Years since quittin.4    Smokeless tobacco: Never   Vaping Use    Vaping Use: Never used   Substance and Sexual Activity    Alcohol use: No    Drug use: No    Sexual activity: Not Currently   Other Topics Concern    Not on file   Social History Narrative    Not on file     Social Determinants of Health     Financial Resource Strain: Not on file   Food Insecurity: Not on file   Transportation Needs: Not on file   Physical Activity: Not on file   Stress: Not on file   Social Connections: Not on file   Intimate Partner Violence: Not on file   Housing Stability: Not on file     Family History          Problem Relation Age of Onset    High Blood Pressure Mother      Sleep History    Patient used to follow with Dr. Law Sargent MD and will need to obtain his old sleep study records  Occupational history   Occupation:  He is current working: No  Type of profession: He is currently on Social Security disability for his schizophrenia. He used to work at [employer]                       History of tobacco smoking:Yes  Amount of tobacco smokin.0 PPD. Years of tobacco smokin.                                    Quit smoking: Yes.               Quit EFRP:2904   Current smoker: No. .  History of recreational or IV drug use in the past:NO     History of exposure to coal mines/coal dust: NO  History of exposure to foundry dust/welding: NO  History of exposure to quarry/silica/sandblasting: NO  History of exposure to asbestos/working with breaks/ships: NO  History of exposure to farm dust: NO  History of recent travel to long distances: NO  History of exposure to birds, pigeons, or chickens in the past:NO  Pet animals at home:No    History of pulmonary embolism in the past: No            History of DVT in the past:No              [place] of systems   Unable to obtain, patient with BiPAP full face mask   Vitals     height is 5' 10\" (1.778 m) and weight is 194 lb 3.2 oz (88.1 kg). His axillary temperature is 97.7 °F (36.5 °C). His blood pressure is 121/67 and his pulse is 70. His respiration is 18 and oxygen saturation is 100%. Body mass index is 27.86 kg/m². SUPPLEMENTAL O2: O2 Flow Rate (L/min): 15 L/min     I/O      Intake/Output Summary (Last 24 hours) at 2/24/2023 1628  Last data filed at 2/24/2023 1428  Gross per 24 hour   Intake --   Output 475 ml   Net -475 ml     No intake/output data recorded. Patient Vitals for the past 96 hrs (Last 3 readings):   Weight   02/24/23 0815 194 lb 3.2 oz (88.1 kg)   02/23/23 2353 203 lb (92.1 kg)       Exam   Nursing note and vitals reviewed. General Appearance: Acutely ill appearing male. On BiPAP  Head: normocephalic atraumatic. Eyes: PERRL, EOMI. vision is grossly intact. Nose: No nasal discharge. Throat: Oral cavity and pharynx normal. No inflammation, swelling, exudate, or lesions. Teeth and gingiva in good general condition. Neck: Neck supple, non-tender without lymphadenopathy, masses or thyromegaly. Cardiac: Normal S1 and S2. No S3, S4 or murmurs. Rhythm is regular. Lungs: Mild wheezing noted bilaterally   Abdomen: Positive bowel sounds. Soft, nondistended, nontender. No guarding or rebound. No masses. MSK: Adequately aligned spine. ROM intact spine and extremities. No joint erythema or tenderness. Normal muscular development. Normal gait. Extremities: No significant deformity or joint abnormality. No edema. Peripheral pulses intact. No varicosities. Skin: Skin normal color, texture and turgor with no lesions or eruptions. Labs  - Old records and notes have been reviewed in [facility]   ABG  Lab Results   Component Value Date/Time    PH 7.32 02/24/2023 01:57 PM    PO2 108 02/24/2023 01:57 PM    PCO2 71 02/24/2023 01:57 PM    HCO3 36 02/24/2023 01:57 PM    O2SAT 97 02/24/2023 01:57 PM     Lab Results   Component Value Date/Time    IFIO2 60 02/24/2023 01:57 PM    MODE CPAP/PS 02/24/2023 01:57 PM    SETTIDVOL 480 05/30/2020 05:47 AM    SETPEEP 8.0 02/24/2023 01:57 PM     CBC  Recent Labs     02/24/23  0020   WBC 12.1*   RBC 4.82   HGB 14.3   HCT 46.4   MCV 96.3*   MCH 29.7   MCHC 30.8*      MPV 9.1*      BMP  Recent Labs     02/24/23  0020      K 4.1   CL 97*   CO2 35*   BUN 12   CREATININE 0.6   GLUCOSE 136*   CALCIUM 9.9     LFT  Recent Labs     02/24/23  0020   AST 9   ALT 12   BILITOT 0.2*   ALKPHOS 117     TROP  Lab Results   Component Value Date/Time    TROPONINT < 0.010 02/24/2023 12:20 AM    TROPONINT < 0.010 01/03/2023 05:30 PM    TROPONINT < 0.010 11/07/2022 03:33 PM     BNP  No results for input(s): BNP in the last 72 hours. Lactic Acid  No results for input(s): LACTA in the last 72 hours. INR  No results for input(s): INR, PROTIME in the last 72 hours. PTT  No results for input(s): APTT in the last 72 hours. Glucose  Recent Labs     02/24/23  0830   POCGLU 124*     UA No results for input(s): Alver Dance, COLORU, CLARITYU, MUCUS, PROTEINU, BLOODU, RBCUA, WBCUA, BACTERIA, NITRU, GLUCOSEU, BILIRUBINUR, UROBILINOGEN, KETUA, LABCAST, LABCASTTY, AMORPHOS in the last 72 hours. Invalid input(s): CRYSTALS.     PFTs   3/10/2020      Sleep studies   In the Epic system     Cultures    Influenza A/B ->negative  COVID-19 ->negative  Blood cultures x2 ->pending  Respiratory cultures ->pending     EKG   2/24/23    Normal sinus rhythm  Rightward axis  Nonspecific T wave abnormality  Abnormal ECG  When compared with ECG of 03-JAN-2023 17:26,  Premature ventricular complexes are no longer Present  T wave inversion now evident in Anterior leads  Confirmed by Yamile Vidales ([id number]) on 2/24/2023 6:56:31 AM    Echocardiogram   5/18/2020    Conclusions      Summary   Left ventricular size is normal and systolic function is mildly reduced. Ejection fraction was estimated at 50-55%. LV wall thickness is within   normal limits. Intraventricular septal wall compression during systole suggestive of RV   pressure overload. Markedly enlarged right atrium size. The right ventricle was not clearly visualized. Appears dilated. RVSP of 55-60 mm Hg consistent with moderate pulmonary hypertension. Small circumferential pericardial effusion without evidence of tamponade   physiology. IVC size is dilated with <50% respiratory collapse. Signature    Radiology    CXR  2/24/2023     EXAM: 1V CHEST   Impression: Stable prominent reticular densities bilaterally may reflect interstitial edema or fibrosis. Cardiac silhouette mildly enlarged. Emphysema. CT Scans  (See actual reports for details)  2/24/2023     CTA chest with 3-D postprocessing     Impression: No pulmonary embolism. Mild amount of bilateral ground-glass and nodular opacities which are new since 1/3/23 and likely infectious/inflammatory. Three-month follow-up chest CT can be performed to confirm resolution. Questionable concurrent mild cardiogenic pulmonary edema. Assessment   - Acute on chronic hypoxic respiratory failure - SpO2 47% on presentation. Patient wears 2L supplemental oxygen at baseline and 4L with exercise  - Acute hypercapnic respiratory failure with metabolic alkalosis - pCO2 89% with pH 7.29 and HCO3 40.  Patient likely has chronic CO2 retention now that bicarb seems quite elevated but unable to establish baseline, per chart review patient with elevated pCO2 when hospitalized. - Acute COPD exacerbation - history of severe COPD. Patient should be on BiPAP for his COPD at home but there is questionable adherence to BiPAP. This is 2/2 to developing pneumonia vs non-adherence to home nebs/BiPAP. - Ground-glass and nodular opacities - seen on CTA chest 2/24/23  - Pulmonary hypertension with right ventricular systolic pressure 55 to 60 mmHg as seen on echo 5/18/2020. Most likely WHO group 3 I.e COPD   - History of tobacco smoking, 30 years with 1 pack of cigarettes/day. Quit 11/2022  - Schizophrenia  - Essential Hypertension      Plan   - Continue with BiPAP settings 20/8 with BUR 20. Repeat ABG in 3 hours,   - Send sputum cultures / respiratory panel / Legionella / Strep antigen if able. - Continue with Solumedrol 40 mg q12h  - Continue duoneb q4h while awake. - Agree with IV abx   - Aspiration precautions  - DVT prophylaxis       \"Thank you for asking us to see this patient\"    Questions and concerns addressed. Electronically signed by   Sandie Steiner MD PGY-3 Internal Medicine Resident on 2/24/2023 at 4:28 PM     Case discussed with Dr. Jeannie Emmanuel MD    Addendum by Dr. Jeannie Emmanuel MD:  Patient seen by me independently including key components of medical care. Face to face evaluation and examination was performed. Case discussed with Saul Nova MD-resident physician. Italicized font, if present,  represents changes to the note made by me. More than 50% of the encounter time involved with patient care by the Pulmonary & Critical care service team spent by me. Please see my modifications mentioned below:  CT angiogram of chest with IV contrast:  Impression: No pulmonary embolism. Mild amount of bilateral ground-glass and nodular opacities which are new since 1/3/23 and likely infectious/inflammatory.  Three-month follow-up chest CT can be performed to confirm resolution. Questionable concurrent mild cardiogenic pulmonary edema. Plan: Will change BiPAP settings to IPAP of 24 and EPAP of 6 cm of water with a backup rate of 16/min. Avoid all sedative medications if patient is drowsy  Aspiration precautions  Decrease Solu-Medrol to 40 mg IV twice daily  We will add doxycycline 100 mg p.o. twice daily for 7 days to cover atypical organisms for pneumonia due to abnormal CT scan and ongoing COPD exacerbation. We will repeat a CT scan of chest with IV contrast in 3 months to follow his mediastinal and hilar lymphadenopathy along with bilateral pneumonia. Follow-up pending cultures. Patient educated about my impression plan    Electronically signed by   Danae Navarro MD on 2/24/2023 at 4:30 PM    Addendum done on 2/24/23 at 5:00 PM EST:  Will change doxycycline to Z-John due to drug interaction of doxycycline with the carbamazepine. I ran drug interaction check with the risperidone, azithromycin and Wellbutrin in the [facility]-no significant drug interaction. I spoke with Mr. Jessica Metcalf, pharmacist on 3B regarding the above change.

## 2023-02-24 NOTE — ED PROVIDER NOTES
Transfer of Care Note:   Physician Signing out: Dav Gonzalez MD  Receiving Physician: Wily Disla MD  Supervising physician: Ang Devine MD  Sign out time: 0200      Brief history:  49-year-old male with history of COPD on 2 L nasal cannula baseline presenting with shortness of breath and chest pain. Was at 47% on room air on arrival.  Saturations improved to high 70s on nonrebreather mask. Improved into the 90s although still having increased work of breathing. Items pending that need to be checked:  CTA chest, ABG      Tentative Impression of patient:  Currently stable but at risk for decompensation    Expected disposition of patient:  Pending results, admit. Additional Assessment and results:   I have personally performed a face to face diagnostic evaluation on this patient. The patient's initial evaluation and plan have been discussed with the prior physician who initially evaluated the patient. Nursing Notes, Past Medical Hx, Past Surgical Hx, Social Hx, Allergies, vital signs and Family Hx were all reviewed.       Vitals:    02/24/23 0513   BP: 120/75   Pulse: 75   Resp: 23   Temp:    SpO2: 100%         Labs Reviewed   CBC WITH AUTO DIFFERENTIAL - Abnormal; Notable for the following components:       Result Value    WBC 12.1 (*)     MCV 96.3 (*)     MCHC 30.8 (*)     RDW-SD 48.7 (*)     MPV 9.1 (*)     Segs Absolute 9.5 (*)     Immature Grans (Abs) 0.14 (*)     All other components within normal limits   COMPREHENSIVE METABOLIC PANEL - Abnormal; Notable for the following components:    Glucose 136 (*)     Chloride 97 (*)     CO2 35 (*)     Total Protein 8.4 (*)     Total Bilirubin 0.2 (*)     All other components within normal limits   BLOOD GAS, VENOUS - Abnormal; Notable for the following components:    PH MIXED 7.27 (*)     PCO2, MIXED VENOUS 89 (*)     PO2, Mixed 42 (*)     HCO3, Mixed 40 (*)     Base Exc, Mixed 8.9 (*)     All other components within normal limits   COVID-19 & INFLUENZA COMBO   TROPONIN   BRAIN NATRIURETIC PEPTIDE   ANION GAP   GLOMERULAR FILTRATION RATE, ESTIMATED   OSMOLALITY         Medications   albuterol (PROVENTIL) nebulizer solution 2.5 mg (2.5 mg Nebulization Given 2/24/23 0214)   ipratropium (ATROVENT) 0.02 % nebulizer solution 0.5 mg (0.5 mg Nebulization Given 2/24/23 0214)   iopamidol (ISOVUE-370) 76 % injection 80 mL (80 mLs IntraVENous Given 2/24/23 0042)   methylPREDNISolone sodium (SOLU-MEDROL) injection 125 mg (125 mg IntraVENous Given 2/24/23 0219)   albuterol (PROVENTIL) nebulizer solution 2.5 mg (2.5 mg Nebulization Given 2/24/23 0214)   ipratropium (ATROVENT) 0.02 % nebulizer solution 0.5 mg (0.5 mg Nebulization Given 2/24/23 0214)         CTA CHEST W WO CONTRAST   Final Result   Impression:   No pulmonary embolism. Mild amount of bilateral ground-glass and nodular opacities which are new    since 1/3/23 and likely infectious/inflammatory. Three-month follow-up    chest CT can be performed to confirm resolution. Questionable concurrent mild cardiogenic pulmonary edema. This document has been electronically signed by: Sanju Vasquez. uBd Vargas MD    on 02/24/2023 03:43 AM      All CTs at this facility use dose modulation techniques and iterative    reconstructions, and/or weight-based dosing   when appropriate to reduce radiation to a low as reasonably achievable. 3D Post-processing was performed on this study. XR CHEST PORTABLE   Final Result   Impression:   Stable prominent reticular densities bilaterally may reflect interstitial    edema or fibrosis. Cardiac silhouette mildly enlarged. Emphysema. This document has been electronically signed by: Kushal Aponte MD on    02/24/2023 12:32 AM            ED Course as of 02/24/23 0556   Fri Feb 24, 2023   0045 WBC(!): 12.1 [EL]   0046 XR CHEST PORTABLE  Impression:  Stable prominent reticular densities bilaterally may reflect interstitial   edema or fibrosis.   Cardiac silhouette mildly enlarged. Emphysema. [EL]   0108 Troponin T: < 0.010 [EL]   0108 Pro-BNP: 114.7 [EL]   0133 Covid/flu neg [EL]      ED Course User Index  [EL] Mane Joseph MD         Further MDM and disposition:   Assessment:   Stable. Transitioned to BiPAP. PCO2 of 89. CTA chest no PE. Plan:   Case was discussed with and patient was admitted to Dr. Inez Mireles. Final diagnoses:   COPD exacerbation ([facility] [address]   Chest pain, unspecified type     New Prescriptions    No medications on file         Condition: condition: stable  Dispo: Admit     This transcription was electronically signed. It was dictated by use of voice recognition software and electronically transcribed. The transcription may contain errors not detected in proofreading.         Monica Deleon MD  Resident  02/24/23 1454

## 2023-02-25 LAB
ANION GAP SERPL CALC-SCNC: 6 MEQ/L (ref 8–16)
ARTERIAL PATENCY WRIST A: POSITIVE
BASE EXCESS BLDA CALC-SCNC: 9.5 MMOL/L (ref -2.5–2.5)
BDY SITE: ABNORMAL
BUN SERPL-MCNC: 15 MG/DL (ref 7–22)
CA-I BLD ISE-SCNC: 1.23 MMOL/L (ref 1.12–1.32)
CALCIUM SERPL-MCNC: 8.7 MG/DL (ref 8.5–10.5)
CHLORIDE BLD-SCNC: 97 MEQ/L (ref 98–109)
CHLORIDE SERPL-SCNC: 96 MEQ/L (ref 98–111)
CHOLEST SERPL-MCNC: 137 MG/DL (ref 100–199)
CHOLESTEROL, FASTING: 137 MG/DL (ref 100–199)
CO2 SERPL-SCNC: 34 MEQ/L (ref 23–33)
COLLECTED BY:: ABNORMAL
CREAT SERPL-MCNC: 0.6 MG/DL (ref 0.4–1.2)
DEVICE: ABNORMAL
FIO2 ON VENT O2 ANALYZER: 45 %
GFR SERPL CREATININE-BSD FRML MDRD: > 60 ML/MIN/1.73M2
GLUCOSE BLD-MCNC: 86 MG/DL (ref 70–108)
GLUCOSE SERPL-MCNC: 91 MG/DL (ref 70–108)
HCO3 BLDA-SCNC: 39 MMOL/L (ref 23–28)
HDLC SERPL-MCNC: 35 MG/DL
LACTATE BLD-SCNC: 0.8 MMOL/L (ref 0.5–1.9)
LDLC SERPL CALC-MCNC: 89 MG/DL
PCO2 BLDA: 74 MMHG (ref 35–45)
PEEP SETTING VENT: 11 MMHG
PH BLDA: 7.32 [PH] (ref 7.35–7.45)
PO2 BLDA: 52 MMHG (ref 71–104)
POC CREATININE WHOLE BLOOD: 0.8 MG/DL (ref 0.5–1.2)
POTASSIUM BLD-SCNC: 3.8 MEQ/L (ref 3.5–4.9)
POTASSIUM SERPL-SCNC: 5 MEQ/L (ref 3.5–5.2)
PRESSURE SUPPORT SETTING VENT: 27 CMH2O
SAO2 % BLDA: 82 %
SODIUM BLD-SCNC: 141 MEQ/L (ref 138–146)
SODIUM SERPL-SCNC: 136 MEQ/L (ref 135–145)
TRIGL SERPL-MCNC: 66 MG/DL (ref 0–199)
TRIGLYCERIDE, FASTING: 69 MG/DL (ref 0–199)
TSH SERPL DL<=0.005 MIU/L-ACNC: 0.41 UIU/ML (ref 0.4–4.2)
VENTILATION MODE VENT: ABNORMAL

## 2023-02-25 PROCEDURE — 83605 ASSAY OF LACTIC ACID: CPT

## 2023-02-25 PROCEDURE — 94660 CPAP INITIATION&MGMT: CPT

## 2023-02-25 PROCEDURE — 6360000002 HC RX W HCPCS: Performed by: INTERNAL MEDICINE

## 2023-02-25 PROCEDURE — 82565 ASSAY OF CREATININE: CPT

## 2023-02-25 PROCEDURE — 82803 BLOOD GASES ANY COMBINATION: CPT

## 2023-02-25 PROCEDURE — 36415 COLL VENOUS BLD VENIPUNCTURE: CPT

## 2023-02-25 PROCEDURE — 84132 ASSAY OF SERUM POTASSIUM: CPT

## 2023-02-25 PROCEDURE — 2140000000 HC CCU INTERMEDIATE R&B

## 2023-02-25 PROCEDURE — 80061 LIPID PANEL: CPT

## 2023-02-25 PROCEDURE — 2700000000 HC OXYGEN THERAPY PER DAY

## 2023-02-25 PROCEDURE — 80048 BASIC METABOLIC PNL TOTAL CA: CPT

## 2023-02-25 PROCEDURE — 94640 AIRWAY INHALATION TREATMENT: CPT

## 2023-02-25 PROCEDURE — 82435 ASSAY OF BLOOD CHLORIDE: CPT

## 2023-02-25 PROCEDURE — 84443 ASSAY THYROID STIM HORMONE: CPT

## 2023-02-25 PROCEDURE — 84295 ASSAY OF SERUM SODIUM: CPT

## 2023-02-25 PROCEDURE — 6370000000 HC RX 637 (ALT 250 FOR IP): Performed by: INTERNAL MEDICINE

## 2023-02-25 PROCEDURE — 2580000003 HC RX 258: Performed by: INTERNAL MEDICINE

## 2023-02-25 PROCEDURE — 82947 ASSAY GLUCOSE BLOOD QUANT: CPT

## 2023-02-25 PROCEDURE — 36600 WITHDRAWAL OF ARTERIAL BLOOD: CPT

## 2023-02-25 PROCEDURE — 82330 ASSAY OF CALCIUM: CPT

## 2023-02-25 RX ADMIN — DOCUSATE SODIUM 100 MG: 100 CAPSULE, LIQUID FILLED ORAL at 09:11

## 2023-02-25 RX ADMIN — AZITHROMYCIN MONOHYDRATE 250 MG: 250 TABLET ORAL at 09:13

## 2023-02-25 RX ADMIN — RISPERIDONE 2 MG: 1 TABLET ORAL at 09:11

## 2023-02-25 RX ADMIN — PANTOPRAZOLE SODIUM 40 MG: 40 TABLET, DELAYED RELEASE ORAL at 09:12

## 2023-02-25 RX ADMIN — METHYLPREDNISOLONE SODIUM SUCCINATE 40 MG: 40 INJECTION, POWDER, FOR SOLUTION INTRAMUSCULAR; INTRAVENOUS at 17:22

## 2023-02-25 RX ADMIN — ENOXAPARIN SODIUM 40 MG: 100 INJECTION SUBCUTANEOUS at 09:12

## 2023-02-25 RX ADMIN — CARBAMAZEPINE 200 MG: 200 TABLET ORAL at 09:11

## 2023-02-25 RX ADMIN — ASPIRIN 81 MG 81 MG: 81 TABLET ORAL at 09:11

## 2023-02-25 RX ADMIN — ALBUTEROL SULFATE 2.5 MG: 2.5 SOLUTION RESPIRATORY (INHALATION) at 15:57

## 2023-02-25 RX ADMIN — PRAVASTATIN SODIUM 40 MG: 40 TABLET ORAL at 20:29

## 2023-02-25 RX ADMIN — CEFTRIAXONE SODIUM 1000 MG: 1 INJECTION, POWDER, FOR SOLUTION INTRAMUSCULAR; INTRAVENOUS at 09:18

## 2023-02-25 RX ADMIN — CARBAMAZEPINE 200 MG: 200 TABLET ORAL at 20:29

## 2023-02-25 RX ADMIN — BUPROPION HYDROCHLORIDE 150 MG: 150 TABLET, FILM COATED, EXTENDED RELEASE ORAL at 09:11

## 2023-02-25 RX ADMIN — IPRATROPIUM BROMIDE 0.5 MG: 0.5 SOLUTION RESPIRATORY (INHALATION) at 21:25

## 2023-02-25 RX ADMIN — RISPERIDONE 2 MG: 1 TABLET ORAL at 20:28

## 2023-02-25 RX ADMIN — IPRATROPIUM BROMIDE 0.5 MG: 0.5 SOLUTION RESPIRATORY (INHALATION) at 15:57

## 2023-02-25 RX ADMIN — IPRATROPIUM BROMIDE 0.5 MG: 0.5 SOLUTION RESPIRATORY (INHALATION) at 07:42

## 2023-02-25 RX ADMIN — IPRATROPIUM BROMIDE 0.5 MG: 0.5 SOLUTION RESPIRATORY (INHALATION) at 12:00

## 2023-02-25 RX ADMIN — ALBUTEROL SULFATE 2.5 MG: 2.5 SOLUTION RESPIRATORY (INHALATION) at 07:42

## 2023-02-25 RX ADMIN — ALBUTEROL SULFATE 2.5 MG: 2.5 SOLUTION RESPIRATORY (INHALATION) at 12:00

## 2023-02-25 RX ADMIN — METHYLPREDNISOLONE SODIUM SUCCINATE 40 MG: 40 INJECTION, POWDER, FOR SOLUTION INTRAMUSCULAR; INTRAVENOUS at 06:52

## 2023-02-25 RX ADMIN — ALBUTEROL SULFATE 2.5 MG: 2.5 SOLUTION RESPIRATORY (INHALATION) at 21:25

## 2023-02-25 ASSESSMENT — PAIN SCALES - GENERAL
PAINLEVEL_OUTOF10: 0
PAINLEVEL_OUTOF10: 0

## 2023-02-25 NOTE — PLAN OF CARE
Problem: Respiratory - Adult  Goal: Clear lung sounds  Outcome: Progressing  Note: Cont aerosol to improve aeration  Patient mutually agreed on goals.

## 2023-02-25 NOTE — PROGRESS NOTES
INTERNAL MEDICINE Progress Note  2/25/2023 12:34 PM  Subjective:   Admit Date: 2/23/2023  PCP: Mayur Rothman MD  Interval History:   Patient with shortness of breath. Remains on BiPAP. Cough mainly dry.       Objective:   Vitals: BP (!) 94/57   Pulse 89   Temp 98.2 °F (36.8 °C) (Axillary)   Resp 18   Ht 5' 10\" (1.778 m)   Wt 200 lb 4.8 oz (90.9 kg)   SpO2 98%   BMI 28.74 kg/m²   General appearance: alert and cooperative with exam  HEENT: Head: Bipap with face shield no adenopathy and no carotid bruit  Lungs: diminished breath sounds bilaterally  Heart: S1, S2 normal  Abdomen: soft, non-tender; bowel sounds normal; no masses,  no organomegaly  Extremities: no edema, redness or tenderness in the calves or thighs  Neurologic: Mental status: alertness: alert, orientation: person, place, affect: normal      Medications:   Scheduled Meds:   aspirin  81 mg Oral Daily    cefTRIAXone (ROCEPHIN) IV  1,000 mg IntraVENous Q24H    enoxaparin  40 mg SubCUTAneous Daily    pantoprazole  40 mg Oral QAM AC    risperiDONE  2 mg Oral BID    albuterol  2.5 mg Nebulization Q4H WA    And    ipratropium  0.5 mg Nebulization Q4H WA    carBAMazepine  200 mg Oral BID    docusate sodium  100 mg Oral Daily    buPROPion  150 mg Oral QAM    pravastatin  40 mg Oral Nightly    methylPREDNISolone  40 mg IntraVENous Q12H    azithromycin  250 mg Oral Daily    nicotine  1 patch TransDERmal Nightly     Continuous Infusions:    Lab Results:   CBC:   Recent Labs     02/24/23  0020   WBC 12.1*   HGB 14.3        BMP:    Recent Labs     02/24/23  0020 02/24/23 2011 02/25/23  0322 02/25/23  0643    139 136 141   K 4.1 4.6 5.0 3.8   CL 97*  --  96*  --    CO2 35*  --  34*  --    BUN 12  --  15  --    CREATININE 0.6  --  0.6  --    GLUCOSE 136*  --  91  --      Hepatic:   Recent Labs     02/24/23  0020   AST 9   ALT 12   BILITOT 0.2*   ALKPHOS 117     Lipids:   Recent Labs     02/25/23 0322   CHOL 137   HDL 35       TSH:    Lab Results Component Value Date/Time    TSH 0.411 02/25/2023 03:22 AM     02/25/23 0650    Specimen Source: Blood gases     pH, Blood Gas 7.32 Low     PCO2 74 High Panic   mmhg    PO2 52 Low  mmhg    HCO3 39 High  mmol/l    Base Excess (Calculated) 9.5 High  mmol/l    O2 Sat 82 %    IFIO2 45    DEVICE BiPAP    Mode BiLevel    SET PEEP 11.0 mmhg    SET PRESS SUPP 27        Assessment and Plan:   Acute on chronic hypercapnic / hypoxemic resp failure  COPD exacerbation  Schizoaffective disorder    plan   Cont Bipap, O2 delivery  Duoneb aerosols qid and albuterol prn  Solumedrol  Rocephin, Z vahe  Am labs    Declan Franz MD, MD

## 2023-02-25 NOTE — RT PROTOCOL NOTE
RT Inhaler-Nebulizer Bronchodilator Protocol Note    There is a bronchodilator order in the chart from a provider indicating to follow the RT Bronchodilator Protocol and there is an Initiate RT Inhaler-Nebulizer Bronchodilator Protocol order as well (see protocol at bottom of note). CXR Findings:  XR CHEST PORTABLE    Result Date: 2/24/2023  Impression: Stable prominent reticular densities bilaterally may reflect interstitial edema or fibrosis. Cardiac silhouette mildly enlarged. Emphysema. This document has been electronically signed by: Antony Maldonado MD on 02/24/2023 12:32 AM      The findings from the last RT Protocol Assessment were as follows:   History Pulmonary Disease: Chronic pulmonary disease  Respiratory Pattern: Dyspnea on exertion or RR 21-25 bpm  Breath Sounds: Severe inspiratory and expiratory wheezing or severely diminished  Cough: Strong, spontaneous, non-productive  Indication for Bronchodilator Therapy: Decreased or absent breath sounds, On home bronchodilators  Bronchodilator Assessment Score: 12    Aerosolized bronchodilator medication orders have been revised according to the RT Inhaler-Nebulizer Bronchodilator Protocol below. Respiratory Therapist to perform RT Therapy Protocol Assessment initially then follow the protocol. Repeat RT Therapy Protocol Assessment PRN for score 0-3 or on second treatment, BID, and PRN for scores above 3. No Indications - adjust the frequency to every 6 hours PRN wheezing or bronchospasm, if no treatments needed after 48 hours then discontinue using Per Protocol order mode. If indication present, adjust the RT bronchodilator orders based on the Bronchodilator Assessment Score as indicated below.   Use Inhaler orders unless patient has one or more of the following: on home nebulizer, not able to hold breath for 10 seconds, is not alert and oriented, cannot activate and use MDI correctly, or respiratory rate 25 breaths per minute or more, then use the equivalent nebulizer order(s) with same Frequency and PRN reasons based on the score. If a patient is on this medication at home then do not decrease Frequency below that used at home. 0-3 - enter or revise RT bronchodilator order(s) to equivalent RT Bronchodilator order with Frequency of every 4 hours PRN for wheezing or increased work of breathing using Per Protocol order mode. 4-6 - enter or revise RT Bronchodilator order(s) to two equivalent RT bronchodilator orders with one order with BID Frequency and one order with Frequency of every 4 hours PRN wheezing or increased work of breathing using Per Protocol order mode. 7-10 - enter or revise RT Bronchodilator order(s) to two equivalent RT bronchodilator orders with one order with TID Frequency and one order with Frequency of every 4 hours PRN wheezing or increased work of breathing using Per Protocol order mode. 11-13 - enter or revise RT Bronchodilator order(s) to one equivalent RT bronchodilator order with QID Frequency and an Albuterol order with Frequency of every 4 hours PRN wheezing or increased work of breathing using Per Protocol order mode. Greater than 13 - enter or revise RT Bronchodilator order(s) to one equivalent RT bronchodilator order with every 4 hours Frequency and an Albuterol order with Frequency of every 2 hours PRN wheezing or increased work of breathing using Per Protocol order mode. RT to enter RT Home Evaluation for COPD & MDI Assessment order using Per Protocol order mode.     Electronically signed by Chau Jordan RCP on 2/25/2023 at 7:46 AM

## 2023-02-25 NOTE — PLAN OF CARE
Problem: Discharge Planning  Goal: Discharge to home or other facility with appropriate resources  Outcome: Progressing  Flowsheets (Taken 2/25/2023 0815)  Discharge to home or other facility with appropriate resources:   Identify barriers to discharge with patient and caregiver   Arrange for needed discharge resources and transportation as appropriate   Identify discharge learning needs (meds, wound care, etc)   Refer to discharge planning if patient needs post-hospital services based on physician order or complex needs related to functional status, cognitive ability or social support system     Problem: Respiratory - Adult  Goal: Clear lung sounds  2/25/2023 0807 by [facility] [facility], RCP  Outcome: Progressing  Note: Cont aerosol to improve aeration  Patient mutually agreed on goals. Goal: Achieves optimal ventilation and oxygenation  Outcome: Progressing  Flowsheets (Taken 2/25/2023 0815)  Achieves optimal ventilation and oxygenation:   Assess for changes in respiratory status   Assess for changes in mentation and behavior   Position to facilitate oxygenation and minimize respiratory effort   Oxygen supplementation based on oxygen saturation or arterial blood gases  Goal: Able to breathe comfortably  Description: Able to breathe comfortably  Outcome: Progressing  Goal: Blood gas, arterial, within specified parameters  Outcome: Progressing     Problem: Skin/Tissue Integrity  Goal: Absence of new skin breakdown  Description: 1. Monitor for areas of redness and/or skin breakdown  2. Assess vascular access sites hourly  3. Every 4-6 hours minimum:  Change oxygen saturation probe site  4. Every 4-6 hours:  If on nasal continuous positive airway pressure, respiratory therapy assess nares and determine need for appliance change or resting period.   Outcome: Progressing     Problem: Safety - Adult  Goal: Free from fall injury  Outcome: Progressing     Problem: ABCDS Injury Assessment  Goal: Absence of physical injury  Outcome: Progressing     Problem: Chronic Conditions and Co-morbidities  Goal: Patient's chronic conditions and co-morbidity symptoms are monitored and maintained or improved  Outcome: Progressing  Flowsheets (Taken 2/25/2023 0815)  Care Plan - Patient's Chronic Conditions and Co-Morbidity Symptoms are Monitored and Maintained or Improved:   Monitor and assess patient's chronic conditions and comorbid symptoms for stability, deterioration, or improvement   Collaborate with multidisciplinary team to address chronic and comorbid conditions and prevent exacerbation or deterioration   Update acute care plan with appropriate goals if chronic or comorbid symptoms are exacerbated and prevent overall improvement and discharge  Care plan reviewed with patient. Patient verbalizes understanding of the care plan and contributed to goal setting.

## 2023-02-26 LAB
ANION GAP SERPL CALC-SCNC: 8 MEQ/L (ref 8–16)
ARTERIAL PATENCY WRIST A: POSITIVE
BASE EXCESS BLDA CALC-SCNC: 8.6 MMOL/L (ref -2.5–2.5)
BDY SITE: ABNORMAL
BUN SERPL-MCNC: 15 MG/DL (ref 7–22)
CA-I BLD ISE-SCNC: 1.21 MMOL/L (ref 1.12–1.32)
CALCIUM SERPL-MCNC: 9.1 MG/DL (ref 8.5–10.5)
CHLORIDE BLD-SCNC: 95 MEQ/L (ref 98–109)
CHLORIDE SERPL-SCNC: 96 MEQ/L (ref 98–111)
CO2 SERPL-SCNC: 35 MEQ/L (ref 23–33)
COLLECTED BY:: ABNORMAL
CREAT SERPL-MCNC: 0.7 MG/DL (ref 0.4–1.2)
DEPRECATED RDW RBC AUTO: 50.7 FL (ref 35–45)
DEVICE: ABNORMAL
ERYTHROCYTE [DISTWIDTH] IN BLOOD BY AUTOMATED COUNT: 14 % (ref 11.5–14.5)
FIO2 ON VENT O2 ANALYZER: 40 %
GFR SERPL CREATININE-BSD FRML MDRD: > 60 ML/MIN/1.73M2
GLUCOSE BLD-MCNC: 89 MG/DL (ref 70–108)
GLUCOSE SERPL-MCNC: 92 MG/DL (ref 70–108)
HCO3 BLDA-SCNC: 36 MMOL/L (ref 23–28)
HCT VFR BLD AUTO: 38.8 % (ref 42–52)
HGB BLD-MCNC: 11.5 GM/DL (ref 14–18)
LACTATE BLD-SCNC: 0.8 MMOL/L (ref 0.5–1.9)
MCH RBC QN AUTO: 29.1 PG (ref 26–33)
MCHC RBC AUTO-ENTMCNC: 29.6 GM/DL (ref 32.2–35.5)
MCV RBC AUTO: 98.2 FL (ref 80–94)
PCO2 BLDA: 62 MMHG (ref 35–45)
PEEP SETTING VENT: 11 MMHG
PH BLDA: 7.37 [PH] (ref 7.35–7.45)
PLATELET # BLD AUTO: 276 THOU/MM3 (ref 130–400)
PMV BLD AUTO: 9 FL (ref 9.4–12.4)
PO2 BLDA: 45 MMHG (ref 71–104)
POC CREATININE WHOLE BLOOD: 0.7 MG/DL (ref 0.5–1.2)
POTASSIUM BLD-SCNC: 4.2 MEQ/L (ref 3.5–4.9)
POTASSIUM SERPL-SCNC: 5.1 MEQ/L (ref 3.5–5.2)
PRESSURE SUPPORT SETTING VENT: 27 CMH2O
RBC # BLD AUTO: 3.95 MILL/MM3 (ref 4.7–6.1)
SAO2 % BLDA: 78 %
SODIUM BLD-SCNC: 137 MEQ/L (ref 138–146)
SODIUM SERPL-SCNC: 139 MEQ/L (ref 135–145)
VENTILATION MODE VENT: ABNORMAL
WBC # BLD AUTO: 11.1 THOU/MM3 (ref 4.8–10.8)

## 2023-02-26 PROCEDURE — 36600 WITHDRAWAL OF ARTERIAL BLOOD: CPT

## 2023-02-26 PROCEDURE — 2140000000 HC CCU INTERMEDIATE R&B

## 2023-02-26 PROCEDURE — 6370000000 HC RX 637 (ALT 250 FOR IP): Performed by: INTERNAL MEDICINE

## 2023-02-26 PROCEDURE — 6360000002 HC RX W HCPCS: Performed by: INTERNAL MEDICINE

## 2023-02-26 PROCEDURE — 94761 N-INVAS EAR/PLS OXIMETRY MLT: CPT

## 2023-02-26 PROCEDURE — 36415 COLL VENOUS BLD VENIPUNCTURE: CPT

## 2023-02-26 PROCEDURE — 82803 BLOOD GASES ANY COMBINATION: CPT

## 2023-02-26 PROCEDURE — 99232 SBSQ HOSP IP/OBS MODERATE 35: CPT | Performed by: INTERNAL MEDICINE

## 2023-02-26 PROCEDURE — 83605 ASSAY OF LACTIC ACID: CPT

## 2023-02-26 PROCEDURE — 84132 ASSAY OF SERUM POTASSIUM: CPT

## 2023-02-26 PROCEDURE — 85027 COMPLETE CBC AUTOMATED: CPT

## 2023-02-26 PROCEDURE — 82435 ASSAY OF BLOOD CHLORIDE: CPT

## 2023-02-26 PROCEDURE — 2580000003 HC RX 258: Performed by: INTERNAL MEDICINE

## 2023-02-26 PROCEDURE — 82947 ASSAY GLUCOSE BLOOD QUANT: CPT

## 2023-02-26 PROCEDURE — 2700000000 HC OXYGEN THERAPY PER DAY

## 2023-02-26 PROCEDURE — 94660 CPAP INITIATION&MGMT: CPT

## 2023-02-26 PROCEDURE — 84295 ASSAY OF SERUM SODIUM: CPT

## 2023-02-26 PROCEDURE — 94640 AIRWAY INHALATION TREATMENT: CPT

## 2023-02-26 PROCEDURE — 82565 ASSAY OF CREATININE: CPT

## 2023-02-26 PROCEDURE — 80048 BASIC METABOLIC PNL TOTAL CA: CPT

## 2023-02-26 PROCEDURE — 82330 ASSAY OF CALCIUM: CPT

## 2023-02-26 RX ORDER — ALBUTEROL SULFATE 2.5 MG/3ML
2.5 SOLUTION RESPIRATORY (INHALATION) 2 TIMES DAILY
Status: DISCONTINUED | OUTPATIENT
Start: 2023-02-27 | End: 2023-02-27

## 2023-02-26 RX ADMIN — CARBAMAZEPINE 200 MG: 200 TABLET ORAL at 20:35

## 2023-02-26 RX ADMIN — ALBUTEROL SULFATE 2.5 MG: 2.5 SOLUTION RESPIRATORY (INHALATION) at 12:33

## 2023-02-26 RX ADMIN — ALBUTEROL SULFATE 2.5 MG: 2.5 SOLUTION RESPIRATORY (INHALATION) at 08:54

## 2023-02-26 RX ADMIN — AZITHROMYCIN MONOHYDRATE 250 MG: 250 TABLET ORAL at 08:37

## 2023-02-26 RX ADMIN — ALBUTEROL SULFATE 2.5 MG: 2.5 SOLUTION RESPIRATORY (INHALATION) at 19:53

## 2023-02-26 RX ADMIN — IPRATROPIUM BROMIDE 0.5 MG: 0.5 SOLUTION RESPIRATORY (INHALATION) at 19:53

## 2023-02-26 RX ADMIN — IPRATROPIUM BROMIDE 0.5 MG: 0.5 SOLUTION RESPIRATORY (INHALATION) at 16:04

## 2023-02-26 RX ADMIN — METHYLPREDNISOLONE SODIUM SUCCINATE 40 MG: 40 INJECTION, POWDER, FOR SOLUTION INTRAMUSCULAR; INTRAVENOUS at 05:58

## 2023-02-26 RX ADMIN — CARBAMAZEPINE 200 MG: 200 TABLET ORAL at 08:33

## 2023-02-26 RX ADMIN — ENOXAPARIN SODIUM 40 MG: 100 INJECTION SUBCUTANEOUS at 08:34

## 2023-02-26 RX ADMIN — PANTOPRAZOLE SODIUM 40 MG: 40 TABLET, DELAYED RELEASE ORAL at 05:58

## 2023-02-26 RX ADMIN — RISPERIDONE 2 MG: 1 TABLET ORAL at 20:35

## 2023-02-26 RX ADMIN — DOCUSATE SODIUM 100 MG: 100 CAPSULE, LIQUID FILLED ORAL at 08:33

## 2023-02-26 RX ADMIN — ALBUTEROL SULFATE 2.5 MG: 2.5 SOLUTION RESPIRATORY (INHALATION) at 16:04

## 2023-02-26 RX ADMIN — BUPROPION HYDROCHLORIDE 150 MG: 150 TABLET, FILM COATED, EXTENDED RELEASE ORAL at 08:34

## 2023-02-26 RX ADMIN — IPRATROPIUM BROMIDE 0.5 MG: 0.5 SOLUTION RESPIRATORY (INHALATION) at 12:33

## 2023-02-26 RX ADMIN — IPRATROPIUM BROMIDE 0.5 MG: 0.5 SOLUTION RESPIRATORY (INHALATION) at 08:54

## 2023-02-26 RX ADMIN — CEFTRIAXONE SODIUM 1000 MG: 1 INJECTION, POWDER, FOR SOLUTION INTRAMUSCULAR; INTRAVENOUS at 09:22

## 2023-02-26 RX ADMIN — ASPIRIN 81 MG 81 MG: 81 TABLET ORAL at 08:32

## 2023-02-26 RX ADMIN — RISPERIDONE 2 MG: 1 TABLET ORAL at 08:34

## 2023-02-26 RX ADMIN — PRAVASTATIN SODIUM 40 MG: 40 TABLET ORAL at 20:35

## 2023-02-26 RX ADMIN — METHYLPREDNISOLONE SODIUM SUCCINATE 40 MG: 40 INJECTION, POWDER, FOR SOLUTION INTRAMUSCULAR; INTRAVENOUS at 17:48

## 2023-02-26 ASSESSMENT — PAIN SCALES - GENERAL: PAINLEVEL_OUTOF10: 0

## 2023-02-26 NOTE — CONSULTS
[facility]      Patient - Haritha Ware   MRN -  [id number]   [facility] # - [de-identified]   - 1970      Date of Admission -  2023 11:43 PM  Date of evaluation -  2023  Room - 3B-[facility]   Hospital Day - 2  Jasmina Rahman MD Primary Care Physician - Mike Chambers MD     Problem List      Active Hospital Problems    Diagnosis Date Noted    COPD exacerbation [facility]) [J44.1] 2023     Priority: Medium     Reason for Consult    COPD exacerbation with acute on chronic hypoxic Respiratory failure   HPI   History Obtained From electronic medical record. Haritha Ware is a 45 y.o. male who presented for evaluation of shortness of breath and dry cough. PMHx significant for severe COPD (FEV1 47%), chronic hypoxic respiratory failure on 2L NC at rest and 4L with exercise, JORDAN/OHS, moderate PAH (RSVP 55-60 mmHg), chronic tobacco use, HFrEF 45%. Unable to obtain history from patient, he is on BiPAP with a full face mask on. On presentation to the ER, he was c/o shortness of breath and dry cough. He was found to be hypoxic with SpO2 47%. Venous blood gas was also done revealed pH 7.27, pCO2 89, bicarb 40. Bipap was applied. Of note, patient was admitted in 2022 for COPD exacerbation due to pneumonia, he did not respond to BiPAP and was intubated. He recently saw Pulmonology on 2023, he was started on Brovana nebs. Patient is supposed to be on BiPAP for his underlying COPD at home, unsure if he is adherent to BiPAP. Questionable adherence to medications as well. Subjective     Patient tolerated HFNC 50 LPM while eating    PMHx   Past Medical History      Diagnosis Date    Acute on chronic systolic congestive heart failure ([address] 2019    Emphysema (subcutaneous) (surgical) resulting from a procedure     Hypertension     Pneumonia     Schizophrenia [facility])       Past Surgical History    History reviewed. No pertinent surgical history.   Meds Current Medications    aspirin  81 mg Oral Daily    cefTRIAXone (ROCEPHIN) IV  1,000 mg IntraVENous Q24H    enoxaparin  40 mg SubCUTAneous Daily    pantoprazole  40 mg Oral QAM AC    risperiDONE  2 mg Oral BID    albuterol  2.5 mg Nebulization Q4H WA    And    ipratropium  0.5 mg Nebulization Q4H WA    carBAMazepine  200 mg Oral BID    docusate sodium  100 mg Oral Daily    buPROPion  150 mg Oral QAM    pravastatin  40 mg Oral Nightly    methylPREDNISolone  40 mg IntraVENous Q12H    azithromycin  250 mg Oral Daily    nicotine  1 patch TransDERmal Nightly     acetaminophen, nitroGLYCERIN, ondansetron, ondansetron, albuterol  IV Drips/Infusions    Home Medications  Medications Prior to Admission: ipratropium-albuterol (DUONEB) 0.5-2.5 (3) MG/3ML SOLN nebulizer solution, Inhale 3 mLs into the lungs every 6 hours as needed for Shortness of Breath  budesonide (PULMICORT) 0.5 MG/2ML nebulizer suspension, Take 2 mLs by nebulization 2 times daily  arformoterol tartrate (BROVANA) 15 MCG/2ML NEBU, Take 2 mLs by nebulization in the morning and 2 mLs in the evening. albuterol sulfate HFA (VENTOLIN HFA) 108 (90 Base) MCG/ACT inhaler, Inhale 2 puffs into the lungs every 6 hours as needed for Wheezing or Shortness of Breath  docusate sodium (COLACE, DULCOLAX) 100 MG CAPS, Take 100 mg by mouth daily  enoxaparin (LOVENOX) 40 MG/0.4ML, Inject 0.4 mLs into the skin every 24 hours  famotidine (PEPCID) 20 MG tablet, Take 1 tablet by mouth 2 times daily  methylPREDNISolone sodium (SOLU-MEDROL) 40 MG injection, Infuse 1 mL intravenously in the morning and 1 mL in the evening.   traZODone (DESYREL) 100 MG tablet, take 1 tablet by mouth at bedtime  potassium chloride (KLOR-CON M) 20 MEQ extended release tablet, take 1 tablet by mouth once daily WITH BREAKFAST  haloperidol decanoate (HALDOL DECANOATE) 50 MG/ML injection, inject 1 milliliter intramuscularly every 2 WEEKS  furosemide (LASIX) 20 MG tablet, take 1 tablet by mouth once daily  buPROPion (WELLBUTRIN XL) 150 MG extended release tablet, Take 1 tablet by mouth every morning  risperiDONE (RISPERDAL) 2 MG tablet, Take 1 tablet by mouth 2 times daily  Spacer/Aero-Holding Chambers CAREN, 1 Device by Does not apply route daily  aspirin 325 MG EC tablet, Take 1 tablet by mouth daily  pravastatin (PRAVACHOL) 40 MG tablet, Take 40 mg by mouth nightly  carBAMazepine (TEGRETOL) 200 MG tablet, Take 200 mg by mouth 2 times daily  Diet    ADULT DIET; Regular  Allergies    Patient has no known allergies. Social History     Social History     Socioeconomic History    Marital status: Single     Spouse name: Not on file    Number of children: 0    Years of education: Not on file    Highest education level: Not on file   Occupational History    Not on file   Tobacco Use    Smoking status: Former     Years:      Types: Cigars, Cigarettes     Quit date: 10/2022     Years since quittin.4    Smokeless tobacco: Never   Vaping Use    Vaping Use: Never used   Substance and Sexual Activity    Alcohol use: No    Drug use: No    Sexual activity: Not Currently   Other Topics Concern    Not on file   Social History Narrative    Not on file     Social Determinants of Health     Financial Resource Strain: Not on file   Food Insecurity: Not on file   Transportation Needs: Not on file   Physical Activity: Not on file   Stress: Not on file   Social Connections: Not on file   Intimate Partner Violence: Not on file   Housing Stability: Not on file     Family History          Problem Relation Age of Onset    High Blood Pressure Mother      Sleep History    Patient used to follow with Dr. Morgan Taylor MD and will need to obtain his old sleep study records  Occupational history   Occupation:  He is current working: No  Type of profession: He is currently on Social Security disability for his schizophrenia.   He used to work at [id number]                       History of tobacco smoking:Yes  Amount of tobacco smokin.0 PPD. Years of tobacco smokin.                                    Quit smoking: Yes. Quit [id number]   Current smoker: No.       . History of recreational or IV drug use in the past:NO     History of exposure to coal mines/coal dust: NO  History of exposure to foundry dust/welding: NO  History of exposure to quarry/silica/sandblasting: NO  History of exposure to asbestos/working with breaks/ships: NO  History of exposure to farm dust: NO  History of recent travel to long distances: NO  History of exposure to birds, pigeons, or chickens in the past:NO  Pet animals at home:No    History of pulmonary embolism in the past: No            History of DVT in the past:No              [place] of systems   Unable to obtain, patient with BiPAP full face mask   Vitals     height is 5' 10\" (1.778 m) and weight is 205 lb 6.4 oz (93.2 kg). His axillary temperature is 97.3 °F (36.3 °C). His blood pressure is 128/66 and his pulse is 80. His respiration is 18 and oxygen saturation is 97%. Body mass index is 29.47 kg/m². SUPPLEMENTAL O2: O2 Flow Rate (L/min): 50 L/min     I/O      Intake/Output Summary (Last 24 hours) at 2023 1720  Last data filed at 2023 1233  Gross per 24 hour   Intake 300 ml   Output 1925 ml   Net -1625 ml       I/O last 3 completed shifts: In: 0   Out: 1700 [Urine:1700]   Patient Vitals for the past 96 hrs (Last 3 readings):   Weight   23 0449 205 lb 6.4 oz (93.2 kg)   23 0330 200 lb 4.8 oz (90.9 kg)   23 0815 194 lb 3.2 oz (88.1 kg)         Exam   Nursing note and vitals reviewed. General Appearance: Acutely ill appearing male. On BiPAP  Head: normocephalic atraumatic. Eyes: PERRL, EOMI. vision is grossly intact. Nose: No nasal discharge. Throat: Oral cavity and pharynx normal. No inflammation, swelling, exudate, or lesions. Teeth and gingiva in good general condition.   Neck: Neck supple, non-tender without lymphadenopathy, masses or thyromegaly. Cardiac: Normal S1 and S2. No S3, S4 or murmurs. Rhythm is regular. Lungs: Mild wheezing noted bilaterally   Abdomen: Positive bowel sounds. Soft, nondistended, nontender. No guarding or rebound. No masses. MSK: Adequately aligned spine. ROM intact spine and extremities. No joint erythema or tenderness. Normal muscular development. Normal gait. Extremities: No significant deformity or joint abnormality. No edema. Peripheral pulses intact. No varicosities. Skin: Skin normal color, texture and turgor with no lesions or eruptions. Labs  - Old records and notes have been reviewed in [facility]  Lab Results   Component Value Date/Time    PH 7.37 02/26/2023 05:03 AM    PO2 45 02/26/2023 05:03 AM    PCO2 62 02/26/2023 05:03 AM    HCO3 36 02/26/2023 05:03 AM    O2SAT 78 02/26/2023 05:03 AM     Lab Results   Component Value Date/Time    IFIO2 40 02/26/2023 05:03 AM    MODE BiLevel 02/26/2023 05:03 AM    SETTIDVOL 480 05/30/2020 05:47 AM    SETPEEP 11.0 02/26/2023 05:03 AM     CBC  Recent Labs     02/24/23  0020 02/26/23  0436   WBC 12.1* 11.1*   RBC 4.82 3.95*   HGB 14.3 11.5*   HCT 46.4 38.8*   MCV 96.3* 98.2*   MCH 29.7 29.1   MCHC 30.8* 29.6*    276   MPV 9.1* 9.0*        BMP  Recent Labs     02/24/23  0020 02/24/23 2011 02/25/23  0322 02/25/23  0643 02/26/23  0436 02/26/23  0503      < > 136 141 139 137*   K 4.1   < > 5.0 3.8 5.1 4.2   CL 97*  --  96*  --  96*  --    CO2 35*  --  34*  --  35*  --    BUN 12  --  15  --  15  --    CREATININE 0.6  --  0.6  --  0.7  --    GLUCOSE 136*  --  91  --  92  --    CALCIUM 9.9  --  8.7  --  9.1  --     < > = values in this interval not displayed.        LFT  Recent Labs     02/24/23  0020   AST 9   ALT 12   BILITOT 0.2*   ALKPHOS 117       TROP  Lab Results   Component Value Date/Time    TROPONINT < 0.010 02/24/2023 12:20 AM    TROPONINT < 0.010 01/03/2023 05:30 PM    TROPONINT < 0.010 11/07/2022 03:33 PM     BNP  No results for input(s): BNP in the last 72 hours. Lactic Acid  No results for input(s): LACTA in the last 72 hours. INR  No results for input(s): INR, PROTIME in the last 72 hours. PTT  No results for input(s): APTT in the last 72 hours. Glucose  Recent Labs     02/24/23  0830   POCGLU 124*       UA No results for input(s): Jefm Ip, COLORU, CLARITYU, MUCUS, PROTEINU, BLOODU, RBCUA, WBCUA, BACTERIA, NITRU, GLUCOSEU, BILIRUBINUR, UROBILINOGEN, KETUA, LABCAST, LABCASTTY, AMORPHOS in the last 72 hours. Invalid input(s): CRYSTALS. PFTs   3/10/2020      Sleep studies   In the Epic system     Cultures    Influenza A/B ->negative  COVID-19 ->negative  Blood cultures x2 ->pending  Respiratory cultures ->pending     EKG   2/24/23    Normal sinus rhythm  Rightward axis  Nonspecific T wave abnormality  Abnormal ECG  When compared with ECG of 03-JAN-2023 17:26,  Premature ventricular complexes are no longer Present  T wave inversion now evident in Anterior leads  Confirmed by Yamile Vidales ([id number]) on 2/24/2023 6:56:31 AM    Echocardiogram   5/18/2020    Conclusions      Summary   Left ventricular size is normal and systolic function is mildly reduced. Ejection fraction was estimated at 50-55%. LV wall thickness is within   normal limits. Intraventricular septal wall compression during systole suggestive of RV   pressure overload. Markedly enlarged right atrium size. The right ventricle was not clearly visualized. Appears dilated. RVSP of 55-60 mm Hg consistent with moderate pulmonary hypertension. Small circumferential pericardial effusion without evidence of tamponade   physiology. IVC size is dilated with <50% respiratory collapse. Signature    Radiology    CXR  2/24/2023     EXAM: 1V CHEST   Impression: Stable prominent reticular densities bilaterally may reflect interstitial edema or fibrosis. Cardiac silhouette mildly enlarged. Emphysema.          CT Scans  (See actual reports for details)  2/24/2023     CTA chest with 3-D postprocessing     Impression: No pulmonary embolism. Mild amount of bilateral ground-glass and nodular opacities which are new since 1/3/23 and likely infectious/inflammatory. Three-month follow-up chest CT can be performed to confirm resolution. Questionable concurrent mild cardiogenic pulmonary edema. Assessment   - Acute on chronic hypoxic respiratory failure - SpO2 47% on presentation. Patient wears 2L supplemental oxygen at baseline and 4L with exercise  - Acute hypercapnic respiratory failure with metabolic alkalosis - pCO2 89% with pH 7.29 and HCO3 40. Patient likely has chronic CO2 retention now that bicarb seems quite elevated but unable to establish baseline, per chart review patient with elevated pCO2 when hospitalized. - Acute COPD exacerbation - history of severe COPD. Patient should be on BiPAP for his COPD at home but there is questionable adherence to BiPAP. This is 2/2 to developing pneumonia vs non-adherence to home nebs/BiPAP. - Ground-glass and nodular opacities - seen on CTA chest 2/24/23  - Pulmonary hypertension with right ventricular systolic pressure 55 to 60 mmHg as seen on echo 5/18/2020. Most likely WHO group 3 I.e COPD   - History of tobacco smoking, 30 years with 1 pack of cigarettes/day. Quit 11/2022  - Schizophrenia  - Essential Hypertension      Plan     - Continue with BiPAP settings 20/8 during sleep and naps, PRN during the day, HFNC during meals and for breaks   - Continue with Solumedrol 40 mg q12h, continue Rocephin/AZT  - Continue duoneb q4h while awake. - Aspiration precautions  - DVT prophylaxis       \"Thank you for asking us to see this patient\"    Questions and concerns addressed.     Electronically signed by   Mcarthur Frankel, MD

## 2023-02-26 NOTE — PLAN OF CARE
Problem: Discharge Planning  Goal: Discharge to home or other facility with appropriate resources  2/26/2023 0039 by Lacey Mckeon RN  Outcome: Progressing  Flowsheets (Taken 2/26/2023 8061)  Discharge to home or other facility with appropriate resources:   Identify barriers to discharge with patient and caregiver   Identify discharge learning needs (meds, wound care, etc)   Arrange for needed discharge resources and transportation as appropriate  2/25/2023 1523 by Edita Alejandro RN  Outcome: Progressing  Flowsheets (Taken 2/25/2023 0815)  Discharge to home or other facility with appropriate resources:   Identify barriers to discharge with patient and caregiver   Arrange for needed discharge resources and transportation as appropriate   Identify discharge learning needs (meds, wound care, etc)   Refer to discharge planning if patient needs post-hospital services based on physician order or complex needs related to functional status, cognitive ability or social support system     Problem: Skin/Tissue Integrity  Goal: Absence of new skin breakdown  Description: 1. Monitor for areas of redness and/or skin breakdown  2. Assess vascular access sites hourly  3. Every 4-6 hours minimum:  Change oxygen saturation probe site  4. Every 4-6 hours:  If on nasal continuous positive airway pressure, respiratory therapy assess nares and determine need for appliance change or resting period.   2/26/2023 0039 by Lacey Mckeon RN  Outcome: Progressing  2/25/2023 1523 by Edita Alejandro RN  Outcome: Progressing     Problem: Safety - Adult  Goal: Free from fall injury  2/26/2023 0039 by Lacey Mckeon RN  Outcome: Progressing  Flowsheets (Taken 2/26/2023 0039)  Free From Fall Injury: Instruct family/caregiver on patient safety  2/25/2023 1523 by Edita Alejandro RN  Outcome: Progressing     Problem: ABCDS Injury Assessment  Goal: Absence of physical injury  2/26/2023 0039 by Lacey Mckeon RN  Outcome: Melvin Bryson (Taken 2/24/2023 1720 by Debbra Crigler, RN)  Absence of Physical Injury: Implement safety measures based on patient assessment  2/25/2023 1523 by Felix Richardson RN  Outcome: Progressing

## 2023-02-26 NOTE — PROGRESS NOTES
INTERNAL MEDICINE Progress Note  2/26/2023 2:12 PM  Subjective:   Admit Date: 2/23/2023  PCP: Raul Qureshi MD  Interval History:     + shortness of breath. Remains on BiPAP with full facemask. Objective:   Vitals: /66   Pulse 66   Temp 99.5 °F (37.5 °C) (Axillary)   Resp 19   Ht 5' 10\" (1.778 m)   Wt 205 lb 6.4 oz (93.2 kg)   SpO2 96%   BMI 29.47 kg/m²   General appearance: alert and cooperative with exam  HEENT: Bipap with face shield no adenopathy and no carotid bruit  Lungs: diminished breath sounds bilaterally  Heart: S1, S2 normal  Abdomen: soft, non-tender; bowel sounds normal; no masses,  no organomegaly  Extremities: no edema, redness or tenderness in the calves or thighs  Neurologic: alert, orientation: person, place, affect: normal      Medications:   Scheduled Meds:   aspirin  81 mg Oral Daily    cefTRIAXone (ROCEPHIN) IV  1,000 mg IntraVENous Q24H    enoxaparin  40 mg SubCUTAneous Daily    pantoprazole  40 mg Oral QAM AC    risperiDONE  2 mg Oral BID    albuterol  2.5 mg Nebulization Q4H WA    And    ipratropium  0.5 mg Nebulization Q4H WA    carBAMazepine  200 mg Oral BID    docusate sodium  100 mg Oral Daily    buPROPion  150 mg Oral QAM    pravastatin  40 mg Oral Nightly    methylPREDNISolone  40 mg IntraVENous Q12H    azithromycin  250 mg Oral Daily    nicotine  1 patch TransDERmal Nightly     Continuous Infusions:    Lab Results:   CBC:   Recent Labs     02/24/23  0020 02/26/23  0436   WBC 12.1* 11.1*   HGB 14.3 11.5*    276       BMP:    Recent Labs     02/24/23  0020 02/24/23 2011 02/25/23  0322 02/25/23  0643 02/26/23  0436 02/26/23  0503      < > 136 141 139 137*   K 4.1   < > 5.0 3.8 5.1 4.2   CL 97*  --  96*  --  96*  --    CO2 35*  --  34*  --  35*  --    BUN 12  --  15  --  15  --    CREATININE 0.6  --  0.6  --  0.7  --    GLUCOSE 136*  --  91  --  92  --     < > = values in this interval not displayed.        Hepatic:   Recent Labs     02/24/23  0020 AST 9   ALT 12   BILITOT 0.2*   ALKPHOS 117       Lipids:   Recent Labs     02/25/23  0322   CHOL 137   HDL 35         TSH:    Lab Results   Component Value Date/Time    TSH 0.411 02/25/2023 03:22 AM     Blood Gas, Arterial [[id number]] (Abnormal)    Collected: 02/26/23 0503    Updated: 02/26/23 0507    Specimen Source: Blood gases     pH, Blood Gas 7.37    PCO2 62 High  mmhg    PO2 45 Low  mmhg    HCO3 36 High  mmol/l    Base Excess (Calculated) 8.6 High  mmol/l    O2 Sat 78 %    IFIO2 40    DEVICE BiPAP    Mode BiLevel    SET PEEP 11.0 mmhg    SET PRESS SUPP 27 cmH2O    Nicholas Test Positive    Source: R Radial       Assessment and Plan:   Acute on chronic hypercapnic / hypoxemic resp failure  COPD exacerbation  Schizoaffective disorder    plan   Cont Bipap, O2 delivery, continue efforts at weaning off BiPAP.   Duoneb aerosols qid and albuterol prn  Solumedrol  antibiotics  Am labs    Mayur Rothman MD, MD

## 2023-02-26 NOTE — PLAN OF CARE
Problem: Respiratory - Adult  Goal: Clear lung sounds  2/26/2023 0902 by Anika Salomon RCP  Outcome: Progressing  Goal: Achieves optimal ventilation and oxygenation  2/26/2023 1447 by Dav Phillips RN  Flowsheets (Taken 2/26/2023 1447)  Achieves optimal ventilation and oxygenation:   Assess for changes in respiratory status   Assess for changes in mentation and behavior   Position to facilitate oxygenation and minimize respiratory effort   Oxygen supplementation based on oxygen saturation or arterial blood gases  2/26/2023 0902 by Anika Salomon RCP  Outcome: Progressing     Problem: Respiratory - Adult  Goal: Achieves optimal ventilation and oxygenation  2/26/2023 1447 by Dav Phillips RN  Flowsheets (Taken 2/26/2023 1447)  Achieves optimal ventilation and oxygenation:   Assess for changes in respiratory status   Assess for changes in mentation and behavior   Position to facilitate oxygenation and minimize respiratory effort   Oxygen supplementation based on oxygen saturation or arterial blood gases     Problem: Discharge Planning  Goal: Discharge to home or other facility with appropriate resources  Flowsheets (Taken 2/26/2023 1447)  Discharge to home or other facility with appropriate resources:   Arrange for needed discharge resources and transportation as appropriate   Identify discharge learning needs (meds, wound care, etc)     Problem: Safety - Adult  Goal: Free from fall injury  Flowsheets (Taken 2/26/2023 1447)  Free From Fall Injury: Instruct family/caregiver on patient safety     Problem: ABCDS Injury Assessment  Goal: Absence of physical injury  Flowsheets (Taken 2/26/2023 1447)  Absence of Physical Injury: Implement safety measures based on patient assessment     Problem: Chronic Conditions and Co-morbidities  Goal: Patient's chronic conditions and co-morbidity symptoms are monitored and maintained or improved  Flowsheets (Taken 2/26/2023 1447)  Care Plan - Patient's Chronic Conditions and Co-Morbidity Symptoms are Monitored and Maintained or Improved: Monitor and assess patient's chronic conditions and comorbid symptoms for stability, deterioration, or improvement

## 2023-02-26 NOTE — PLAN OF CARE
Problem: Respiratory - Adult  Goal: Clear lung sounds  Outcome: Progressing     Problem: Respiratory - Adult  Goal: Achieves optimal ventilation and oxygenation  2/26/2023 0902 by Mauricio Salomon RCP  Outcome: Progressing

## 2023-02-27 LAB
EKG ATRIAL RATE: 94 BPM
EKG P AXIS: 39 DEGREES
EKG P-R INTERVAL: 148 MS
EKG Q-T INTERVAL: 338 MS
EKG QRS DURATION: 96 MS
EKG QTC CALCULATION (BAZETT): 422 MS
EKG R AXIS: 90 DEGREES
EKG T AXIS: 65 DEGREES
EKG VENTRICULAR RATE: 94 BPM

## 2023-02-27 PROCEDURE — 6370000000 HC RX 637 (ALT 250 FOR IP): Performed by: NURSE PRACTITIONER

## 2023-02-27 PROCEDURE — 2140000000 HC CCU INTERMEDIATE R&B

## 2023-02-27 PROCEDURE — 6360000002 HC RX W HCPCS: Performed by: INTERNAL MEDICINE

## 2023-02-27 PROCEDURE — 2580000003 HC RX 258: Performed by: INTERNAL MEDICINE

## 2023-02-27 PROCEDURE — 6370000000 HC RX 637 (ALT 250 FOR IP): Performed by: INTERNAL MEDICINE

## 2023-02-27 PROCEDURE — 99232 SBSQ HOSP IP/OBS MODERATE 35: CPT | Performed by: INTERNAL MEDICINE

## 2023-02-27 PROCEDURE — 94664 DEMO&/EVAL PT USE INHALER: CPT

## 2023-02-27 PROCEDURE — 94640 AIRWAY INHALATION TREATMENT: CPT

## 2023-02-27 PROCEDURE — 94660 CPAP INITIATION&MGMT: CPT

## 2023-02-27 PROCEDURE — 2700000000 HC OXYGEN THERAPY PER DAY

## 2023-02-27 PROCEDURE — 98960 EDU&TRN PT SELF-MGMT NQHP 1: CPT

## 2023-02-27 PROCEDURE — 94761 N-INVAS EAR/PLS OXIMETRY MLT: CPT

## 2023-02-27 RX ORDER — PREDNISONE 20 MG/1
40 TABLET ORAL DAILY
Status: DISCONTINUED | OUTPATIENT
Start: 2023-02-28 | End: 2023-02-28 | Stop reason: HOSPADM

## 2023-02-27 RX ADMIN — ENOXAPARIN SODIUM 40 MG: 100 INJECTION SUBCUTANEOUS at 08:30

## 2023-02-27 RX ADMIN — IPRATROPIUM BROMIDE 0.5 MG: 0.5 SOLUTION RESPIRATORY (INHALATION) at 07:20

## 2023-02-27 RX ADMIN — CEFTRIAXONE SODIUM 1000 MG: 1 INJECTION, POWDER, FOR SOLUTION INTRAMUSCULAR; INTRAVENOUS at 08:32

## 2023-02-27 RX ADMIN — PANTOPRAZOLE SODIUM 40 MG: 40 TABLET, DELAYED RELEASE ORAL at 05:31

## 2023-02-27 RX ADMIN — METHYLPREDNISOLONE SODIUM SUCCINATE 40 MG: 40 INJECTION, POWDER, FOR SOLUTION INTRAMUSCULAR; INTRAVENOUS at 05:31

## 2023-02-27 RX ADMIN — CARBAMAZEPINE 200 MG: 200 TABLET ORAL at 08:30

## 2023-02-27 RX ADMIN — CARBAMAZEPINE 200 MG: 200 TABLET ORAL at 20:55

## 2023-02-27 RX ADMIN — BUPROPION HYDROCHLORIDE 150 MG: 150 TABLET, FILM COATED, EXTENDED RELEASE ORAL at 08:27

## 2023-02-27 RX ADMIN — RISPERIDONE 2 MG: 1 TABLET ORAL at 08:27

## 2023-02-27 RX ADMIN — PRAVASTATIN SODIUM 40 MG: 40 TABLET ORAL at 20:55

## 2023-02-27 RX ADMIN — ALBUTEROL SULFATE 2.5 MG: 2.5 SOLUTION RESPIRATORY (INHALATION) at 07:20

## 2023-02-27 RX ADMIN — TIOTROPIUM BROMIDE AND OLODATEROL 2 PUFF: 3.124; 2.736 SPRAY, METERED RESPIRATORY (INHALATION) at 12:13

## 2023-02-27 RX ADMIN — ASPIRIN 81 MG 81 MG: 81 TABLET ORAL at 08:26

## 2023-02-27 RX ADMIN — DOCUSATE SODIUM 100 MG: 100 CAPSULE, LIQUID FILLED ORAL at 08:27

## 2023-02-27 RX ADMIN — RISPERIDONE 2 MG: 1 TABLET ORAL at 20:55

## 2023-02-27 RX ADMIN — AZITHROMYCIN MONOHYDRATE 250 MG: 250 TABLET ORAL at 08:39

## 2023-02-27 ASSESSMENT — COPD QUESTIONNAIRES
CAT_TOTALSCORE: 40
QUESTION1_COUGHFREQUENCY: 5
GOLD_GRADE: 3
QUESTION6_LEAVINGHOUSE: 5
QUESTION3_CHESTTIGHTNESS: 5
QUESTION7_SLEEPQUALITY: 5
QUESTION5_HOMEACTIVITIES: 5
QUESTION2_CHESTPHLEGM: 5
QUESTION4_WALKINCLINE: 5
QUESTION8_ENERGYLEVEL: 5
TOTAL_EXACERBATIONS_PASTYEAR: 8

## 2023-02-27 ASSESSMENT — PAIN SCALES - GENERAL
PAINLEVEL_OUTOF10: 0

## 2023-02-27 ASSESSMENT — PULMONARY FUNCTION TESTS
PIF_VALUE: 35
POST BRONCHODILATOR FEV1/FVC: 58
FEV1 (%PREDICTED): 47

## 2023-02-27 NOTE — PLAN OF CARE
Problem: Discharge Planning  Goal: Discharge to home or other facility with appropriate resources  2/27/2023 0303 by Taj Lake RN  Flowsheets (Taken 2/27/2023 4886)  Discharge to home or other facility with appropriate resources:   Identify discharge learning needs (meds, wound care, etc)   Identify barriers to discharge with patient and caregiver   Arrange for needed discharge resources and transportation as appropriate   Arrange for interpreters to assist at discharge as needed  2/26/2023 1447 by Tru Power RN  Flowsheets (Taken 2/26/2023 1447)  Discharge to home or other facility with appropriate resources:   Arrange for needed discharge resources and transportation as appropriate   Identify discharge learning needs (meds, wound care, etc)     Problem: Respiratory - Adult  Goal: Clear lung sounds  2/26/2023 2000 by Preet Whyte RCP  Outcome: Progressing  Goal: Achieves optimal ventilation and oxygenation  2/27/2023 0303 by Taj Lake RN  Flowsheets (Taken 2/27/2023 0303)  Achieves optimal ventilation and oxygenation:   Assess for changes in respiratory status   Assess for changes in mentation and behavior   Encourage broncho-pulmonary hygiene including cough, deep breathe, incentive spirometry   Assess the need for suctioning and aspirate as needed  2/26/2023 2000 by Preet Whyte RCP  Outcome: Progressing  2/26/2023 1447 by Tru Power RN  Flowsheets (Taken 2/26/2023 1447)  Achieves optimal ventilation and oxygenation:   Assess for changes in respiratory status   Assess for changes in mentation and behavior   Position to facilitate oxygenation and minimize respiratory effort   Oxygen supplementation based on oxygen saturation or arterial blood gases     Problem: Respiratory - Adult  Goal: Achieves optimal ventilation and oxygenation  2/27/2023 0303 by Taj Lake RN  Flowsheets (Taken 2/27/2023 0303)  Achieves optimal ventilation and oxygenation:   Assess for changes in respiratory status   Assess for changes in mentation and behavior   Encourage broncho-pulmonary hygiene including cough, deep breathe, incentive spirometry   Assess the need for suctioning and aspirate as needed  2/26/2023 2000 by Sara Cutler RCP  Outcome: Progressing  2/26/2023 1447 by Clarke Jimenez RN  Flowsheets (Taken 2/26/2023 1447)  Achieves optimal ventilation and oxygenation:   Assess for changes in respiratory status   Assess for changes in mentation and behavior   Position to facilitate oxygenation and minimize respiratory effort   Oxygen supplementation based on oxygen saturation or arterial blood gases

## 2023-02-27 NOTE — PROGRESS NOTES
Attempted to educate pt on COPD program. Pt verbalized understanding, however his ansers to questions were not appropriate. Unsure if pt is going to be able to retain information taught. Handouts left at bedside for pt viewing.

## 2023-02-27 NOTE — PROGRESS NOTES
INTERNAL MEDICINE Progress Note  2/27/2023 6:42 PM  Subjective:   Admit Date: 2/23/2023  PCP: Maria Isabel Walker MD  Interval History:     + shortness of breath. On high flow O2      Objective:   Vitals: /77   Pulse 75   Temp 98.9 °F (37.2 °C) (Oral)   Resp 18   Ht 5' 10\" (1.778 m)   Wt 205 lb 6.4 oz (93.2 kg)   SpO2 98%   BMI 29.47 kg/m²   General appearance: alert and cooperative with exam, HFO2 in situ  HEENT:  no adenopathy and no carotid bruit  Lungs: diminished breath sounds bilaterally  Heart: S1, S2 normal  Abdomen: soft, non-tender; bowel sounds normal; no masses,  no organomegaly  Extremities: no edema, redness or tenderness in the calves or thighs  Neurologic: alert, orientation: person, place, affect: normal      Medications:   Scheduled Meds:   tiotropium-olodaterol  2 puff Inhalation Daily    [START ON 2/28/2023] predniSONE  40 mg Oral Daily    aspirin  81 mg Oral Daily    cefTRIAXone (ROCEPHIN) IV  1,000 mg IntraVENous Q24H    enoxaparin  40 mg SubCUTAneous Daily    pantoprazole  40 mg Oral QAM AC    risperiDONE  2 mg Oral BID    carBAMazepine  200 mg Oral BID    docusate sodium  100 mg Oral Daily    buPROPion  150 mg Oral QAM    pravastatin  40 mg Oral Nightly    azithromycin  250 mg Oral Daily    nicotine  1 patch TransDERmal Nightly     Continuous Infusions:    Lab Results:   CBC:   Recent Labs     02/26/23  0436   WBC 11.1*   HGB 11.5*          BMP:    Recent Labs     02/25/23  0322 02/25/23  0643 02/26/23  0436 02/26/23  0503    141 139 137*   K 5.0 3.8 5.1 4.2   CL 96*  --  96*  --    CO2 34*  --  35*  --    BUN 15  --  15  --    CREATININE 0.6  --  0.7  --    GLUCOSE 91  --  92  --        Hepatic:   No results for input(s): AST, ALT, ALB, BILITOT, ALKPHOS in the last 72 hours.     Lipids:   Recent Labs     02/25/23  0322   CHOL 137   HDL 35         TSH:    Lab Results   Component Value Date/Time    TSH 0.411 02/25/2023 03:22 AM     Assessment and Plan:   Acute on chronic hypercapnic / hypoxemic resp failure  COPD exacerbation  Schizoaffective disorder    plan   Cont HFO2,  Duoneb aerosols qid and albuterol prn  Solumedrol  antibiotics  Am labs    Esme Rojas MD, MD

## 2023-02-27 NOTE — CARE COORDINATION
2/27/23, 3:58 PM EST    DISCHARGE ON GOING EVALUATION    Satish Encinas day: 3  Location: [facility]32/032-A [facility] admit: [facility] [J[facility]]  Chest pain, unspecified type [R07.9]   Procedure:   2/24 CXR: Stable prominent reticular densities bilaterally may reflect interstitial edema or fibrosis. Cardiac silhouette mildly enlarged. Emphysema. 2/24 CTA chest: No pulmonary embolism. Mild amount of bilateral ground-glass and nodular opacities which are new since 1/3/23 and likely infectious/inflammatory. Questionable concurrent mild cardiogenic pulmonary edema. Barriers to Discharge: IM and Pulmonology following. Afebrile. BiPAP while sleeping and HF O2 during day. HF is currently at 40L and 50% FiO2. Zithromax po daily. Rocephin iv daily. Lovenox. Stiolto. Prednisone po daily. PCP: Mayur Rothman MD  Readmission Risk Score: 21.6%  Patient Goals/Plan/Treatment Preferences: Pt is from home with mom. Has NIV (Trilogy) and home O2 through [facility]. Monitor for needs.

## 2023-02-27 NOTE — PROGRESS NOTES
[facility]      Patient - Owen Bonner   MRN -  [id number]   [facility]   - 1970      Date of Admission -  2023 11:43 PM  Date of evaluation -  2023  Room - 3B-32/[facility]-ANNAMARIA Russell MD Primary Care Physician - Guanaco Dyer MD     Problem List      Active Hospital Problems    Diagnosis Date Noted    COPD exacerbation [facility]) [J44.1] 2023     Priority: Medium     Reason for Consult    COPD exacerbation with acute on chronic respiratory failure with hypoxia  HPI   History Obtained From electronic medical record. Owen Bonner is a 45 y.o. male who presented for evaluation of shortness of breath and dry cough. PMHx significant for severe COPD (FEV1 47%), chronic hypoxic respiratory failure on 2L NC at rest and 4L with exercise, JORDAN/OHS, moderate PAH (RSVP 55-60 mmHg), chronic tobacco use, HFrEF 45%. Unable to obtain history from patient, he is on BiPAP with a full face mask on. On presentation to the ER, he was c/o shortness of breath and dry cough. He was found to be hypoxic with SpO2 47%. Venous blood gas was also done revealed pH 7.27, pCO2 89, bicarb 40. Bipap was applied. Of note, patient was admitted in 2022 for COPD exacerbation due to pneumonia, he did not respond to BiPAP and was intubated. He recently saw Pulmonology on 2023, he was started on Brovana nebs. Patient is supposed to be on BiPAP for his underlying COPD at home, unsure if he is adherent to BiPAP. Questionable adherence to medications as well.      Past 24 hrs   -Alert reports SOB stable  -On HFNC 50LPM at 55% complaining of too much flow weaned to 40 LPM at 65% SpO2 improved after titration  -Has some mild cough, noted history of schizophrenia   All other systems reviewed    PMHx   Past Medical History      Diagnosis Date    Acute on chronic systolic congestive heart failure ([address] 2019    Emphysema (subcutaneous) (surgical) resulting from a procedure     Hypertension     Pneumonia     Schizophrenia [facility])       Past Surgical History    History reviewed. No pertinent surgical history. Meds    Current Medications    albuterol  2.5 mg Nebulization BID    And    ipratropium  0.5 mg Nebulization BID    aspirin  81 mg Oral Daily    cefTRIAXone (ROCEPHIN) IV  1,000 mg IntraVENous Q24H    enoxaparin  40 mg SubCUTAneous Daily    pantoprazole  40 mg Oral QAM AC    risperiDONE  2 mg Oral BID    carBAMazepine  200 mg Oral BID    docusate sodium  100 mg Oral Daily    buPROPion  150 mg Oral QAM    pravastatin  40 mg Oral Nightly    methylPREDNISolone  40 mg IntraVENous Q12H    azithromycin  250 mg Oral Daily    nicotine  1 patch TransDERmal Nightly     acetaminophen, nitroGLYCERIN, ondansetron, ondansetron, albuterol  IV Drips/Infusions    Home Medications  Medications Prior to Admission: ipratropium-albuterol (DUONEB) 0.5-2.5 (3) MG/3ML SOLN nebulizer solution, Inhale 3 mLs into the lungs every 6 hours as needed for Shortness of Breath  budesonide (PULMICORT) 0.5 MG/2ML nebulizer suspension, Take 2 mLs by nebulization 2 times daily  arformoterol tartrate (BROVANA) 15 MCG/2ML NEBU, Take 2 mLs by nebulization in the morning and 2 mLs in the evening. albuterol sulfate HFA (VENTOLIN HFA) 108 (90 Base) MCG/ACT inhaler, Inhale 2 puffs into the lungs every 6 hours as needed for Wheezing or Shortness of Breath  docusate sodium (COLACE, DULCOLAX) 100 MG CAPS, Take 100 mg by mouth daily  enoxaparin (LOVENOX) 40 MG/0.4ML, Inject 0.4 mLs into the skin every 24 hours  famotidine (PEPCID) 20 MG tablet, Take 1 tablet by mouth 2 times daily  methylPREDNISolone sodium (SOLU-MEDROL) 40 MG injection, Infuse 1 mL intravenously in the morning and 1 mL in the evening.   traZODone (DESYREL) 100 MG tablet, take 1 tablet by mouth at bedtime  potassium chloride (KLOR-CON M) 20 MEQ extended release tablet, take 1 tablet by mouth once daily WITH BREAKFAST  haloperidol decanoate (HALDOL DECANOATE) 50 MG/ML injection, inject 1 milliliter intramuscularly every 2 WEEKS  furosemide (LASIX) 20 MG tablet, take 1 tablet by mouth once daily  buPROPion (WELLBUTRIN XL) 150 MG extended release tablet, Take 1 tablet by mouth every morning  risperiDONE (RISPERDAL) 2 MG tablet, Take 1 tablet by mouth 2 times daily  Spacer/Aero-Holding Chambers CAREN, 1 Device by Does not apply route daily  aspirin 325 MG EC tablet, Take 1 tablet by mouth daily  pravastatin (PRAVACHOL) 40 MG tablet, Take 40 mg by mouth nightly  carBAMazepine (TEGRETOL) 200 MG tablet, Take 200 mg by mouth 2 times daily  Diet    ADULT DIET; Regular  Allergies    Patient has no known allergies. Sleep History    Patient used to follow with Dr. Sameer Kenyon MD and will need to obtain his old sleep study records  Occupational history   Occupation:  He is current working: No  Type of profession: He is currently on Social Security disability for his schizophrenia. He used to work at [id number]                       History of tobacco smoking:Yes  Amount of tobacco smokin.0 PPD. Years of tobacco smokin.                                    Quit smoking: Yes. Quit [id number]   Current smoker: No.       . History of recreational or IV drug use in the past:NO     History of exposure to coal mines/coal dust: NO  History of exposure to foundry dust/welding: NO  History of exposure to quarry/silica/sandblasting: NO  History of exposure to asbestos/working with breaks/ships: NO  History of exposure to farm dust: NO  History of recent travel to long distances: NO  History of exposure to birds, pigeons, or chickens in the past:NO  Pet animals at home:No    History of pulmonary embolism in the past: No            History of DVT in the past:No          Vitals     height is 5' 10\" (1.778 m) and weight is 205 lb 6.4 oz (93.2 kg). His oral temperature is 98.1 °F (36.7 °C).  His blood pressure is 112/81 and his pulse is 64. His respiration is 18 and oxygen saturation is 93%. Body mass index is 29.47 kg/m². SUPPLEMENTAL O2: O2 Flow Rate (L/min): 50 L/min     I/O      Intake/Output Summary (Last 24 hours) at 2/27/2023 0912  Last data filed at 2/27/2023 0532  Gross per 24 hour   Intake 300 ml   Output 2175 ml   Net -1875 ml       I/O last 3 completed shifts: In: 300 [P.O.:300]  Out: 3025 [Urine:3025]   Patient Vitals for the past 96 hrs (Last 3 readings):   Weight   02/26/23 0449 205 lb 6.4 oz (93.2 kg)   02/25/23 0330 200 lb 4.8 oz (90.9 kg)   02/24/23 0815 194 lb 3.2 oz (88.1 kg)         Exam     Physical Exam   Constitutional: No distress on O2 per HFNC. Patient appears moderately built and  moderately nourished. Head: Normocephalic and atraumatic. Mouth/Throat: Oropharynx is clear and moist.  No oral thrush. Eyes: Conjunctivae are normal. Pupils are equal, round. No scleral icterus. Neck: Neck supple. No tracheal deviation present. Cardiovascular: S1 and S2 with no murmur. No peripheral edema  Pulmonary/Chest: Normal effort with bilateral air entry, diminished breath sounds faint intermittent expiratory wheeze. No stridor. No respiratory distress. Patient exhibits no tenderness. Abdominal: Soft. Bowel sounds audible. No distension or tenderness to palp.    Musculoskeletal: Moves all extremities  Neurological: Patient is alert and follows simple commands     Labs  - Old records and notes have been reviewed in [facility]   ABG  Lab Results   Component Value Date/Time    PH 7.37 02/26/2023 05:03 AM    PO2 45 02/26/2023 05:03 AM    PCO2 62 02/26/2023 05:03 AM    HCO3 36 02/26/2023 05:03 AM    O2SAT 78 02/26/2023 05:03 AM     Lab Results   Component Value Date/Time    IFIO2 40 02/26/2023 05:03 AM    MODE BiLevel 02/26/2023 05:03 AM    SETTIDVOL 480 05/30/2020 05:47 AM    SETPEEP 11.0 02/26/2023 05:03 AM     CBC  Recent Labs     02/26/23  0436   WBC 11.1*   RBC 3.95*   HGB 11.5*   HCT 38.8*   MCV 98.2*   MCH 29.1   MCHC 29.6*      MPV 9.0*        BMP  Recent Labs     02/25/23  0322 02/25/23  0643 02/26/23  0436 02/26/23  0503    141 139 137*   K 5.0 3.8 5.1 4.2   CL 96*  --  96*  --    CO2 34*  --  35*  --    BUN 15  --  15  --    CREATININE 0.6  --  0.7  --    GLUCOSE 91  --  92  --    CALCIUM 8.7  --  9.1  --        LFT  No results for input(s): AST, ALT, ALB, BILITOT, ALKPHOS, LIPASE in the last 72 hours. Invalid input(s): AMYLASE    TROP  Lab Results   Component Value Date/Time    TROPONINT < 0.010 02/24/2023 12:20 AM    TROPONINT < 0.010 01/03/2023 05:30 PM    TROPONINT < 0.010 11/07/2022 03:33 PM     BNP  No results for input(s): BNP in the last 72 hours. Lactic Acid  No results for input(s): LACTA in the last 72 hours. INR  No results for input(s): INR, PROTIME in the last 72 hours. PTT  No results for input(s): APTT in the last 72 hours. Glucose  No results for input(s): POCGLU in the last 72 hours. UA No results for input(s): SPECGRAV, PHUR, COLORU, CLARITYU, MUCUS, PROTEINU, BLOODU, RBCUA, WBCUA, BACTERIA, NITRU, GLUCOSEU, BILIRUBINUR, UROBILINOGEN, KETUA, LABCAST, LABCASTTY, AMORPHOS in the last 72 hours. Invalid input(s): CRYSTALS. PFTs   3/4/2020 Bedside Spirometry      Sleep studies   In the Epic system     Cultures    -Influenza A/B ->negative  -COVID-19 ->negative  -Blood cultures x2 -No prelim growth  -Strep and legionella urine antigens-Negative   Respiratory cultures ->pending sample    EKG   2/24/23    Normal sinus rhythm  Rightward axis  Nonspecific T wave abnormality  Abnormal ECG  When compared with ECG of 03-JAN-2023 17:26,  Premature ventricular complexes are no longer Present  T wave inversion now evident in Anterior leads  Confirmed by Marielle Pearce ([id number]) on 2/24/2023 6:56:31 AM    Echocardiogram   5/18/2020    Conclusions      Summary   Left ventricular size is normal and systolic function is mildly reduced.    Ejection fraction was estimated at 50-55%. LV wall thickness is within   normal limits. Intraventricular septal wall compression during systole suggestive of RV   pressure overload. Markedly enlarged right atrium size. The right ventricle was not clearly visualized. Appears dilated. RVSP of 55-60 mm Hg consistent with moderate pulmonary hypertension. Small circumferential pericardial effusion without evidence of tamponade   physiology. IVC size is dilated with <50% respiratory collapse. Signature    Radiology    CXR  2/24/2023     EXAM: 1V CHEST   Impression: Stable prominent reticular densities bilaterally may reflect interstitial edema or fibrosis. Cardiac silhouette mildly enlarged. Emphysema. CT Scans  CTA chest with 3-D postprocessing   2/24/2023   Impression:   No pulmonary embolism. Mild amount of bilateral ground-glass and nodular opacities which are new since 1/3/23 and likely infectious/inflammatory. Three-month follow-up chest CT can be performed to confirm resolution. Questionable concurrent mild cardiogenic pulmonary edema. (See actual reports for details)    Assessment   -Acute on chronic hypoxic respiratory failure - SpO2 47% on presentation. Patient wears 2L supplemental oxygen at baseline and 4L with exercise  -Acute on chronic hypercapnic respiratory failure- pCO2 71 with pH 7.32 and HCO3 36. Patient has chronic CO2 retention 2/2 COPD  -Acute COPD exacerbation- history of severe COPD FEV1 47% 2020. Patient prescribed IVAPS non-invasive ventilator December 2022  -Abnormal CT chest with Ground-glass and nodular opacities - seen on CTA chest 2/24/23  - Pulmonary hypertension with right ventricular systolic pressure 55 to 60 mmHg as seen on echo 5/18/2020. Most likely WHO group 3 I.e COPD   - History of tobacco smoking, 30 years with 1 pack of cigarettes/day.  Quit 11/2022  - Schizophrenia  - Essential Hypertension      Plan   -Continue with BiPAP settings 24/6 with BUR 16 during sleep and naps, PRN during the day, HFNC during meals and for breaks   -Monitor SpO2 wean supplemental O2 to maintain SpO2 88-94%  -Stop Solumedrol 40 mg q12h, change to prednisone 40 mg Daily in AM  -Continue on ATB's Rocephin and Azithromycin  -Start stiolto with JIMMY nebs PRN for SOB/wheezing  -Aspiration precautions  -DVT prophylaxis lovenox    Questions and concerns addressed. Electronically signed by   SAVANNAH Wilder - CNP     Addendum by Dr. Anu Jean MD:  Patient seen by me independently including key components of medical care. Face to face evaluation and examination was performed. Case discussed with . Lobo KATHARINE Ariza  Italicized font, if present,  represents changes to the note made by me. More than 50% of the encounter time involved with patient care by the Pulmonary & Critical care service team spent by me . Please see my modifications mentioned below:  He is using BiPAP with good compliance  He is more awake alert    Lab Results   Component Value Date/Time    PH 7.37 02/26/2023 05:03 AM    PCO2 62 02/26/2023 05:03 AM    PO2 45 02/26/2023 05:03 AM    HCO3 36 02/26/2023 05:03 AM    O2SAT 78 02/26/2023 05:03 AM     Lab Results   Component Value Date/Time    IFIO2 40 02/26/2023 05:03 AM    MODE BiLevel 02/26/2023 05:03 AM    SETTIDVOL 480 05/30/2020 05:47 AM    SETPEEP 11.0 02/26/2023 05:03 AM     All cultures were negative    CT angiogram of chest with IV contrast:  Impression: No pulmonary embolism. Mild amount of bilateral ground-glass and nodular opacities which are new since 1/3/23 and likely infectious/inflammatory. Three-month follow-up chest CT can be performed to confirm resolution. Questionable concurrent mild cardiogenic pulmonary edema. Plan: We will repeat a CT scan of chest with IV contrast in 3 months to follow his mediastinal and hilar lymphadenopathy along with bilateral pneumonia. Follow-up pending cultures.   Patient educated about my impression plan      Electronically signed by   Jono Elaine MD on 2/27/2023 at 7:19 PM

## 2023-02-27 NOTE — PLAN OF CARE
Problem: Discharge Planning  Goal: Discharge to home or other facility with appropriate resources  2/27/2023 1432 by Michel Levy RN  Outcome: Progressing  Flowsheets (Taken 2/27/2023 0815)  Discharge to home or other facility with appropriate resources:   Identify barriers to discharge with patient and caregiver   Arrange for needed discharge resources and transportation as appropriate   Identify discharge learning needs (meds, wound care, etc)   Refer to discharge planning if patient needs post-hospital services based on physician order or complex needs related to functional status, cognitive ability or social support system  2/27/2023 0303 by Emily Mon RN  Flowsheets (Taken 2/27/2023 0303)  Discharge to home or other facility with appropriate resources:   Identify discharge learning needs (meds, wound care, etc)   Identify barriers to discharge with patient and caregiver   Arrange for needed discharge resources and transportation as appropriate   Arrange for interpreters to assist at discharge as needed     Problem: Respiratory - Adult  Goal: Clear lung sounds  2/27/2023 0728 by Farzad Maldonado RCP  Outcome: Progressing  Goal: Achieves optimal ventilation and oxygenation  2/27/2023 1432 by Michel Levy RN  Outcome: Progressing  Flowsheets (Taken 2/27/2023 0815)  Achieves optimal ventilation and oxygenation:   Assess for changes in respiratory status   Assess for changes in mentation and behavior   Position to facilitate oxygenation and minimize respiratory effort   Oxygen supplementation based on oxygen saturation or arterial blood gases   Assess and instruct to report shortness of breath or any respiratory difficulty   Respiratory therapy support as indicated  2/27/2023 0303 by Emily Mon RN  Flowsheets (Taken 2/27/2023 0303)  Achieves optimal ventilation and oxygenation:   Assess for changes in respiratory status   Assess for changes in mentation and behavior   Encourage broncho-pulmonary hygiene including cough, deep breathe, incentive spirometry   Assess the need for suctioning and aspirate as needed  Goal: Able to breathe comfortably  Description: Able to breathe comfortably  Outcome: Progressing  Goal: Blood gas, arterial, within specified parameters  Outcome: Progressing     Problem: Skin/Tissue Integrity  Goal: Absence of new skin breakdown  Description: 1. Monitor for areas of redness and/or skin breakdown  2. Assess vascular access sites hourly  3. Every 4-6 hours minimum:  Change oxygen saturation probe site  4. Every 4-6 hours:  If on nasal continuous positive airway pressure, respiratory therapy assess nares and determine need for appliance change or resting period.   Outcome: Progressing     Problem: Safety - Adult  Goal: Free from fall injury  2/27/2023 1432 by Harrison Manning RN  Outcome: Progressing  Flowsheets (Taken 2/27/2023 1432)  Free From Fall Injury: Instruct family/caregiver on patient safety  2/27/2023 0303 by Ml Claudio RN  Flowsheets (Taken 2/27/2023 0303)  Free From Fall Injury: Instruct family/caregiver on patient safety     Problem: ABCDS Injury Assessment  Goal: Absence of physical injury  Outcome: Progressing  Flowsheets (Taken 2/27/2023 1432)  Absence of Physical Injury: Implement safety measures based on patient assessment     Problem: Chronic Conditions and Co-morbidities  Goal: Patient's chronic conditions and co-morbidity symptoms are monitored and maintained or improved  Outcome: Progressing  Flowsheets (Taken 2/27/2023 0815)  Care Plan - Patient's Chronic Conditions and Co-Morbidity Symptoms are Monitored and Maintained or Improved:   Monitor and assess patient's chronic conditions and comorbid symptoms for stability, deterioration, or improvement   Collaborate with multidisciplinary team to address chronic and comorbid conditions and prevent exacerbation or deterioration   Update acute care plan with appropriate goals if chronic or comorbid symptoms are exacerbated and prevent overall improvement and discharge   Care plan reviewed with patient. Patient verbalizes understanding of the care plan and contributed to goal setting.

## 2023-02-27 NOTE — PLAN OF CARE
Problem: Respiratory - Adult  Goal: Clear lung sounds  2/26/2023 2000 by Juan Alexander RCP  Outcome: Progressing     Problem: Respiratory - Adult  Goal: Achieves optimal ventilation and oxygenation  2/26/2023 2000 by Juan Alexander RCP  Outcome: Progressing   Patient mutually agrees upon goals.

## 2023-02-27 NOTE — RT PROTOCOL NOTE
RT Inhaler-Nebulizer Bronchodilator Protocol Note    There is a bronchodilator order in the chart from a provider indicating to follow the RT Bronchodilator Protocol and there is an Initiate RT Inhaler-Nebulizer Bronchodilator Protocol order as well (see protocol at bottom of note). CXR Findings:  No results found. The findings from the last RT Protocol Assessment were as follows:   History Pulmonary Disease: Chronic pulmonary disease  Respiratory Pattern: Dyspnea on exertion or RR 21-25 bpm  Breath Sounds: Slightly diminished and/or crackles  Cough: Strong, spontaneous, non-productive  Indication for Bronchodilator Therapy: Decreased or absent breath sounds  Bronchodilator Assessment Score: 6    Aerosolized bronchodilator medication orders have been revised according to the RT Inhaler-Nebulizer Bronchodilator Protocol below. Respiratory Therapist to perform RT Therapy Protocol Assessment initially then follow the protocol. Repeat RT Therapy Protocol Assessment PRN for score 0-3 or on second treatment, BID, and PRN for scores above 3. No Indications - adjust the frequency to every 6 hours PRN wheezing or bronchospasm, if no treatments needed after 48 hours then discontinue using Per Protocol order mode. If indication present, adjust the RT bronchodilator orders based on the Bronchodilator Assessment Score as indicated below. Use Inhaler orders unless patient has one or more of the following: on home nebulizer, not able to hold breath for 10 seconds, is not alert and oriented, cannot activate and use MDI correctly, or respiratory rate 25 breaths per minute or more, then use the equivalent nebulizer order(s) with same Frequency and PRN reasons based on the score. If a patient is on this medication at home then do not decrease Frequency below that used at home.     0-3 - enter or revise RT bronchodilator order(s) to equivalent RT Bronchodilator order with Frequency of every 4 hours PRN for wheezing or increased work of breathing using Per Protocol order mode. 4-6 - enter or revise RT Bronchodilator order(s) to two equivalent RT bronchodilator orders with one order with BID Frequency and one order with Frequency of every 4 hours PRN wheezing or increased work of breathing using Per Protocol order mode. 7-10 - enter or revise RT Bronchodilator order(s) to two equivalent RT bronchodilator orders with one order with TID Frequency and one order with Frequency of every 4 hours PRN wheezing or increased work of breathing using Per Protocol order mode. 11-13 - enter or revise RT Bronchodilator order(s) to one equivalent RT bronchodilator order with QID Frequency and an Albuterol order with Frequency of every 4 hours PRN wheezing or increased work of breathing using Per Protocol order mode. Greater than 13 - enter or revise RT Bronchodilator order(s) to one equivalent RT bronchodilator order with every 4 hours Frequency and an Albuterol order with Frequency of every 2 hours PRN wheezing or increased work of breathing using Per Protocol order mode. RT to enter RT Home Evaluation for COPD & MDI Assessment order using Per Protocol order mode.     Electronically signed by Hill Comer RCP on 2/26/2023 at 7:59 PM

## 2023-02-27 NOTE — PLAN OF CARE
Problem: Respiratory - Adult  Goal: Clear lung sounds  2/27/2023 0728 by Alcon Rocha RCP  Outcome: Progressing

## 2023-02-28 VITALS
OXYGEN SATURATION: 96 % | RESPIRATION RATE: 18 BRPM | TEMPERATURE: 98.4 F | HEIGHT: 70 IN | WEIGHT: 205.4 LBS | SYSTOLIC BLOOD PRESSURE: 101 MMHG | BODY MASS INDEX: 29.41 KG/M2 | DIASTOLIC BLOOD PRESSURE: 59 MMHG | HEART RATE: 99 BPM

## 2023-02-28 LAB
ANION GAP SERPL CALC-SCNC: 9 MEQ/L (ref 8–16)
ARTERIAL PATENCY WRIST A: POSITIVE
BASE EXCESS BLDA CALC-SCNC: 6.7 MMOL/L (ref -2.5–2.5)
BDY SITE: ABNORMAL
BUN SERPL-MCNC: 11 MG/DL (ref 7–22)
CALCIUM SERPL-MCNC: 8.9 MG/DL (ref 8.5–10.5)
CHLORIDE SERPL-SCNC: 95 MEQ/L (ref 98–111)
CO2 SERPL-SCNC: 32 MEQ/L (ref 23–33)
COLLECTED BY:: ABNORMAL
CREAT SERPL-MCNC: 0.7 MG/DL (ref 0.4–1.2)
DEPRECATED RDW RBC AUTO: 49.1 FL (ref 35–45)
DEVICE: ABNORMAL
ERYTHROCYTE [DISTWIDTH] IN BLOOD BY AUTOMATED COUNT: 13.7 % (ref 11.5–14.5)
FIO2 ON VENT O2 ANALYZER: 35 %
GFR SERPL CREATININE-BSD FRML MDRD: > 60 ML/MIN/1.73M2
GLUCOSE SERPL-MCNC: 85 MG/DL (ref 70–108)
HCO3 BLDA-SCNC: 33 MMOL/L (ref 23–28)
HCT VFR BLD AUTO: 42.5 % (ref 42–52)
HGB BLD-MCNC: 12.9 GM/DL (ref 14–18)
MCH RBC QN AUTO: 29.6 PG (ref 26–33)
MCHC RBC AUTO-ENTMCNC: 30.4 GM/DL (ref 32.2–35.5)
MCV RBC AUTO: 97.5 FL (ref 80–94)
PCO2 BLDA: 51 MMHG (ref 35–45)
PH BLDA: 7.42 [PH] (ref 7.35–7.45)
PLATELET # BLD AUTO: 296 THOU/MM3 (ref 130–400)
PMV BLD AUTO: 9.2 FL (ref 9.4–12.4)
PO2 BLDA: 68 MMHG (ref 71–104)
POTASSIUM SERPL-SCNC: 4.8 MEQ/L (ref 3.5–5.2)
RBC # BLD AUTO: 4.36 MILL/MM3 (ref 4.7–6.1)
SAO2 % BLDA: 93 %
SODIUM SERPL-SCNC: 136 MEQ/L (ref 135–145)
WBC # BLD AUTO: 7 THOU/MM3 (ref 4.8–10.8)

## 2023-02-28 PROCEDURE — 6370000000 HC RX 637 (ALT 250 FOR IP): Performed by: INTERNAL MEDICINE

## 2023-02-28 PROCEDURE — 82803 BLOOD GASES ANY COMBINATION: CPT

## 2023-02-28 PROCEDURE — 36415 COLL VENOUS BLD VENIPUNCTURE: CPT

## 2023-02-28 PROCEDURE — 2700000000 HC OXYGEN THERAPY PER DAY

## 2023-02-28 PROCEDURE — 94640 AIRWAY INHALATION TREATMENT: CPT

## 2023-02-28 PROCEDURE — 80048 BASIC METABOLIC PNL TOTAL CA: CPT

## 2023-02-28 PROCEDURE — 2580000003 HC RX 258: Performed by: INTERNAL MEDICINE

## 2023-02-28 PROCEDURE — 6370000000 HC RX 637 (ALT 250 FOR IP): Performed by: NURSE PRACTITIONER

## 2023-02-28 PROCEDURE — 6360000002 HC RX W HCPCS: Performed by: INTERNAL MEDICINE

## 2023-02-28 PROCEDURE — 36600 WITHDRAWAL OF ARTERIAL BLOOD: CPT

## 2023-02-28 PROCEDURE — 99232 SBSQ HOSP IP/OBS MODERATE 35: CPT | Performed by: INTERNAL MEDICINE

## 2023-02-28 PROCEDURE — 85027 COMPLETE CBC AUTOMATED: CPT

## 2023-02-28 PROCEDURE — 94761 N-INVAS EAR/PLS OXIMETRY MLT: CPT

## 2023-02-28 RX ORDER — PREDNISONE 20 MG/1
40 TABLET ORAL DAILY
Qty: 6 TABLET | Refills: 0 | Status: ON HOLD | DISCHARGE
Start: 2023-03-01 | End: 2023-03-04

## 2023-02-28 RX ORDER — NITROGLYCERIN 0.4 MG/1
0.4 TABLET SUBLINGUAL EVERY 5 MIN PRN
Qty: 25 TABLET | Refills: 3 | Status: ON HOLD | DISCHARGE
Start: 2023-02-28

## 2023-02-28 RX ORDER — ONDANSETRON 4 MG/1
4 TABLET, ORALLY DISINTEGRATING ORAL EVERY 8 HOURS PRN
Status: ON HOLD | DISCHARGE
Start: 2023-02-28

## 2023-02-28 RX ORDER — PANTOPRAZOLE SODIUM 40 MG/1
40 TABLET, DELAYED RELEASE ORAL
Qty: 30 TABLET | Refills: 3 | Status: ON HOLD | DISCHARGE
Start: 2023-03-01

## 2023-02-28 RX ORDER — NICOTINE 21 MG/24HR
1 PATCH, TRANSDERMAL 24 HOURS TRANSDERMAL NIGHTLY
Qty: 30 PATCH | Refills: 3 | Status: ON HOLD | DISCHARGE
Start: 2023-02-28

## 2023-02-28 RX ADMIN — BUPROPION HYDROCHLORIDE 150 MG: 150 TABLET, FILM COATED, EXTENDED RELEASE ORAL at 09:21

## 2023-02-28 RX ADMIN — AZITHROMYCIN MONOHYDRATE 250 MG: 250 TABLET ORAL at 10:12

## 2023-02-28 RX ADMIN — PANTOPRAZOLE SODIUM 40 MG: 40 TABLET, DELAYED RELEASE ORAL at 06:01

## 2023-02-28 RX ADMIN — CARBAMAZEPINE 200 MG: 200 TABLET ORAL at 09:22

## 2023-02-28 RX ADMIN — ENOXAPARIN SODIUM 40 MG: 100 INJECTION SUBCUTANEOUS at 09:22

## 2023-02-28 RX ADMIN — DOCUSATE SODIUM 100 MG: 100 CAPSULE, LIQUID FILLED ORAL at 09:22

## 2023-02-28 RX ADMIN — ASPIRIN 81 MG 81 MG: 81 TABLET ORAL at 09:21

## 2023-02-28 RX ADMIN — PREDNISONE 40 MG: 20 TABLET ORAL at 09:22

## 2023-02-28 RX ADMIN — RISPERIDONE 2 MG: 1 TABLET ORAL at 09:22

## 2023-02-28 RX ADMIN — TIOTROPIUM BROMIDE AND OLODATEROL 2 PUFF: 3.124; 2.736 SPRAY, METERED RESPIRATORY (INHALATION) at 10:00

## 2023-02-28 RX ADMIN — CEFTRIAXONE SODIUM 1000 MG: 1 INJECTION, POWDER, FOR SOLUTION INTRAMUSCULAR; INTRAVENOUS at 09:50

## 2023-02-28 ASSESSMENT — PAIN SCALES - GENERAL
PAINLEVEL_OUTOF10: 0

## 2023-02-28 NOTE — PROGRESS NOTES
Alert and oreinted x4. Speech incomprehensible. Mucous membranes dry and pink. Upper extremities appropriate for ethnicity, warm and dry. No numbness and tingling present. Hand grasp moderate and equal. Skin turgor and capillary refill less than 3 seconds. Regular rhythm, strong amplitude, rate is 73 BPM. Regular breathing pattern, moderate depth of respiration, and symmetrical chest movement. Lung sounds diminished throughout. Abdomen round and soft. Bowel sounds active x4. Lower extremities appropriate for ethnicity, warm and dry. Pedal push and pull strong and equal bilaterally. Moves in bed without difficulty. Skin over bony prominences appropriate for ethnicity and intact.    Lawerance Riding / [place] [facility] [place]

## 2023-02-28 NOTE — PROGRESS NOTES
INTERNAL MEDICINE Progress Note  2/28/2023 10:03 AM  Subjective:   Admit Date: 2/23/2023  PCP: Edson Michaels MD  Interval History:     + shortness of breath.     Remains on high flow O2    Objective:   Vitals: /84   Pulse 73   Temp 97.7 °F (36.5 °C) (Oral)   Resp 16   Ht 5' 10\" (1.778 m)   Wt 205 lb 6.4 oz (93.2 kg)   SpO2 92%   BMI 29.47 kg/m²   General appearance: alert and cooperative with exam, HFO2 in situ  HEENT:  no adenopathy and no carotid bruit  Lungs: diminished breath sounds bilaterally  Heart: S1, S2 normal  Abdomen: soft, non-tender; bowel sounds normal; no masses,  no organomegaly  Extremities: no edema, redness or tenderness in the calves or thighs  Neurologic: alert, orientation: person, place, affect: flat, tangential in thoughts      Medications:   Scheduled Meds:   tiotropium-olodaterol  2 puff Inhalation Daily    predniSONE  40 mg Oral Daily    aspirin  81 mg Oral Daily    cefTRIAXone (ROCEPHIN) IV  1,000 mg IntraVENous Q24H    enoxaparin  40 mg SubCUTAneous Daily    pantoprazole  40 mg Oral QAM AC    risperiDONE  2 mg Oral BID    carBAMazepine  200 mg Oral BID    docusate sodium  100 mg Oral Daily    buPROPion  150 mg Oral QAM    pravastatin  40 mg Oral Nightly    azithromycin  250 mg Oral Daily    nicotine  1 patch TransDERmal Nightly     Continuous Infusions:    Lab Results:   CBC:   Recent Labs     02/26/23  0436 02/28/23  0332   WBC 11.1* 7.0   HGB 11.5* 12.9*    296       BMP:    Recent Labs     02/26/23  0436 02/26/23  0503 02/28/23  0332    137* 136   K 5.1 4.2 4.8   CL 96*  --  95*   CO2 35*  --  32   BUN 15  --  11   CREATININE 0.7  --  0.7   GLUCOSE 92  --  85       TSH:    Lab Results   Component Value Date/Time    TSH 0.411 02/25/2023 03:22 AM     Assessment and Plan:   Acute on chronic hypercapnic / hypoxemic resp failure  COPD exacerbation  Schizoaffective disorder    plan   Cont HFO2,  Duoneb aerosols qid and albuterol prn  Solumedrol  Antibiotics  risperidol  Am labs  LTAC eval    Wilberto Love MD, MD

## 2023-02-28 NOTE — CARE COORDINATION
2/28/23, 12:00 PM EST    DISCHARGE PLANNING EVALUATION    Order placed for SW to make referral to LTAC. CM is aware and referral has been deferred.

## 2023-02-28 NOTE — PLAN OF CARE
Problem: Discharge Planning  Goal: Discharge to home or other facility with appropriate resources  2/28/2023 1246 by Arely Muniz RN  Outcome: Completed  2/28/2023 1015 by Arely Muniz RN  Outcome: Progressing     Problem: Respiratory - Adult  Goal: Achieves optimal ventilation and oxygenation  2/28/2023 1246 by Arely Muniz RN  Outcome: Completed  2/28/2023 1015 by Arely Muniz RN  Outcome: Progressing  Goal: Able to breathe comfortably  Description: Able to breathe comfortably  2/28/2023 1246 by Arely Muniz RN  Outcome: Completed  2/28/2023 1015 by Arely Muniz RN  Outcome: Progressing  Goal: Blood gas, arterial, within specified parameters  2/28/2023 1246 by Arely Muniz RN  Outcome: Completed  2/28/2023 1015 by Arely Muniz RN  Outcome: Progressing     Problem: Skin/Tissue Integrity  Goal: Absence of new skin breakdown  Description: 1.  Monitor for areas of redness and/or skin breakdown  2.  Assess vascular access sites hourly  3.  Every 4-6 hours minimum:  Change oxygen saturation probe site  4.  Every 4-6 hours:  If on nasal continuous positive airway pressure, respiratory therapy assess nares and determine need for appliance change or resting period.  2/28/2023 1246 by Arely Muniz RN  Outcome: Completed  2/28/2023 1015 by Arely Muniz RN  Outcome: Progressing     Problem: Safety - Adult  Goal: Free from fall injury  2/28/2023 1246 by Arely Muniz RN  Outcome: Completed  2/28/2023 1015 by Arely Muniz RN  Outcome: Progressing     Problem: ABCDS Injury Assessment  Goal: Absence of physical injury  2/28/2023 1246 by Arely Muniz RN  Outcome: Completed  2/28/2023 1015 by Arely Muniz RN  Outcome: Progressing     Problem: Chronic Conditions and Co-morbidities  Goal: Patient's chronic conditions and co-morbidity symptoms are monitored and maintained or improved  2/28/2023 1246 by Arely Muniz  RN  Outcome: Completed  2/28/2023 1015 by Jude French RN  Outcome: Progressing

## 2023-02-28 NOTE — DISCHARGE INSTR - COC
Continuity of Care Form    Patient Name: Gabriella Trevizo   :  1970  MRN:  [id number]    Admit date:  2023  Discharge date:  2023     Code Status Order: Full Code   Advance Directives:     Admitting Physician:  Aliyah Andrews MD  PCP: Aliyah Andrews MD    Discharging Nurse: [facility] Unit/Room#: 3B-32/032-A  Discharging Unit Phone Number: [phone]    Emergency Contact:   Extended Emergency Contact Information  Primary Emergency Contact: Chanda Kendricksaurabh           [address] Phone: [phone]  Mobile Phone: [phone]  Relation: Brother/Sister  Secondary Emergency Contact: Maureen Cuellar           [place], [address] Phone: [phone]  Mobile Phone: [phone]  Relation: Parent    Past Surgical History:  History reviewed. No pertinent surgical history.     Immunization History:   Immunization History   Administered Date(s) Administered    Influenza Virus Vaccine 2019    Influenza, Brea Sweeney, 6 mo and older, IM, PF (Flulaval, Fluarix) 2019       Active Problems:  Patient Active Problem List   Diagnosis Code    Acute on chronic respiratory failure with hypercapnia ([facility]) J96.22    Respiratory insufficiency/failure R06.89    Chronic obstructive pulmonary disease with acute exacerbation (HCC) J44.1    Acute on chronic systolic congestive heart failure (HCC) I50.23    Nicotine abuse Z72.0    Chest pain R07.9    Schizophreniform disorder ([address] F20.81    Respiratory failure (HCC) J96.90    Smoker F17.200    Acute respiratory failure (HCC) J96.00    Acute on chronic respiratory failure (HCC) J96.20    Pneumonia of right lower lobe due to infectious organism J18.9    Aspiration pneumonia of both lower lobes due to gastric secretions ([address] J69.0    Community acquired bacterial pneumonia J15.9    Acute on chronic respiratory failure with hypoxia ([address] J96.21    Hypoxemic respiratory failure, chronic ([facility]) J96.11    COPD exacerbation ([facility] J44.1       Isolation/Infection:   Isolation            No Isolation          Patient Infection Status       Infection Onset Added Last Indicated Last Indicated By Review Planned Expiration Resolved Resolved By    None active    Resolved    COVID-19 (Rule Out) 23 COVID-19 & Influenza Combo (Ordered)   23 Rule-Out Test Resulted    COVID-19 (Rule Out) 23 COVID-19 & Influenza Combo (Ordered)   23 Rule-Out Test Resulted    COVID-19 (Rule Out) 22 COVID-19 & Influenza Combo (Ordered)   22     COVID-19 (Rule Out) 22 COVID-19 (Ordered)   22 Rule-Out Test Resulted    COVID-19 (Rule Out) 22 COVID-19, Rapid (Ordered)   22 Rule-Out Test Resulted    COVID-19 (Rule Out) 22 COVID-19, Rapid (Ordered)   22 Rule-Out Test Resulted    COVID-19 (Rule Out) 21 COVID-19, Rapid (Ordered)   21 Rule-Out Test Resulted    COVID-19 (Rule Out) 20 COVID-19 (Ordered)   20 Rule-Out Test Resulted            Nurse Assessment:  Last Vital Signs: /84   Pulse 73   Temp 97.7 °F (36.5 °C) (Oral)   Resp 16   Ht 5' 10\" (1.778 m)   Wt 205 lb 6.4 oz (93.2 kg)   SpO2 91%   BMI 29.47 kg/m²     Last documented pain score (0-10 scale): Pain Level: 0  Last Weight:   Wt Readings from Last 1 Encounters:   23 205 lb 6.4 oz (93.2 kg)     Mental Status:  oriented and alert    IV Access:  - None    Nursing Mobility/ADLs:  Walking   Assisted  Transfer  Assisted  Bathing  Assisted  Dressing  Assisted  Toileting  Assisted  Feeding  Independent  Med Admin  Assisted  Med Delivery   whole    Wound Care Documentation and Therapy:        Elimination:  Continence:    Bowel: Yes  Bladder: Yes  Urinary Catheter: None   Colostomy/Ileostomy/Ileal Conduit: No       Date of Last BM: 2/27/2023    Intake/Output Summary (Last 24 hours) at 2/28/2023 1200  Last data filed at 2/28/2023 1102  Gross per 24 hour   Intake 1480 ml   Output 4850 ml   Net -3370 ml     I/O last 3 completed shifts: In: 12 [P.O.:960]  Out: 4600 [Urine:4600]    Safety Concerns: At Risk for Falls    Impairments/Disabilities:      Cognitive     Nutrition Therapy:  Current Nutrition Therapy:   - Oral Diet:  General    Routes of Feeding: Oral  Liquids: No Restrictions  Daily Fluid Restriction: no  Last Modified Barium Swallow with Video (Video Swallowing Test): not done    Treatments at the Time of Hospital Discharge:   Respiratory Treatments: yes  Oxygen Therapy:  is on oxygen at 6 L/min per nasal cannula.   Ventilator:    - BiPAP   IPAP: 24 cmH20, CPAP/EPAP: 6 cmH2O {[facility] CPAP Settings:[id number]}  - CPAP   {[facility] CPAP Settings:[id number]}  - home     Rehab Therapies: resp tx   Weight Bearing Status/Restrictions: No weight bearing restrictions  Other Medical Equipment (for information only, NOT a DME order):  assist x1  Other Treatments: n/a    Patient's personal belongings (please select all that are sent with patient):  None    RN SIGNATURE:  Electronically signed by Preet Almonte RN on 2/28/23 at 12:15 PM EST    CASE MANAGEMENT/SOCIAL WORK SECTION    Inpatient Status Date: 2/24/2023    Readmission Risk Assessment Score:  Readmission Risk              Risk of Unplanned Readmission:  22           Discharging to Facility/ Agency   Name: [facility]MIN[facility]  Address:  Phone: [phone]  Fax:    Dialysis Facility (if applicable)   Name:  Address:  Dialysis Schedule:  Phone:  Fax:    / signature: Electronically signed by Hollie Schulte RN on 2/28/23 at 12:01 PM EST    PHYSICIAN SECTION    Prognosis: Good    Condition at Discharge: Stable    Rehab Potential (if transferring to Rehab): Good    Recommended Labs or Other Treatments After Discharge:     Physician Certification: I certify the above information and transfer of Kaushal Roche  is necessary for the continuing treatment of the diagnosis listed and that he requires LTAC for less 30 days.      Update Admission H&P: No change in H&P    PHYSICIAN SIGNATURE:  Electronically signed by Day Adams MD on 2/28/23 at 1:27 PM EST

## 2023-02-28 NOTE — CARE COORDINATION
2/28/23, 12:12 PM EST    DISCHARGE ON GOING EVALUATION    Satish Encinas day: 4  Location: [facility]/032-A Reason for admit: COPD exacerbation ([address] [J44.1]  Chest pain, unspecified type [R07.9]   Procedure:   2/24 CXR: Stable prominent reticular densities bilaterally may reflect interstitial edema or fibrosis. Cardiac silhouette mildly enlarged. Emphysema. 2/24 CTA chest: No pulmonary embolism. Mild amount of bilateral ground-glass and nodular opacities which are new since 1/3/23 and likely infectious/inflammatory. Questionable concurrent mild cardiogenic pulmonary edema. Barriers to Discharge:  IM and Pulmonology following. HF O2 and BiPAP continue. Attempting to wean to [place]. Dr. Shirley Singletary requesting LTACH referral. Called to [facility]. Planning discharge there today. Updated RN. PCP: Rosalva Velasquez MD  Readmission Risk Score: 22%  Patient Goals/Plan/Treatment Preferences: Planning LTACH today. Updated Nanette Sharif RN. Chart printed to 3B.     2/28/23, 12:25 PM EST    Patient goals/plan/ treatment preferences discussed by  and . Patient goals/plan/ treatment preferences reviewed with patient/ family. Patient/ family verbalize understanding of discharge plan and are in agreement with goal/plan/treatment preferences. Understanding was demonstrated using the teach back method. AVS provided by RN at time of discharge, which includes all necessary medical information pertaining to the patients current course of illness, treatment, post-discharge goals of care, and treatment preferences.      Services At/After Discharge: LTAC [facility]       IMM Letter  IMM Letter given to Patient/Family/Significant other/Guardian/POA/by[de-identified] Pt Access  IMM Letter date given[de-identified] 02/24/23  IMM Letter time given[de-identified] 5696

## 2023-02-28 NOTE — PLAN OF CARE
Problem: Discharge Planning  Goal: Discharge to home or other facility with appropriate resources  Outcome: Progressing     Problem: Respiratory - Adult  Goal: Achieves optimal ventilation and oxygenation  Outcome: Progressing  Goal: Able to breathe comfortably  Description: Able to breathe comfortably  Outcome: Progressing  Goal: Blood gas, arterial, within specified parameters  Outcome: Progressing     Problem: Skin/Tissue Integrity  Goal: Absence of new skin breakdown  Description: 1.  Monitor for areas of redness and/or skin breakdown  2.  Assess vascular access sites hourly  3.  Every 4-6 hours minimum:  Change oxygen saturation probe site  4.  Every 4-6 hours:  If on nasal continuous positive airway pressure, respiratory therapy assess nares and determine need for appliance change or resting period.  Outcome: Progressing     Problem: Safety - Adult  Goal: Free from fall injury  Outcome: Progressing     Problem: ABCDS Injury Assessment  Goal: Absence of physical injury  Outcome: Progressing     Problem: Chronic Conditions and Co-morbidities  Goal: Patient's chronic conditions and co-morbidity symptoms are monitored and maintained or improved  Outcome: Progressing

## 2023-02-28 NOTE — PROGRESS NOTES
SBAR given to primary nurse Shayla Ross. Pt has no concerns voiced. Call light within reach.   Josi BIGGS/RSC

## 2023-02-28 NOTE — PROGRESS NOTES
Mobile for Pulmonary, Sleep and Critical Care Medicine      Patient - Sandie Carrera   MRN -  [id number]   [facility] # - [id number]   - 1970      Date of Admission -  2023 11:43 PM  Date of evaluation -  2023  Room - 3B-/[facility]   Hospital Day - 4  Consulting - Elijah Liu MD Primary Care Physician - Elijah Liu MD     Problem List      Active Hospital Problems    Diagnosis Date Noted    COPD exacerbation (HCC) [J44.1] 2023     Priority: Medium     Reason for Consult    COPD exacerbation with acute on chronic respiratory failure with hypoxia and hypercapnia  HPI   History Obtained From electronic medical record.    Sandie Carrera is a 52 y.o. male who presented for evaluation of shortness of breath and dry cough. PMHx significant for severe COPD (FEV1 47%), chronic hypoxic respiratory failure on 2L NC at rest and 4L with exercise, JORDAN/OHS, moderate PAH (RSVP 55-60 mmHg), chronic tobacco use, HFrEF 45%. Unable to obtain history from patient, he is on BiPAP with a full face mask on. On presentation to the ER, he was c/o shortness of breath and dry cough. He was found to be hypoxic with SpO2 47%. Venous blood gas was also done revealed pH 7.27, pCO2 89, bicarb 40. Bipap was applied. Of note, patient was admitted in 2022 for COPD exacerbation due to pneumonia, he did not respond to BiPAP and was intubated. He recently saw Pulmonology on 2023, he was started on Brovana nebs. Patient is supposed to be on BiPAP for his underlying COPD at home, unsure if he is adherent to BiPAP. Questionable adherence to medications as well.     Past 24 hrs   -Did not use Hospital BiPAP overnight has home NIV, advised patient to have family bring in home IVAPS  -On hFNC 40 LPM at 33%, ApO2 94% weaned to 30 LPM at 35% notified OLESYA Ortega to trial on 6 LPM if tolerates for 1 hr  -Wheezing improved ongoing SOB has mild dry non-productive cough  All other systems reviewed    PMHx   Past Medical History     Diagnosis Date    Acute on chronic systolic congestive heart failure ([facility] [address] 4/4/2019    Emphysema (subcutaneous) (surgical) resulting from a procedure     Hypertension     Pneumonia     Schizophrenia [facility])       Past Surgical History    History reviewed. No pertinent surgical history. Meds    Current Medications    tiotropium-olodaterol  2 puff Inhalation Daily    predniSONE  40 mg Oral Daily    aspirin  81 mg Oral Daily    cefTRIAXone (ROCEPHIN) IV  1,000 mg IntraVENous Q24H    enoxaparin  40 mg SubCUTAneous Daily    pantoprazole  40 mg Oral QAM AC    risperiDONE  2 mg Oral BID    carBAMazepine  200 mg Oral BID    docusate sodium  100 mg Oral Daily    buPROPion  150 mg Oral QAM    pravastatin  40 mg Oral Nightly    azithromycin  250 mg Oral Daily    nicotine  1 patch TransDERmal Nightly     albuterol **AND** [DISCONTINUED] ipratropium, acetaminophen, nitroGLYCERIN, ondansetron, ondansetron  IV Drips/Infusions    Home Medications  Medications Prior to Admission: ipratropium-albuterol (DUONEB) 0.5-2.5 (3) MG/3ML SOLN nebulizer solution, Inhale 3 mLs into the lungs every 6 hours as needed for Shortness of Breath  budesonide (PULMICORT) 0.5 MG/2ML nebulizer suspension, Take 2 mLs by nebulization 2 times daily  arformoterol tartrate (BROVANA) 15 MCG/2ML NEBU, Take 2 mLs by nebulization in the morning and 2 mLs in the evening. albuterol sulfate HFA (VENTOLIN HFA) 108 (90 Base) MCG/ACT inhaler, Inhale 2 puffs into the lungs every 6 hours as needed for Wheezing or Shortness of Breath  docusate sodium (COLACE, DULCOLAX) 100 MG CAPS, Take 100 mg by mouth daily  enoxaparin (LOVENOX) 40 MG/0.4ML, Inject 0.4 mLs into the skin every 24 hours  famotidine (PEPCID) 20 MG tablet, Take 1 tablet by mouth 2 times daily  methylPREDNISolone sodium (SOLU-MEDROL) 40 MG injection, Infuse 1 mL intravenously in the morning and 1 mL in the evening.   traZODone (DESYREL) 100 MG tablet, take 1 tablet by mouth at bedtime  potassium chloride (KLOR-CON M) 20 MEQ extended release tablet, take 1 tablet by mouth once daily WITH BREAKFAST  haloperidol decanoate (HALDOL DECANOATE) 50 MG/ML injection, inject 1 milliliter intramuscularly every 2 WEEKS  furosemide (LASIX) 20 MG tablet, take 1 tablet by mouth once daily  buPROPion (WELLBUTRIN XL) 150 MG extended release tablet, Take 1 tablet by mouth every morning  risperiDONE (RISPERDAL) 2 MG tablet, Take 1 tablet by mouth 2 times daily  Spacer/Aero-Holding Chambers CAREN, 1 Device by Does not apply route daily  aspirin 325 MG EC tablet, Take 1 tablet by mouth daily  pravastatin (PRAVACHOL) 40 MG tablet, Take 40 mg by mouth nightly  carBAMazepine (TEGRETOL) 200 MG tablet, Take 200 mg by mouth 2 times daily  Diet    ADULT DIET; Regular  Allergies    Patient has no known allergies. Sleep History    Patient used to follow with Dr. Brian Roger MD and will need to obtain his old sleep study records  Occupational history   Occupation:  He is current working: No  Type of profession: He is currently on Social Security disability for his schizophrenia. He used to work at [email or web address]                       History of tobacco smoking:Yes  Amount of tobacco smokin.0 PPD. Years of tobacco smokin.                                    Quit smoking: Yes. Quit [id number]   Current smoker: No.       .   History of recreational or IV drug use in the past:NO     History of exposure to coal mines/coal dust: NO  History of exposure to foundry dust/welding: NO  History of exposure to quarry/silica/sandblasting: NO  History of exposure to asbestos/working with breaks/ships: NO  History of exposure to farm dust: NO  History of recent travel to long distances: NO  History of exposure to birds, pigeons, or chickens in the past:NO  Pet animals at home:No    History of pulmonary embolism in the past: No            History of DVT in the past:No          Vitals     height is 5' 10\" (1.778 m) and weight is 205 lb 6.4 oz (93.2 kg). His oral temperature is 97.7 °F (36.5 °C). His blood pressure is 121/84 and his pulse is 73. His respiration is 16 and oxygen saturation is 92%. Body mass index is 29.47 kg/m². SUPPLEMENTAL O2: O2 Flow Rate (L/min): 45 L/min     I/O      Intake/Output Summary (Last 24 hours) at 2/28/2023 0923  Last data filed at 2/28/2023 0901  Gross per 24 hour   Intake 1080 ml   Output 4575 ml   Net -3495 ml       I/O last 3 completed shifts: In: 960 [P.O.:960]  Out: 4600 [Urine:4600]   Patient Vitals for the past 96 hrs (Last 3 readings):   Weight   02/26/23 0449 205 lb 6.4 oz (93.2 kg)   02/25/23 0330 200 lb 4.8 oz (90.9 kg)         Exam     Physical Exam   Constitutional: No distress on O2 per HFNC. Patient appears moderately built and moderately nourished. Head: Normocephalic and atraumatic. Mouth/Throat: Oropharynx is clear and moist.  No oral thrush. Eyes: Conjunctivae are normal. Pupils are equal, round. No scleral icterus. Neck: Neck supple. No tracheal deviation present. Cardiovascular: S1 and S2 with no murmur. No peripheral edema  Pulmonary/Chest: Normal effort with bilateral air entry, faint expiratory wheeze. No stridor. No respiratory distress. Patient exhibits no tenderness. Abdominal: Soft. Bowel sounds audible. No distension or tenderness to palp.    Musculoskeletal: Moves all extremities  Neurological: Patient is alert and follows simple commands     Labs  - Old records and notes have been reviewed in [facility]   ABG  Lab Results   Component Value Date/Time    PH 7.42 02/28/2023 05:58 AM    PO2 68 02/28/2023 05:58 AM    PCO2 51 02/28/2023 05:58 AM    HCO3 33 02/28/2023 05:58 AM    O2SAT 93 02/28/2023 05:58 AM     Lab Results   Component Value Date/Time    IFIO2 35 02/28/2023 05:58 AM    MODE BiLevel 02/26/2023 05:03 AM    SETTIDVOL 480 05/30/2020 05:47 AM    SETPEEP 11.0 02/26/2023 05:03 AM     CBC  Recent Labs     02/26/23  0436 02/28/23  0332   WBC 11.1* 7.0   RBC 3.95* 4.36*   HGB 11.5* 12.9*   HCT 38.8* 42.5   MCV 98.2* 97.5*   MCH 29.1 29.6   MCHC 29.6* 30.4*    296   MPV 9.0* 9.2*        BMP  Recent Labs     02/26/23  0436 02/26/23  0503 02/28/23  0332    137* 136   K 5.1 4.2 4.8   CL 96*  --  95*   CO2 35*  --  32   BUN 15  --  11   CREATININE 0.7  --  0.7   GLUCOSE 92  --  85   CALCIUM 9.1  --  8.9       LFT  No results for input(s): AST, ALT, ALB, BILITOT, ALKPHOS, LIPASE in the last 72 hours. Invalid input(s): AMYLASE    TROP  Lab Results   Component Value Date/Time    TROPONINT < 0.010 02/24/2023 12:20 AM    TROPONINT < 0.010 01/03/2023 05:30 PM    TROPONINT < 0.010 11/07/2022 03:33 PM     BNP  No results for input(s): BNP in the last 72 hours. Lactic Acid  No results for input(s): LACTA in the last 72 hours. INR  No results for input(s): INR, PROTIME in the last 72 hours. PTT  No results for input(s): APTT in the last 72 hours. Glucose  No results for input(s): POCGLU in the last 72 hours. UA No results for input(s): SPECGRAV, PHUR, COLORU, CLARITYU, MUCUS, PROTEINU, BLOODU, RBCUA, WBCUA, BACTERIA, NITRU, GLUCOSEU, BILIRUBINUR, UROBILINOGEN, KETUA, LABCAST, LABCASTTY, AMORPHOS in the last 72 hours. Invalid input(s): CRYSTALS. PFTs   3/4/2020 Bedside Spirometry      Sleep studies   In the Epic system     Cultures    -Influenza A/B ->negative  -COVID-19 ->negative  -Blood cultures x2 -No prelim growth  -Strep and legionella urine antigens-Negative   Respiratory cultures ->pending sample    EKG   2/24/23    Normal sinus rhythm  Rightward axis  Nonspecific T wave abnormality  Abnormal ECG  When compared with ECG of 03-JAN-2023 17:26,  Premature ventricular complexes are no longer Present  T wave inversion now evident in Anterior leads  Confirmed by Edmonia Patch ([id number]) on 2/24/2023 6:56:31 AM    Echocardiogram   5/18/2020    Conclusions      Summary   Left ventricular size is normal and systolic function is mildly reduced.    Ejection fraction was estimated at 50-55%. LV wall thickness is within   normal limits. Intraventricular septal wall compression during systole suggestive of RV   pressure overload. Markedly enlarged right atrium size. The right ventricle was not clearly visualized. Appears dilated. RVSP of 55-60 mm Hg consistent with moderate pulmonary hypertension. Small circumferential pericardial effusion without evidence of tamponade   physiology. IVC size is dilated with <50% respiratory collapse. Signature    Radiology    CXR  2/24/2023     EXAM: 1V CHEST   Impression: Stable prominent reticular densities bilaterally may reflect interstitial edema or fibrosis. Cardiac silhouette mildly enlarged. Emphysema. CT Scans  CTA chest with 3-D postprocessing   2/24/2023   Impression:   No pulmonary embolism. Mild amount of bilateral ground-glass and nodular opacities which are new since 1/3/23 and likely infectious/inflammatory. Three-month follow-up chest CT can be performed to confirm resolution. Questionable concurrent mild cardiogenic pulmonary edema. (See actual reports for details)    Assessment   -Acute on chronic hypoxic respiratory failure - SpO2 47% on presentation. Patient wears 2L supplemental oxygen at baseline and 4L with exercise  -Acute on chronic hypercapnic respiratory failure- pCO2 71 with pH 7.32 and HCO3 36. Patient has chronic CO2 retention 2/2 COPD  -Acute COPD exacerbation- history of severe COPD FEV1 47% 2020. Patient prescribed IVAPS non-invasive ventilator December 2022  -Abnormal CT chest with Ground-glass and nodular opacities - seen on CTA chest 2/24/23  - Pulmonary hypertension with right ventricular systolic pressure 55 to 60 mmHg as seen on echo 5/18/2020. Most likely WHO group 3 I.e COPD   - History of tobacco smoking, 30 years with 1 pack of cigarettes/day.  Quit 11/2022  - Schizophrenia  - Essential Hypertension      Plan   -Continue with BiPAP settings 24/6 with BUR 16 during sleep and naps, PRN during the day, HFNC during meals and for breaks   -Monitor SpO2 wean supplemental O2 to maintain SpO2 88-94%  -Prednisone 40 mg Daily for 3 more days  -ATB's Rocephin and Azithromycin completed   -Stiolto 2 puff Daily  -JIMMY nebs PRN for SOB/wheezing  -Aspiration precautions  -Order placed to bring in home NIV for patient to use   -NRT, patient gives intermittent report of smoking  -Will Keep scheduled follow-up in sleep clinic and Pulmonary clinic with Dr. Rose Marie Guerrero will add Ct chest with contrast to follow pneumonia with mediastinal and hilar LAD  -DVT prophylaxis lovenox    Questions and concerns addressed. Electronically signed by   SAVANNAH Sheehan - CNP     Addendum by Dr. Rose Marie Guerrero MD:  Patient seen by me independently including key components of medical care. Face to face evaluation and examination was performed. Case discussed with Mr. Maria A Ortiz, CNP  Italicized font, if present,  represents changes to the note made by me. More than 50% of the encounter time involved with patient care by the Pulmonary & Critical care service team spent by me . Please see my modifications mentioned below:  He is still on high flow with 40 LPM with 33% FiO2  Using BiPAP at nighttime. Patient being transferred to [facility].  Follow-up as above with the planned CT scan of chest in 3 months to follow his mediastinal and hilar lymphadenopathy along with bilateral pneumonia.     Electronically signed by   Ruthie Márquez MD on 2/28/2023 at 8:00 PM

## 2023-02-28 NOTE — PROGRESS NOTES
No changes since previous assessment. Pt sitting in chair, with call light within reach. No concerns voiced at this time.    Vanessa Speak SN/RSC

## 2023-02-28 NOTE — PROGRESS NOTES
02/28/23 1430   Encounter Summary   Encounter Overview/Reason  Initial Encounter   Service Provided For: Patient   Referral/Consult From: Babar   Last Encounter  02/28/23   Complexity of Encounter Moderate   Encounter    Type Initial Screen/Assessment   Spiritual/Emotional needs   Type Spiritual Support   Assessment/Intervention/Outcome   Assessment Anxious; Coping   Intervention Active listening;Nurtured Hope;Prayer (assurance of)/[place];Sustaining Presence/Ministry of presence   Outcome Comfort;Encouraged   [facility] PROGRESS NOTE      Patient: Iftikhar Arnett  Room #: 6H-23/577-Y            YOB: 1970  Age: 45 y.o. Gender: male            Admit Date & Time: 2/23/2023 11:43 PM    Assessment:  Regina Crow is a 45year old male who is sitting up in a chair on 3B. He welcomed this  into his room. No family is present. He made statements about his chest., these were shared with his nurse. Interventions:  Prayer is offered and accepted. Words of encouragement given    Outcomes:  encouraged    Plan:     Care Plan:  Continue spiritual and emotional care for patient and family. Including prayers.       Electronically signed by Bipin Serrano, on 2/28/2023 at 3:19 PM.  [address] [facility]  [phone]

## 2023-03-01 LAB
BACTERIA BLD AEROBE CULT: NORMAL
BACTERIA BLD AEROBE CULT: NORMAL

## 2023-03-13 NOTE — PLAN OF CARE
Caller: MAIRA    Relationship: BEN     Best call back number: [phone]    What is the best time to reach you: ANY    Who are you requesting to speak with (clinical staff, provider,  specific staff member): CLINICAL      What was the call regarding: PT WAS RECENTLY IN [facility] FOR CHF AND FLUID BUILD UP. SHE HAS BEEN INSTRUCTED TO GET A 2 WEEK FOLLOW UP WITH DR AVILA.     Do you require a callback: YES             Problem: Restraint Use - Nonviolent/Non-Self-Destructive Behavior:  Goal: Absence of restraint indications  Description: Absence of restraint indications  5/24/2020 1103 by Ibeth Ching RN  Outcome: Ongoing  Note: Patient awakens momentarily and reaches for ett     Problem: Restraint Use - Nonviolent/Non-Self-Destructive Behavior:  Goal: Absence of restraint-related injury  Description: Absence of restraint-related injury  5/24/2020 1103 by Ibeth Ching RN  Outcome: Ongoing  Note: No sign of restraint related injury     Problem: Discharge Planning:  Goal: Participates in care planning  Description: Participates in care planning  5/24/2020 1103 by Ibeth Ching RN  Outcome: Ongoing  Note: Patient will require rehab     Problem: Discharge Planning:  Goal: Discharged to appropriate level of care  Description: Discharged to appropriate level of care  5/24/2020 1103 by Ibeth Ching RN  Outcome: Ongoing  Note: Patient will require rehab     Problem: Airway Clearance - Ineffective:  Goal: Ability to maintain a clear airway will improve  Description: Ability to maintain a clear airway will improve  5/24/2020 1103 by Ibeth Ching RN  Outcome: Ongoing  Note: Suctioning via ett     Problem: Anxiety/Stress:  Goal: Level of anxiety will decrease  Description: Level of anxiety will decrease  5/24/2020 1103 by Ibeth Ching RN  Outcome: Ongoing  Note: Titration of propofol for rass -1-2     Problem: Cardiac Output - Decreased:  Goal: Hemodynamic stability will improve  Description: Hemodynamic stability will improve  5/24/2020 1103 by Ibeth Ching RN  Outcome: Ongoing  Note: Vitals stable     Problem: Mental Status - Impaired:  Goal: Mental status will be restored to baseline  Description: Mental status will be restored to baseline  5/24/2020 1103 by Ibeth Ching RN  Outcome: Ongoing  Note: Patient awakens momentarily and follows commands with all extremities     Problem: Pain:  Goal: Pain level will decrease  Description: Pain level will decrease  5/24/2020 1103 by Blaze Wilson RN  Outcome: Ongoing  Note: Cpot score 0     Problem: Pain:  Goal: Recognizes and communicates pain  Description: Recognizes and communicates pain  5/24/2020 1103 by Blaze Wilson RN  Outcome: Ongoing  Note: Cpot score 0     Problem: Pain:  Goal: Control of acute pain  Description: Control of acute pain  5/24/2020 1103 by Blaze Wilson RN  Outcome: Ongoing     Problem: Pain:  Goal: Control of chronic pain  Description: Control of chronic pain  Outcome: Ongoing     Problem: Falls - Risk of:  Goal: Will remain free from falls  Description: Will remain free from falls  5/24/2020 1103 by Blaze Wilson RN  Outcome: Ongoing  Note: Bed alarm on , side rails up times 4 due to bed therapy mode     Problem: Falls - Risk of:  Goal: Absence of physical injury  Description: Absence of physical injury  5/24/2020 1103 by Blaze Wilson RN  Outcome: Ongoing  Note: No sign of physical injury this shift     Problem: Nutrition  Goal: Optimal nutrition therapy  5/24/2020 1103 by Blaze Wilson RN  Outcome: Ongoing  Note: Patient tolerating tube feeds     Problem: Urinary Elimination:  Goal: Complications related to the disease process, condition or treatment will be avoided or minimized  Description: Complications related to the disease process, condition or treatment will be avoided or minimized  5/24/2020 1103 by Blaze Wilson RN  Outcome: Ongoing  Note: Minaya in place, patient on lasix

## 2023-05-15 ENCOUNTER — HOSPITAL ENCOUNTER (INPATIENT)
Age: 53
LOS: 2 days | Discharge: HOME | End: 2023-05-19
Attending: STUDENT IN AN ORGANIZED HEALTH CARE EDUCATION/TRAINING PROGRAM | Admitting: INTERNAL MEDICINE
Payer: MEDICARE

## 2023-05-15 DIAGNOSIS — J44.9 STAGE 3 SEVERE COPD BY GOLD CLASSIFICATION (HCC): ICD-10-CM

## 2023-05-15 DIAGNOSIS — Z87.891 PERSONAL HISTORY OF TOBACCO USE, PRESENTING HAZARDS TO HEALTH: ICD-10-CM

## 2023-05-15 DIAGNOSIS — J96.22 ACUTE ON CHRONIC RESPIRATORY FAILURE WITH HYPOXIA AND HYPERCAPNIA (HCC): Primary | ICD-10-CM

## 2023-05-15 DIAGNOSIS — J96.21 ACUTE ON CHRONIC RESPIRATORY FAILURE WITH HYPOXIA AND HYPERCAPNIA (HCC): Primary | ICD-10-CM

## 2023-05-15 PROCEDURE — 85025 COMPLETE CBC W/AUTO DIFF WBC: CPT

## 2023-05-15 PROCEDURE — 80053 COMPREHEN METABOLIC PANEL: CPT

## 2023-05-15 PROCEDURE — 99285 EMERGENCY DEPT VISIT HI MDM: CPT

## 2023-05-15 PROCEDURE — 83880 ASSAY OF NATRIURETIC PEPTIDE: CPT

## 2023-05-15 PROCEDURE — 84484 ASSAY OF TROPONIN QUANT: CPT

## 2023-05-15 PROCEDURE — 93005 ELECTROCARDIOGRAM TRACING: CPT | Performed by: NURSE PRACTITIONER

## 2023-05-15 PROCEDURE — 36415 COLL VENOUS BLD VENIPUNCTURE: CPT

## 2023-05-15 RX ORDER — METHYLPREDNISOLONE SODIUM SUCCINATE 40 MG/ML
120 INJECTION, POWDER, LYOPHILIZED, FOR SOLUTION INTRAMUSCULAR; INTRAVENOUS ONCE
Status: COMPLETED | OUTPATIENT
Start: 2023-05-16 | End: 2023-05-16

## 2023-05-15 ASSESSMENT — PAIN - FUNCTIONAL ASSESSMENT: PAIN_FUNCTIONAL_ASSESSMENT: NONE - DENIES PAIN

## 2023-05-16 ENCOUNTER — APPOINTMENT (OUTPATIENT)
Dept: GENERAL RADIOLOGY | Age: 53
End: 2023-05-16
Payer: MEDICARE

## 2023-05-16 PROBLEM — J96.21 ACUTE ON CHRONIC RESPIRATORY FAILURE WITH HYPOXIA AND HYPERCAPNIA (HCC): Status: ACTIVE | Noted: 2023-05-16

## 2023-05-16 PROBLEM — J96.22 ACUTE ON CHRONIC RESPIRATORY FAILURE WITH HYPOXIA AND HYPERCAPNIA (HCC): Status: ACTIVE | Noted: 2023-05-16

## 2023-05-16 LAB
ALBUMIN SERPL BCG-MCNC: 4.6 G/DL (ref 3.5–5.1)
ALP SERPL-CCNC: 99 U/L (ref 38–126)
ALT SERPL W/O P-5'-P-CCNC: 17 U/L (ref 11–66)
ANION GAP SERPL CALC-SCNC: 12 MEQ/L (ref 8–16)
ARTERIAL PATENCY WRIST A: ABNORMAL
ARTERIAL PATENCY WRIST A: POSITIVE
AST SERPL-CCNC: 14 U/L (ref 5–40)
BASE EXCESS BLDA CALC-SCNC: 3.7 MMOL/L (ref -2.5–2.5)
BASE EXCESS BLDA CALC-SCNC: 7.9 MMOL/L (ref -2.5–2.5)
BASE EXCESS BLDA CALC-SCNC: 7.9 MMOL/L (ref -2.5–2.5)
BASE EXCESS BLDA CALC-SCNC: 8.8 MMOL/L (ref -2.5–2.5)
BASE EXCESS BLDA CALC-SCNC: 9.2 MMOL/L (ref -2–3)
BASOPHILS ABSOLUTE: 0 THOU/MM3 (ref 0–0.1)
BASOPHILS NFR BLD AUTO: 0.5 %
BDY SITE: ABNORMAL
BILIRUB SERPL-MCNC: 0.3 MG/DL (ref 0.3–1.2)
BUN SERPL-MCNC: 15 MG/DL (ref 7–22)
CALCIUM SERPL-MCNC: 9.4 MG/DL (ref 8.5–10.5)
CHLORIDE SERPL-SCNC: 97 MEQ/L (ref 98–111)
CO2 SERPL-SCNC: 32 MEQ/L (ref 23–33)
COLLECTED BY:: ABNORMAL
CREAT SERPL-MCNC: 0.9 MG/DL (ref 0.4–1.2)
DEPRECATED RDW RBC AUTO: 54.1 FL (ref 35–45)
DEVICE: ABNORMAL
EOSINOPHIL NFR BLD AUTO: 1.8 %
EOSINOPHILS ABSOLUTE: 0.1 THOU/MM3 (ref 0–0.4)
ERYTHROCYTE [DISTWIDTH] IN BLOOD BY AUTOMATED COUNT: 14.5 % (ref 11.5–14.5)
FIO2 ON VENT O2 ANALYZER: 100 %
FIO2 ON VENT O2 ANALYZER: 40 %
FIO2 ON VENT O2 ANALYZER: 50 %
FLUAV RNA RESP QL NAA+PROBE: NOT DETECTED
FLUBV RNA RESP QL NAA+PROBE: NOT DETECTED
GFR SERPL CREATININE-BSD FRML MDRD: > 60 ML/MIN/1.73M2
GLUCOSE BLD STRIP.AUTO-MCNC: 100 MG/DL (ref 70–108)
GLUCOSE BLD STRIP.AUTO-MCNC: 116 MG/DL (ref 70–108)
GLUCOSE BLD STRIP.AUTO-MCNC: 118 MG/DL (ref 70–108)
GLUCOSE BLD STRIP.AUTO-MCNC: 90 MG/DL (ref 70–108)
GLUCOSE SERPL-MCNC: 127 MG/DL (ref 70–108)
HCO3 BLDA-SCNC: 36 MMOL/L (ref 23–28)
HCO3 BLDA-SCNC: 39 MMOL/L (ref 23–28)
HCO3 BLDA-SCNC: 40 MMOL/L (ref 23–28)
HCO3 BLDA-SCNC: 41 MMOL/L (ref 23–28)
HCO3 BLDA-SCNC: 42 MMOL/L (ref 23–28)
HCT VFR BLD AUTO: 55.5 % (ref 42–52)
HGB BLD-MCNC: 16.8 GM/DL (ref 14–18)
IMM GRANULOCYTES # BLD AUTO: 0.01 THOU/MM3 (ref 0–0.07)
IMM GRANULOCYTES NFR BLD AUTO: 0.2 %
LV EF: 50 %
LVEF MODALITY: NORMAL
LYMPHOCYTES ABSOLUTE: 1.4 THOU/MM3 (ref 1–4.8)
LYMPHOCYTES NFR BLD AUTO: 20.9 %
MCH RBC QN AUTO: 30.2 PG (ref 26–33)
MCHC RBC AUTO-ENTMCNC: 30.3 GM/DL (ref 32.2–35.5)
MCV RBC AUTO: 99.8 FL (ref 80–94)
MONOCYTES ABSOLUTE: 0.6 THOU/MM3 (ref 0.4–1.3)
MONOCYTES NFR BLD AUTO: 9.1 %
NEUTROPHILS NFR BLD AUTO: 67.5 %
NRBC BLD AUTO-RTO: 0 /100 WBC
NT-PROBNP SERPL IA-MCNC: 1584 PG/ML (ref 0–124)
OSMOLALITY SERPL CALC.SUM OF ELEC: 283.7 MOSMOL/KG (ref 275–300)
PCO2 BLDA: 80 MMHG (ref 35–45)
PCO2 BLDA: 84 MMHG (ref 35–45)
PCO2 BLDA: 85 MMHG (ref 35–45)
PCO2 BLDA: 86 MMHG (ref 35–45)
PCO2 TEMP ADJ BLDMV: 86 MMHG (ref 41–51)
PEEP SETTING VENT: 6 MMHG
PH BLDA: 7.24 [PH] (ref 7.35–7.45)
PH BLDA: 7.28 [PH] (ref 7.35–7.45)
PH BLDA: 7.29 [PH] (ref 7.35–7.45)
PH BLDA: 7.3 [PH] (ref 7.35–7.45)
PH BLDMV: 7.29 [PH] (ref 7.31–7.41)
PIP: 16 CMH2O
PIP: 16 CMH2O
PIP: 22 CMH2O
PIP: 24 CMH2O
PLATELET # BLD AUTO: 248 THOU/MM3 (ref 130–400)
PMV BLD AUTO: 10.3 FL (ref 9.4–12.4)
PO2 BLDA: 78 MMHG (ref 71–104)
PO2 BLDA: 81 MMHG (ref 71–104)
PO2 BLDA: 82 MMHG (ref 71–104)
PO2 BLDA: 90 MMHG (ref 71–104)
PO2 BLDMV: 31 MMHG (ref 25–40)
POTASSIUM SERPL-SCNC: 4.4 MEQ/L (ref 3.5–5.2)
PRESSURE SUPPORT SETTING VENT: 10 CMH2O
PROT SERPL-MCNC: 8.2 G/DL (ref 6.1–8)
RBC # BLD AUTO: 5.56 MILL/MM3 (ref 4.7–6.1)
SAO2 % BLDA: 93 %
SAO2 % BLDA: 93 %
SAO2 % BLDA: 94 %
SAO2 % BLDA: 95 %
SAO2 % BLDMV: 48 %
SARS-COV-2 RNA RESP QL NAA+PROBE: NOT DETECTED
SEGMENTED NEUTROPHILS ABSOLUTE COUNT: 4.4 THOU/MM3 (ref 1.8–7.7)
SET PEEP: 6 MMHG
SET RESPIRATORY RATE: 18 BPM
SITE: ABNORMAL
SODIUM SERPL-SCNC: 141 MEQ/L (ref 135–145)
TROPONIN T: < 0.01 NG/ML
VENTILATION MODE VENT: ABNORMAL
VENTILATION MODE VENT: ABNORMAL
WBC # BLD AUTO: 6.5 THOU/MM3 (ref 4.8–10.8)

## 2023-05-16 PROCEDURE — 93010 ELECTROCARDIOGRAM REPORT: CPT | Performed by: INTERNAL MEDICINE

## 2023-05-16 PROCEDURE — 36600 WITHDRAWAL OF ARTERIAL BLOOD: CPT

## 2023-05-16 PROCEDURE — 82803 BLOOD GASES ANY COMBINATION: CPT

## 2023-05-16 PROCEDURE — 87636 SARSCOV2 & INF A&B AMP PRB: CPT

## 2023-05-16 PROCEDURE — 6370000000 HC RX 637 (ALT 250 FOR IP): Performed by: INTERNAL MEDICINE

## 2023-05-16 PROCEDURE — 82948 REAGENT STRIP/BLOOD GLUCOSE: CPT

## 2023-05-16 PROCEDURE — 94761 N-INVAS EAR/PLS OXIMETRY MLT: CPT

## 2023-05-16 PROCEDURE — 6360000002 HC RX W HCPCS: Performed by: NURSE PRACTITIONER

## 2023-05-16 PROCEDURE — 71045 X-RAY EXAM CHEST 1 VIEW: CPT

## 2023-05-16 PROCEDURE — 2580000003 HC RX 258: Performed by: INTERNAL MEDICINE

## 2023-05-16 PROCEDURE — G0378 HOSPITAL OBSERVATION PER HR: HCPCS

## 2023-05-16 PROCEDURE — 2700000000 HC OXYGEN THERAPY PER DAY

## 2023-05-16 PROCEDURE — 93306 TTE W/DOPPLER COMPLETE: CPT

## 2023-05-16 PROCEDURE — 96374 THER/PROPH/DIAG INJ IV PUSH: CPT

## 2023-05-16 PROCEDURE — 94660 CPAP INITIATION&MGMT: CPT

## 2023-05-16 PROCEDURE — 2580000003 HC RX 258

## 2023-05-16 PROCEDURE — 99222 1ST HOSP IP/OBS MODERATE 55: CPT | Performed by: INTERNAL MEDICINE

## 2023-05-16 PROCEDURE — 6360000002 HC RX W HCPCS: Performed by: INTERNAL MEDICINE

## 2023-05-16 PROCEDURE — 94640 AIRWAY INHALATION TREATMENT: CPT

## 2023-05-16 PROCEDURE — 5A09457 ASSISTANCE WITH RESPIRATORY VENTILATION, 24-96 CONSECUTIVE HOURS, CONTINUOUS POSITIVE AIRWAY PRESSURE: ICD-10-PCS | Performed by: INTERNAL MEDICINE

## 2023-05-16 RX ORDER — IPRATROPIUM BROMIDE AND ALBUTEROL SULFATE 2.5; .5 MG/3ML; MG/3ML
1 SOLUTION RESPIRATORY (INHALATION)
Status: DISCONTINUED | OUTPATIENT
Start: 2023-05-16 | End: 2023-05-16

## 2023-05-16 RX ORDER — ENOXAPARIN SODIUM 100 MG/ML
40 INJECTION SUBCUTANEOUS DAILY
Status: DISCONTINUED | OUTPATIENT
Start: 2023-05-16 | End: 2023-05-19 | Stop reason: HOSPADM

## 2023-05-16 RX ORDER — METHYLPREDNISOLONE SODIUM SUCCINATE 40 MG/ML
40 INJECTION, POWDER, LYOPHILIZED, FOR SOLUTION INTRAMUSCULAR; INTRAVENOUS EVERY 6 HOURS
Status: DISCONTINUED | OUTPATIENT
Start: 2023-05-16 | End: 2023-05-16

## 2023-05-16 RX ORDER — PANTOPRAZOLE SODIUM 40 MG/1
40 TABLET, DELAYED RELEASE ORAL
Status: DISCONTINUED | OUTPATIENT
Start: 2023-05-16 | End: 2023-05-19 | Stop reason: HOSPADM

## 2023-05-16 RX ORDER — WATER 1000 ML/1000ML
INJECTION, SOLUTION INTRAVENOUS
Status: COMPLETED
Start: 2023-05-16 | End: 2023-05-16

## 2023-05-16 RX ORDER — INSULIN LISPRO 100 [IU]/ML
0-4 INJECTION, SOLUTION INTRAVENOUS; SUBCUTANEOUS NIGHTLY
Status: DISCONTINUED | OUTPATIENT
Start: 2023-05-16 | End: 2023-05-19 | Stop reason: HOSPADM

## 2023-05-16 RX ORDER — INSULIN LISPRO 100 [IU]/ML
0-4 INJECTION, SOLUTION INTRAVENOUS; SUBCUTANEOUS
Status: DISCONTINUED | OUTPATIENT
Start: 2023-05-16 | End: 2023-05-19 | Stop reason: HOSPADM

## 2023-05-16 RX ORDER — ONDANSETRON 2 MG/ML
4 INJECTION INTRAMUSCULAR; INTRAVENOUS EVERY 6 HOURS PRN
Status: DISCONTINUED | OUTPATIENT
Start: 2023-05-16 | End: 2023-05-19 | Stop reason: HOSPADM

## 2023-05-16 RX ORDER — METHYLPREDNISOLONE SODIUM SUCCINATE 40 MG/ML
40 INJECTION, POWDER, LYOPHILIZED, FOR SOLUTION INTRAMUSCULAR; INTRAVENOUS EVERY 12 HOURS
Status: DISCONTINUED | OUTPATIENT
Start: 2023-05-17 | End: 2023-05-19 | Stop reason: HOSPADM

## 2023-05-16 RX ORDER — RISPERIDONE 1 MG/1
2 TABLET ORAL 2 TIMES DAILY
Status: DISCONTINUED | OUTPATIENT
Start: 2023-05-16 | End: 2023-05-19 | Stop reason: HOSPADM

## 2023-05-16 RX ORDER — RISPERIDONE 0.25 MG/1
0.25 TABLET ORAL DAILY
Status: DISCONTINUED | OUTPATIENT
Start: 2023-05-16 | End: 2023-05-19 | Stop reason: HOSPADM

## 2023-05-16 RX ORDER — ASPIRIN 81 MG/1
81 TABLET, CHEWABLE ORAL DAILY
Status: DISCONTINUED | OUTPATIENT
Start: 2023-05-16 | End: 2023-05-19 | Stop reason: HOSPADM

## 2023-05-16 RX ORDER — DOCUSATE SODIUM 100 MG/1
100 CAPSULE, LIQUID FILLED ORAL DAILY
Status: DISCONTINUED | OUTPATIENT
Start: 2023-05-16 | End: 2023-05-19 | Stop reason: HOSPADM

## 2023-05-16 RX ORDER — IPRATROPIUM BROMIDE AND ALBUTEROL SULFATE 2.5; .5 MG/3ML; MG/3ML
1 SOLUTION RESPIRATORY (INHALATION) 3 TIMES DAILY
Status: DISCONTINUED | OUTPATIENT
Start: 2023-05-16 | End: 2023-05-17

## 2023-05-16 RX ORDER — ACETAMINOPHEN 325 MG/1
650 TABLET ORAL EVERY 4 HOURS PRN
Status: DISCONTINUED | OUTPATIENT
Start: 2023-05-16 | End: 2023-05-19 | Stop reason: HOSPADM

## 2023-05-16 RX ORDER — DEXTROSE MONOHYDRATE 100 MG/ML
INJECTION, SOLUTION INTRAVENOUS CONTINUOUS PRN
Status: DISCONTINUED | OUTPATIENT
Start: 2023-05-16 | End: 2023-05-19 | Stop reason: HOSPADM

## 2023-05-16 RX ORDER — ALBUTEROL SULFATE 2.5 MG/3ML
2.5 SOLUTION RESPIRATORY (INHALATION) EVERY 4 HOURS PRN
Status: DISCONTINUED | OUTPATIENT
Start: 2023-05-16 | End: 2023-05-19 | Stop reason: HOSPADM

## 2023-05-16 RX ORDER — POLYETHYLENE GLYCOL 3350 17 G/17G
17 POWDER, FOR SOLUTION ORAL DAILY
Status: DISCONTINUED | OUTPATIENT
Start: 2023-05-16 | End: 2023-05-19 | Stop reason: HOSPADM

## 2023-05-16 RX ORDER — NITROGLYCERIN 0.4 MG/1
0.4 TABLET SUBLINGUAL EVERY 5 MIN PRN
Status: DISCONTINUED | OUTPATIENT
Start: 2023-05-16 | End: 2023-05-18

## 2023-05-16 RX ORDER — SENNA PLUS 8.6 MG/1
1 TABLET ORAL NIGHTLY
Status: DISCONTINUED | OUTPATIENT
Start: 2023-05-16 | End: 2023-05-19 | Stop reason: HOSPADM

## 2023-05-16 RX ORDER — ONDANSETRON 4 MG/1
4 TABLET, ORALLY DISINTEGRATING ORAL EVERY 8 HOURS PRN
Status: DISCONTINUED | OUTPATIENT
Start: 2023-05-16 | End: 2023-05-19 | Stop reason: HOSPADM

## 2023-05-16 RX ADMIN — ASPIRIN 81 MG 81 MG: 81 TABLET ORAL at 09:16

## 2023-05-16 RX ADMIN — RISPERIDONE 2 MG: 1 TABLET ORAL at 09:17

## 2023-05-16 RX ADMIN — PANTOPRAZOLE SODIUM 40 MG: 40 TABLET, DELAYED RELEASE ORAL at 09:16

## 2023-05-16 RX ADMIN — POLYETHYLENE GLYCOL 3350 17 G: 17 POWDER, FOR SOLUTION ORAL at 09:17

## 2023-05-16 RX ADMIN — METHYLPREDNISOLONE SODIUM SUCCINATE 120 MG: 40 INJECTION INTRAMUSCULAR; INTRAVENOUS at 00:24

## 2023-05-16 RX ADMIN — RISPERIDONE 2 MG: 1 TABLET ORAL at 23:12

## 2023-05-16 RX ADMIN — ENOXAPARIN SODIUM 40 MG: 100 INJECTION SUBCUTANEOUS at 09:16

## 2023-05-16 RX ADMIN — IPRATROPIUM BROMIDE AND ALBUTEROL SULFATE 1 AMPULE: .5; 3 SOLUTION RESPIRATORY (INHALATION) at 08:21

## 2023-05-16 RX ADMIN — CEFTRIAXONE SODIUM 1000 MG: 1 INJECTION, POWDER, FOR SOLUTION INTRAMUSCULAR; INTRAVENOUS at 05:50

## 2023-05-16 RX ADMIN — METHYLPREDNISOLONE SODIUM SUCCINATE 40 MG: 40 INJECTION, POWDER, FOR SOLUTION INTRAMUSCULAR; INTRAVENOUS at 05:51

## 2023-05-16 RX ADMIN — WATER 10 ML: 1 INJECTION INTRAMUSCULAR; INTRAVENOUS; SUBCUTANEOUS at 00:25

## 2023-05-16 RX ADMIN — METHYLPREDNISOLONE SODIUM SUCCINATE 40 MG: 40 INJECTION, POWDER, FOR SOLUTION INTRAMUSCULAR; INTRAVENOUS at 12:06

## 2023-05-16 RX ADMIN — SENNOSIDES 8.6 MG: 8.6 TABLET, FILM COATED ORAL at 23:14

## 2023-05-16 RX ADMIN — IPRATROPIUM BROMIDE AND ALBUTEROL SULFATE 1 AMPULE: .5; 3 SOLUTION RESPIRATORY (INHALATION) at 11:03

## 2023-05-16 RX ADMIN — DOCUSATE SODIUM 100 MG: 100 CAPSULE, LIQUID FILLED ORAL at 09:16

## 2023-05-16 RX ADMIN — RISPERIDONE 0.25 MG: 0.25 TABLET, FILM COATED ORAL at 09:17

## 2023-05-16 RX ADMIN — IPRATROPIUM BROMIDE AND ALBUTEROL SULFATE 1 AMPULE: .5; 3 SOLUTION RESPIRATORY (INHALATION) at 20:29

## 2023-05-16 ASSESSMENT — PAIN - FUNCTIONAL ASSESSMENT: PAIN_FUNCTIONAL_ASSESSMENT: NONE - DENIES PAIN

## 2023-05-16 NOTE — H&P
Internal Medicine  History and Physical    Patient: Tammy Sheth  MRN: [id number]      History Obtained From:  patient, electronic medical record  PCP: Yuridia Velázquez MD    CHIEF COMPLAINT:  SOB, cough    HISTORY OF PRESENT ILLNESS:   The patient is a 45 y.o. male who presents with cough, SOB, wheezing in the last week. He has a h/o COPD, mod pulm HTN, on home Oxygen with questionable compliance, he still smokes. There is no fever, no chills, no N/V. He was seen in ER, labs showed marked hypercarbia, CXR was unrevealing. He was started on BIPAP and admitted for further care. Past Medical History:        Diagnosis Date    Acute on chronic systolic congestive heart failure ([address] 4/4/2019    Emphysema (subcutaneous) (surgical) resulting from a procedure     Hypertension     Pneumonia     Schizophrenia [facility])        Past Surgical History:    History reviewed. No pertinent surgical history. Medications Prior to Admission:    Prior to Admission medications    Medication Sig Start Date End Date Taking? Authorizing Provider   nicotine (NICODERM CQ) 14 MG/24HR Place 1 patch onto the skin at bedtime 2/28/23   Yuridia Velázquez MD   nitroGLYCERIN (NITROSTAT) 0.4 MG SL tablet Place 1 tablet under the tongue every 5 minutes as needed for Chest pain up to max of 3 total doses.  If no relief after 1 dose, call 911. 2/28/23   Yuridia Velázquez MD   ondansetron (ZOFRAN-ODT) 4 MG disintegrating tablet Take 1 tablet by mouth every 8 hours as needed for Nausea or Vomiting 2/28/23   Yuridia Velázquez MD   pantoprazole (PROTONIX) 40 MG tablet Take 1 tablet by mouth every morning (before breakfast) 3/1/23   Yuridia Velázquez MD   tiotropium-olodaterol (STIOLTO) 2.5-2.5 MCG/ACT AERS Inhale 2 puffs into the lungs daily 3/1/23   Yuridia Velázquez MD   budesonide (PULMICORT) 0.5 MG/2ML nebulizer suspension Take 2 mLs by nebulization 2 times daily 1/24/23 1/24/24  Kyra Emery MD   arformoterol tartrate (BROVANA) 15 MCG/2ML NEBU Take 2 mLs

## 2023-05-16 NOTE — CARE COORDINATION
Case Management Assessment  Initial Evaluation    Date/Time of Evaluation: 5/16/2023 2:55 PM  Assessment Completed by: Marlys Salinas RN    If patient is discharged prior to next notation, then this note serves as note for discharge by case management. Patient Name: Kelly Cook                   YOB: 1970  Diagnosis: Acute on chronic respiratory failure with hypoxia and hypercapnia ([facility] [address] [J96.21, J96.22]                   Date / Time: 5/15/2023 11:31 PM  Location: [address] [place] [address]     Patient Admission Status: Observation   Readmission Risk Low 0-14, Mod 15-19), High > 20: Readmission Risk Score: 22    Current PCP: Patricio Oconnor MD  PCP verified by CM? Yes    Chart Reviewed: Yes      History Provided by: Medical Record  Patient Orientation: Other (see comment) (patient asleep during both attempts to meet with him)    Patient Cognition: Other (see comment)    Hospitalization in the last 30 days (Readmission):  No    If yes, Readmission Assessment in  Navigator will be completed. Advance Directives:      Code Status: Prior   Patient's Primary Decision Maker is: Legal Next of Kin    Primary Decision Maker: [facility] [phone]    Secondary Decision Maker: Miguelyarelis Duran  Brother/Sister - [phone]    Secondary Decision Maker: Vira [place] Brother/Sister - [phone]    Discharge Planning:    Patient lives with: Parent Type of Home: House  Primary Care Giver: Self  Patient Support Systems include: Parent, Family Members   Current Financial resources: Medicare, Medicaid  Current community resources: None  Current services prior to admission: Oxygen Therapy, Durable Medical Equipment (SR HME 2-6L)            Current DME: Other (Comment) (NIV)            Type of Home Care services:  None    ADLS  Prior functional level: Independent in ADLs/IADLs  Current functional level: Independent in ADLs/IADLs    Family can provide assistance at DC:  Yes  Would you like Case Management to

## 2023-05-16 NOTE — ED NOTES
ED to inpatient nurses report    Chief Complaint   Patient presents with    Shortness of Breath      Present to ED from home  LOC: alert and orientated to name, place, date  Vital signs   Vitals:    05/16/23 0100 05/16/23 0110 05/16/23 0120 05/16/23 0130   BP:  113/81 127/87    Pulse: 92 90 87 88   Resp: 29 23 22   Temp:       TempSrc:       SpO2: 100% 99% 99% 98%      Oxygen Baseline none (pt is suppose to wear NC)    Current needs required Bipap   LDAs:   Peripheral IV 05/15/23 Right Antecubital (Active)   Site Assessment Clean, dry & intact 05/16/23 0023   Line Status Flushed;Normal saline locked 05/16/23 0023   Dressing Status New dressing applied;Clean, dry & intact 05/15/23 2350     Mobility: Requires assistance * 1  Pending ED orders: none  Present condition: stable    C-SSRS    Swallow Screening    Preferred Language: [place]     Electronically signed by Shona Catherine RN on 5/16/2023 at [address] [place] [address]  05/16/23 8741

## 2023-05-16 NOTE — ED NOTES
Pt resting in bed at this time with eyes closed, no concerns noted. Call light in reach.       Gina Craft RN  05/16/23 0410

## 2023-05-16 NOTE — ED NOTES
Pt resting in bed. Pt requesting something to drink. RN informed pt nothing to drink at this time. Pt medicated per MAR. Bed lowest position, call light in reach, side rails x2.      Ralph Rosen RN  05/16/23 [id number]

## 2023-05-16 NOTE — ED NOTES
Patient transported to [facility]  by cart in stable condition. Patient monitored on cardiac telemetry. Patient on  O2 via CPAP   IV infusing and line is patent.         Danae Delgadillo, EMT-P  05/16/23 [id number]

## 2023-05-16 NOTE — ED TRIAGE NOTES
Pt presents to the ED with c/o cough, congestion, shortness of breath x couple days. Pt has history of COPD. Pt states he is suppose to be wearing 4L NC. Pt states he wears his oxygen as prescribed. Pt states he has been taking his inhalers as prescribed. Pt states he is still smoking.

## 2023-05-17 ENCOUNTER — APPOINTMENT (OUTPATIENT)
Dept: CT IMAGING | Age: 53
End: 2023-05-17
Payer: MEDICARE

## 2023-05-17 PROBLEM — J96.20 ACUTE AND CHRONIC RESPIRATORY FAILURE (ACUTE-ON-CHRONIC) (HCC): Status: ACTIVE | Noted: 2023-05-17

## 2023-05-17 PROBLEM — Z91.199 MEDICALLY NONCOMPLIANT: Status: ACTIVE | Noted: 2023-05-17

## 2023-05-17 LAB
BASE EXCESS BLDA CALC-SCNC: 9.1 MMOL/L (ref -2–3)
COLLECTED BY:: ABNORMAL
DEVICE: ABNORMAL
FIO2 ON VENT O2 ANALYZER: 40 %
GLUCOSE BLD STRIP.AUTO-MCNC: 101 MG/DL (ref 70–108)
GLUCOSE BLD STRIP.AUTO-MCNC: 130 MG/DL (ref 70–108)
GLUCOSE BLD STRIP.AUTO-MCNC: 81 MG/DL (ref 70–108)
GLUCOSE BLD STRIP.AUTO-MCNC: 93 MG/DL (ref 70–108)
HCO3 BLDA-SCNC: 38 MMOL/L (ref 23–28)
PCO2 TEMP ADJ BLDMV: 62 MMHG (ref 41–51)
PH BLDMV: 7.39 [PH] (ref 7.31–7.41)
PIP: 24 CMH2O
PO2 BLDMV: 78 MMHG (ref 25–40)
SAO2 % BLDMV: 95 %
SET PEEP: 10 MMHG
SET RESPIRATORY RATE: 18 BPM
VENTILATION MODE VENT: ABNORMAL

## 2023-05-17 PROCEDURE — 94660 CPAP INITIATION&MGMT: CPT

## 2023-05-17 PROCEDURE — 94640 AIRWAY INHALATION TREATMENT: CPT

## 2023-05-17 PROCEDURE — 99232 SBSQ HOSP IP/OBS MODERATE 35: CPT | Performed by: INTERNAL MEDICINE

## 2023-05-17 PROCEDURE — 2580000003 HC RX 258: Performed by: INTERNAL MEDICINE

## 2023-05-17 PROCEDURE — 6370000000 HC RX 637 (ALT 250 FOR IP): Performed by: INTERNAL MEDICINE

## 2023-05-17 PROCEDURE — 6360000002 HC RX W HCPCS: Performed by: INTERNAL MEDICINE

## 2023-05-17 PROCEDURE — 2060000000 HC ICU INTERMEDIATE R&B

## 2023-05-17 PROCEDURE — 1200000003 HC TELEMETRY R&B

## 2023-05-17 PROCEDURE — 82803 BLOOD GASES ANY COMBINATION: CPT

## 2023-05-17 PROCEDURE — 6360000002 HC RX W HCPCS: Performed by: STUDENT IN AN ORGANIZED HEALTH CARE EDUCATION/TRAINING PROGRAM

## 2023-05-17 PROCEDURE — 71260 CT THORAX DX C+: CPT

## 2023-05-17 PROCEDURE — 94761 N-INVAS EAR/PLS OXIMETRY MLT: CPT

## 2023-05-17 PROCEDURE — 82948 REAGENT STRIP/BLOOD GLUCOSE: CPT

## 2023-05-17 PROCEDURE — 2700000000 HC OXYGEN THERAPY PER DAY

## 2023-05-17 PROCEDURE — 1200000000 HC SEMI PRIVATE

## 2023-05-17 PROCEDURE — 6360000004 HC RX CONTRAST MEDICATION: Performed by: INTERNAL MEDICINE

## 2023-05-17 PROCEDURE — 99223 1ST HOSP IP/OBS HIGH 75: CPT | Performed by: INTERNAL MEDICINE

## 2023-05-17 RX ORDER — IPRATROPIUM BROMIDE AND ALBUTEROL SULFATE 2.5; .5 MG/3ML; MG/3ML
1 SOLUTION RESPIRATORY (INHALATION) 2 TIMES DAILY
Status: DISCONTINUED | OUTPATIENT
Start: 2023-05-17 | End: 2023-05-18

## 2023-05-17 RX ADMIN — IPRATROPIUM BROMIDE AND ALBUTEROL SULFATE 1 AMPULE: .5; 3 SOLUTION RESPIRATORY (INHALATION) at 08:08

## 2023-05-17 RX ADMIN — IPRATROPIUM BROMIDE AND ALBUTEROL SULFATE 1 AMPULE: .5; 3 SOLUTION RESPIRATORY (INHALATION) at 16:33

## 2023-05-17 RX ADMIN — CEFTRIAXONE SODIUM 1000 MG: 1 INJECTION, POWDER, FOR SOLUTION INTRAMUSCULAR; INTRAVENOUS at 04:49

## 2023-05-17 RX ADMIN — METHYLPREDNISOLONE SODIUM SUCCINATE 40 MG: 40 INJECTION INTRAMUSCULAR; INTRAVENOUS at 00:40

## 2023-05-17 RX ADMIN — METHYLPREDNISOLONE SODIUM SUCCINATE 40 MG: 40 INJECTION INTRAMUSCULAR; INTRAVENOUS at 11:51

## 2023-05-17 RX ADMIN — RISPERIDONE 0.25 MG: 0.25 TABLET, FILM COATED ORAL at 11:48

## 2023-05-17 RX ADMIN — PANTOPRAZOLE SODIUM 40 MG: 40 TABLET, DELAYED RELEASE ORAL at 06:41

## 2023-05-17 RX ADMIN — RISPERIDONE 2 MG: 1 TABLET ORAL at 11:48

## 2023-05-17 RX ADMIN — SENNOSIDES 8.6 MG: 8.6 TABLET, FILM COATED ORAL at 20:52

## 2023-05-17 RX ADMIN — ASPIRIN 81 MG 81 MG: 81 TABLET ORAL at 09:18

## 2023-05-17 RX ADMIN — ENOXAPARIN SODIUM 40 MG: 100 INJECTION SUBCUTANEOUS at 09:18

## 2023-05-17 RX ADMIN — IOPAMIDOL 80 ML: 755 INJECTION, SOLUTION INTRAVENOUS at 09:06

## 2023-05-17 ASSESSMENT — PAIN SCALES - GENERAL: PAINLEVEL_OUTOF10: 0

## 2023-05-17 NOTE — CONSULTS
[facility] [address] [place] [address] Drive    Patient's Name/Date of Birth: Sg Kang / 1970 (31 y.o.)    Date: May 17, 2023     Referring Provider: Ariel Fowler MD    CHIEF COMPLAINT: Shortness of Breath      HPI: Sg Kang is a 45 y.o. male who presented to [facility] for dyspnea, cough, and wheezing. The onset of his symptoms was 1 week ago and have been gradually improving since admission. He states that he has had COPD for around  15 year to the best of his knowledge and that he felt that is his worsened dyspnea brought him to the ED. He states that he is compliant with his PAP therapy at home and that he quit smoking last year. Aggravating factors include activity. He denies associated symptoms that include chills, fever, nausea, vomiting, chest pain and palpitations. He has a past medical history of chronic systolic congestive heart failure, Emphysema (subcutaneous) (surgical) resulting from a procedure, Hypertension, Pneumonia, chronic respiratory failure, COPD, hx of smoking, and Schizophrenia. He is admitted for Acute on chronic respiratory failure with hypoxia and hypercapnia in the context of COPD exacerbation. Cardiology is consulted as his latest Echo from 2023 demonstrated progression in his pulmonary HTN from moderate to severe as his RVSP was 55-60 mmHg in 2020 and is now 85 mmHg. Additionally, he has a hx of CHF with EF of ~50 +/- 5% since at least 2019. Most recent EK2023 nonspecific ST and T waves changes, sinus tachycardia, rightward axis, HR of 109, mostly unchanged from previous tracings. Prior EK2023 normal sinus rhythm, nonspecific ST and T waves changes, rightward axis, HR of 94.    LHC: Not on file  Stress Test: Not on file  Most recent Echo: 5/15/2023 with EF of 50% with overall normally LV function, moderately dilated RV, severe pulmonary HTN with RVSP of 85 mmHg and mildly abnormal strain pattern with RV4CSL of 14%, dilated IVC, mildly dialted
Inject 0.4 mLs into the skin every 24 hours  famotidine (PEPCID) 20 MG tablet, Take 1 tablet by mouth 2 times daily  traZODone (DESYREL) 100 MG tablet, take 1 tablet by mouth at bedtime  potassium chloride (KLOR-CON M) 20 MEQ extended release tablet, take 1 tablet by mouth once daily WITH BREAKFAST  haloperidol decanoate (HALDOL DECANOATE) 50 MG/ML injection, inject 1 milliliter intramuscularly every 2 WEEKS  furosemide (LASIX) 20 MG tablet, take 1 tablet by mouth once daily  buPROPion (WELLBUTRIN XL) 150 MG extended release tablet, Take 1 tablet by mouth every morning  risperiDONE (RISPERDAL) 2 MG tablet, Take 1 tablet by mouth 2 times daily  Spacer/Aero-Holding Chambers CAREN, 1 Device by Does not apply route daily  aspirin 325 MG EC tablet, Take 1 tablet by mouth daily  pravastatin (PRAVACHOL) 40 MG tablet, Take 1 tablet by mouth nightly  carBAMazepine (TEGRETOL) 200 MG tablet, Take 1 tablet by mouth 2 times daily  Diet    ADULT DIET; Regular  Allergies    Patient has no known allergies.   Social History     Social History     Socioeconomic History    Marital status: Single     Spouse name: Not on file    Number of children: 0    Years of education: Not on file    Highest education level: Not on file   Occupational History    Not on file   Tobacco Use    Smoking status: Former     Years: 30.00     Types: Cigars, Cigarettes     Quit date: 10/2022     Years since quittin.6    Smokeless tobacco: Never   Vaping Use    Vaping Use: Never used   Substance and Sexual Activity    Alcohol use: No    Drug use: No    Sexual activity: Not Currently   Other Topics Concern    Not on file   Social History Narrative    Not on file     Social Determinants of Health     Financial Resource Strain: Not on file   Food Insecurity: Not on file   Transportation Needs: Not on file   Physical Activity: Not on file   Stress: Not on file   Social Connections: Not on file   Intimate Partner Violence: Not on file   Housing Stability: Not on

## 2023-05-17 NOTE — CARE COORDINATION
5/17/23, 10:45 AM EDT    DISCHARGE ON GOING EVALUATION    Satish Encinas day: 0  Location: -05/005-A Reason for admit: Acute on chronic respiratory failure with hypoxia and hypercapnia ([facility] [address] [E37.33, J96.22]   Procedure:  5/16 CXR: No acute processes  Barriers to Discharge: IM and pulm following. Possible discharge later per report. ASA. IV rocephin. Solumedrol. Nebs. PCP: Luis Lee MD   %  Patient Goals/Plan/Treatment Preferences: Assessment was filled out using chart yesterday as CM was unable to meet with patient. Spoke with Maria Teresajosselin Voe, confirmed medical record was correct. Plans home with his mother. NIV and O2 from [facility], 2 L at rest, 6 L with activity.

## 2023-05-17 NOTE — ED PROVIDER NOTES
[facility]      EMER[facility]ENC[facility]IC[facility]     Pt Name: Naomi Cohen  MRN: [id number]  [place] 1970  Date of evaluation: 5/15/2023  Provider: Nena Cochran MD    CHIEF COMPLAINT       Chief Complaint   Patient presents with    Shortness of Breath     HISTORY OF PRESENT ILLNESS   Naomi Cohen is a pleasant 45 y.o. male who presents to the emergency department from from home, by private vehicle for evaluation of shortness of breath. Patient sent from home where he normally wears O2 4 L per nasal cannula with increasing shortness of breath for the last 2 days. Patient had recent hospital admission for similar symptoms.   Patient still smokes tobacco.  Patient on arrival noted with pulse ox in the ED and tachypneic    PASTMEDICAL HISTORY     Past Medical History:   Diagnosis Date    Acute on chronic systolic congestive heart failure ([address] 4/4/2019    Emphysema (subcutaneous) (surgical) resulting from a procedure     Hypertension     Pneumonia     Schizophrenia [facility])        Patient Active Problem List   Diagnosis Code    Acute on chronic respiratory failure with hypercapnia ([facility]) J96.22    Respiratory insufficiency/failure R06.89    Chronic obstructive pulmonary disease with acute exacerbation (HCC) J44.1    Acute on chronic systolic congestive heart failure ([facility]) I50.23    Nicotine abuse Z72.0    Chest pain R07.9    Schizophreniform disorder ([address] F20.81    Respiratory failure ([address] J96.90    Smoker F17.200    Acute respiratory failure (HCC) J96.00    Acute on chronic respiratory failure ([facility]) J96.20    Pneumonia of right lower lobe due to infectious organism J18.9    Aspiration pneumonia of both lower lobes due to gastric secretions ([address] J69.0    Community acquired bacterial pneumonia J15.9    Acute on chronic respiratory failure with hypoxia ([address] J96.21    Hypoxemic respiratory failure, chronic ([facility]) J96.11    COPD exacerbation ([facility]) J44.1    Acute on chronic respiratory failure with hypoxia

## 2023-05-18 LAB
ARTERIAL PATENCY WRIST A: POSITIVE
BASE EXCESS BLDA CALC-SCNC: 7.6 MMOL/L (ref -2.5–2.5)
BASE EXCESS BLDA CALC-SCNC: 9.8 MMOL/L (ref -2–3)
BDY SITE: ABNORMAL
COLLECTED BY:: ABNORMAL
COLLECTED BY:: ABNORMAL
DEVICE: ABNORMAL
DEVICE: ABNORMAL
EKG ATRIAL RATE: 109 BPM
EKG P AXIS: 39 DEGREES
EKG P-R INTERVAL: 138 MS
EKG Q-T INTERVAL: 334 MS
EKG QRS DURATION: 88 MS
EKG QTC CALCULATION (BAZETT): 449 MS
EKG R AXIS: 109 DEGREES
EKG T AXIS: 28 DEGREES
EKG VENTRICULAR RATE: 109 BPM
FIO2 ON VENT O2 ANALYZER: 2 %
FIO2 ON VENT O2 ANALYZER: 4 %
GLUCOSE BLD STRIP.AUTO-MCNC: 104 MG/DL (ref 70–108)
GLUCOSE BLD STRIP.AUTO-MCNC: 109 MG/DL (ref 70–108)
GLUCOSE BLD STRIP.AUTO-MCNC: 124 MG/DL (ref 70–108)
GLUCOSE BLD STRIP.AUTO-MCNC: 98 MG/DL (ref 70–108)
HCO3 BLDA-SCNC: 34 MMOL/L (ref 23–28)
HCO3 BLDA-SCNC: 41 MMOL/L (ref 23–28)
PCO2 BLDA: 53 MMHG (ref 35–45)
PCO2 TEMP ADJ BLDMV: 80 MMHG (ref 41–51)
PH BLDA: 7.42 [PH] (ref 7.35–7.45)
PH BLDMV: 7.32 [PH] (ref 7.31–7.41)
PO2 BLDA: 55 MMHG (ref 71–104)
PO2 BLDMV: 58 MMHG (ref 25–40)
SAO2 % BLDA: 88 %
SAO2 % BLDMV: 85 %

## 2023-05-18 PROCEDURE — 94640 AIRWAY INHALATION TREATMENT: CPT

## 2023-05-18 PROCEDURE — 6360000002 HC RX W HCPCS: Performed by: STUDENT IN AN ORGANIZED HEALTH CARE EDUCATION/TRAINING PROGRAM

## 2023-05-18 PROCEDURE — 6360000002 HC RX W HCPCS: Performed by: INTERNAL MEDICINE

## 2023-05-18 PROCEDURE — 82948 REAGENT STRIP/BLOOD GLUCOSE: CPT

## 2023-05-18 PROCEDURE — 99233 SBSQ HOSP IP/OBS HIGH 50: CPT | Performed by: INTERNAL MEDICINE

## 2023-05-18 PROCEDURE — 2060000000 HC ICU INTERMEDIATE R&B

## 2023-05-18 PROCEDURE — 6370000000 HC RX 637 (ALT 250 FOR IP): Performed by: INTERNAL MEDICINE

## 2023-05-18 PROCEDURE — 82803 BLOOD GASES ANY COMBINATION: CPT

## 2023-05-18 PROCEDURE — 94761 N-INVAS EAR/PLS OXIMETRY MLT: CPT

## 2023-05-18 PROCEDURE — 94660 CPAP INITIATION&MGMT: CPT

## 2023-05-18 PROCEDURE — 2580000003 HC RX 258: Performed by: INTERNAL MEDICINE

## 2023-05-18 PROCEDURE — 2700000000 HC OXYGEN THERAPY PER DAY

## 2023-05-18 PROCEDURE — 36600 WITHDRAWAL OF ARTERIAL BLOOD: CPT

## 2023-05-18 RX ORDER — PROGESTERONE 100 MG/1
200 CAPSULE ORAL NIGHTLY
Status: DISCONTINUED | OUTPATIENT
Start: 2023-05-18 | End: 2023-05-19 | Stop reason: HOSPADM

## 2023-05-18 RX ORDER — TADALAFIL 20 MG/1
10 TABLET ORAL DAILY
Status: DISCONTINUED | OUTPATIENT
Start: 2023-05-18 | End: 2023-05-19 | Stop reason: HOSPADM

## 2023-05-18 RX ADMIN — METHYLPREDNISOLONE SODIUM SUCCINATE 40 MG: 40 INJECTION INTRAMUSCULAR; INTRAVENOUS at 11:10

## 2023-05-18 RX ADMIN — METHYLPREDNISOLONE SODIUM SUCCINATE 40 MG: 40 INJECTION INTRAMUSCULAR; INTRAVENOUS at 23:39

## 2023-05-18 RX ADMIN — POLYETHYLENE GLYCOL 3350 17 G: 17 POWDER, FOR SOLUTION ORAL at 11:00

## 2023-05-18 RX ADMIN — SENNOSIDES 8.6 MG: 8.6 TABLET, FILM COATED ORAL at 21:39

## 2023-05-18 RX ADMIN — ASPIRIN 81 MG 81 MG: 81 TABLET ORAL at 11:00

## 2023-05-18 RX ADMIN — PANTOPRAZOLE SODIUM 40 MG: 40 TABLET, DELAYED RELEASE ORAL at 05:31

## 2023-05-18 RX ADMIN — IPRATROPIUM BROMIDE AND ALBUTEROL SULFATE 1 AMPULE: .5; 3 SOLUTION RESPIRATORY (INHALATION) at 08:45

## 2023-05-18 RX ADMIN — RISPERIDONE 2 MG: 1 TABLET ORAL at 11:00

## 2023-05-18 RX ADMIN — ENOXAPARIN SODIUM 40 MG: 100 INJECTION SUBCUTANEOUS at 11:00

## 2023-05-18 RX ADMIN — RISPERIDONE 2 MG: 1 TABLET ORAL at 00:11

## 2023-05-18 RX ADMIN — CEFTRIAXONE SODIUM 1000 MG: 1 INJECTION, POWDER, FOR SOLUTION INTRAMUSCULAR; INTRAVENOUS at 05:31

## 2023-05-18 RX ADMIN — DOCUSATE SODIUM 100 MG: 100 CAPSULE, LIQUID FILLED ORAL at 11:00

## 2023-05-18 RX ADMIN — RISPERIDONE 2 MG: 1 TABLET ORAL at 21:39

## 2023-05-18 RX ADMIN — RISPERIDONE 0.25 MG: 0.25 TABLET, FILM COATED ORAL at 11:00

## 2023-05-18 RX ADMIN — TADALAFIL 10 MG: 20 TABLET ORAL at 15:45

## 2023-05-18 RX ADMIN — PROGESTERONE 200 MG: 100 CAPSULE ORAL at 23:37

## 2023-05-18 RX ADMIN — METHYLPREDNISOLONE SODIUM SUCCINATE 40 MG: 40 INJECTION INTRAMUSCULAR; INTRAVENOUS at 00:15

## 2023-05-18 ASSESSMENT — PAIN SCALES - GENERAL
PAINLEVEL_OUTOF10: 0

## 2023-05-18 NOTE — CARE COORDINATION
Collaborative Discharge Planning    Randall Aviles  :  1970  MRN:  [id number]    ADMIT DATE:  5/15/2023      Discharge Planning Discharge Planning  Type of Residence: House  Living Arrangements: Parent  Support Systems: Parent, Family Members  Current Services Prior To Admission: Oxygen Therapy, Durable Medical Equipment ([facility] 2-6L)  Current DME Prior to Arrival: Other (Comment) (NIV)  Potential Assistance Needed: N/A  DME Ordered?: No  Potential Assistance Purchasing Medications: No  Type of Home Care Services: None  [facility] Board Notes /Social Work Whiteboard Notes  /Social Work Whiteboard:  CM: Home with mother. [facility] NIV and O2 2-6 L. Denies needs.     Discharge Plan Home with family  plans home w mother Ml Shell, sister Kiel Beverage transports, therapy;  referral; has [facility] home oxygen 3-6L, [facility]  Discharge Milestones and Delays: Clinical status    Bipap 40% FIO2 v Oxygen 4L, IV AB, IV Steroids    Home Trilogy w Oxygen 2L bleed-in trial today w 3h ABG thereafter; collaborated w Tasia Claude, Roberto Hirsch, Attending, [facility]          SIGNED:  Jason Keene RN   2023, 3:30 PM

## 2023-05-19 VITALS
TEMPERATURE: 98.2 F | OXYGEN SATURATION: 92 % | HEART RATE: 83 BPM | WEIGHT: 201.1 LBS | RESPIRATION RATE: 20 BRPM | BODY MASS INDEX: 31.56 KG/M2 | SYSTOLIC BLOOD PRESSURE: 114 MMHG | DIASTOLIC BLOOD PRESSURE: 65 MMHG | HEIGHT: 67 IN

## 2023-05-19 DIAGNOSIS — Z87.891 PERSONAL HISTORY OF TOBACCO USE, PRESENTING HAZARDS TO HEALTH: ICD-10-CM

## 2023-05-19 DIAGNOSIS — J44.9 STAGE 3 SEVERE COPD BY GOLD CLASSIFICATION (HCC): ICD-10-CM

## 2023-05-19 PROBLEM — I27.21 PAH (PULMONARY ARTERY HYPERTENSION) (HCC): Status: ACTIVE | Noted: 2023-05-19

## 2023-05-19 LAB
ANION GAP SERPL CALC-SCNC: 8 MEQ/L (ref 8–16)
BUN SERPL-MCNC: 14 MG/DL (ref 7–22)
CALCIUM SERPL-MCNC: 8.7 MG/DL (ref 8.5–10.5)
CHLORIDE SERPL-SCNC: 101 MEQ/L (ref 98–111)
CO2 SERPL-SCNC: 31 MEQ/L (ref 23–33)
CREAT SERPL-MCNC: 0.6 MG/DL (ref 0.4–1.2)
GFR SERPL CREATININE-BSD FRML MDRD: > 60 ML/MIN/1.73M2
GLUCOSE BLD STRIP.AUTO-MCNC: 159 MG/DL (ref 70–108)
GLUCOSE SERPL-MCNC: 107 MG/DL (ref 70–108)
POTASSIUM SERPL-SCNC: 5 MEQ/L (ref 3.5–5.2)
REASON FOR REJECTION: NORMAL
REJECTED TEST: NORMAL
SODIUM SERPL-SCNC: 140 MEQ/L (ref 135–145)

## 2023-05-19 PROCEDURE — 6370000000 HC RX 637 (ALT 250 FOR IP): Performed by: INTERNAL MEDICINE

## 2023-05-19 PROCEDURE — 2580000003 HC RX 258: Performed by: INTERNAL MEDICINE

## 2023-05-19 PROCEDURE — 94640 AIRWAY INHALATION TREATMENT: CPT

## 2023-05-19 PROCEDURE — 80048 BASIC METABOLIC PNL TOTAL CA: CPT

## 2023-05-19 PROCEDURE — 82948 REAGENT STRIP/BLOOD GLUCOSE: CPT

## 2023-05-19 PROCEDURE — 6360000002 HC RX W HCPCS: Performed by: INTERNAL MEDICINE

## 2023-05-19 PROCEDURE — 94761 N-INVAS EAR/PLS OXIMETRY MLT: CPT

## 2023-05-19 PROCEDURE — 99232 SBSQ HOSP IP/OBS MODERATE 35: CPT | Performed by: INTERNAL MEDICINE

## 2023-05-19 PROCEDURE — 2700000000 HC OXYGEN THERAPY PER DAY

## 2023-05-19 PROCEDURE — 6360000002 HC RX W HCPCS: Performed by: STUDENT IN AN ORGANIZED HEALTH CARE EDUCATION/TRAINING PROGRAM

## 2023-05-19 PROCEDURE — 36415 COLL VENOUS BLD VENIPUNCTURE: CPT

## 2023-05-19 RX ORDER — ARFORMOTEROL TARTRATE 15 UG/2ML
15 SOLUTION RESPIRATORY (INHALATION) 2 TIMES DAILY
Qty: 120 ML | Refills: 11 | Status: SHIPPED | OUTPATIENT
Start: 2023-05-19 | End: 2023-05-19 | Stop reason: SDUPTHER

## 2023-05-19 RX ORDER — BUDESONIDE 0.5 MG/2ML
500 INHALANT ORAL 2 TIMES DAILY
Qty: 120 ML | Refills: 11 | Status: SHIPPED | OUTPATIENT
Start: 2023-05-19 | End: 2023-05-19 | Stop reason: SDUPTHER

## 2023-05-19 RX ORDER — PROGESTERONE 200 MG/1
200 CAPSULE ORAL NIGHTLY
Qty: 30 CAPSULE | Refills: 5 | Status: SHIPPED | OUTPATIENT
Start: 2023-05-19

## 2023-05-19 RX ORDER — TIOTROPIUM BROMIDE 18 UG/1
18 CAPSULE ORAL; RESPIRATORY (INHALATION) DAILY
Qty: 90 CAPSULE | Refills: 1 | Status: SHIPPED | OUTPATIENT
Start: 2023-05-19

## 2023-05-19 RX ORDER — NICOTINE 21 MG/24HR
1 PATCH, TRANSDERMAL 24 HOURS TRANSDERMAL NIGHTLY
Qty: 30 PATCH | Refills: 3 | Status: SHIPPED | OUTPATIENT
Start: 2023-05-19

## 2023-05-19 RX ORDER — ARFORMOTEROL TARTRATE 15 UG/2ML
15 SOLUTION RESPIRATORY (INHALATION) 2 TIMES DAILY
Qty: 120 ML | Refills: 11 | Status: SHIPPED | OUTPATIENT
Start: 2023-05-19 | End: 2024-05-18

## 2023-05-19 RX ORDER — BUDESONIDE 0.5 MG/2ML
500 INHALANT ORAL 2 TIMES DAILY
Qty: 120 ML | Refills: 11 | Status: SHIPPED | OUTPATIENT
Start: 2023-05-19 | End: 2024-05-18

## 2023-05-19 RX ORDER — PREDNISONE 20 MG/1
40 TABLET ORAL DAILY
Qty: 10 TABLET | Refills: 0 | Status: SHIPPED | OUTPATIENT
Start: 2023-05-19 | End: 2023-05-24

## 2023-05-19 RX ORDER — TADALAFIL 20 MG/1
10 TABLET ORAL DAILY
Qty: 30 TABLET | Refills: 3 | Status: SHIPPED | OUTPATIENT
Start: 2023-05-20

## 2023-05-19 RX ADMIN — POLYETHYLENE GLYCOL 3350 17 G: 17 POWDER, FOR SOLUTION ORAL at 08:52

## 2023-05-19 RX ADMIN — TIOTROPIUM BROMIDE AND OLODATEROL 2 PUFF: 3.124; 2.736 SPRAY, METERED RESPIRATORY (INHALATION) at 08:13

## 2023-05-19 RX ADMIN — CEFTRIAXONE SODIUM 1000 MG: 1 INJECTION, POWDER, FOR SOLUTION INTRAMUSCULAR; INTRAVENOUS at 05:06

## 2023-05-19 RX ADMIN — TADALAFIL 10 MG: 20 TABLET ORAL at 09:44

## 2023-05-19 RX ADMIN — ENOXAPARIN SODIUM 40 MG: 100 INJECTION SUBCUTANEOUS at 08:53

## 2023-05-19 RX ADMIN — PANTOPRAZOLE SODIUM 40 MG: 40 TABLET, DELAYED RELEASE ORAL at 05:06

## 2023-05-19 RX ADMIN — ASPIRIN 81 MG 81 MG: 81 TABLET ORAL at 08:53

## 2023-05-19 RX ADMIN — RISPERIDONE 2 MG: 1 TABLET ORAL at 08:54

## 2023-05-19 RX ADMIN — DOCUSATE SODIUM 100 MG: 100 CAPSULE, LIQUID FILLED ORAL at 08:53

## 2023-05-19 RX ADMIN — METHYLPREDNISOLONE SODIUM SUCCINATE 40 MG: 40 INJECTION INTRAMUSCULAR; INTRAVENOUS at 12:15

## 2023-05-19 RX ADMIN — RISPERIDONE 0.25 MG: 0.25 TABLET, FILM COATED ORAL at 08:53

## 2023-05-19 ASSESSMENT — PAIN SCALES - GENERAL
PAINLEVEL_OUTOF10: 0
PAINLEVEL_OUTOF10: 0

## 2023-05-19 NOTE — RT PROTOCOL NOTE
RT Inhaler-Nebulizer Bronchodilator Protocol Note    There is a bronchodilator order in the chart from a provider indicating to follow the RT Bronchodilator Protocol and there is an Initiate RT Inhaler-Nebulizer Bronchodilator Protocol order as well (see protocol at bottom of note). CXR Findings:  No results found. The findings from the last RT Protocol Assessment were as follows:   History Pulmonary Disease: Chronic pulmonary disease  Respiratory Pattern: Dyspnea on exertion or RR 21-25 bpm  Breath Sounds: Slightly diminished and/or crackles  Cough: Strong, spontaneous, non-productive  Indication for Bronchodilator Therapy: Decreased or absent breath sounds  Bronchodilator Assessment Score: 6    Aerosolized bronchodilator medication orders have been revised according to the RT Inhaler-Nebulizer Bronchodilator Protocol below. Respiratory Therapist to perform RT Therapy Protocol Assessment initially then follow the protocol. Repeat RT Therapy Protocol Assessment PRN for score 0-3 or on second treatment, BID, and PRN for scores above 3. No Indications - adjust the frequency to every 6 hours PRN wheezing or bronchospasm, if no treatments needed after 48 hours then discontinue using Per Protocol order mode. If indication present, adjust the RT bronchodilator orders based on the Bronchodilator Assessment Score as indicated below. Use Inhaler orders unless patient has one or more of the following: on home nebulizer, not able to hold breath for 10 seconds, is not alert and oriented, cannot activate and use MDI correctly, or respiratory rate 25 breaths per minute or more, then use the equivalent nebulizer order(s) with same Frequency and PRN reasons based on the score. If a patient is on this medication at home then do not decrease Frequency below that used at home.     0-3 - enter or revise RT bronchodilator order(s) to equivalent RT Bronchodilator order with Frequency of every 4 hours PRN for wheezing
RT Inhaler-Nebulizer Bronchodilator Protocol Note    There is a bronchodilator order in the chart from a provider indicating to follow the RT Bronchodilator Protocol and there is an Initiate RT Inhaler-Nebulizer Bronchodilator Protocol order as well (see protocol at bottom of note). CXR Findings:  No results found. The findings from the last RT Protocol Assessment were as follows:   History Pulmonary Disease: Chronic pulmonary disease  Respiratory Pattern: Regular pattern and RR 12-20 bpm  Breath Sounds: Slightly diminished and/or crackles  Cough: Strong, spontaneous, non-productive  Indication for Bronchodilator Therapy: Decreased or absent breath sounds  Bronchodilator Assessment Score: 4    PRN as at home. Aerosolized bronchodilator medication orders have been revised according to the RT Inhaler-Nebulizer Bronchodilator Protocol below. Respiratory Therapist to perform RT Therapy Protocol Assessment initially then follow the protocol. Repeat RT Therapy Protocol Assessment PRN for score 0-3 or on second treatment, BID, and PRN for scores above 3. No Indications - adjust the frequency to every 6 hours PRN wheezing or bronchospasm, if no treatments needed after 48 hours then discontinue using Per Protocol order mode. If indication present, adjust the RT bronchodilator orders based on the Bronchodilator Assessment Score as indicated below. Use Inhaler orders unless patient has one or more of the following: on home nebulizer, not able to hold breath for 10 seconds, is not alert and oriented, cannot activate and use MDI correctly, or respiratory rate 25 breaths per minute or more, then use the equivalent nebulizer order(s) with same Frequency and PRN reasons based on the score. If a patient is on this medication at home then do not decrease Frequency below that used at home.     0-3 - enter or revise RT bronchodilator order(s) to equivalent RT Bronchodilator order with Frequency of every 4 hours
RT Inhaler-Nebulizer Bronchodilator Protocol Note    There is a bronchodilator order in the chart from a provider indicating to follow the RT Bronchodilator Protocol and there is an Initiate RT Inhaler-Nebulizer Bronchodilator Protocol order as well (see protocol at bottom of note). CXR Findings:  XR CHEST PORTABLE    Result Date: 5/16/2023  Impression: No acute processes. This document has been electronically signed by: Cornell Marte MD on 05/16/2023 01:06 AM      The findings from the last RT Protocol Assessment were as follows:   History Pulmonary Disease: Chronic pulmonary disease  Respiratory Pattern: Dyspnea on exertion or RR 21-25 bpm  Breath Sounds: Slightly diminished and/or crackles  Cough: Strong, spontaneous, non-productive  Indication for Bronchodilator Therapy: Decreased or absent breath sounds  Bronchodilator Assessment Score: 6    Aerosolized bronchodilator medication orders have been revised according to the RT Inhaler-Nebulizer Bronchodilator Protocol below. Respiratory Therapist to perform RT Therapy Protocol Assessment initially then follow the protocol. Repeat RT Therapy Protocol Assessment PRN for score 0-3 or on second treatment, BID, and PRN for scores above 3. No Indications - adjust the frequency to every 6 hours PRN wheezing or bronchospasm, if no treatments needed after 48 hours then discontinue using Per Protocol order mode. If indication present, adjust the RT bronchodilator orders based on the Bronchodilator Assessment Score as indicated below. Use Inhaler orders unless patient has one or more of the following: on home nebulizer, not able to hold breath for 10 seconds, is not alert and oriented, cannot activate and use MDI correctly, or respiratory rate 25 breaths per minute or more, then use the equivalent nebulizer order(s) with same Frequency and PRN reasons based on the score.   If a patient is on this medication at home then do not decrease Frequency below that
RT Inhaler-Nebulizer Bronchodilator Protocol Note    There is a bronchodilator order in the chart from a provider indicating to follow the RT Bronchodilator Protocol and there is an Initiate RT Inhaler-Nebulizer Bronchodilator Protocol order as well (see protocol at bottom of note). CXR Findings:  XR CHEST PORTABLE    Result Date: 5/16/2023  Impression: No acute processes. This document has been electronically signed by: Justice Coats MD on 05/16/2023 01:06 AM      The findings from the last RT Protocol Assessment were as follows:   History Pulmonary Disease: Chronic pulmonary disease  Respiratory Pattern: Dyspnea on exertion or RR 21-25 bpm  Breath Sounds: Slightly diminished and/or crackles  Cough: Strong, spontaneous, non-productive  Indication for Bronchodilator Therapy: Decreased or absent breath sounds  Bronchodilator Assessment Score: 6    Aerosolized bronchodilator medication orders have been revised according to the RT Inhaler-Nebulizer Bronchodilator Protocol below. Respiratory Therapist to perform RT Therapy Protocol Assessment initially then follow the protocol. Repeat RT Therapy Protocol Assessment PRN for score 0-3 or on second treatment, BID, and PRN for scores above 3. No Indications - adjust the frequency to every 6 hours PRN wheezing or bronchospasm, if no treatments needed after 48 hours then discontinue using Per Protocol order mode. If indication present, adjust the RT bronchodilator orders based on the Bronchodilator Assessment Score as indicated below. Use Inhaler orders unless patient has one or more of the following: on home nebulizer, not able to hold breath for 10 seconds, is not alert and oriented, cannot activate and use MDI correctly, or respiratory rate 25 breaths per minute or more, then use the equivalent nebulizer order(s) with same Frequency and PRN reasons based on the score.   If a patient is on this medication at home then do not decrease Frequency below that
used at home. 0-3 - enter or revise RT bronchodilator order(s) to equivalent RT Bronchodilator order with Frequency of every 4 hours PRN for wheezing or increased work of breathing using Per Protocol order mode. 4-6 - enter or revise RT Bronchodilator order(s) to two equivalent RT bronchodilator orders with one order with BID Frequency and one order with Frequency of every 4 hours PRN wheezing or increased work of breathing using Per Protocol order mode. 7-10 - enter or revise RT Bronchodilator order(s) to two equivalent RT bronchodilator orders with one order with TID Frequency and one order with Frequency of every 4 hours PRN wheezing or increased work of breathing using Per Protocol order mode. 11-13 - enter or revise RT Bronchodilator order(s) to one equivalent RT bronchodilator order with QID Frequency and an Albuterol order with Frequency of every 4 hours PRN wheezing or increased work of breathing using Per Protocol order mode. Greater than 13 - enter or revise RT Bronchodilator order(s) to one equivalent RT bronchodilator order with every 4 hours Frequency and an Albuterol order with Frequency of every 2 hours PRN wheezing or increased work of breathing using Per Protocol order mode. RT to enter RT Home Evaluation for COPD & MDI Assessment order using Per Protocol order mode.     Electronically signed by Elsie Viveros RCP on 5/17/2023 at 8:13 AM

## 2023-05-19 NOTE — CARE COORDINATION
5/19/23, 11:26 AM EDT    Patient goals/plan/ treatment preferences discussed by  and . Patient goals/plan/ treatment preferences reviewed with patient/ family. Patient/ family verbalize understanding of discharge plan and are in agreement with goal/plan/treatment preferences. Understanding was demonstrated using the teach back method. AVS provided by RN at time of discharge, which includes all necessary medical information pertaining to the patients current course of illness, treatment, post-discharge goals of care, and treatment preferences.      Services At/After Discharge: DME  plans home w mother Refugsheree Atkinson when medically cleared, sister Dariana Richardson transports, therapy; SW referral; has [facility] home oxygen 3-6L, [facility]       IMM Letter  IMM Letter given to Patient/Family/Significant other/Guardian/POA/by[de-identified] Keyanna DACOSTA CM  IMM Letter date given[de-identified] 05/17/23  IMM Letter time given[de-identified] 1350  Observation Status Letter date given[de-identified] 05/16/23  Observation Status Letter time given[de-identified] 0140  Observation Status Letter given to Patient/Family/Significant other/Guardian/POA/by[de-identified] patient registration

## 2023-05-19 NOTE — DISCHARGE INSTR - DIET
your intake of high fat food can help reduce body weight and cholesterol levels. Reduce intake of fried food, barnett, sausage, luncheon meat, gravy, sour cream, cheese, egg yolks and margarine/butter. Limit your intake of alcohol. Drink alcohol only with permission of your doctor. Never drink alcohol on an empty stomach. Be more active. Regular exercise is an important part of your diabetes care as exercise can help lower your blood sugar levels. The type and amount of exercise that is right for you should be discussed with your doctor.

## 2023-05-19 NOTE — PLAN OF CARE
Problem: Discharge Planning  Goal: Discharge to home or other facility with appropriate resources  5/19/2023 0231 by Willy Martinez RN  Outcome: Progressing  Flowsheets (Taken 5/18/2023 2130)  Discharge to home or other facility with appropriate resources:   Identify barriers to discharge with patient and caregiver   Identify discharge learning needs (meds, wound care, etc)   Refer to discharge planning if patient needs post-hospital services based on physician order or complex needs related to functional status, cognitive ability or social support system     Problem: Skin/Tissue Integrity  Goal: Absence of new skin breakdown  Description: 1. Monitor for areas of redness and/or skin breakdown  2. Assess vascular access sites hourly  3. Every 4-6 hours minimum:  Change oxygen saturation probe site  4. Every 4-6 hours:  If on nasal continuous positive airway pressure, respiratory therapy assess nares and determine need for appliance change or resting period. 5/19/2023 0231 by Willy Martinez RN  Outcome: Progressing  Note: Assess skin integrity and implement interventions to prevent breakdown.       Problem: Safety - Adult  Goal: Free from fall injury  5/19/2023 0231 by Willy Martinez RN  Outcome: Progressing  Flowsheets (Taken 5/19/2023 0231)  Free From Fall Injury:   Instruct family/caregiver on patient safety   Based on caregiver fall risk screen, instruct family/caregiver to ask for assistance with transferring infant if caregiver noted to have fall risk factors     Problem: ABCDS Injury Assessment  Goal: Absence of physical injury  5/19/2023 0231 by Willy Martinez RN  Outcome: Progressing  Flowsheets (Taken 5/19/2023 0231)  Absence of Physical Injury: Implement safety measures based on patient assessment     Problem: Chronic Conditions and Co-morbidities  Goal: Patient's chronic conditions and co-morbidity symptoms are monitored and maintained or improved  5/19/2023 0231 by Willy Martinez
Problem: Discharge Planning  Goal: Discharge to home or other facility with appropriate resources  5/19/2023 1254 by Ariela Seay RN  Outcome: Progressing  Flowsheets (Taken 5/19/2023 0830)  Discharge to home or other facility with appropriate resources:   Identify barriers to discharge with patient and caregiver   Arrange for needed discharge resources and transportation as appropriate   Identify discharge learning needs (meds, wound care, etc)   Refer to discharge planning if patient needs post-hospital services based on physician order or complex needs related to functional status, cognitive ability or social support system     Problem: Skin/Tissue Integrity  Goal: Absence of new skin breakdown  Description: 1. Monitor for areas of redness and/or skin breakdown  2. Assess vascular access sites hourly  3. Every 4-6 hours minimum:  Change oxygen saturation probe site  4. Every 4-6 hours:  If on nasal continuous positive airway pressure, respiratory therapy assess nares and determine need for appliance change or resting period.   5/19/2023 1254 by Ariela Seay RN  Outcome: Progressing     Problem: Safety - Adult  Goal: Free from fall injury  5/19/2023 1254 by Ariela Seay RN  Outcome: Progressing     Problem: ABCDS Injury Assessment  Goal: Absence of physical injury  5/19/2023 1254 by Ariela Seay RN  Outcome: Progressing     Problem: Chronic Conditions and Co-morbidities  Goal: Patient's chronic conditions and co-morbidity symptoms are monitored and maintained or improved  5/19/2023 1254 by Ariela Seay RN  Outcome: Progressing  Flowsheets (Taken 5/19/2023 0830)  Care Plan - Patient's Chronic Conditions and Co-Morbidity Symptoms are Monitored and Maintained or Improved:   Monitor and assess patient's chronic conditions and comorbid symptoms for stability, deterioration, or improvement   Collaborate with multidisciplinary team to address chronic and comorbid conditions
Problem: Discharge Planning  Goal: Discharge to home or other facility with appropriate resources  Outcome: Progressing  Flowsheets (Taken 5/18/2023 0800)  Discharge to home or other facility with appropriate resources:   Identify barriers to discharge with patient and caregiver   Arrange for needed discharge resources and transportation as appropriate   Identify discharge learning needs (meds, wound care, etc)   Refer to discharge planning if patient needs post-hospital services based on physician order or complex needs related to functional status, cognitive ability or social support system     Problem: Skin/Tissue Integrity  Goal: Absence of new skin breakdown  Description: 1. Monitor for areas of redness and/or skin breakdown  2. Assess vascular access sites hourly  3. Every 4-6 hours minimum:  Change oxygen saturation probe site  4. Every 4-6 hours:  If on nasal continuous positive airway pressure, respiratory therapy assess nares and determine need for appliance change or resting period.   Outcome: Progressing     Problem: Safety - Adult  Goal: Free from fall injury  Outcome: Progressing     Problem: ABCDS Injury Assessment  Goal: Absence of physical injury  Outcome: Progressing     Problem: Chronic Conditions and Co-morbidities  Goal: Patient's chronic conditions and co-morbidity symptoms are monitored and maintained or improved  Outcome: Progressing  Flowsheets (Taken 5/18/2023 0800)  Care Plan - Patient's Chronic Conditions and Co-Morbidity Symptoms are Monitored and Maintained or Improved:   Monitor and assess patient's chronic conditions and comorbid symptoms for stability, deterioration, or improvement   Collaborate with multidisciplinary team to address chronic and comorbid conditions and prevent exacerbation or deterioration   Update acute care plan with appropriate goals if chronic or comorbid symptoms are exacerbated and prevent overall improvement and discharge     Problem: Pain  Goal:
Problem: Respiratory - Adult  Goal: Clear lung sounds  Outcome: Progressing   Continue breathing tx's to improve aeration of lungs. Patient mutually agreed on goals.
Problem: Respiratory - Adult  Goal: Clear lung sounds  Outcome: Progressing  Note: Txs to help improve lung aeration. Patient mutually agreed on goals.
plan reviewed with patient. Patient verbalize understanding of the plan of care and contribute to goal setting.

## 2023-05-19 NOTE — PROGRESS NOTES
05/16/23 1634   RT Protocol   History Pulmonary Disease 2   Respiratory pattern 2   Breath sounds 2   Cough 0   Indications for Bronchodilator Therapy Decreased or absent breath sounds   Bronchodilator Assessment Score 6
1730-Patient transferred to [facility] per orders. Attempted to call patients sister, Ramiro Anaya [phone] but there was no answer.
[facility]      Patient - Daina Frances   MRN -  [id number]   [facility]   - 1970      Date of Admission -  5/15/2023 11:31 PM  Date of evaluation -  2023  Room - --ANNAMARIA Mccurdy MD Primary Care Physician - Andrew Aguilar MD     Problem List      Active Hospital Problems    Diagnosis Date Noted    Acute on chronic respiratory failure with hypoxia and hypercapnia (HCC) [Y26.92, J96.22] 2023     Reason for Consult    SOB, cough  HPI   History Obtained From: Patient  and electronic medical record. Daina Frances is a 45 y.o. male with PMHx significant for severe COPD (FEV1 47%), chronic hypoxic respiratory failure on 2L NC at rest and 4L with exercise, JORDAN/OHS, moderate PAH (RSVP 55-60 mmHg), chronic tobacco use, HFrEF 45%. Presented with cough, SOB and wheezing in the last week. Patient is supposed to be on Bipap for his underlying COPD at home but unsure if patient is actually compliant. Has had numerous hospitalizations with similar presentations. Arrived hypoxic at 65% on RA. Patient was put on non rebreather mask and then Bipap. Labs showed marked hypercarbia. He is having shortness of breath: Yes  Onset: sudden   Duration:1weeks. Diurnal variation:  None. Functional status prior to beginning of symptoms: 1 block/s on level ground. Current functional capacity on level ground: 0 block/s on level ground. He can climb steps: No  Flights of steps she can climb: 0  He denies orthopnea. He denies paroxysmal nocturnal dyspnea. He is having cough: Yes  Duration of cough: for 1 days.    His cough is associated with sputum production: No       He is having chest pain:No      PMHx   Past Medical History      Diagnosis Date    Acute on chronic systolic congestive heart failure ([address] 2019    Emphysema (subcutaneous) (surgical) resulting from a procedure     Hypertension     Pneumonia
Discharge teaching and instructions for diagnosis/procedure of acute on chronic respiratory failure with hypoxia completed with patient using teachback method. AVS reviewed. Printed prescriptions given to patient. Patient voiced understanding regarding prescriptions, follow up appointments, and care of self at home. Discharged in a wheelchair to  home with support per family.
Echo completed at bedside. Echo ROW Dr. Cici Tavarez to read.
INTERNAL MEDICINE Progress Note  5/17/2023 12:49 PM  Subjective:   Admit Date: 5/15/2023  PCP: Selene Joseph MD  Interval History:   More alert and breathing easier today  No CP  Mild MELGAR    Objective:   Vitals: /72   Pulse 78   Temp 98.8 °F (37.1 °C) (Oral)   Resp 18   Ht 5' 7\" (1.702 m)   Wt 199 lb 1.2 oz (90.3 kg)   SpO2 98%   BMI 31.18 kg/m²   General appearance: alert and cooperative with exam  HEENT: Head: atraumatic  Neck: no adenopathy, no carotid bruit, and no JVD  Lungs: diminished breath sounds bilaterally  Heart: S1, S2 normal  Abdomen: soft, non-tender; bowel sounds normal; no masses,  no organomegaly  Extremities: no edema, redness or tenderness in the calves or thighs  Neurologic: Mental status: Alert, oriented, thought content appropriate      Medications:   Scheduled Meds:   ipratropium 0.5 mg-albuterol 2.5 mg  1 ampule Inhalation BID    aspirin  81 mg Oral Daily    cefTRIAXone (ROCEPHIN) IV  1,000 mg IntraVENous Q24H    docusate sodium  100 mg Oral Daily    enoxaparin  40 mg SubCUTAneous Daily    pantoprazole  40 mg Oral QAM AC    polyethylene glycol  17 g Oral Daily    risperiDONE  0.25 mg Oral Daily    senna  1 tablet Oral Nightly    insulin lispro  0-4 Units SubCUTAneous TID WC    insulin lispro  0-4 Units SubCUTAneous Nightly    risperiDONE  2 mg Oral BID    methylPREDNISolone  40 mg IntraVENous Q12H     Continuous Infusions:   dextrose         Lab Results:   CBC:   Recent Labs     05/15/23  2350   WBC 6.5   HGB 16.8        BMP:    Recent Labs     05/15/23  2350      K 4.4   CL 97*   CO2 32   BUN 15   CREATININE 0.9   GLUCOSE 127*     Hepatic:   Recent Labs     05/15/23  2350   AST 14   ALT 17   BILITOT 0.3   ALKPHOS 99       Magnesium:    Lab Results   Component Value Date/Time    MG 2.1 11/07/2022 03:33 PM     Uric Acid:  No components found for: URIC  HgBA1c:    Lab Results   Component Value Date/Time    LABA1C 6.0 12/15/2019 10:00 AM     TSH:    Lab Results
INTERNAL MEDICINE Progress Note  5/18/2023 4:01 PM  Subjective:   Admit Date: 5/15/2023  PCP: Elijah Liu MD  Interval History:     More alert and breathing easier today on NIPPV  No CP  No SOB    Objective:   Vitals: BP 96/63   Pulse 69   Temp 97.9 °F (36.6 °C) (Axillary)   Resp 18   Ht 5' 7\" (1.702 m)   Wt 201 lb 14.4 oz (91.6 kg)   SpO2 95%   BMI 31.62 kg/m²   General appearance: alert and cooperative with exam  HEENT:  atraumatic  Neck: no adenopathy, no carotid bruit, and no JVD  Lungs: diminished breath sounds bilaterally  Heart: S1, S2 normal  Abdomen: soft, non-tender; bowel sounds normal; no masses,  no organomegaly  Extremities: no edema, redness or tenderness in the calves or thighs  Neurologic: Alert, oriented, thought content appropriate      Medications:   Scheduled Meds:   tiotropium-olodaterol  2 puff Inhalation Daily    Tadalafil (PAH)  10 mg Oral Daily    progesterone  200 mg Oral Nightly    aspirin  81 mg Oral Daily    cefTRIAXone (ROCEPHIN) IV  1,000 mg IntraVENous Q24H    docusate sodium  100 mg Oral Daily    enoxaparin  40 mg SubCUTAneous Daily    pantoprazole  40 mg Oral QAM AC    polyethylene glycol  17 g Oral Daily    risperiDONE  0.25 mg Oral Daily    senna  1 tablet Oral Nightly    insulin lispro  0-4 Units SubCUTAneous TID WC    insulin lispro  0-4 Units SubCUTAneous Nightly    risperiDONE  2 mg Oral BID    methylPREDNISolone  40 mg IntraVENous Q12H     Continuous Infusions:   dextrose         Lab Results:   CBC:   Recent Labs     05/15/23  2350   WBC 6.5   HGB 16.8          BMP:    Recent Labs     05/15/23  2350      K 4.4   CL 97*   CO2 32   BUN 15   CREATININE 0.9   GLUCOSE 127*       Hepatic:   Recent Labs     05/15/23  2350   AST 14   ALT 17   BILITOT 0.3   ALKPHOS 99         Magnesium:    Lab Results   Component Value Date/Time    MG 2.1 11/07/2022 03:33 PM     Uric Acid:  No components found for: URIC  HgBA1c:    Lab Results   Component Value Date/Time    
INTERNAL MEDICINE Progress Note  5/19/2023 2:25 PM  Subjective:   Admit Date: 5/15/2023  PCP: Mike Arreola MD  Interval History:     alert and breathing easier, on O2 via nc  No CP  No SOB    Objective:   Vitals: /62   Pulse 78   Temp 98.1 °F (36.7 °C) (Oral)   Resp 20   Ht 5' 7\" (1.702 m)   Wt 201 lb 1.6 oz (91.2 kg)   SpO2 95%   BMI 31.50 kg/m²   General appearance: alert and cooperative with exam  HEENT:  atraumatic  Neck: no adenopathy, no carotid bruit, and no JVD  Lungs: diminished breath sounds bilaterally, no rhonchi  Heart: S1, S2 normal  Abdomen: soft, non-tender; bowel sounds normal; no masses,  no organomegaly  Extremities: no edema, redness or tenderness in the calves or thighs  Neurologic: Alert, oriented, thought content appropriate      Medications:   Scheduled Meds:   tiotropium-olodaterol  2 puff Inhalation Daily    Tadalafil (PAH)  10 mg Oral Daily    progesterone  200 mg Oral Nightly    aspirin  81 mg Oral Daily    cefTRIAXone (ROCEPHIN) IV  1,000 mg IntraVENous Q24H    docusate sodium  100 mg Oral Daily    enoxaparin  40 mg SubCUTAneous Daily    pantoprazole  40 mg Oral QAM AC    polyethylene glycol  17 g Oral Daily    risperiDONE  0.25 mg Oral Daily    senna  1 tablet Oral Nightly    insulin lispro  0-4 Units SubCUTAneous TID WC    insulin lispro  0-4 Units SubCUTAneous Nightly    risperiDONE  2 mg Oral BID    methylPREDNISolone  40 mg IntraVENous Q12H     Continuous Infusions:   dextrose         Lab Results:   CBC:   No results for input(s): WBC, HGB, PLT in the last 72 hours. BMP:    Recent Labs     05/19/23  0535      K 5.0      CO2 31   BUN 14   CREATININE 0.6   GLUCOSE 107       Hepatic:   No results for input(s): AST, ALT, ALB, BILITOT, ALKPHOS in the last 72 hours.       Magnesium:    Lab Results   Component Value Date/Time    MG 2.1 11/07/2022 03:33 PM     Uric Acid:  No components found for: URIC  HgBA1c:    Lab Results   Component Value Date/Time    LABA1C
Pt did not want to wear Bipap tonight.  I walked in at 0000 rounds, pt was still awake and said \"I dont want that tonight, I feel good\"
Pt is a 52y. o. male, sitting on his bed awaiting discharge, in 4K-05. He shared that he is doing much better, but aside from that wasn't very talkative. He welcomed prayer prior to going home.  provided active listening, encouragement and prayer-met with gratitude by [facility]. 05/19/23 1749   Encounter Summary   Encounter Overview/Reason  Initial Encounter   Service Provided For: Patient   Referral/Consult From: Babar   Last Encounter  05/19/23   Complexity of Encounter Low   Begin Time 1627   End Time  1632   Total Time Calculated 5 min   Spiritual/Emotional needs   Type Spiritual Support   Assessment/Intervention/Outcome   Assessment Peaceful; Hopeful;Calm;Coping   Intervention Active listening;Prayer (assurance of)/[place];Explored/Affirmed feelings, thoughts, concerns   Outcome Expressed Gratitude;Receptive;Coping
Pt was placed on home bipap unit with o2 bleed in 2lpm sats 91%
This RT attempted to perform an ABG and was unsuccessful. Rt called  and she tried as well and was unsuccessful, venous blood was obtained and ran. Pt did not want poked again. Rt called pulmonary and perfect served ordering provider of ABG. Waiting for a response.
results for input(s): AST, ALT, ALB, BILITOT, ALKPHOS, LIPASE in the last 72 hours. Invalid input(s): AMYLASE    TROP  Lab Results   Component Value Date/Time    TROPONINT < 0.010 05/15/2023 11:50 PM    TROPONINT < 0.010 02/24/2023 12:20 AM    TROPONINT < 0.010 01/03/2023 05:30 PM     BNP  No results for input(s): BNP in the last 72 hours. Lactic Acid  No results for input(s): LACTA in the last 72 hours. INR  No results for input(s): INR, PROTIME in the last 72 hours. PTT  No results for input(s): APTT in the last 72 hours. Glucose  Recent Labs     05/18/23  1133 05/18/23  1629 05/18/23  1949   POCGLU 124* 109* 104       UA No results for input(s): Ever Pella, COLORU, CLARITYU, MUCUS, PROTEINU, BLOODU, RBCUA, WBCUA, BACTERIA, NITRU, GLUCOSEU, BILIRUBINUR, UROBILINOGEN, KETUA, LABCAST, LABCASTTY, AMORPHOS in the last 72 hours. Invalid input(s): CRYSTALS. PFTs       Sleep studies   Never done    Cultures    Not done    EKG     Echocardiogram       Radiology    CXR      CT Scans  (See actual reports for details)        Assessment   Severe COPD exacerbation- Patient noncompliant with his home Bipap. Acute on Chronic Hypoxic and Hypercapnic Respiratory Failure  Severe Pulmonary HTN with RVSP of 85 mmHg- WHO group 3  Hx of Schizophrenia  Essential HTN    Plan   Continue Continuous Bipap with with settings 14/8 with BUR of 8- Can Take breaks to eat   Home NIV brought in and downloaded and shows that patient is very noncomplaint  Aspiration Precautions  IV Solumedrol every 12 hours  Scheduled Wendy   Will need to follow with us in 3 months in Clinic  Educated patient on compliance with Bipap at home  Okay for D/c from pulm standpoint    Case Discussed with Dr. Anna Blanca DO  Internal Medicine Resident PGY-3    Questions and concerns addressed. Electronically signed by   Claus Hurst DO on 5/19/2023 at 9:26 AM   Patient seen by me including key components of medical care.   Case discussed with
pulm htn. Add progesterone for respiratory stimulation. Italicized font, if present,  represents changes to the note made by me. CC time 25 minutes. Time was discontiguous. Time does not include procedure. Time does include my direct assessment of the patient and coordination of care. Time represents more than 50% of the time involved with patient care by the [address] [place] [address] team.  Electronically signed by Tiffany Purcell.  Renea Stephenson MD.

## 2023-05-19 NOTE — DISCHARGE SUMMARY
Discharge Summary    Darci Leigh  :  1970  MRN:  [id number]    Admit date:  5/15/2023  Discharge date:      Admitting Physician:  Mayur Galdamez MD    Discharge Diagnoses:   Acute on chronic hypercapnic resp failure  COPD exac, improved  Elevated BNP,   Elevated RVSP of 85 mm Hg consistent with severe pulmonary hypertension-echo , worsened since echo from   Chronic active nicotine abuse  Schizophrenia-undifferentiated     Admission Condition:  serious  Discharged Condition:  good    Hospital Course:   ***    Discharge Medications:         Medication List        START taking these medications      predniSONE 20 MG tablet  Commonly known as: DELTASONE  Take 2 tablets by mouth daily for 5 days     progesterone 200 MG Caps capsule  Commonly known as: PROMETRIUM  Take 1 capsule by mouth nightly     Spiriva HandiHaler 18 MCG inhalation capsule  Generic drug: tiotropium  Inhale 1 capsule into the lungs daily     Tadalafil (PAH) 20 MG tablet  Take 0.5 tablets by mouth daily  Start taking on: May 20, 2023            CONTINUE taking these medications      albuterol sulfate  (90 Base) MCG/ACT inhaler  Commonly known as: Ventolin HFA  Inhale 2 puffs into the lungs every 6 hours as needed for Wheezing or Shortness of Breath     arformoterol tartrate 15 MCG/2ML Nebu  Commonly known as: Brovana  Take 2 mLs by nebulization in the morning and 2 mLs in the evening. aspirin 325 MG EC tablet  Take 1 tablet by mouth daily     budesonide 0.5 MG/2ML nebulizer suspension  Commonly known as: Pulmicort  Take 2 mLs by nebulization 2 times daily     buPROPion 150 MG extended release tablet  Commonly known as:  Wellbutrin XL  Take 1 tablet by mouth every morning     carBAMazepine 200 MG tablet  Commonly known as: TEGRETOL     docusate 100 MG Caps  Commonly known as: COLACE, DULCOLAX  Take 100 mg by mouth daily     famotidine 20 MG tablet  Commonly known as: PEPCID  Take 1 tablet by mouth 2 times daily     furosemide Yunior Gomez     Patient called and spoke with daughter.   Will be following up with Dr. Melissa Sarah ordered  Will get lab drawn before appointment    CAROLINA Herrera  Working with [facility]

## 2023-05-22 ENCOUNTER — CARE COORDINATION (OUTPATIENT)
Dept: CASE MANAGEMENT | Age: 53
End: 2023-05-22

## 2023-05-22 DIAGNOSIS — J44.1 COPD EXACERBATION (HCC): Primary | ICD-10-CM

## 2023-05-22 PROCEDURE — 1111F DSCHRG MED/CURRENT MED MERGE: CPT | Performed by: INTERNAL MEDICINE

## 2023-05-22 NOTE — CARE COORDINATION
importance of following POC. Discussed COPD mgmt:  Take meds as directed  Use Med Nebulizer or rescue inhalers as prescribed. Make sure equipment is in good working order and have all supplies. Call MD immediately if noting sob, thicker or change in mucus color, increased cough or wheezing, fever, chills as you may need corticosteroid to antibiotic. Call 911 if noting acute distress. Avoid cigarette smoke, inhaled irritants and other known triggers       Care Transition Nurse reviewed discharge instructions, medical action plan, and red flags with patient who verbalized understanding. The patient was given an opportunity to ask questions and does not have any further questions or concerns at this time. Were discharge instructions available to patient? Yes. Reviewed appropriate site of care based on symptoms and resources available to patient including: PCP  Specialist  Benefits related nurse triage line  Urgent care clinics  [facility]  When to call [address] The patient agrees to contact the PCP office for questions related to their healthcare. Advance Care Planning:   Does patient have an Advance Directive: not on file. Medication reconciliation was performed with patient, who verbalizes understanding of administration of home medications. Medications reviewed, 1111F entered: yes    Was patient discharged with a pulse oximeter? yes, discussed and confirmed pulse oximeter discharge instructions and when to notify provider or seek emergency care.     Non-face-to-face services provided:  Obtained and reviewed discharge summary and/or continuity of care documents  Education of patient/family/caregiver/guardian to support self-management-COPD/CHF   Assessment and support for treatment adherence and medication management-med review    Offered patient enrollment in the Remote Patient Monitoring (RPM) program for in-home monitoring: NA.    Care Transitions 24 Hour Call    Care Transitions

## 2023-05-31 ENCOUNTER — CARE COORDINATION (OUTPATIENT)
Dept: CASE MANAGEMENT | Age: 53
End: 2023-05-31

## 2023-05-31 NOTE — CARE COORDINATION
Care Transitions Outreach Attempt    Patient: Minus Felt Patient : 1970 MRN: [id number]    Attempted to reach patient for transitions of care follow up. Unable to reach patient. #1. Left vm message requesting call back to [phone]. CTN will try again. Last Discharge  Street       Date Complaint Diagnosis Description Type Department Provider    5/15/23 Shortness of Breath Acute on chronic respiratory failure with hypoxia and hypercapnia ([facility] [address] . .. ED to Hosp-Admission (Discharged) (ADMITTED) Corinne Flood MD; Dave Davila MD        Noted following upcoming appointments from discharge chart review:   [facility] follow up appointment(s): No future appointments.   [facility] follow up appointment(s): NA Lazarus Orange RN [phone]

## 2023-06-07 ENCOUNTER — CARE COORDINATION (OUTPATIENT)
Dept: CASE MANAGEMENT | Age: 53
End: 2023-06-07

## 2023-06-07 NOTE — CARE COORDINATION
Care Transitions Outreach Attempt    Patient: Cordelia Keane Patient : 1970 MRN: [id number]    2nd unsuccessful attempt to reach for Care Transition follow up call. HIPAA compliant message left requesting call back. Episode closed per protocol, no further outreach scheduled. Unable to reach letter done. Message sent to [address]ye [address] to please mail out    Last Discharge  [address]       Date Complaint Diagnosis Description Type Department Provider    5/15/23 Shortness of Breath Acute on chronic respiratory failure with hypoxia and hypercapnia ([facility] [address] . ..  ED to Hosp-Admission (Discharged) (ADMITTED) WILL 4K Woo Mars MD; MD Ruperto Rodas RN [phone]

## 2023-09-05 PROCEDURE — 99285 EMERGENCY DEPT VISIT HI MDM: CPT

## 2023-09-05 PROCEDURE — 96374 THER/PROPH/DIAG INJ IV PUSH: CPT

## 2023-09-06 ENCOUNTER — HOSPITAL ENCOUNTER (INPATIENT)
Age: 53
LOS: 2 days | Discharge: HOME | End: 2023-09-08
Attending: STUDENT IN AN ORGANIZED HEALTH CARE EDUCATION/TRAINING PROGRAM | Admitting: INTERNAL MEDICINE
Payer: MEDICARE

## 2023-09-06 ENCOUNTER — APPOINTMENT (OUTPATIENT)
Dept: GENERAL RADIOLOGY | Age: 53
End: 2023-09-06
Payer: MEDICARE

## 2023-09-06 DIAGNOSIS — Z87.891 PERSONAL HISTORY OF TOBACCO USE, PRESENTING HAZARDS TO HEALTH: ICD-10-CM

## 2023-09-06 DIAGNOSIS — J44.1 COPD EXACERBATION (HCC): Primary | ICD-10-CM

## 2023-09-06 DIAGNOSIS — J44.9 STAGE 3 SEVERE COPD BY GOLD CLASSIFICATION (HCC): ICD-10-CM

## 2023-09-06 DIAGNOSIS — R09.02 HYPOXIA: ICD-10-CM

## 2023-09-06 LAB
ALBUMIN SERPL BCG-MCNC: 4 G/DL (ref 3.5–5.1)
ALP SERPL-CCNC: 72 U/L (ref 38–126)
ALT SERPL W/O P-5'-P-CCNC: 7 U/L (ref 11–66)
ANION GAP SERPL CALC-SCNC: 9 MEQ/L (ref 8–16)
ARTERIAL PATENCY WRIST A: POSITIVE
AST SERPL-CCNC: 12 U/L (ref 5–40)
BASE EXCESS BLDA CALC-SCNC: 2.1 MMOL/L (ref -2.5–2.5)
BASOPHILS ABSOLUTE: 0 THOU/MM3 (ref 0–0.1)
BASOPHILS NFR BLD AUTO: 0.7 %
BDY SITE: ABNORMAL
BILIRUB SERPL-MCNC: 0.2 MG/DL (ref 0.3–1.2)
BUN SERPL-MCNC: 20 MG/DL (ref 7–22)
BURR CELLS BLD QL SMEAR: ABNORMAL
CALCIUM SERPL-MCNC: 9.1 MG/DL (ref 8.5–10.5)
CHLORIDE SERPL-SCNC: 103 MEQ/L (ref 98–111)
CO2 SERPL-SCNC: 30 MEQ/L (ref 23–33)
COLLECTED BY:: ABNORMAL
CREAT SERPL-MCNC: 0.8 MG/DL (ref 0.4–1.2)
DEPRECATED RDW RBC AUTO: 58.7 FL (ref 35–45)
EOSINOPHIL NFR BLD AUTO: 2.1 %
EOSINOPHILS ABSOLUTE: 0.1 THOU/MM3 (ref 0–0.4)
ERYTHROCYTE [DISTWIDTH] IN BLOOD BY AUTOMATED COUNT: 16.3 % (ref 11.5–14.5)
FIO2 ON VENT O2 ANALYZER: 5 %
FLUAV RNA RESP QL NAA+PROBE: NOT DETECTED
FLUBV RNA RESP QL NAA+PROBE: NOT DETECTED
GFR SERPL CREATININE-BSD FRML MDRD: > 60 ML/MIN/1.73M2
GLUCOSE SERPL-MCNC: 114 MG/DL (ref 70–108)
HCO3 BLDA-SCNC: 29 MMOL/L (ref 23–28)
HCT VFR BLD AUTO: 45.5 % (ref 42–52)
HGB BLD-MCNC: 13.8 GM/DL (ref 14–18)
IMM GRANULOCYTES # BLD AUTO: 0.02 THOU/MM3 (ref 0–0.07)
IMM GRANULOCYTES NFR BLD AUTO: 0.3 %
LYMPHOCYTES ABSOLUTE: 2 THOU/MM3 (ref 1–4.8)
LYMPHOCYTES NFR BLD AUTO: 33.1 %
MCH RBC QN AUTO: 29.4 PG (ref 26–33)
MCHC RBC AUTO-ENTMCNC: 30.3 GM/DL (ref 32.2–35.5)
MCV RBC AUTO: 97 FL (ref 80–94)
MONOCYTES ABSOLUTE: 0.6 THOU/MM3 (ref 0.4–1.3)
MONOCYTES NFR BLD AUTO: 9.2 %
NEUTROPHILS NFR BLD AUTO: 54.6 %
NRBC BLD AUTO-RTO: 0 /100 WBC
OSMOLALITY SERPL CALC.SUM OF ELEC: 286.6 MOSMOL/KG (ref 275–300)
PCO2 BLDA: 52 MMHG (ref 35–45)
PH BLDA: 7.35 [PH] (ref 7.35–7.45)
PLATELET # BLD AUTO: 175 THOU/MM3 (ref 130–400)
PLATELET BLD QL SMEAR: ADEQUATE
PMV BLD AUTO: 11.1 FL (ref 9.4–12.4)
PO2 BLDA: 77 MMHG (ref 71–104)
POTASSIUM SERPL-SCNC: 4.1 MEQ/L (ref 3.5–5.2)
PROT SERPL-MCNC: 7.1 G/DL (ref 6.1–8)
RBC # BLD AUTO: 4.69 MILL/MM3 (ref 4.7–6.1)
SAO2 % BLDA: 94 %
SARS-COV-2 RNA RESP QL NAA+PROBE: NOT DETECTED
SCAN OF BLOOD SMEAR: NORMAL
SEGMENTED NEUTROPHILS ABSOLUTE COUNT: 3.3 THOU/MM3 (ref 1.8–7.7)
SODIUM SERPL-SCNC: 142 MEQ/L (ref 135–145)
TROPONIN, HIGH SENSITIVITY: 12 NG/L (ref 0–12)
VARIANT LYMPHS BLD QL SMEAR: ABNORMAL %
WBC # BLD AUTO: 6.1 THOU/MM3 (ref 4.8–10.8)

## 2023-09-06 PROCEDURE — 6360000002 HC RX W HCPCS: Performed by: STUDENT IN AN ORGANIZED HEALTH CARE EDUCATION/TRAINING PROGRAM

## 2023-09-06 PROCEDURE — 2580000003 HC RX 258: Performed by: STUDENT IN AN ORGANIZED HEALTH CARE EDUCATION/TRAINING PROGRAM

## 2023-09-06 PROCEDURE — 6360000002 HC RX W HCPCS: Performed by: INTERNAL MEDICINE

## 2023-09-06 PROCEDURE — 36600 WITHDRAWAL OF ARTERIAL BLOOD: CPT

## 2023-09-06 PROCEDURE — 80053 COMPREHEN METABOLIC PANEL: CPT

## 2023-09-06 PROCEDURE — 84484 ASSAY OF TROPONIN QUANT: CPT

## 2023-09-06 PROCEDURE — 71046 X-RAY EXAM CHEST 2 VIEWS: CPT

## 2023-09-06 PROCEDURE — 82803 BLOOD GASES ANY COMBINATION: CPT

## 2023-09-06 PROCEDURE — 94640 AIRWAY INHALATION TREATMENT: CPT

## 2023-09-06 PROCEDURE — 93010 ELECTROCARDIOGRAM REPORT: CPT | Performed by: INTERNAL MEDICINE

## 2023-09-06 PROCEDURE — 93005 ELECTROCARDIOGRAM TRACING: CPT | Performed by: STUDENT IN AN ORGANIZED HEALTH CARE EDUCATION/TRAINING PROGRAM

## 2023-09-06 PROCEDURE — 85025 COMPLETE CBC W/AUTO DIFF WBC: CPT

## 2023-09-06 PROCEDURE — 6370000000 HC RX 637 (ALT 250 FOR IP): Performed by: STUDENT IN AN ORGANIZED HEALTH CARE EDUCATION/TRAINING PROGRAM

## 2023-09-06 PROCEDURE — 6370000000 HC RX 637 (ALT 250 FOR IP): Performed by: INTERNAL MEDICINE

## 2023-09-06 PROCEDURE — 36415 COLL VENOUS BLD VENIPUNCTURE: CPT

## 2023-09-06 PROCEDURE — 1200000003 HC TELEMETRY R&B

## 2023-09-06 PROCEDURE — 2580000003 HC RX 258: Performed by: INTERNAL MEDICINE

## 2023-09-06 PROCEDURE — 87636 SARSCOV2 & INF A&B AMP PRB: CPT

## 2023-09-06 RX ORDER — ACETAMINOPHEN 650 MG/1
650 SUPPOSITORY RECTAL EVERY 6 HOURS PRN
Status: DISCONTINUED | OUTPATIENT
Start: 2023-09-06 | End: 2023-09-09 | Stop reason: HOSPADM

## 2023-09-06 RX ORDER — ONDANSETRON 4 MG/1
4 TABLET, ORALLY DISINTEGRATING ORAL EVERY 8 HOURS PRN
Status: DISCONTINUED | OUTPATIENT
Start: 2023-09-06 | End: 2023-09-09 | Stop reason: HOSPADM

## 2023-09-06 RX ORDER — POLYETHYLENE GLYCOL 3350 17 G/17G
17 POWDER, FOR SOLUTION ORAL DAILY PRN
Status: DISCONTINUED | OUTPATIENT
Start: 2023-09-06 | End: 2023-09-09 | Stop reason: HOSPADM

## 2023-09-06 RX ORDER — FAMOTIDINE 20 MG/1
20 TABLET, FILM COATED ORAL 2 TIMES DAILY
Status: DISCONTINUED | OUTPATIENT
Start: 2023-09-06 | End: 2023-09-09 | Stop reason: HOSPADM

## 2023-09-06 RX ORDER — ACETAMINOPHEN 325 MG/1
650 TABLET ORAL EVERY 6 HOURS PRN
Status: DISCONTINUED | OUTPATIENT
Start: 2023-09-06 | End: 2023-09-09 | Stop reason: HOSPADM

## 2023-09-06 RX ORDER — SODIUM CHLORIDE 9 MG/ML
INJECTION, SOLUTION INTRAVENOUS CONTINUOUS
Status: DISCONTINUED | OUTPATIENT
Start: 2023-09-06 | End: 2023-09-06

## 2023-09-06 RX ORDER — SODIUM CHLORIDE 0.9 % (FLUSH) 0.9 %
5-40 SYRINGE (ML) INJECTION PRN
Status: DISCONTINUED | OUTPATIENT
Start: 2023-09-06 | End: 2023-09-09 | Stop reason: HOSPADM

## 2023-09-06 RX ORDER — IPRATROPIUM BROMIDE AND ALBUTEROL SULFATE 2.5; .5 MG/3ML; MG/3ML
1 SOLUTION RESPIRATORY (INHALATION) ONCE
Status: COMPLETED | OUTPATIENT
Start: 2023-09-06 | End: 2023-09-06

## 2023-09-06 RX ORDER — PRAVASTATIN SODIUM 40 MG
40 TABLET ORAL NIGHTLY
Status: DISCONTINUED | OUTPATIENT
Start: 2023-09-06 | End: 2023-09-09 | Stop reason: HOSPADM

## 2023-09-06 RX ORDER — SODIUM CHLORIDE 0.9 % (FLUSH) 0.9 %
5-40 SYRINGE (ML) INJECTION EVERY 12 HOURS SCHEDULED
Status: DISCONTINUED | OUTPATIENT
Start: 2023-09-06 | End: 2023-09-09 | Stop reason: HOSPADM

## 2023-09-06 RX ORDER — BUPROPION HYDROCHLORIDE 150 MG/1
150 TABLET ORAL EVERY MORNING
Status: DISCONTINUED | OUTPATIENT
Start: 2023-09-07 | End: 2023-09-09 | Stop reason: HOSPADM

## 2023-09-06 RX ORDER — CARBAMAZEPINE 200 MG/1
200 TABLET ORAL 2 TIMES DAILY
Status: DISCONTINUED | OUTPATIENT
Start: 2023-09-06 | End: 2023-09-09 | Stop reason: HOSPADM

## 2023-09-06 RX ORDER — ONDANSETRON 2 MG/ML
4 INJECTION INTRAMUSCULAR; INTRAVENOUS EVERY 6 HOURS PRN
Status: DISCONTINUED | OUTPATIENT
Start: 2023-09-06 | End: 2023-09-09 | Stop reason: HOSPADM

## 2023-09-06 RX ORDER — BUDESONIDE 0.5 MG/2ML
500 INHALANT ORAL 2 TIMES DAILY
Status: DISCONTINUED | OUTPATIENT
Start: 2023-09-06 | End: 2023-09-09 | Stop reason: HOSPADM

## 2023-09-06 RX ORDER — POTASSIUM CHLORIDE 20 MEQ/1
20 TABLET, EXTENDED RELEASE ORAL
Status: DISCONTINUED | OUTPATIENT
Start: 2023-09-07 | End: 2023-09-09 | Stop reason: HOSPADM

## 2023-09-06 RX ORDER — DOCUSATE SODIUM 100 MG/1
100 CAPSULE, LIQUID FILLED ORAL DAILY
Status: DISCONTINUED | OUTPATIENT
Start: 2023-09-06 | End: 2023-09-09 | Stop reason: HOSPADM

## 2023-09-06 RX ORDER — RISPERIDONE 1 MG/1
2 TABLET ORAL 2 TIMES DAILY
Status: DISCONTINUED | OUTPATIENT
Start: 2023-09-06 | End: 2023-09-09 | Stop reason: HOSPADM

## 2023-09-06 RX ORDER — ENOXAPARIN SODIUM 100 MG/ML
40 INJECTION SUBCUTANEOUS DAILY
Status: DISCONTINUED | OUTPATIENT
Start: 2023-09-06 | End: 2023-09-09 | Stop reason: HOSPADM

## 2023-09-06 RX ORDER — TADALAFIL 20 MG/1
10 TABLET ORAL DAILY
Status: DISCONTINUED | OUTPATIENT
Start: 2023-09-06 | End: 2023-09-09 | Stop reason: HOSPADM

## 2023-09-06 RX ORDER — ALBUTEROL SULFATE 90 UG/1
2 AEROSOL, METERED RESPIRATORY (INHALATION) 2 TIMES DAILY
Status: DISCONTINUED | OUTPATIENT
Start: 2023-09-06 | End: 2023-09-09 | Stop reason: HOSPADM

## 2023-09-06 RX ORDER — SODIUM CHLORIDE 9 MG/ML
INJECTION, SOLUTION INTRAVENOUS PRN
Status: DISCONTINUED | OUTPATIENT
Start: 2023-09-06 | End: 2023-09-09 | Stop reason: HOSPADM

## 2023-09-06 RX ORDER — TRAZODONE HYDROCHLORIDE 100 MG/1
100 TABLET ORAL NIGHTLY
Status: DISCONTINUED | OUTPATIENT
Start: 2023-09-06 | End: 2023-09-09 | Stop reason: HOSPADM

## 2023-09-06 RX ORDER — ALBUTEROL SULFATE 2.5 MG/3ML
2.5 SOLUTION RESPIRATORY (INHALATION) EVERY 4 HOURS PRN
Status: DISCONTINUED | OUTPATIENT
Start: 2023-09-06 | End: 2023-09-09 | Stop reason: HOSPADM

## 2023-09-06 RX ORDER — IPRATROPIUM BROMIDE AND ALBUTEROL SULFATE 2.5; .5 MG/3ML; MG/3ML
1 SOLUTION RESPIRATORY (INHALATION)
Status: DISCONTINUED | OUTPATIENT
Start: 2023-09-06 | End: 2023-09-06

## 2023-09-06 RX ORDER — ASPIRIN 325 MG
325 TABLET, DELAYED RELEASE (ENTERIC COATED) ORAL DAILY
Status: DISCONTINUED | OUTPATIENT
Start: 2023-09-06 | End: 2023-09-09 | Stop reason: HOSPADM

## 2023-09-06 RX ORDER — FUROSEMIDE 20 MG/1
20 TABLET ORAL DAILY
Status: DISCONTINUED | OUTPATIENT
Start: 2023-09-06 | End: 2023-09-09 | Stop reason: HOSPADM

## 2023-09-06 RX ADMIN — SODIUM CHLORIDE: 9 INJECTION, SOLUTION INTRAVENOUS at 08:24

## 2023-09-06 RX ADMIN — IPRATROPIUM BROMIDE 2 PUFF: 17 AEROSOL, METERED RESPIRATORY (INHALATION) at 18:09

## 2023-09-06 RX ADMIN — CARBAMAZEPINE 200 MG: 200 TABLET ORAL at 19:59

## 2023-09-06 RX ADMIN — IPRATROPIUM BROMIDE 2 PUFF: 17 AEROSOL, METERED RESPIRATORY (INHALATION) at 09:00

## 2023-09-06 RX ADMIN — ENOXAPARIN SODIUM 40 MG: 100 INJECTION SUBCUTANEOUS at 08:35

## 2023-09-06 RX ADMIN — WATER 40 MG: 1 INJECTION INTRAMUSCULAR; INTRAVENOUS; SUBCUTANEOUS at 08:34

## 2023-09-06 RX ADMIN — FAMOTIDINE 20 MG: 20 TABLET ORAL at 19:58

## 2023-09-06 RX ADMIN — PRAVASTATIN SODIUM 40 MG: 40 TABLET ORAL at 19:58

## 2023-09-06 RX ADMIN — ALBUTEROL SULFATE 2 PUFF: 90 AEROSOL, METERED RESPIRATORY (INHALATION) at 09:00

## 2023-09-06 RX ADMIN — ALBUTEROL SULFATE 2 PUFF: 90 AEROSOL, METERED RESPIRATORY (INHALATION) at 18:09

## 2023-09-06 RX ADMIN — WATER 40 MG: 1 INJECTION INTRAMUSCULAR; INTRAVENOUS; SUBCUTANEOUS at 14:27

## 2023-09-06 RX ADMIN — TRAZODONE HYDROCHLORIDE 100 MG: 100 TABLET ORAL at 19:58

## 2023-09-06 RX ADMIN — SODIUM CHLORIDE, PRESERVATIVE FREE 10 ML: 5 INJECTION INTRAVENOUS at 20:01

## 2023-09-06 RX ADMIN — SODIUM CHLORIDE, PRESERVATIVE FREE 10 ML: 5 INJECTION INTRAVENOUS at 08:34

## 2023-09-06 RX ADMIN — ASPIRIN 325 MG: 325 TABLET, COATED ORAL at 16:59

## 2023-09-06 RX ADMIN — IPRATROPIUM BROMIDE AND ALBUTEROL SULFATE 1 DOSE: .5; 3 SOLUTION RESPIRATORY (INHALATION) at 01:48

## 2023-09-06 RX ADMIN — BUDESONIDE 500 MCG: 0.5 INHALANT RESPIRATORY (INHALATION) at 18:09

## 2023-09-06 RX ADMIN — TADALAFIL 10 MG: 20 TABLET ORAL at 16:59

## 2023-09-06 RX ADMIN — CEFTRIAXONE SODIUM 1000 MG: 1 INJECTION, POWDER, FOR SOLUTION INTRAMUSCULAR; INTRAVENOUS at 08:55

## 2023-09-06 RX ADMIN — IPRATROPIUM BROMIDE AND ALBUTEROL SULFATE 1 DOSE: .5; 3 SOLUTION RESPIRATORY (INHALATION) at 01:47

## 2023-09-06 RX ADMIN — FUROSEMIDE 20 MG: 20 TABLET ORAL at 16:59

## 2023-09-06 RX ADMIN — WATER 40 MG: 1 INJECTION INTRAMUSCULAR; INTRAVENOUS; SUBCUTANEOUS at 21:46

## 2023-09-06 RX ADMIN — RISPERIDONE 2 MG: 1 TABLET ORAL at 19:58

## 2023-09-06 RX ADMIN — DOCUSATE SODIUM 100 MG: 100 CAPSULE, LIQUID FILLED ORAL at 16:59

## 2023-09-06 RX ADMIN — IPRATROPIUM BROMIDE AND ALBUTEROL SULFATE 1 DOSE: .5; 3 SOLUTION RESPIRATORY (INHALATION) at 01:49

## 2023-09-06 RX ADMIN — METHYLPREDNISOLONE SODIUM SUCCINATE 125 MG: 125 INJECTION INTRAMUSCULAR; INTRAVENOUS at 01:49

## 2023-09-06 ASSESSMENT — PAIN - FUNCTIONAL ASSESSMENT
PAIN_FUNCTIONAL_ASSESSMENT: NONE - DENIES PAIN
PAIN_FUNCTIONAL_ASSESSMENT: 0-10
PAIN_FUNCTIONAL_ASSESSMENT: NONE - DENIES PAIN

## 2023-09-06 ASSESSMENT — PAIN SCALES - GENERAL
PAINLEVEL_OUTOF10: 7
PAINLEVEL_OUTOF10: 9

## 2023-09-06 ASSESSMENT — PAIN DESCRIPTION - LOCATION: LOCATION: BACK

## 2023-09-06 ASSESSMENT — PAIN DESCRIPTION - DESCRIPTORS: DESCRIPTORS: DISCOMFORT

## 2023-09-06 ASSESSMENT — HEART SCORE: ECG: 1

## 2023-09-06 NOTE — PROGRESS NOTES
Patient ambulated self to the restroom independently. Voices no concerns at this time.      Dennie Corona, SN, [facility]

## 2023-09-06 NOTE — ED NOTES
Spoke to [employer] to approve pt transport to [facility] in stable condition.      Sally Miller LPN  50/74/80 7999

## 2023-09-06 NOTE — ED NOTES
Pt resting in bed, with family at bedside. Denies any pain at this time, no distress noted.      Tim Stokes RN  09/06/23 7807

## 2023-09-06 NOTE — PROGRESS NOTES
Patient verbally provided permission to access camera. Visitor in room x 1 who assists with answering questions. This virtual nurse reviewed admission navigators, education, home medications, and allergies with the patient. Patient verbalized understanding of all educational information regarding safety, call dont fall, pain reporting, use of call light, and repositioning. This virtual nurse observes call light to be within reach, bed appears to be in lowest position. Bed rails in raised locked position x 2. There are no safety concerns observed and patient not exhibiting any signs of acute distress. States he would like to have some coffee. I see no restrictions in chart, message sent to bedside nurse on [employer]. Patient denies any further concerns or needs at this time and verbalizes agreement with virtual nursing services.

## 2023-09-06 NOTE — CARE COORDINATION
Case Management Assessment  Initial Evaluation    Date/Time of Evaluation: 9/6/2023 12:08 PM  Assessment Completed by: Angela Chaudhary RN    If patient is discharged prior to next notation, then this note serves as note for discharge by case management. Patient Name: Eliajh Ahn                   YOB: 1970  Diagnosis: COPD exacerbation ([address]) [J44.1]                   Date / Time: 9/6/2023 12:05 AM  Location: [address] [place] [address]     Patient Admission Status: Inpatient   Readmission Risk Low 0-14, Mod 15-19), High > 20: Readmission Risk Score: 17.3    Current PCP: Ysabel Fitzpatrick MD  PCP verified by CM? Yes    Chart Reviewed: Yes      History Provided by: Other (see comment) (patient and mother)  Patient Orientation: Alert and Oriented    Patient Cognition: Alert    Hospitalization in the last 30 days (Readmission):  No    If yes, Readmission Assessment in CM Navigator will be completed. Advance Directives:      Code Status: Full Code   Patient's Primary Decision Maker is: Legal Next of Kin (Olga Pop)    Primary Decision Maker: Jocelyne Levy [facility] Parent - [phone]    Secondary Decision Maker: Randall Oh - Brother/Sister - [phone]    Secondary Decision Maker: Roberta Daniel Brother/Sister - [phone]    Discharge Planning:    Patient lives with: Family Members Type of Home: Apartment  Primary Care Giver: Self  Patient Support Systems include: Family Members   Current Financial resources: Medicare, Medicaid  Current community resources: None  Current services prior to admission: Oxygen Therapy, [facility]            Current DME:              Type of Home Care services:  None    ADLS  Prior functional level: Independent in ADLs/IADLs  Current functional level: Independent in ADLs/IADLs    Family can provide assistance at DC: Yes  Would you like Case Management to discuss the discharge plan with any other family members/significant others, and if so, who?  Yes (Mother)  Plans to Return to

## 2023-09-06 NOTE — PROGRESS NOTES
Patient's lung sounds are diminished. Currently on 4L via nasal cannula. Patient in bed resting at this time. Bed in lowest position. Call light within reach.      SN Bailey, [facility]

## 2023-09-06 NOTE — PROGRESS NOTES
09/06/23 7526   Encounter Summary   Encounter Overview/Reason  Initial Encounter;Spiritual/Emotional Needs   Service Provided For: Patient and family together   Referral/Consult From: [address] Parent; Family members   Last Encounter  09/06/23   Complexity of Encounter Moderate   Begin Time 0729   End Time  0738   Total Time Calculated 9 min   Spiritual/Emotional needs   Type Spiritual Support   Assessment/Intervention/Outcome   Assessment Calm;Coping   Intervention Active listening;Nurtured Hope;Prayer (assurance of)/[place];Sustaining Presence/Ministry of presence   Outcome Comfort;Coping;Encouraged     Assessment: In my encounter with the 45 yr old patient the pt's family (His mother Pérez Granados) was supportively present. While rounding the unit I provided spiritual care to patient and their family through conversation, I also came to assess their spiritual needs present. The pt was admitted due to COPD. Interventions:  I provided, prayer, emotional support and words of comfort.  provided a listening presence and encouraged pt to share their beliefs and how they support him during their hospitalization. Outcomes: The patient was encouraged and didn't share any further spiritual needs at this time. The pt remains optimistic and hopeful. The pt shared that they were appreciative for the support. Plan:  Chaplains will follow-up at a later time for assessment of any spiritual care needs present.

## 2023-09-06 NOTE — PROGRESS NOTES
Patient A&Ox4. PERRLA 3mm to 2mm. Mucous membranes pink, moist, and intact. Upper extremities warm, dry, and tan. No restricted movement in upper extremities. No numbness or tingling present. Arm drift negative. Hand grasp strong and equal bilaterally. Capillary refill and skin turgor <3 seconds bilaterally. Continuous IV located in left forearm. Heart rate and rhythm regular. Respiratory effort unlabored, breath sounds diminished in all lobes. Abdomen is flat, soft, and non tender. No masses palpated. Bowel sounds heard x4 quadrants. Lower extremities warm, dry, and tan. No restricted movement in lower extremities. No numbness or tingling present. Dorsalis pedis and posterior tibialis pulses strong and equal bilaterally. Patient denies any pain at this time. Bed in lowest position. Call light within reach.      SN Jaiden, [facility]

## 2023-09-06 NOTE — PROGRESS NOTES
Bautista serve to Dr. Kearns [place] asking if he wants patient's normal saline infusion to continue at this time.

## 2023-09-06 NOTE — ED TRIAGE NOTES
Pt present to ED from home with chief complaint of chest pain. Pt states for the past few days his chest has been hurting and he has felt palptation. Pt Family states he has been fatigued and short of breath quicker than usual when walking around. Pt is on 2L at home. Pt currently at 90% with 6L. Pt has history of COPD. Respirations easy and unlabored. Pt is alert and oriented. No distress noted.

## 2023-09-07 LAB
ANION GAP SERPL CALC-SCNC: 8 MEQ/L (ref 8–16)
BASOPHILS ABSOLUTE: 0 THOU/MM3 (ref 0–0.1)
BASOPHILS NFR BLD AUTO: 0 %
BUN SERPL-MCNC: 10 MG/DL (ref 7–22)
CALCIUM SERPL-MCNC: 9.1 MG/DL (ref 8.5–10.5)
CHLORIDE SERPL-SCNC: 102 MEQ/L (ref 98–111)
CO2 SERPL-SCNC: 30 MEQ/L (ref 23–33)
CREAT SERPL-MCNC: 0.6 MG/DL (ref 0.4–1.2)
DEPRECATED RDW RBC AUTO: 55.8 FL (ref 35–45)
EOSINOPHIL NFR BLD AUTO: 0 %
EOSINOPHILS ABSOLUTE: 0 THOU/MM3 (ref 0–0.4)
ERYTHROCYTE [DISTWIDTH] IN BLOOD BY AUTOMATED COUNT: 16.3 % (ref 11.5–14.5)
GFR SERPL CREATININE-BSD FRML MDRD: > 60 ML/MIN/1.73M2
GLUCOSE SERPL-MCNC: 121 MG/DL (ref 70–108)
HCT VFR BLD AUTO: 40.9 % (ref 42–52)
HGB BLD-MCNC: 12.7 GM/DL (ref 14–18)
IMM GRANULOCYTES # BLD AUTO: 0.06 THOU/MM3 (ref 0–0.07)
IMM GRANULOCYTES NFR BLD AUTO: 0.6 %
LYMPHOCYTES ABSOLUTE: 0.6 THOU/MM3 (ref 1–4.8)
LYMPHOCYTES NFR BLD AUTO: 6.1 %
MCH RBC QN AUTO: 28.9 PG (ref 26–33)
MCHC RBC AUTO-ENTMCNC: 31.1 GM/DL (ref 32.2–35.5)
MCV RBC AUTO: 93 FL (ref 80–94)
MONOCYTES ABSOLUTE: 0.2 THOU/MM3 (ref 0.4–1.3)
MONOCYTES NFR BLD AUTO: 2.4 %
NEUTROPHILS NFR BLD AUTO: 90.9 %
NRBC BLD AUTO-RTO: 0 /100 WBC
PLATELET # BLD AUTO: 182 THOU/MM3 (ref 130–400)
PMV BLD AUTO: 11.1 FL (ref 9.4–12.4)
POTASSIUM SERPL-SCNC: 4.6 MEQ/L (ref 3.5–5.2)
RBC # BLD AUTO: 4.4 MILL/MM3 (ref 4.7–6.1)
SEGMENTED NEUTROPHILS ABSOLUTE COUNT: 8.4 THOU/MM3 (ref 1.8–7.7)
SODIUM SERPL-SCNC: 140 MEQ/L (ref 135–145)
WBC # BLD AUTO: 9.2 THOU/MM3 (ref 4.8–10.8)

## 2023-09-07 PROCEDURE — 94010 BREATHING CAPACITY TEST: CPT

## 2023-09-07 PROCEDURE — 85025 COMPLETE CBC W/AUTO DIFF WBC: CPT

## 2023-09-07 PROCEDURE — 2580000003 HC RX 258: Performed by: INTERNAL MEDICINE

## 2023-09-07 PROCEDURE — 6370000000 HC RX 637 (ALT 250 FOR IP): Performed by: INTERNAL MEDICINE

## 2023-09-07 PROCEDURE — 6360000002 HC RX W HCPCS: Performed by: INTERNAL MEDICINE

## 2023-09-07 PROCEDURE — 36415 COLL VENOUS BLD VENIPUNCTURE: CPT

## 2023-09-07 PROCEDURE — 94640 AIRWAY INHALATION TREATMENT: CPT

## 2023-09-07 PROCEDURE — 80048 BASIC METABOLIC PNL TOTAL CA: CPT

## 2023-09-07 PROCEDURE — 1200000003 HC TELEMETRY R&B

## 2023-09-07 RX ADMIN — FUROSEMIDE 20 MG: 20 TABLET ORAL at 08:04

## 2023-09-07 RX ADMIN — FAMOTIDINE 20 MG: 20 TABLET ORAL at 20:23

## 2023-09-07 RX ADMIN — ASPIRIN 325 MG: 325 TABLET, COATED ORAL at 08:05

## 2023-09-07 RX ADMIN — ALBUTEROL SULFATE 2 PUFF: 90 AEROSOL, METERED RESPIRATORY (INHALATION) at 17:19

## 2023-09-07 RX ADMIN — CEFTRIAXONE SODIUM 1000 MG: 1 INJECTION, POWDER, FOR SOLUTION INTRAMUSCULAR; INTRAVENOUS at 08:11

## 2023-09-07 RX ADMIN — BUDESONIDE 500 MCG: 0.5 INHALANT RESPIRATORY (INHALATION) at 08:49

## 2023-09-07 RX ADMIN — BUPROPION HYDROCHLORIDE 150 MG: 150 TABLET, FILM COATED, EXTENDED RELEASE ORAL at 08:04

## 2023-09-07 RX ADMIN — WATER 40 MG: 1 INJECTION INTRAMUSCULAR; INTRAVENOUS; SUBCUTANEOUS at 20:23

## 2023-09-07 RX ADMIN — RISPERIDONE 2 MG: 1 TABLET ORAL at 08:04

## 2023-09-07 RX ADMIN — WATER 40 MG: 1 INJECTION INTRAMUSCULAR; INTRAVENOUS; SUBCUTANEOUS at 12:41

## 2023-09-07 RX ADMIN — POTASSIUM CHLORIDE 20 MEQ: 1500 TABLET, EXTENDED RELEASE ORAL at 08:04

## 2023-09-07 RX ADMIN — WATER 40 MG: 1 INJECTION INTRAMUSCULAR; INTRAVENOUS; SUBCUTANEOUS at 01:59

## 2023-09-07 RX ADMIN — ACETAMINOPHEN 650 MG: 325 TABLET ORAL at 12:40

## 2023-09-07 RX ADMIN — ENOXAPARIN SODIUM 40 MG: 100 INJECTION SUBCUTANEOUS at 08:05

## 2023-09-07 RX ADMIN — ALBUTEROL SULFATE 2 PUFF: 90 AEROSOL, METERED RESPIRATORY (INHALATION) at 08:49

## 2023-09-07 RX ADMIN — CARBAMAZEPINE 200 MG: 200 TABLET ORAL at 20:23

## 2023-09-07 RX ADMIN — WATER 40 MG: 1 INJECTION INTRAMUSCULAR; INTRAVENOUS; SUBCUTANEOUS at 08:39

## 2023-09-07 RX ADMIN — CARBAMAZEPINE 200 MG: 200 TABLET ORAL at 08:04

## 2023-09-07 RX ADMIN — DOCUSATE SODIUM 100 MG: 100 CAPSULE, LIQUID FILLED ORAL at 08:04

## 2023-09-07 RX ADMIN — IPRATROPIUM BROMIDE 2 PUFF: 17 AEROSOL, METERED RESPIRATORY (INHALATION) at 17:19

## 2023-09-07 RX ADMIN — SODIUM CHLORIDE, PRESERVATIVE FREE 10 ML: 5 INJECTION INTRAVENOUS at 08:06

## 2023-09-07 RX ADMIN — IPRATROPIUM BROMIDE 2 PUFF: 17 AEROSOL, METERED RESPIRATORY (INHALATION) at 08:49

## 2023-09-07 RX ADMIN — TRAZODONE HYDROCHLORIDE 100 MG: 100 TABLET ORAL at 20:23

## 2023-09-07 RX ADMIN — SODIUM CHLORIDE, PRESERVATIVE FREE 10 ML: 5 INJECTION INTRAVENOUS at 20:23

## 2023-09-07 RX ADMIN — FAMOTIDINE 20 MG: 20 TABLET ORAL at 08:05

## 2023-09-07 RX ADMIN — BUDESONIDE 500 MCG: 0.5 INHALANT RESPIRATORY (INHALATION) at 17:19

## 2023-09-07 RX ADMIN — TADALAFIL 10 MG: 20 TABLET ORAL at 08:04

## 2023-09-07 RX ADMIN — PRAVASTATIN SODIUM 40 MG: 40 TABLET ORAL at 20:23

## 2023-09-07 RX ADMIN — RISPERIDONE 2 MG: 1 TABLET ORAL at 20:23

## 2023-09-07 ASSESSMENT — PULMONARY FUNCTION TESTS
FEV1 (%PREDICTED): 47
POST BRONCHODILATOR FEV1/FVC: 58

## 2023-09-07 ASSESSMENT — COPD QUESTIONNAIRES
QUESTION4_WALKINCLINE: 3
QUESTION3_CHESTTIGHTNESS: 0
QUESTION1_COUGHFREQUENCY: 3
QUESTION5_HOMEACTIVITIES: 3
GOLD_GROUP: GROUP E
TOTAL_EXACERBATIONS_PASTYEAR: 3
QUESTION7_SLEEPQUALITY: 0
GOLD_GRADE: 3
CAT_TOTALSCORE: 13
QUESTION6_LEAVINGHOUSE: 0
QUESTION2_CHESTPHLEGM: 4
QUESTION8_ENERGYLEVEL: 0

## 2023-09-07 ASSESSMENT — PAIN DESCRIPTION - LOCATION: LOCATION: BACK

## 2023-09-07 ASSESSMENT — PAIN SCALES - GENERAL: PAINLEVEL_OUTOF10: 7

## 2023-09-07 ASSESSMENT — PAIN DESCRIPTION - ORIENTATION: ORIENTATION: LOWER

## 2023-09-07 NOTE — DISCHARGE INSTRUCTIONS
COPD Action Plan  You have been educated on a COPD Action Plan for home. This plan will help YOU to better manage your COPD. Your respiratory therapist and/or nurse reviewed the plan with you. The plan includes steps that you should take when your symptoms match those in the green, yellow, and red zone. If you are having symptoms in the yellow zone call your provider and/or make an  appointment if symptoms do not improve. Do not wait. If you are having symptoms in the red zone call 911 or seek immediate medical care.

## 2023-09-07 NOTE — DISCHARGE INSTR - COC
Continuity of Care Form    Patient Name: Sampson Winter   :  1970  MRN:  [id number]    Admit date:  2023  Discharge date:  ***    Code Status Order: Full Code   Advance Directives:     Admitting Physician:  Rosalind Garcia MD  PCP: Rosalind Garcia MD    Discharging Nurse: [facility] Unit/Room#: 6K-12/012-A  Discharging Unit Phone Number: ***    Emergency Contact:   Extended Emergency Contact Information  [facility] Emergency Contact: [facility] [address] Phone: [phone]  Mobile Phone: [phone]  Relation: Brother/Sister  Secondary Emergency Contact: Maureen Cuellar [facility] of [address] [employer] Phone: [phone]  Mobile Phone: [phone]  Relation: Parent    Past Surgical History:  No past surgical history on file.     Immunization History:   Immunization History   Administered Date(s) Administered    Influenza Virus Vaccine 2019    Influenza, Kj Border, 6 mo and older, IM, PF (Flulaval, Fluarix) 2019       Active Problems:  Patient Active Problem List   Diagnosis Code    Acute on chronic respiratory failure with hypercapnia ([facility]) J96.22    Respiratory insufficiency/failure R06.89    Chronic obstructive pulmonary disease with acute exacerbation (HCC) J44.1    Acute on chronic systolic congestive heart failure ([facility]) I50.23    Nicotine abuse Z72.0    Chest pain R07.9    Schizophreniform disorder ([address]) F20.81    Respiratory failure (HCC) J96.90    Smoker F17.200    Acute respiratory failure ([facility]) J96.00    Acute on chronic respiratory failure (HCC) J96.20    Pneumonia of right lower lobe due to infectious organism J18.9    Aspiration pneumonia of both lower lobes due to gastric secretions ([address]) J69.0    Community acquired bacterial pneumonia J15.9    Acute on chronic respiratory failure with hypoxia ([address]) J96.21    Hypoxemic respiratory failure, chronic ([facility]) J96.11    COPD exacerbation (HCC) J44.1    Acute on chronic

## 2023-09-07 NOTE — CARE COORDINATION
9/7/23, 1:45 PM EDT    DISCHARGE ON GOING EVALUATION    [facility] N day: 1  Location: 6-12/012-A Reason for admit: COPD exacerbation ([address]) [J44.1]   Barriers to Discharge: 92% on 3L. IV rocephin, nebs. Pulm consult pending. PCP: Jean Pierre Thomas MD  Readmission Risk Score: 18.7%  Patient Goals/Plan/Treatment Preferences: Home with mother. Has home oxygen (2L ATC) and cpap.  If HH is needed, he prefers to use [facility].

## 2023-09-07 NOTE — PLAN OF CARE
Problem: Respiratory - Adult  Goal: Achieves optimal ventilation and oxygenation  9/7/2023 1724 by Haresh Maloney RCP  Outcome: Progressing

## 2023-09-07 NOTE — PLAN OF CARE
Problem: Respiratory - Adult  Goal: Achieves optimal ventilation and oxygenation  9/7/2023 0857 by Alesia Lucas RCP  Outcome: Progressing

## 2023-09-07 NOTE — PROGRESS NOTES
Current GOLD classification for Sandie BALLARD Carrera    Gold thGthrthathdtheth:th th4th Group: Group E    Recorded domestic exacerbations past 12 months COPD Exacerbations in last year: 3    Current recorded COPD Assessment Tool (CAT) score of Assessment Score: 13    Current eosinophil count:  0    Maintenance inhalers prior to admission Albuterol, Budesonide, and Tiotropium Bromide, brovana      Inhaler Device   Acceptable for Use   Respimat  Not Breath Actuated Yes   MDI  Not Breath Actuated Yes           DPI  Observed PIF   using  In-Check Meter   Optimal PIF   Acceptable for Use   HANDIHALER 40 >30 yes   Pressair 48 >45 yes   NEOHALER 65 >50 yes   Diskus 68 >60 yes   ELLIPTA 68 >60 yes           LONG-ACTING (LABA)   Arformoterol (Brovana) NEBULIZER   Indacaterol (Arcapta) NEOHALER   Olodaterol (Striverdi)  Respimat   Salmeterol (Serevent) MDI, DISKUS   LONG-ACTING (LAMA)   Aclidinium bromide (Tudorza) PRESSAIR   Glycopyrronium bromide Lamount Heydi) NEOHALER   Tiotropium (Spiriva) Respimat, HANDIHALER   Umeclidinium (Incruse) ELLIPTA   (LABA/LAMA)   Formoterol/glycopyrronium (Bevespi) MDI   Indacaterol/glycopyrronium (Utibron) NEOHALER   Vilanterol/umeclidinium (Anoro) ELLIPTA   Olodaterol/tiotropium (Stiolto) Respimat   (LABA/ICS)   Formoterol/budesomide (Symbicort) MDI   Formoterol/mometasone (Dulera) MDI   Salmeterol/fluticasone (Advair) MDI, DISKUS   Vilanterol/fluticasone (Breo) ELLIPTA   (LABA/LAMA/ICS)   Fluticasone/umeclidinium/vilanterol (Trelegy) ELLIPTA   Budesonide/glycopyrrolate/formoterol fumarate (Beztri) aerosphere

## 2023-09-08 VITALS
DIASTOLIC BLOOD PRESSURE: 84 MMHG | RESPIRATION RATE: 18 BRPM | TEMPERATURE: 98.2 F | OXYGEN SATURATION: 94 % | SYSTOLIC BLOOD PRESSURE: 112 MMHG | HEART RATE: 87 BPM | HEIGHT: 70 IN | WEIGHT: 194 LBS | BODY MASS INDEX: 27.77 KG/M2

## 2023-09-08 LAB
EKG ATRIAL RATE: 84 BPM
EKG P AXIS: 56 DEGREES
EKG P-R INTERVAL: 158 MS
EKG Q-T INTERVAL: 370 MS
EKG QRS DURATION: 88 MS
EKG QTC CALCULATION (BAZETT): 437 MS
EKG R AXIS: 85 DEGREES
EKG T AXIS: 62 DEGREES
EKG VENTRICULAR RATE: 84 BPM

## 2023-09-08 PROCEDURE — 94760 N-INVAS EAR/PLS OXIMETRY 1: CPT

## 2023-09-08 PROCEDURE — 6370000000 HC RX 637 (ALT 250 FOR IP): Performed by: INTERNAL MEDICINE

## 2023-09-08 PROCEDURE — 6360000002 HC RX W HCPCS: Performed by: INTERNAL MEDICINE

## 2023-09-08 PROCEDURE — 2580000003 HC RX 258: Performed by: INTERNAL MEDICINE

## 2023-09-08 PROCEDURE — 94640 AIRWAY INHALATION TREATMENT: CPT

## 2023-09-08 PROCEDURE — 2700000000 HC OXYGEN THERAPY PER DAY

## 2023-09-08 RX ORDER — ASPIRIN 325 MG
325 TABLET, DELAYED RELEASE (ENTERIC COATED) ORAL DAILY
Qty: 30 TABLET | Refills: 3 | Status: SHIPPED | OUTPATIENT
Start: 2023-09-08 | End: 2023-09-08 | Stop reason: SDUPTHER

## 2023-09-08 RX ORDER — BUPROPION HYDROCHLORIDE 150 MG/1
150 TABLET ORAL EVERY MORNING
Qty: 30 TABLET | Refills: 3 | Status: SHIPPED | OUTPATIENT
Start: 2023-09-08

## 2023-09-08 RX ORDER — ARFORMOTEROL TARTRATE 15 UG/2ML
15 SOLUTION RESPIRATORY (INHALATION)
Qty: 120 ML | Refills: 11 | Status: SHIPPED | OUTPATIENT
Start: 2023-09-08 | End: 2023-09-08 | Stop reason: SDUPTHER

## 2023-09-08 RX ORDER — BUDESONIDE 0.5 MG/2ML
500 INHALANT ORAL 2 TIMES DAILY
Qty: 120 ML | Refills: 11 | Status: SHIPPED | OUTPATIENT
Start: 2023-09-08 | End: 2024-09-07

## 2023-09-08 RX ORDER — PREDNISONE 20 MG/1
40 TABLET ORAL 2 TIMES DAILY
Qty: 10 TABLET | Refills: 0 | Status: SHIPPED | OUTPATIENT
Start: 2023-09-08 | End: 2023-09-08 | Stop reason: SDUPTHER

## 2023-09-08 RX ORDER — PREDNISONE 20 MG/1
40 TABLET ORAL 2 TIMES DAILY
Qty: 10 TABLET | Refills: 0 | Status: SHIPPED | OUTPATIENT
Start: 2023-09-08 | End: 2023-09-13

## 2023-09-08 RX ORDER — ALBUTEROL SULFATE 90 UG/1
2 AEROSOL, METERED RESPIRATORY (INHALATION) EVERY 6 HOURS PRN
Qty: 18 G | Refills: 8 | Status: SHIPPED | OUTPATIENT
Start: 2023-09-08 | End: 2024-09-07

## 2023-09-08 RX ORDER — CARBAMAZEPINE 200 MG/1
200 TABLET ORAL 2 TIMES DAILY
Qty: 60 TABLET | Refills: 3 | Status: SHIPPED | OUTPATIENT
Start: 2023-09-08

## 2023-09-08 RX ORDER — BUDESONIDE 0.5 MG/2ML
500 INHALANT ORAL 2 TIMES DAILY
Qty: 120 ML | Refills: 11 | Status: SHIPPED | OUTPATIENT
Start: 2023-09-08 | End: 2023-09-08 | Stop reason: SDUPTHER

## 2023-09-08 RX ORDER — ALBUTEROL SULFATE 90 UG/1
2 AEROSOL, METERED RESPIRATORY (INHALATION) EVERY 6 HOURS PRN
Qty: 18 G | Refills: 8 | Status: SHIPPED | OUTPATIENT
Start: 2023-09-08 | End: 2023-09-08 | Stop reason: SDUPTHER

## 2023-09-08 RX ORDER — ASPIRIN 325 MG
325 TABLET, DELAYED RELEASE (ENTERIC COATED) ORAL DAILY
Qty: 30 TABLET | Refills: 3 | Status: SHIPPED | OUTPATIENT
Start: 2023-09-08

## 2023-09-08 RX ORDER — ARFORMOTEROL TARTRATE 15 UG/2ML
15 SOLUTION RESPIRATORY (INHALATION)
Qty: 120 ML | Refills: 11 | Status: SHIPPED | OUTPATIENT
Start: 2023-09-08 | End: 2024-09-07

## 2023-09-08 RX ORDER — RISPERIDONE 2 MG/1
2 TABLET ORAL 2 TIMES DAILY
Qty: 60 TABLET | Refills: 3 | Status: SHIPPED | OUTPATIENT
Start: 2023-09-08

## 2023-09-08 RX ORDER — FUROSEMIDE 20 MG/1
20 TABLET ORAL DAILY
Qty: 60 TABLET | Refills: 3 | Status: SHIPPED | OUTPATIENT
Start: 2023-09-08 | End: 2023-09-08 | Stop reason: SDUPTHER

## 2023-09-08 RX ORDER — FUROSEMIDE 20 MG/1
20 TABLET ORAL DAILY
Qty: 60 TABLET | Refills: 3 | Status: SHIPPED | OUTPATIENT
Start: 2023-09-08

## 2023-09-08 RX ORDER — BUPROPION HYDROCHLORIDE 150 MG/1
150 TABLET ORAL EVERY MORNING
Qty: 30 TABLET | Refills: 3 | Status: SHIPPED | OUTPATIENT
Start: 2023-09-08 | End: 2023-09-08 | Stop reason: SDUPTHER

## 2023-09-08 RX ORDER — CARBAMAZEPINE 200 MG/1
200 TABLET ORAL 2 TIMES DAILY
Qty: 60 TABLET | Refills: 3 | Status: SHIPPED | OUTPATIENT
Start: 2023-09-08 | End: 2023-09-08 | Stop reason: SDUPTHER

## 2023-09-08 RX ADMIN — ALBUTEROL SULFATE 2 PUFF: 90 AEROSOL, METERED RESPIRATORY (INHALATION) at 17:27

## 2023-09-08 RX ADMIN — ENOXAPARIN SODIUM 40 MG: 100 INJECTION SUBCUTANEOUS at 08:00

## 2023-09-08 RX ADMIN — WATER 40 MG: 1 INJECTION INTRAMUSCULAR; INTRAVENOUS; SUBCUTANEOUS at 14:06

## 2023-09-08 RX ADMIN — RISPERIDONE 2 MG: 1 TABLET ORAL at 07:55

## 2023-09-08 RX ADMIN — RISPERIDONE 2 MG: 1 TABLET ORAL at 20:45

## 2023-09-08 RX ADMIN — CEFTRIAXONE SODIUM 1000 MG: 1 INJECTION, POWDER, FOR SOLUTION INTRAMUSCULAR; INTRAVENOUS at 05:59

## 2023-09-08 RX ADMIN — DOCUSATE SODIUM 100 MG: 100 CAPSULE, LIQUID FILLED ORAL at 07:55

## 2023-09-08 RX ADMIN — SODIUM CHLORIDE, PRESERVATIVE FREE 10 ML: 5 INJECTION INTRAVENOUS at 20:45

## 2023-09-08 RX ADMIN — BUDESONIDE 500 MCG: 0.5 INHALANT RESPIRATORY (INHALATION) at 17:29

## 2023-09-08 RX ADMIN — SODIUM CHLORIDE, PRESERVATIVE FREE 10 ML: 5 INJECTION INTRAVENOUS at 07:56

## 2023-09-08 RX ADMIN — WATER 40 MG: 1 INJECTION INTRAMUSCULAR; INTRAVENOUS; SUBCUTANEOUS at 07:59

## 2023-09-08 RX ADMIN — PRAVASTATIN SODIUM 40 MG: 40 TABLET ORAL at 20:45

## 2023-09-08 RX ADMIN — FAMOTIDINE 20 MG: 20 TABLET ORAL at 07:57

## 2023-09-08 RX ADMIN — TRAZODONE HYDROCHLORIDE 100 MG: 100 TABLET ORAL at 20:45

## 2023-09-08 RX ADMIN — ASPIRIN 325 MG: 325 TABLET, COATED ORAL at 07:55

## 2023-09-08 RX ADMIN — FUROSEMIDE 20 MG: 20 TABLET ORAL at 07:55

## 2023-09-08 RX ADMIN — FAMOTIDINE 20 MG: 20 TABLET ORAL at 20:45

## 2023-09-08 RX ADMIN — TADALAFIL 10 MG: 20 TABLET ORAL at 07:55

## 2023-09-08 RX ADMIN — IPRATROPIUM BROMIDE 2 PUFF: 17 AEROSOL, METERED RESPIRATORY (INHALATION) at 07:26

## 2023-09-08 RX ADMIN — CARBAMAZEPINE 200 MG: 200 TABLET ORAL at 08:00

## 2023-09-08 RX ADMIN — BUPROPION HYDROCHLORIDE 150 MG: 150 TABLET, FILM COATED, EXTENDED RELEASE ORAL at 07:55

## 2023-09-08 RX ADMIN — ALBUTEROL SULFATE 2 PUFF: 90 AEROSOL, METERED RESPIRATORY (INHALATION) at 07:24

## 2023-09-08 RX ADMIN — IPRATROPIUM BROMIDE 2 PUFF: 17 AEROSOL, METERED RESPIRATORY (INHALATION) at 17:28

## 2023-09-08 RX ADMIN — WATER 40 MG: 1 INJECTION INTRAMUSCULAR; INTRAVENOUS; SUBCUTANEOUS at 02:23

## 2023-09-08 RX ADMIN — WATER 40 MG: 1 INJECTION INTRAMUSCULAR; INTRAVENOUS; SUBCUTANEOUS at 20:45

## 2023-09-08 RX ADMIN — BUDESONIDE 500 MCG: 0.5 INHALANT RESPIRATORY (INHALATION) at 07:27

## 2023-09-08 RX ADMIN — CARBAMAZEPINE 200 MG: 200 TABLET ORAL at 20:45

## 2023-09-08 RX ADMIN — POTASSIUM CHLORIDE 20 MEQ: 1500 TABLET, EXTENDED RELEASE ORAL at 07:55

## 2023-09-08 NOTE — PLAN OF CARE
Problem: Discharge Planning  Goal: Discharge to home or other facility with appropriate resources  Outcome: Progressing  Flowsheets (Taken 9/7/2023 2022)  Discharge to home or other facility with appropriate resources: Identify barriers to discharge with patient and caregiver     Problem: Safety - Adult  Goal: Free from fall injury  Outcome: Progressing     Problem: Pain  Goal: Verbalizes/displays adequate comfort level or baseline comfort level  Outcome: Progressing  Flowsheets (Taken 9/7/2023 1941)  Verbalizes/displays adequate comfort level or baseline comfort level: Encourage patient to monitor pain and request assistance     Problem: Skin/Tissue Integrity  Goal: Absence of new skin breakdown  Description: 1. Monitor for areas of redness and/or skin breakdown  2. Assess vascular access sites hourly  3. Every 4-6 hours minimum:  Change oxygen saturation probe site  4. Every 4-6 hours:  If on nasal continuous positive airway pressure, respiratory therapy assess nares and determine need for appliance change or resting period.   Outcome: Progressing     Problem: Chronic Conditions and Co-morbidities  Goal: Patient's chronic conditions and co-morbidity symptoms are monitored and maintained or improved  Outcome: Progressing  Flowsheets (Taken 9/7/2023 2022)  Care Plan - Patient's Chronic Conditions and Co-Morbidity Symptoms are Monitored and Maintained or Improved: Monitor and assess patient's chronic conditions and comorbid symptoms for stability, deterioration, or improvement     Problem: Respiratory - Adult  Goal: Achieves optimal ventilation and oxygenation  9/7/2023 2233 by Nicolas Rangel RN  Outcome: Progressing  Flowsheets (Taken 9/7/2023 2022)  Achieves optimal ventilation and oxygenation:   Assess for changes in respiratory status   Assess for changes in mentation and behavior   Oxygen supplementation based on oxygen saturation or arterial blood gases  9/7/2023 1724 by Haresh Maloney RCP  Outcome:

## 2023-09-08 NOTE — PLAN OF CARE
Problem: Respiratory - Adult  Goal: Achieves optimal ventilation and oxygenation  9/8/2023 0737 by Shwetha Gonzalez RCP  Outcome: Progressing

## 2023-09-08 NOTE — PLAN OF CARE
Problem: Discharge Planning  Goal: Discharge to home or other facility with appropriate resources  9/8/2023 1035 by Loan Knight RN  Outcome: Progressing     Problem: Safety - Adult  Goal: Free from fall injury  9/8/2023 1035 by Loan Knight RN  Outcome: Progressing     Problem: Pain  Goal: Verbalizes/displays adequate comfort level or baseline comfort level  9/8/2023 1035 by Loan Knight RN  Outcome: Isidro Embs (Taken 9/8/2023 0417 by Karla Cross RN)  Verbalizes/displays adequate comfort level or baseline comfort level: Encourage patient to monitor pain and request assistance     Problem: Skin/Tissue Integrity  Goal: Absence of new skin breakdown  Description: 1. Monitor for areas of redness and/or skin breakdown  2. Assess vascular access sites hourly  3. Every 4-6 hours minimum:  Change oxygen saturation probe site  4. Every 4-6 hours:  If on nasal continuous positive airway pressure, respiratory therapy assess nares and determine need for appliance change or resting period.   9/8/2023 1035 by Loan Knight RN  Outcome: Progressing     Problem: Chronic Conditions and Co-morbidities  Goal: Patient's chronic conditions and co-morbidity symptoms are monitored and maintained or improved  9/8/2023 1035 by Loan Knight RN  Outcome: Progressing     Problem: Respiratory - Adult  Goal: Achieves optimal ventilation and oxygenation  9/8/2023 1035 by Loan Knight RN  Outcome: Progressing     Problem: Metabolic/Fluid and Electrolytes - Adult  Goal: Electrolytes maintained within normal limits  9/8/2023 1035 by Loan Knight RN  Outcome: Progressing     Problem: Metabolic/Fluid and Electrolytes - Adult  Goal: Hemodynamic stability and optimal renal function maintained  9/8/2023 1035 by Loan Knight RN  Outcome: Progressing     Problem: Metabolic/Fluid and Electrolytes - Adult  Goal: Glucose maintained within prescribed range  9/8/2023 1035 by Loan Knight RN  Outcome: Progressing     Problem: Hematologic - Adult  Goal: Maintains hematologic stability  9/8/2023 1035 by Rach Logan RN  Outcome: Progressing     Problem: ABCDS Injury Assessment  Goal: Absence of physical injury  9/8/2023 1035 by Rach Logan RN  Outcome: Progressing

## 2023-09-08 NOTE — CARE COORDINATION
9/8/23, 2:42 PM EDT    Anticipate discharge. Home with mother. Home oxygen and CPAP from [facility]. Patient goals/plan/ treatment preferences discussed by  and . Patient goals/plan/ treatment preferences reviewed with patient/ family. Patient/ family verbalize understanding of discharge plan and are in agreement with goal/plan/treatment preferences. Understanding was demonstrated using the teach back method. AVS provided by RN at time of discharge, which includes all necessary medical information pertaining to the patients current course of illness, treatment, post-discharge goals of care, and treatment preferences.      Services At/After Discharge: None       IMM Letter  IMM Letter given to Patient/Family/Significant other/Guardian/POA/by[de-identified] Stevan Severino CM  IMM Letter date given[de-identified] 09/08/23  IMM Letter time given[de-identified] 5251

## 2023-09-08 NOTE — FLOWSHEET NOTE
09/08/23 9429   Safe Environment   Safety Measures Other (comment)  (VN safety round)     VN called into patients room and introduced myself and role. Patient did not answer. Video activated for safety check. . Patient resting comfortably in bed. . Patient has call light within reach. Pt does not display any obvious signs of distress at this time.

## 2023-09-11 ENCOUNTER — CARE COORDINATION (OUTPATIENT)
Dept: CASE MANAGEMENT | Age: 53
End: 2023-09-11

## 2023-09-11 NOTE — CARE COORDINATION
Care Transitions Initial Outreach Attempt-1st attempt    Call within 2 business days of discharge: Yes   Attempted initial 24 hour transitional call to patient. Left HIPPA compliant VM to return call directly to [phone]. Patient: Ingrid Roberts Patient : 1970 MRN: [id number]    Last Discharge [address] [facility] [facility]       Date Complaint Diagnosis Description Type Department Provider    23 Chest Pain COPD exacerbation ([facility]) . .. ED to Hosp-Admission (Discharged) (ADMITTED) Melissa Bar MD; Servando Cardenas. ..               Noted following upcoming appointments from discharge chart review:   [facility] follow up appointment(s):   Future Appointments   Date Time Provider [address]   2023 10:30 AM [address] [facility] [address] [facility] follow up appointment(s): Dr Gerber Sanabria

## 2023-09-12 ENCOUNTER — FOLLOWUP TELEPHONE ENCOUNTER (OUTPATIENT)
Dept: CARDIAC REHAB | Age: 53
End: 2023-09-12

## 2023-09-12 ENCOUNTER — CARE COORDINATION (OUTPATIENT)
Dept: CASE MANAGEMENT | Age: 53
End: 2023-09-12

## 2023-09-12 NOTE — TELEPHONE ENCOUNTER
PULMONARY REHABILITATION REFERRAL  COPD Exacerbation     Pt referred to Pulmonary Rehab by respiratory therapist doing inpt COPD program when pt was recently an inpt at [facility]. There was no answer so I left message asking pt to call us back at [phone].

## 2023-09-12 NOTE — CARE COORDINATION
Care Transitions Initial Outreach Attempt-2nd attempt    Call within 2 business days of discharge: Yes   Attempted initial 24 hour transitional call to patient. Left HIPPA compliant VM to return call directly to [phone]. If no return call, CTN will sign off-2nd attempt. Unable to reach letter will be mailed to pt by SS. Patient: Misty Angel Patient : 1970 MRN: [id number]    Last Discharge [address]       Date Complaint Diagnosis Description Type Department Provider    23 Chest Pain COPD exacerbation ([address] . .. ED to Hosp-Admission (Discharged) (ADMITTED) Caity Watts MD; Brooke Grimes. ..               Noted following upcoming appointments from discharge chart review:   [facility] follow up appointment(s):   Future Appointments   Date Time Provider [address]  [facility]   2023 10:30 AM [address] [facility] [address] [facility] follow up appointment(s): Dr Sharee Agee 2023

## 2023-09-13 ENCOUNTER — TELEPHONE (OUTPATIENT)
Dept: PHARMACY | Age: 53
End: 2023-09-13

## 2023-09-13 NOTE — TELEPHONE ENCOUNTER
Pt seen by Respiratory therapy for COPD program when recently an inpt at [address] [place] [address], they referred the pt to [facility]. [facility]. Called patient, left message with instructions for patient to call the clinic back at [phone], option 2.

## 2023-09-18 ENCOUNTER — CARE COORDINATION (OUTPATIENT)
Dept: CARE COORDINATION | Age: 53
End: 2023-09-18

## 2023-09-26 ENCOUNTER — FOLLOWUP TELEPHONE ENCOUNTER (OUTPATIENT)
Dept: CARDIAC REHAB | Age: 53
End: 2023-09-26

## 2023-09-27 DIAGNOSIS — J44.9 STAGE 3 SEVERE COPD BY GOLD CLASSIFICATION (HCC): Primary | ICD-10-CM

## 2023-09-27 DIAGNOSIS — Z87.891 PERSONAL HISTORY OF TOBACCO USE, PRESENTING HAZARDS TO HEALTH: Primary | ICD-10-CM

## 2023-09-27 DIAGNOSIS — J44.9 STAGE 3 SEVERE COPD BY GOLD CLASSIFICATION (HCC): ICD-10-CM

## 2023-10-11 ENCOUNTER — HOSPITAL ENCOUNTER (OUTPATIENT)
Dept: PHARMACY | Age: 53
Setting detail: THERAPIES SERIES
Discharge: HOME | End: 2023-10-11

## 2023-10-11 NOTE — PROGRESS NOTES
Medication Management   [facility]. [facility]  [phone] (phone)  [phone] (fax)    Astrid Gonzales is a 45 y.o. male has been referred to our service by self referral.     Pt reports they are ready and motivated to quit. Here with mother. History:  Family/friends for support: mom, sister smokes  Career: does not work   Home environment/smoke free zones in house: Yes  Past Medical history/diagnosis reviewed:  Yes  Medication list reviewed: Yes    Past Medical History:      Past Medical History:   Diagnosis Date    Acute on chronic systolic congestive heart failure ([address]) 4/4/2019    Emphysema (subcutaneous) (surgical) resulting from a procedure     Hypertension     Pneumonia     Schizophrenia ([address])          Scaling:  Importance level: 10  Confidence level: 8    Fagerstrom Test: unable to complete. Tobacco use: Unable to complete. Current use:  #/packs per day: 4 cigarettes per day,  Age started: 11   Do you vape? no  How many times in the past have you made a serious attempt to quit smoking? Unable to complete  What was the longest period of time you were able to quit smoking? Unable to complete  When was your most recent quit attempt and for how long were you quit? Unable to complete  What methods have you used in the past when you tried to quit smoking? () cold turkey, () taper down, () hypnosis, () acupuncture, () medication, () counseling, () other     Second hand smoke exposure:  Yes, lives with sister who smokes. ASSESSMENT/PLAN:  Doesn't buy cigarettes, hunts for cigarette butts outside. States that he smokes approx 4 cigarettes total per day. Unable to complete appt as patient unable to stay on task with questions when asked. Patient is has current appts with his behavioral health team at [facility]3]. Recommended to mother to ask for smoking cessation assistance at [facility]3]. No further f/u with this clinic.    Patient already has Rx for Nicotine patches from

## 2023-11-07 ENCOUNTER — HOSPITAL ENCOUNTER (OUTPATIENT)
Dept: CARDIAC REHAB | Age: 53
Setting detail: THERAPIES SERIES
Discharge: HOME | End: 2023-11-07
Payer: MEDICARE

## 2023-11-07 VITALS — HEIGHT: 70 IN | BODY MASS INDEX: 27.4 KG/M2 | WEIGHT: 191.36 LBS

## 2023-11-07 PROCEDURE — 94625 PHY/QHP OP PULM RHB W/O MNTR: CPT

## 2023-11-14 ENCOUNTER — HOSPITAL ENCOUNTER (OUTPATIENT)
Dept: CARDIAC REHAB | Age: 53
Setting detail: THERAPIES SERIES
End: 2023-11-14
Payer: MEDICARE

## 2023-11-15 ENCOUNTER — HOSPITAL ENCOUNTER (OUTPATIENT)
Dept: PULMONOLOGY | Age: 53
Discharge: HOME | End: 2023-11-15
Attending: INTERNAL MEDICINE
Payer: MEDICARE

## 2023-11-15 DIAGNOSIS — Z87.891 PERSONAL HISTORY OF TOBACCO USE, PRESENTING HAZARDS TO HEALTH: ICD-10-CM

## 2023-11-15 DIAGNOSIS — J44.9 STAGE 3 SEVERE COPD BY GOLD CLASSIFICATION (HCC): ICD-10-CM

## 2023-11-15 PROCEDURE — 94060 EVALUATION OF WHEEZING: CPT

## 2023-11-16 ENCOUNTER — HOSPITAL ENCOUNTER (OUTPATIENT)
Dept: CARDIAC REHAB | Age: 53
Setting detail: THERAPIES SERIES
Discharge: HOME | End: 2023-11-16
Payer: MEDICARE

## 2023-11-16 PROCEDURE — 94626 PHY/QHP OP PULM RHB W/MNTR: CPT

## 2023-11-21 ENCOUNTER — HOSPITAL ENCOUNTER (OUTPATIENT)
Dept: CARDIAC REHAB | Age: 53
Setting detail: THERAPIES SERIES
Discharge: HOME | End: 2023-11-21
Payer: MEDICARE

## 2023-11-21 PROCEDURE — 94626 PHY/QHP OP PULM RHB W/MNTR: CPT

## 2023-11-28 ENCOUNTER — HOSPITAL ENCOUNTER (OUTPATIENT)
Dept: CARDIAC REHAB | Age: 53
Setting detail: THERAPIES SERIES
Discharge: HOME | End: 2023-11-28
Payer: MEDICARE

## 2023-11-28 PROCEDURE — 94626 PHY/QHP OP PULM RHB W/MNTR: CPT

## 2023-11-29 NOTE — PROGRESS NOTES
as needed for shortness of breath or wheezing   -Continue pulmonary rehab  -Reviewed worsened missy with patient. Will plan repeat missy in about 1 year. Advised smoking cessation  -Reviewed preventative vaccinations    2. Abnormal CT of the chest with  Mediastinal lymphadenopathy  -Will follow radiologist recommendation for repeat CT Chest in May to follow LAD and other abnormal lung findings. Orders placed. Serum creatinine prior to scan    3. Current smoker on some days  -Advised smoking cessation  Educational material provided to patient. 4. Chronic respiratory failure with hypoxia and hypercapnia (HCC)  -Advised good compliance with NIV and supplemental O2. Reviewed download provided from [facility] from May to Sept that demonstrated poor compliance. Advised patient to use ASV every night. He verbalized understanding       Advised patient to call office with any changes, questions, or concerns regarding respiratory status or issues with prescribed medications    Return in about 6 months (around 5/30/2024) for COPD with CT CHest prior.        Electronically signed by SAVANNAH Mahajan CNP on 11/30/2023 at 2:08 PM     (Please note that portions of this note may have been completed with a voice recognition program. Efforts were made to edit the dictation but occasionally words are mis-transcribed)

## 2023-11-30 ENCOUNTER — OFFICE VISIT (OUTPATIENT)
Dept: PULMONOLOGY | Age: 53
End: 2023-11-30
Payer: MEDICARE

## 2023-11-30 ENCOUNTER — HOSPITAL ENCOUNTER (OUTPATIENT)
Dept: CARDIAC REHAB | Age: 53
Setting detail: THERAPIES SERIES
End: 2023-11-30
Payer: MEDICARE

## 2023-11-30 VITALS
HEART RATE: 100 BPM | BODY MASS INDEX: 26.63 KG/M2 | SYSTOLIC BLOOD PRESSURE: 120 MMHG | OXYGEN SATURATION: 96 % | TEMPERATURE: 98.9 F | WEIGHT: 186 LBS | DIASTOLIC BLOOD PRESSURE: 74 MMHG | HEIGHT: 70 IN

## 2023-11-30 DIAGNOSIS — J44.9 STAGE 4 VERY SEVERE COPD BY GOLD CLASSIFICATION (HCC): Primary | ICD-10-CM

## 2023-11-30 DIAGNOSIS — R93.89 ABNORMAL CT OF THE CHEST: ICD-10-CM

## 2023-11-30 DIAGNOSIS — J96.12 CHRONIC RESPIRATORY FAILURE WITH HYPOXIA AND HYPERCAPNIA (HCC): ICD-10-CM

## 2023-11-30 DIAGNOSIS — J96.11 CHRONIC RESPIRATORY FAILURE WITH HYPOXIA AND HYPERCAPNIA (HCC): ICD-10-CM

## 2023-11-30 DIAGNOSIS — R59.0 MEDIASTINAL LYMPHADENOPATHY: ICD-10-CM

## 2023-11-30 DIAGNOSIS — F17.200 CURRENT SMOKER ON SOME DAYS: ICD-10-CM

## 2023-11-30 PROCEDURE — 4004F PT TOBACCO SCREEN RCVD TLK: CPT

## 2023-11-30 PROCEDURE — G8427 DOCREV CUR MEDS BY ELIG CLIN: HCPCS

## 2023-11-30 PROCEDURE — 99214 OFFICE O/P EST MOD 30 MIN: CPT

## 2023-11-30 PROCEDURE — G8484 FLU IMMUNIZE NO ADMIN: HCPCS

## 2023-11-30 PROCEDURE — 3023F SPIROM DOC REV: CPT

## 2023-11-30 PROCEDURE — G8417 CALC BMI ABV UP PARAM F/U: HCPCS

## 2023-11-30 PROCEDURE — 3017F COLORECTAL CA SCREEN DOC REV: CPT

## 2023-11-30 RX ORDER — TIOTROPIUM BROMIDE 18 UG/1
18 CAPSULE ORAL; RESPIRATORY (INHALATION) DAILY
Qty: 90 CAPSULE | Refills: 3 | Status: SHIPPED | OUTPATIENT
Start: 2023-11-30

## 2023-11-30 ASSESSMENT — ENCOUNTER SYMPTOMS
RHINORRHEA: 1
SINUS PAIN: 0
CHEST TIGHTNESS: 1
WHEEZING: 1
SINUS PRESSURE: 0
COUGH: 1
SHORTNESS OF BREATH: 1

## 2023-12-05 ENCOUNTER — HOSPITAL ENCOUNTER (OUTPATIENT)
Dept: CARDIAC REHAB | Age: 53
Setting detail: THERAPIES SERIES
Discharge: HOME | End: 2023-12-05
Payer: MEDICARE

## 2023-12-05 PROCEDURE — 94626 PHY/QHP OP PULM RHB W/MNTR: CPT

## 2023-12-07 ENCOUNTER — HOSPITAL ENCOUNTER (OUTPATIENT)
Dept: CARDIAC REHAB | Age: 53
Setting detail: THERAPIES SERIES
Discharge: HOME | End: 2023-12-07
Payer: MEDICARE

## 2023-12-07 PROCEDURE — 94626 PHY/QHP OP PULM RHB W/MNTR: CPT

## 2023-12-12 ENCOUNTER — HOSPITAL ENCOUNTER (OUTPATIENT)
Dept: CARDIAC REHAB | Age: 53
Setting detail: THERAPIES SERIES
Discharge: HOME | End: 2023-12-12
Payer: MEDICARE

## 2023-12-12 PROCEDURE — 94626 PHY/QHP OP PULM RHB W/MNTR: CPT

## 2023-12-14 ENCOUNTER — HOSPITAL ENCOUNTER (OUTPATIENT)
Dept: CARDIAC REHAB | Age: 53
Setting detail: THERAPIES SERIES
Discharge: HOME | End: 2023-12-14
Payer: MEDICARE

## 2023-12-14 PROCEDURE — 94626 PHY/QHP OP PULM RHB W/MNTR: CPT

## 2023-12-26 ENCOUNTER — HOSPITAL ENCOUNTER (OUTPATIENT)
Dept: CARDIAC REHAB | Age: 53
Setting detail: THERAPIES SERIES
Discharge: HOME | End: 2023-12-26
Payer: MEDICARE

## 2023-12-26 PROCEDURE — 94626 PHY/QHP OP PULM RHB W/MNTR: CPT

## 2023-12-28 ENCOUNTER — APPOINTMENT (OUTPATIENT)
Dept: CARDIAC REHAB | Age: 53
End: 2023-12-28
Payer: MEDICARE

## 2024-01-02 ENCOUNTER — HOSPITAL ENCOUNTER (OUTPATIENT)
Dept: CARDIAC REHAB | Age: 54
Setting detail: THERAPIES SERIES
Discharge: HOME | End: 2024-01-02
Payer: MEDICARE

## 2024-01-02 PROCEDURE — 94626 PHY/QHP OP PULM RHB W/MNTR: CPT

## 2024-01-04 ENCOUNTER — HOSPITAL ENCOUNTER (OUTPATIENT)
Dept: CARDIAC REHAB | Age: 54
Setting detail: THERAPIES SERIES
Discharge: HOME | End: 2024-01-04
Payer: MEDICARE

## 2024-01-04 PROCEDURE — 94626 PHY/QHP OP PULM RHB W/MNTR: CPT

## 2024-01-04 NOTE — PLAN OF CARE
hand washing tech’s and alcohol gel usage    () Sputum assessment    () Ability to recognize exacerbation and when to call MD        () Cleaning of respiratory equipment EXACERBAT'N PREVENTION & MANAGEMENT  DISCHARGE        () Pt has had class  Date:  () Pt did not have class              () Addressed questions and pt feels comfortable with hydration, hand washing, sputum assessment, exacerbation knowledge, and cleaning of equipment   Exacerbation Prevention & Management Goals  Initial Assessment      (x) Learn ways to stay healthy and not get admitted          (x) Increase knowledge of Lungs, Breathing, Exercise, Nutrition, Mood and controlling anxiety, and stopping the Dyspnea Cycle by attending classes   Exacerbation Prevention & Management Goals Intervention/ Education  Plan    -Attend class and demonstrate disease self management strategies      -Attend all appropriate classes                   Exacerbation Prevention & Management Goals  Reassessment        (x) Pt has had class  () Pt has not had class        () Pt has had all classes  (x) Pt has had some classes  () Pt has had little/ not staying for education Exacerbation Prevention & Management Goals  Reassessment        () Pt has had class  () Pt has not had class        () Pt has had all classes  () Pt has had some classes  () Pt has had little/ not staying for education Exacerbation Prevention & Management Goals  Reassessment        () Pt has had class  () Pt has not had class        () Pt has had all classes  () Pt has had most classes  () Pt has had some of the classes  () Pt has had little/ not staying for education Exacerbation Prevention & Management Goals  Discharge        () Pt had class  Date:  See above  () Pt did not have class        () Pt attended all relevant classes and all questions were answered                   PHYSICIAN INTERACTION    MD Initial Assessment Review    (x) Initial  Assessment Reviewed  (x) Treatment Plan and Goals support

## 2024-01-09 ENCOUNTER — HOSPITAL ENCOUNTER (OUTPATIENT)
Dept: CARDIAC REHAB | Age: 54
Setting detail: THERAPIES SERIES
Discharge: HOME | End: 2024-01-09
Payer: MEDICARE

## 2024-01-09 PROCEDURE — 94626 PHY/QHP OP PULM RHB W/MNTR: CPT

## 2024-01-11 ENCOUNTER — HOSPITAL ENCOUNTER (OUTPATIENT)
Dept: CARDIAC REHAB | Age: 54
Setting detail: THERAPIES SERIES
Discharge: HOME | End: 2024-01-11
Payer: MEDICARE

## 2024-01-11 PROCEDURE — 94626 PHY/QHP OP PULM RHB W/MNTR: CPT

## 2024-01-16 ENCOUNTER — HOSPITAL ENCOUNTER (OUTPATIENT)
Dept: CARDIAC REHAB | Age: 54
Setting detail: THERAPIES SERIES
End: 2024-01-16
Payer: MEDICARE

## 2024-01-18 ENCOUNTER — HOSPITAL ENCOUNTER (INPATIENT)
Age: 54
LOS: 3 days | Discharge: HOME | DRG: 190 | End: 2024-01-21
Attending: EMERGENCY MEDICINE | Admitting: INTERNAL MEDICINE
Payer: MEDICARE

## 2024-01-18 ENCOUNTER — APPOINTMENT (OUTPATIENT)
Dept: GENERAL RADIOLOGY | Age: 54
DRG: 190 | End: 2024-01-18
Payer: MEDICARE

## 2024-01-18 ENCOUNTER — HOSPITAL ENCOUNTER (OUTPATIENT)
Dept: CARDIAC REHAB | Age: 54
Setting detail: THERAPIES SERIES
End: 2024-01-18
Payer: MEDICARE

## 2024-01-18 DIAGNOSIS — J44.1 COPD EXACERBATION (HCC): Primary | ICD-10-CM

## 2024-01-18 DIAGNOSIS — J44.9 STAGE 3 SEVERE COPD BY GOLD CLASSIFICATION (HCC): ICD-10-CM

## 2024-01-18 LAB
ALBUMIN SERPL BCG-MCNC: 4 G/DL (ref 3.5–5.1)
ALP SERPL-CCNC: 73 U/L (ref 38–126)
ALT SERPL W/O P-5'-P-CCNC: 6 U/L (ref 11–66)
ANION GAP SERPL CALC-SCNC: 10 MEQ/L (ref 8–16)
AST SERPL-CCNC: 9 U/L (ref 5–40)
BASOPHILS ABSOLUTE: 0 THOU/MM3 (ref 0–0.1)
BASOPHILS NFR BLD AUTO: 0.5 %
BILIRUB SERPL-MCNC: 0.2 MG/DL (ref 0.3–1.2)
BUN SERPL-MCNC: 13 MG/DL (ref 7–22)
CALCIUM SERPL-MCNC: 9 MG/DL (ref 8.5–10.5)
CHLORIDE SERPL-SCNC: 98 MEQ/L (ref 98–111)
CO2 SERPL-SCNC: 33 MEQ/L (ref 23–33)
CREAT SERPL-MCNC: 0.8 MG/DL (ref 0.4–1.2)
DEPRECATED RDW RBC AUTO: 49.1 FL (ref 35–45)
EOSINOPHIL NFR BLD AUTO: 0.7 %
EOSINOPHILS ABSOLUTE: 0.1 THOU/MM3 (ref 0–0.4)
ERYTHROCYTE [DISTWIDTH] IN BLOOD BY AUTOMATED COUNT: 13.9 % (ref 11.5–14.5)
FLUAV RNA RESP QL NAA+PROBE: NOT DETECTED
FLUBV RNA RESP QL NAA+PROBE: NOT DETECTED
GFR SERPL CREATININE-BSD FRML MDRD: > 60 ML/MIN/1.73M2
GLUCOSE SERPL-MCNC: 93 MG/DL (ref 70–108)
HCT VFR BLD AUTO: 42.9 % (ref 42–52)
HGB BLD-MCNC: 13.4 GM/DL (ref 14–18)
IMM GRANULOCYTES # BLD AUTO: 0.03 THOU/MM3 (ref 0–0.07)
IMM GRANULOCYTES NFR BLD AUTO: 0.4 %
LYMPHOCYTES ABSOLUTE: 1.1 THOU/MM3 (ref 1–4.8)
LYMPHOCYTES NFR BLD AUTO: 13.3 %
MCH RBC QN AUTO: 29.7 PG (ref 26–33)
MCHC RBC AUTO-ENTMCNC: 31.2 GM/DL (ref 32.2–35.5)
MCV RBC AUTO: 95.1 FL (ref 80–94)
MONOCYTES ABSOLUTE: 0.8 THOU/MM3 (ref 0.4–1.3)
MONOCYTES NFR BLD AUTO: 9.7 %
NEUTROPHILS NFR BLD AUTO: 75.4 %
NRBC BLD AUTO-RTO: 0 /100 WBC
NT-PROBNP SERPL IA-MCNC: 286.4 PG/ML (ref 0–124)
OSMOLALITY SERPL CALC.SUM OF ELEC: 281.1 MOSMOL/KG (ref 275–300)
PLATELET # BLD AUTO: 172 THOU/MM3 (ref 130–400)
PMV BLD AUTO: 10.5 FL (ref 9.4–12.4)
POTASSIUM SERPL-SCNC: 3.7 MEQ/L (ref 3.5–5.2)
PROT SERPL-MCNC: 7.7 G/DL (ref 6.1–8)
RBC # BLD AUTO: 4.51 MILL/MM3 (ref 4.7–6.1)
SARS-COV-2 RNA RESP QL NAA+PROBE: NOT DETECTED
SEGMENTED NEUTROPHILS ABSOLUTE COUNT: 6.1 THOU/MM3 (ref 1.8–7.7)
SODIUM SERPL-SCNC: 141 MEQ/L (ref 135–145)
TROPONIN, HIGH SENSITIVITY: 17 NG/L (ref 0–12)
WBC # BLD AUTO: 8.1 THOU/MM3 (ref 4.8–10.8)

## 2024-01-18 PROCEDURE — 87636 SARSCOV2 & INF A&B AMP PRB: CPT

## 2024-01-18 PROCEDURE — 93005 ELECTROCARDIOGRAM TRACING: CPT | Performed by: STUDENT IN AN ORGANIZED HEALTH CARE EDUCATION/TRAINING PROGRAM

## 2024-01-18 PROCEDURE — 6370000000 HC RX 637 (ALT 250 FOR IP): Performed by: INTERNAL MEDICINE

## 2024-01-18 PROCEDURE — 2700000000 HC OXYGEN THERAPY PER DAY

## 2024-01-18 PROCEDURE — 1200000000 HC SEMI PRIVATE

## 2024-01-18 PROCEDURE — 2580000003 HC RX 258: Performed by: INTERNAL MEDICINE

## 2024-01-18 PROCEDURE — 71045 X-RAY EXAM CHEST 1 VIEW: CPT

## 2024-01-18 PROCEDURE — 6360000002 HC RX W HCPCS: Performed by: INTERNAL MEDICINE

## 2024-01-18 PROCEDURE — 94640 AIRWAY INHALATION TREATMENT: CPT

## 2024-01-18 PROCEDURE — 80053 COMPREHEN METABOLIC PANEL: CPT

## 2024-01-18 PROCEDURE — 94761 N-INVAS EAR/PLS OXIMETRY MLT: CPT

## 2024-01-18 PROCEDURE — 36415 COLL VENOUS BLD VENIPUNCTURE: CPT

## 2024-01-18 PROCEDURE — 6370000000 HC RX 637 (ALT 250 FOR IP): Performed by: STUDENT IN AN ORGANIZED HEALTH CARE EDUCATION/TRAINING PROGRAM

## 2024-01-18 PROCEDURE — 83880 ASSAY OF NATRIURETIC PEPTIDE: CPT

## 2024-01-18 PROCEDURE — 84484 ASSAY OF TROPONIN QUANT: CPT

## 2024-01-18 PROCEDURE — 99285 EMERGENCY DEPT VISIT HI MDM: CPT

## 2024-01-18 PROCEDURE — 85025 COMPLETE CBC W/AUTO DIFF WBC: CPT

## 2024-01-18 PROCEDURE — 1200000003 HC TELEMETRY R&B

## 2024-01-18 RX ORDER — ONDANSETRON 4 MG/1
4 TABLET, ORALLY DISINTEGRATING ORAL EVERY 8 HOURS PRN
Status: DISCONTINUED | OUTPATIENT
Start: 2024-01-18 | End: 2024-01-21 | Stop reason: HOSPADM

## 2024-01-18 RX ORDER — SODIUM CHLORIDE 0.9 % (FLUSH) 0.9 %
5-40 SYRINGE (ML) INJECTION PRN
Status: DISCONTINUED | OUTPATIENT
Start: 2024-01-18 | End: 2024-01-21 | Stop reason: HOSPADM

## 2024-01-18 RX ORDER — ALBUTEROL SULFATE 2.5 MG/3ML
2.5 SOLUTION RESPIRATORY (INHALATION) EVERY 4 HOURS PRN
Status: DISCONTINUED | OUTPATIENT
Start: 2024-01-18 | End: 2024-01-21 | Stop reason: HOSPADM

## 2024-01-18 RX ORDER — ENOXAPARIN SODIUM 100 MG/ML
40 INJECTION SUBCUTANEOUS DAILY
Status: DISCONTINUED | OUTPATIENT
Start: 2024-01-19 | End: 2024-01-21 | Stop reason: HOSPADM

## 2024-01-18 RX ORDER — SODIUM CHLORIDE 9 MG/ML
INJECTION, SOLUTION INTRAVENOUS PRN
Status: DISCONTINUED | OUTPATIENT
Start: 2024-01-18 | End: 2024-01-21 | Stop reason: HOSPADM

## 2024-01-18 RX ORDER — IPRATROPIUM BROMIDE AND ALBUTEROL SULFATE 2.5; .5 MG/3ML; MG/3ML
1 SOLUTION RESPIRATORY (INHALATION)
Status: DISCONTINUED | OUTPATIENT
Start: 2024-01-19 | End: 2024-01-19

## 2024-01-18 RX ORDER — ACETAMINOPHEN 325 MG/1
650 TABLET ORAL EVERY 6 HOURS PRN
Status: DISCONTINUED | OUTPATIENT
Start: 2024-01-18 | End: 2024-01-21 | Stop reason: HOSPADM

## 2024-01-18 RX ORDER — PREDNISONE 20 MG/1
40 TABLET ORAL DAILY
Status: DISCONTINUED | OUTPATIENT
Start: 2024-01-21 | End: 2024-01-21 | Stop reason: HOSPADM

## 2024-01-18 RX ORDER — POLYETHYLENE GLYCOL 3350 17 G/17G
17 POWDER, FOR SOLUTION ORAL DAILY PRN
Status: DISCONTINUED | OUTPATIENT
Start: 2024-01-18 | End: 2024-01-21 | Stop reason: HOSPADM

## 2024-01-18 RX ORDER — SODIUM CHLORIDE 0.9 % (FLUSH) 0.9 %
5-40 SYRINGE (ML) INJECTION EVERY 12 HOURS SCHEDULED
Status: DISCONTINUED | OUTPATIENT
Start: 2024-01-18 | End: 2024-01-21 | Stop reason: HOSPADM

## 2024-01-18 RX ORDER — GUAIFENESIN 600 MG/1
600 TABLET, EXTENDED RELEASE ORAL EVERY 12 HOURS
Status: DISCONTINUED | OUTPATIENT
Start: 2024-01-19 | End: 2024-01-21 | Stop reason: HOSPADM

## 2024-01-18 RX ORDER — RISPERIDONE 1 MG/1
2 TABLET ORAL 2 TIMES DAILY
Status: DISCONTINUED | OUTPATIENT
Start: 2024-01-18 | End: 2024-01-21 | Stop reason: HOSPADM

## 2024-01-18 RX ORDER — ONDANSETRON 2 MG/ML
4 INJECTION INTRAMUSCULAR; INTRAVENOUS EVERY 6 HOURS PRN
Status: DISCONTINUED | OUTPATIENT
Start: 2024-01-18 | End: 2024-01-21 | Stop reason: HOSPADM

## 2024-01-18 RX ORDER — GUAIFENESIN 600 MG/1
600 TABLET, EXTENDED RELEASE ORAL 2 TIMES DAILY
Status: DISCONTINUED | OUTPATIENT
Start: 2024-01-18 | End: 2024-01-18

## 2024-01-18 RX ORDER — IPRATROPIUM BROMIDE AND ALBUTEROL SULFATE 2.5; .5 MG/3ML; MG/3ML
1 SOLUTION RESPIRATORY (INHALATION) EVERY 20 MIN
Status: COMPLETED | OUTPATIENT
Start: 2024-01-18 | End: 2024-01-18

## 2024-01-18 RX ORDER — ACETAMINOPHEN 650 MG/1
650 SUPPOSITORY RECTAL EVERY 6 HOURS PRN
Status: DISCONTINUED | OUTPATIENT
Start: 2024-01-18 | End: 2024-01-21 | Stop reason: HOSPADM

## 2024-01-18 RX ADMIN — GUAIFENESIN 600 MG: 600 TABLET, EXTENDED RELEASE ORAL at 23:39

## 2024-01-18 RX ADMIN — SODIUM CHLORIDE, PRESERVATIVE FREE 10 ML: 5 INJECTION INTRAVENOUS at 23:40

## 2024-01-18 RX ADMIN — IPRATROPIUM BROMIDE AND ALBUTEROL SULFATE 1 DOSE: .5; 3 SOLUTION RESPIRATORY (INHALATION) at 19:50

## 2024-01-18 RX ADMIN — RISPERIDONE 2 MG: 1 TABLET ORAL at 22:11

## 2024-01-18 RX ADMIN — CEFTRIAXONE SODIUM 1000 MG: 1 INJECTION, POWDER, FOR SOLUTION INTRAMUSCULAR; INTRAVENOUS at 23:38

## 2024-01-18 RX ADMIN — IPRATROPIUM BROMIDE AND ALBUTEROL SULFATE 1 DOSE: .5; 3 SOLUTION RESPIRATORY (INHALATION) at 20:10

## 2024-01-18 ASSESSMENT — PAIN SCALES - GENERAL
PAINLEVEL_OUTOF10: 0
PAINLEVEL_OUTOF10: 0

## 2024-01-19 LAB
ANION GAP SERPL CALC-SCNC: 9 MEQ/L (ref 8–16)
BASOPHILS ABSOLUTE: 0 THOU/MM3 (ref 0–0.1)
BASOPHILS NFR BLD AUTO: 0.3 %
BUN SERPL-MCNC: 12 MG/DL (ref 7–22)
CALCIUM SERPL-MCNC: 8.9 MG/DL (ref 8.5–10.5)
CHLORIDE SERPL-SCNC: 100 MEQ/L (ref 98–111)
CO2 SERPL-SCNC: 30 MEQ/L (ref 23–33)
CREAT SERPL-MCNC: 0.8 MG/DL (ref 0.4–1.2)
DEPRECATED RDW RBC AUTO: 49.3 FL (ref 35–45)
EOSINOPHIL NFR BLD AUTO: 0.3 %
EOSINOPHILS ABSOLUTE: 0 THOU/MM3 (ref 0–0.4)
ERYTHROCYTE [DISTWIDTH] IN BLOOD BY AUTOMATED COUNT: 14.2 % (ref 11.5–14.5)
GFR SERPL CREATININE-BSD FRML MDRD: > 60 ML/MIN/1.73M2
GLUCOSE SERPL-MCNC: 99 MG/DL (ref 70–108)
HCT VFR BLD AUTO: 41.5 % (ref 42–52)
HGB BLD-MCNC: 12.9 GM/DL (ref 14–18)
IMM GRANULOCYTES # BLD AUTO: 0.03 THOU/MM3 (ref 0–0.07)
IMM GRANULOCYTES NFR BLD AUTO: 0.4 %
LYMPHOCYTES ABSOLUTE: 0.9 THOU/MM3 (ref 1–4.8)
LYMPHOCYTES NFR BLD AUTO: 11.5 %
MCH RBC QN AUTO: 29.3 PG (ref 26–33)
MCHC RBC AUTO-ENTMCNC: 31.1 GM/DL (ref 32.2–35.5)
MCV RBC AUTO: 94.3 FL (ref 80–94)
MONOCYTES ABSOLUTE: 0.2 THOU/MM3 (ref 0.4–1.3)
MONOCYTES NFR BLD AUTO: 2.5 %
NEUTROPHILS NFR BLD AUTO: 85 %
NRBC BLD AUTO-RTO: 0 /100 WBC
OSMOLALITY SERPL CALC.SUM OF ELEC: 277.3 MOSMOL/KG (ref 275–300)
PLATELET # BLD AUTO: 172 THOU/MM3 (ref 130–400)
PMV BLD AUTO: 10.4 FL (ref 9.4–12.4)
POTASSIUM SERPL-SCNC: 4.4 MEQ/L (ref 3.5–5.2)
PROCALCITONIN SERPL IA-MCNC: 0.07 NG/ML (ref 0.01–0.09)
RBC # BLD AUTO: 4.4 MILL/MM3 (ref 4.7–6.1)
SEGMENTED NEUTROPHILS ABSOLUTE COUNT: 6.5 THOU/MM3 (ref 1.8–7.7)
SODIUM SERPL-SCNC: 139 MEQ/L (ref 135–145)
WBC # BLD AUTO: 7.6 THOU/MM3 (ref 4.8–10.8)

## 2024-01-19 PROCEDURE — 94761 N-INVAS EAR/PLS OXIMETRY MLT: CPT

## 2024-01-19 PROCEDURE — 93010 ELECTROCARDIOGRAM REPORT: CPT | Performed by: NUCLEAR MEDICINE

## 2024-01-19 PROCEDURE — 6360000002 HC RX W HCPCS: Performed by: INTERNAL MEDICINE

## 2024-01-19 PROCEDURE — 84145 PROCALCITONIN (PCT): CPT

## 2024-01-19 PROCEDURE — 36415 COLL VENOUS BLD VENIPUNCTURE: CPT

## 2024-01-19 PROCEDURE — 80048 BASIC METABOLIC PNL TOTAL CA: CPT

## 2024-01-19 PROCEDURE — 1200000003 HC TELEMETRY R&B

## 2024-01-19 PROCEDURE — 2580000003 HC RX 258: Performed by: INTERNAL MEDICINE

## 2024-01-19 PROCEDURE — 6370000000 HC RX 637 (ALT 250 FOR IP): Performed by: INTERNAL MEDICINE

## 2024-01-19 PROCEDURE — 85025 COMPLETE CBC W/AUTO DIFF WBC: CPT

## 2024-01-19 PROCEDURE — 94640 AIRWAY INHALATION TREATMENT: CPT

## 2024-01-19 RX ORDER — FUROSEMIDE 20 MG/1
20 TABLET ORAL DAILY
Status: DISCONTINUED | OUTPATIENT
Start: 2024-01-19 | End: 2024-01-21 | Stop reason: HOSPADM

## 2024-01-19 RX ORDER — TADALAFIL 20 MG/1
10 TABLET ORAL DAILY
Status: DISCONTINUED | OUTPATIENT
Start: 2024-01-19 | End: 2024-01-21 | Stop reason: HOSPADM

## 2024-01-19 RX ORDER — BUPROPION HYDROCHLORIDE 150 MG/1
150 TABLET ORAL EVERY MORNING
Status: DISCONTINUED | OUTPATIENT
Start: 2024-01-20 | End: 2024-01-21 | Stop reason: HOSPADM

## 2024-01-19 RX ORDER — IPRATROPIUM BROMIDE AND ALBUTEROL SULFATE 2.5; .5 MG/3ML; MG/3ML
1 SOLUTION RESPIRATORY (INHALATION)
Status: DISCONTINUED | OUTPATIENT
Start: 2024-01-19 | End: 2024-01-21 | Stop reason: HOSPADM

## 2024-01-19 RX ORDER — CARBAMAZEPINE 200 MG/1
200 TABLET ORAL 2 TIMES DAILY
Status: DISCONTINUED | OUTPATIENT
Start: 2024-01-19 | End: 2024-01-21 | Stop reason: HOSPADM

## 2024-01-19 RX ORDER — PRAVASTATIN SODIUM 40 MG
40 TABLET ORAL NIGHTLY
Status: DISCONTINUED | OUTPATIENT
Start: 2024-01-19 | End: 2024-01-21 | Stop reason: HOSPADM

## 2024-01-19 RX ADMIN — FUROSEMIDE 20 MG: 20 TABLET ORAL at 20:22

## 2024-01-19 RX ADMIN — PRAVASTATIN SODIUM 40 MG: 40 TABLET ORAL at 20:22

## 2024-01-19 RX ADMIN — IPRATROPIUM BROMIDE AND ALBUTEROL SULFATE 1 DOSE: .5; 3 SOLUTION RESPIRATORY (INHALATION) at 09:12

## 2024-01-19 RX ADMIN — CEFTRIAXONE SODIUM 1000 MG: 1 INJECTION, POWDER, FOR SOLUTION INTRAMUSCULAR; INTRAVENOUS at 23:15

## 2024-01-19 RX ADMIN — GUAIFENESIN 600 MG: 600 TABLET, EXTENDED RELEASE ORAL at 12:07

## 2024-01-19 RX ADMIN — IPRATROPIUM BROMIDE AND ALBUTEROL SULFATE 1 DOSE: .5; 3 SOLUTION RESPIRATORY (INHALATION) at 12:28

## 2024-01-19 RX ADMIN — ENOXAPARIN SODIUM 40 MG: 100 INJECTION SUBCUTANEOUS at 08:19

## 2024-01-19 RX ADMIN — METHYLPREDNISOLONE SODIUM SUCCINATE 40 MG: 40 INJECTION, POWDER, FOR SOLUTION INTRAMUSCULAR; INTRAVENOUS at 08:19

## 2024-01-19 RX ADMIN — TADALAFIL 10 MG: 20 TABLET ORAL at 18:08

## 2024-01-19 RX ADMIN — CARBAMAZEPINE 200 MG: 200 TABLET ORAL at 20:22

## 2024-01-19 RX ADMIN — GUAIFENESIN 600 MG: 600 TABLET, EXTENDED RELEASE ORAL at 23:10

## 2024-01-19 RX ADMIN — RISPERIDONE 2 MG: 1 TABLET ORAL at 08:19

## 2024-01-19 RX ADMIN — SODIUM CHLORIDE, PRESERVATIVE FREE 10 ML: 5 INJECTION INTRAVENOUS at 19:44

## 2024-01-19 RX ADMIN — RISPERIDONE 2 MG: 1 TABLET ORAL at 19:41

## 2024-01-19 RX ADMIN — SODIUM CHLORIDE, PRESERVATIVE FREE 10 ML: 5 INJECTION INTRAVENOUS at 08:19

## 2024-01-19 RX ADMIN — METHYLPREDNISOLONE SODIUM SUCCINATE 40 MG: 40 INJECTION, POWDER, FOR SOLUTION INTRAMUSCULAR; INTRAVENOUS at 02:06

## 2024-01-19 RX ADMIN — METHYLPREDNISOLONE SODIUM SUCCINATE 40 MG: 40 INJECTION, POWDER, FOR SOLUTION INTRAMUSCULAR; INTRAVENOUS at 19:41

## 2024-01-19 RX ADMIN — METHYLPREDNISOLONE SODIUM SUCCINATE 40 MG: 40 INJECTION, POWDER, FOR SOLUTION INTRAMUSCULAR; INTRAVENOUS at 12:53

## 2024-01-19 RX ADMIN — IPRATROPIUM BROMIDE AND ALBUTEROL SULFATE 1 DOSE: .5; 3 SOLUTION RESPIRATORY (INHALATION) at 18:25

## 2024-01-19 ASSESSMENT — PAIN DESCRIPTION - LOCATION: LOCATION: FOOT

## 2024-01-19 ASSESSMENT — PAIN SCALES - GENERAL
PAINLEVEL_OUTOF10: 0
PAINLEVEL_OUTOF10: 3
PAINLEVEL_OUTOF10: 0

## 2024-01-19 ASSESSMENT — PAIN DESCRIPTION - PAIN TYPE: TYPE: CHRONIC PAIN

## 2024-01-19 ASSESSMENT — PAIN - FUNCTIONAL ASSESSMENT: PAIN_FUNCTIONAL_ASSESSMENT: ACTIVITIES ARE NOT PREVENTED

## 2024-01-19 ASSESSMENT — PAIN DESCRIPTION - DESCRIPTORS: DESCRIPTORS: TINGLING

## 2024-01-19 ASSESSMENT — PAIN DESCRIPTION - ORIENTATION: ORIENTATION: RIGHT;LEFT

## 2024-01-19 NOTE — PLAN OF CARE
Problem: Respiratory - Adult  Goal: Achieves optimal ventilation and oxygenation  Outcome: Progressing  Flowsheets (Taken 1/19/2024 1238)  Achieves optimal ventilation and oxygenation:   Assess for changes in respiratory status   Respiratory therapy support as indicated   Assess for changes in mentation and behavior  Note: Pt had no questions on purpose or side effects of medication   Will continue with treatments to help improve aeration t/o lungs

## 2024-01-19 NOTE — ED NOTES
ED to inpatient nurses report      Chief Complaint:  Chief Complaint   Patient presents with    Shortness of Breath     Present to ED from: home    MOA:     LOC: alert and orientated to name, place, date  Mobility: Requires assistance * 1  Oxygen Baseline: 2 liters    Current needs required: 6 liters     Code Status:   Full Code    What abnormal results were found and what did you give/do to treat them?   Any procedures or intervention occur?     Mental Status:  Level of Consciousness: Alert (0)    Psych Assessment:        Vitals:  Patient Vitals for the past 24 hrs:   BP Temp Pulse Resp SpO2 Weight   01/18/24 2031 120/65 -- 97 16 98 % --   01/18/24 1950 -- -- (!) 103 14 99 % --   01/18/24 1934 96/77 -- (!) 108 27 -- --   01/18/24 1907 -- 100 °F (37.8 °C) -- -- -- --   01/18/24 1902 107/71 -- (!) 106 18 94 % 89.8 kg (198 lb)        LDAs:   Peripheral IV 01/18/24 Left Antecubital (Active)   Site Assessment Clean, dry & intact 01/18/24 1908   Line Status Blood return noted;Flushed;Specimen collected 01/18/24 1908   Phlebitis Assessment No symptoms 01/18/24 1908   Infiltration Assessment 0 01/18/24 1908   Dressing Status Clean, dry & intact 01/18/24 1908       Ambulatory Status:  No data recorded    Diagnosis:  DISPOSITION Admitted 01/18/2024 08:29:00 PM   Final diagnoses:   COPD exacerbation ([facility])        Consults:  None     Pain Score:       C-SSRS:        Sepsis Screening:  Sepsis Risk Score: 2.22    [place] Fall Risk:       Swallow Screening        Preferred Language:   English      ALLERGIES     Patient has no known allergies.    SURGICAL HISTORY     No past surgical history on file.    PAST MEDICAL HISTORY       Past Medical History:   Diagnosis Date    Acute on chronic systolic congestive heart failure ([facility]) 4/4/2019    Emphysema (subcutaneous) (surgical) resulting from a procedure     Hypertension     Pneumonia     Schizophrenia ([facility])            Electronically signed by Neel Truong RN on 1/18/2024 at 8:45

## 2024-01-19 NOTE — CARE COORDINATION
01/19/24 0925   Service Assessment   Patient Orientation Alert and Oriented   Cognition Alert   History Provided By Patient   Primary Caregiver Self   Accompanied By/Relationship Mom present and helps with answers   Support Systems Parent;Family Members;Friends/Neighbors   Patient's Healthcare Decision Maker is: Legal Next of Kin   PCP Verified by CM Yes   Last Visit to PCP Within last 3 months   Prior Functional Level Independent in ADLs/IADLs   Current Functional Level Independent in ADLs/IADLs   Can patient return to prior living arrangement Yes   Ability to make needs known: Good   Family able to assist with home care needs: Yes   Would you like for me to discuss the discharge plan with any other family members/significant others, and if so, who? Yes   Financial Resources Medicare;Medicaid   Community Resources Transportation   Social/Functional History   Lives With Parent;Family   Type of Home Condo   Home Layout Multi-level   Active  No   Patient's  Info Family   Discharge Planning   Type of Residence Other (Comment)  (Condo)   Living Arrangements Parent;Family Members   Current Services Prior To Admission Oxygen Therapy;C-pap  (Per [facility])   DME Ordered? No   Potential Assistance Purchasing Medications No   Type of Home Care Services None   Patient expects to be discharged to: Other (comment)  (Condo)   One/Two Story Residence Two story   Services At/After Discharge   Mode of Transport at Discharge Other (see comment)  (Family)   Confirm Follow Up Transport Family

## 2024-01-19 NOTE — ED NOTES
Patient and family updated on plan of care at this time. Patient provided with coffee per request. Patient resting in bed. Respirations easy and unlabored. No distress noted. Call light within reach.

## 2024-01-19 NOTE — ED NOTES
Report received from OLESYA Patel. Patient resting in bed with family at bedside. Respirations easy and unlabored. No distress noted. Call light within reach.

## 2024-01-19 NOTE — ED NOTES
ED nurse-to-nurse bedside report    Chief Complaint   Patient presents with    Shortness of Breath      LOC: alert and orientated to name, place, date  Vital signs   Vitals:    01/18/24 1902 01/18/24 1907   BP: 107/71    Pulse: (!) 106    Resp: 18    Temp:  100 °F (37.8 °C)   SpO2: 94%    Weight: 89.8 kg (198 lb)       Pain:    Pain Interventions: NA  Pain Goal: 0  Oxygen: Yes    Current needs required 6L   Telemetry: Yes  LDAs:   Peripheral IV 01/18/24 Left Antecubital (Active)   Site Assessment Clean, dry & intact 01/18/24 1908   Line Status Blood return noted;Flushed;Specimen collected 01/18/24 1908   Phlebitis Assessment No symptoms 01/18/24 1908   Infiltration Assessment 0 01/18/24 1908   Dressing Status Clean, dry & intact 01/18/24 1908     Continuous Infusions:   Mobility: Requires assistance * 1  [place] Fall Risk Score:        No data to display              Fall Interventions: call light in reach, bed in low position with wheels locked, side rails up x2  Report given to: OLESYA Cortes; OLESYA Shaikh

## 2024-01-19 NOTE — H&P
Internal Medicine  History and Physical    Patient:  Sandie Carrera  MRN: [id number]      History Obtained From:  patient  PCP: Elijah Liu MD    CHIEF COMPLAINT:  SOB    HISTORY OF PRESENT ILLNESS:   The patient is a 53 y.o. male who presents with worsening shortness of breath.  Patient states increasing dyspnea and oxygen requirements over the last week.  Patient endorses a productive cough but no change in sputum.  No fevers, no chills or chest pain. He is on 2L oxygen at home and has required more oxygen for the worsening SOB.  ER evaluation was consistent with COPD exacerbation,treated with steroid, bronchodilators and admitted for optimization of pulm status.     Past Medical History:        Diagnosis Date    Acute on chronic systolic congestive heart failure (HCC) 4/4/2019    Emphysema (subcutaneous) (surgical) resulting from a procedure     Hypertension     Pneumonia     Schizophrenia (HCC)        Past Surgical History:    No past surgical history on file.    Medications Prior to Admission:    Prior to Admission medications    Medication Sig Start Date End Date Taking? Authorizing Provider   SPIRIVA HANDIHALER 18 MCG inhalation capsule Inhale 1 capsule into the lungs daily 11/30/23   Jacqueline Castillo, APRN - CNP   risperiDONE (RISPERDAL) 2 MG tablet Take 1 tablet by mouth 2 times daily 9/8/23   Elijah Liu MD   albuterol sulfate HFA (VENTOLIN HFA) 108 (90 Base) MCG/ACT inhaler Inhale 2 puffs into the lungs every 6 hours as needed for Wheezing or Shortness of Breath 9/8/23 9/7/24  Elijah Liu MD   arformoterol tartrate (BROVANA) 15 MCG/2ML NEBU Take 2 mLs by nebulization in the morning and 2 mLs in the evening. 9/8/23 9/7/24  Elijah Liu MD   aspirin 325 MG EC tablet Take 1 tablet by mouth daily 9/8/23   Elijah Liu MD   budesonide (PULMICORT) 0.5 MG/2ML nebulizer suspension Take 2 mLs by nebulization 2 times daily 9/8/23 9/7/24  Elijah Liu MD   buPROPion (WELLBUTRIN XL) 150 MG

## 2024-01-19 NOTE — ED NOTES
ED to inpatient nurses report      Chief Complaint:  Chief Complaint   Patient presents with    Shortness of Breath     Present to ED from: home    MOA:     LOC: alert and orientated to name, place, date  Mobility: Requires assistance * 1  Oxygen Baseline: 2lpm    Current needs required: 6lpm     Code Status:   Full Code    What abnormal results were found and what did you give/do to treat them? Sob - gave nebs  Any procedures or intervention occur? See chart    Mental Status:  Level of Consciousness: Alert (0)    Psych Assessment:        Vitals:  Patient Vitals for the past 24 hrs:   BP Temp Pulse Resp SpO2 Weight   01/18/24 2031 120/65 -- 97 16 98 % --   01/18/24 1950 -- -- (!) 103 14 99 % --   01/18/24 1934 96/77 -- (!) 108 27 -- --   01/18/24 1907 -- 100 °F (37.8 °C) -- -- -- --   01/18/24 1902 107/71 -- (!) 106 18 94 % 89.8 kg (198 lb)        LDAs:   Peripheral IV 01/18/24 Left Antecubital (Active)   Site Assessment Clean, dry & intact 01/18/24 1908   Line Status Blood return noted;Flushed;Specimen collected 01/18/24 1908   Phlebitis Assessment No symptoms 01/18/24 1908   Infiltration Assessment 0 01/18/24 1908   Dressing Status Clean, dry & intact 01/18/24 1908       Ambulatory Status:  No data recorded    Diagnosis:  DISPOSITION Admitted 01/18/2024 08:29:00 PM   Final diagnoses:   COPD exacerbation ([facility])        Consults:  None     Pain Score:       C-SSRS:    No risk    Sepsis Screening:  Sepsis Risk Score: 2.55    [place] Fall Risk:       Swallow Screening    pass    Preferred Language:   English      ALLERGIES     Patient has no known allergies.    SURGICAL HISTORY     No past surgical history on file.    PAST MEDICAL HISTORY       Past Medical History:   Diagnosis Date    Acute on chronic systolic congestive heart failure ([facility]) 4/4/2019    Emphysema (subcutaneous) (surgical) resulting from a procedure     Hypertension     Pneumonia     Schizophrenia ([facility])            Electronically signed by Sophia Adam

## 2024-01-19 NOTE — ED PROVIDER NOTES
COPD exacerbation ([facility]) J44.1    Acute on chronic respiratory failure with hypoxia and hypercapnia ([facility]) J96.21, J96.22    Acute and chronic respiratory failure (acute-on-chronic) ([facility]) J96.20    Medically noncompliant Z91.199    PAH (pulmonary artery hypertension) ([facility]) I27.21     SURGICAL HISTORY     No past surgical history on file.    CURRENT MEDICATIONS       Previous Medications    ALBUTEROL SULFATE HFA (VENTOLIN HFA) 108 (90 BASE) MCG/ACT INHALER    Inhale 2 puffs into the lungs every 6 hours as needed for Wheezing or Shortness of Breath    ARFORMOTEROL TARTRATE (BROVANA) 15 MCG/2ML NEBU    Take 2 mLs by nebulization in the morning and 2 mLs in the evening.    ASPIRIN 325 MG EC TABLET    Take 1 tablet by mouth daily    BUDESONIDE (PULMICORT) 0.5 MG/2ML NEBULIZER SUSPENSION    Take 2 mLs by nebulization 2 times daily    BUPROPION (WELLBUTRIN XL) 150 MG EXTENDED RELEASE TABLET    Take 1 tablet by mouth every morning    CARBAMAZEPINE (TEGRETOL) 200 MG TABLET    Take 1 tablet by mouth 2 times daily    DOCUSATE SODIUM (COLACE, DULCOLAX) 100 MG CAPS    Take 100 mg by mouth daily    FAMOTIDINE (PEPCID) 20 MG TABLET    Take 1 tablet by mouth 2 times daily    FUROSEMIDE (LASIX) 20 MG TABLET    Take 1 tablet by mouth daily    HALOPERIDOL DECANOATE (HALDOL DECANOATE) 50 MG/ML INJECTION        NICOTINE (NICODERM CQ) 14 MG/24HR    Place 1 patch onto the skin at bedtime    ONDANSETRON (ZOFRAN-ODT) 4 MG DISINTEGRATING TABLET    Take 1 tablet by mouth every 8 hours as needed for Nausea or Vomiting    PANTOPRAZOLE (PROTONIX) 40 MG TABLET    Take 1 tablet by mouth every morning (before breakfast)    POTASSIUM CHLORIDE (KLOR-CON M) 20 MEQ EXTENDED RELEASE TABLET    take 1 tablet by mouth once daily WITH BREAKFAST    PRAVASTATIN (PRAVACHOL) 40 MG TABLET    Take 1 tablet by mouth nightly    PROGESTERONE (PROMETRIUM) 200 MG CAPS CAPSULE    Take 1 capsule by mouth nightly    RISPERIDONE (RISPERDAL) 2 MG TABLET    Take 1 tablet by

## 2024-01-19 NOTE — CARE COORDINATION
1/19/24, 7:05 AM EST      DISCHARGE PLANNING EVALUATION    Sandie Carrera  Admitted: 1/18/2024  Hospital Day: 1    Location: [facility]22/022-A Reason for admit: COPD exacerbation (HCC) [J44.1]  COPD with acute exacerbation (HCC) [J44.1]    Past Medical History:   Diagnosis Date    Acute on chronic systolic congestive heart failure (HCC) 4/4/2019    Emphysema (subcutaneous) (surgical) resulting from a procedure     Hypertension     Pneumonia     Schizophrenia (HCC)        Procedure: No  Barriers to Discharge: Admitted with SOB from ER. Hx Copd and PAH. Uses home O2 baseline is 6L/2L. Rocephin. Mucinex. Duonebs. Solumedrol.     PCP: Elijah Liu MD    Readmission Risk Low 0-14, Mod 15-19), High > 20: Readmission Risk Score: 18.1      Advance Directives:      Code Status: Full Code   Patient's Primary Decision Maker is: Legal Next of Kin    Primary Decision Maker: Maureen Cuellar - Parent - [phone]    Secondary Decision Maker: Marianela Debi - Brother/Sister - [phone]    Secondary Decision Maker: Trudy Cuellar - Brother/Sister - [phone]    Patient Goals/Plan/Treatment Preferences: Met with Sandie. His mother is present. Sandie resides with his mother and a sister. He states he is independent however he does not drive. Family or insurance provide transportation. Sandie has home O2 supplied by [facility]. 2L at rest and 6L with exertion. He also uses a C-Pap.    Transportation/Food Security/Housekeeping Addressed: No issues identified.     If patient is discharged prior to next notation, then this note serves as note for discharge by case management.

## 2024-01-20 PROCEDURE — 6360000002 HC RX W HCPCS: Performed by: INTERNAL MEDICINE

## 2024-01-20 PROCEDURE — 1200000003 HC TELEMETRY R&B

## 2024-01-20 PROCEDURE — 6370000000 HC RX 637 (ALT 250 FOR IP): Performed by: INTERNAL MEDICINE

## 2024-01-20 PROCEDURE — 2580000003 HC RX 258: Performed by: INTERNAL MEDICINE

## 2024-01-20 PROCEDURE — 2700000000 HC OXYGEN THERAPY PER DAY

## 2024-01-20 PROCEDURE — 94640 AIRWAY INHALATION TREATMENT: CPT

## 2024-01-20 PROCEDURE — 94761 N-INVAS EAR/PLS OXIMETRY MLT: CPT

## 2024-01-20 RX ADMIN — METHYLPREDNISOLONE SODIUM SUCCINATE 40 MG: 40 INJECTION, POWDER, FOR SOLUTION INTRAMUSCULAR; INTRAVENOUS at 13:35

## 2024-01-20 RX ADMIN — RISPERIDONE 2 MG: 1 TABLET ORAL at 09:24

## 2024-01-20 RX ADMIN — GUAIFENESIN 600 MG: 600 TABLET, EXTENDED RELEASE ORAL at 22:44

## 2024-01-20 RX ADMIN — GUAIFENESIN 600 MG: 600 TABLET, EXTENDED RELEASE ORAL at 13:35

## 2024-01-20 RX ADMIN — METHYLPREDNISOLONE SODIUM SUCCINATE 40 MG: 40 INJECTION, POWDER, FOR SOLUTION INTRAMUSCULAR; INTRAVENOUS at 09:24

## 2024-01-20 RX ADMIN — SODIUM CHLORIDE, PRESERVATIVE FREE 10 ML: 5 INJECTION INTRAVENOUS at 09:24

## 2024-01-20 RX ADMIN — METHYLPREDNISOLONE SODIUM SUCCINATE 40 MG: 40 INJECTION, POWDER, FOR SOLUTION INTRAMUSCULAR; INTRAVENOUS at 19:58

## 2024-01-20 RX ADMIN — FUROSEMIDE 20 MG: 20 TABLET ORAL at 09:24

## 2024-01-20 RX ADMIN — METHYLPREDNISOLONE SODIUM SUCCINATE 40 MG: 40 INJECTION, POWDER, FOR SOLUTION INTRAMUSCULAR; INTRAVENOUS at 02:56

## 2024-01-20 RX ADMIN — CARBAMAZEPINE 200 MG: 200 TABLET ORAL at 22:44

## 2024-01-20 RX ADMIN — ENOXAPARIN SODIUM 40 MG: 100 INJECTION SUBCUTANEOUS at 09:24

## 2024-01-20 RX ADMIN — CEFTRIAXONE SODIUM 1000 MG: 1 INJECTION, POWDER, FOR SOLUTION INTRAMUSCULAR; INTRAVENOUS at 23:49

## 2024-01-20 RX ADMIN — IPRATROPIUM BROMIDE AND ALBUTEROL SULFATE 1 DOSE: .5; 3 SOLUTION RESPIRATORY (INHALATION) at 06:30

## 2024-01-20 RX ADMIN — BUPROPION HYDROCHLORIDE 150 MG: 150 TABLET, EXTENDED RELEASE ORAL at 09:24

## 2024-01-20 RX ADMIN — IPRATROPIUM BROMIDE AND ALBUTEROL SULFATE 1 DOSE: .5; 3 SOLUTION RESPIRATORY (INHALATION) at 16:57

## 2024-01-20 RX ADMIN — RISPERIDONE 2 MG: 1 TABLET ORAL at 22:44

## 2024-01-20 RX ADMIN — TADALAFIL 10 MG: 20 TABLET ORAL at 09:23

## 2024-01-20 RX ADMIN — PRAVASTATIN SODIUM 40 MG: 40 TABLET ORAL at 22:44

## 2024-01-20 RX ADMIN — SODIUM CHLORIDE, PRESERVATIVE FREE 10 ML: 5 INJECTION INTRAVENOUS at 22:45

## 2024-01-20 RX ADMIN — CARBAMAZEPINE 200 MG: 200 TABLET ORAL at 09:24

## 2024-01-20 ASSESSMENT — PAIN SCALES - GENERAL
PAINLEVEL_OUTOF10: 0
PAINLEVEL_OUTOF10: 0

## 2024-01-20 NOTE — RT PROTOCOL NOTE
RT Inhaler-Nebulizer Bronchodilator Protocol Note    There is a bronchodilator order in the chart from a provider indicating to follow the RT Bronchodilator Protocol and there is an “Initiate RT Inhaler-Nebulizer Bronchodilator Protocol” order as well (see protocol at bottom of note).    CXR Findings:  XR CHEST PORTABLE    Result Date: 1/18/2024  1. Patchy airspace disease in the right midlung and right lung base, likely infectious. Follow-up to resolution recommended. This document has been electronically signed by: Jose Ortega MD on 01/18/2024 08:37 PM      The findings from the last RT Protocol Assessment were as follows:   History Pulmonary Disease: Chronic pulmonary disease  Respiratory Pattern: Dyspnea on exertion or RR 21-25 bpm  Breath Sounds: Slightly diminished and/or crackles  Cough: Strong, spontaneous, non-productive  Indication for Bronchodilator Therapy:    Bronchodilator Assessment Score: 6    Aerosolized bronchodilator medication orders have been revised according to the RT Inhaler-Nebulizer Bronchodilator Protocol below.    Respiratory Therapist to perform RT Therapy Protocol Assessment initially then follow the protocol.  Repeat RT Therapy Protocol Assessment PRN for score 0-3 or on second treatment, BID, and PRN for scores above 3.    No Indications - adjust the frequency to every 6 hours PRN wheezing or bronchospasm, if no treatments needed after 48 hours then discontinue using Per Protocol order mode.     If indication present, adjust the RT bronchodilator orders based on the Bronchodilator Assessment Score as indicated below.  Use Inhaler orders unless patient has one or more of the following: on home nebulizer, not able to hold breath for 10 seconds, is not alert and oriented, cannot activate and use MDI correctly, or respiratory rate 25 breaths per minute or more, then use the equivalent nebulizer order(s) with same Frequency and PRN reasons based on the score.  If a patient is on this 
RT Nebulizer Bronchodilator Protocol Note    There is a bronchodilator order in the chart from a provider indicating to follow the RT Bronchodilator Protocol and there is an “Initiate RT Bronchodilator Protocol” order as well (see protocol at bottom of note).    CXR Findings:  XR CHEST PORTABLE    Result Date: 1/18/2024  1. Patchy airspace disease in the right midlung and right lung base, likely infectious. Follow-up to resolution recommended. This document has been electronically signed by: Jose Ortega MD on 01/18/2024 08:37 PM      The findings from the last RT Protocol Assessment were as follows:  Smoking: Chronic pulmonary disease  Respiratory Pattern: Dyspnea on exertion or RR 21-25 bpm  Breath Sounds: Slightly diminished and/or crackles  Cough: Strong, spontaneous, non-productive  Indication for Bronchodilator Therapy: None  Bronchodilator Assessment Score: 6    Aerosolized bronchodilator medication orders have been revised according to the RT Nebulizer Bronchodilator Protocol below.    Respiratory Therapist to perform RT Therapy Protocol Assessment initially then follow the protocol.  Repeat RT Therapy Protocol Assessment PRN for score 0-3 or on second treatment, BID, and PRN for scores above 3.    No Indications - adjust the frequency to every 6 hours PRN wheezing or bronchospasm, if no treatments needed after 48 hours then discontinue using Per Protocol order mode.     If indication present, adjust the RT bronchodilator orders based on the Bronchodilator Assessment Score as indicated below.  If a patient is on this medication at home then do not decrease Frequency below that used at home.    0-3 - enter or revise RT bronchodilator order(s) to equivalent RT Bronchodilator order with Frequency of every 4 hours PRN for wheezing or increased work of breathing using Per Protocol order mode.       4-6 - enter or revise RT Bronchodilator order(s) to two equivalent RT bronchodilator orders with one order with BID 
Frequency and one order with Frequency of every 4 hours PRN wheezing or increased work of breathing using Per Protocol order mode.         7-10 - enter or revise RT Bronchodilator order(s) to two equivalent RT bronchodilator orders with one order with TID Frequency and one order with Frequency of every 4 hours PRN wheezing or increased work of breathing using Per Protocol order mode.       11-13 - enter or revise RT Bronchodilator order(s) to one equivalent RT bronchodilator order with QID Frequency and an Albuterol order with Frequency of every 4 hours PRN wheezing or increased work of breathing using Per Protocol order mode.      Greater than 13 - enter or revise RT Bronchodilator order(s) to one equivalent RT bronchodilator order with every 4 hours Frequency and an Albuterol order with Frequency of every 2 hours PRN wheezing or increased work of breathing using Per Protocol order mode.     RT to enter RT Home Evaluation for COPD & MDI Assessment order using Per Protocol order mode.    Electronically signed by Mattie Alston RCP on 1/19/2024 at 6:29 PM

## 2024-01-20 NOTE — PROGRESS NOTES
INTERNAL MEDICINE Progress Note  1/20/2024 12:34 PM  Subjective:   Admit Date: 1/18/2024  PCP: Elijah Liu MD  Interval History:     Breathing easier,  Scant cough  no CP      Objective:   Vitals: BP (!) 110/53   Pulse 82   Temp 98.6 °F (37 °C) (Oral)   Resp 16   Ht 1.803 m (5' 11\")   Wt 80.1 kg (176 lb 9.4 oz)   SpO2 95%   BMI 24.63 kg/m²   General appearance: alert and cooperative with exam  HEENT: Head: atraumatic  Neck: no adenopathy, no carotid bruit, and no JVD  Lungs: diminished breath sounds bilaterally  Heart: S1, S2 normal  Abdomen: soft, non-tender; bowel sounds normal; no masses,  no organomegaly  Extremities: no edema, redness or tenderness in the calves or thighs  Neurologic: Mental status: Alert, oriented, thought content appropriate      Medications:   Scheduled Meds:   ipratropium 0.5 mg-albuterol 2.5 mg  1 Dose Inhalation BID RT    buPROPion  150 mg Oral QAM    carBAMazepine  200 mg Oral BID    furosemide  20 mg Oral Daily    pravastatin  40 mg Oral Nightly    Tadalafil (PAH)  10 mg Oral Daily    methylPREDNISolone  125 mg IntraVENous Once    sodium chloride flush  5-40 mL IntraVENous 2 times per day    enoxaparin  40 mg SubCUTAneous Daily    cefTRIAXone (ROCEPHIN) IV  1,000 mg IntraVENous Q24H    methylPREDNISolone  40 mg IntraVENous Q6H    Followed by    [START ON 1/21/2024] predniSONE  40 mg Oral Daily    risperiDONE  2 mg Oral BID    guaiFENesin  600 mg Oral Q12H     Continuous Infusions:   sodium chloride         Lab Results:   CBC:   Recent Labs     01/18/24 1932 01/19/24  0602   WBC 8.1 7.6   HGB 13.4* 12.9*    172     BMP:    Recent Labs     01/18/24 1932 01/19/24  0602    139   K 3.7 4.4   CL 98 100   CO2 33 30   BUN 13 12   CREATININE 0.8 0.8   GLUCOSE 93 99     Hepatic:   Recent Labs     01/18/24 1932   AST 9   ALT 6*   BILITOT 0.2*   ALKPHOS 73       Magnesium:    Lab Results   Component Value Date/Time    MG 2.1 11/07/2022 03:33 PM     Uric Acid:  No

## 2024-01-21 VITALS
WEIGHT: 176.59 LBS | OXYGEN SATURATION: 94 % | TEMPERATURE: 98 F | SYSTOLIC BLOOD PRESSURE: 123 MMHG | HEART RATE: 89 BPM | DIASTOLIC BLOOD PRESSURE: 70 MMHG | HEIGHT: 71 IN | BODY MASS INDEX: 24.72 KG/M2 | RESPIRATION RATE: 16 BRPM

## 2024-01-21 LAB
EKG ATRIAL RATE: 107 BPM
EKG P AXIS: 44 DEGREES
EKG P-R INTERVAL: 160 MS
EKG Q-T INTERVAL: 322 MS
EKG QRS DURATION: 80 MS
EKG QTC CALCULATION (BAZETT): 429 MS
EKG R AXIS: 90 DEGREES
EKG T AXIS: 49 DEGREES
EKG VENTRICULAR RATE: 107 BPM

## 2024-01-21 PROCEDURE — 6360000002 HC RX W HCPCS: Performed by: INTERNAL MEDICINE

## 2024-01-21 PROCEDURE — 6370000000 HC RX 637 (ALT 250 FOR IP): Performed by: INTERNAL MEDICINE

## 2024-01-21 PROCEDURE — 2580000003 HC RX 258: Performed by: INTERNAL MEDICINE

## 2024-01-21 PROCEDURE — 94761 N-INVAS EAR/PLS OXIMETRY MLT: CPT

## 2024-01-21 PROCEDURE — 94640 AIRWAY INHALATION TREATMENT: CPT

## 2024-01-21 PROCEDURE — 2700000000 HC OXYGEN THERAPY PER DAY

## 2024-01-21 RX ORDER — ARFORMOTEROL TARTRATE 15 UG/2ML
15 SOLUTION RESPIRATORY (INHALATION)
Qty: 360 ML | Refills: 3 | Status: SHIPPED | OUTPATIENT
Start: 2024-01-21 | End: 2025-01-20

## 2024-01-21 RX ORDER — PREDNISONE 20 MG/1
40 TABLET ORAL DAILY
Qty: 4 TABLET | Refills: 0 | Status: SHIPPED | OUTPATIENT
Start: 2024-01-22 | End: 2024-01-24

## 2024-01-21 RX ORDER — BUDESONIDE 0.5 MG/2ML
500 INHALANT ORAL 2 TIMES DAILY
Qty: 360 ML | Refills: 3 | Status: SHIPPED | OUTPATIENT
Start: 2024-01-21 | End: 2025-01-20

## 2024-01-21 RX ORDER — TADALAFIL 20 MG/1
10 TABLET ORAL DAILY
Qty: 30 TABLET | Refills: 3 | Status: SHIPPED | OUTPATIENT
Start: 2024-01-21 | End: 2024-09-17

## 2024-01-21 RX ADMIN — GUAIFENESIN 600 MG: 600 TABLET, EXTENDED RELEASE ORAL at 12:19

## 2024-01-21 RX ADMIN — IPRATROPIUM BROMIDE AND ALBUTEROL SULFATE 1 DOSE: .5; 3 SOLUTION RESPIRATORY (INHALATION) at 15:48

## 2024-01-21 RX ADMIN — FUROSEMIDE 20 MG: 20 TABLET ORAL at 08:03

## 2024-01-21 RX ADMIN — PREDNISONE 40 MG: 20 TABLET ORAL at 08:03

## 2024-01-21 RX ADMIN — RISPERIDONE 2 MG: 1 TABLET ORAL at 08:03

## 2024-01-21 RX ADMIN — SODIUM CHLORIDE, PRESERVATIVE FREE 10 ML: 5 INJECTION INTRAVENOUS at 08:03

## 2024-01-21 RX ADMIN — IPRATROPIUM BROMIDE AND ALBUTEROL SULFATE 1 DOSE: .5; 3 SOLUTION RESPIRATORY (INHALATION) at 08:42

## 2024-01-21 RX ADMIN — ENOXAPARIN SODIUM 40 MG: 100 INJECTION SUBCUTANEOUS at 08:05

## 2024-01-21 RX ADMIN — TADALAFIL 10 MG: 20 TABLET ORAL at 08:03

## 2024-01-21 RX ADMIN — CARBAMAZEPINE 200 MG: 200 TABLET ORAL at 08:06

## 2024-01-21 RX ADMIN — BUPROPION HYDROCHLORIDE 150 MG: 150 TABLET, EXTENDED RELEASE ORAL at 08:02

## 2024-01-21 ASSESSMENT — PAIN SCALES - GENERAL: PAINLEVEL_OUTOF10: 0

## 2024-01-21 NOTE — PROGRESS NOTES
INTERNAL MEDICINE Progress Note  1/21/2024 12:35 PM  Subjective:   Admit Date: 1/18/2024  PCP: Elijah Liu MD  Interval History:     No new c/o  Breathing easier,  Scant cough  no CP      Objective:   Vitals: /70   Pulse 89   Temp 98 °F (36.7 °C) (Oral)   Resp 16   Ht 1.803 m (5' 11\")   Wt 80.1 kg (176 lb 9.4 oz)   SpO2 94%   BMI 24.63 kg/m²   General appearance: alert and cooperative with exam  HEENT: Head: atraumatic  Neck: no adenopathy, no carotid bruit, and no JVD  Lungs: diminished breath sounds bilaterally, no rhonchi  Heart: S1, S2 normal  Abdomen: soft, non-tender; bowel sounds normal; no masses,  no organomegaly  Extremities: no edema, redness or tenderness in the calves or thighs  Neurologic: Mental status: Alert, oriented, thought content appropriate      Medications:   Scheduled Meds:   ipratropium 0.5 mg-albuterol 2.5 mg  1 Dose Inhalation BID RT    buPROPion  150 mg Oral QAM    carBAMazepine  200 mg Oral BID    furosemide  20 mg Oral Daily    pravastatin  40 mg Oral Nightly    Tadalafil (PAH)  10 mg Oral Daily    methylPREDNISolone  125 mg IntraVENous Once    sodium chloride flush  5-40 mL IntraVENous 2 times per day    enoxaparin  40 mg SubCUTAneous Daily    cefTRIAXone (ROCEPHIN) IV  1,000 mg IntraVENous Q24H    predniSONE  40 mg Oral Daily    risperiDONE  2 mg Oral BID    guaiFENesin  600 mg Oral Q12H     Continuous Infusions:   sodium chloride         Lab Results:   CBC:   Recent Labs     01/18/24 1932 01/19/24  0602   WBC 8.1 7.6   HGB 13.4* 12.9*    172       BMP:    Recent Labs     01/18/24 1932 01/19/24  0602    139   K 3.7 4.4   CL 98 100   CO2 33 30   BUN 13 12   CREATININE 0.8 0.8   GLUCOSE 93 99       Hepatic:   Recent Labs     01/18/24 1932   AST 9   ALT 6*   BILITOT 0.2*   ALKPHOS 73         Magnesium:    Lab Results   Component Value Date/Time    MG 2.1 11/07/2022 03:33 PM     Uric Acid:  No components found for: \"URIC\"  HgBA1c:    Lab Results

## 2024-01-21 NOTE — DISCHARGE SUMMARY
Discharge Summary    Sandie Carrera  :  1970  MRN:  [id number]    Admit date:  2024  Discharge date:      Admitting Physician:  Elijah Liu MD    Discharge Diagnoses:    Acute on chronic hypoxemic resp failure  COPD with acute exacerbation  Pulm HTN/ PAH  Schizoaffective disorder       Admission Condition:  serious  Discharged Condition:  good    Hospital Course:   patient was admitted and treated for copd exacerbation associated with worsening hypoxemic resp failure.   He responded to bronchodilators, steroid, antibiotics.    Discharge Medications:         Medication List        START taking these medications      predniSONE 20 MG tablet  Commonly known as: DELTASONE  Take 2 tablets by mouth daily for 2 doses  Start taking on: 2024            CONTINUE taking these medications      albuterol sulfate  (90 Base) MCG/ACT inhaler  Commonly known as: Ventolin HFA  Inhale 2 puffs into the lungs every 6 hours as needed for Wheezing or Shortness of Breath     arformoterol tartrate 15 MCG/2ML Nebu  Commonly known as: Brovana  Take 2 mLs by nebulization in the morning and 2 mLs in the evening.     aspirin 325 MG EC tablet  Take 1 tablet by mouth daily     budesonide 0.5 MG/2ML nebulizer suspension  Commonly known as: Pulmicort  Take 2 mLs by nebulization 2 times daily     buPROPion 150 MG extended release tablet  Commonly known as: Wellbutrin XL  Take 1 tablet by mouth every morning     carBAMazepine 200 MG tablet  Commonly known as: TEGRETOL  Take 1 tablet by mouth 2 times daily     docusate 100 MG Caps  Commonly known as: COLACE, DULCOLAX  Take 100 mg by mouth daily     famotidine 20 MG tablet  Commonly known as: PEPCID  Take 1 tablet by mouth 2 times daily     furosemide 20 MG tablet  Commonly known as: LASIX  Take 1 tablet by mouth daily     haloperidol decanoate 50 MG/ML injection  Commonly known as: HALDOL DECANOATE     nicotine 14 MG/24HR  Commonly known as: NICODERM CQ  Place 1 patch

## 2024-01-21 NOTE — PLAN OF CARE
Problem: Discharge Planning  Goal: Discharge to home or other facility with appropriate resources  Outcome: Adequate for Discharge     Problem: Pain  Goal: Verbalizes/displays adequate comfort level or baseline comfort level  Outcome: Adequate for Discharge     Problem: Chronic Conditions and Co-morbidities  Goal: Patient's chronic conditions and co-morbidity symptoms are monitored and maintained or improved  Outcome: Adequate for Discharge     Problem: Respiratory - Adult  Goal: Achieves optimal ventilation and oxygenation  Outcome: Adequate for Discharge

## 2024-01-22 ENCOUNTER — CARE COORDINATION (OUTPATIENT)
Dept: CASE MANAGEMENT | Age: 54
End: 2024-01-22

## 2024-01-22 NOTE — CARE COORDINATION
Care Transitions Initial Outreach Attempt-1st attempt    Call within 2 business days of discharge: Yes   Attempted initial 24 hour transitional call to patient.  Left HIPPA compliant VM to return call directly to [phone].    Patient: Sandie Carrera Patient : 1970 MRN: [id number]    Last Discharge Facility       Date Complaint Diagnosis Description Type Department Provider    24 Shortness of Breath COPD exacerbation (HCC) ... ED to Hosp-Admission (Discharged) (ADMITTED) [facility] Elijah Liu MD; Eduardo Lance DO              Noted following upcoming appointments from discharge chart review:   [facility] follow up appointment(s):   Future Appointments   Date Time Provider Department Center   2024  2:00 PM STR CARDIAC EXERCISE RM [facility] HOD   2024  2:00 PM STR CARDIAC EXERCISE RM [facility] HOD   2024  2:00 PM STR CARDIAC EXERCISE RM [facility] HOD   2024  2:00 PM STR CARDIAC EXERCISE RM [facility] HOD   2024  2:00 PM STR CARDIAC EXERCISE RM [facility] HOD   2024  2:00 PM STR CARDIAC EXERCISE RM [facility] HOD   2024  2:00 PM STR CARDIAC EXERCISE RM [facility] HOD   2/15/2024  2:00 PM STR CARDIAC EXERCISE RM [facility] HOD   2024  2:00 PM STR CARDIAC EXERCISE RM [facility] HOD   2024  2:00 PM STR CARDIAC EXERCISE RM [facility] HOD   2024  2:00 PM STR CARDIAC EXERCISE RM [facility] HOD   2024  2:00 PM STR CARDIAC EXERCISE RM [facility] HOD   3/5/2024  2:00 PM STR CARDIAC EXERCISE RM [facility] HOD   3/7/2024  2:00 PM STR CARDIAC EXERCISE RM [facility] HOD   3/12/2024  2:00 PM STR CARDIAC EXERCISE RM [facility] HOD   3/14/2024  2:00 PM STR CARDIAC EXERCISE RM [facility] HOD   2024  1:00 PM STR CT IMAGING RM1  [facility] EXP STR Rad/Card   2024  1:30 PM Jacqueline Castillo N, APRN - CNP N Pulm Med [facility] [place]     Non-[facility]  follow

## 2024-01-23 ENCOUNTER — HOSPITAL ENCOUNTER (OUTPATIENT)
Dept: CARDIAC REHAB | Age: 54
Setting detail: THERAPIES SERIES
Discharge: HOME | End: 2024-01-23
Payer: MEDICARE

## 2024-01-23 ENCOUNTER — CARE COORDINATION (OUTPATIENT)
Dept: CASE MANAGEMENT | Age: 54
End: 2024-01-23

## 2024-01-23 PROCEDURE — 94626 PHY/QHP OP PULM RHB W/MNTR: CPT

## 2024-01-23 NOTE — CARE COORDINATION
Care Transitions Initial Outreach Attempt-2nd attempt    Call within 2 business days of discharge: Yes   Attempted initial 24 hour transitional call to patient.  Left HIPPA compliant VM to return call directly to [phone].  If no return call, CTN will sign off-2nd attempt.    Patient: Sandie Carrera Patient : 1970 MRN: [id number]    Last Discharge Facility       Date Complaint Diagnosis Description Type Department Provider    24 Shortness of Breath COPD exacerbation (HCC) ... ED to Hosp-Admission (Discharged) (ADMITTED) Elijah Cortez MD; Eduardo Lance DO              Noted following upcoming appointments from discharge chart review:   [facility] follow up appointment(s):   Future Appointments   Date Time Provider Department Center   2024  2:00 PM STR CARDIAC EXERCISE RM [facility] HOD   2024  2:00 PM STR CARDIAC EXERCISE RM [facility] HOD   2024  2:00 PM STR CARDIAC EXERCISE RM [facility] HOD   2024  2:00 PM STR CARDIAC EXERCISE RM [facility] HOD   2024  2:00 PM STR CARDIAC EXERCISE RM [facility] HOD   2024  2:00 PM STR CARDIAC EXERCISE RM [facility] HOD   2024  2:00 PM STR CARDIAC EXERCISE RM [facility] HOD   2/15/2024  2:00 PM STR CARDIAC EXERCISE RM [facility] HOD   2024  2:00 PM STR CARDIAC EXERCISE RM [facility] HOD   2024  2:00 PM STR CARDIAC EXERCISE RM [facility] HOD   2024  2:00 PM STR CARDIAC EXERCISE RM [facility] HOD   2024  2:00 PM STR CARDIAC EXERCISE RM [facility] HOD   3/5/2024  2:00 PM STR CARDIAC EXERCISE RM [facility] HOD   3/7/2024  2:00 PM STR CARDIAC EXERCISE RM [facility] HOD   3/12/2024  2:00 PM STR CARDIAC EXERCISE RM [facility] HOD   3/14/2024  2:00 PM STR CARDIAC EXERCISE RM [facility] HOD   2024  1:00 PM STR CT IMAGING RM1  [facility] EXP STR Rad/Card   2024  1:30 PM Jacqueline Castillo, APRN

## 2024-01-25 ENCOUNTER — HOSPITAL ENCOUNTER (OUTPATIENT)
Dept: CARDIAC REHAB | Age: 54
Setting detail: THERAPIES SERIES
End: 2024-01-25
Payer: MEDICARE

## 2024-01-30 ENCOUNTER — HOSPITAL ENCOUNTER (OUTPATIENT)
Dept: CARDIAC REHAB | Age: 54
Setting detail: THERAPIES SERIES
End: 2024-01-30
Payer: MEDICARE

## 2024-02-01 ENCOUNTER — HOSPITAL ENCOUNTER (OUTPATIENT)
Dept: CARDIAC REHAB | Age: 54
Setting detail: THERAPIES SERIES
Discharge: HOME | End: 2024-02-01
Payer: MEDICARE

## 2024-02-01 ENCOUNTER — TELEPHONE (OUTPATIENT)
Dept: PULMONOLOGY | Age: 54
End: 2024-02-01

## 2024-02-01 PROCEDURE — 94626 PHY/QHP OP PULM RHB W/MNTR: CPT

## 2024-02-01 NOTE — PLAN OF CARE
recognize exacerbation and when to call MD        (x) Cleaning of respiratory equipment EXACERBAT'N PREVENTION & MANAGEMENT  RE ASSESSMENT      () Pt has had class  () Pt has not had class        () Improved hydration    () Correct hand washing tech’s and alcohol gel usage    () Sputum assessment    () Ability to recognize exacerbation and when to call MD        () Cleaning of respiratory equipment EXACERBAT'N PREVENTION & MANAGEMENT  DISCHARGE        () Pt has had class  Date:  () Pt did not have class              () Addressed questions and pt feels comfortable with hydration, hand washing, sputum assessment, exacerbation knowledge, and cleaning of equipment   Exacerbation Prevention & Management Goals  Initial Assessment      (x) Learn ways to stay healthy and not get admitted          (x) Increase knowledge of Lungs, Breathing, Exercise, Nutrition, Mood and controlling anxiety, and stopping the Dyspnea Cycle by attending classes   Exacerbation Prevention & Management Goals Intervention/ Education  Plan    -Attend class and demonstrate disease self management strategies      -Attend all appropriate classes                   Exacerbation Prevention & Management Goals  Reassessment        (x) Pt has had class  () Pt has not had class        () Pt has had all classes  (x) Pt has had some classes  () Pt has had little/ not staying for education Exacerbation Prevention & Management Goals  Reassessment        () Pt has had class  (x) Pt has not had class        () Pt has had all classes  () Pt has had some classes  (x) Pt has had little/ not staying for education Exacerbation Prevention & Management Goals  Reassessment        () Pt has had class  () Pt has not had class        () Pt has had all classes  () Pt has had most classes  () Pt has had some of the classes  () Pt has had little/ not staying for education Exacerbation Prevention & Management Goals  Discharge        () Pt had class  Date:  See above  () Pt did

## 2024-02-01 NOTE — PROGRESS NOTES
Monthly assessment done today during pulmonary rehab.  Pt started pulmonary rehab in November 2023 and was using 6LPM O2 with exertion.   We have had to increase to 8LPM O2 with exercise during pulmonary rehab to keep SpO2 > 87%.  Just wanting to make Dr  aware of this, so this note will be routed to GOGO Castillo NP as she has most recently seen the pt in pulmonary office.

## 2024-02-01 NOTE — TELEPHONE ENCOUNTER
Received notification from pulmonary rehab that patient was requiring 8 lpm at pulm rehab. His baseline is 6 lpm with exertion. Please see how patient is feeling. Is he feeling exacerbated? Thanks!

## 2024-02-06 ENCOUNTER — HOSPITAL ENCOUNTER (OUTPATIENT)
Dept: CARDIAC REHAB | Age: 54
Setting detail: THERAPIES SERIES
Discharge: HOME | End: 2024-02-06
Payer: MEDICARE

## 2024-02-06 PROCEDURE — 94626 PHY/QHP OP PULM RHB W/MNTR: CPT

## 2024-02-08 ENCOUNTER — HOSPITAL ENCOUNTER (OUTPATIENT)
Dept: CARDIAC REHAB | Age: 54
Setting detail: THERAPIES SERIES
Discharge: HOME | End: 2024-02-08
Payer: MEDICARE

## 2024-02-08 PROCEDURE — 94626 PHY/QHP OP PULM RHB W/MNTR: CPT

## 2024-02-13 ENCOUNTER — HOSPITAL ENCOUNTER (OUTPATIENT)
Dept: CARDIAC REHAB | Age: 54
Setting detail: THERAPIES SERIES
Discharge: HOME | End: 2024-02-13
Payer: MEDICARE

## 2024-02-13 PROCEDURE — 94626 PHY/QHP OP PULM RHB W/MNTR: CPT

## 2024-02-13 PROCEDURE — 94625 PHY/QHP OP PULM RHB W/O MNTR: CPT

## 2024-02-13 NOTE — PROGRESS NOTES
PULMONARY REHABILITATION / RTR PROGRAM  EDUCATION SESSION      Sandie Carrera  Session: __22___      Pt and his mother both present for education.     ANATOMY/LIVING WITH CHRONIC LUNG DISEASE -  I went over how our lungs work and what happens in our body when we breathe.  I then further explained what is happening in their lungs due to chronic lung disease and we discussed the several different types of chronic lung disease.     BREATHING RETRAINING - I went over pursed lip breathing and diaphragmatic breathing with the pt.   I explained to him/her the importance and benefits of these 2 breathing techniques.  Pt did some pursed lip breathing for me at this time.       DYSPNEA CYCLE -  I explained to the Dyspnea cycle to the pt and gave them tips on how to break the cycle.       PREVENTING EXACERBATIONS - I went over with pt what an exacerbation is and how to prevent COPD flare ups/Exacerbations, like avoiding pollution, very hot or very cold weather, avoiding strong odors, perfumes, , taking meds as prescribed and avoiding paint/dust/new carpet fumes, etc.     Pt and his mother had no questions at this time.     Time spent: 32 Minutes.

## 2024-02-15 ENCOUNTER — HOSPITAL ENCOUNTER (OUTPATIENT)
Dept: CARDIAC REHAB | Age: 54
Setting detail: THERAPIES SERIES
Discharge: HOME | End: 2024-02-15
Payer: MEDICARE

## 2024-02-15 PROCEDURE — 94626 PHY/QHP OP PULM RHB W/MNTR: CPT

## 2024-02-20 ENCOUNTER — HOSPITAL ENCOUNTER (OUTPATIENT)
Dept: CARDIAC REHAB | Age: 54
Setting detail: THERAPIES SERIES
End: 2024-02-20
Payer: MEDICARE

## 2024-02-22 ENCOUNTER — HOSPITAL ENCOUNTER (OUTPATIENT)
Dept: CARDIAC REHAB | Age: 54
Setting detail: THERAPIES SERIES
End: 2024-02-22
Payer: MEDICARE

## 2024-02-27 ENCOUNTER — HOSPITAL ENCOUNTER (OUTPATIENT)
Dept: CARDIAC REHAB | Age: 54
Setting detail: THERAPIES SERIES
End: 2024-02-27
Payer: MEDICARE

## 2024-02-29 ENCOUNTER — HOSPITAL ENCOUNTER (OUTPATIENT)
Dept: CARDIAC REHAB | Age: 54
Setting detail: THERAPIES SERIES
Discharge: HOME | End: 2024-02-29
Payer: MEDICARE

## 2024-02-29 PROCEDURE — 94626 PHY/QHP OP PULM RHB W/MNTR: CPT

## 2024-02-29 NOTE — PLAN OF CARE
PULMONARY REHABILITATION   [facility] Facility-Based Therapy for COPD   INDIVIDUAL TREATMENT PLAN (ITP)     Name: Sandie BALLARD Debi JUNIOR.:  1970  Acct Number: [id number]   AGE: 53 y.o.    Diagnosis:  Severe COPD, which is GOLD Stage 3                                               PFT:  Post Bronchodilator FEV1/FVC: 58  FEV1: 47    Patient Goals:  (x) Breathe better  (x) Increase my endurance (x) Have more energy  (x) Rely less on others  (x) Ease ADL’s  (x) Understand and use meds correctly  (x) Control cough  (x) Make fewer visits to hospital  () Quit smoking  (x) Feel less anxious / have more confidence    Glossary:  KY=Pulmonary Rehab  PF=Physical Fitness TM=Treadmill  AD=Schwinn Airdyne  UBE=UpperBody Ergometer  RT=Resistance Training  PLB=Pursed Lip Breathing      23- *pt has history of Schizophrenia and lives with his mother.  Mother answering most of the questions.  Unsure if pt will be good candidate/follow directions for pulmonary rehab.  I discussed this with mom and pt and pt is willing to try so we will attempt pulmonary rehab and see how pt does.*      The following Education has been completed with patient.  [x] Wait in the lobby until we call you back to rehab.  [x] Bring your own bottle of water with you.  [x] You can not bring anyone with you in to the rehab clinic. They are welcome to sit in the lobby or wait in the car.      INDIVIDUAL TREATMENT PLAN    INITIAL ASSESSMENT    Day #1 INDIVIDUAL TREATMENT PLAN    RE ASSESSMENT    Day #2-30 INDIVIDUAL TREATMENT PLAN    RE ASSESSMENT    Day #31-60 INDIVIDUAL TREATMENT PLAN    RE ASSESSMENT    Day #61-90 INDIVIDUAL TREATMENT PLAN    RE ASSESSMENT    Day # INDIVIDUAL TREATMENT PLAN      DISCHARGE    Last Day        Date: 2023     Date: 23   Date: 24     Date: 2024   Date: 2024   Date:    EXERCISE   INITIAL ASSESSMENT      Prescribed Oxygen Use  Activity: 6 L/min  (x) Continuous  () Breath Actuated      Oxygen

## 2024-03-05 ENCOUNTER — HOSPITAL ENCOUNTER (OUTPATIENT)
Dept: CARDIAC REHAB | Age: 54
Setting detail: THERAPIES SERIES
Discharge: HOME | End: 2024-03-05
Payer: MEDICARE

## 2024-03-05 PROCEDURE — 94626 PHY/QHP OP PULM RHB W/MNTR: CPT

## 2024-03-07 ENCOUNTER — HOSPITAL ENCOUNTER (OUTPATIENT)
Dept: CARDIAC REHAB | Age: 54
Setting detail: THERAPIES SERIES
Discharge: HOME | End: 2024-03-07
Payer: MEDICARE

## 2024-03-07 PROCEDURE — 94626 PHY/QHP OP PULM RHB W/MNTR: CPT

## 2024-03-12 ENCOUNTER — HOSPITAL ENCOUNTER (OUTPATIENT)
Dept: CARDIAC REHAB | Age: 54
Setting detail: THERAPIES SERIES
Discharge: HOME | End: 2024-03-12
Payer: MEDICARE

## 2024-03-12 PROCEDURE — 94626 PHY/QHP OP PULM RHB W/MNTR: CPT

## 2024-03-14 ENCOUNTER — HOSPITAL ENCOUNTER (OUTPATIENT)
Dept: CARDIAC REHAB | Age: 54
Setting detail: THERAPIES SERIES
Discharge: HOME | End: 2024-03-14
Payer: MEDICARE

## 2024-03-14 VITALS — HEIGHT: 70 IN | BODY MASS INDEX: 26.83 KG/M2 | WEIGHT: 187.4 LBS

## 2024-03-14 PROCEDURE — 94626 PHY/QHP OP PULM RHB W/MNTR: CPT

## 2024-03-14 NOTE — PLAN OF CARE
below                  Post Rehab   CAT score:  38/ 40 - mother states that pt and his brother did together due to pts mental status.               OUTCOMES    PHQ-9:  Did pt drop a severity level or maintain in the minimal range?  () Y  (x) N    UCSD SOB  Did pt decrease their number by 5 or more?  (x) Y  () N    CAT scores:  Did pt drop by 2 or more points from Pre to Post?  () Y  (x) N        Psychosocial Intervention/ Education   Initial Assessment    (x) Pt is being treated for depression/ anxiety        (x) Pt would benefit from [facility]’s Psychosocial Issues Class (Stress, Depression, Anxiety, and Intimacy   Psychosocial  Intervention/ Education  Plan      (x) Pt to contact physician if any severe changes occur in mood        (x) Pt will attend [facility]’s class   Psychosocial Intervention/ Education Reassessment      (x) Pt is coping well  () Pt is not coping well         () Pt has had class  (x) Pt has not had class Psychosocial Intervention/ Education Reassessment      (x) Pt is coping well  () Pt is not coping well         () Pt has had class  (x) Pt has not had class Psychosocial Intervention/ Education Reassessment      (x) Pt is coping well  () Pt is not coping well         () Pt has had class  (x) Pt has not had class   Psychosocial Intervention/ Education Discharge      () Pt did not need any changes   () Pt needed changes to treatment      () Pt had class  Date: See above  (x) Pt did not have class         Pain   Initial Assessment    (x) N/A  Location:   Duration:   Intensity:  /10  Type:    Pain   Plan      (x) N/A    () Pt’s pain is under control  () Pt encouraged to contact physician for pain control   Pain   Reassessment      (x) N/A    () Pt’s pain is under control  () Pt encouraged to contact physician for pain control Pain   Reassessment      (x) N/A    () Pt’s pain is under control  () Pt encouraged to contact physician for pain control Pain   Reassessment      (x) N/A    () Pt’s pain is under

## 2024-05-31 ENCOUNTER — HOSPITAL ENCOUNTER (EMERGENCY)
Age: 54
Discharge: HOME | End: 2024-05-31
Attending: EMERGENCY MEDICINE
Payer: MEDICARE

## 2024-05-31 ENCOUNTER — APPOINTMENT (OUTPATIENT)
Dept: GENERAL RADIOLOGY | Age: 54
End: 2024-05-31
Payer: MEDICARE

## 2024-05-31 VITALS
DIASTOLIC BLOOD PRESSURE: 82 MMHG | WEIGHT: 183 LBS | HEART RATE: 80 BPM | SYSTOLIC BLOOD PRESSURE: 105 MMHG | TEMPERATURE: 100.1 F | OXYGEN SATURATION: 91 % | RESPIRATION RATE: 18 BRPM | HEIGHT: 70 IN | BODY MASS INDEX: 26.2 KG/M2

## 2024-05-31 DIAGNOSIS — R06.09 DYSPNEA ON EXERTION: ICD-10-CM

## 2024-05-31 DIAGNOSIS — J44.1 COPD EXACERBATION (HCC): Primary | ICD-10-CM

## 2024-05-31 DIAGNOSIS — R07.9 CHEST PAIN, UNSPECIFIED TYPE: ICD-10-CM

## 2024-05-31 LAB
ANION GAP SERPL CALC-SCNC: 10 MEQ/L (ref 8–16)
BASOPHILS ABSOLUTE: 0 THOU/MM3 (ref 0–0.1)
BASOPHILS NFR BLD AUTO: 0.5 %
BUN SERPL-MCNC: 21 MG/DL (ref 7–22)
CALCIUM SERPL-MCNC: 9.2 MG/DL (ref 8.5–10.5)
CHLORIDE SERPL-SCNC: 98 MEQ/L (ref 98–111)
CO2 SERPL-SCNC: 30 MEQ/L (ref 23–33)
CREAT SERPL-MCNC: 0.8 MG/DL (ref 0.4–1.2)
D DIMER PPP IA.FEU-MCNC: 368 NG/ML FEU (ref 0–500)
DEPRECATED RDW RBC AUTO: 48.7 FL (ref 35–45)
EOSINOPHIL NFR BLD AUTO: 0.9 %
EOSINOPHILS ABSOLUTE: 0.1 THOU/MM3 (ref 0–0.4)
ERYTHROCYTE [DISTWIDTH] IN BLOOD BY AUTOMATED COUNT: 13.9 % (ref 11.5–14.5)
FLUAV RNA RESP QL NAA+PROBE: NOT DETECTED
FLUBV RNA RESP QL NAA+PROBE: NOT DETECTED
GFR SERPL CREATININE-BSD FRML MDRD: > 90 ML/MIN/1.73M2
GLUCOSE SERPL-MCNC: 83 MG/DL (ref 70–108)
HCT VFR BLD AUTO: 42.3 % (ref 42–52)
HGB BLD-MCNC: 13.2 GM/DL (ref 14–18)
IMM GRANULOCYTES # BLD AUTO: 0.02 THOU/MM3 (ref 0–0.07)
IMM GRANULOCYTES NFR BLD AUTO: 0.3 %
LYMPHOCYTES ABSOLUTE: 2 THOU/MM3 (ref 1–4.8)
LYMPHOCYTES NFR BLD AUTO: 29.8 %
MCH RBC QN AUTO: 29.5 PG (ref 26–33)
MCHC RBC AUTO-ENTMCNC: 31.2 GM/DL (ref 32.2–35.5)
MCV RBC AUTO: 94.6 FL (ref 80–94)
MONOCYTES ABSOLUTE: 0.7 THOU/MM3 (ref 0.4–1.3)
MONOCYTES NFR BLD AUTO: 9.9 %
NEUTROPHILS ABSOLUTE: 3.9 THOU/MM3 (ref 1.8–7.7)
NEUTROPHILS NFR BLD AUTO: 58.6 %
NRBC BLD AUTO-RTO: 0 /100 WBC
NT-PROBNP SERPL IA-MCNC: 102.3 PG/ML (ref 0–124)
OSMOLALITY SERPL CALC.SUM OF ELEC: 277.8 MOSMOL/KG (ref 275–300)
PLATELET # BLD AUTO: 177 THOU/MM3 (ref 130–400)
PMV BLD AUTO: 10.7 FL (ref 9.4–12.4)
POTASSIUM SERPL-SCNC: 4.1 MEQ/L (ref 3.5–5.2)
RBC # BLD AUTO: 4.47 MILL/MM3 (ref 4.7–6.1)
SARS-COV-2 RNA RESP QL NAA+PROBE: NOT DETECTED
SODIUM SERPL-SCNC: 138 MEQ/L (ref 135–145)
TROPONIN, HIGH SENSITIVITY: 12 NG/L (ref 0–12)
WBC # BLD AUTO: 6.6 THOU/MM3 (ref 4.8–10.8)

## 2024-05-31 PROCEDURE — 36415 COLL VENOUS BLD VENIPUNCTURE: CPT

## 2024-05-31 PROCEDURE — 6360000002 HC RX W HCPCS

## 2024-05-31 PROCEDURE — 85379 FIBRIN DEGRADATION QUANT: CPT

## 2024-05-31 PROCEDURE — 87636 SARSCOV2 & INF A&B AMP PRB: CPT

## 2024-05-31 PROCEDURE — 96374 THER/PROPH/DIAG INJ IV PUSH: CPT

## 2024-05-31 PROCEDURE — 94640 AIRWAY INHALATION TREATMENT: CPT

## 2024-05-31 PROCEDURE — 83880 ASSAY OF NATRIURETIC PEPTIDE: CPT

## 2024-05-31 PROCEDURE — 84484 ASSAY OF TROPONIN QUANT: CPT

## 2024-05-31 PROCEDURE — 93005 ELECTROCARDIOGRAM TRACING: CPT | Performed by: EMERGENCY MEDICINE

## 2024-05-31 PROCEDURE — 6370000000 HC RX 637 (ALT 250 FOR IP)

## 2024-05-31 PROCEDURE — 71045 X-RAY EXAM CHEST 1 VIEW: CPT

## 2024-05-31 PROCEDURE — 85025 COMPLETE CBC W/AUTO DIFF WBC: CPT

## 2024-05-31 PROCEDURE — 99285 EMERGENCY DEPT VISIT HI MDM: CPT

## 2024-05-31 PROCEDURE — 2580000003 HC RX 258

## 2024-05-31 PROCEDURE — 80048 BASIC METABOLIC PNL TOTAL CA: CPT

## 2024-05-31 RX ORDER — IPRATROPIUM BROMIDE AND ALBUTEROL SULFATE 2.5; .5 MG/3ML; MG/3ML
1 SOLUTION RESPIRATORY (INHALATION) ONCE
Status: COMPLETED | OUTPATIENT
Start: 2024-05-31 | End: 2024-05-31

## 2024-05-31 RX ORDER — PREDNISONE 20 MG/1
40 TABLET ORAL DAILY
Qty: 8 TABLET | Refills: 0 | Status: SHIPPED | OUTPATIENT
Start: 2024-05-31 | End: 2024-06-04

## 2024-05-31 RX ADMIN — WATER 125 MG: 1 INJECTION INTRAMUSCULAR; INTRAVENOUS; SUBCUTANEOUS at 21:23

## 2024-05-31 RX ADMIN — IPRATROPIUM BROMIDE AND ALBUTEROL SULFATE 1 DOSE: .5; 3 SOLUTION RESPIRATORY (INHALATION) at 20:35

## 2024-05-31 ASSESSMENT — PAIN - FUNCTIONAL ASSESSMENT
PAIN_FUNCTIONAL_ASSESSMENT: 0-10

## 2024-05-31 ASSESSMENT — ENCOUNTER SYMPTOMS
CONSTIPATION: 0
DIARRHEA: 0
NAUSEA: 0
SHORTNESS OF BREATH: 1
RHINORRHEA: 1
VOMITING: 0
COUGH: 1
SINUS PRESSURE: 1
ABDOMINAL PAIN: 0

## 2024-05-31 ASSESSMENT — PAIN SCALES - GENERAL
PAINLEVEL_OUTOF10: 2

## 2024-05-31 ASSESSMENT — PAIN DESCRIPTION - LOCATION: LOCATION: CHEST

## 2024-05-31 NOTE — ED TRIAGE NOTES
Pt presents to the ER with c/o CP and SOB x1 week. Family states pt has not been able to walk to the mailbox w/o being SOB. Pt has hx of COPD and is on 2L NC at baseline. Upon arrival, O2 83% on 2L. Increased to 4L, now 94%. Pt is alert and oriented, respirations even and unlabored. VSS

## 2024-05-31 NOTE — ED PROVIDER NOTES
[facility]  EMERGENCY DEPARTMENT ENCOUNTER          Pt Name: Sandie Carrera  MRN: [id number]  Birthdate 1970  Date of evaluation: 5/31/2024  Treating Resident Physician: Serge Cueto MD  Supervising Physician: Dr. Buitrago    History obtained from chart review, the patient and the patients mother.    CHIEF COMPLAINT       Chief Complaint   Patient presents with    Chest Pain    Shortness of Breath           HISTORY OF PRESENT ILLNESS    HPI  Sandie Carrera is a 53 y.o. male who presents to the emergency department for evaluation of shortness of breath and chest pain.  PMH of COPD on 2 L O2 at baseline.  Patient presents with a 2-week history of increasing shortness of breath, unable to walk to his mailbox without becoming short of breath..  He has had cold-like symptoms during this time including white/clear rhinorrhea and nasal congestion.  He also endorses chest pain described as substernal, pressure-like, not worse with exertion and patient did not take nitro, it radiates to his back between the shoulder blades, denies radiation to neck, jaw and arm.  The patient has no other acute complaints at this time.           REVIEW OF SYSTEMS   Review of Systems   Constitutional:  Positive for chills, diaphoresis and fever.   HENT:  Positive for congestion, rhinorrhea and sinus pressure.    Eyes:  Negative for visual disturbance.   Respiratory:  Positive for cough and shortness of breath.    Cardiovascular:  Positive for chest pain and leg swelling.   Gastrointestinal:  Negative for abdominal pain, constipation, diarrhea, nausea and vomiting.   Genitourinary:  Negative for dysuria and flank pain.   Musculoskeletal:  Negative for arthralgias.   Skin:  Negative for rash.   Neurological:  Negative for dizziness, light-headedness and headaches.   Psychiatric/Behavioral:  Negative for confusion.          PAST MEDICAL AND SURGICAL HISTORY     Past Medical History:   Diagnosis Date     other components within normal limits   COVID-19 & INFLUENZA COMBO   BASIC METABOLIC PANEL   TROPONIN   BRAIN NATRIURETIC PEPTIDE   D-DIMER, QUANTITATIVE   ANION GAP   GLOMERULAR FILTRATION RATE, ESTIMATED   OSMOLALITY     (A negative COVID-19 test should be interpreted as COVID no longer suspected unless otherwise noted in this encounter documentation note)    Radiologic studies results:  XR CHEST PORTABLE   Final Result   Very mild bibasilar atelectasis or infiltrates.      This document has been electronically signed by: Piedad Blevins MD on    05/31/2024 08:14 PM          ED Medications administered this visit:   Medications   ipratropium 0.5 mg-albuterol 2.5 mg (DUONEB) nebulizer solution 1 Dose (1 Dose Inhalation Given 5/31/24 2035)   methylPREDNISolone sodium succ (SOLU-MEDROL) 125 mg in sterile water 2 mL injection (125 mg IntraVENous Given 5/31/24 2123)         ED COURSE    Labs drawn, imaging obtained.  Patient was medically stable back on his baseline 2 L O2 saturating at 98% and able to be discharged home.    Strict return precautions and follow up instructions were discussed with the patient prior to discharge, with which the patient agrees.      MEDICATION CHANGES     New Prescriptions    PREDNISONE (DELTASONE) 20 MG TABLET    Take 2 tablets by mouth daily for 4 days         FINAL DISPOSITION   MDM  Patient was medically stable and able to be discharged home.  Final diagnoses:   Dyspnea on exertion   Chest pain, unspecified type   COPD exacerbation (HCC)     Condition: condition: good  Dispo: Discharge to home      This transcription was electronically signed. Parts of this transcriptions may have been dictated by use of voice recognition software and electronically transcribed, and parts may have been transcribed with the assistance of an ED scribe. The transcription may contain errors not detected in proofreading.  Please refer to my supervising physician's documentation if my documentation

## 2024-06-01 LAB
EKG ATRIAL RATE: 74 BPM
EKG P AXIS: 51 DEGREES
EKG P-R INTERVAL: 178 MS
EKG Q-T INTERVAL: 376 MS
EKG QRS DURATION: 90 MS
EKG QTC CALCULATION (BAZETT): 417 MS
EKG R AXIS: 85 DEGREES
EKG T AXIS: 72 DEGREES
EKG VENTRICULAR RATE: 74 BPM

## 2024-06-01 PROCEDURE — 93010 ELECTROCARDIOGRAM REPORT: CPT | Performed by: INTERNAL MEDICINE

## 2024-06-01 NOTE — ED PROVIDER NOTES

## 2024-06-01 NOTE — DISCHARGE INSTRUCTIONS
You will have a prescription for 4 more days of prednisone, this will help decrease the inflammation in your lungs.

## 2024-06-01 NOTE — ED NOTES
Pt updated on POC. Pt swabbed for covid/flu. Pt resting in bed, respirations even and unlabored. No needs expressed at this time. Call light within reach. VSS

## 2024-06-04 NOTE — PROGRESS NOTES
[place] [facility]    Patient - Maximus Ayala   MRN -  [id number]   [facility]   - 1970      Date of Admission -  3/30/2019  6:05 AM  Date of evaluation -  3/31/2019  Room - -14/014-ANNAMARIA Anaya MD Primary Care Physician - Rhiannon Pérez MD   Chief Complaint     Admitted 19 for worsening SOB    HPI     The patient is a 48 y. o. male with past medical history of COPD on home oxygen, and tobacco abuse, presented with worsening SOB x 1 week associated with a recent URTI with cough and wheezes treated with OTC meds. He has increased sputum production, white in Colour, no hemoptysis, No fever, no chills, no N/V. He is on home oxygen and a chronic smoker. ABG 7.25/    Started on BIPAP 26/6     CXR showed no infiltrates      PFT 2017 showed FEV1: 2.01   24% bronchodilator response  DLCO 35%    Last 24 hours:    Doing better  Weaned off BIPAP to Vapotherm   More alert and responsive     Meds    Current Medications    varenicline  0.5 mg Oral Daily    amLODIPine  10 mg Oral Daily    aspirin  325 mg Oral Daily    carBAMazepine  200 mg Oral BID    metoprolol succinate  25 mg Oral Daily    nicotine  1 patch Transdermal Daily    risperiDONE  2 mg Oral BID    traZODone  100 mg Oral Nightly    sodium chloride flush  10 mL Intravenous 2 times per day    enoxaparin  40 mg Subcutaneous Q24H    ipratropium-albuterol  1 ampule Inhalation Q4H WA    methylPREDNISolone  40 mg Intravenous Q6H    Followed by   Gudelia Macias ON 2019] predniSONE  40 mg Oral Daily    doxycycline hyclate  100 mg Oral Q12H     sodium chloride flush, magnesium hydroxide, ondansetron, albuterol, acetaminophen  IV Drips/Infusions   sodium chloride 75 mL/hr at 19 1533     Vitals    Vitals    height is 5' 11\" (1.803 m) and weight is 238 lb (108 kg). His oral temperature is 98.5 °F (36.9 °C).  His blood pressure is 151/82 (abnormal) and his pulse is 93. His respiration is 18 and oxygen saturation is 92%. O2 Flow Rate (L/min): 35 L/min  I/O    Intake/Output Summary (Last 24 hours) at 3/31/2019 1659  Last data filed at 3/31/2019 1508  Gross per 24 hour   Intake 2602.91 ml   Output 675 ml   Net 1927.91 ml     Patient Vitals for the past 96 hrs (Last 3 readings):   Weight   03/30/19 0626 238 lb (108 kg)     Exam     Constitutional: Patient appears moderately built and moderately nourished. Head: Normocephalic and atraumatic. Mouth/Throat: Oropharynx is clear and moist.  No oral thrush. Eyes: Conjunctivae are normal. Pupils are equal, round, and reactive to light. No scleral icterus. Neck: Neck supple. No JVD or tracheal deviation present. Cardiovascular: Regular rate, regular rhythm, S1 and S2 with no murmur. No peripheral edema  Pulmonary/Chest: Severely diminished breath sounds bilateral, no added sounds   Abdominal: Soft. Bowel sounds audible. No distension or tenderness to palp  Musculoskeletal: Moves all extremities  Lymphadenopathy:  No cervical adenopathy. Neurological: Patient is alert and oriented to person, place, and time. Skin: Skin is warm and dry.           Labs   ABG  Lab Results   Component Value Date    PH 7.28 03/31/2019    PO2 84 03/31/2019    PCO2 69 03/31/2019    HCO3 32 03/31/2019    O2SAT 94 03/31/2019     Lab Results   Component Value Date    IFIO2 50 03/31/2019    SETTIDVOL 404 03/31/2019    SETPEEP 6.0 03/31/2019     CBC  Recent Labs     03/30/19 0622   WBC 6.6   RBC 5.65   HGB 17.3   HCT 54.5*   MCV 96.5*   MCH 30.6   MCHC 31.7*      MPV 10.0      BMP  Recent Labs     03/30/19  0622 03/31/19  0344    140   K 4.4 5.2   CL 99 103   CO2 25 27   BUN 16 18   CREATININE 0.6 0.5   GLUCOSE 161* 123*   MG 2.0  --    CALCIUM 8.8 8.7     LFT  Recent Labs     03/30/19  0622   AST 20   ALT 17   BILITOT 0.5   ALKPHOS 107     TROP  Lab Results   Component Value Date    TROPONINT < 0.010 03/30/2019 TROPONINT < 0.010 03/02/2017     BNP  Lab Results   Component Value Date    PROBNP 2989.0 03/30/2019    PROBNP 113.8 03/02/2017     D-Dimer  Lab Results   Component Value Date    DDIMER 261.00 03/02/2017     Lactic Acid  Recent Labs     03/30/19  0633 03/30/19  1215   LACTA 3.0* 1.4     INR  No results for input(s): INR, PROTIME in the last 72 hours. PTT  No results for input(s): APTT in the last 72 hours. Glucose  No results for input(s): POCGLU in the last 72 hours. UA   Recent Labs     03/31/19  0410   SPECGRAV 1.027   PHUR 6.0   COLORU YELLOW   PROTEINU 30*   BLOODU NEGATIVE   RBCUA 0-2   WBCUA 0-2   BACTERIA NONE   NITRU NEGATIVE   BILIRUBINUR NEGATIVE   UROBILINOGEN 0.2   KETUA NEGATIVE   LABCAST NONE SEEN  NONE SEEN   . PFTs       Echo    N/A  Cultures    Procalcitonin  Lab Results   Component Value Date    PROCAL 0.06 04/17/2017    PROCAL 0.07 03/02/2017         Radiology         CT Scans 01/2018       (See actual reports for details)        Assessment     Acute on chronic hypercapnic and hypoxemic respiratory failure  COPD Exacerbation  Tobacco abuse       Recommendations     Continue BIPAP on and off as needed alternating with Vapotherm   Repeat blood gas  O2 to keep saturation 92%  Bronchodilators  Steroids  2D ECHO   DVT and GI prophylaxis       Thank you for the consult and allowing us to participate in the care of your patient. Case discussed with nurse and patient/family. Questions and concerns addressed. Meds and Orders reviewed.     Electronically signed by     Irish Wilkins MD on 3/31/2019 at 4:59 PM               prog 04-Jun-2024 17:08

## 2024-07-21 NOTE — PLAN OF CARE
Problem: Restraint Use - Nonviolent/Non-Self-Destructive Behavior:  Goal: Absence of restraint indications  5/18/2020 1026 by Laurey Lanes, RN  Outcome: Ongoing  Note: Patient continues to pull at lines and tubes when restraints are released. Will continue to monitor. Problem: Restraint Use - Nonviolent/Non-Self-Destructive Behavior:  Goal: Absence of restraint-related injury  Outcome: Ongoing  Note: No redness or swelling noted at site. No signs of injury related to restraints. Will continue to monitor. Care plan reviewed with patient and family. Patient and family verbalize understanding of the plan of care and contribute to goal setting. Patient came in with the complaints of worsening cyst on upper back . As per patient she took 100mg BID doxycyline and mupirocin ointment for 10 days but cyst got worse and erythematous. Patient denies fever/chills/nausea/vomiting. No other complaints. No distress noted. specimens collected and sent. Nursing care continues

## 2024-11-12 ENCOUNTER — TELEPHONE (OUTPATIENT)
Dept: PULMONOLOGY | Age: 54
End: 2024-11-12

## 2024-11-12 NOTE — TELEPHONE ENCOUNTER
Jacqueline signed a script for patient's NIV from [facility] but states that patient needs to be scheduled for a follow up since he has not been seen in our office for 1 year.  -  I faxed the script to [facility] and [facility] for patient to call our office to schedule his follow up with Jacqueline.

## 2025-01-15 ENCOUNTER — APPOINTMENT (OUTPATIENT)
Dept: GENERAL RADIOLOGY | Age: 55
DRG: 193 | End: 2025-01-15
Payer: MEDICARE

## 2025-01-15 ENCOUNTER — HOSPITAL ENCOUNTER (INPATIENT)
Age: 55
LOS: 6 days | Discharge: HOME | DRG: 193 | End: 2025-01-21
Attending: STUDENT IN AN ORGANIZED HEALTH CARE EDUCATION/TRAINING PROGRAM | Admitting: INTERNAL MEDICINE
Payer: MEDICARE

## 2025-01-15 DIAGNOSIS — Z87.891 PERSONAL HISTORY OF TOBACCO USE, PRESENTING HAZARDS TO HEALTH: ICD-10-CM

## 2025-01-15 DIAGNOSIS — J96.21 ACUTE ON CHRONIC HYPOXIC RESPIRATORY FAILURE: Primary | ICD-10-CM

## 2025-01-15 DIAGNOSIS — J44.9 STAGE 4 VERY SEVERE COPD BY GOLD CLASSIFICATION (HCC): ICD-10-CM

## 2025-01-15 DIAGNOSIS — N17.9 ACUTE KIDNEY INJURY (HCC): ICD-10-CM

## 2025-01-15 DIAGNOSIS — J44.1 COPD WITH ACUTE EXACERBATION (HCC): ICD-10-CM

## 2025-01-15 DIAGNOSIS — J96.21 ACUTE AND CHRONIC RESPIRATORY FAILURE WITH HYPOXIA: ICD-10-CM

## 2025-01-15 DIAGNOSIS — J44.1 COPD EXACERBATION (HCC): ICD-10-CM

## 2025-01-15 DIAGNOSIS — J44.9 STAGE 3 SEVERE COPD BY GOLD CLASSIFICATION (HCC): ICD-10-CM

## 2025-01-15 LAB
ALBUMIN SERPL BCG-MCNC: 3.7 G/DL (ref 3.5–5.1)
ALP SERPL-CCNC: 72 U/L (ref 38–126)
ALT SERPL W/O P-5'-P-CCNC: 12 U/L (ref 11–66)
ANION GAP SERPL CALC-SCNC: 11 MEQ/L (ref 8–16)
ARTERIAL PATENCY WRIST A: POSITIVE
AST SERPL-CCNC: 14 U/L (ref 5–40)
BASE EXCESS BLDA CALC-SCNC: 2.2 MMOL/L (ref -2.5–2.5)
BASOPHILS ABSOLUTE: 0 THOU/MM3 (ref 0–0.1)
BASOPHILS NFR BLD AUTO: 0.6 %
BDY SITE: ABNORMAL
BILIRUB SERPL-MCNC: 0.2 MG/DL (ref 0.3–1.2)
BUN SERPL-MCNC: 15 MG/DL (ref 7–22)
CALCIUM SERPL-MCNC: 8.6 MG/DL (ref 8.5–10.5)
CHLORIDE SERPL-SCNC: 99 MEQ/L (ref 98–111)
CO2 SERPL-SCNC: 26 MEQ/L (ref 23–33)
COLLECTED BY:: ABNORMAL
CREAT SERPL-MCNC: 1.6 MG/DL (ref 0.4–1.2)
DEPRECATED RDW RBC AUTO: 55.4 FL (ref 35–45)
DEVICE: ABNORMAL
EKG ATRIAL RATE: 94 BPM
EKG P AXIS: 80 DEGREES
EKG P-R INTERVAL: 144 MS
EKG Q-T INTERVAL: 368 MS
EKG QRS DURATION: 82 MS
EKG QTC CALCULATION (BAZETT): 460 MS
EKG R AXIS: 92 DEGREES
EKG T AXIS: 36 DEGREES
EKG VENTRICULAR RATE: 94 BPM
EOSINOPHIL NFR BLD AUTO: 1.6 %
EOSINOPHILS ABSOLUTE: 0.1 THOU/MM3 (ref 0–0.4)
ERYTHROCYTE [DISTWIDTH] IN BLOOD BY AUTOMATED COUNT: 15.8 % (ref 11.5–14.5)
FIO2 ON VENT O2 ANALYZER: 6 %
FLUAV RNA RESP QL NAA+PROBE: NOT DETECTED
FLUBV RNA RESP QL NAA+PROBE: NOT DETECTED
GFR SERPL CREATININE-BSD FRML MDRD: 50 ML/MIN/1.73M2
GLUCOSE SERPL-MCNC: 88 MG/DL (ref 70–108)
HCO3 BLDA-SCNC: 30 MMOL/L (ref 23–28)
HCT VFR BLD AUTO: 46.7 % (ref 42–52)
HGB BLD-MCNC: 14.2 GM/DL (ref 14–18)
IMM GRANULOCYTES # BLD AUTO: 0.04 THOU/MM3 (ref 0–0.07)
IMM GRANULOCYTES NFR BLD AUTO: 0.5 %
LACTIC ACID, SEPSIS: 0.8 MMOL/L (ref 0.5–1.9)
LACTIC ACID, SEPSIS: 1.8 MMOL/L (ref 0.5–1.9)
LYMPHOCYTES ABSOLUTE: 2 THOU/MM3 (ref 1–4.8)
LYMPHOCYTES NFR BLD AUTO: 23.8 %
MCH RBC QN AUTO: 29.2 PG (ref 26–33)
MCHC RBC AUTO-ENTMCNC: 30.4 GM/DL (ref 32.2–35.5)
MCV RBC AUTO: 96.1 FL (ref 80–94)
MONOCYTES ABSOLUTE: 0.8 THOU/MM3 (ref 0.4–1.3)
MONOCYTES NFR BLD AUTO: 10.3 %
NEUTROPHILS ABSOLUTE: 5.2 THOU/MM3 (ref 1.8–7.7)
NEUTROPHILS NFR BLD AUTO: 63.2 %
NRBC BLD AUTO-RTO: 0 /100 WBC
NT-PROBNP SERPL IA-MCNC: 2427 PG/ML (ref 0–124)
OSMOLALITY SERPL CALC.SUM OF ELEC: 272.2 MOSMOL/KG (ref 275–300)
PCO2 BLDA: 54 MMHG (ref 35–45)
PH BLDA: 7.34 [PH] (ref 7.35–7.45)
PLATELET # BLD AUTO: 275 THOU/MM3 (ref 130–400)
PMV BLD AUTO: 10.4 FL (ref 9.4–12.4)
PO2 BLDA: 55 MMHG (ref 71–104)
POTASSIUM SERPL-SCNC: 4.2 MEQ/L (ref 3.5–5.2)
PROT SERPL-MCNC: 7.2 G/DL (ref 6.1–8)
RBC # BLD AUTO: 4.86 MILL/MM3 (ref 4.7–6.1)
SAO2 % BLDA: 86 %
SARS-COV-2 RNA RESP QL NAA+PROBE: NOT DETECTED
SODIUM SERPL-SCNC: 136 MEQ/L (ref 135–145)
TROPONIN, HIGH SENSITIVITY: 25 NG/L (ref 0–12)
VENTILATION MODE VENT: ABNORMAL
WBC # BLD AUTO: 8.2 THOU/MM3 (ref 4.8–10.8)

## 2025-01-15 PROCEDURE — 71045 X-RAY EXAM CHEST 1 VIEW: CPT

## 2025-01-15 PROCEDURE — 80053 COMPREHEN METABOLIC PANEL: CPT

## 2025-01-15 PROCEDURE — 87636 SARSCOV2 & INF A&B AMP PRB: CPT

## 2025-01-15 PROCEDURE — 83605 ASSAY OF LACTIC ACID: CPT

## 2025-01-15 PROCEDURE — 5A09457 ASSISTANCE WITH RESPIRATORY VENTILATION, 24-96 CONSECUTIVE HOURS, CONTINUOUS POSITIVE AIRWAY PRESSURE: ICD-10-PCS | Performed by: INTERNAL MEDICINE

## 2025-01-15 PROCEDURE — 2700000000 HC OXYGEN THERAPY PER DAY

## 2025-01-15 PROCEDURE — 6370000000 HC RX 637 (ALT 250 FOR IP): Performed by: INTERNAL MEDICINE

## 2025-01-15 PROCEDURE — 99223 1ST HOSP IP/OBS HIGH 75: CPT | Performed by: INTERNAL MEDICINE

## 2025-01-15 PROCEDURE — 94660 CPAP INITIATION&MGMT: CPT

## 2025-01-15 PROCEDURE — 2500000003 HC RX 250 WO HCPCS: Performed by: INTERNAL MEDICINE

## 2025-01-15 PROCEDURE — 5A0935A ASSISTANCE WITH RESPIRATORY VENTILATION, LESS THAN 24 CONSECUTIVE HOURS, HIGH NASAL FLOW/VELOCITY: ICD-10-PCS | Performed by: INTERNAL MEDICINE

## 2025-01-15 PROCEDURE — 87040 BLOOD CULTURE FOR BACTERIA: CPT

## 2025-01-15 PROCEDURE — 6360000002 HC RX W HCPCS: Performed by: INTERNAL MEDICINE

## 2025-01-15 PROCEDURE — 6360000002 HC RX W HCPCS: Performed by: STUDENT IN AN ORGANIZED HEALTH CARE EDUCATION/TRAINING PROGRAM

## 2025-01-15 PROCEDURE — 94761 N-INVAS EAR/PLS OXIMETRY MLT: CPT

## 2025-01-15 PROCEDURE — 94640 AIRWAY INHALATION TREATMENT: CPT

## 2025-01-15 PROCEDURE — 36600 WITHDRAWAL OF ARTERIAL BLOOD: CPT

## 2025-01-15 PROCEDURE — 2580000003 HC RX 258: Performed by: STUDENT IN AN ORGANIZED HEALTH CARE EDUCATION/TRAINING PROGRAM

## 2025-01-15 PROCEDURE — 93005 ELECTROCARDIOGRAM TRACING: CPT | Performed by: EMERGENCY MEDICINE

## 2025-01-15 PROCEDURE — 6370000000 HC RX 637 (ALT 250 FOR IP): Performed by: STUDENT IN AN ORGANIZED HEALTH CARE EDUCATION/TRAINING PROGRAM

## 2025-01-15 PROCEDURE — 84484 ASSAY OF TROPONIN QUANT: CPT

## 2025-01-15 PROCEDURE — 93010 ELECTROCARDIOGRAM REPORT: CPT | Performed by: INTERNAL MEDICINE

## 2025-01-15 PROCEDURE — 2140000000 HC CCU INTERMEDIATE R&B

## 2025-01-15 PROCEDURE — 82803 BLOOD GASES ANY COMBINATION: CPT

## 2025-01-15 PROCEDURE — 36415 COLL VENOUS BLD VENIPUNCTURE: CPT

## 2025-01-15 PROCEDURE — 83880 ASSAY OF NATRIURETIC PEPTIDE: CPT

## 2025-01-15 PROCEDURE — 99285 EMERGENCY DEPT VISIT HI MDM: CPT

## 2025-01-15 PROCEDURE — 85025 COMPLETE CBC W/AUTO DIFF WBC: CPT

## 2025-01-15 RX ORDER — SODIUM CHLORIDE 9 MG/ML
INJECTION, SOLUTION INTRAVENOUS PRN
Status: DISCONTINUED | OUTPATIENT
Start: 2025-01-15 | End: 2025-01-21 | Stop reason: HOSPADM

## 2025-01-15 RX ORDER — SODIUM CHLORIDE 0.9 % (FLUSH) 0.9 %
5-40 SYRINGE (ML) INJECTION PRN
Status: DISCONTINUED | OUTPATIENT
Start: 2025-01-15 | End: 2025-01-21 | Stop reason: HOSPADM

## 2025-01-15 RX ORDER — CARBAMAZEPINE 200 MG/1
200 TABLET ORAL 2 TIMES DAILY
Status: DISCONTINUED | OUTPATIENT
Start: 2025-01-15 | End: 2025-01-21 | Stop reason: HOSPADM

## 2025-01-15 RX ORDER — FAMOTIDINE 20 MG/1
20 TABLET, FILM COATED ORAL 2 TIMES DAILY
Status: DISCONTINUED | OUTPATIENT
Start: 2025-01-15 | End: 2025-01-21 | Stop reason: HOSPADM

## 2025-01-15 RX ORDER — ACETAMINOPHEN 325 MG/1
650 TABLET ORAL EVERY 6 HOURS PRN
Status: DISCONTINUED | OUTPATIENT
Start: 2025-01-15 | End: 2025-01-21 | Stop reason: HOSPADM

## 2025-01-15 RX ORDER — PREDNISONE 20 MG/1
40 TABLET ORAL DAILY
Status: COMPLETED | OUTPATIENT
Start: 2025-01-17 | End: 2025-01-19

## 2025-01-15 RX ORDER — ONDANSETRON 4 MG/1
4 TABLET, ORALLY DISINTEGRATING ORAL EVERY 8 HOURS PRN
Status: DISCONTINUED | OUTPATIENT
Start: 2025-01-15 | End: 2025-01-21 | Stop reason: HOSPADM

## 2025-01-15 RX ORDER — IPRATROPIUM BROMIDE AND ALBUTEROL SULFATE 2.5; .5 MG/3ML; MG/3ML
1 SOLUTION RESPIRATORY (INHALATION)
Status: DISCONTINUED | OUTPATIENT
Start: 2025-01-15 | End: 2025-01-21 | Stop reason: HOSPADM

## 2025-01-15 RX ORDER — PRAVASTATIN SODIUM 40 MG
40 TABLET ORAL NIGHTLY
Status: DISCONTINUED | OUTPATIENT
Start: 2025-01-15 | End: 2025-01-21 | Stop reason: HOSPADM

## 2025-01-15 RX ORDER — BUPROPION HYDROCHLORIDE 150 MG/1
150 TABLET ORAL EVERY MORNING
Status: DISCONTINUED | OUTPATIENT
Start: 2025-01-16 | End: 2025-01-21 | Stop reason: HOSPADM

## 2025-01-15 RX ORDER — ACETAMINOPHEN 650 MG/1
650 SUPPOSITORY RECTAL EVERY 6 HOURS PRN
Status: DISCONTINUED | OUTPATIENT
Start: 2025-01-15 | End: 2025-01-21 | Stop reason: HOSPADM

## 2025-01-15 RX ORDER — RISPERIDONE 1 MG/1
1 TABLET ORAL 2 TIMES DAILY
Status: DISCONTINUED | OUTPATIENT
Start: 2025-01-15 | End: 2025-01-15

## 2025-01-15 RX ORDER — RISPERIDONE 1 MG/1
2 TABLET ORAL 2 TIMES DAILY
Status: DISCONTINUED | OUTPATIENT
Start: 2025-01-15 | End: 2025-01-21 | Stop reason: HOSPADM

## 2025-01-15 RX ORDER — ALBUTEROL SULFATE 0.83 MG/ML
5 SOLUTION RESPIRATORY (INHALATION) ONCE
Status: COMPLETED | OUTPATIENT
Start: 2025-01-15 | End: 2025-01-15

## 2025-01-15 RX ORDER — GUAIFENESIN 600 MG/1
600 TABLET, EXTENDED RELEASE ORAL 2 TIMES DAILY
Status: DISCONTINUED | OUTPATIENT
Start: 2025-01-15 | End: 2025-01-21 | Stop reason: HOSPADM

## 2025-01-15 RX ORDER — IPRATROPIUM BROMIDE AND ALBUTEROL SULFATE 2.5; .5 MG/3ML; MG/3ML
1 SOLUTION RESPIRATORY (INHALATION) ONCE
Status: COMPLETED | OUTPATIENT
Start: 2025-01-15 | End: 2025-01-15

## 2025-01-15 RX ORDER — DOCUSATE SODIUM 100 MG/1
100 CAPSULE, LIQUID FILLED ORAL DAILY
Status: DISCONTINUED | OUTPATIENT
Start: 2025-01-15 | End: 2025-01-21 | Stop reason: HOSPADM

## 2025-01-15 RX ORDER — TADALAFIL 20 MG/1
10 TABLET ORAL DAILY
Status: DISCONTINUED | OUTPATIENT
Start: 2025-01-15 | End: 2025-01-21 | Stop reason: HOSPADM

## 2025-01-15 RX ORDER — ALBUTEROL SULFATE 0.83 MG/ML
2.5 SOLUTION RESPIRATORY (INHALATION)
Status: DISCONTINUED | OUTPATIENT
Start: 2025-01-15 | End: 2025-01-16

## 2025-01-15 RX ORDER — 0.9 % SODIUM CHLORIDE 0.9 %
1000 INTRAVENOUS SOLUTION INTRAVENOUS ONCE
Status: COMPLETED | OUTPATIENT
Start: 2025-01-15 | End: 2025-01-15

## 2025-01-15 RX ORDER — POLYETHYLENE GLYCOL 3350 17 G/17G
17 POWDER, FOR SOLUTION ORAL DAILY PRN
Status: DISCONTINUED | OUTPATIENT
Start: 2025-01-15 | End: 2025-01-21 | Stop reason: HOSPADM

## 2025-01-15 RX ORDER — NICOTINE 21 MG/24HR
1 PATCH, TRANSDERMAL 24 HOURS TRANSDERMAL NIGHTLY
Status: DISCONTINUED | OUTPATIENT
Start: 2025-01-15 | End: 2025-01-21 | Stop reason: HOSPADM

## 2025-01-15 RX ORDER — IPRATROPIUM BROMIDE AND ALBUTEROL SULFATE 2.5; .5 MG/3ML; MG/3ML
1 SOLUTION RESPIRATORY (INHALATION)
Status: DISCONTINUED | OUTPATIENT
Start: 2025-01-15 | End: 2025-01-15

## 2025-01-15 RX ORDER — ENOXAPARIN SODIUM 100 MG/ML
40 INJECTION SUBCUTANEOUS DAILY
Status: DISCONTINUED | OUTPATIENT
Start: 2025-01-15 | End: 2025-01-21 | Stop reason: HOSPADM

## 2025-01-15 RX ORDER — ASPIRIN 325 MG
325 TABLET, DELAYED RELEASE (ENTERIC COATED) ORAL DAILY
Status: DISCONTINUED | OUTPATIENT
Start: 2025-01-15 | End: 2025-01-21 | Stop reason: HOSPADM

## 2025-01-15 RX ORDER — SODIUM CHLORIDE 0.9 % (FLUSH) 0.9 %
5-40 SYRINGE (ML) INJECTION EVERY 12 HOURS SCHEDULED
Status: DISCONTINUED | OUTPATIENT
Start: 2025-01-15 | End: 2025-01-21 | Stop reason: HOSPADM

## 2025-01-15 RX ORDER — TRAZODONE HYDROCHLORIDE 100 MG/1
100 TABLET ORAL NIGHTLY
Status: DISCONTINUED | OUTPATIENT
Start: 2025-01-15 | End: 2025-01-21 | Stop reason: HOSPADM

## 2025-01-15 RX ORDER — DOXYCYCLINE HYCLATE 100 MG
100 TABLET ORAL EVERY 12 HOURS SCHEDULED
Status: COMPLETED | OUTPATIENT
Start: 2025-01-15 | End: 2025-01-20

## 2025-01-15 RX ORDER — ONDANSETRON 2 MG/ML
4 INJECTION INTRAMUSCULAR; INTRAVENOUS EVERY 6 HOURS PRN
Status: DISCONTINUED | OUTPATIENT
Start: 2025-01-15 | End: 2025-01-21 | Stop reason: HOSPADM

## 2025-01-15 RX ORDER — BUDESONIDE 0.5 MG/2ML
500 INHALANT ORAL 2 TIMES DAILY
Status: DISCONTINUED | OUTPATIENT
Start: 2025-01-15 | End: 2025-01-15

## 2025-01-15 RX ADMIN — WATER 40 MG: 1 INJECTION INTRAMUSCULAR; INTRAVENOUS; SUBCUTANEOUS at 21:26

## 2025-01-15 RX ADMIN — TRAZODONE HYDROCHLORIDE 100 MG: 100 TABLET ORAL at 20:07

## 2025-01-15 RX ADMIN — ASPIRIN 325 MG: 325 TABLET, COATED ORAL at 18:12

## 2025-01-15 RX ADMIN — RISPERIDONE 1 MG: 1 TABLET, FILM COATED ORAL at 10:02

## 2025-01-15 RX ADMIN — TADALAFIL 10 MG: 20 TABLET ORAL at 18:12

## 2025-01-15 RX ADMIN — FAMOTIDINE 20 MG: 20 TABLET, FILM COATED ORAL at 20:07

## 2025-01-15 RX ADMIN — FAMOTIDINE 20 MG: 20 TABLET, FILM COATED ORAL at 10:02

## 2025-01-15 RX ADMIN — IPRATROPIUM BROMIDE AND ALBUTEROL SULFATE 1 DOSE: .5; 3 SOLUTION RESPIRATORY (INHALATION) at 08:33

## 2025-01-15 RX ADMIN — WATER 1000 MG: 1 INJECTION INTRAMUSCULAR; INTRAVENOUS; SUBCUTANEOUS at 10:02

## 2025-01-15 RX ADMIN — IPRATROPIUM BROMIDE AND ALBUTEROL SULFATE 1 DOSE: .5; 3 SOLUTION RESPIRATORY (INHALATION) at 13:02

## 2025-01-15 RX ADMIN — IPRATROPIUM BROMIDE AND ALBUTEROL SULFATE 1 DOSE: .5; 3 SOLUTION RESPIRATORY (INHALATION) at 03:58

## 2025-01-15 RX ADMIN — ALBUTEROL SULFATE 5 MG: 2.5 SOLUTION RESPIRATORY (INHALATION) at 03:57

## 2025-01-15 RX ADMIN — IPRATROPIUM BROMIDE AND ALBUTEROL SULFATE 1 DOSE: .5; 3 SOLUTION RESPIRATORY (INHALATION) at 21:04

## 2025-01-15 RX ADMIN — ENOXAPARIN SODIUM 40 MG: 100 INJECTION SUBCUTANEOUS at 10:02

## 2025-01-15 RX ADMIN — CARBAMAZEPINE 200 MG: 200 TABLET ORAL at 20:07

## 2025-01-15 RX ADMIN — RISPERIDONE 2 MG: 1 TABLET, FILM COATED ORAL at 20:07

## 2025-01-15 RX ADMIN — SODIUM CHLORIDE 1000 ML: 9 INJECTION, SOLUTION INTRAVENOUS at 05:08

## 2025-01-15 RX ADMIN — GUAIFENESIN 600 MG: 600 TABLET, EXTENDED RELEASE ORAL at 20:07

## 2025-01-15 RX ADMIN — PRAVASTATIN SODIUM 40 MG: 40 TABLET ORAL at 20:07

## 2025-01-15 RX ADMIN — WATER 40 MG: 1 INJECTION INTRAMUSCULAR; INTRAVENOUS; SUBCUTANEOUS at 10:09

## 2025-01-15 RX ADMIN — GUAIFENESIN 600 MG: 600 TABLET, EXTENDED RELEASE ORAL at 10:02

## 2025-01-15 RX ADMIN — SODIUM CHLORIDE, PRESERVATIVE FREE 10 ML: 5 INJECTION INTRAVENOUS at 10:02

## 2025-01-15 RX ADMIN — WATER 40 MG: 1 INJECTION INTRAMUSCULAR; INTRAVENOUS; SUBCUTANEOUS at 15:18

## 2025-01-15 RX ADMIN — DOXYCYCLINE HYCLATE 100 MG: 100 TABLET, COATED ORAL at 20:07

## 2025-01-15 RX ADMIN — SODIUM CHLORIDE, PRESERVATIVE FREE 10 ML: 5 INJECTION INTRAVENOUS at 20:08

## 2025-01-15 NOTE — ED NOTES
ED to inpatient nurses report      Chief Complaint:  Chief Complaint   Patient presents with    Shortness of Breath     Present to ED from: Home    MOA:     LOC: alert and orientated to name, place, date (Hx schizophrenia - occasional disorganized speech that family states is baseline)  Mobility: Requires assistance * 1  Oxygen Baseline: 2L (Has not been wearing it for several days at home)    Current needs required: BiPAP     Code Status:   Prior    What abnormal results were found and what did you give/do to treat them? Acute on chronic hypoxic respiratory failure, COPD exacerbation, MAGNO - 1L bolus and BiPAP   Any procedures or intervention occur? XR chest    Mental Status:  Level of Consciousness: Alert (0)    Psych Assessment:        Vitals:  Patient Vitals for the past 24 hrs:   BP Temp Temp src Pulse Resp SpO2 Weight   01/15/25 0509 133/89 -- -- 94 30 100 % --   01/15/25 0502 -- -- -- 89 20 100 % --   01/15/25 0430 139/86 -- -- 87 23 (!) 86 % --   01/15/25 0401 132/83 -- -- 93 20 91 % --   01/15/25 0335 (!) 138/94 98.7 °F (37.1 °C) Axillary 94 13 90 % 83.9 kg (185 lb)   01/15/25 0332 -- -- -- -- -- (!) 87 % --   01/15/25 0331 -- -- -- -- -- (!) 68 % --        LDAs:   Peripheral IV 01/15/25 Right Antecubital (Active)   Site Assessment Clean, dry & intact 01/15/25 0502   Line Status Flushed 01/15/25 0502   Phlebitis Assessment No symptoms 01/15/25 0502   Infiltration Assessment 0 01/15/25 0502   Dressing Status Clean, dry & intact 01/15/25 0502   Dressing Type Transparent 01/15/25 0502   Dressing Intervention New 01/15/25 0340       Peripheral IV 01/15/25 Left Antecubital (Active)   Site Assessment Clean, dry & intact 01/15/25 0508   Line Status Infusing 01/15/25 0508   Phlebitis Assessment No symptoms 01/15/25 0508   Infiltration Assessment 0 01/15/25 0508   Dressing Status Clean, dry & intact 01/15/25 0508   Dressing Type Transparent 01/15/25 0508   Dressing Intervention New 01/15/25 0356       Ambulatory

## 2025-01-15 NOTE — ED PROVIDER NOTES
into the lungs every 6 hours as needed for Wheezing or Shortness of Breath    ARFORMOTEROL TARTRATE (BROVANA) 15 MCG/2ML NEBU    Take 2 mLs by nebulization in the morning and 2 mLs in the evening.    ASPIRIN 325 MG EC TABLET    Take 1 tablet by mouth daily    BUDESONIDE (PULMICORT) 0.5 MG/2ML NEBULIZER SUSPENSION    Take 2 mLs by nebulization 2 times daily    BUPROPION (WELLBUTRIN XL) 150 MG EXTENDED RELEASE TABLET    Take 1 tablet by mouth every morning    CARBAMAZEPINE (TEGRETOL) 200 MG TABLET    Take 1 tablet by mouth 2 times daily    DOCUSATE SODIUM (COLACE, DULCOLAX) 100 MG CAPS    Take 100 mg by mouth daily    FAMOTIDINE (PEPCID) 20 MG TABLET    Take 1 tablet by mouth 2 times daily    FUROSEMIDE (LASIX) 20 MG TABLET    Take 1 tablet by mouth daily    HALOPERIDOL DECANOATE (HALDOL DECANOATE) 50 MG/ML INJECTION        NICOTINE (NICODERM CQ) 14 MG/24HR    Place 1 patch onto the skin at bedtime    ONDANSETRON (ZOFRAN-ODT) 4 MG DISINTEGRATING TABLET    Take 1 tablet by mouth every 8 hours as needed for Nausea or Vomiting    PANTOPRAZOLE (PROTONIX) 40 MG TABLET    Take 1 tablet by mouth every morning (before breakfast)    POTASSIUM CHLORIDE (KLOR-CON M) 20 MEQ EXTENDED RELEASE TABLET    take 1 tablet by mouth once daily WITH BREAKFAST    PRAVASTATIN (PRAVACHOL) 40 MG TABLET    Take 1 tablet by mouth nightly    PROGESTERONE (PROMETRIUM) 200 MG CAPS CAPSULE    Take 1 capsule by mouth nightly    RISPERIDONE (RISPERDAL) 2 MG TABLET    Take 1 tablet by mouth 2 times daily    SPACER/AERO-HOLDING CHAMBERS CAREN    1 Device by Does not apply route daily    SPIRIVA HANDIHALER 18 MCG INHALATION CAPSULE    Inhale 1 capsule into the lungs daily    TADALAFIL, PAH, 20 MG TABLET    Take 0.5 tablets by mouth daily    TRAZODONE (DESYREL) 100 MG TABLET    take 1 tablet by mouth at bedtime         SOCIAL HISTORY     Social History     Social History Narrative    Not on file     Social History     Tobacco Use    Smoking status: Some

## 2025-01-15 NOTE — ED NOTES
Patient transported to  [facility] by cart in stable condition.   Patient monitored on cardiac telemetry.   Patient on O2 via BiPap   IV infusing and line is patent.

## 2025-01-15 NOTE — H&P
Internal Medicine  History and Physical    Patient:  Sandie Carrera  MRN: [id number]      History Obtained From:  patient  PCP: Elijah Liu MD    CHIEF COMPLAINT: Shortness of breath.    HISTORY OF PRESENT ILLNESS:   The patient is a 54 y.o. male with a history of COPD pulmonary hypertension on home oxygen who presented with shortness of breath.  Patient endorses a mild cough.  Intermittent wheezes.  He ran out of home oxygen 2 days ago.  Oxygen company yet to deliver refill.  He presented to emergency room on account of worsening shortness of breath.  Patient was found to be profoundly hypoxemic.  Subsequently placed back on oxygen and treated with bronchodilators.    Past Medical History:        Diagnosis Date    Acute on chronic systolic congestive heart failure (HCC) 4/4/2019    Emphysema (subcutaneous) (surgical) resulting from a procedure     Hypertension     Pneumonia     Schizophrenia ([facility])        Past Surgical History:    History reviewed. No pertinent surgical history.    Medications Prior to Admission:    Prior to Admission medications    Medication Sig Start Date End Date Taking? Authorizing Provider   budesonide (PULMICORT) 0.5 MG/2ML nebulizer suspension Take 2 mLs by nebulization 2 times daily 1/21/24 1/20/25 Yes Elijah Liu MD   arformoterol tartrate (BROVANA) 15 MCG/2ML NEBU Take 2 mLs by nebulization in the morning and 2 mLs in the evening. 1/21/24 1/20/25 Yes Elijah Liu MD   SPIRIVA HANDIHALER 18 MCG inhalation capsule Inhale 1 capsule into the lungs daily 11/30/23  Yes Jacqueline Castillo, APRN - CNP   risperiDONE (RISPERDAL) 2 MG tablet Take 1 tablet by mouth 2 times daily 9/8/23  Yes Elijah Liu MD   aspirin 325 MG EC tablet Take 1 tablet by mouth daily 9/8/23  Yes Elijah Liu MD   buPROPion (WELLBUTRIN XL) 150 MG extended release tablet Take 1 tablet by mouth every morning 9/8/23  Yes Elijah Liu MD   carBAMazepine (TEGRETOL) 200 MG tablet Take 1 tablet by mouth 2 times

## 2025-01-15 NOTE — CARE COORDINATION
Case Management Assessment Initial Evaluation    Date/Time of Evaluation: 1/15/2025 12:02 PM  Assessment Completed by: Sydney Viveros RN    If patient is discharged prior to next notation, then this note serves as note for discharge by case management.    Patient Name: Sandie Carrera                   YOB: 1970  Diagnosis: COPD exacerbation (HCC) [J44.1]  Acute kidney injury (HCC) [N17.9]  Acute and chronic respiratory failure with hypoxia [J96.21]  Acute on chronic respiratory failure with hypoxia [J96.21]  Acute on chronic hypoxic respiratory failure [J96.21]                   Date / Time: 1/15/2025  3:25 AM  Location: [address] [place] [address]     Patient Admission Status: Inpatient   Readmission Risk Low 0-14, Mod 15-19), High > 20: Readmission Risk Score: 12.3    Current PCP: Elijah Liu MD  Health Care Decision Makers:   Primary Decision Maker: PoloMaureen - Parent - [phone]    Secondary Decision Maker: Marianela Carrera - Brother/Sister - [phone]    Secondary Decision Maker: Trudy Cuellar - Brother/Sister - [phone]    Additional Case Management Notes: Presented to ED with c/o SOB. Reportedly ran out of home oxygen. Sats 68% on room air in ED. Placed on nasal cannula O2, then transitioned to bipap . Admitted to  stepdown unit. Alternated between HFNC and bipap. Now on 4L O2 with sats 95%. Afebrile. NSR. Pulmonology consulted for respiratory failure. Telemetry. IV rocephin, lovenox, mucinex, nebs, IV solumedrol 40 mg Q6H, tadalafil. Received 1L fld bolus x1 today. Rapid flu & COVID 19 were negative. Blood cultures sent. Creat 1.6, NT pro-bnp 2427, trop 25.     Procedures: none    Imagin/15 CXR: 1. Similar-appearing densities of the left lung base, favor atelectasis   and/or scarring. Developing left basilar consolidation is not entirely   excluded.    Patient Goals/Plan/Treatment Preferences: From home w/mother. Past CM note states patient has home O2 and cpap thru [facility]; last

## 2025-01-15 NOTE — ED TRIAGE NOTES
Pt arrives to ED via private for evaluation of shortness of breath. Per family at bedside, pt is supposed to wear 2L at baseline, however, has been out of oxygen for the past couple days and his breathing continues to get worse. Pt arrived on room air at 68%. Pt placed on 6L nasal cannula with improvement to 90-91%. Dr Braxton at bedside to assess patient. EKG completed on arrival. Pt has hx of schizophrenia and has disorganized speech during triage which family states is his baseline.

## 2025-01-15 NOTE — ED NOTES
Pt tolerating BiPAP at this time. Pt medicated per MAR. Telemetry in place and call light in reach

## 2025-01-15 NOTE — CONSULTS
[place] [facility]      Patient - Sandie Carrera   MRN -  [id number]   [facility] # - [id number]   - 1970      Date of Admission -  1/15/2025  3:25 AM  Date of evaluation -  1/15/2025  Room - [facility]38/038   Hospital Day - 0  Consulting - Elijah Liu MD Primary Care Physician - Elijah Liu MD     Problem List      Active Hospital Problems    Diagnosis Date Noted    Acute on chronic respiratory failure with hypoxia [J96.21] 2022     Priority: Medium    Acute and chronic respiratory failure with hypoxia [J96.21] 01/15/2025     Reason for Consult    Acute on chronic hypoxic/hypercapnic respiratory failure  HPI   History Obtained From: Patient  and electronic medical record.    Sandie Carrera is a 54 y.o. male with past medical history of schizophrenia, COPD, PHTN group 3, JORDAN/OSH, HTN, systolic heart failure current smoker, presents to the [facility] for worsening shortness of breath.  Patient is a poor historian. Patient uses 2 L of home oxygen at rest and 4 L with activity.  Complaints that she has been having difficulty time breathing.  Initially patient was placed on 6 L of nasal cannula with saturation is 90% but saturation dropped to 86% and patient was switched to high flow nasal cannula.  And patient did not improve with high flow nasal cannula and was switched to BiPAP, with improvement in respiratory status.  CXR showed left lung base infiltrates/atelectasis changes.  ABG showed 7.3 4/54/55/30, respiratory acidosis with metabolic compensation.       PMHx   Past Medical History      Diagnosis Date    Acute on chronic systolic congestive heart failure (HCC) 2019    Emphysema (subcutaneous) (surgical) resulting from a procedure     Hypertension     Pneumonia     Schizophrenia (HCC)       Past Surgical History    History reviewed. No pertinent surgical history.  Meds    Current Medications    sodium chloride flush  5-40 mL IntraVENous 2 times per day    enoxaparin

## 2025-01-15 NOTE — PLAN OF CARE
Problem: Chronic Conditions and Co-morbidities  Goal: Patient's chronic conditions and co-morbidity symptoms are monitored and maintained or improved  Outcome: Progressing  Flowsheets (Taken 1/15/2025 0946)  Care Plan - Patient's Chronic Conditions and Co-Morbidity Symptoms are Monitored and Maintained or Improved: Monitor and assess patient's chronic conditions and comorbid symptoms for stability, deterioration, or improvement     Problem: Discharge Planning  Goal: Discharge to home or other facility with appropriate resources  Outcome: Progressing  Flowsheets  Taken 1/15/2025 0946 by Darryl Crow RN  Discharge to home or other facility with appropriate resources: Identify barriers to discharge with patient and caregiver  Taken 1/15/2025 0635 by Jeremiah Kern RN  Discharge to home or other facility with appropriate resources:   Identify barriers to discharge with patient and caregiver   Identify discharge learning needs (meds, wound care, etc)     Problem: Safety - Adult  Goal: Free from fall injury  Outcome: Progressing

## 2025-01-15 NOTE — RT PROTOCOL NOTE
RT Inhaler-Nebulizer Bronchodilator Protocol Note    There is a bronchodilator order in the chart from a provider indicating to follow the RT Bronchodilator Protocol and there is an “Initiate RT Inhaler-Nebulizer Bronchodilator Protocol” order as well (see protocol at bottom of note).    CXR Findings:  XR CHEST PORTABLE    Result Date: 1/15/2025  1. Similar-appearing densities of the left lung base, favor atelectasis and/or scarring. Developing left basilar consolidation is not entirely excluded. This document has been electronically signed by: Jose Murillo DO on 01/15/2025 04:22 AM      The findings from the last RT Protocol Assessment were as follows:   History Pulmonary Disease: Chronic pulmonary disease  Respiratory Pattern: Use of accessory muscles, prolonged exhalation, or RR 26-30 bpm  Breath Sounds: Slightly diminished and/or crackles  Cough: Strong, spontaneous, non-productive  Indication for Bronchodilator Therapy: Decreased or absent breath sounds  Bronchodilator Assessment Score: 10    Aerosolized bronchodilator medication orders have been revised according to the RT Inhaler-Nebulizer Bronchodilator Protocol below.    Respiratory Therapist to perform RT Therapy Protocol Assessment initially then follow the protocol.  Repeat RT Therapy Protocol Assessment PRN for score 0-3 or on second treatment, BID, and PRN for scores above 3.    No Indications - adjust the frequency to every 6 hours PRN wheezing or bronchospasm, if no treatments needed after 48 hours then discontinue using Per Protocol order mode.     If indication present, adjust the RT bronchodilator orders based on the Bronchodilator Assessment Score as indicated below.  Use Inhaler orders unless patient has one or more of the following: on home nebulizer, not able to hold breath for 10 seconds, is not alert and oriented, cannot activate and use MDI correctly, or respiratory rate 25 breaths per minute or more, then use the equivalent nebulizer

## 2025-01-16 ENCOUNTER — TELEPHONE (OUTPATIENT)
Dept: PULMONOLOGY | Age: 55
End: 2025-01-16

## 2025-01-16 LAB
ANION GAP SERPL CALC-SCNC: 9 MEQ/L (ref 8–16)
BASOPHILS ABSOLUTE: 0 THOU/MM3 (ref 0–0.1)
BASOPHILS NFR BLD AUTO: 0 %
BUN SERPL-MCNC: 15 MG/DL (ref 7–22)
CALCIUM SERPL-MCNC: 8.7 MG/DL (ref 8.5–10.5)
CHLORIDE SERPL-SCNC: 101 MEQ/L (ref 98–111)
CO2 SERPL-SCNC: 25 MEQ/L (ref 23–33)
CREAT SERPL-MCNC: 0.9 MG/DL (ref 0.4–1.2)
DEPRECATED RDW RBC AUTO: 53.1 FL (ref 35–45)
EOSINOPHIL NFR BLD AUTO: 0 %
EOSINOPHILS ABSOLUTE: 0 THOU/MM3 (ref 0–0.4)
ERYTHROCYTE [DISTWIDTH] IN BLOOD BY AUTOMATED COUNT: 15.6 % (ref 11.5–14.5)
GFR SERPL CREATININE-BSD FRML MDRD: > 90 ML/MIN/1.73M2
GLUCOSE SERPL-MCNC: 142 MG/DL (ref 70–108)
HCT VFR BLD AUTO: 42.7 % (ref 42–52)
HGB BLD-MCNC: 13.4 GM/DL (ref 14–18)
IMM GRANULOCYTES # BLD AUTO: 0.02 THOU/MM3 (ref 0–0.07)
IMM GRANULOCYTES NFR BLD AUTO: 0.4 %
LYMPHOCYTES ABSOLUTE: 0.3 THOU/MM3 (ref 1–4.8)
LYMPHOCYTES NFR BLD AUTO: 6.7 %
MCH RBC QN AUTO: 29.3 PG (ref 26–33)
MCHC RBC AUTO-ENTMCNC: 31.4 GM/DL (ref 32.2–35.5)
MCV RBC AUTO: 93.4 FL (ref 80–94)
MONOCYTES ABSOLUTE: 0.1 THOU/MM3 (ref 0.4–1.3)
MONOCYTES NFR BLD AUTO: 2.7 %
NEUTROPHILS ABSOLUTE: 4.7 THOU/MM3 (ref 1.8–7.7)
NEUTROPHILS NFR BLD AUTO: 90.2 %
NRBC BLD AUTO-RTO: 0 /100 WBC
PLATELET # BLD AUTO: 245 THOU/MM3 (ref 130–400)
PMV BLD AUTO: 10.1 FL (ref 9.4–12.4)
POTASSIUM SERPL-SCNC: 5 MEQ/L (ref 3.5–5.2)
PROCALCITONIN SERPL IA-MCNC: 0.05 NG/ML (ref 0.01–0.09)
RBC # BLD AUTO: 4.57 MILL/MM3 (ref 4.7–6.1)
SODIUM SERPL-SCNC: 135 MEQ/L (ref 135–145)
WBC # BLD AUTO: 5.2 THOU/MM3 (ref 4.8–10.8)

## 2025-01-16 PROCEDURE — 94660 CPAP INITIATION&MGMT: CPT

## 2025-01-16 PROCEDURE — 6370000000 HC RX 637 (ALT 250 FOR IP): Performed by: INTERNAL MEDICINE

## 2025-01-16 PROCEDURE — 85025 COMPLETE CBC W/AUTO DIFF WBC: CPT

## 2025-01-16 PROCEDURE — 94640 AIRWAY INHALATION TREATMENT: CPT

## 2025-01-16 PROCEDURE — 2500000003 HC RX 250 WO HCPCS: Performed by: INTERNAL MEDICINE

## 2025-01-16 PROCEDURE — 84145 PROCALCITONIN (PCT): CPT

## 2025-01-16 PROCEDURE — 6360000002 HC RX W HCPCS: Performed by: INTERNAL MEDICINE

## 2025-01-16 PROCEDURE — 2140000000 HC CCU INTERMEDIATE R&B

## 2025-01-16 PROCEDURE — 36415 COLL VENOUS BLD VENIPUNCTURE: CPT

## 2025-01-16 PROCEDURE — 80048 BASIC METABOLIC PNL TOTAL CA: CPT

## 2025-01-16 PROCEDURE — 6370000000 HC RX 637 (ALT 250 FOR IP): Performed by: NURSE PRACTITIONER

## 2025-01-16 PROCEDURE — 2700000000 HC OXYGEN THERAPY PER DAY

## 2025-01-16 PROCEDURE — 99233 SBSQ HOSP IP/OBS HIGH 50: CPT | Performed by: INTERNAL MEDICINE

## 2025-01-16 RX ORDER — ALBUTEROL SULFATE 0.83 MG/ML
2.5 SOLUTION RESPIRATORY (INHALATION) EVERY 4 HOURS PRN
Status: DISCONTINUED | OUTPATIENT
Start: 2025-01-16 | End: 2025-01-21 | Stop reason: HOSPADM

## 2025-01-16 RX ORDER — LIDOCAINE 4 G/G
1 PATCH TOPICAL DAILY
Status: DISCONTINUED | OUTPATIENT
Start: 2025-01-16 | End: 2025-01-21 | Stop reason: HOSPADM

## 2025-01-16 RX ADMIN — ASPIRIN 325 MG: 325 TABLET, COATED ORAL at 10:32

## 2025-01-16 RX ADMIN — BUPROPION HYDROCHLORIDE 150 MG: 150 TABLET, EXTENDED RELEASE ORAL at 10:33

## 2025-01-16 RX ADMIN — CARBAMAZEPINE 200 MG: 200 TABLET ORAL at 10:32

## 2025-01-16 RX ADMIN — CARBAMAZEPINE 200 MG: 200 TABLET ORAL at 20:50

## 2025-01-16 RX ADMIN — TADALAFIL 10 MG: 20 TABLET ORAL at 10:32

## 2025-01-16 RX ADMIN — SODIUM CHLORIDE, PRESERVATIVE FREE 10 ML: 5 INJECTION INTRAVENOUS at 20:51

## 2025-01-16 RX ADMIN — DOXYCYCLINE HYCLATE 100 MG: 100 TABLET, COATED ORAL at 20:50

## 2025-01-16 RX ADMIN — SODIUM CHLORIDE, PRESERVATIVE FREE 10 ML: 5 INJECTION INTRAVENOUS at 11:46

## 2025-01-16 RX ADMIN — PRAVASTATIN SODIUM 40 MG: 40 TABLET ORAL at 20:50

## 2025-01-16 RX ADMIN — IPRATROPIUM BROMIDE AND ALBUTEROL SULFATE 1 DOSE: .5; 3 SOLUTION RESPIRATORY (INHALATION) at 12:16

## 2025-01-16 RX ADMIN — IPRATROPIUM BROMIDE AND ALBUTEROL SULFATE 1 DOSE: .5; 3 SOLUTION RESPIRATORY (INHALATION) at 20:36

## 2025-01-16 RX ADMIN — DOXYCYCLINE HYCLATE 100 MG: 100 TABLET, COATED ORAL at 10:32

## 2025-01-16 RX ADMIN — WATER 40 MG: 1 INJECTION INTRAMUSCULAR; INTRAVENOUS; SUBCUTANEOUS at 18:11

## 2025-01-16 RX ADMIN — IPRATROPIUM BROMIDE AND ALBUTEROL SULFATE 1 DOSE: .5; 3 SOLUTION RESPIRATORY (INHALATION) at 07:15

## 2025-01-16 RX ADMIN — GUAIFENESIN 600 MG: 600 TABLET, EXTENDED RELEASE ORAL at 20:50

## 2025-01-16 RX ADMIN — GUAIFENESIN 600 MG: 600 TABLET, EXTENDED RELEASE ORAL at 10:32

## 2025-01-16 RX ADMIN — RISPERIDONE 2 MG: 1 TABLET, FILM COATED ORAL at 10:32

## 2025-01-16 RX ADMIN — ACETAMINOPHEN 650 MG: 325 TABLET ORAL at 10:32

## 2025-01-16 RX ADMIN — TRAZODONE HYDROCHLORIDE 100 MG: 100 TABLET ORAL at 20:50

## 2025-01-16 RX ADMIN — WATER 1000 MG: 1 INJECTION INTRAMUSCULAR; INTRAVENOUS; SUBCUTANEOUS at 10:21

## 2025-01-16 RX ADMIN — IPRATROPIUM BROMIDE AND ALBUTEROL SULFATE 1 DOSE: .5; 3 SOLUTION RESPIRATORY (INHALATION) at 17:08

## 2025-01-16 RX ADMIN — FAMOTIDINE 20 MG: 20 TABLET, FILM COATED ORAL at 20:50

## 2025-01-16 RX ADMIN — FAMOTIDINE 20 MG: 20 TABLET, FILM COATED ORAL at 10:32

## 2025-01-16 RX ADMIN — RISPERIDONE 2 MG: 1 TABLET, FILM COATED ORAL at 20:50

## 2025-01-16 RX ADMIN — ENOXAPARIN SODIUM 40 MG: 100 INJECTION SUBCUTANEOUS at 10:33

## 2025-01-16 RX ADMIN — WATER 40 MG: 1 INJECTION INTRAMUSCULAR; INTRAVENOUS; SUBCUTANEOUS at 10:30

## 2025-01-16 RX ADMIN — WATER 40 MG: 1 INJECTION INTRAMUSCULAR; INTRAVENOUS; SUBCUTANEOUS at 05:12

## 2025-01-16 ASSESSMENT — PAIN DESCRIPTION - DESCRIPTORS: DESCRIPTORS: ACHING

## 2025-01-16 ASSESSMENT — PAIN SCALES - GENERAL: PAINLEVEL_OUTOF10: 3

## 2025-01-16 ASSESSMENT — PAIN DESCRIPTION - LOCATION: LOCATION: BACK

## 2025-01-16 ASSESSMENT — PAIN DESCRIPTION - ORIENTATION: ORIENTATION: MID

## 2025-01-16 NOTE — TELEPHONE ENCOUNTER
----- Message from Marcelo Leggett, SAVANNAH - CNP sent at 1/16/2025 11:24 AM EST -----    CXR 3 months with Jacqueline COPD exacerbation has non-invasive vent 2/2 severe COPD    Thank you,   Marcelo Leggett

## 2025-01-16 NOTE — CARE COORDINATION
01/16/25 1026   Service Assessment   Patient Orientation Alert and Oriented;Person;Place;Situation;Self   Cognition Alert   History Provided By Patient;Medical Record   Primary Caregiver Self   Accompanied By/Relationship Unaccompanied   Support Systems Parent;Family Members   Patient's Healthcare Decision Maker is: Legal Next of Kin   PCP Verified by CM Yes   Last Visit to PCP Within last 3 months   Prior Functional Level Independent in ADLs/IADLs;Bathing;Dressing;Toileting;Feeding;Cooking;Housework;Shopping;Mobility   Current Functional Level Independent in ADLs/IADLs;Toileting;Feeding;Cooking;Housework;Shopping;Mobility;Dressing;Bathing   Can patient return to prior living arrangement Yes   Ability to make needs known: Good   Family able to assist with home care needs: Yes   Would you like for me to discuss the discharge plan with any other family members/significant others, and if so, who? No   Financial Resources Medicare;Medicaid   Community Resources Other (Comment)  ([employer])   CM/SYDNEE Referral ADLs/IADLs   Social/Functional History   Active  Yes   Occupation On disability   Discharge Planning   Type of Residence House   Living Arrangements Parent   Current Services Prior To Admission Durable Medical Equipment;C-pap;Oxygen Therapy   Current DME Prior to Arrival Oxygen Therapy (Comment);Cpap  ([facility])   Potential Assistance Needed Home Care   DME Ordered? No   Potential Assistance Purchasing Medications No   Type of Home Care Services Nursing Services   Patient expects to be discharged to: House   History of falls? 0   Services At/After Discharge   Transition of Care Consult (CM Consult) Home Health   Internal Home Health Yes   Services At/After Discharge Nursing services;Home Health   Confirm Follow Up Transport Family   Condition of Participation: Discharge Planning   The Plan for Transition of Care is related to the following treatment goals: breath better   Freedom of

## 2025-01-16 NOTE — PLAN OF CARE
Problem: Respiratory - Adult  Goal: Clear lung sounds  Outcome: Progressing   Patient has diminished breath sounds with improved aeration with treatment, continue treatments.

## 2025-01-16 NOTE — DISCHARGE INSTRUCTIONS
Pulmonary Follow-up Chest X-ray  Go to outpatient radiology at [facility] 2 days prior to your appointment to obtain Chest X-ray  prior to your appointment with Jacqueline Castillo NP this Chest x-ray is done as walk-in procedure no scheduled time is required.    Please schedule follow-up in pulmonary clinic in 3 months with chest x-ray PA and lateral views.

## 2025-01-16 NOTE — TELEPHONE ENCOUNTER
3 mo hfu sched with rosina on 4.16.25 w order for cxr in epic to be completed prior- will mail out

## 2025-01-16 NOTE — PLAN OF CARE
Problem: Respiratory - Adult  Goal: Achieves optimal ventilation and oxygenation  Outcome: Progressing     Problem: Respiratory - Adult  Goal: Clear lung sounds  1/16/2025 1220 by Jasper Ibarra RCP  Outcome: Progressing  1/16/2025 0724 by Francie Lo RCP  Outcome: Progressing

## 2025-01-17 LAB
ARTERIAL PATENCY WRIST A: POSITIVE
BASE EXCESS BLDA CALC-SCNC: 6.1 MMOL/L (ref -2.5–2.5)
BDY SITE: ABNORMAL
COLLECTED BY:: ABNORMAL
DEVICE: ABNORMAL
FIO2 ON VENT O2 ANALYZER: 35 %
HCO3 BLDA-SCNC: 34 MMOL/L (ref 23–28)
PCO2 BLDA: 63 MMHG (ref 35–45)
PH BLDA: 7.35 [PH] (ref 7.35–7.45)
PO2 BLDA: 80 MMHG (ref 71–104)
SAO2 % BLDA: 95 %
VENTILATION MODE VENT: ABNORMAL
VT SETTING VENT: 480 ML

## 2025-01-17 PROCEDURE — 99232 SBSQ HOSP IP/OBS MODERATE 35: CPT | Performed by: INTERNAL MEDICINE

## 2025-01-17 PROCEDURE — 6370000000 HC RX 637 (ALT 250 FOR IP): Performed by: INTERNAL MEDICINE

## 2025-01-17 PROCEDURE — 6360000002 HC RX W HCPCS: Performed by: INTERNAL MEDICINE

## 2025-01-17 PROCEDURE — 94660 CPAP INITIATION&MGMT: CPT

## 2025-01-17 PROCEDURE — 2500000003 HC RX 250 WO HCPCS: Performed by: INTERNAL MEDICINE

## 2025-01-17 PROCEDURE — 36600 WITHDRAWAL OF ARTERIAL BLOOD: CPT

## 2025-01-17 PROCEDURE — 6360000002 HC RX W HCPCS: Performed by: NURSE PRACTITIONER

## 2025-01-17 PROCEDURE — 6370000000 HC RX 637 (ALT 250 FOR IP): Performed by: NURSE PRACTITIONER

## 2025-01-17 PROCEDURE — 82803 BLOOD GASES ANY COMBINATION: CPT

## 2025-01-17 PROCEDURE — 94640 AIRWAY INHALATION TREATMENT: CPT

## 2025-01-17 PROCEDURE — 94760 N-INVAS EAR/PLS OXIMETRY 1: CPT

## 2025-01-17 PROCEDURE — 2700000000 HC OXYGEN THERAPY PER DAY

## 2025-01-17 PROCEDURE — 2140000000 HC CCU INTERMEDIATE R&B

## 2025-01-17 RX ADMIN — TRAZODONE HYDROCHLORIDE 100 MG: 100 TABLET ORAL at 21:26

## 2025-01-17 RX ADMIN — DOXYCYCLINE HYCLATE 100 MG: 100 TABLET, COATED ORAL at 21:26

## 2025-01-17 RX ADMIN — DOCUSATE SODIUM 100 MG: 100 CAPSULE, LIQUID FILLED ORAL at 10:09

## 2025-01-17 RX ADMIN — GUAIFENESIN 600 MG: 600 TABLET, EXTENDED RELEASE ORAL at 10:09

## 2025-01-17 RX ADMIN — WATER 1000 MG: 1 INJECTION INTRAMUSCULAR; INTRAVENOUS; SUBCUTANEOUS at 10:07

## 2025-01-17 RX ADMIN — WATER 40 MG: 1 INJECTION INTRAMUSCULAR; INTRAVENOUS; SUBCUTANEOUS at 01:12

## 2025-01-17 RX ADMIN — SODIUM CHLORIDE, PRESERVATIVE FREE 10 ML: 5 INJECTION INTRAVENOUS at 10:12

## 2025-01-17 RX ADMIN — PRAVASTATIN SODIUM 40 MG: 40 TABLET ORAL at 21:26

## 2025-01-17 RX ADMIN — RISPERIDONE 2 MG: 1 TABLET, FILM COATED ORAL at 21:26

## 2025-01-17 RX ADMIN — WATER 40 MG: 1 INJECTION INTRAMUSCULAR; INTRAVENOUS; SUBCUTANEOUS at 05:36

## 2025-01-17 RX ADMIN — IPRATROPIUM BROMIDE AND ALBUTEROL SULFATE 1 DOSE: .5; 3 SOLUTION RESPIRATORY (INHALATION) at 12:38

## 2025-01-17 RX ADMIN — FAMOTIDINE 20 MG: 20 TABLET, FILM COATED ORAL at 10:10

## 2025-01-17 RX ADMIN — CARBAMAZEPINE 200 MG: 200 TABLET ORAL at 21:26

## 2025-01-17 RX ADMIN — GUAIFENESIN 600 MG: 600 TABLET, EXTENDED RELEASE ORAL at 21:26

## 2025-01-17 RX ADMIN — FAMOTIDINE 20 MG: 20 TABLET, FILM COATED ORAL at 21:26

## 2025-01-17 RX ADMIN — IPRATROPIUM BROMIDE AND ALBUTEROL SULFATE 1 DOSE: .5; 3 SOLUTION RESPIRATORY (INHALATION) at 17:45

## 2025-01-17 RX ADMIN — CARBAMAZEPINE 200 MG: 200 TABLET ORAL at 10:09

## 2025-01-17 RX ADMIN — IPRATROPIUM BROMIDE AND ALBUTEROL SULFATE 1 DOSE: .5; 3 SOLUTION RESPIRATORY (INHALATION) at 21:09

## 2025-01-17 RX ADMIN — PREDNISONE 40 MG: 20 TABLET ORAL at 16:52

## 2025-01-17 RX ADMIN — ASPIRIN 325 MG: 325 TABLET, COATED ORAL at 10:09

## 2025-01-17 RX ADMIN — RISPERIDONE 2 MG: 1 TABLET, FILM COATED ORAL at 10:09

## 2025-01-17 RX ADMIN — DOXYCYCLINE HYCLATE 100 MG: 100 TABLET, COATED ORAL at 10:11

## 2025-01-17 RX ADMIN — SODIUM CHLORIDE, PRESERVATIVE FREE 10 ML: 5 INJECTION INTRAVENOUS at 21:26

## 2025-01-17 RX ADMIN — TADALAFIL 10 MG: 20 TABLET ORAL at 10:09

## 2025-01-17 RX ADMIN — BUPROPION HYDROCHLORIDE 150 MG: 150 TABLET, EXTENDED RELEASE ORAL at 10:11

## 2025-01-17 RX ADMIN — ALBUTEROL SULFATE 2.5 MG: 2.5 SOLUTION RESPIRATORY (INHALATION) at 09:11

## 2025-01-17 RX ADMIN — IPRATROPIUM BROMIDE AND ALBUTEROL SULFATE 1 DOSE: .5; 3 SOLUTION RESPIRATORY (INHALATION) at 09:10

## 2025-01-17 NOTE — PLAN OF CARE
Problem: Respiratory - Adult  Goal: Achieves optimal ventilation and oxygenation  1/17/2025 0649 by Filiberto Cole RCP  Outcome: Progressing

## 2025-01-17 NOTE — PLAN OF CARE
Problem: Discharge Planning  Goal: Discharge to home or other facility with appropriate resources  Outcome: Progressing  Flowsheets  Taken 1/16/2025 2220 by Michelle Rosen RN  Discharge to home or other facility with appropriate resources: Identify barriers to discharge with patient and caregiver  Taken 1/16/2025 1018 by Darryl Crow RN  Discharge to home or other facility with appropriate resources: Identify barriers to discharge with patient and caregiver     Problem: Safety - Adult  Goal: Free from fall injury  Outcome: Progressing  Flowsheets (Taken 1/16/2025 2220)  Free From Fall Injury: Instruct family/caregiver on patient safety     Problem: Respiratory - Adult  Goal: Achieves optimal ventilation and oxygenation  1/16/2025 2220 by Michelle Rosen RN  Outcome: Progressing  Flowsheets  Taken 1/16/2025 2220 by Michelle Rosen RN  Achieves optimal ventilation and oxygenation:   Assess for changes in respiratory status   Assess for changes in mentation and behavior   Oxygen supplementation based on oxygen saturation or arterial blood gases  Taken 1/16/2025 1708 by Antonio Gerber RCP  Achieves optimal ventilation and oxygenation: Assess for changes in respiratory status     Care plan reviewed with patient.  Patient verbalizes understanding of the care plan and contributed to goal setting.

## 2025-01-18 LAB
ANION GAP SERPL CALC-SCNC: 10 MEQ/L (ref 8–16)
BUN SERPL-MCNC: 12 MG/DL (ref 7–22)
CALCIUM SERPL-MCNC: 8.9 MG/DL (ref 8.5–10.5)
CHLORIDE SERPL-SCNC: 94 MEQ/L (ref 98–111)
CO2 SERPL-SCNC: 31 MEQ/L (ref 23–33)
CREAT SERPL-MCNC: 0.9 MG/DL (ref 0.4–1.2)
DEPRECATED RDW RBC AUTO: 54.7 FL (ref 35–45)
ERYTHROCYTE [DISTWIDTH] IN BLOOD BY AUTOMATED COUNT: 15.9 % (ref 11.5–14.5)
GFR SERPL CREATININE-BSD FRML MDRD: > 90 ML/MIN/1.73M2
GLUCOSE SERPL-MCNC: 104 MG/DL (ref 70–108)
HCT VFR BLD AUTO: 44.1 % (ref 42–52)
HGB BLD-MCNC: 13.8 GM/DL (ref 14–18)
MCH RBC QN AUTO: 29.6 PG (ref 26–33)
MCHC RBC AUTO-ENTMCNC: 31.3 GM/DL (ref 32.2–35.5)
MCV RBC AUTO: 94.4 FL (ref 80–94)
PLATELET # BLD AUTO: 260 THOU/MM3 (ref 130–400)
PMV BLD AUTO: 10 FL (ref 9.4–12.4)
POTASSIUM SERPL-SCNC: 4.3 MEQ/L (ref 3.5–5.2)
RBC # BLD AUTO: 4.67 MILL/MM3 (ref 4.7–6.1)
SODIUM SERPL-SCNC: 135 MEQ/L (ref 135–145)
WBC # BLD AUTO: 8.3 THOU/MM3 (ref 4.8–10.8)

## 2025-01-18 PROCEDURE — 2500000003 HC RX 250 WO HCPCS: Performed by: INTERNAL MEDICINE

## 2025-01-18 PROCEDURE — 6370000000 HC RX 637 (ALT 250 FOR IP): Performed by: INTERNAL MEDICINE

## 2025-01-18 PROCEDURE — 94640 AIRWAY INHALATION TREATMENT: CPT

## 2025-01-18 PROCEDURE — 2700000000 HC OXYGEN THERAPY PER DAY

## 2025-01-18 PROCEDURE — 6360000002 HC RX W HCPCS: Performed by: INTERNAL MEDICINE

## 2025-01-18 PROCEDURE — 36415 COLL VENOUS BLD VENIPUNCTURE: CPT

## 2025-01-18 PROCEDURE — 2140000000 HC CCU INTERMEDIATE R&B

## 2025-01-18 PROCEDURE — 85027 COMPLETE CBC AUTOMATED: CPT

## 2025-01-18 PROCEDURE — 94660 CPAP INITIATION&MGMT: CPT

## 2025-01-18 PROCEDURE — 80048 BASIC METABOLIC PNL TOTAL CA: CPT

## 2025-01-18 RX ADMIN — ENOXAPARIN SODIUM 40 MG: 100 INJECTION SUBCUTANEOUS at 09:54

## 2025-01-18 RX ADMIN — GUAIFENESIN 600 MG: 600 TABLET, EXTENDED RELEASE ORAL at 09:52

## 2025-01-18 RX ADMIN — DOXYCYCLINE HYCLATE 100 MG: 100 TABLET, COATED ORAL at 21:16

## 2025-01-18 RX ADMIN — BUPROPION HYDROCHLORIDE 150 MG: 150 TABLET, EXTENDED RELEASE ORAL at 09:52

## 2025-01-18 RX ADMIN — GUAIFENESIN 600 MG: 600 TABLET, EXTENDED RELEASE ORAL at 19:51

## 2025-01-18 RX ADMIN — CARBAMAZEPINE 200 MG: 200 TABLET ORAL at 21:16

## 2025-01-18 RX ADMIN — TRAZODONE HYDROCHLORIDE 100 MG: 100 TABLET ORAL at 19:51

## 2025-01-18 RX ADMIN — TADALAFIL 10 MG: 20 TABLET ORAL at 09:53

## 2025-01-18 RX ADMIN — FAMOTIDINE 20 MG: 20 TABLET, FILM COATED ORAL at 19:51

## 2025-01-18 RX ADMIN — WATER 1000 MG: 1 INJECTION INTRAMUSCULAR; INTRAVENOUS; SUBCUTANEOUS at 09:55

## 2025-01-18 RX ADMIN — SODIUM CHLORIDE, PRESERVATIVE FREE 10 ML: 5 INJECTION INTRAVENOUS at 09:53

## 2025-01-18 RX ADMIN — PREDNISONE 40 MG: 20 TABLET ORAL at 09:49

## 2025-01-18 RX ADMIN — DOCUSATE SODIUM 100 MG: 100 CAPSULE, LIQUID FILLED ORAL at 09:49

## 2025-01-18 RX ADMIN — DOXYCYCLINE HYCLATE 100 MG: 100 TABLET, COATED ORAL at 09:52

## 2025-01-18 RX ADMIN — SODIUM CHLORIDE, PRESERVATIVE FREE 10 ML: 5 INJECTION INTRAVENOUS at 19:50

## 2025-01-18 RX ADMIN — IPRATROPIUM BROMIDE AND ALBUTEROL SULFATE 1 DOSE: .5; 3 SOLUTION RESPIRATORY (INHALATION) at 10:02

## 2025-01-18 RX ADMIN — IPRATROPIUM BROMIDE AND ALBUTEROL SULFATE 1 DOSE: .5; 3 SOLUTION RESPIRATORY (INHALATION) at 17:08

## 2025-01-18 RX ADMIN — RISPERIDONE 2 MG: 1 TABLET, FILM COATED ORAL at 09:53

## 2025-01-18 RX ADMIN — FAMOTIDINE 20 MG: 20 TABLET, FILM COATED ORAL at 09:49

## 2025-01-18 RX ADMIN — IPRATROPIUM BROMIDE AND ALBUTEROL SULFATE 1 DOSE: .5; 3 SOLUTION RESPIRATORY (INHALATION) at 13:23

## 2025-01-18 RX ADMIN — ASPIRIN 325 MG: 325 TABLET, COATED ORAL at 09:49

## 2025-01-18 RX ADMIN — CARBAMAZEPINE 200 MG: 200 TABLET ORAL at 09:52

## 2025-01-18 RX ADMIN — PRAVASTATIN SODIUM 40 MG: 40 TABLET ORAL at 19:53

## 2025-01-18 RX ADMIN — RISPERIDONE 2 MG: 1 TABLET, FILM COATED ORAL at 19:53

## 2025-01-18 NOTE — PLAN OF CARE
Problem: Chronic Conditions and Co-morbidities  Goal: Patient's chronic conditions and co-morbidity symptoms are monitored and maintained or improved  Outcome: Progressing  Flowsheets  Taken 1/18/2025 0436  Care Plan - Patient's Chronic Conditions and Co-Morbidity Symptoms are Monitored and Maintained or Improved: Monitor and assess patient's chronic conditions and comorbid symptoms for stability, deterioration, or improvement  Taken 1/17/2025 2115  Care Plan - Patient's Chronic Conditions and Co-Morbidity Symptoms are Monitored and Maintained or Improved: Monitor and assess patient's chronic conditions and comorbid symptoms for stability, deterioration, or improvement     Problem: Discharge Planning  Goal: Discharge to home or other facility with appropriate resources  Outcome: Progressing  Flowsheets  Taken 1/18/2025 0436  Discharge to home or other facility with appropriate resources:   Identify barriers to discharge with patient and caregiver   Arrange for needed discharge resources and transportation as appropriate  Taken 1/17/2025 2115  Discharge to home or other facility with appropriate resources: Identify barriers to discharge with patient and caregiver     Problem: Safety - Adult  Goal: Free from fall injury  Outcome: Progressing  Flowsheets (Taken 1/18/2025 0436)  Free From Fall Injury: Instruct family/caregiver on patient safety     Problem: Respiratory - Adult  Goal: Achieves optimal ventilation and oxygenation  Outcome: Progressing  Flowsheets (Taken 1/18/2025 0436)  Achieves optimal ventilation and oxygenation: Assess for changes in respiratory status

## 2025-01-18 NOTE — PLAN OF CARE
Problem: Respiratory - Adult  Goal: Clear lung sounds  Outcome: Progressing     Problem: Respiratory - Adult  Goal: Achieves optimal ventilation and oxygenation  1/18/2025 1005 by Jennifer Metz RCP  Outcome: Progressing

## 2025-01-19 PROCEDURE — 94640 AIRWAY INHALATION TREATMENT: CPT

## 2025-01-19 PROCEDURE — 6370000000 HC RX 637 (ALT 250 FOR IP): Performed by: INTERNAL MEDICINE

## 2025-01-19 PROCEDURE — 94760 N-INVAS EAR/PLS OXIMETRY 1: CPT

## 2025-01-19 PROCEDURE — 2500000003 HC RX 250 WO HCPCS: Performed by: INTERNAL MEDICINE

## 2025-01-19 PROCEDURE — 2140000000 HC CCU INTERMEDIATE R&B

## 2025-01-19 PROCEDURE — 2700000000 HC OXYGEN THERAPY PER DAY

## 2025-01-19 PROCEDURE — 6360000002 HC RX W HCPCS: Performed by: INTERNAL MEDICINE

## 2025-01-19 RX ADMIN — PREDNISONE 40 MG: 20 TABLET ORAL at 08:24

## 2025-01-19 RX ADMIN — SODIUM CHLORIDE, PRESERVATIVE FREE 10 ML: 5 INJECTION INTRAVENOUS at 19:50

## 2025-01-19 RX ADMIN — IPRATROPIUM BROMIDE AND ALBUTEROL SULFATE 1 DOSE: .5; 3 SOLUTION RESPIRATORY (INHALATION) at 11:32

## 2025-01-19 RX ADMIN — SODIUM CHLORIDE, PRESERVATIVE FREE 10 ML: 5 INJECTION INTRAVENOUS at 08:28

## 2025-01-19 RX ADMIN — RISPERIDONE 2 MG: 1 TABLET, FILM COATED ORAL at 08:24

## 2025-01-19 RX ADMIN — CARBAMAZEPINE 200 MG: 200 TABLET ORAL at 08:24

## 2025-01-19 RX ADMIN — GUAIFENESIN 600 MG: 600 TABLET, EXTENDED RELEASE ORAL at 19:51

## 2025-01-19 RX ADMIN — DOXYCYCLINE HYCLATE 100 MG: 100 TABLET, COATED ORAL at 08:24

## 2025-01-19 RX ADMIN — ASPIRIN 325 MG: 325 TABLET, COATED ORAL at 08:24

## 2025-01-19 RX ADMIN — ENOXAPARIN SODIUM 40 MG: 100 INJECTION SUBCUTANEOUS at 08:27

## 2025-01-19 RX ADMIN — IPRATROPIUM BROMIDE AND ALBUTEROL SULFATE 1 DOSE: .5; 3 SOLUTION RESPIRATORY (INHALATION) at 15:50

## 2025-01-19 RX ADMIN — TADALAFIL 10 MG: 20 TABLET ORAL at 08:24

## 2025-01-19 RX ADMIN — FAMOTIDINE 20 MG: 20 TABLET, FILM COATED ORAL at 08:24

## 2025-01-19 RX ADMIN — BUPROPION HYDROCHLORIDE 150 MG: 150 TABLET, EXTENDED RELEASE ORAL at 08:24

## 2025-01-19 RX ADMIN — WATER 1000 MG: 1 INJECTION INTRAMUSCULAR; INTRAVENOUS; SUBCUTANEOUS at 08:34

## 2025-01-19 RX ADMIN — PRAVASTATIN SODIUM 40 MG: 40 TABLET ORAL at 19:51

## 2025-01-19 RX ADMIN — IPRATROPIUM BROMIDE AND ALBUTEROL SULFATE 1 DOSE: .5; 3 SOLUTION RESPIRATORY (INHALATION) at 05:16

## 2025-01-19 RX ADMIN — RISPERIDONE 2 MG: 1 TABLET, FILM COATED ORAL at 19:51

## 2025-01-19 RX ADMIN — CARBAMAZEPINE 200 MG: 200 TABLET ORAL at 21:46

## 2025-01-19 RX ADMIN — DOCUSATE SODIUM 100 MG: 100 CAPSULE, LIQUID FILLED ORAL at 08:24

## 2025-01-19 RX ADMIN — TRAZODONE HYDROCHLORIDE 100 MG: 100 TABLET ORAL at 21:46

## 2025-01-19 RX ADMIN — IPRATROPIUM BROMIDE AND ALBUTEROL SULFATE 1 DOSE: .5; 3 SOLUTION RESPIRATORY (INHALATION) at 22:12

## 2025-01-19 RX ADMIN — DOXYCYCLINE HYCLATE 100 MG: 100 TABLET, COATED ORAL at 21:46

## 2025-01-19 RX ADMIN — GUAIFENESIN 600 MG: 600 TABLET, EXTENDED RELEASE ORAL at 08:24

## 2025-01-19 RX ADMIN — FAMOTIDINE 20 MG: 20 TABLET, FILM COATED ORAL at 19:51

## 2025-01-19 NOTE — PLAN OF CARE
Problem: Chronic Conditions and Co-morbidities  Goal: Patient's chronic conditions and co-morbidity symptoms are monitored and maintained or improved  Outcome: Progressing  Flowsheets (Taken 1/18/2025 2226)  Care Plan - Patient's Chronic Conditions and Co-Morbidity Symptoms are Monitored and Maintained or Improved: Monitor and assess patient's chronic conditions and comorbid symptoms for stability, deterioration, or improvement     Problem: Discharge Planning  Goal: Discharge to home or other facility with appropriate resources  Outcome: Progressing  Flowsheets (Taken 1/18/2025 2226)  Discharge to home or other facility with appropriate resources:   Identify barriers to discharge with patient and caregiver   Identify discharge learning needs (meds, wound care, etc)   Refer to discharge planning if patient needs post-hospital services based on physician order or complex needs related to functional status, cognitive ability or social support system   Arrange for needed discharge resources and transportation as appropriate     Problem: Safety - Adult  Goal: Free from fall injury  Outcome: Progressing  Flowsheets (Taken 1/18/2025 2226)  Free From Fall Injury: Instruct family/caregiver on patient safety     Problem: Respiratory - Adult  Goal: Clear lung sounds  1/18/2025 1005 by Jennifer Metz RCP  Outcome: Progressing     Problem: Respiratory - Adult  Goal: Achieves optimal ventilation and oxygenation  1/18/2025 2226 by Dilcia Luna, RN  Outcome: Progressing  Flowsheets (Taken 1/18/2025 2226)  Achieves optimal ventilation and oxygenation: Assess for changes in respiratory status

## 2025-01-20 LAB
BACTERIA BLD AEROBE CULT: NORMAL
BACTERIA BLD AEROBE CULT: NORMAL

## 2025-01-20 PROCEDURE — 94640 AIRWAY INHALATION TREATMENT: CPT

## 2025-01-20 PROCEDURE — 2140000000 HC CCU INTERMEDIATE R&B

## 2025-01-20 PROCEDURE — 6370000000 HC RX 637 (ALT 250 FOR IP): Performed by: INTERNAL MEDICINE

## 2025-01-20 PROCEDURE — 99232 SBSQ HOSP IP/OBS MODERATE 35: CPT | Performed by: INTERNAL MEDICINE

## 2025-01-20 PROCEDURE — 2500000003 HC RX 250 WO HCPCS: Performed by: INTERNAL MEDICINE

## 2025-01-20 PROCEDURE — 6370000000 HC RX 637 (ALT 250 FOR IP): Performed by: NURSE PRACTITIONER

## 2025-01-20 PROCEDURE — 2700000000 HC OXYGEN THERAPY PER DAY

## 2025-01-20 PROCEDURE — 94761 N-INVAS EAR/PLS OXIMETRY MLT: CPT

## 2025-01-20 RX ORDER — PRAVASTATIN SODIUM 40 MG
40 TABLET ORAL NIGHTLY
Qty: 30 TABLET | Refills: 3 | Status: SHIPPED | OUTPATIENT
Start: 2025-01-20

## 2025-01-20 RX ORDER — ARFORMOTEROL TARTRATE 15 UG/2ML
15 SOLUTION RESPIRATORY (INHALATION)
Qty: 360 ML | Refills: 3 | Status: SHIPPED | OUTPATIENT
Start: 2025-01-20 | End: 2025-01-20

## 2025-01-20 RX ORDER — BUDESONIDE 0.5 MG/2ML
500 INHALANT ORAL 2 TIMES DAILY
Qty: 360 ML | Refills: 3 | Status: SHIPPED | OUTPATIENT
Start: 2025-01-20 | End: 2025-01-20

## 2025-01-20 RX ORDER — BUDESONIDE 0.5 MG/2ML
500 INHALANT ORAL 2 TIMES DAILY
Qty: 360 ML | Refills: 3 | Status: SHIPPED | OUTPATIENT
Start: 2025-01-20 | End: 2025-01-20 | Stop reason: HOSPADM

## 2025-01-20 RX ORDER — IPRATROPIUM BROMIDE AND ALBUTEROL SULFATE 2.5; .5 MG/3ML; MG/3ML
1 SOLUTION RESPIRATORY (INHALATION) EVERY 4 HOURS
Qty: 360 ML | Refills: 3 | Status: SHIPPED | OUTPATIENT
Start: 2025-01-20

## 2025-01-20 RX ORDER — POTASSIUM CHLORIDE 1500 MG/1
20 TABLET, EXTENDED RELEASE ORAL
Qty: 60 TABLET | Refills: 3 | Status: SHIPPED | OUTPATIENT
Start: 2025-01-20

## 2025-01-20 RX ORDER — FUROSEMIDE 20 MG/1
20 TABLET ORAL DAILY
Qty: 60 TABLET | Refills: 3 | Status: SHIPPED | OUTPATIENT
Start: 2025-01-20

## 2025-01-20 RX ORDER — ARFORMOTEROL TARTRATE 15 UG/2ML
15 SOLUTION RESPIRATORY (INHALATION)
Qty: 360 ML | Refills: 3 | Status: SHIPPED | OUTPATIENT
Start: 2025-01-20 | End: 2025-01-20 | Stop reason: HOSPADM

## 2025-01-20 RX ORDER — CARBAMAZEPINE 200 MG/1
200 TABLET ORAL 2 TIMES DAILY
Qty: 60 TABLET | Refills: 3 | Status: SHIPPED | OUTPATIENT
Start: 2025-01-20 | End: 2025-01-20 | Stop reason: HOSPADM

## 2025-01-20 RX ORDER — FLUTICASONE PROPIONATE 220 UG/1
1 AEROSOL, METERED RESPIRATORY (INHALATION) 2 TIMES DAILY
Qty: 24 G | Refills: 3 | Status: SHIPPED | OUTPATIENT
Start: 2025-01-20

## 2025-01-20 RX ORDER — RISPERIDONE 2 MG/1
2 TABLET ORAL 2 TIMES DAILY
Qty: 60 TABLET | Refills: 3 | Status: SHIPPED | OUTPATIENT
Start: 2025-01-20

## 2025-01-20 RX ORDER — ALBUTEROL SULFATE 90 UG/1
2 INHALANT RESPIRATORY (INHALATION) EVERY 6 HOURS PRN
Qty: 51 G | Refills: 8 | Status: SHIPPED | OUTPATIENT
Start: 2025-01-20 | End: 2026-01-20

## 2025-01-20 RX ORDER — UMECLIDINIUM 62.5 UG/1
1 AEROSOL, POWDER ORAL DAILY
Qty: 30 EACH | Refills: 2 | Status: SHIPPED | OUTPATIENT
Start: 2025-01-20 | End: 2025-01-20 | Stop reason: HOSPADM

## 2025-01-20 RX ORDER — TADALAFIL 20 MG/1
10 TABLET ORAL DAILY
Qty: 30 TABLET | Refills: 3 | Status: SHIPPED | OUTPATIENT
Start: 2025-01-20 | End: 2025-09-17

## 2025-01-20 RX ORDER — BUPROPION HYDROCHLORIDE 150 MG/1
150 TABLET ORAL EVERY MORNING
Qty: 30 TABLET | Refills: 3 | Status: SHIPPED | OUTPATIENT
Start: 2025-01-20

## 2025-01-20 RX ADMIN — TRAZODONE HYDROCHLORIDE 100 MG: 100 TABLET ORAL at 21:16

## 2025-01-20 RX ADMIN — FAMOTIDINE 20 MG: 20 TABLET, FILM COATED ORAL at 21:16

## 2025-01-20 RX ADMIN — DOXYCYCLINE HYCLATE 100 MG: 100 TABLET, COATED ORAL at 08:26

## 2025-01-20 RX ADMIN — SODIUM CHLORIDE, PRESERVATIVE FREE 10 ML: 5 INJECTION INTRAVENOUS at 08:26

## 2025-01-20 RX ADMIN — SODIUM CHLORIDE, PRESERVATIVE FREE 10 ML: 5 INJECTION INTRAVENOUS at 21:17

## 2025-01-20 RX ADMIN — FAMOTIDINE 20 MG: 20 TABLET, FILM COATED ORAL at 08:26

## 2025-01-20 RX ADMIN — CARBAMAZEPINE 200 MG: 200 TABLET ORAL at 08:26

## 2025-01-20 RX ADMIN — GUAIFENESIN 600 MG: 600 TABLET, EXTENDED RELEASE ORAL at 08:26

## 2025-01-20 RX ADMIN — PRAVASTATIN SODIUM 40 MG: 40 TABLET ORAL at 21:16

## 2025-01-20 RX ADMIN — ASPIRIN 325 MG: 325 TABLET, COATED ORAL at 08:26

## 2025-01-20 RX ADMIN — IPRATROPIUM BROMIDE AND ALBUTEROL SULFATE 1 DOSE: .5; 3 SOLUTION RESPIRATORY (INHALATION) at 12:47

## 2025-01-20 RX ADMIN — IPRATROPIUM BROMIDE AND ALBUTEROL SULFATE 1 DOSE: .5; 3 SOLUTION RESPIRATORY (INHALATION) at 20:30

## 2025-01-20 RX ADMIN — IPRATROPIUM BROMIDE AND ALBUTEROL SULFATE 1 DOSE: .5; 3 SOLUTION RESPIRATORY (INHALATION) at 16:38

## 2025-01-20 RX ADMIN — IPRATROPIUM BROMIDE AND ALBUTEROL SULFATE 1 DOSE: .5; 3 SOLUTION RESPIRATORY (INHALATION) at 09:37

## 2025-01-20 RX ADMIN — RISPERIDONE 2 MG: 1 TABLET, FILM COATED ORAL at 08:26

## 2025-01-20 RX ADMIN — CARBAMAZEPINE 200 MG: 200 TABLET ORAL at 21:16

## 2025-01-20 RX ADMIN — RISPERIDONE 2 MG: 1 TABLET, FILM COATED ORAL at 21:16

## 2025-01-20 RX ADMIN — DOCUSATE SODIUM 100 MG: 100 CAPSULE, LIQUID FILLED ORAL at 08:26

## 2025-01-20 RX ADMIN — BUPROPION HYDROCHLORIDE 150 MG: 150 TABLET, EXTENDED RELEASE ORAL at 08:26

## 2025-01-20 RX ADMIN — GUAIFENESIN 600 MG: 600 TABLET, EXTENDED RELEASE ORAL at 21:16

## 2025-01-20 RX ADMIN — TADALAFIL 10 MG: 20 TABLET ORAL at 11:03

## 2025-01-20 ASSESSMENT — PAIN - FUNCTIONAL ASSESSMENT: PAIN_FUNCTIONAL_ASSESSMENT: ACTIVITIES ARE NOT PREVENTED

## 2025-01-20 ASSESSMENT — PAIN DESCRIPTION - DESCRIPTORS: DESCRIPTORS: ACHING

## 2025-01-20 ASSESSMENT — PAIN DESCRIPTION - PAIN TYPE: TYPE: ACUTE PAIN

## 2025-01-20 ASSESSMENT — PAIN DESCRIPTION - FREQUENCY: FREQUENCY: INTERMITTENT

## 2025-01-20 ASSESSMENT — PAIN SCALES - GENERAL: PAINLEVEL_OUTOF10: 7

## 2025-01-20 ASSESSMENT — PAIN DESCRIPTION - ONSET: ONSET: ON-GOING

## 2025-01-20 ASSESSMENT — PAIN DESCRIPTION - LOCATION: LOCATION: BACK

## 2025-01-20 ASSESSMENT — PAIN DESCRIPTION - ORIENTATION: ORIENTATION: LOWER;MID

## 2025-01-20 NOTE — DISCHARGE SUMMARY
40 MG tablet  Commonly known as: PROTONIX  Take 1 tablet by mouth every morning (before breakfast)               Where to Get Your Medications        These medications were sent to [facility] - [place], [place] - [address] - P [phone] - F [phone]  [address], [place] [address]      Phone: [phone]   albuterol sulfate  (90 Base) MCG/ACT inhaler  buPROPion 150 MG extended release tablet  carBAMazepine 200 MG tablet  fluticasone 220 MCG/ACT inhaler  furosemide 20 MG tablet  ipratropium 0.5 mg-albuterol 2.5 mg 0.5-2.5 (3) MG/3ML Soln nebulizer solution  potassium chloride 20 MEQ extended release tablet  pravastatin 40 MG tablet  risperiDONE 2 MG tablet  Tadalafil (PAH) 20 MG tablet         Consults:  pulmonary/intensive care    Significant Diagnostic Studies: labs:       Recent Labs     01/18/25  1140   WBC 8.3   HGB 13.8*         BMP:        Recent Labs     01/18/25  1140      K 4.3   CL 94*   CO2 31   BUN 12   CREATININE 0.9   GLUCOSE 104               Lab Results   Component Value Date/Time     MG 2.1 11/07/2022 03:33 PM      HgBA1c:          Lab Results   Component Value Date/Time     LABA1C 6.0 12/15/2019 10:00 AM      TSH:          Lab Results   Component Value Date/Time     TSH 0.411 02/25/2023 03:22 AM      FOLATE:          Lab Results   Component Value Date/Time     FOLATE 9.0 12/08/2016 10:18 AM      IRON:  No results found for: \"IRON\"  FERRITIN:          Lab Results   Component Value Date/Time     FERRITIN 344 12/11/2021 02:00 AM            Assessment and Plan:   Acute on chronic hypercapnic respiratory failure, improved  COPD with acute exacerbation.  Pulmonary arterial hypertension.              -On tadalafil  Schizoaffective disorder  MAGNO              -resolved     Treatments:   continue with bronchodilators as OP.  pt on 3 L oxygen at home  HHS at [facility] home on Oxygen.    Disposition:   home with HHS/ O2    Signed:  Elijah YIN  DC instructions

## 2025-01-20 NOTE — CARE COORDINATION
1/20/25, 2:10 PM EST    Patient goals/plan/ treatment preferences discussed by  and .  Patient goals/plan/ treatment preferences reviewed with patient/ family.  Patient/ family verbalize understanding of discharge plan and are in agreement with goal/plan/treatment preferences.  Understanding was demonstrated using the teach back method.  AVS provided by RN at time of discharge, which includes all necessary medical information pertaining to the patients current course of illness, treatment, post-discharge goals of care, and treatment preferences.     Services At/After Discharge: Home Health, Aide services, Nursing service, OT, and PT       Spoke with Sandie regarding discharge plan.  He will return home with his mother and [facility] by [facility] for RN, PT, OT, SW and aid.  Asked him what other resources he needed.  He said he needs set up with SSI and welfare.  Told him that he has Medicaid.  Gave him information on how to contact Medicaid and the  office.  He denies other needs.  He will be discharged today.

## 2025-01-20 NOTE — PROCEDURES
Patient was evaluated today for the diagnosis of  COPD/ PAH .  I entered a DME order for home oxygen at 6 lpm because the diagnosis and testing require the patient to have supplemental oxygen.  Condition will improve or be benefited by oxygen use.  The patient is  able to perform good mobility in a home setting and therefore does require the use of a portable oxygen system.  The need for this equipment was discussed with the patient and he understands and is in agreement.

## 2025-01-20 NOTE — PLAN OF CARE
Problem: Chronic Conditions and Co-morbidities  Goal: Patient's chronic conditions and co-morbidity symptoms are monitored and maintained or improved  Outcome: Progressing  Flowsheets (Taken 1/18/2025 2226 by Dilcia Luna RN)  Care Plan - Patient's Chronic Conditions and Co-Morbidity Symptoms are Monitored and Maintained or Improved: Monitor and assess patient's chronic conditions and comorbid symptoms for stability, deterioration, or improvement     Problem: Discharge Planning  Goal: Discharge to home or other facility with appropriate resources  Outcome: Progressing  Flowsheets (Taken 1/18/2025 2226 by Dilcia Luna RN)  Discharge to home or other facility with appropriate resources:   Identify barriers to discharge with patient and caregiver   Identify discharge learning needs (meds, wound care, etc)   Refer to discharge planning if patient needs post-hospital services based on physician order or complex needs related to functional status, cognitive ability or social support system   Arrange for needed discharge resources and transportation as appropriate     Problem: Safety - Adult  Goal: Free from fall injury  Outcome: Progressing  Flowsheets (Taken 1/18/2025 2226 by Dilcia Luna RN)  Free From Fall Injury: Instruct family/caregiver on patient safety     Problem: Respiratory - Adult  Goal: Achieves optimal ventilation and oxygenation  Outcome: Progressing  Flowsheets (Taken 1/18/2025 2226 by Dilcia Luna RN)  Achieves optimal ventilation and oxygenation: Assess for changes in respiratory status

## 2025-01-21 ENCOUNTER — TELEPHONE (OUTPATIENT)
Dept: PULMONOLOGY | Age: 55
End: 2025-01-21

## 2025-01-21 VITALS
SYSTOLIC BLOOD PRESSURE: 127 MMHG | BODY MASS INDEX: 25.82 KG/M2 | RESPIRATION RATE: 18 BRPM | DIASTOLIC BLOOD PRESSURE: 104 MMHG | TEMPERATURE: 98.1 F | WEIGHT: 180.34 LBS | HEART RATE: 80 BPM | HEIGHT: 70 IN | OXYGEN SATURATION: 91 %

## 2025-01-21 PROCEDURE — 6360000002 HC RX W HCPCS: Performed by: INTERNAL MEDICINE

## 2025-01-21 PROCEDURE — 6370000000 HC RX 637 (ALT 250 FOR IP): Performed by: INTERNAL MEDICINE

## 2025-01-21 PROCEDURE — 6370000000 HC RX 637 (ALT 250 FOR IP): Performed by: NURSE PRACTITIONER

## 2025-01-21 PROCEDURE — 2700000000 HC OXYGEN THERAPY PER DAY

## 2025-01-21 PROCEDURE — 94761 N-INVAS EAR/PLS OXIMETRY MLT: CPT

## 2025-01-21 PROCEDURE — 94640 AIRWAY INHALATION TREATMENT: CPT

## 2025-01-21 RX ADMIN — ENOXAPARIN SODIUM 40 MG: 100 INJECTION SUBCUTANEOUS at 08:43

## 2025-01-21 RX ADMIN — CARBAMAZEPINE 200 MG: 200 TABLET ORAL at 08:43

## 2025-01-21 RX ADMIN — BUPROPION HYDROCHLORIDE 150 MG: 150 TABLET, EXTENDED RELEASE ORAL at 08:42

## 2025-01-21 RX ADMIN — FAMOTIDINE 20 MG: 20 TABLET, FILM COATED ORAL at 08:43

## 2025-01-21 RX ADMIN — TADALAFIL 10 MG: 20 TABLET ORAL at 08:42

## 2025-01-21 RX ADMIN — IPRATROPIUM BROMIDE AND ALBUTEROL SULFATE 1 DOSE: .5; 3 SOLUTION RESPIRATORY (INHALATION) at 07:15

## 2025-01-21 RX ADMIN — GUAIFENESIN 600 MG: 600 TABLET, EXTENDED RELEASE ORAL at 08:43

## 2025-01-21 RX ADMIN — RISPERIDONE 2 MG: 1 TABLET, FILM COATED ORAL at 08:43

## 2025-01-21 RX ADMIN — DOCUSATE SODIUM 100 MG: 100 CAPSULE, LIQUID FILLED ORAL at 08:43

## 2025-01-21 RX ADMIN — ASPIRIN 325 MG: 325 TABLET, COATED ORAL at 08:43

## 2025-01-21 ASSESSMENT — PAIN SCALES - GENERAL: PAINLEVEL_OUTOF10: 0

## 2025-01-21 NOTE — PLAN OF CARE
Problem: Chronic Conditions and Co-morbidities  Goal: Patient's chronic conditions and co-morbidity symptoms are monitored and maintained or improved  Outcome: Progressing  Flowsheets (Taken 1/21/2025 0651)  Care Plan - Patient's Chronic Conditions and Co-Morbidity Symptoms are Monitored and Maintained or Improved:   Monitor and assess patient's chronic conditions and comorbid symptoms for stability, deterioration, or improvement   Collaborate with multidisciplinary team to address chronic and comorbid conditions and prevent exacerbation or deterioration   Update acute care plan with appropriate goals if chronic or comorbid symptoms are exacerbated and prevent overall improvement and discharge     Problem: Discharge Planning  Goal: Discharge to home or other facility with appropriate resources  Outcome: Progressing  Flowsheets (Taken 1/21/2025 0651)  Discharge to home or other facility with appropriate resources:   Identify barriers to discharge with patient and caregiver   Identify discharge learning needs (meds, wound care, etc)     Problem: Safety - Adult  Goal: Free from fall injury  Outcome: Progressing  Note: Bed locked & in low position, call light in reach, side-rails up x2, bed/chair alarm utilized, non-slip socks on when ambulating, reminded patient to use call light to call for assistance.      Problem: Respiratory - Adult  Goal: Clear lung sounds  1/20/2025 2032 by Sanaz Bey RCP  Outcome: Progressing     Problem: Pain  Goal: Verbalizes/displays adequate comfort level or baseline comfort level  Outcome: Progressing  Flowsheets (Taken 1/21/2025 0651)  Verbalizes/displays adequate comfort level or baseline comfort level:   Encourage patient to monitor pain and request assistance   Assess pain using appropriate pain scale   Care plan reviewed with patient.  Patient verbalizes understanding of the care plan and contributed to goal setting.

## 2025-01-21 NOTE — PLAN OF CARE
Problem: Respiratory - Adult  Goal: Clear lung sounds  1/20/2025 2032 by Sanaz Bey RCP  Outcome: Progressing    Goal: Achieves optimal ventilation and oxygenation  Outcome: Progressing

## 2025-01-21 NOTE — PLAN OF CARE
Problem: Respiratory - Adult  Goal: Clear lung sounds  1/21/2025 0723 by Leonor Perez, PORTER  Outcome: Progressing   Continue tx's to improve breath sounds, increase aeration and decrease WOB. Pt mutually agrees with goals.

## 2025-01-21 NOTE — PROGRESS NOTES
[facility]      Patient - Sandie Carrera   MRN -  [id number]   [facility] # - [id number]   - 1970      Date of Admission -  1/15/2025  3:25 AM  Date of evaluation -  2025  Room - [facility]38/038   Hospital Day - 2  Consulting - Elijah Liu MD Primary Care Physician - Elijah Liu MD     Problem List      Active Hospital Problems    Diagnosis Date Noted    Acute on chronic respiratory failure with hypoxia [J96.21] 2022     Priority: Medium    Acute and chronic respiratory failure with hypoxia [J96.21] 01/15/2025     Reason for Consult    Acute on chronic respiratory failure with hypoxia and hypercapnia   HPI   History Obtained From: Patient  and electronic medical record.    Sandie Carrera is a 54 y.o. male with past medical history of schizophrenia, COPD, PHTN group 3, JORDAN/OSH, HTN, systolic heart failure current smoker, presents to the [facility] for worsening shortness of breath.  Patient is a poor historian. Patient uses 2 L of home oxygen at rest and 4 L with activity.  Complaints that she has been having difficulty time breathing.  Initially patient was placed on 6 L of nasal cannula with saturation is 90% but saturation dropped to 86% and patient was switched to high flow nasal cannula.  And patient did not improve with high flow nasal cannula and was switched to BiPAP, with improvement in respiratory status.  CXR showed left lung base infiltrates/atelectasis changes.  ABG showed 7.3 4/54/55/30, respiratory acidosis with metabolic compensation.     Past 24 hrs   -ABG this a.m. reviewed compensated respiratory acidosis  -Patient continues to have AMS will follow simple commands difficult to communicate verbally  -Patient nods head appropriately when questioned denies pain shortness of breath or difficulty breathing  ROS limited      PMHx   Past Medical History      Diagnosis Date    Acute on chronic systolic congestive heart failure (HCC) 2019    
A home oxygen evaluation has been completed.     [x]Patient is an inpatient. It is expected that the patient will be discharged within the next 48 hours. Qualified provider to write order for home prescription if patient qualifies. Social service/care managers will arrange for home oxygen.  If patient is active, arrange for Home Medical supplier to assess for Oxygen Conserving Device per pulse oximetry.  []Patient is an outpatient. Results will be faxed to the ordering provider. Qualified provider to write order for home prescription if patient qualifies and arranges for home oxygen.    Patient was placed on room air for 2 minutes. SpO2 was 87 % on room air at rest. Patient was walked for 5 minutes. SpO2 was 80 % during walking. Patients SpO2 was below 89% and qualified for home oxygen. Oxygen was applied at 6 lpm via nasal cannula to maintain a SpO2 between 90-92% while walking. Actual SpO2 was 93 %.    If oxygen need is greater than 4 lpm the SpO2 on 4 lpm was 80%.     Note: For any SpO2 at 89% see policy and procedure for possible qualifications.  
Ambulance transport scheduled to pick pt up to return home at 12:30, verified address and confirmed with pt's sister Lauren that their mom will be at home to receive pt and assist in his care if needed when he gets home. [facility] [employer] [facility] called to notify of discharge time-confirmed with Dunia that [facility] w/ home O2 company would be notified to drop off more oxygen to pt's home pending his discharge. Pt's IV removed, telemetry removed, all pt belongings gathered and sent with pt, pt will leave w/ 1 home oxygen tank that is at bedside currently.    Pt's brother called in and states he will be picking up pt and transportation will no longer be needed. Pt's brother arrived at 1500 and attempted to wheel pt out of room with no oxygen hooked up and no prescriptions. I stopped the pt and brother and explained the importance of pt needing to be hooked up to oxygen at all times and that we are still waiting on his prescriptions for discharge.     Prescriptions dropped off to room at 1515, I educated pt and brother on the oxygen tank use and pt's required settings, alos updated that home oxygen would be delivered to his residence when he gets home for continued use. This RN called home oxygen company to confirm pt discharge time to allow for oxygen delivery, and called [facility] to cancel transport. Pt and borther confirm understanding of discharge instructions regarding oxygen and what to do in case of emergency. Pt escorted via wheelchair to ride home with brother hooked up to his home oxygen tank in stable condition.  
Attempted to call family no less than 4 times since Pt finished home O2 eval. Mother's number is not in service. Spoke with one sister x1, she stated she would call back with information from Mother or Brother. No call was received. All other attempts to contact unsuccessful.  
CLINICAL PHARMACY: DISCHARGE MED RECONCILIATION/REVIEW    [facility] Select Patient?: Yes  Total # of Interventions Recommended: 0   -   Total # Interventions Accepted: 0  Intervention Severity:   - Level 1 Intervention Present?: No   - Level 2 #: 0   - Level 3 #: 0   Time Spent (min): 15    Amanda Ricardo, PharmD 1/20/2025 12:47 PM      
[facility]   PROGRESS NOTE      Patient: Sandie Carrera  Room #: 3B-38/038-A            YOB: 1970  Age: 54 y.o.  Gender: male            Admit Date & Time: 1/15/2025  3:25 AM    Assessment:    The patient declined a visit today.    Interventions:  The patient was provided information about Spiritual Care being available.     Outcomes:  The  wished the patient a positive day.     Plan:  1.Spiritual care will continue to follow the patient according to [facility] spiritual care SOP.       Electronically signed by Chaplain Hillary, on 1/17/2025 at 1:39 PM.  Spiritual Care Department  [facility]  [phone]    
INTERNAL MEDICINE Progress Note  1/16/2025 3:32 PM  Subjective:   Admit Date: 1/15/2025  PCP: Elijah Liu MD  Interval History:   Feels better  SOB+    Objective:   Vitals: /88   Pulse 84   Temp 98.3 °F (36.8 °C) (Oral)   Resp 18   Ht 1.778 m (5' 10\")   Wt 82.8 kg (182 lb 8.7 oz)   SpO2 98%   BMI 26.19 kg/m²   General appearance: alert and cooperative with exam  HEENT: Head: atraumatic  Neck: no adenopathy, no carotid bruit, and no JVD  Lungs: diminished breath sounds bilaterally  Heart: S1, S2 normal  Abdomen: soft, non-tender; bowel sounds normal; no masses,  no organomegaly  Extremities: extremities normal, atraumatic, no cyanosis or edema  Neurologic: Mental status: alertness: alert, orientation: person, place, affect: normal, thought content exhibits tangential connections, loose associations      Medications:   Scheduled Meds:   lidocaine  1 patch TransDERmal Daily    sodium chloride flush  5-40 mL IntraVENous 2 times per day    enoxaparin  40 mg SubCUTAneous Daily    cefTRIAXone (ROCEPHIN) IV  1,000 mg IntraVENous Q24H    famotidine  20 mg Oral BID    guaiFENesin  600 mg Oral BID    methylPREDNISolone  40 mg IntraVENous Q6H    Followed by    [START ON 1/17/2025] predniSONE  40 mg Oral Daily    risperiDONE  2 mg Oral BID    aspirin  325 mg Oral Daily    buPROPion  150 mg Oral QAM    carBAMazepine  200 mg Oral BID    docusate sodium  100 mg Oral Daily    nicotine  1 patch TransDERmal Nightly    pravastatin  40 mg Oral Nightly    Tadalafil (PAH)  10 mg Oral Daily    traZODone  100 mg Oral Nightly    doxycycline hyclate  100 mg Oral 2 times per day    ipratropium 0.5 mg-albuterol 2.5 mg  1 Dose Inhalation Q4H WA RT     Continuous Infusions:   sodium chloride         Lab Results:   CBC:   Recent Labs     01/15/25  0340 01/16/25  0440   WBC 8.2 5.2   HGB 14.2 13.4*    245     BMP:    Recent Labs     01/15/25  0340 01/16/25  0440    135   K 4.2 5.0   CL 99 101   CO2 26 25   BUN 15 15 
INTERNAL MEDICINE Progress Note  1/17/2025 3:02 PM  Subjective:   Admit Date: 1/15/2025  PCP: Elijah Liu MD  Interval History:   Feels better  SOB+    Objective:   Vitals: /62   Pulse 100   Temp 98.2 °F (36.8 °C) (Oral)   Resp 20   Ht 1.778 m (5' 10\")   Wt 82.8 kg (182 lb 8.7 oz)   SpO2 94%   BMI 26.19 kg/m²   General appearance: alert and cooperative with exam  HEENT: Head: atraumatic  Neck: no adenopathy, no carotid bruit, and no JVD  Lungs: diminished breath sounds bilaterally  Heart: S1, S2 normal  Abdomen: soft, non-tender; bowel sounds normal; no masses,  no organomegaly  Extremities: extremities normal, atraumatic, no cyanosis or edema  Neurologic: Mental status: alertness: alert, orientation: person, place, affect: normal, thought content exhibits tangential connections, loose associations      Medications:   Scheduled Meds:   lidocaine  1 patch TransDERmal Daily    sodium chloride flush  5-40 mL IntraVENous 2 times per day    enoxaparin  40 mg SubCUTAneous Daily    cefTRIAXone (ROCEPHIN) IV  1,000 mg IntraVENous Q24H    famotidine  20 mg Oral BID    guaiFENesin  600 mg Oral BID    predniSONE  40 mg Oral Daily    risperiDONE  2 mg Oral BID    aspirin  325 mg Oral Daily    buPROPion  150 mg Oral QAM    carBAMazepine  200 mg Oral BID    docusate sodium  100 mg Oral Daily    nicotine  1 patch TransDERmal Nightly    pravastatin  40 mg Oral Nightly    Tadalafil (PAH)  10 mg Oral Daily    traZODone  100 mg Oral Nightly    doxycycline hyclate  100 mg Oral 2 times per day    ipratropium 0.5 mg-albuterol 2.5 mg  1 Dose Inhalation Q4H WA RT     Continuous Infusions:   sodium chloride         Lab Results:   CBC:   Recent Labs     01/15/25  0340 01/16/25  0440   WBC 8.2 5.2   HGB 14.2 13.4*    245     BMP:    Recent Labs     01/15/25  0340 01/16/25  0440    135   K 4.2 5.0   CL 99 101   CO2 26 25   BUN 15 15   CREATININE 1.6* 0.9   GLUCOSE 88 142*     Hepatic:   Recent Labs     
INTERNAL MEDICINE Progress Note  1/18/2025 11:25 AM  Subjective:   Admit Date: 1/15/2025  PCP: Elijah Liu MD  Interval History:   Remains on oxygen 3 L via nasal cannula.  SOB+    Objective:   Vitals: BP (!) 135/97   Pulse 75   Temp 97.7 °F (36.5 °C) (Oral)   Resp 18   Ht 1.778 m (5' 10\")   Wt 82.8 kg (182 lb 8.7 oz)   SpO2 96%   BMI 26.19 kg/m²   General appearance: alert and cooperative with exam  HEENT:  atraumatic  Neck: no adenopathy, no carotid bruit, and no JVD  Lungs: diminished breath sounds bilaterally  Heart: S1, S2 normal  Abdomen: soft, non-tender; bowel sounds normal; no masses,  no organomegaly  Extremities: extremities normal, atraumatic, no cyanosis or edema  Neurologic:  alert, orientation: person, place, affect: normal, thought content exhibits tangential connections, loose associations      Medications:   Scheduled Meds:   lidocaine  1 patch TransDERmal Daily    sodium chloride flush  5-40 mL IntraVENous 2 times per day    enoxaparin  40 mg SubCUTAneous Daily    cefTRIAXone (ROCEPHIN) IV  1,000 mg IntraVENous Q24H    famotidine  20 mg Oral BID    guaiFENesin  600 mg Oral BID    predniSONE  40 mg Oral Daily    risperiDONE  2 mg Oral BID    aspirin  325 mg Oral Daily    buPROPion  150 mg Oral QAM    carBAMazepine  200 mg Oral BID    docusate sodium  100 mg Oral Daily    nicotine  1 patch TransDERmal Nightly    pravastatin  40 mg Oral Nightly    Tadalafil (PAH)  10 mg Oral Daily    traZODone  100 mg Oral Nightly    doxycycline hyclate  100 mg Oral 2 times per day    ipratropium 0.5 mg-albuterol 2.5 mg  1 Dose Inhalation Q4H WA RT     Continuous Infusions:   sodium chloride         Lab Results:   CBC:   Recent Labs     01/16/25  0440   WBC 5.2   HGB 13.4*        BMP:    Recent Labs     01/16/25  0440      K 5.0      CO2 25   BUN 15   CREATININE 0.9   GLUCOSE 142*     Hepatic:   No results for input(s): \"AST\", \"ALT\", \"BILITOT\", \"ALKPHOS\" in the last 72 hours.    Invalid 
INTERNAL MEDICINE Progress Note  1/19/2025 1:01 PM  Subjective:   Admit Date: 1/15/2025  PCP: Elijah Liu MD  Interval History:   On oxygen 3 L via nasal cannula.  SOB+  No cough, no CP    Objective:   Vitals: /71   Pulse 96   Temp 98.2 °F (36.8 °C)   Resp 18   Ht 1.778 m (5' 10\")   Wt 82.8 kg (182 lb 8.7 oz)   SpO2 94%   BMI 26.19 kg/m²   General appearance: alert and cooperative with exam  HEENT:  atraumatic  Neck: no adenopathy, no carotid bruit, and no JVD  Lungs: diminished breath sounds bilaterally  Heart: S1, S2 normal  Abdomen: soft, non-tender; bowel sounds normal; no masses,  no organomegaly  Extremities: extremities normal, atraumatic, no cyanosis or edema  Neurologic:  alert, orientation: person, place, affect: normal,   thought content exhibits tangential connections, loose associations      Medications:   Scheduled Meds:   lidocaine  1 patch TransDERmal Daily    sodium chloride flush  5-40 mL IntraVENous 2 times per day    enoxaparin  40 mg SubCUTAneous Daily    famotidine  20 mg Oral BID    guaiFENesin  600 mg Oral BID    risperiDONE  2 mg Oral BID    aspirin  325 mg Oral Daily    buPROPion  150 mg Oral QAM    carBAMazepine  200 mg Oral BID    docusate sodium  100 mg Oral Daily    nicotine  1 patch TransDERmal Nightly    pravastatin  40 mg Oral Nightly    Tadalafil (PAH)  10 mg Oral Daily    traZODone  100 mg Oral Nightly    doxycycline hyclate  100 mg Oral 2 times per day    ipratropium 0.5 mg-albuterol 2.5 mg  1 Dose Inhalation Q4H WA RT     Continuous Infusions:   sodium chloride         Lab Results:   CBC:   Recent Labs     01/18/25  1140   WBC 8.3   HGB 13.8*        BMP:    Recent Labs     01/18/25  1140      K 4.3   CL 94*   CO2 31   BUN 12   CREATININE 0.9   GLUCOSE 104       Lab Results   Component Value Date/Time    MG 2.1 11/07/2022 03:33 PM     HgBA1c:    Lab Results   Component Value Date/Time    LABA1C 6.0 12/15/2019 10:00 AM     TSH:    Lab Results 
Pneumonia     Schizophrenia (HCC)       Past Surgical History    History reviewed. No pertinent surgical history.  Meds    Current Medications    lidocaine  1 patch TransDERmal Daily    sodium chloride flush  5-40 mL IntraVENous 2 times per day    enoxaparin  40 mg SubCUTAneous Daily    cefTRIAXone (ROCEPHIN) IV  1,000 mg IntraVENous Q24H    famotidine  20 mg Oral BID    guaiFENesin  600 mg Oral BID    methylPREDNISolone  40 mg IntraVENous Q6H    Followed by    [START ON 1/17/2025] predniSONE  40 mg Oral Daily    risperiDONE  2 mg Oral BID    aspirin  325 mg Oral Daily    buPROPion  150 mg Oral QAM    carBAMazepine  200 mg Oral BID    docusate sodium  100 mg Oral Daily    nicotine  1 patch TransDERmal Nightly    pravastatin  40 mg Oral Nightly    Tadalafil (PAH)  10 mg Oral Daily    traZODone  100 mg Oral Nightly    doxycycline hyclate  100 mg Oral 2 times per day    ipratropium 0.5 mg-albuterol 2.5 mg  1 Dose Inhalation Q4H WA RT     sodium chloride flush, sodium chloride, ondansetron **OR** ondansetron, polyethylene glycol, acetaminophen **OR** acetaminophen, albuterol  IV Drips/Infusions   sodium chloride       Home Medications  Medications Prior to Admission: budesonide (PULMICORT) 0.5 MG/2ML nebulizer suspension, Take 2 mLs by nebulization 2 times daily  arformoterol tartrate (BROVANA) 15 MCG/2ML NEBU, Take 2 mLs by nebulization in the morning and 2 mLs in the evening.  SPIRIVA HANDIHALER 18 MCG inhalation capsule, Inhale 1 capsule into the lungs daily  risperiDONE (RISPERDAL) 2 MG tablet, Take 1 tablet by mouth 2 times daily  aspirin 325 MG EC tablet, Take 1 tablet by mouth daily  buPROPion (WELLBUTRIN XL) 150 MG extended release tablet, Take 1 tablet by mouth every morning  carBAMazepine (TEGRETOL) 200 MG tablet, Take 1 tablet by mouth 2 times daily  furosemide (LASIX) 20 MG tablet, Take 1 tablet by mouth daily  nicotine (NICODERM CQ) 14 MG/24HR, Place 1 patch onto the skin at bedtime  progesterone 
TSH 0.411 02/25/2023 03:22 AM     FOLATE:    Lab Results   Component Value Date/Time    FOLATE 9.0 12/08/2016 10:18 AM     IRON:  No results found for: \"IRON\"  FERRITIN:    Lab Results   Component Value Date/Time    FERRITIN 344 12/11/2021 02:00 AM         Assessment and Plan:   Acute on chronic hypercapnic respiratory failure, improved  COPD with acute exacerbation.  Pulmonary arterial hypertension.   -On tadalafil  Schizoaffective disorder  MAGNO   -resolved     continue with bronchodilators as OP.  pt on 3 L oxygen at home  HHS at [facility] home on Oxygen.      Elijah Liu MD, MD    
WHO group III.  Secondary to COPD.  -HTN  -Chronic systolic heart dysfunction  -Schizophrenia    Plan   -Monitor SpO2 continue to wean for SpO2 goal greater than 90%  -Discussed with patient and RN family unable bringing patient home NIV machine patient baseline settings as listed above discussed with RCP Antonio Galvez and Leonarda orders in place for NIV with EPAP of 6 pressure support 12-18 respiratory rate 18 with 32% FiO2 -this morning's ABG showed compensated respiratory acidosis after using NIV with the above-noted settings  -Antibiotics per primary team adjust based on pending cultures continue on doxycycline, ceftriaxone   -prednisone tablet 40mg for total of 5 days steroid therapy and date in place  -DuoNeb q4hr WA  - albuterol nebs every 4 hours PRN shortness of breath/wheeze  -Stable from pulmonary standpoint will have patient schedule to follow-up in pulmonary clinic in 3 months with chest x-ray PA and lateral with Ms. Jacqueline Castillo CNP's clinic.    Questions and concerns addressed.  Electronically signed by   Gm Castaneda MD on 1/20/2025 at 8:26 PM

## 2025-01-21 NOTE — CARE COORDINATION
1/21/25, 11:08 AM EST    Patient goals/plan/ treatment preferences discussed by  and .  Patient goals/plan/ treatment preferences reviewed with patient/ family.  Patient/ family verbalize understanding of discharge plan and are in agreement with goal/plan/treatment preferences.  Understanding was demonstrated using the teach back method.  AVS provided by RN at time of discharge, which includes all necessary medical information pertaining to the patients current course of illness, treatment, post-discharge goals of care, and treatment preferences.     Services At/After Discharge: Home Health, Aide services, In ambulette, Nursing service, OT, and PT    Sandie will be discharged to home today.  Set up an ambulette for 12:30 pm. RN called and verified address and that patients mother would be home to let him in.  He will have [facility] by [facility] for RN, PT, OT, aid and SW.

## 2025-01-21 NOTE — TELEPHONE ENCOUNTER
----- Message from Dr. Gm Castaneda MD sent at 1/20/2025  8:33 PM EST -----  Regarding: Needs follow up with Ms. Jacqueline Bob,    Please schedule follow-up with Ms. Jacqueline Castillo CNP in pulmonary clinic in 3 months with chest x-ray PA and lateral to be done at least 2 days before clinic to follow pneumonia.  He was discharged today on 28 January 2025.    Leonel

## 2025-01-22 ENCOUNTER — CARE COORDINATION (OUTPATIENT)
Dept: CASE MANAGEMENT | Age: 55
End: 2025-01-22

## 2025-01-22 NOTE — CARE COORDINATION
Care Transitions Note    Initial Call - Call within 2 business days of discharge: Yes    Attempted to reach patient for transitions of care follow up. Unable to reach patient, VM not set up.  Other number not valid.  Mother's number not valid.  Spoke with sister, Marianela (TABATHA) who lives in [place].  She gave me phone number for her brother, Marcelo, AKUA not set up on his phone.  Confirmed HH referral with [facility] .    Outreach Attempts:   Unable to leave message.     Patient: Sandie Carrera    Patient : 1970   MRN: [id number]    Reason for Admission: Acute on chronic hypoxic respiratory failure   Discharge Date: 25  RURS: Readmission Risk Score: 10.7    Last Discharge Facility       Date Complaint Diagnosis Description Type Department Provider    1/15/25 Shortness of Breath Acute on chronic hypoxic respiratory failure ... ED to Hosp-Admission (Discharged) (ADMITTED) [facility] Elijah Liu MD; Bran Braxton...            Was this an external facility discharge? No    Follow Up Appointment:   Patient does not have a follow up appointment scheduled at time of call.  Not able to reach  Future Appointments         Provider Specialty Dept Phone    2025 1:30 PM Jacqueline Castillo, APRN - CNP Pulmonology [phone]            Plan for follow-up on next business day.      Ellie Boogie RN

## 2025-01-23 ENCOUNTER — CARE COORDINATION (OUTPATIENT)
Dept: CASE MANAGEMENT | Age: 55
End: 2025-01-23

## 2025-01-23 NOTE — CARE COORDINATION
Care Transitions Note    Initial Call - Call within 2 business days of discharge: Yes-2nd attempt    Attempted to reach patient for transitions of care follow up. Unable to reach patient. If no return call, CTN will sign off-2nd attempt.  Will send to Flora BARRAZA for possible ACM enrollment.    Outreach Attempts:   HIPAA compliant voicemail left for patient.     Patient: Sandie Carrera    Patient : 1970   MRN: [id number]    Reason for Admission: Acute on chronic hypoxic respiratory failure   Discharge Date: 25  RURS: Readmission Risk Score: 10.7    Last Discharge Facility       Date Complaint Diagnosis Description Type Department Provider    1/15/25 Shortness of Breath Acute on chronic hypoxic respiratory failure ... ED to Hosp-Admission (Discharged) (ADMITTED) Elijah Wu MD; Bran Braxton...            Was this an external facility discharge? No    Follow Up Appointment:   Patient does not have a follow up appointment scheduled at time of call.  Unable to reach  Future Appointments         Provider Specialty Dept Phone    2025 1:30 PM Jacqueline Castillo, APRN - [facility] Pulmonology [phone]            No further follow-up call indicated     Ellie Boogie RN

## 2025-01-27 ENCOUNTER — CARE COORDINATION (OUTPATIENT)
Dept: CARE COORDINATION | Age: 55
End: 2025-01-27

## 2025-01-27 SDOH — SOCIAL STABILITY: SOCIAL NETWORK: ARE YOU MARRIED, WIDOWED, DIVORCED, SEPARATED, NEVER MARRIED, OR LIVING WITH A PARTNER?: NEVER MARRIED

## 2025-01-27 SDOH — HEALTH STABILITY: MENTAL HEALTH: HOW OFTEN DO YOU HAVE A DRINK CONTAINING ALCOHOL?: NEVER

## 2025-01-27 SDOH — ECONOMIC STABILITY: INCOME INSECURITY: IN THE LAST 12 MONTHS, WAS THERE A TIME WHEN YOU WERE NOT ABLE TO PAY THE MORTGAGE OR RENT ON TIME?: NO

## 2025-01-27 SDOH — SOCIAL STABILITY: SOCIAL NETWORK: HOW OFTEN DO YOU ATTEND CHURCH OR RELIGIOUS SERVICES?: MORE THAN 4 TIMES PER YEAR

## 2025-01-27 SDOH — ECONOMIC STABILITY: FOOD INSECURITY: WITHIN THE PAST 12 MONTHS, YOU WORRIED THAT YOUR FOOD WOULD RUN OUT BEFORE YOU GOT MONEY TO BUY MORE.: NEVER TRUE

## 2025-01-27 SDOH — SOCIAL STABILITY: SOCIAL NETWORK
IN A TYPICAL WEEK, HOW MANY TIMES DO YOU TALK ON THE PHONE WITH FAMILY, FRIENDS, OR NEIGHBORS?: MORE THAN THREE TIMES A WEEK

## 2025-01-27 SDOH — HEALTH STABILITY: PHYSICAL HEALTH: ON AVERAGE, HOW MANY MINUTES DO YOU ENGAGE IN EXERCISE AT THIS LEVEL?: 30 MIN

## 2025-01-27 SDOH — ECONOMIC STABILITY: INCOME INSECURITY: HOW HARD IS IT FOR YOU TO PAY FOR THE VERY BASICS LIKE FOOD, HOUSING, MEDICAL CARE, AND HEATING?: NOT VERY HARD

## 2025-01-27 SDOH — ECONOMIC STABILITY: FOOD INSECURITY: WITHIN THE PAST 12 MONTHS, THE FOOD YOU BOUGHT JUST DIDN'T LAST AND YOU DIDN'T HAVE MONEY TO GET MORE.: NEVER TRUE

## 2025-01-27 SDOH — HEALTH STABILITY: MENTAL HEALTH
STRESS IS WHEN SOMEONE FEELS TENSE, NERVOUS, ANXIOUS, OR CAN'T SLEEP AT NIGHT BECAUSE THEIR MIND IS TROUBLED. HOW STRESSED ARE YOU?: ONLY A LITTLE

## 2025-01-27 SDOH — SOCIAL STABILITY: SOCIAL NETWORK
DO YOU BELONG TO ANY CLUBS OR ORGANIZATIONS SUCH AS CHURCH GROUPS UNIONS, FRATERNAL OR ATHLETIC GROUPS, OR SCHOOL GROUPS?: YES

## 2025-01-27 SDOH — HEALTH STABILITY: PHYSICAL HEALTH: ON AVERAGE, HOW MANY DAYS PER WEEK DO YOU ENGAGE IN MODERATE TO STRENUOUS EXERCISE (LIKE A BRISK WALK)?: 3 DAYS

## 2025-01-27 SDOH — SOCIAL STABILITY: SOCIAL NETWORK: HOW OFTEN DO YOU ATTENT MEETINGS OF THE CLUB OR ORGANIZATION YOU BELONG TO?: MORE THAN 4 TIMES PER YEAR

## 2025-01-27 SDOH — ECONOMIC STABILITY: TRANSPORTATION INSECURITY
IN THE PAST 12 MONTHS, HAS LACK OF TRANSPORTATION KEPT YOU FROM MEETINGS, WORK, OR FROM GETTING THINGS NEEDED FOR DAILY LIVING?: NO

## 2025-01-27 SDOH — SOCIAL STABILITY: SOCIAL NETWORK: HOW OFTEN DO YOU GET TOGETHER WITH FRIENDS OR RELATIVES?: MORE THAN THREE TIMES A WEEK

## 2025-01-27 SDOH — ECONOMIC STABILITY: TRANSPORTATION INSECURITY
IN THE PAST 12 MONTHS, HAS THE LACK OF TRANSPORTATION KEPT YOU FROM MEDICAL APPOINTMENTS OR FROM GETTING MEDICATIONS?: NO

## 2025-01-27 SDOH — HEALTH STABILITY: MENTAL HEALTH: HOW MANY STANDARD DRINKS CONTAINING ALCOHOL DO YOU HAVE ON A TYPICAL DAY?: PATIENT DOES NOT DRINK

## 2025-01-27 ASSESSMENT — ENCOUNTER SYMPTOMS: DYSPNEA ASSOCIATED WITH: EXERTION

## 2025-01-27 NOTE — CARE COORDINATION
Ambulatory Care Coordination Note     2025 4:46 PM     Patient Current Location:  Home: [facility] [place] [address]     This patient was received as a referral from Care Transition Nurse.    ACM contacted the patient by telephone. Verified name and  with patient as identifiers. Provided introduction to self, and explanation of the ACM role.    Patient and his mother  accepted care management services at this time.          ACM: Amanda Liriano RN     Challenges to be reviewed by the provider   Additional needs identified to be addressed with provider No    none         Method of communication with provider: none.    Utilization: Initial Call - N/A    Care Summary Note: Patient was called for Care Coordination enrollment s/p recent CTN referral for assistance with the management of his CHF, COPD, healthcare needs, and in f/u to recent STR hospital discharge for Hypoxia and Acute Resp Failure.  Care Coordination program was reviewed with patient and he then requested ACM speak with his mother, Maureen, and program was also reviewed with her and initial eval information obtained.  Maureen shared patient is doing better since returning home, and she denied patient having any c/o increased/worsening SOB, cough, or swelling being present.  Maureen shared they do not currently have a scale available, but she is looking into purchasing a new scale.  ACM educated Maureen on the importance of daily weight monitoring and what to report and when to follow up.  Maureen acknowledged understanding.  ACM educated Maureen on signs and symptoms they should report and the importance of early symptom recognition and follow up.  Maureen acknowledged understanding.  Maureen shared patient is scheduled to see PCP on 2025 and she denied any concerns with getting patient to his appointment.  Maureen shared she assists patient with his medication management and Med Rec was completed.  Maureen was encouraged to bring current med list with them to

## 2025-02-05 ENCOUNTER — CARE COORDINATION (OUTPATIENT)
Dept: CARE COORDINATION | Age: 55
End: 2025-02-05

## 2025-02-05 ASSESSMENT — ENCOUNTER SYMPTOMS: DYSPNEA ASSOCIATED WITH: EXERTION

## 2025-02-05 NOTE — CARE COORDINATION
Ambulatory Care Coordination Note     2025 3:33 PM     Patient Current Location:  Home: [address] [place] [address] [place] [address]     ACM contacted the patient by telephone. Verified name and  with patient as identifiers.         ACM: Amanda Liriano RN     Challenges to be reviewed by the provider   Additional needs identified to be addressed with provider No    none         Method of communication with provider: none.    Utilization: Patient has not had any utilization since our last call.     Care Summary Note: Patient was called for continued Care Coordination follow up and education re: the management of his CHF, COPD, and healthcare needs.  ACM spoke with patient and his mother, Maureen, per patient's request.  Maureen and patient shared he continues to do well at home, and both denied any recent c/o increased/worsening SOB, cough, or swelling being present.  ACM educated patient on zone information, including signs and symptoms they should report and the importance of early symptom recognition and follow up as well as the need to call for earlier appointments with any new/worsening of symptoms and patient/mother acknowledged understanding.  Maureen shared patient has not had recent f/u with PCP and ACM educated patient/mother on the importance of following up routinely as scheduled and they verbalized understanding.  Patient and mother denied any other questions, concerns, or needs and they were encouraged to call with any that may develop.     Offered patient enrollment in the Remote Patient Monitoring (RPM) program for in-home monitoring: Yes, but did not enroll at this time: Will monitor .     Assessments Completed:   Care Coordination Interventions    Referral from Primary Care Provider: No  Suggested Interventions and Community Resources  Behavorial Health: Completed (Comment: [facility] (TJ every 3 months) - 2025)  Disease Specific Clinic: Not Started  Home Health Services: Completed (Comment: [facility] -

## 2025-02-10 ENCOUNTER — CARE COORDINATION (OUTPATIENT)
Dept: CARE COORDINATION | Age: 55
End: 2025-02-10

## 2025-02-10 NOTE — CARE COORDINATION
Attempted to reach patient for continued Care Coordination follow up and education.  Patient was unavailable at the time of my call, and voicemail is not set up.

## 2025-02-17 ENCOUNTER — CARE COORDINATION (OUTPATIENT)
Dept: CARE COORDINATION | Age: 55
End: 2025-02-17

## 2025-02-17 NOTE — CARE COORDINATION
Ambulatory Care Coordination Note     2/17/2025 3:21 PM     Patient outreach attempt by this ACM today to offer care management services. ACM was unable to reach the patient by telephone today;   left voice message requesting a return phone call to this ACM.     ACM: Amanda Liriano RN     Care Summary Note: Attempted to reach patient for continued Care Coordination follow up and education re: the management of his CHF, COPD, and healthcare needs.  Will continue to work to f/u with patient in the future.     PCP/Specialist follow up:   Future Appointments         Provider Specialty Dept Phone    4/16/2025 1:30 PM Jacqueline Castillo, APRN - [facility] [phone]            Follow Up:   Plan for next AC outreach in approximately 1 week to complete:  - Disease specific assessments - CHF, COPD, Schizophrenia, & recent hospital stay  - Advance care planning - Pending Review  - Goal progression  - Education   - RPM - will monitor (PCP is affiliate provider)  - Monitor for additional needs  - Monitor for readiness to discharge from Care Coordination

## 2025-02-24 ENCOUNTER — CARE COORDINATION (OUTPATIENT)
Dept: CARE COORDINATION | Age: 55
End: 2025-02-24

## 2025-02-24 NOTE — CARE COORDINATION
Ambulatory Care Coordination Note     2/24/2025 2:48 PM     Patient outreach attempt by this ACM today to perform care management follow up . ACM was unable to reach the patient by telephone today;   left voice message requesting a return phone call to this ACM.     ACM: Amanda Liriano RN     Care Summary Note: Attempted to reach patient for continued Care Coordination follow up and education re: the management of his CHF, COPD, and healthcare needs.  Will continue to work to f/u with patient in the future.     PCP/Specialist follow up:   Future Appointments         Provider Specialty Dept Phone    4/16/2025 1:30 PM Jacqueline Castillo, APRN - [facility] [phone]            Follow Up:   Plan for next AC outreach in approximately 1 week to complete:  - Disease specific assessments - CHF, COPD, Schizophrenia, & recent hospital stay  - Advance care planning - Pending Review  - Goal progression  - Education   - RPM - will monitor (PCP is affiliate provider)  - Monitor for additional needs  - Monitor for readiness to discharge from Care Coordination

## 2025-03-04 ENCOUNTER — CARE COORDINATION (OUTPATIENT)
Dept: CARE COORDINATION | Age: 55
End: 2025-03-04

## 2025-03-04 NOTE — CARE COORDINATION
Ambulatory Care Coordination Note     3/4/2025 1:50 PM     Patient  outreach attempt by this [facility] today to perform care management follow up . [facility] was unable to reach the patient by telephone today;   left voice message requesting a return phone call to this [facility].     Patient closed (unable to reach patient) from the High Risk Care Management program on 3/5/2025.    Final attempt to reach patient for continued Care Coordination follow up and education re: the management of his CHF, COPD, and ongoing healthcare needs.  Patient was unavailable at the time of [facility] call, and generic voicemail message was left asking patient to please return call to [facility] direct number.  [facility] has not been able to reach patient or family s/p multiple recent attempts and no further f/u is planned if no response received.  Will discharge from Care Coordination if no return call.  No further Ambulatory Care Manager follow up scheduled.

## 2025-06-09 ENCOUNTER — APPOINTMENT (OUTPATIENT)
Dept: GENERAL RADIOLOGY | Age: 55
DRG: 189 | End: 2025-06-09
Payer: MEDICARE

## 2025-06-09 ENCOUNTER — HOSPITAL ENCOUNTER (INPATIENT)
Age: 55
LOS: 7 days | Discharge: HOME | DRG: 189 | End: 2025-06-16
Attending: EMERGENCY MEDICINE | Admitting: INTERNAL MEDICINE
Payer: MEDICARE

## 2025-06-09 DIAGNOSIS — J44.1 COPD EXACERBATION (HCC): Primary | ICD-10-CM

## 2025-06-09 DIAGNOSIS — R06.02 SHORTNESS OF BREATH: ICD-10-CM

## 2025-06-09 DIAGNOSIS — I27.20 PULMONARY HYPERTENSION (HCC): ICD-10-CM

## 2025-06-09 DIAGNOSIS — J44.1 COPD WITH ACUTE EXACERBATION (HCC): ICD-10-CM

## 2025-06-09 DIAGNOSIS — I27.21 PAH (PULMONARY ARTERY HYPERTENSION) (HCC): ICD-10-CM

## 2025-06-09 DIAGNOSIS — J44.1 CHRONIC OBSTRUCTIVE PULMONARY DISEASE WITH ACUTE EXACERBATION (HCC): ICD-10-CM

## 2025-06-09 LAB
ALBUMIN SERPL BCG-MCNC: 4 G/DL (ref 3.4–4.9)
ALP SERPL-CCNC: 82 U/L (ref 40–129)
ALT SERPL W/O P-5'-P-CCNC: 18 U/L (ref 10–50)
ANION GAP SERPL CALC-SCNC: 12 MEQ/L (ref 8–16)
AST SERPL-CCNC: 17 U/L (ref 10–50)
BASE EXCESS BLDA CALC-SCNC: 2.3 MMOL/L (ref -2–3)
BASOPHILS ABSOLUTE: 0 THOU/MM3 (ref 0–0.1)
BASOPHILS NFR BLD AUTO: 0.7 %
BILIRUB CONJ SERPL-MCNC: < 0.1 MG/DL (ref 0–0.2)
BILIRUB SERPL-MCNC: 0.2 MG/DL (ref 0.3–1.2)
BUN SERPL-MCNC: 13 MG/DL (ref 8–23)
CALCIUM SERPL-MCNC: 8.8 MG/DL (ref 8.6–10)
CHLORIDE SERPL-SCNC: 101 MEQ/L (ref 98–111)
CO2 SERPL-SCNC: 30 MEQ/L (ref 22–29)
COLLECTED BY:: ABNORMAL
CREAT SERPL-MCNC: 1.2 MG/DL (ref 0.7–1.2)
DEPRECATED RDW RBC AUTO: 56.8 FL (ref 35–45)
DEVICE: ABNORMAL
EOSINOPHIL NFR BLD AUTO: 0.9 %
EOSINOPHILS ABSOLUTE: 0.1 THOU/MM3 (ref 0–0.4)
ERYTHROCYTE [DISTWIDTH] IN BLOOD BY AUTOMATED COUNT: 15.9 % (ref 11.5–14.5)
FLUAV RNA RESP QL NAA+PROBE: NOT DETECTED
FLUBV RNA RESP QL NAA+PROBE: NOT DETECTED
GFR SERPL CREATININE-BSD FRML MDRD: 71 ML/MIN/1.73M2
GLUCOSE SERPL-MCNC: 161 MG/DL (ref 74–109)
HCO3 BLDA-SCNC: 34 MMOL/L (ref 23–28)
HCT VFR BLD AUTO: 46.4 % (ref 42–52)
HGB BLD-MCNC: 14.1 GM/DL (ref 14–18)
IMM GRANULOCYTES # BLD AUTO: 0.01 THOU/MM3 (ref 0–0.07)
IMM GRANULOCYTES NFR BLD AUTO: 0.2 %
LYMPHOCYTES ABSOLUTE: 1.2 THOU/MM3 (ref 1–4.8)
LYMPHOCYTES NFR BLD AUTO: 20 %
MCH RBC QN AUTO: 29.4 PG (ref 26–33)
MCHC RBC AUTO-ENTMCNC: 30.4 GM/DL (ref 32.2–35.5)
MCV RBC AUTO: 96.9 FL (ref 80–94)
MONOCYTES ABSOLUTE: 0.5 THOU/MM3 (ref 0.4–1.3)
MONOCYTES NFR BLD AUTO: 8.6 %
NEUTROPHILS ABSOLUTE: 4 THOU/MM3 (ref 1.8–7.7)
NEUTROPHILS NFR BLD AUTO: 69.6 %
NRBC BLD AUTO-RTO: 0 /100 WBC
NT-PROBNP SERPL IA-MCNC: 2555 PG/ML (ref 0–124)
OSMOLALITY SERPL CALC.SUM OF ELEC: 288.6 MOSMOL/KG (ref 275–300)
PCO2 TEMP ADJ BLDMV: 90 MMHG (ref 41–51)
PH BLDMV: 7.18 [PH] (ref 7.31–7.41)
PLATELET # BLD AUTO: 281 THOU/MM3 (ref 130–400)
PMV BLD AUTO: 10.3 FL (ref 9.4–12.4)
PO2 BLDMV: 67 MMHG (ref 25–40)
POTASSIUM SERPL-SCNC: 4 MEQ/L (ref 3.5–5.2)
PROT SERPL-MCNC: 7.2 G/DL (ref 6.4–8.3)
RBC # BLD AUTO: 4.79 MILL/MM3 (ref 4.7–6.1)
SAO2 % BLDMV: 86 %
SARS-COV-2 RNA RESP QL NAA+PROBE: NOT DETECTED
SITE: ABNORMAL
SODIUM SERPL-SCNC: 143 MEQ/L (ref 135–145)
TROPONIN, HIGH SENSITIVITY: 26 NG/L (ref 0–12)
VENTILATION MODE VENT: ABNORMAL
WBC # BLD AUTO: 5.8 THOU/MM3 (ref 4.8–10.8)

## 2025-06-09 PROCEDURE — 83880 ASSAY OF NATRIURETIC PEPTIDE: CPT

## 2025-06-09 PROCEDURE — 71045 X-RAY EXAM CHEST 1 VIEW: CPT

## 2025-06-09 PROCEDURE — 99285 EMERGENCY DEPT VISIT HI MDM: CPT

## 2025-06-09 PROCEDURE — 84484 ASSAY OF TROPONIN QUANT: CPT

## 2025-06-09 PROCEDURE — 94761 N-INVAS EAR/PLS OXIMETRY MLT: CPT

## 2025-06-09 PROCEDURE — 6360000002 HC RX W HCPCS

## 2025-06-09 PROCEDURE — 5A09457 ASSISTANCE WITH RESPIRATORY VENTILATION, 24-96 CONSECUTIVE HOURS, CONTINUOUS POSITIVE AIRWAY PRESSURE: ICD-10-PCS | Performed by: INTERNAL MEDICINE

## 2025-06-09 PROCEDURE — 93005 ELECTROCARDIOGRAM TRACING: CPT | Performed by: EMERGENCY MEDICINE

## 2025-06-09 PROCEDURE — 6370000000 HC RX 637 (ALT 250 FOR IP)

## 2025-06-09 PROCEDURE — 6360000002 HC RX W HCPCS: Performed by: INTERNAL MEDICINE

## 2025-06-09 PROCEDURE — 96374 THER/PROPH/DIAG INJ IV PUSH: CPT

## 2025-06-09 PROCEDURE — 80053 COMPREHEN METABOLIC PANEL: CPT

## 2025-06-09 PROCEDURE — 36415 COLL VENOUS BLD VENIPUNCTURE: CPT

## 2025-06-09 PROCEDURE — 2060000000 HC ICU INTERMEDIATE R&B

## 2025-06-09 PROCEDURE — 94640 AIRWAY INHALATION TREATMENT: CPT

## 2025-06-09 PROCEDURE — 82248 BILIRUBIN DIRECT: CPT

## 2025-06-09 PROCEDURE — 87636 SARSCOV2 & INF A&B AMP PRB: CPT

## 2025-06-09 PROCEDURE — 85025 COMPLETE CBC W/AUTO DIFF WBC: CPT

## 2025-06-09 PROCEDURE — 94660 CPAP INITIATION&MGMT: CPT

## 2025-06-09 PROCEDURE — 82803 BLOOD GASES ANY COMBINATION: CPT

## 2025-06-09 PROCEDURE — 2500000003 HC RX 250 WO HCPCS

## 2025-06-09 PROCEDURE — 2700000000 HC OXYGEN THERAPY PER DAY

## 2025-06-09 RX ORDER — ALBUTEROL SULFATE 0.83 MG/ML
2.5 SOLUTION RESPIRATORY (INHALATION) EVERY 4 HOURS PRN
Status: DISCONTINUED | OUTPATIENT
Start: 2025-06-09 | End: 2025-06-09 | Stop reason: SDUPTHER

## 2025-06-09 RX ORDER — ONDANSETRON 2 MG/ML
4 INJECTION INTRAMUSCULAR; INTRAVENOUS EVERY 6 HOURS PRN
Status: DISCONTINUED | OUTPATIENT
Start: 2025-06-09 | End: 2025-06-09 | Stop reason: SDUPTHER

## 2025-06-09 RX ORDER — POLYETHYLENE GLYCOL 3350 17 G/17G
17 POWDER, FOR SOLUTION ORAL DAILY PRN
Status: DISCONTINUED | OUTPATIENT
Start: 2025-06-09 | End: 2025-06-09

## 2025-06-09 RX ORDER — ENOXAPARIN SODIUM 100 MG/ML
40 INJECTION SUBCUTANEOUS DAILY
Status: DISCONTINUED | OUTPATIENT
Start: 2025-06-09 | End: 2025-06-09

## 2025-06-09 RX ORDER — ACETAMINOPHEN 325 MG/1
650 TABLET ORAL EVERY 6 HOURS PRN
Status: DISCONTINUED | OUTPATIENT
Start: 2025-06-09 | End: 2025-06-09 | Stop reason: SDUPTHER

## 2025-06-09 RX ORDER — ACETAMINOPHEN 650 MG/1
650 SUPPOSITORY RECTAL EVERY 6 HOURS PRN
Status: DISCONTINUED | OUTPATIENT
Start: 2025-06-09 | End: 2025-06-16 | Stop reason: HOSPADM

## 2025-06-09 RX ORDER — SODIUM CHLORIDE 0.9 % (FLUSH) 0.9 %
5-40 SYRINGE (ML) INJECTION EVERY 12 HOURS SCHEDULED
Status: DISCONTINUED | OUTPATIENT
Start: 2025-06-09 | End: 2025-06-16 | Stop reason: HOSPADM

## 2025-06-09 RX ORDER — IPRATROPIUM BROMIDE AND ALBUTEROL SULFATE 2.5; .5 MG/3ML; MG/3ML
2 SOLUTION RESPIRATORY (INHALATION) ONCE
Status: COMPLETED | OUTPATIENT
Start: 2025-06-09 | End: 2025-06-09

## 2025-06-09 RX ORDER — ACETAMINOPHEN 650 MG/1
650 SUPPOSITORY RECTAL EVERY 6 HOURS PRN
Status: DISCONTINUED | OUTPATIENT
Start: 2025-06-09 | End: 2025-06-09 | Stop reason: SDUPTHER

## 2025-06-09 RX ORDER — SODIUM CHLORIDE 9 MG/ML
INJECTION, SOLUTION INTRAVENOUS PRN
Status: DISCONTINUED | OUTPATIENT
Start: 2025-06-09 | End: 2025-06-09 | Stop reason: SDUPTHER

## 2025-06-09 RX ORDER — POLYETHYLENE GLYCOL 3350 17 G/17G
17 POWDER, FOR SOLUTION ORAL DAILY PRN
Status: DISCONTINUED | OUTPATIENT
Start: 2025-06-09 | End: 2025-06-16 | Stop reason: HOSPADM

## 2025-06-09 RX ORDER — SODIUM CHLORIDE 0.9 % (FLUSH) 0.9 %
5-40 SYRINGE (ML) INJECTION PRN
Status: DISCONTINUED | OUTPATIENT
Start: 2025-06-09 | End: 2025-06-09 | Stop reason: SDUPTHER

## 2025-06-09 RX ORDER — SODIUM CHLORIDE 9 MG/ML
INJECTION, SOLUTION INTRAVENOUS PRN
Status: DISCONTINUED | OUTPATIENT
Start: 2025-06-09 | End: 2025-06-16 | Stop reason: HOSPADM

## 2025-06-09 RX ORDER — PREDNISONE 20 MG/1
40 TABLET ORAL DAILY
Status: DISCONTINUED | OUTPATIENT
Start: 2025-06-09 | End: 2025-06-10

## 2025-06-09 RX ORDER — ENOXAPARIN SODIUM 100 MG/ML
40 INJECTION SUBCUTANEOUS DAILY
Status: DISCONTINUED | OUTPATIENT
Start: 2025-06-09 | End: 2025-06-16 | Stop reason: HOSPADM

## 2025-06-09 RX ORDER — SODIUM CHLORIDE 0.9 % (FLUSH) 0.9 %
5-40 SYRINGE (ML) INJECTION PRN
Status: DISCONTINUED | OUTPATIENT
Start: 2025-06-09 | End: 2025-06-16 | Stop reason: HOSPADM

## 2025-06-09 RX ORDER — PREDNISONE 20 MG/1
40 TABLET ORAL DAILY
Status: DISCONTINUED | OUTPATIENT
Start: 2025-06-12 | End: 2025-06-10

## 2025-06-09 RX ORDER — ALBUTEROL SULFATE 0.83 MG/ML
2.5 SOLUTION RESPIRATORY (INHALATION) EVERY 4 HOURS PRN
Status: DISCONTINUED | OUTPATIENT
Start: 2025-06-09 | End: 2025-06-16 | Stop reason: HOSPADM

## 2025-06-09 RX ORDER — ONDANSETRON 2 MG/ML
4 INJECTION INTRAMUSCULAR; INTRAVENOUS EVERY 6 HOURS PRN
Status: DISCONTINUED | OUTPATIENT
Start: 2025-06-09 | End: 2025-06-16 | Stop reason: HOSPADM

## 2025-06-09 RX ORDER — IPRATROPIUM BROMIDE AND ALBUTEROL SULFATE 2.5; .5 MG/3ML; MG/3ML
1 SOLUTION RESPIRATORY (INHALATION)
Status: DISCONTINUED | OUTPATIENT
Start: 2025-06-09 | End: 2025-06-11

## 2025-06-09 RX ORDER — ONDANSETRON 4 MG/1
4 TABLET, ORALLY DISINTEGRATING ORAL EVERY 8 HOURS PRN
Status: DISCONTINUED | OUTPATIENT
Start: 2025-06-09 | End: 2025-06-09 | Stop reason: SDUPTHER

## 2025-06-09 RX ORDER — ACETAMINOPHEN 325 MG/1
650 TABLET ORAL EVERY 6 HOURS PRN
Status: DISCONTINUED | OUTPATIENT
Start: 2025-06-09 | End: 2025-06-16 | Stop reason: HOSPADM

## 2025-06-09 RX ORDER — ONDANSETRON 4 MG/1
4 TABLET, ORALLY DISINTEGRATING ORAL EVERY 8 HOURS PRN
Status: DISCONTINUED | OUTPATIENT
Start: 2025-06-09 | End: 2025-06-16 | Stop reason: HOSPADM

## 2025-06-09 RX ADMIN — ENOXAPARIN SODIUM 40 MG: 100 INJECTION SUBCUTANEOUS at 22:51

## 2025-06-09 RX ADMIN — WATER 125 MG: 1 INJECTION INTRAMUSCULAR; INTRAVENOUS; SUBCUTANEOUS at 18:44

## 2025-06-09 RX ADMIN — IPRATROPIUM BROMIDE AND ALBUTEROL SULFATE 2 DOSE: .5; 3 SOLUTION RESPIRATORY (INHALATION) at 19:13

## 2025-06-09 NOTE — ED NOTES
Pt c/o SOB and fatigue that worsened today. Family states pt has hx of COPD. States is on 4LNC all the time. Pt arrived with no oxygen in place. Family states pt took oxygen off in car to come in and has been off for approx 30 minutes. Pt SPO2 59% RA. Pt placed on 15 L NRB. Pt alert and answering questions but very fatigued.

## 2025-06-09 NOTE — ED PROVIDER NOTES
[facility]  EMERGENCY MEDICINE ATTENDING ATTESTATION      Evaluation of Sandie Carrera.   Case discussed and care plan developed with resident physician.   I agree with the resident physician documentation and plan as documented by him, except if my documentation differs.   Patient seen, interviewed and examined by me.  I reviewed the medical, surgical, family and social history, medications and allergies.   I have reviewed and interpreted all available lab, radiology and ekg results available at the moment.  I have reviewed the nursing documentation.     Please see the resident physician completed note for final disposition except as documented on this attestation.   I have reviewed and interpreted all available lab, radiology and ekg results available at the moment.  Diagnosis, treatment and disposition plans were discussed and agreed upon by patient.   This transcription was electronically signed. It was dictated by use of voice recognition software and electronically transcribed. The transcription may contain errors not detected in proofreading.     I performed direct supervision and was present for the critical portion following procedures: None  Critical care time on this case: None    Electronically signed by Tyshawn Pacheco MD on 6/9/25 at 6:50 PM EDT        Tyshawn Pacheco MD  06/09/25 7326    
proofreading.    Electronically signed by Bernard Wiley MD on 6/9/25 at 7:03 PM EDT

## 2025-06-10 ENCOUNTER — APPOINTMENT (OUTPATIENT)
Age: 55
DRG: 189 | End: 2025-06-10
Payer: MEDICARE

## 2025-06-10 LAB
ANION GAP SERPL CALC-SCNC: 9 MEQ/L (ref 8–16)
BASE EXCESS BLDA CALC-SCNC: 4.5 MMOL/L (ref -2–3)
BASE EXCESS BLDA CALC-SCNC: 7.1 MMOL/L (ref -2–3)
BASOPHILS ABSOLUTE: 0 THOU/MM3 (ref 0–0.1)
BASOPHILS NFR BLD AUTO: 0 %
BUN SERPL-MCNC: 13 MG/DL (ref 8–23)
CALCIUM SERPL-MCNC: 8.6 MG/DL (ref 8.6–10)
CHLORIDE SERPL-SCNC: 106 MEQ/L (ref 98–111)
CO2 SERPL-SCNC: 30 MEQ/L (ref 22–29)
COLLECTED BY:: ABNORMAL
COLLECTED BY:: ABNORMAL
CREAT SERPL-MCNC: 0.9 MG/DL (ref 0.7–1.2)
D DIMER PPP IA.FEU-MCNC: 273 NG/ML FEU (ref 0–500)
DEPRECATED RDW RBC AUTO: 58 FL (ref 35–45)
DEVICE: ABNORMAL
DEVICE: ABNORMAL
ECHO AV CUSP MM: 1.9 CM
ECHO AV MEAN GRADIENT: 5 MMHG
ECHO AV MEAN VELOCITY: 1 M/S
ECHO AV PEAK GRADIENT: 7 MMHG
ECHO AV PEAK VELOCITY: 1.4 M/S
ECHO AV VELOCITY RATIO: 0.64
ECHO AV VTI: 28.5 CM
ECHO BSA: 2.16 M2
ECHO EST RA PRESSURE: 8 MMHG
ECHO IVC PROX: 2.9 CM
ECHO LA AREA 4C: 19 CM2
ECHO LA DIAMETER INDEX: 1.78 CM/M2
ECHO LA DIAMETER: 3.8 CM
ECHO LA MAJOR AXIS: 6.4 CM
ECHO LA VOL MOD A4C: 45 ML (ref 18–58)
ECHO LA VOLUME INDEX MOD A4C: 21 ML/M2 (ref 16–34)
ECHO LV E' LATERAL VELOCITY: 8.9 CM/S
ECHO LV E' SEPTAL VELOCITY: 8.4 CM/S
ECHO LV EF PHYSICIAN: 50 %
ECHO LV FRACTIONAL SHORTENING: 27 % (ref 28–44)
ECHO LV INTERNAL DIMENSION DIASTOLE INDEX: 2.25 CM/M2
ECHO LV INTERNAL DIMENSION DIASTOLIC: 4.8 CM (ref 4.2–5.9)
ECHO LV INTERNAL DIMENSION SYSTOLIC INDEX: 1.64 CM/M2
ECHO LV INTERNAL DIMENSION SYSTOLIC: 3.5 CM
ECHO LV ISOVOLUMETRIC RELAXATION TIME (IVRT): 92 MS
ECHO LV IVSD: 1.1 CM (ref 0.6–1)
ECHO LV MASS 2D: 194 G (ref 88–224)
ECHO LV MASS INDEX 2D: 91.1 G/M2 (ref 49–115)
ECHO LV POSTERIOR WALL DIASTOLIC: 1.1 CM (ref 0.6–1)
ECHO LV RELATIVE WALL THICKNESS RATIO: 0.46
ECHO LVOT AV VTI INDEX: 0.59
ECHO LVOT MEAN GRADIENT: 1 MMHG
ECHO LVOT PEAK GRADIENT: 3 MMHG
ECHO LVOT PEAK VELOCITY: 0.9 M/S
ECHO LVOT VTI: 16.7 CM
ECHO MV A VELOCITY: 0.72 M/S
ECHO MV E DECELERATION TIME (DT): 200 MS
ECHO MV E VELOCITY: 0.67 M/S
ECHO MV E/A RATIO: 0.93
ECHO MV E/E' LATERAL: 7.53
ECHO MV E/E' RATIO (AVERAGED): 7.75
ECHO MV E/E' SEPTAL: 7.98
ECHO RIGHT VENTRICULAR SYSTOLIC PRESSURE (RVSP): 63 MMHG
ECHO RV FREE WALL PEAK S': 16 CM/S
ECHO RV INTERNAL DIMENSION: 2.7 CM
ECHO RV TAPSE: 1.8 CM (ref 1.7–?)
ECHO TV E WAVE: 0.6 M/S
ECHO TV REGURGITANT MAX VELOCITY: 3.71 M/S
ECHO TV REGURGITANT PEAK GRADIENT: 55 MMHG
EKG ATRIAL RATE: 101 BPM
EKG P AXIS: 80 DEGREES
EKG P-R INTERVAL: 150 MS
EKG Q-T INTERVAL: 352 MS
EKG QRS DURATION: 84 MS
EKG QTC CALCULATION (BAZETT): 456 MS
EKG R AXIS: 100 DEGREES
EKG T AXIS: 17 DEGREES
EKG VENTRICULAR RATE: 101 BPM
EOSINOPHIL NFR BLD AUTO: 0 %
EOSINOPHILS ABSOLUTE: 0 THOU/MM3 (ref 0–0.4)
ERYTHROCYTE [DISTWIDTH] IN BLOOD BY AUTOMATED COUNT: 15.8 % (ref 11.5–14.5)
FIO2 ON VENT O2 ANALYZER: 5 %
FIO2 ON VENT O2 ANALYZER: 5 %
GFR SERPL CREATININE-BSD FRML MDRD: > 90 ML/MIN/1.73M2
GLUCOSE SERPL-MCNC: 127 MG/DL (ref 74–109)
HCO3 BLDA-SCNC: 33 MMOL/L (ref 23–28)
HCO3 BLDA-SCNC: 34 MMOL/L (ref 23–28)
HCT VFR BLD AUTO: 43.9 % (ref 42–52)
HGB BLD-MCNC: 13.1 GM/DL (ref 14–18)
IMM GRANULOCYTES # BLD AUTO: 0.01 THOU/MM3 (ref 0–0.07)
IMM GRANULOCYTES NFR BLD AUTO: 0.3 %
LYMPHOCYTES ABSOLUTE: 0.3 THOU/MM3 (ref 1–4.8)
LYMPHOCYTES NFR BLD AUTO: 7 %
MCH RBC QN AUTO: 29.6 PG (ref 26–33)
MCHC RBC AUTO-ENTMCNC: 29.8 GM/DL (ref 32.2–35.5)
MCV RBC AUTO: 99.1 FL (ref 80–94)
MONOCYTES ABSOLUTE: 0.1 THOU/MM3 (ref 0.4–1.3)
MONOCYTES NFR BLD AUTO: 2.3 %
NEUTROPHILS ABSOLUTE: 3.5 THOU/MM3 (ref 1.8–7.7)
NEUTROPHILS NFR BLD AUTO: 90.4 %
NRBC BLD AUTO-RTO: 0 /100 WBC
NT-PROBNP SERPL IA-MCNC: 1456 PG/ML (ref 0–124)
OSMOLALITY SERPL CALC.SUM OF ELEC: 290.4 MOSMOL/KG (ref 275–300)
PCO2 TEMP ADJ BLDMV: 55 MMHG (ref 41–51)
PCO2 TEMP ADJ BLDMV: 64 MMHG (ref 41–51)
PH BLDMV: 7.32 [PH] (ref 7.31–7.41)
PH BLDMV: 7.4 [PH] (ref 7.31–7.41)
PLATELET # BLD AUTO: 287 THOU/MM3 (ref 130–400)
PMV BLD AUTO: 9.8 FL (ref 9.4–12.4)
PO2 BLDMV: 36 MMHG (ref 25–40)
PO2 BLDMV: 39 MMHG (ref 25–40)
POTASSIUM SERPL-SCNC: 5.2 MEQ/L (ref 3.5–5.2)
PROCALCITONIN SERPL IA-MCNC: 0.04 NG/ML (ref 0.01–0.09)
RBC # BLD AUTO: 4.43 MILL/MM3 (ref 4.7–6.1)
SAO2 % BLDMV: 62 %
SAO2 % BLDMV: 72 %
SITE: ABNORMAL
SITE: ABNORMAL
SODIUM SERPL-SCNC: 145 MEQ/L (ref 135–145)
VENTILATION MODE VENT: ABNORMAL
VENTILATION MODE VENT: ABNORMAL
WBC # BLD AUTO: 3.9 THOU/MM3 (ref 4.8–10.8)

## 2025-06-10 PROCEDURE — 94761 N-INVAS EAR/PLS OXIMETRY MLT: CPT

## 2025-06-10 PROCEDURE — 99223 1ST HOSP IP/OBS HIGH 75: CPT | Performed by: INTERNAL MEDICINE

## 2025-06-10 PROCEDURE — 2500000003 HC RX 250 WO HCPCS: Performed by: INTERNAL MEDICINE

## 2025-06-10 PROCEDURE — 6360000002 HC RX W HCPCS: Performed by: INTERNAL MEDICINE

## 2025-06-10 PROCEDURE — 80048 BASIC METABOLIC PNL TOTAL CA: CPT

## 2025-06-10 PROCEDURE — 85025 COMPLETE CBC W/AUTO DIFF WBC: CPT

## 2025-06-10 PROCEDURE — 94640 AIRWAY INHALATION TREATMENT: CPT

## 2025-06-10 PROCEDURE — 36600 WITHDRAWAL OF ARTERIAL BLOOD: CPT

## 2025-06-10 PROCEDURE — 6370000000 HC RX 637 (ALT 250 FOR IP)

## 2025-06-10 PROCEDURE — 85379 FIBRIN DEGRADATION QUANT: CPT

## 2025-06-10 PROCEDURE — 93010 ELECTROCARDIOGRAM REPORT: CPT | Performed by: INTERNAL MEDICINE

## 2025-06-10 PROCEDURE — 36415 COLL VENOUS BLD VENIPUNCTURE: CPT

## 2025-06-10 PROCEDURE — 6370000000 HC RX 637 (ALT 250 FOR IP): Performed by: INTERNAL MEDICINE

## 2025-06-10 PROCEDURE — 2700000000 HC OXYGEN THERAPY PER DAY

## 2025-06-10 PROCEDURE — 84145 PROCALCITONIN (PCT): CPT

## 2025-06-10 PROCEDURE — 83880 ASSAY OF NATRIURETIC PEPTIDE: CPT

## 2025-06-10 PROCEDURE — 82803 BLOOD GASES ANY COMBINATION: CPT

## 2025-06-10 PROCEDURE — 94660 CPAP INITIATION&MGMT: CPT

## 2025-06-10 PROCEDURE — 93306 TTE W/DOPPLER COMPLETE: CPT

## 2025-06-10 PROCEDURE — 93306 TTE W/DOPPLER COMPLETE: CPT | Performed by: INTERNAL MEDICINE

## 2025-06-10 PROCEDURE — 2060000000 HC ICU INTERMEDIATE R&B

## 2025-06-10 RX ORDER — TADALAFIL 20 MG/1
20 TABLET ORAL DAILY
Status: DISCONTINUED | OUTPATIENT
Start: 2025-06-10 | End: 2025-06-16 | Stop reason: HOSPADM

## 2025-06-10 RX ADMIN — IPRATROPIUM BROMIDE AND ALBUTEROL SULFATE 1 DOSE: .5; 3 SOLUTION RESPIRATORY (INHALATION) at 08:20

## 2025-06-10 RX ADMIN — IPRATROPIUM BROMIDE AND ALBUTEROL SULFATE 1 DOSE: .5; 3 SOLUTION RESPIRATORY (INHALATION) at 16:02

## 2025-06-10 RX ADMIN — IPRATROPIUM BROMIDE AND ALBUTEROL SULFATE 1 DOSE: .5; 3 SOLUTION RESPIRATORY (INHALATION) at 21:30

## 2025-06-10 RX ADMIN — WATER 40 MG: 1 INJECTION INTRAMUSCULAR; INTRAVENOUS; SUBCUTANEOUS at 08:54

## 2025-06-10 RX ADMIN — TADALAFIL 20 MG: 20 TABLET ORAL at 12:50

## 2025-06-10 RX ADMIN — SODIUM CHLORIDE, PRESERVATIVE FREE 10 ML: 5 INJECTION INTRAVENOUS at 08:54

## 2025-06-10 RX ADMIN — WATER 40 MG: 1 INJECTION INTRAMUSCULAR; INTRAVENOUS; SUBCUTANEOUS at 02:37

## 2025-06-10 RX ADMIN — WATER 40 MG: 1 INJECTION INTRAMUSCULAR; INTRAVENOUS; SUBCUTANEOUS at 12:50

## 2025-06-10 RX ADMIN — IPRATROPIUM BROMIDE AND ALBUTEROL SULFATE 1 DOSE: .5; 3 SOLUTION RESPIRATORY (INHALATION) at 13:12

## 2025-06-10 RX ADMIN — ENOXAPARIN SODIUM 40 MG: 100 INJECTION SUBCUTANEOUS at 08:54

## 2025-06-10 RX ADMIN — SODIUM CHLORIDE, PRESERVATIVE FREE 10 ML: 5 INJECTION INTRAVENOUS at 02:38

## 2025-06-10 RX ADMIN — PREDNISONE 40 MG: 20 TABLET ORAL at 08:54

## 2025-06-10 NOTE — ED NOTES
Pt lying in bed with resp regular. Lights dimmed per request. Denies other needs. Updated on POC and admission. Call light in reach.

## 2025-06-10 NOTE — ED NOTES
Pt resting in bed asleep. Resp regular. Pt on bipap. Pt repositioned. Denies needs. Call light in [facility].

## 2025-06-10 NOTE — CARE COORDINATION
Case Management Assessment Initial Evaluation    Date/Time of Evaluation: 6/10/2025 12:11 PM  Assessment Completed by: Keyanna Tabor RN    If patient is discharged prior to next notation, then this note serves as note for discharge by case management.    Patient Name: Sandie Carrera                   YOB: 1970  Diagnosis: COPD exacerbation (HCC) [J44.1]                   Date / Time: 2025  6:02 PM  Location: [address] [place] [address]     Patient Admission Status: Inpatient   Readmission Risk Low 0-14, Mod 15-19), High > 20: Readmission Risk Score: 13.6    Current PCP: Elijah Liu MD  Health Care Decision Makers:   Primary Decision Maker: PoloMaureen - Parent - [phone]    Secondary Decision Maker: Marianela Carrera - Brother/Sister - [phone]    Secondary Decision Maker: Trudy Cuellar - Brother/Sister - [phone]    Additional Case Management Notes: Presented to ED for increased sob. Sats were 59% on room air (uses 4L NC ATC). IM following. Using baseline and improved.Nebs. Solumedrol. Pulm consult.     Procedures: none    Imagin/9 CXR: no acute findings    Patient Goals/Plan/Treatment Preferences: Spoke with Sandie, he resides at home with his mom. Uses a walker at times and home O2 at 4L ATC from [facility]. He did not anticipate home needs. Follow for possible HH again.          06/10/25 1215   Service Assessment   Patient Orientation Alert and Oriented   Cognition Alert   History Provided By Patient;Medical Record   Primary Caregiver Other (Comment)  (resides with family)   Support Systems Parent;Family Members   Patient's Healthcare Decision Maker is: Legal Next of Kin   PCP Verified by CM Yes   Last Visit to PCP Within last 6 months   Prior Functional Level Assistance with the following:;Shopping;Housework   Current Functional Level Shopping;Housework;Independent in ADLs/IADLs;Bathing;Toileting   Can patient return to prior living arrangement Yes   Ability to make needs known: Fair

## 2025-06-10 NOTE — ED NOTES
Patient transported to [facility]  by cart in stable condition.   Patient monitored on cardiac telemetry.   Patient on O2 via BiPap  IV infusing and line is patent.

## 2025-06-10 NOTE — ED NOTES
ED to inpatient nurses report    Chief Complaint   Patient presents with    Shortness of Breath      Present to ED from home  LOC: alert and orientated to name, place, date  Vital signs   Vitals:    06/09/25 1818 06/09/25 1824 06/09/25 1913 06/09/25 1922   BP:    103/66   Pulse:    98   Resp:    20   Temp:       TempSrc:       SpO2: 96% 92% 92% 93%   Weight:          Oxygen Baseline 93    Current needs required 8 LNC Bipap/Cpap No  LDAs:   Peripheral IV 06/09/25 Right Forearm (Active)   Site Assessment Clean, dry & intact 06/09/25 1922   Dressing Status Clean, dry & intact 06/09/25 1922     Mobility: Requires assistance * 1  Pending ED orders: none  Present condition: stable    C-SSRS    Swallow Screening    Preferred Language: English     Electronically signed by ART SILVA RN on 6/9/2025 at 8:16 PM

## 2025-06-10 NOTE — H&P
joints  NEUROLOGIC:  Mental Status Exam:  Level of Alertness:   awake  Orientation:   person, place  Memory:   abnormal - illogical discussions, illogical conversations.  Cranial Nerves:  cranial nerves II-XII are grossly intact  Motor Exam:  Motor exam is symmetrical 5 out of 5 all extremities bilaterally  SKIN:  no redness, warmth, or swelling      CBC:   Recent Labs     06/09/25  1825 06/10/25  0422   WBC 5.8 3.9*   HGB 14.1 13.1*    287     BMP:    Recent Labs     06/09/25  1825 06/10/25  0422    145   K 4.0 5.2    106   CO2 30* 30*   BUN 13 13   CREATININE 1.2 0.9   GLUCOSE 161* 127*     Hepatic:   Recent Labs     06/09/25 1825   AST 17   ALT 18   BILITOT 0.2*   ALKPHOS 82       Procalcitonin 0.04     NT Pro-BNP 1456.0 High  pg/mL     Comment: Performed at [email or web address] [address] [place] [address]      Procalcitonin [[id number]]    Collected: 06/10/25 0422    Updated: 06/10/25 0513    Order Status: Completed    Specimen Type: Blood     Procalcitonin 0.04 ng/mL    Comment: Suspected Sepsis:        Blood gas, venous [[id number]] (Abnormal)    Collected: 06/09/25 2134    Updated: 06/09/25 2139    Order Status: Completed    Specimen Source: Blood gases     PH MIXED 7.18 Low     PCO2, MIXED VENOUS 90 High  mmhg    PO2, Mixed 67 High  mmhg    HCO3, Mixed 34 High  mmol/l    Base Exc, Mixed 2.3 mmol/l    O2 Sat, Mixed 86 %    COLLECTED BY: [id number]    DEVICE Cannula    Site Not entered     PA/lat CXR:   No cardiomegaly.  Normal mediastinal contours.  No pneumothorax. No opacity. No pleural effusion.  Normal upper abdomen.  No acute fracture.   Impression:     Impression:  No acute findings.     EKG: Sinus tachycardia rate 101 bpm with Fusion complexes  Rightward axis  Pulmonary disease pattern  Abnormal ECG    Assessment and Plan   Acute on chronic hypercapnic resp failure  COPD exac  PAH  Schizophrenia    NIPPV  Bronchodilators / O2  Resume tadalafil  Pulm consult  Resume other home

## 2025-06-10 NOTE — ED NOTES
Pt asleep in bed. Resp regular. Family at side and updated on POC. Denies needs. Call light in reach.

## 2025-06-11 ENCOUNTER — APPOINTMENT (OUTPATIENT)
Dept: GENERAL RADIOLOGY | Age: 55
DRG: 189 | End: 2025-06-11
Payer: MEDICARE

## 2025-06-11 ENCOUNTER — APPOINTMENT (OUTPATIENT)
Dept: NUCLEAR MEDICINE | Age: 55
DRG: 189 | End: 2025-06-11
Payer: MEDICARE

## 2025-06-11 PROBLEM — I50.32 CHRONIC HEART FAILURE WITH PRESERVED EJECTION FRACTION (HCC): Status: ACTIVE | Noted: 2025-06-11

## 2025-06-11 PROBLEM — I27.20 PULMONARY HYPERTENSION (HCC): Status: ACTIVE | Noted: 2025-06-11

## 2025-06-11 PROCEDURE — 78582 LUNG VENTILAT&PERFUS IMAGING: CPT

## 2025-06-11 PROCEDURE — 71046 X-RAY EXAM CHEST 2 VIEWS: CPT

## 2025-06-11 PROCEDURE — A9540 TC99M MAA: HCPCS

## 2025-06-11 PROCEDURE — 2060000000 HC ICU INTERMEDIATE R&B

## 2025-06-11 PROCEDURE — 3430000000 HC RX DIAGNOSTIC RADIOPHARMACEUTICAL

## 2025-06-11 PROCEDURE — 94760 N-INVAS EAR/PLS OXIMETRY 1: CPT

## 2025-06-11 PROCEDURE — 2500000003 HC RX 250 WO HCPCS

## 2025-06-11 PROCEDURE — 6360000002 HC RX W HCPCS: Performed by: INTERNAL MEDICINE

## 2025-06-11 PROCEDURE — 6360000002 HC RX W HCPCS: Performed by: NURSE PRACTITIONER

## 2025-06-11 PROCEDURE — 2700000000 HC OXYGEN THERAPY PER DAY

## 2025-06-11 PROCEDURE — 94660 CPAP INITIATION&MGMT: CPT

## 2025-06-11 PROCEDURE — 6370000000 HC RX 637 (ALT 250 FOR IP): Performed by: INTERNAL MEDICINE

## 2025-06-11 PROCEDURE — 94640 AIRWAY INHALATION TREATMENT: CPT

## 2025-06-11 PROCEDURE — 2500000003 HC RX 250 WO HCPCS: Performed by: INTERNAL MEDICINE

## 2025-06-11 PROCEDURE — A9558 XE133 XENON 10MCI: HCPCS

## 2025-06-11 PROCEDURE — 99233 SBSQ HOSP IP/OBS HIGH 50: CPT | Performed by: INTERNAL MEDICINE

## 2025-06-11 PROCEDURE — 99223 1ST HOSP IP/OBS HIGH 75: CPT | Performed by: INTERNAL MEDICINE

## 2025-06-11 PROCEDURE — 2500000003 HC RX 250 WO HCPCS: Performed by: NURSE PRACTITIONER

## 2025-06-11 RX ORDER — IPRATROPIUM BROMIDE AND ALBUTEROL SULFATE 2.5; .5 MG/3ML; MG/3ML
1 SOLUTION RESPIRATORY (INHALATION)
Status: DISCONTINUED | OUTPATIENT
Start: 2025-06-11 | End: 2025-06-16 | Stop reason: HOSPADM

## 2025-06-11 RX ORDER — PREDNISONE 20 MG/1
40 TABLET ORAL DAILY
Status: COMPLETED | OUTPATIENT
Start: 2025-06-12 | End: 2025-06-14

## 2025-06-11 RX ORDER — XENON XE-133 10 MCI/1
7.8 GAS RESPIRATORY (INHALATION)
Status: COMPLETED | OUTPATIENT
Start: 2025-06-11 | End: 2025-06-11

## 2025-06-11 RX ADMIN — Medication 2.5 MILLICURIE: at 13:15

## 2025-06-11 RX ADMIN — WATER 40 MG: 1 INJECTION INTRAMUSCULAR; INTRAVENOUS; SUBCUTANEOUS at 13:55

## 2025-06-11 RX ADMIN — SODIUM CHLORIDE, PRESERVATIVE FREE 10 ML: 5 INJECTION INTRAVENOUS at 21:18

## 2025-06-11 RX ADMIN — WATER 40 MG: 1 INJECTION INTRAMUSCULAR; INTRAVENOUS; SUBCUTANEOUS at 01:19

## 2025-06-11 RX ADMIN — IPRATROPIUM BROMIDE AND ALBUTEROL SULFATE 1 DOSE: .5; 3 SOLUTION RESPIRATORY (INHALATION) at 17:59

## 2025-06-11 RX ADMIN — IPRATROPIUM BROMIDE AND ALBUTEROL SULFATE 1 DOSE: .5; 3 SOLUTION RESPIRATORY (INHALATION) at 22:15

## 2025-06-11 RX ADMIN — XENON XE-133 7.8 MILLICURIE: 10 GAS RESPIRATORY (INHALATION) at 13:10

## 2025-06-11 RX ADMIN — ENOXAPARIN SODIUM 40 MG: 100 INJECTION SUBCUTANEOUS at 08:11

## 2025-06-11 RX ADMIN — SODIUM CHLORIDE, PRESERVATIVE FREE 10 ML: 5 INJECTION INTRAVENOUS at 08:14

## 2025-06-11 RX ADMIN — IPRATROPIUM BROMIDE AND ALBUTEROL SULFATE 1 DOSE: .5; 3 SOLUTION RESPIRATORY (INHALATION) at 09:44

## 2025-06-11 ASSESSMENT — PAIN SCALES - GENERAL
PAINLEVEL_OUTOF10: 0
PAINLEVEL_OUTOF10: 0

## 2025-06-11 NOTE — CONSULTS
[facility]      Patient - Sandie Carrera   MRN -  [id number]   [facility] # - [id number]   - 1970      Date of Admission -  2025  6:02 PM  Date of evaluation -  6/10/2025  Room - -011-A   Hospital Day - 1  Consulting - Elijah Liu MD Primary Care Physician - Elijah Liu MD     Problem List      Active Hospital Problems    Diagnosis Date Noted    COPD exacerbation (HCC) [J44.1] 2023     Priority: Medium     Reason for Consult    Acute on chronic respiratory failure, COPD, PAH  HPI   History Obtained From: Patient, nurse and electronic medical record.    Sandie Carrera is a 54 y.o. male with pmhx of pulmonary hypertension, CHF, emphysema, HTN, schizophrenia, and intellectual disability that presented for shortness of breath that had been ongoing for 5-7 days. Requires 4 L NC at baseline.     History limited by patient cognitive understanding.     He is having shortness of breath: Yes  Onset: sudden   Duration:7days.  Diurnal variation:  None.    Functional status prior to beginning of symptoms: 2 block/s on level ground.  Current functional capacity on level ground: 1 block/s on level ground.  He can climb steps: Yes  Flights of steps she can climb: 1  He admits to orthopnea.  He admits to paroxysmal nocturnal dyspnea.      He is having cough: Yes  Duration of cough: for 7 days.   His cough is associated with sputum production: Yes   The sputum color: brown  Hemoptysis:No  Diurnal variation: None.     Worse in the afternoon  Relieving factors: No  Aggravating factors: No      He is having chest pain:No  Duration:Days:0  Nature/Quality:N/A  Assessment:none.  Scale:denies/10  Relation ship to breathing: no pain with inspiration.        PMHx   Past Medical History      Diagnosis Date    Acute on chronic systolic congestive heart failure (HCC) 2019    Emphysema (subcutaneous) (surgical) resulting from a procedure     Hypertension     Pneumonia     
WBCUA 5-9 01/03/2023 07:42 PM    RBCUA 0-2 01/03/2023 07:42 PM    MUCUS NONE SEEN 12/11/2021 06:30 AM    YEAST NONE SEEN 01/03/2023 07:42 PM    BACTERIA NONE SEEN 01/03/2023 07:42 PM    LEUKOCYTESUR TRACE 01/03/2023 07:42 PM    UROBILINOGEN 1.0 01/03/2023 07:42 PM    BILIRUBINUR SMALL 01/03/2023 07:42 PM    BLOODU NEGATIVE 01/03/2023 07:42 PM    GLUCOSEU NEGATIVE 01/03/2023 07:42 PM    AMORPHOUS DEBRIS 12/11/2021 06:30 AM         Physical Exam:  Vitals:    06/11/25 0756   BP: 121/86   Pulse:    Resp:    Temp: 98.5 °F (36.9 °C)   SpO2: 90%      Intake/Output Summary (Last 24 hours) at 6/11/2025 0833  Last data filed at 6/11/2025 0500  Gross per 24 hour   Intake 1200 ml   Output 3200 ml   Net -2000 ml      General:  No acute distress, on supplemental O2 via nasal cannula  Neck: Supple, no JVD, no carotid bruits  Heart: Regular rhythm normal S1 and S2, no rubs, murmurs or gallops   Lungs: clear to ascultation no rales, wheezes, or rhonchi  Abdomen: positive bowel sounds, soft, non-tender, non-distended, no bruits, no masses  Extremities:no clubbing, cyanosis or edema  Neurologic: alert and oriented but making minimal verbal responses, sometimes does not follow simple commands  Skin: No rashes  Psych: Alert but making minimal verbal responses, mainly grunts or shakes/nods head in response to questions  Lymph: No obvious LAD    Med, labs, EKG , echo and imaging studies reviewed      Assessment  Acute on chronic hypoxic/hypercapnic respiratory failure:  Pulmonary hypertension, group 3: No prior right heart cath on file, had been on tadalafil  JORDAN: Pulmonology service following  Tobacco use  CHF:  Hypertension:  COPD exacerbation    Plan:  Attempted to call patient's designated healthcare decision-maker Maureen, went straight to voicemail on 2 consecutive attempts.  Same for patient's sister who is listed as secondary healthcare decision-maker  Consider for right heart cath after discussion with patient's healthcare decision

## 2025-06-11 NOTE — PLAN OF CARE
Problem: Chronic Conditions and Co-morbidities  Goal: Patient's chronic conditions and co-morbidity symptoms are monitored and maintained or improved  Outcome: Progressing  Flowsheets (Taken 6/10/2025 2000 by Rohit Stevenson, RN)  Care Plan - Patient's Chronic Conditions and Co-Morbidity Symptoms are Monitored and Maintained or Improved:   Monitor and assess patient's chronic conditions and comorbid symptoms for stability, deterioration, or improvement   Collaborate with multidisciplinary team to address chronic and comorbid conditions and prevent exacerbation or deterioration   Update acute care plan with appropriate goals if chronic or comorbid symptoms are exacerbated and prevent overall improvement and discharge     Problem: Discharge Planning  Goal: Discharge to home or other facility with appropriate resources  Outcome: Progressing  Flowsheets (Taken 6/10/2025 2000 by Rohit Stevenson, RN)  Discharge to home or other facility with appropriate resources:   Identify barriers to discharge with patient and caregiver   Arrange for needed discharge resources and transportation as appropriate   Identify discharge learning needs (meds, wound care, etc)   Refer to discharge planning if patient needs post-hospital services based on physician order or complex needs related to functional status, cognitive ability or social support system     Problem: Skin/Tissue Integrity  Goal: Skin integrity remains intact  Description: 1.  Monitor for areas of redness and/or skin breakdown2.  Assess vascular access sites hourly3.  Every 4-6 hours minimum:  Change oxygen saturation probe site4.  Every 4-6 hours:  If on nasal continuous positive airway pressure, respiratory therapy assess nares and determine need for appliance change or resting period  Outcome: Progressing  Flowsheets (Taken 6/10/2025 2000 by Rohit Stevenson, RN)  Skin Integrity Remains Intact:   Monitor for areas of redness and/or skin breakdown   Assess vascular access sites

## 2025-06-11 NOTE — RT PROTOCOL NOTE
RT Inhaler-Nebulizer Bronchodilator Protocol Note    There is a bronchodilator order in the chart from a provider indicating to follow the RT Bronchodilator Protocol and there is an “Initiate RT Inhaler-Nebulizer Bronchodilator Protocol” order as well (see protocol at bottom of note).    CXR Findings:  XR CHEST PORTABLE  Result Date: 6/9/2025  Impression: No acute findings. This document has been electronically signed by: Caroline Vera MD on 06/09/2025 06:46 PM      The findings from the last RT Protocol Assessment were as follows:   History Pulmonary Disease: Chronic pulmonary disease  Respiratory Pattern: Regular pattern and RR 12-20 bpm  Breath Sounds: Slightly diminished and/or crackles  Cough: Strong, spontaneous, non-productive  Indication for Bronchodilator Therapy: Decreased or absent breath sounds, On home bronchodilators  Bronchodilator Assessment Score: 4    Aerosolized bronchodilator medication orders have been revised according to the RT Inhaler-Nebulizer Bronchodilator Protocol below.    Respiratory Therapist to perform RT Therapy Protocol Assessment initially then follow the protocol.  Repeat RT Therapy Protocol Assessment PRN for score 0-3 or on second treatment, BID, and PRN for scores above 3.    No Indications - adjust the frequency to every 6 hours PRN wheezing or bronchospasm, if no treatments needed after 48 hours then discontinue using Per Protocol order mode.     If indication present, adjust the RT bronchodilator orders based on the Bronchodilator Assessment Score as indicated below.  Use Inhaler orders unless patient has one or more of the following: on home nebulizer, not able to hold breath for 10 seconds, is not alert and oriented, cannot activate and use MDI correctly, or respiratory rate 25 breaths per minute or more, then use the equivalent nebulizer order(s) with same Frequency and PRN reasons based on the score.  If a patient is on this medication at home then do not

## 2025-06-11 NOTE — PLAN OF CARE
Problem: Respiratory - Adult  Goal: Lung sounds clear or within normal limits for patient  6/11/2025 1808 by Trish Rouse, PORTER  Outcome: Progressing

## 2025-06-11 NOTE — PLAN OF CARE
Problem: Respiratory - Adult  Goal: Lung sounds clear or within normal limits for patient  Outcome: Progressing   Breath sounds diminished, continuing treatments as ordered.  Patient mutually agreed on goals.

## 2025-06-12 LAB
ABO GROUP BLD: NORMAL
ANION GAP SERPL CALC-SCNC: 12 MEQ/L (ref 8–16)
APTT PPP: 30.9 SECONDS (ref 22–38)
BDY SITE: ABNORMAL
BUN SERPL-MCNC: 11 MG/DL (ref 8–23)
CALCIUM SERPL-MCNC: 9.2 MG/DL (ref 8.6–10)
CHLORIDE SERPL-SCNC: 94 MEQ/L (ref 98–111)
CO2 SERPL-SCNC: 31 MEQ/L (ref 22–29)
COLLECTED BY:: ABNORMAL
CREAT SERPL-MCNC: 0.8 MG/DL (ref 0.7–1.2)
DEPRECATED RDW RBC AUTO: 57.4 FL (ref 35–45)
ECHO BSA: 2.16 M2
EKG ATRIAL RATE: 82 BPM
EKG P AXIS: 85 DEGREES
EKG P-R INTERVAL: 150 MS
EKG Q-T INTERVAL: 388 MS
EKG QRS DURATION: 94 MS
EKG QTC CALCULATION (BAZETT): 453 MS
EKG R AXIS: 97 DEGREES
EKG T AXIS: 58 DEGREES
EKG VENTRICULAR RATE: 82 BPM
ERYTHROCYTE [DISTWIDTH] IN BLOOD BY AUTOMATED COUNT: 15.7 % (ref 11.5–14.5)
GFR SERPL CREATININE-BSD FRML MDRD: > 90 ML/MIN/1.73M2
GLUCOSE SERPL-MCNC: 92 MG/DL (ref 74–109)
HCT VFR BLD AUTO: 49.6 % (ref 42–52)
HGB BLD-MCNC: 14.9 GM/DL (ref 14–18)
IAT IGG-SP REAG SERPL QL: NORMAL
INR PPP: 0.98 (ref 0.85–1.13)
MCH RBC QN AUTO: 29.7 PG (ref 26–33)
MCHC RBC AUTO-ENTMCNC: 30 GM/DL (ref 32.2–35.5)
MCV RBC AUTO: 98.8 FL (ref 80–94)
PLATELET # BLD AUTO: 305 THOU/MM3 (ref 130–400)
PMV BLD AUTO: 10.1 FL (ref 9.4–12.4)
POTASSIUM SERPL-SCNC: 4.8 MEQ/L (ref 3.5–5.2)
PROTHROMBIN TIME: 11.2 SECONDS (ref 10–13.5)
RBC # BLD AUTO: 5.02 MILL/MM3 (ref 4.7–6.1)
RH BLD: NORMAL
SAO2 % BLD: 67 % (ref 94–97)
SODIUM SERPL-SCNC: 137 MEQ/L (ref 135–145)
WBC # BLD AUTO: 7.2 THOU/MM3 (ref 4.8–10.8)

## 2025-06-12 PROCEDURE — 80048 BASIC METABOLIC PNL TOTAL CA: CPT

## 2025-06-12 PROCEDURE — 2500000003 HC RX 250 WO HCPCS: Performed by: PHYSICIAN ASSISTANT

## 2025-06-12 PROCEDURE — 85730 THROMBOPLASTIN TIME PARTIAL: CPT

## 2025-06-12 PROCEDURE — 6370000000 HC RX 637 (ALT 250 FOR IP): Performed by: PHYSICIAN ASSISTANT

## 2025-06-12 PROCEDURE — 6370000000 HC RX 637 (ALT 250 FOR IP): Performed by: INTERNAL MEDICINE

## 2025-06-12 PROCEDURE — 93451 RIGHT HEART CATH: CPT | Performed by: INTERNAL MEDICINE

## 2025-06-12 PROCEDURE — 99232 SBSQ HOSP IP/OBS MODERATE 35: CPT | Performed by: PHYSICIAN ASSISTANT

## 2025-06-12 PROCEDURE — 86885 COOMBS TEST INDIRECT QUAL: CPT

## 2025-06-12 PROCEDURE — 93010 ELECTROCARDIOGRAM REPORT: CPT | Performed by: INTERNAL MEDICINE

## 2025-06-12 PROCEDURE — 2500000003 HC RX 250 WO HCPCS

## 2025-06-12 PROCEDURE — 94761 N-INVAS EAR/PLS OXIMETRY MLT: CPT

## 2025-06-12 PROCEDURE — 6360000002 HC RX W HCPCS: Performed by: INTERNAL MEDICINE

## 2025-06-12 PROCEDURE — 2500000003 HC RX 250 WO HCPCS: Performed by: NURSE PRACTITIONER

## 2025-06-12 PROCEDURE — 2709999900 HC NON-CHARGEABLE SUPPLY: Performed by: INTERNAL MEDICINE

## 2025-06-12 PROCEDURE — 82810 BLOOD GASES O2 SAT ONLY: CPT

## 2025-06-12 PROCEDURE — 99233 SBSQ HOSP IP/OBS HIGH 50: CPT | Performed by: INTERNAL MEDICINE

## 2025-06-12 PROCEDURE — 2700000000 HC OXYGEN THERAPY PER DAY

## 2025-06-12 PROCEDURE — 86900 BLOOD TYPING SEROLOGIC ABO: CPT

## 2025-06-12 PROCEDURE — 85610 PROTHROMBIN TIME: CPT

## 2025-06-12 PROCEDURE — 2060000000 HC ICU INTERMEDIATE R&B

## 2025-06-12 PROCEDURE — 93005 ELECTROCARDIOGRAM TRACING: CPT | Performed by: PHYSICIAN ASSISTANT

## 2025-06-12 PROCEDURE — 94640 AIRWAY INHALATION TREATMENT: CPT

## 2025-06-12 PROCEDURE — 85027 COMPLETE CBC AUTOMATED: CPT

## 2025-06-12 PROCEDURE — 86901 BLOOD TYPING SEROLOGIC RH(D): CPT

## 2025-06-12 PROCEDURE — 6370000000 HC RX 637 (ALT 250 FOR IP): Performed by: NURSE PRACTITIONER

## 2025-06-12 PROCEDURE — 99152 MOD SED SAME PHYS/QHP 5/>YRS: CPT | Performed by: INTERNAL MEDICINE

## 2025-06-12 PROCEDURE — 36415 COLL VENOUS BLD VENIPUNCTURE: CPT

## 2025-06-12 PROCEDURE — 6360000002 HC RX W HCPCS: Performed by: NURSE PRACTITIONER

## 2025-06-12 PROCEDURE — 94660 CPAP INITIATION&MGMT: CPT

## 2025-06-12 PROCEDURE — 2500000003 HC RX 250 WO HCPCS: Performed by: INTERNAL MEDICINE

## 2025-06-12 PROCEDURE — C1894 INTRO/SHEATH, NON-LASER: HCPCS | Performed by: INTERNAL MEDICINE

## 2025-06-12 PROCEDURE — 4A023N6 MEASUREMENT OF CARDIAC SAMPLING AND PRESSURE, RIGHT HEART, PERCUTANEOUS APPROACH: ICD-10-PCS | Performed by: INTERNAL MEDICINE

## 2025-06-12 RX ORDER — SODIUM CHLORIDE 0.9 % (FLUSH) 0.9 %
5-40 SYRINGE (ML) INJECTION PRN
Status: CANCELLED | OUTPATIENT
Start: 2025-06-12

## 2025-06-12 RX ORDER — SODIUM CHLORIDE 0.9 % (FLUSH) 0.9 %
5-40 SYRINGE (ML) INJECTION EVERY 12 HOURS SCHEDULED
Status: DISCONTINUED | OUTPATIENT
Start: 2025-06-12 | End: 2025-06-16 | Stop reason: HOSPADM

## 2025-06-12 RX ORDER — ASPIRIN 325 MG
325 TABLET ORAL ONCE
Status: COMPLETED | OUTPATIENT
Start: 2025-06-12 | End: 2025-06-12

## 2025-06-12 RX ORDER — SODIUM CHLORIDE 9 MG/ML
INJECTION, SOLUTION INTRAVENOUS PRN
Status: CANCELLED | OUTPATIENT
Start: 2025-06-12

## 2025-06-12 RX ORDER — PRAVASTATIN SODIUM 40 MG
40 TABLET ORAL NIGHTLY
Status: DISCONTINUED | OUTPATIENT
Start: 2025-06-12 | End: 2025-06-16 | Stop reason: HOSPADM

## 2025-06-12 RX ORDER — MIDAZOLAM HYDROCHLORIDE 1 MG/ML
INJECTION, SOLUTION INTRAMUSCULAR; INTRAVENOUS PRN
Status: DISCONTINUED | OUTPATIENT
Start: 2025-06-12 | End: 2025-06-12 | Stop reason: HOSPADM

## 2025-06-12 RX ORDER — PANTOPRAZOLE SODIUM 40 MG/1
40 TABLET, DELAYED RELEASE ORAL
Status: DISCONTINUED | OUTPATIENT
Start: 2025-06-13 | End: 2025-06-16 | Stop reason: HOSPADM

## 2025-06-12 RX ORDER — RISPERIDONE 1 MG/1
2 TABLET ORAL 2 TIMES DAILY
Status: DISCONTINUED | OUTPATIENT
Start: 2025-06-12 | End: 2025-06-16 | Stop reason: HOSPADM

## 2025-06-12 RX ORDER — SODIUM CHLORIDE 9 MG/ML
INJECTION, SOLUTION INTRAVENOUS CONTINUOUS
Status: CANCELLED | OUTPATIENT
Start: 2025-06-12 | End: 2025-06-12

## 2025-06-12 RX ORDER — FENTANYL CITRATE 50 UG/ML
INJECTION, SOLUTION INTRAMUSCULAR; INTRAVENOUS PRN
Status: DISCONTINUED | OUTPATIENT
Start: 2025-06-12 | End: 2025-06-12 | Stop reason: HOSPADM

## 2025-06-12 RX ORDER — SODIUM CHLORIDE 0.9 % (FLUSH) 0.9 %
5-40 SYRINGE (ML) INJECTION PRN
Status: DISCONTINUED | OUTPATIENT
Start: 2025-06-12 | End: 2025-06-16 | Stop reason: HOSPADM

## 2025-06-12 RX ORDER — SODIUM CHLORIDE 9 MG/ML
INJECTION, SOLUTION INTRAVENOUS CONTINUOUS
Status: DISCONTINUED | OUTPATIENT
Start: 2025-06-12 | End: 2025-06-15

## 2025-06-12 RX ORDER — SODIUM CHLORIDE 9 MG/ML
INJECTION, SOLUTION INTRAVENOUS PRN
Status: DISCONTINUED | OUTPATIENT
Start: 2025-06-12 | End: 2025-06-16 | Stop reason: HOSPADM

## 2025-06-12 RX ORDER — ACETAMINOPHEN 325 MG/1
650 TABLET ORAL EVERY 4 HOURS PRN
Status: CANCELLED | OUTPATIENT
Start: 2025-06-12

## 2025-06-12 RX ORDER — SODIUM CHLORIDE 0.9 % (FLUSH) 0.9 %
5-40 SYRINGE (ML) INJECTION EVERY 12 HOURS SCHEDULED
Status: CANCELLED | OUTPATIENT
Start: 2025-06-12

## 2025-06-12 RX ORDER — ATROPINE SULFATE 0.4 MG/ML
0.5 INJECTION, SOLUTION INTRAVENOUS
Status: CANCELLED | OUTPATIENT
Start: 2025-06-12

## 2025-06-12 RX ORDER — BUPROPION HYDROCHLORIDE 150 MG/1
150 TABLET ORAL EVERY MORNING
Status: DISCONTINUED | OUTPATIENT
Start: 2025-06-13 | End: 2025-06-16 | Stop reason: HOSPADM

## 2025-06-12 RX ORDER — LIDOCAINE HYDROCHLORIDE 20 MG/ML
INJECTION, SOLUTION EPIDURAL; INFILTRATION; INTRACAUDAL; PERINEURAL PRN
Status: DISCONTINUED | OUTPATIENT
Start: 2025-06-12 | End: 2025-06-12 | Stop reason: HOSPADM

## 2025-06-12 RX ADMIN — PREDNISONE 40 MG: 20 TABLET ORAL at 08:13

## 2025-06-12 RX ADMIN — ALBUTEROL SULFATE 2.5 MG: 2.5 SOLUTION RESPIRATORY (INHALATION) at 12:04

## 2025-06-12 RX ADMIN — IPRATROPIUM BROMIDE AND ALBUTEROL SULFATE 1 DOSE: .5; 3 SOLUTION RESPIRATORY (INHALATION) at 07:51

## 2025-06-12 RX ADMIN — RISPERIDONE 2 MG: 1 TABLET, FILM COATED ORAL at 20:47

## 2025-06-12 RX ADMIN — SODIUM CHLORIDE, PRESERVATIVE FREE 10 ML: 5 INJECTION INTRAVENOUS at 08:15

## 2025-06-12 RX ADMIN — IPRATROPIUM BROMIDE AND ALBUTEROL SULFATE 1 DOSE: .5; 3 SOLUTION RESPIRATORY (INHALATION) at 04:50

## 2025-06-12 RX ADMIN — WATER 40 MG: 1 INJECTION INTRAMUSCULAR; INTRAVENOUS; SUBCUTANEOUS at 01:18

## 2025-06-12 RX ADMIN — SODIUM CHLORIDE, PRESERVATIVE FREE 10 ML: 5 INJECTION INTRAVENOUS at 20:47

## 2025-06-12 RX ADMIN — IPRATROPIUM BROMIDE AND ALBUTEROL SULFATE 1 DOSE: .5; 3 SOLUTION RESPIRATORY (INHALATION) at 16:03

## 2025-06-12 RX ADMIN — ENOXAPARIN SODIUM 40 MG: 100 INJECTION SUBCUTANEOUS at 08:13

## 2025-06-12 RX ADMIN — IPRATROPIUM BROMIDE AND ALBUTEROL SULFATE 1 DOSE: .5; 3 SOLUTION RESPIRATORY (INHALATION) at 20:20

## 2025-06-12 RX ADMIN — PRAVASTATIN SODIUM 40 MG: 40 TABLET ORAL at 20:47

## 2025-06-12 RX ADMIN — SODIUM CHLORIDE, PRESERVATIVE FREE 10 ML: 5 INJECTION INTRAVENOUS at 08:16

## 2025-06-12 RX ADMIN — SODIUM CHLORIDE, PRESERVATIVE FREE 10 ML: 5 INJECTION INTRAVENOUS at 20:46

## 2025-06-12 RX ADMIN — ASPIRIN 325 MG: 325 TABLET ORAL at 12:24

## 2025-06-12 ASSESSMENT — PAIN SCALES - GENERAL
PAINLEVEL_OUTOF10: 0
PAINLEVEL_OUTOF10: 0

## 2025-06-12 NOTE — BRIEF OP NOTE
[facility]  Sedation/Analgesia Post Sedation Record        Pt Name: Sandie Carrera  MRN: [id number]  YOB: 1970  Procedure Performed By: Onur Lopez MD MD, YAMILEX, NATALYA, KURTIS  Primary Care Physician: Elijah Liu MD    POST-PROCEDURE    Sedation/Anesthesia:  Local Anesthesia and IV Conscious Sedation with continuous O2 monitoring    Estimated Blood Loss: 10 cc     Specimens Removed:  [x]None []Other:      Disposition of Specimen:  []Pathology []Other        Complications:   [x]None Immediate []Other:       Procedure performed:Right heart cath    Post Procedure Diagnosis/Findings:  PA: 66/13 (41)  PCWP: 6  RA: 3    CO 5.47, CI: 2.6  PA sat: 67%       Recommendations:    Medical treatment  Routine post-cath care.               Onur Lopez MD MD, FACLUCIO, NATALYA, KURTIS  Electronically signed 6/12/2025 at 2:20 PM

## 2025-06-12 NOTE — PLAN OF CARE
Problem: Chronic Conditions and Co-morbidities  Goal: Patient's chronic conditions and co-morbidity symptoms are monitored and maintained or improved  6/12/2025 1311 by Zia Rosales RN  Outcome: Progressing  6/12/2025 0057 by Betty Spaulding RN  Flowsheets (Taken 6/11/2025 2108)  Care Plan - Patient's Chronic Conditions and Co-Morbidity Symptoms are Monitored and Maintained or Improved:   Monitor and assess patient's chronic conditions and comorbid symptoms for stability, deterioration, or improvement   Collaborate with multidisciplinary team to address chronic and comorbid conditions and prevent exacerbation or deterioration     Problem: Discharge Planning  Goal: Discharge to home or other facility with appropriate resources  6/12/2025 1311 by Zia Rosales RN  Outcome: Progressing  6/12/2025 0057 by Betty Spaulding RN  Flowsheets (Taken 6/11/2025 2108)  Discharge to home or other facility with appropriate resources:   Identify barriers to discharge with patient and caregiver   Arrange for needed discharge resources and transportation as appropriate   Identify discharge learning needs (meds, wound care, etc)     Problem: Skin/Tissue Integrity  Goal: Skin integrity remains intact  Description: 1.  Monitor for areas of redness and/or skin breakdown2.  Assess vascular access sites hourly3.  Every 4-6 hours minimum:  Change oxygen saturation probe site4.  Every 4-6 hours:  If on nasal continuous positive airway pressure, respiratory therapy assess nares and determine need for appliance change or resting period  6/12/2025 1311 by Zia Rosales RN  Outcome: Progressing  6/12/2025 0057 by Betty Spaulding RN  Flowsheets (Taken 6/11/2025 2108)  Skin Integrity Remains Intact:   Monitor for areas of redness and/or skin breakdown   Assess vascular access sites hourly   Every 4-6 hours minimum:  Change oxygen saturation probe site   Assess need for specialty bed   Pressure redistribution bed/mattress (bed type)

## 2025-06-12 NOTE — PLAN OF CARE
Problem: Chronic Conditions and Co-morbidities  Goal: Patient's chronic conditions and co-morbidity symptoms are monitored and maintained or improved  6/12/2025 0057 by Betty Spaulding RN  Flowsheets (Taken 6/11/2025 2108)  Care Plan - Patient's Chronic Conditions and Co-Morbidity Symptoms are Monitored and Maintained or Improved:   Monitor and assess patient's chronic conditions and comorbid symptoms for stability, deterioration, or improvement   Collaborate with multidisciplinary team to address chronic and comorbid conditions and prevent exacerbation or deterioration  6/11/2025 1613 by Hemal Chapin RN  Outcome: Progressing  Flowsheets (Taken 6/10/2025 2000 by Rohit Stevenson, RN)  Care Plan - Patient's Chronic Conditions and Co-Morbidity Symptoms are Monitored and Maintained or Improved:   Monitor and assess patient's chronic conditions and comorbid symptoms for stability, deterioration, or improvement   Collaborate with multidisciplinary team to address chronic and comorbid conditions and prevent exacerbation or deterioration   Update acute care plan with appropriate goals if chronic or comorbid symptoms are exacerbated and prevent overall improvement and discharge     Problem: Discharge Planning  Goal: Discharge to home or other facility with appropriate resources  6/12/2025 0057 by Betty Spaulding RN  Flowsheets (Taken 6/11/2025 2108)  Discharge to home or other facility with appropriate resources:   Identify barriers to discharge with patient and caregiver   Arrange for needed discharge resources and transportation as appropriate   Identify discharge learning needs (meds, wound care, etc)  6/11/2025 1613 by Hemal Chapin RN  Outcome: Progressing  Flowsheets (Taken 6/10/2025 2000 by Rohit Stevenson, RN)  Discharge to home or other facility with appropriate resources:   Identify barriers to discharge with patient and caregiver   Arrange for needed discharge resources and transportation as appropriate   Identify

## 2025-06-12 NOTE — PLAN OF CARE
Problem: Respiratory - Adult  Goal: Lung sounds clear or within normal limits for patient  6/12/2025 1209 by Angeli Zhu, RCKAYLIE  Outcome: Progressing   Breath sounds diminished, continuing treatments as ordered.  Patient mutually agreed on goals.

## 2025-06-12 NOTE — PROCEDURES
PROCEDURE NOTE  Date: 6/12/2025   Name: Sandie Carrera  YOB: 1970    Procedures  12 lead EKG completed. Results handed to Polo DACOSTA.

## 2025-06-13 LAB
ARTERIAL PATENCY WRIST A: POSITIVE
BASE EXCESS BLDA CALC-SCNC: 8.8 MMOL/L (ref -2.5–2.5)
BDY SITE: ABNORMAL
BREATHS SETTING VENT: 18 BPM
COLLECTED BY:: ABNORMAL
DEVICE: ABNORMAL
FIO2 ON VENT O2 ANALYZER: 32 %
HCO3 BLDA-SCNC: 37 MMOL/L (ref 23–28)
PCO2 BLDA: 65 MMHG (ref 35–45)
PH BLDA: 7.36 [PH] (ref 7.35–7.45)
PO2 BLDA: 70 MMHG (ref 71–104)
SAO2 % BLDA: 92 %
VENTILATION MODE VENT: ABNORMAL
VT SETTING VENT: 480 ML

## 2025-06-13 PROCEDURE — 2700000000 HC OXYGEN THERAPY PER DAY

## 2025-06-13 PROCEDURE — 99233 SBSQ HOSP IP/OBS HIGH 50: CPT | Performed by: INTERNAL MEDICINE

## 2025-06-13 PROCEDURE — 94761 N-INVAS EAR/PLS OXIMETRY MLT: CPT

## 2025-06-13 PROCEDURE — 94640 AIRWAY INHALATION TREATMENT: CPT

## 2025-06-13 PROCEDURE — 94660 CPAP INITIATION&MGMT: CPT

## 2025-06-13 PROCEDURE — 99232 SBSQ HOSP IP/OBS MODERATE 35: CPT | Performed by: PHYSICIAN ASSISTANT

## 2025-06-13 PROCEDURE — 2500000003 HC RX 250 WO HCPCS

## 2025-06-13 PROCEDURE — 2060000000 HC ICU INTERMEDIATE R&B

## 2025-06-13 PROCEDURE — 6360000002 HC RX W HCPCS: Performed by: INTERNAL MEDICINE

## 2025-06-13 PROCEDURE — 82803 BLOOD GASES ANY COMBINATION: CPT

## 2025-06-13 PROCEDURE — 36600 WITHDRAWAL OF ARTERIAL BLOOD: CPT

## 2025-06-13 PROCEDURE — 6370000000 HC RX 637 (ALT 250 FOR IP): Performed by: NURSE PRACTITIONER

## 2025-06-13 PROCEDURE — 6370000000 HC RX 637 (ALT 250 FOR IP): Performed by: INTERNAL MEDICINE

## 2025-06-13 RX ORDER — ALBUTEROL SULFATE 0.83 MG/ML
2.5 SOLUTION RESPIRATORY (INHALATION) EVERY 6 HOURS PRN
Qty: 360 ML | Refills: 10 | Status: SHIPPED | OUTPATIENT
Start: 2025-06-13 | End: 2026-06-13

## 2025-06-13 RX ORDER — UMECLIDINIUM BROMIDE AND VILANTEROL TRIFENATATE 62.5; 25 UG/1; UG/1
1 POWDER RESPIRATORY (INHALATION) DAILY
Qty: 3 EACH | Refills: 3 | Status: SHIPPED | OUTPATIENT
Start: 2025-06-13

## 2025-06-13 RX ORDER — BUDESONIDE 0.5 MG/2ML
500 INHALANT ORAL 2 TIMES DAILY
Qty: 60 EACH | Refills: 3 | Status: SHIPPED | OUTPATIENT
Start: 2025-06-13 | End: 2025-06-13 | Stop reason: HOSPADM

## 2025-06-13 RX ORDER — TIOTROPIUM BROMIDE 18 UG/1
18 CAPSULE ORAL; RESPIRATORY (INHALATION) DAILY
Qty: 30 CAPSULE | Refills: 11 | Status: SHIPPED | OUTPATIENT
Start: 2025-06-13 | End: 2025-06-13 | Stop reason: HOSPADM

## 2025-06-13 RX ORDER — ARFORMOTEROL TARTRATE 15 UG/2ML
1 SOLUTION RESPIRATORY (INHALATION)
Qty: 60 ML | Refills: 11 | Status: SHIPPED | OUTPATIENT
Start: 2025-06-13 | End: 2025-06-13 | Stop reason: HOSPADM

## 2025-06-13 RX ADMIN — IPRATROPIUM BROMIDE AND ALBUTEROL SULFATE 1 DOSE: .5; 3 SOLUTION RESPIRATORY (INHALATION) at 04:59

## 2025-06-13 RX ADMIN — IPRATROPIUM BROMIDE AND ALBUTEROL SULFATE 1 DOSE: .5; 3 SOLUTION RESPIRATORY (INHALATION) at 07:48

## 2025-06-13 RX ADMIN — ENOXAPARIN SODIUM 40 MG: 100 INJECTION SUBCUTANEOUS at 07:48

## 2025-06-13 RX ADMIN — PREDNISONE 40 MG: 20 TABLET ORAL at 07:48

## 2025-06-13 RX ADMIN — RISPERIDONE 2 MG: 1 TABLET, FILM COATED ORAL at 20:43

## 2025-06-13 RX ADMIN — IPRATROPIUM BROMIDE AND ALBUTEROL SULFATE 1 DOSE: .5; 3 SOLUTION RESPIRATORY (INHALATION) at 22:12

## 2025-06-13 RX ADMIN — SODIUM CHLORIDE, PRESERVATIVE FREE 10 ML: 5 INJECTION INTRAVENOUS at 07:42

## 2025-06-13 RX ADMIN — BUPROPION HYDROCHLORIDE 150 MG: 150 TABLET, EXTENDED RELEASE ORAL at 07:48

## 2025-06-13 RX ADMIN — PANTOPRAZOLE SODIUM 40 MG: 40 TABLET, DELAYED RELEASE ORAL at 07:48

## 2025-06-13 RX ADMIN — IPRATROPIUM BROMIDE AND ALBUTEROL SULFATE 1 DOSE: .5; 3 SOLUTION RESPIRATORY (INHALATION) at 00:31

## 2025-06-13 RX ADMIN — PRAVASTATIN SODIUM 40 MG: 40 TABLET ORAL at 20:43

## 2025-06-13 RX ADMIN — IPRATROPIUM BROMIDE AND ALBUTEROL SULFATE 1 DOSE: .5; 3 SOLUTION RESPIRATORY (INHALATION) at 16:08

## 2025-06-13 RX ADMIN — RISPERIDONE 2 MG: 1 TABLET, FILM COATED ORAL at 07:48

## 2025-06-13 RX ADMIN — IPRATROPIUM BROMIDE AND ALBUTEROL SULFATE 1 DOSE: .5; 3 SOLUTION RESPIRATORY (INHALATION) at 13:37

## 2025-06-13 ASSESSMENT — PAIN SCALES - GENERAL
PAINLEVEL_OUTOF10: 0

## 2025-06-13 NOTE — PLAN OF CARE
Problem: Chronic Conditions and Co-morbidities  Goal: Patient's chronic conditions and co-morbidity symptoms are monitored and maintained or improved  6/12/2025 1311 by Zia Rosales RN  Outcome: Progressing     Problem: Skin/Tissue Integrity  Goal: Skin integrity remains intact  Description: 1.  Monitor for areas of redness and/or skin breakdown2.  Assess vascular access sites hourly3.  Every 4-6 hours minimum:  Change oxygen saturation probe site4.  Every 4-6 hours:  If on nasal continuous positive airway pressure, respiratory therapy assess nares and determine need for appliance change or resting period  6/12/2025 2027 by Jair Fabian RN  Outcome: Progressing  6/12/2025 1311 by Zia Rosales RN  Outcome: Progressing     Problem: Safety - Adult  Goal: Free from fall injury  6/12/2025 2027 by Jair Fabian RN  Outcome: Progressing  6/12/2025 1311 by Zia Rosales RN  Outcome: Progressing     Problem: Respiratory - Adult  Goal: Lung sounds clear or within normal limits for patient  6/12/2025 2027 by Jair Fabian RN  Outcome: Progressing  6/12/2025 1311 by Zia Rosales RN  Outcome: Progressing  6/12/2025 1209 by Angeli Zhu KAYLIE  Outcome: Progressing     Problem: Pain  Goal: Verbalizes/displays adequate comfort level or baseline comfort level  6/12/2025 2027 by Jair Fabian RN  Outcome: Progressing  6/12/2025 1311 by Zia Rosales RN  Outcome: Progressing

## 2025-06-13 NOTE — PLAN OF CARE
Problem: Chronic Conditions and Co-morbidities  Goal: Patient's chronic conditions and co-morbidity symptoms are monitored and maintained or improved  6/13/2025 1941 by Jair Fabian RN  Outcome: Progressing  6/13/2025 1003 by Zia Rosales RN  Outcome: Progressing     Problem: Safety - Adult  Goal: Free from fall injury  6/13/2025 1941 by Jair Fabian RN  Outcome: Progressing  6/13/2025 1003 by Zia Rosales RN  Outcome: Progressing     Problem: Respiratory - Adult  Goal: Lung sounds clear or within normal limits for patient  6/13/2025 1941 by Jair Fabian RN  Outcome: Progressing  6/13/2025 1003 by Zia Rosales RN  Outcome: Progressing  6/13/2025 0759 by Trish Rouse [facility]  Outcome: Progressing     Problem: Pain  Goal: Verbalizes/displays adequate comfort level or baseline comfort level  6/13/2025 1003 by Zia Rosales RN  Outcome: Progressing

## 2025-06-13 NOTE — PLAN OF CARE
Problem: Chronic Conditions and Co-morbidities  Goal: Patient's chronic conditions and co-morbidity symptoms are monitored and maintained or improved  Outcome: Progressing     Problem: Discharge Planning  Goal: Discharge to home or other facility with appropriate resources  Outcome: Progressing     Problem: Skin/Tissue Integrity  Goal: Skin integrity remains intact  Description: 1.  Monitor for areas of redness and/or skin breakdown2.  Assess vascular access sites hourly3.  Every 4-6 hours minimum:  Change oxygen saturation probe site4.  Every 4-6 hours:  If on nasal continuous positive airway pressure, respiratory therapy assess nares and determine need for appliance change or resting period  6/13/2025 1003 by Zia Rosales RN  Outcome: Progressing  6/12/2025 2027 by Jair Fabian RN  Outcome: Progressing     Problem: Safety - Adult  Goal: Free from fall injury  6/13/2025 1003 by Zia Rosales RN  Outcome: Progressing  6/12/2025 2027 by Jair Fabian RN  Outcome: Progressing     Problem: Respiratory - Adult  Goal: Lung sounds clear or within normal limits for patient  6/13/2025 1003 by Zia Rosales RN  Outcome: Progressing  6/13/2025 0759 by Trish Rouse RCP  Outcome: Progressing  6/12/2025 2027 by Jair Fabian RN  Outcome: Progressing     Problem: Pain  Goal: Verbalizes/displays adequate comfort level or baseline comfort level  6/13/2025 1003 by Zia Rosales RN  Outcome: Progressing  6/12/2025 2027 by Jair Fabian RN  Outcome: Progressing

## 2025-06-13 NOTE — PLAN OF CARE
Problem: Respiratory - Adult  Goal: Lung sounds clear or within normal limits for patient  6/13/2025 0759 by Trish Rouse, RCKAYLIE  Outcome: Progressing

## 2025-06-14 PROCEDURE — 2500000003 HC RX 250 WO HCPCS

## 2025-06-14 PROCEDURE — 2060000000 HC ICU INTERMEDIATE R&B

## 2025-06-14 PROCEDURE — 94640 AIRWAY INHALATION TREATMENT: CPT

## 2025-06-14 PROCEDURE — 2700000000 HC OXYGEN THERAPY PER DAY

## 2025-06-14 PROCEDURE — 2500000003 HC RX 250 WO HCPCS: Performed by: PHYSICIAN ASSISTANT

## 2025-06-14 PROCEDURE — 6370000000 HC RX 637 (ALT 250 FOR IP): Performed by: INTERNAL MEDICINE

## 2025-06-14 PROCEDURE — 2500000003 HC RX 250 WO HCPCS: Performed by: INTERNAL MEDICINE

## 2025-06-14 PROCEDURE — 6360000002 HC RX W HCPCS: Performed by: INTERNAL MEDICINE

## 2025-06-14 PROCEDURE — 6370000000 HC RX 637 (ALT 250 FOR IP): Performed by: NURSE PRACTITIONER

## 2025-06-14 PROCEDURE — 94761 N-INVAS EAR/PLS OXIMETRY MLT: CPT

## 2025-06-14 RX ADMIN — PRAVASTATIN SODIUM 40 MG: 40 TABLET ORAL at 19:57

## 2025-06-14 RX ADMIN — IPRATROPIUM BROMIDE AND ALBUTEROL SULFATE 1 DOSE: .5; 3 SOLUTION RESPIRATORY (INHALATION) at 17:08

## 2025-06-14 RX ADMIN — IPRATROPIUM BROMIDE AND ALBUTEROL SULFATE 1 DOSE: .5; 3 SOLUTION RESPIRATORY (INHALATION) at 13:12

## 2025-06-14 RX ADMIN — SODIUM CHLORIDE, PRESERVATIVE FREE 10 ML: 5 INJECTION INTRAVENOUS at 19:57

## 2025-06-14 RX ADMIN — PREDNISONE 40 MG: 20 TABLET ORAL at 08:25

## 2025-06-14 RX ADMIN — ENOXAPARIN SODIUM 40 MG: 100 INJECTION SUBCUTANEOUS at 08:26

## 2025-06-14 RX ADMIN — RISPERIDONE 2 MG: 1 TABLET, FILM COATED ORAL at 19:57

## 2025-06-14 RX ADMIN — IPRATROPIUM BROMIDE AND ALBUTEROL SULFATE 1 DOSE: .5; 3 SOLUTION RESPIRATORY (INHALATION) at 20:55

## 2025-06-14 RX ADMIN — PANTOPRAZOLE SODIUM 40 MG: 40 TABLET, DELAYED RELEASE ORAL at 08:26

## 2025-06-14 RX ADMIN — SODIUM CHLORIDE, PRESERVATIVE FREE 10 ML: 5 INJECTION INTRAVENOUS at 08:32

## 2025-06-14 RX ADMIN — IPRATROPIUM BROMIDE AND ALBUTEROL SULFATE 1 DOSE: .5; 3 SOLUTION RESPIRATORY (INHALATION) at 10:04

## 2025-06-14 RX ADMIN — BUPROPION HYDROCHLORIDE 150 MG: 150 TABLET, EXTENDED RELEASE ORAL at 08:25

## 2025-06-14 RX ADMIN — RISPERIDONE 2 MG: 1 TABLET, FILM COATED ORAL at 08:26

## 2025-06-14 ASSESSMENT — PAIN SCALES - GENERAL
PAINLEVEL_OUTOF10: 0

## 2025-06-14 NOTE — PLAN OF CARE
Problem: Chronic Conditions and Co-morbidities  Goal: Patient's chronic conditions and co-morbidity symptoms are monitored and maintained or improved  Outcome: Progressing     Problem: Discharge Planning  Goal: Discharge to home or other facility with appropriate resources  Outcome: Progressing     Problem: Skin/Tissue Integrity  Goal: Skin integrity remains intact  Description: 1.  Monitor for areas of redness and/or skin breakdown2.  Assess vascular access sites hourly3.  Every 4-6 hours minimum:  Change oxygen saturation probe site4.  Every 4-6 hours:  If on nasal continuous positive airway pressure, respiratory therapy assess nares and determine need for appliance change or resting period  Outcome: Progressing     Problem: Safety - Adult  Goal: Free from fall injury  Outcome: Progressing     Problem: Respiratory - Adult  Goal: Lung sounds clear or within normal limits for patient  Outcome: Progressing     Problem: Pain  Goal: Verbalizes/displays adequate comfort level or baseline comfort level  Outcome: Progressing

## 2025-06-15 PROCEDURE — 6360000002 HC RX W HCPCS: Performed by: INTERNAL MEDICINE

## 2025-06-15 PROCEDURE — 6370000000 HC RX 637 (ALT 250 FOR IP): Performed by: INTERNAL MEDICINE

## 2025-06-15 PROCEDURE — 2700000000 HC OXYGEN THERAPY PER DAY

## 2025-06-15 PROCEDURE — 94640 AIRWAY INHALATION TREATMENT: CPT

## 2025-06-15 PROCEDURE — 99232 SBSQ HOSP IP/OBS MODERATE 35: CPT

## 2025-06-15 PROCEDURE — 94760 N-INVAS EAR/PLS OXIMETRY 1: CPT

## 2025-06-15 PROCEDURE — 2060000000 HC ICU INTERMEDIATE R&B

## 2025-06-15 RX ADMIN — ENOXAPARIN SODIUM 40 MG: 100 INJECTION SUBCUTANEOUS at 09:17

## 2025-06-15 RX ADMIN — PRAVASTATIN SODIUM 40 MG: 40 TABLET ORAL at 21:28

## 2025-06-15 RX ADMIN — IPRATROPIUM BROMIDE AND ALBUTEROL SULFATE 1 DOSE: .5; 3 SOLUTION RESPIRATORY (INHALATION) at 16:13

## 2025-06-15 RX ADMIN — IPRATROPIUM BROMIDE AND ALBUTEROL SULFATE 1 DOSE: .5; 3 SOLUTION RESPIRATORY (INHALATION) at 23:55

## 2025-06-15 RX ADMIN — RISPERIDONE 2 MG: 1 TABLET, FILM COATED ORAL at 09:21

## 2025-06-15 RX ADMIN — IPRATROPIUM BROMIDE AND ALBUTEROL SULFATE 1 DOSE: .5; 3 SOLUTION RESPIRATORY (INHALATION) at 07:34

## 2025-06-15 RX ADMIN — IPRATROPIUM BROMIDE AND ALBUTEROL SULFATE 1 DOSE: .5; 3 SOLUTION RESPIRATORY (INHALATION) at 00:11

## 2025-06-15 RX ADMIN — IPRATROPIUM BROMIDE AND ALBUTEROL SULFATE 1 DOSE: .5; 3 SOLUTION RESPIRATORY (INHALATION) at 21:21

## 2025-06-15 RX ADMIN — IPRATROPIUM BROMIDE AND ALBUTEROL SULFATE 1 DOSE: .5; 3 SOLUTION RESPIRATORY (INHALATION) at 12:13

## 2025-06-15 RX ADMIN — BUPROPION HYDROCHLORIDE 150 MG: 150 TABLET, EXTENDED RELEASE ORAL at 09:21

## 2025-06-15 RX ADMIN — PANTOPRAZOLE SODIUM 40 MG: 40 TABLET, DELAYED RELEASE ORAL at 09:21

## 2025-06-15 RX ADMIN — RISPERIDONE 2 MG: 1 TABLET, FILM COATED ORAL at 21:28

## 2025-06-15 ASSESSMENT — PAIN SCALES - GENERAL
PAINLEVEL_OUTOF10: 0
PAINLEVEL_OUTOF10: 0

## 2025-06-15 NOTE — PLAN OF CARE
Problem: Respiratory - Adult  Goal: Lung sounds clear or within normal limits for patient  6/14/2025 2105 by Antonio Vera RCP  Outcome: Progressing

## 2025-06-15 NOTE — RT PROTOCOL NOTE
RT Nebulizer Bronchodilator Protocol Note    There is a bronchodilator order in the chart from a provider indicating to follow the RT Bronchodilator Protocol and there is an “Initiate RT Bronchodilator Protocol” order as well (see protocol at bottom of note).    CXR Findings:  No results found.    The findings from the last RT Protocol Assessment were as follows:  Smoking: Chronic pulmonary disease  Respiratory Pattern: Regular pattern and RR 12-20 bpm  Breath Sounds: Slightly diminished and/or crackles  Cough: Strong, spontaneous, non-productive  Indication for Bronchodilator Therapy: Decreased or absent breath sounds, On home bronchodilators  Bronchodilator Assessment Score: 4    Aerosolized bronchodilator medication orders have been revised according to the RT Nebulizer Bronchodilator Protocol below.    Respiratory Therapist to perform RT Therapy Protocol Assessment initially then follow the protocol.  Repeat RT Therapy Protocol Assessment PRN for score 0-3 or on second treatment, BID, and PRN for scores above 3.    No Indications - adjust the frequency to every 6 hours PRN wheezing or bronchospasm, if no treatments needed after 48 hours then discontinue using Per Protocol order mode.     If indication present, adjust the RT bronchodilator orders based on the Bronchodilator Assessment Score as indicated below.  If a patient is on this medication at home then do not decrease Frequency below that used at home.    0-3 - enter or revise RT bronchodilator order(s) to equivalent RT Bronchodilator order with Frequency of every 4 hours PRN for wheezing or increased work of breathing using Per Protocol order mode.       4-6 - enter or revise RT Bronchodilator order(s) to two equivalent RT bronchodilator orders with one order with BID Frequency and one order with Frequency of every 4 hours PRN wheezing or increased work of breathing using Per Protocol order mode.         7-10 - enter or revise RT Bronchodilator order(s) to

## 2025-06-15 NOTE — PLAN OF CARE
Problem: Discharge Planning  Goal: Discharge to home or other facility with appropriate resources  6/14/2025 1515 by Мария Burton RN  Outcome: Progressing     Problem: Safety - Adult  Goal: Free from fall injury  6/14/2025 2020 by Jair Fabian RN  Outcome: Progressing  6/14/2025 1515 by Мария Burton RN  Outcome: Progressing     Problem: Respiratory - Adult  Goal: Lung sounds clear or within normal limits for patient  6/14/2025 2020 by Jair Fabian RN  Outcome: Progressing  6/14/2025 1515 by Мария Burton RN  Outcome: Progressing     Problem: Pain  Goal: Verbalizes/displays adequate comfort level or baseline comfort level  6/14/2025 2020 by Jair Fabian RN  Outcome: Progressing  6/14/2025 1515 by Мария Burton RN  Outcome: Progressing

## 2025-06-15 NOTE — PLAN OF CARE
Problem: Chronic Conditions and Co-morbidities  Goal: Patient's chronic conditions and co-morbidity symptoms are monitored and maintained or improved  Outcome: Progressing  Flowsheets (Taken 6/15/2025 0910)  Care Plan - Patient's Chronic Conditions and Co-Morbidity Symptoms are Monitored and Maintained or Improved: Monitor and assess patient's chronic conditions and comorbid symptoms for stability, deterioration, or improvement     Problem: Discharge Planning  Goal: Discharge to home or other facility with appropriate resources  Outcome: Progressing  Flowsheets (Taken 6/15/2025 0910)  Discharge to home or other facility with appropriate resources: Identify barriers to discharge with patient and caregiver     Problem: Skin/Tissue Integrity  Goal: Skin integrity remains intact  Description: 1.  Monitor for areas of redness and/or skin breakdown2.  Assess vascular access sites hourly3.  Every 4-6 hours minimum:  Change oxygen saturation probe site4.  Every 4-6 hours:  If on nasal continuous positive airway pressure, respiratory therapy assess nares and determine need for appliance change or resting period  6/15/2025 1016 by Karissa Ardon, RN  Outcome: Progressing  Flowsheets  Taken 6/15/2025 1015  Skin Integrity Remains Intact: Monitor for areas of redness and/or skin breakdown  Taken 6/15/2025 0910  Skin Integrity Remains Intact: Monitor for areas of redness and/or skin breakdown     Problem: Safety - Adult  Goal: Free from fall injury  6/15/2025 1016 by Karissa Ardon RN  Outcome: Progressing     Problem: Respiratory - Adult  Goal: Lung sounds clear or within normal limits for patient  6/15/2025 1016 by Karissa Ardon, RN  Outcome: Progressing     Problem: Pain  Goal: Verbalizes/displays adequate comfort level or baseline comfort level  6/15/2025 1016 by Karissa Ardon, RN  Outcome: Progressing  Flowsheets (Taken 6/15/2025 0906)  Verbalizes/displays adequate comfort level or baseline comfort level:

## 2025-06-15 NOTE — PLAN OF CARE
Problem: Respiratory - Adult  Goal: Lung sounds clear or within normal limits for patient  6/15/2025 0736 by Rachele Hamm, PORTER  Outcome: Progressing   Duoneb to improve breath sounds. Patient mutually agreed on goals.

## 2025-06-16 ENCOUNTER — TELEPHONE (OUTPATIENT)
Dept: PULMONOLOGY | Age: 55
End: 2025-06-16

## 2025-06-16 VITALS
SYSTOLIC BLOOD PRESSURE: 140 MMHG | WEIGHT: 205.51 LBS | HEIGHT: 71 IN | DIASTOLIC BLOOD PRESSURE: 113 MMHG | OXYGEN SATURATION: 98 % | RESPIRATION RATE: 18 BRPM | BODY MASS INDEX: 28.77 KG/M2 | HEART RATE: 88 BPM | TEMPERATURE: 98.2 F

## 2025-06-16 LAB
ANION GAP SERPL CALC-SCNC: 8 MEQ/L (ref 8–16)
BUN SERPL-MCNC: 9 MG/DL (ref 8–23)
CALCIUM SERPL-MCNC: 8.6 MG/DL (ref 8.6–10)
CHLORIDE SERPL-SCNC: 97 MEQ/L (ref 98–111)
CO2 SERPL-SCNC: 31 MEQ/L (ref 22–29)
CREAT SERPL-MCNC: 0.8 MG/DL (ref 0.7–1.2)
DEPRECATED RDW RBC AUTO: 54.5 FL (ref 35–45)
ERYTHROCYTE [DISTWIDTH] IN BLOOD BY AUTOMATED COUNT: 15.1 % (ref 11.5–14.5)
GFR SERPL CREATININE-BSD FRML MDRD: > 90 ML/MIN/1.73M2
GLUCOSE SERPL-MCNC: 84 MG/DL (ref 74–109)
HCT VFR BLD AUTO: 45.1 % (ref 42–52)
HGB BLD-MCNC: 14 GM/DL (ref 14–18)
MCH RBC QN AUTO: 30.2 PG (ref 26–33)
MCHC RBC AUTO-ENTMCNC: 31 GM/DL (ref 32.2–35.5)
MCV RBC AUTO: 97.4 FL (ref 80–94)
PLATELET # BLD AUTO: 269 THOU/MM3 (ref 130–400)
PMV BLD AUTO: 9.7 FL (ref 9.4–12.4)
POTASSIUM SERPL-SCNC: 4.7 MEQ/L (ref 3.5–5.2)
RBC # BLD AUTO: 4.63 MILL/MM3 (ref 4.7–6.1)
SODIUM SERPL-SCNC: 136 MEQ/L (ref 135–145)
WBC # BLD AUTO: 6.5 THOU/MM3 (ref 4.8–10.8)

## 2025-06-16 PROCEDURE — 99232 SBSQ HOSP IP/OBS MODERATE 35: CPT | Performed by: INTERNAL MEDICINE

## 2025-06-16 PROCEDURE — 6370000000 HC RX 637 (ALT 250 FOR IP): Performed by: INTERNAL MEDICINE

## 2025-06-16 PROCEDURE — 36415 COLL VENOUS BLD VENIPUNCTURE: CPT

## 2025-06-16 PROCEDURE — 85027 COMPLETE CBC AUTOMATED: CPT

## 2025-06-16 PROCEDURE — 6360000002 HC RX W HCPCS: Performed by: INTERNAL MEDICINE

## 2025-06-16 PROCEDURE — 94760 N-INVAS EAR/PLS OXIMETRY 1: CPT

## 2025-06-16 PROCEDURE — 94660 CPAP INITIATION&MGMT: CPT

## 2025-06-16 PROCEDURE — 94640 AIRWAY INHALATION TREATMENT: CPT

## 2025-06-16 PROCEDURE — 2700000000 HC OXYGEN THERAPY PER DAY

## 2025-06-16 PROCEDURE — 80048 BASIC METABOLIC PNL TOTAL CA: CPT

## 2025-06-16 RX ADMIN — IPRATROPIUM BROMIDE AND ALBUTEROL SULFATE 1 DOSE: .5; 3 SOLUTION RESPIRATORY (INHALATION) at 16:05

## 2025-06-16 RX ADMIN — RISPERIDONE 2 MG: 1 TABLET, FILM COATED ORAL at 20:38

## 2025-06-16 RX ADMIN — ENOXAPARIN SODIUM 40 MG: 100 INJECTION SUBCUTANEOUS at 09:27

## 2025-06-16 RX ADMIN — RISPERIDONE 2 MG: 1 TABLET, FILM COATED ORAL at 09:27

## 2025-06-16 RX ADMIN — IPRATROPIUM BROMIDE AND ALBUTEROL SULFATE 1 DOSE: .5; 3 SOLUTION RESPIRATORY (INHALATION) at 10:12

## 2025-06-16 RX ADMIN — IPRATROPIUM BROMIDE AND ALBUTEROL SULFATE 1 DOSE: .5; 3 SOLUTION RESPIRATORY (INHALATION) at 03:51

## 2025-06-16 RX ADMIN — BUPROPION HYDROCHLORIDE 150 MG: 150 TABLET, EXTENDED RELEASE ORAL at 09:27

## 2025-06-16 RX ADMIN — PRAVASTATIN SODIUM 40 MG: 40 TABLET ORAL at 20:38

## 2025-06-16 ASSESSMENT — PAIN SCALES - GENERAL: PAINLEVEL_OUTOF10: 0

## 2025-06-16 NOTE — RT PROTOCOL NOTE
RT Inhaler-Nebulizer Bronchodilator Protocol Note    There is a bronchodilator order in the chart from a provider indicating to follow the RT Bronchodilator Protocol and there is an “Initiate RT Inhaler-Nebulizer Bronchodilator Protocol” order as well (see protocol at bottom of note).    CXR Findings:  No results found.    The findings from the last RT Protocol Assessment were as follows:   History Pulmonary Disease: Chronic pulmonary disease  Respiratory Pattern: Regular pattern and RR 12-20 bpm  Breath Sounds: Slightly diminished and/or crackles  Cough: Strong, spontaneous, non-productive  Indication for Bronchodilator Therapy: Decreased or absent breath sounds, On home bronchodilators (per wife he takes 4 times a day at home)  Bronchodilator Assessment Score: 4    Aerosolized bronchodilator medication orders have been revised according to the RT Inhaler-Nebulizer Bronchodilator Protocol below.    Respiratory Therapist to perform RT Therapy Protocol Assessment initially then follow the protocol.  Repeat RT Therapy Protocol Assessment PRN for score 0-3 or on second treatment, BID, and PRN for scores above 3.    No Indications - adjust the frequency to every 6 hours PRN wheezing or bronchospasm, if no treatments needed after 48 hours then discontinue using Per Protocol order mode.     If indication present, adjust the RT bronchodilator orders based on the Bronchodilator Assessment Score as indicated below.  Use Inhaler orders unless patient has one or more of the following: on home nebulizer, not able to hold breath for 10 seconds, is not alert and oriented, cannot activate and use MDI correctly, or respiratory rate 25 breaths per minute or more, then use the equivalent nebulizer order(s) with same Frequency and PRN reasons based on the score.  If a patient is on this medication at home then do not decrease Frequency below that used at home.    0-3 - enter or revise RT bronchodilator order(s) to equivalent RT

## 2025-06-16 NOTE — DISCHARGE SUMMARY
inhaler  Commonly known as: Ventolin HFA  Inhale 2 puffs into the lungs every 6 hours as needed for Wheezing or Shortness of Breath  What changed: Another medication with the same name was added. Make sure you understand how and when to take each.     * albuterol (2.5 MG/3ML) 0.083% nebulizer solution  Commonly known as: PROVENTIL  Take 3 mLs by nebulization every 6 hours as needed for Wheezing or Shortness of Breath  What changed: You were already taking a medication with the same name, and this prescription was added. Make sure you understand how and when to take each.           * This list has 2 medication(s) that are the same as other medications prescribed for you. Read the directions carefully, and ask your doctor or other care provider to review them with you.                CONTINUE taking these medications      aspirin 325 MG EC tablet  Take 1 tablet by mouth daily     buPROPion 150 MG extended release tablet  Commonly known as: Wellbutrin XL  Take 1 tablet by mouth every morning     docusate 100 MG Caps  Commonly known as: COLACE, DULCOLAX  Take 100 mg by mouth daily     famotidine 20 MG tablet  Commonly known as: PEPCID  Take 1 tablet by mouth 2 times daily     fluticasone 220 MCG/ACT inhaler  Commonly known as: FLOVENT HFA  Inhale 1 puff into the lungs 2 times daily     furosemide 20 MG tablet  Commonly known as: LASIX  Take 1 tablet by mouth daily     haloperidol decanoate 50 MG/ML injection  Commonly known as: HALDOL DECANOATE     nicotine 14 MG/24HR  Commonly known as: NICODERM CQ  Place 1 patch onto the skin at bedtime     ondansetron 4 MG disintegrating tablet  Commonly known as: ZOFRAN-ODT  Take 1 tablet by mouth every 8 hours as needed for Nausea or Vomiting     potassium chloride 20 MEQ extended release tablet  Commonly known as: KLOR-CON M  Take 1 tablet by mouth daily (with breakfast)     pravastatin 40 MG tablet  Commonly known as: PRAVACHOL  Take 1 tablet by mouth nightly     progesterone 200

## 2025-06-16 NOTE — TELEPHONE ENCOUNTER
Per pascual, see patient in 1 mo no testing , w dl from NIV due to hx of non compliance.   Patient scheduled for 1 mo hfu 7.23.25 w Rosina  1:30.     I have kept pf t& f/u afterwards  , if rosina changes plan for f/u after she see's in July we can update then.     New reminder mailed out.

## 2025-06-16 NOTE — PLAN OF CARE
Problem: Respiratory - Adult  Goal: Lung sounds clear or within normal limits for patient  6/15/2025 2123 by Antonio Vera RCP  Outcome: Progressing
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
## 2025-06-16 NOTE — TELEPHONE ENCOUNTER
Patient is on NIV for COPD not anthony- NIV dl will be needed for pulm hfu.   Patient scheduled for PFT 9.15.25 @ 4 pm   Hfu 9.23.25 w Jacqueline @ 2:30 pm .      Perfect serve sent to Antonio to update d/c instruction paperwork  Appt/ testing reminder mailed out w instructions to obtain NIV dl for this appt.

## 2025-06-16 NOTE — TELEPHONE ENCOUNTER
----- Message from Antonio Miller PA-C sent at 6/13/2025 12:25 PM EDT -----  Patient needs sleep follow up for NIV  Patient needs pulm follow up for COPD with PFTs in 3 months  Also has [facility] pulmonary HTN referral placed    Thanks  -Antonio

## 2025-06-17 ENCOUNTER — CARE COORDINATION (OUTPATIENT)
Dept: CARE COORDINATION | Age: 55
End: 2025-06-17

## 2025-06-17 NOTE — CARE COORDINATION
Care Transitions Note    Initial Call - Call within 2 business days of discharge: Yes-1st attempt    Attempted to reach patient, parent, family, Julia Bear for transitions of care follow up. Unable to reach patient, parent, family, Julia Bear.    Outreach Attempts:   HIPAA compliant voicemail left for patient, family, Julia Bear.     Patient: Sandie Carrera    Patient : 1970   MRN: [id number]    Reason for Admission: COPD exacerbation   Discharge Date: 25  RURS: Readmission Risk Score: 10.6    Last Discharge Facility       Date Complaint Diagnosis Description Type Department Provider    25 Shortness of Breath COPD exacerbation (HCC) ... ED to Hosp-Admission (Discharged) (ADMITTED) Elijah Wallace MD; Tyshawn Pacheco..            Was this an external facility discharge? No    Follow Up Appointment:   Patient does not have a follow up appointment scheduled at time of call. Unable to reach  Future Appointments         Provider Specialty Dept Phone    2025 1:30 PM Jacqueline Castillo APRN - CNP Pulmonology [phone]    9/15/2025 4:00 PM [facility] PULMONARY FUNCTION ROOM 1 Pulmonary Function Testing [phone]    2025 2:30 PM Jacqueline Castillo APRN - CNP Pulmonology [phone]            Plan for follow-up on next business day.      Elile Boogie RN

## 2025-06-18 ENCOUNTER — CARE COORDINATION (OUTPATIENT)
Dept: CARE COORDINATION | Age: 55
End: 2025-06-18

## 2025-06-18 NOTE — CARE COORDINATION
Care Transitions Note    Initial Call - Call within 2 business days of discharge: Yes-2nd attempt    Attempted to reach patient, family, siblings for transitions of care follow up. Unable to reach patient, family, siblings.  If no return call, CTN will sign off-2nd attempt.  Will hand to Amanda JANSEN for possible enrollment, she has worked with pt in the past.      Outreach Attempts:   HIPAA compliant voicemail left for patient, family, siblings.     Patient: Sandie Carrera    Patient : 1970   MRN: [id number]    Reason for Admission: COPD exacerbation  Discharge Date: 25  RURS: Readmission Risk Score: 10.6    Last Discharge Facility       Date Complaint Diagnosis Description Type Department Provider    25 Shortness of Breath COPD exacerbation (HCC) ... ED to Hosp-Admission (Discharged) (ADMITTED) Elijah Wallace MD; Tyshawn Pacheco..            Was this an external facility discharge? No    Follow Up Appointment:   Patient does not have a follow up appointment scheduled at time of call. Unable to reach  Future Appointments         Provider Specialty Dept Phone    2025 1:30 PM Jacqueline Castillo APRN - CNP Pulmonology [phone]    9/15/2025 4:00 PM [facility] PULMONARY FUNCTION ROOM 1 Pulmonary Function Testing [phone]    2025 2:30 PM Jacqueline Castillo APRN - CNP Pulmonology [phone]            No further follow-up call indicated     Ellie Boogie RN

## 2025-06-19 NOTE — PROGRESS NOTES
[facility] [facility]      Patient - Sandie Carrera   MRN -  [id number]   [facility] # - [id number]   - 1970      Date of Admission -  2025  6:02 PM  Date of evaluation -  6/15/2025  Room - 4B-011-A   Hospital Day - 6  Consulting - Elijah Liu MD Primary Care Physician - Elijah Liu MD     Problem List      Active Hospital Problems    Diagnosis Date Noted    COPD exacerbation (HCC) [J44.1] 2023     Priority: Medium    Chronic heart failure with preserved ejection fraction (HCC) [I50.32] 2025    Pulmonary hypertension (HCC) [I27.20] 2025    Acute on chronic systolic CHF (congestive heart failure) (HCC) [I50.23] 2019     Reason for Consult    Acute on chronic respiratory failure, COPD, PAH  HPI   History Obtained From: Patient, nurse and electronic medical record.    Sandie Carrera is a 54 y.o. male with pmhx of pulmonary hypertension, CHF, emphysema, HTN, schizophrenia, and intellectual disability that presented for shortness of breath that had been ongoing for 5-7 days. Requires 4 L NC at baseline.     History limited by patient cognitive understanding.     He is having shortness of breath: Yes  Onset: sudden   Duration:7days.  Diurnal variation:  None.    Functional status prior to beginning of symptoms: 2 block/s on level ground.  Current functional capacity on level ground: 1 block/s on level ground.  He can climb steps: Yes  Flights of steps she can climb: 1  He admits to orthopnea.  He admits to paroxysmal nocturnal dyspnea.      He is having cough: Yes  Duration of cough: for 7 days.   His cough is associated with sputum production: Yes   The sputum color: brown  Hemoptysis:No  Diurnal variation: None.     Worse in the afternoon  Relieving factors: No  Aggravating factors: No      He is having chest pain:No  Duration:Days:0  Nature/Quality:N/A  Assessment:none.  Scale:denies/10  Relation ship to breathing: no pain with 
  [place] [facility]      Patient - Sandie Carrera   MRN -  [id number]   [facility] # - [id number]   - 1970      Date of Admission -  2025  6:02 PM  Date of evaluation -  2025  Room - 4B--A   Hospital Day - 4  Consulting - Elijah Liu MD Primary Care Physician - Elijah Liu MD     Problem List      Active Hospital Problems    Diagnosis Date Noted    COPD exacerbation (HCC) [J44.1] 2023     Priority: Medium    Chronic heart failure with preserved ejection fraction (HCC) [I50.32] 2025    Pulmonary hypertension (HCC) [I27.20] 2025    Acute on chronic systolic CHF (congestive heart failure) (HCC) [I50.23] 2019     Reason for Consult    Acute on chronic respiratory failure, COPD, PAH  HPI   History Obtained From: Patient, nurse and electronic medical record.    Sandie Carrera is a 54 y.o. male with pmhx of pulmonary hypertension, CHF, emphysema, HTN, schizophrenia, and intellectual disability that presented for shortness of breath that had been ongoing for 5-7 days. Requires 4 L NC at baseline.     History limited by patient cognitive understanding.     He is having shortness of breath: Yes  Onset: sudden   Duration:7days.  Diurnal variation:  None.    Functional status prior to beginning of symptoms: 2 block/s on level ground.  Current functional capacity on level ground: 1 block/s on level ground.  He can climb steps: Yes  Flights of steps she can climb: 1  He admits to orthopnea.  He admits to paroxysmal nocturnal dyspnea.      He is having cough: Yes  Duration of cough: for 7 days.   His cough is associated with sputum production: Yes   The sputum color: brown  Hemoptysis:No  Diurnal variation: None.     Worse in the afternoon  Relieving factors: No  Aggravating factors: No      He is having chest pain:No  Duration:Days:0  Nature/Quality:N/A  Assessment:none.  Scale:denies/10  Relation ship to breathing: no pain with 
  Physician Progress Note      PATIENT:               TREY CHILDRESS  CSN #:                  [id number]  :                       1970  ADMIT DATE:       2025 6:02 PM  DISCH DATE:        2025 9:15 PM  RESPONDING  PROVIDER #:        Elijah Liu MD          QUERY TEXT:    Acute on chronic systolic CHF is documented in the medical record  by Dr. Castaneda.  Please provide additional clinical indicators supportive of your   documentation. Or please document if the diagnosis of acute on chronic CHF has   been ruled out after study.    The clinical indicators include:  History/Risk factors: chronic systolic congestive heart failure, age 54, copd,   pulmonary HTN, Acute respiratory failure, pulmonary hypertension    Tyesha MERLOS Cardiology :  - \" has a past medical history of Acute on chronic systolic congestive heart   failure\"  - \"Cardiovascular: normal rate, regular rhythm, normal S1 and S2, no murmurs,   rubs, clicks, or gallops, distal pulses intact, no carotid bruits, no JVD\"    Dr. Liu   - \"Had a right heart catheterization.  Findings are consistent with pulmonary   arterial hypertension.\"    IM progress notes during admission:  - \"no edema\"  - \" Neck: no adenopathy, no carotid bruit, and no JVD    BNP: 2555.0>1456.0    VS: HR , RR 16-38    Cxray :  - \"No cardiomegaly. No pneumothorax. No opacity. No pleural effusion.\"    Cxray :  - \"Mild cardiomegaly. Tiny tiny bilateral effusions.\"    Treatment: Labs, EKG, CXray, RHC, cardiology consult  Options provided:  -- Acute on chronic systolic CHF ruled out after study and chronic systolic   CHF confirmed  -- Acute on chronic systolic CHF currently as evidenced by, Please document   evidence.  -- Other - I will add my own diagnosis  -- Disagree - Not applicable / Not valid  -- Disagree - Clinically unable to determine / Unknown  -- Refer to Clinical Documentation Reviewer    PROVIDER RESPONSE TEXT:    Chronic diastolic 
A home oxygen evaluation has been completed.     [x]Patient is an inpatient. It is expected that the patient will be discharged within the next 48 hours. Qualified provider to write order for home prescription if patient qualifies. Social service/care managers will arrange for home oxygen.  If patient is active, arrange for Home Medical supplier to assess for Oxygen Conserving Device per pulse oximetry.  []Patient is an outpatient. Results will be faxed to the ordering provider. Qualified provider to write order for home prescription if patient qualifies and arranges for home oxygen.      Patient was placed on room air for 5 minutes. SpO2 was 86 % on room air at rest. Patients SpO2 was below 89% and qualified for home oxygen. Oxygen was applied at 5 lpm via nasal cannula to maintain a SpO2 between 90-92% while at rest. Actual SpO2 was 92 %. Patient can ambulate for exercise flow rate. Patients was ambulated, SpO2 was 90% on 8 lpm to maintain SpO2 between 90-92% while exercising. Patient SpO2 while walking on 6 lpm is 87%    Results given to Polo DACOSTA.    If oxygen need is greater than 4 lpm the SpO2 on 4 lpm was 89%.     Note: For any SpO2 at 89% see policy and procedure for possible qualifications. Supervised by Rayna STACY  
ABG not done at 0600 as ordered due to patient wearing home unit less than 3 hours. Patients machine coming disassembled many times per RN. Patient off home unit at 0245 and placed on NC by RN. RN did not call RRT when patient came off home unit. See flowsheet documentation. Perfectserved Osmany Alas MD and Antonio Miller PA-C regarding ABG and obtaining when patient is on it and has worn it consistently.   
[facility]  Sedation/Analgesia History & Physical    Pt Name: Sandie Carrera  Account number: [id number]  MRN: [id number]  YOB: 1970  Provider Performing Procedure: Onur Lopez MD MD [facility]  Primary Care Physician: Elijah Liu MD  Date: 6/12/2025    PRE-PROCEDURE    Code Status: FULL CODE  Brief History/Pre-Procedure Diagnosis: referred for RHC    Consent: : I have discussed with the patient risks, benefits, and alternatives (and relevant risks, benefits, and side effects related to alternatives or not receiving care), and likelihood of the success.   The patient and/or representative appear to understand and agree to proceed.  The discussion encompasses risks, benefits, and side effects related to the alternatives and the risks related to not receiving the proposed care, treatment, and services.       PLANNED PROCEDURE   [x]Cath  []PCI                []Pacemaker/AICD  []EMELIA             []Cardioversion []Peripheral angiography/PTA  []Other:      VITAL SIGNS   Vitals:    06/12/25 1213   BP: 113/65   Pulse: 80   Resp: 20   Temp: 99 °F (37.2 °C)   SpO2: 93%       PHYSICAL:   General: No acute distress  HEENT:  Unremarkable for age  Neck: without increased JVD, carotid pulses 2+ bilaterally without bruits  Heart: RRR, S1 & S2 WNL   Lungs: Clear to auscultation    Abdomen: BS present, without HSM, masses, or tenderness    Extremities: without C,C,E.  Pulses 2+ bilaterally  Mental Status: Alert & Oriented    SEDATION  Planned agent:[x]Midazolam []Meperidine []Sublimaze []Morphine  []Diazepam  [x]Other: fentanyl      ASA Classification:  []1 []2 [x]3 []4 []5  Class 1: A normal healthy patient  Class 2: Pt with mild to moderate systemic disease  Class 3: Severe systemic disease or disturbance  Class 4: Severe systemic disorders that are already life threatening.  Class 5: Moribund pt with little chances of survival, for more than 24 hours.  Mallampati I Airway Classification:   []1 []2 
Cardiology Progress Note      Patient:  Sandie Carrera  YOB: 1970  MRN: [id number]   Acct: [id number]  Admit Date:  6/9/2025  Primary Cardiologist:  none    Note per dr walsh \"CHIEF COMPLAINT: Shortness of breath     RC- need for RHC and eval for pulm HTN        HPI: This is a pleasant 54 y.o. male with a PMHx of chronic hypoxic respiratory failure on home supplemental O2, COPD, pulmonary arterial hypertension who presented with shortness of breath and hypoxemia.     Patient endorsed mild dry cough, wheezing but denied fever, chills, recent sick contacts.     In the ED, patient found to be hypoxic and hypercapnic, was started on BiPAP, bronchodilators and steroids.     Cardiology service was consulted for consideration of right heart cath in setting of known pulmonary hypertension.  VQ scan and D-dimer ordered by pulmonology service for evaluation of pulmonary hypertension.\"    Subjective (Events in last 24 hours): pt awake and alert.  NAD. No cp or sob . No edema or orthopnea.  No complaints  Poor historian    On 5 l/min O2  Net I/o -6.7 L    Objective:   /65   Pulse 66   Temp 98.6 °F (37 °C) (Oral)   Resp 18   Ht 1.803 m (5' 11\")   Wt 92.6 kg (204 lb 2.3 oz)   SpO2 93%   BMI 28.47 kg/m²        TELEMETRY: nsr    Physical Exam:  General Appearance: awake and alert.  NAD  Cardiovascular: normal rate, regular rhythm, normal S1 and S2, no murmurs, rubs, clicks, or gallops, distal pulses intact, no carotid bruits, no JVD  Pulmonary/Chest: clear to auscultation bilaterally- no wheezes, rales or rhonchi, normal air movement, no respiratory distress  Abdomen: soft, non-tender, non-distended, normal bowel sounds, no masses Extremities: no cyanosis, clubbing or edema, pulse   Skin: warm and dry, no rash or erythema  Head: normocephalic and atraumatic  Eyes: pupils equal, round, and reactive to light  Neck: supple and non-tender without mass, no thyromegaly       Medications:    ipratropium 0.5 
Cardiology Progress Note      Patient:  Sandie Carrera  YOB: 1970  MRN: [id number]   Acct: [id number]  Admit Date:  6/9/2025  Primary Cardiologist: none     Note per dr walsh \"CHIEF COMPLAINT: Shortness of breath     RC- need for RHC and eval for pulm HTN        HPI: This is a pleasant 54 y.o. male with a PMHx of chronic hypoxic respiratory failure on home supplemental O2, COPD, pulmonary arterial hypertension who presented with shortness of breath and hypoxemia.     Patient endorsed mild dry cough, wheezing but denied fever, chills, recent sick contacts.     In the ED, patient found to be hypoxic and hypercapnic, was started on BiPAP, bronchodilators and steroids.     Cardiology service was consulted for consideration of right heart cath in setting of known pulmonary hypertension.  VQ scan and D-dimer ordered by pulmonology service for evaluation of pulmonary hypertension.\"    Subjective (Events in last 24 hours):   Denies any SOB at this time.  Intermittent cough.    Objective:   /73   Pulse 90   Temp 98.7 °F (37.1 °C) (Oral)   Resp 16   Ht 1.803 m (5' 11\")   Wt 93.1 kg (205 lb 4.8 oz)   SpO2 (!) 89%   BMI 28.63 kg/m²        All labs, EKG's, diagnostic testing and images as well as cardiac cath, stress testing were reviewed during this encounter   TELEMETRY: NSR    Physical Exam:  General Appearance: NAD  Cardiovascular: normal rate, regular rhythm, normal S1 and S2, no murmurs, rubs, clicks, or gallops, distal pulses intact, no carotid bruits, no JVD  Pulmonary/Chest: clear to auscultation bilaterally- no wheezes, rales or rhonchi, normal air movement, no respiratory distress  Abdomen: soft, non-tender, non-distended, normal bowel sounds, no masses   Extremities: no cyanosis, clubbing or edema, pulse   Skin: warm and dry, no rash or erythema    Medications:    sodium chloride flush  5-40 mL IntraVENous 2 times per day    buPROPion  150 mg Oral QAM    pantoprazole  40 mg Oral QAM AC    
Home medication list reads duoneb every 4 hours.  
INTERNAL MEDICINE Progress Note  6/11/2025 12:38 PM  Subjective:   Admit Date: 6/9/2025  PCP: Elijah Liu MD  Interval History:     Alert, improved breathing, no cough  No CP      Objective:   Vitals: /63   Pulse 80   Temp 98.1 °F (36.7 °C) (Oral)   Resp 18   Ht 1.803 m (5' 11\")   Wt 92.6 kg (204 lb 2.3 oz)   SpO2 92%   BMI 28.47 kg/m²   General appearance: alert and cooperative with exam  HEENT: Head: Normocephalic, without obvious abnormality  Neck: no adenopathy, no carotid bruit, and no JVD  Lungs: diminished breath sounds bilaterally  Heart: S1, S2 normal  Abdomen: soft, non-tender; bowel sounds normal; no masses,  no organomegaly  Extremities: extremities normal, atraumatic, no cyanosis or edema  Neurologic: Mental status: alertness: alert, orientation: person, place, affect: normal      Medications:   Scheduled Meds:   ipratropium 0.5 mg-albuterol 2.5 mg  1 Dose Inhalation Q4H RT    [Held by provider] Tadalafil (PAH)  20 mg Oral Daily    methylPREDNISolone  40 mg IntraVENous Q12H    sodium chloride flush  5-40 mL IntraVENous 2 times per day    sodium chloride flush  5-40 mL IntraVENous 2 times per day    enoxaparin  40 mg SubCUTAneous Daily     Continuous Infusions:   sodium chloride         Lab Results:   CBC:   Recent Labs     06/09/25  1825 06/10/25  0422   WBC 5.8 3.9*   HGB 14.1 13.1*    287     BMP:    Recent Labs     06/09/25  1825 06/10/25  0422    145   K 4.0 5.2    106   CO2 30* 30*   BUN 13 13   CREATININE 1.2 0.9   GLUCOSE 161* 127*     Hepatic:   Recent Labs     06/09/25 1825   AST 17   ALT 18   BILITOT 0.2*   ALKPHOS 82     Magnesium:    Lab Results   Component Value Date/Time    MG 2.1 11/07/2022 03:33 PM     Uric Acid:  No components found for: \"URIC\"  HgBA1c:    Lab Results   Component Value Date/Time    LABA1C 6.0 12/15/2019 10:00 AM     TSH:    Lab Results   Component Value Date/Time    TSH 0.411 02/25/2023 03:22 AM     VITAMIN B12: No components found 
INTERNAL MEDICINE Progress Note  6/12/2025 6:15 PM  Subjective:   Admit Date: 6/9/2025  PCP: Elijah Liu MD  Interval History:     Had a right heart catheterization.  Findings are consistent with pulmonary arterial hypertension.  Alert, improved breathing, no cough  No CP      Objective:   Vitals: /74   Pulse 75   Temp 98.4 °F (36.9 °C) (Oral)   Resp 16   Ht 1.803 m (5' 11\")   Wt 92.6 kg (204 lb 2.3 oz)   SpO2 90%   BMI 28.47 kg/m²   General appearance: alert and cooperative with exam  HEENT: Head: Normocephalic, without obvious abnormality  Neck: no adenopathy, no carotid bruit, and no JVD  Lungs: diminished breath sounds bilaterally  Heart: S1, S2 normal  Abdomen: soft, non-tender; bowel sounds normal; no masses,  no organomegaly  Extremities: extremities normal, atraumatic, no cyanosis or edema  Neurologic: Mental status: alertness: alert, orientation: person, place, affect: normal      Medications:   Scheduled Meds:   sodium chloride flush  5-40 mL IntraVENous 2 times per day    ipratropium 0.5 mg-albuterol 2.5 mg  1 Dose Inhalation Q4H RT    predniSONE  40 mg Oral Daily    [Held by provider] Tadalafil (PAH)  20 mg Oral Daily    sodium chloride flush  5-40 mL IntraVENous 2 times per day    sodium chloride flush  5-40 mL IntraVENous 2 times per day    enoxaparin  40 mg SubCUTAneous Daily     Continuous Infusions:   sodium chloride Stopped (06/12/25 1152)    sodium chloride      sodium chloride         Lab Results:   CBC:   Recent Labs     06/09/25  1825 06/10/25  0422 06/12/25  1148   WBC 5.8 3.9* 7.2   HGB 14.1 13.1* 14.9    287 305     BMP:    Recent Labs     06/09/25  1825 06/10/25  0422 06/12/25  1148    145 137   K 4.0 5.2 4.8    106 94*   CO2 30* 30* 31*   BUN 13 13 11   CREATININE 1.2 0.9 0.8   GLUCOSE 161* 127* 92     Hepatic:   Recent Labs     06/09/25  1825   AST 17   ALT 18   BILITOT 0.2*   ALKPHOS 82     Magnesium:    Lab Results   Component Value Date/Time    MG 
INTERNAL MEDICINE Progress Note  6/13/2025 2:03 PM  Subjective:   Admit Date: 6/9/2025  PCP: Elijah Liu MD  Interval History:     No new c/o, on 5L O2  No CP      Objective:   Vitals: /73   Pulse 91   Temp 98.7 °F (37.1 °C) (Oral)   Resp 18   Ht 1.803 m (5' 11\")   Wt 93.1 kg (205 lb 4.8 oz)   SpO2 97%   BMI 28.63 kg/m²   General appearance: alert and cooperative with exam  HEENT:  Normocephalic, without obvious abnormality  Neck: no adenopathy, no carotid bruit, and no JVD  Lungs: diminished breath sounds bilaterally  Heart: S1, S2 normal  Abdomen: soft, non-tender; bowel sounds normal; no masses,  no organomegaly  Extremities: extremities normal, atraumatic, no cyanosis or edema  Neurologic:  alert, orientation: person, place, affect: normal      Medications:   Scheduled Meds:   sodium chloride flush  5-40 mL IntraVENous 2 times per day    buPROPion  150 mg Oral QAM    pantoprazole  40 mg Oral QAM AC    pravastatin  40 mg Oral Nightly    risperiDONE  2 mg Oral BID    ipratropium 0.5 mg-albuterol 2.5 mg  1 Dose Inhalation Q4H RT    predniSONE  40 mg Oral Daily    [Held by provider] Tadalafil (PAH)  20 mg Oral Daily    sodium chloride flush  5-40 mL IntraVENous 2 times per day    sodium chloride flush  5-40 mL IntraVENous 2 times per day    enoxaparin  40 mg SubCUTAneous Daily     Continuous Infusions:   sodium chloride Stopped (06/12/25 1152)    sodium chloride      sodium chloride         Lab Results:   CBC:   Recent Labs     06/12/25  1148   WBC 7.2   HGB 14.9        BMP:    Recent Labs     06/12/25  1148      K 4.8   CL 94*   CO2 31*   BUN 11   CREATININE 0.8   GLUCOSE 92     Hepatic:   No results for input(s): \"AST\", \"ALT\", \"BILITOT\", \"ALKPHOS\" in the last 72 hours.    Invalid input(s): \"ALB\"    Magnesium:    Lab Results   Component Value Date/Time    MG 2.1 11/07/2022 03:33 PM     Uric Acid:  No components found for: \"URIC\"  HgBA1c:    Lab Results   Component Value Date/Time    
INTERNAL MEDICINE Progress Note  6/16/2025 5:46 PM  Subjective:   Admit Date: 6/9/2025  PCP: Elijah Liu MD  Interval History:     No new c/o,   on 4L O2  Nocturnal CPAP  No CP      Objective:   Vitals: BP (!) 104/56   Pulse 86   Temp 98.3 °F (36.8 °C) (Oral)   Resp 18   Ht 1.803 m (5' 11\")   Wt 93.2 kg (205 lb 8.2 oz)   SpO2 97%   BMI 28.66 kg/m²   General appearance: alert and cooperative with exam  HEENT:  Normocephalic, without obvious abnormality  Neck: no adenopathy, no carotid bruit, and no JVD  Lungs: diminished breath sounds bilaterally  Heart: S1, S2 normal  Abdomen: soft, non-tender; bowel sounds normal; no masses,  no organomegaly  Extremities: extremities normal, atraumatic, no cyanosis or edema  Neurologic:  alert, orientation: person, place, affect: normal      Medications:   Scheduled Meds:   sodium chloride flush  5-40 mL IntraVENous 2 times per day    buPROPion  150 mg Oral QAM    pantoprazole  40 mg Oral QAM AC    pravastatin  40 mg Oral Nightly    risperiDONE  2 mg Oral BID    ipratropium 0.5 mg-albuterol 2.5 mg  1 Dose Inhalation Q4H RT    [Held by provider] Tadalafil (PAH)  20 mg Oral Daily    sodium chloride flush  5-40 mL IntraVENous 2 times per day    sodium chloride flush  5-40 mL IntraVENous 2 times per day    enoxaparin  40 mg SubCUTAneous Daily     Continuous Infusions:   sodium chloride      sodium chloride         Lab Results:   CBC:   Recent Labs     06/16/25  0426   WBC 6.5   HGB 14.0        BMP:    Recent Labs     06/16/25  0426      K 4.7   CL 97*   CO2 31*   BUN 9   CREATININE 0.8   GLUCOSE 84     Magnesium:    Lab Results   Component Value Date/Time    MG 2.1 11/07/2022 03:33 PM     HgBA1c:    Lab Results   Component Value Date/Time    LABA1C 6.0 12/15/2019 10:00 AM     TSH:    Lab Results   Component Value Date/Time    TSH 0.411 02/25/2023 03:22 AM     FOLATE:    Lab Results   Component Value Date/Time    FOLATE 9.0 12/08/2016 10:18 AM     IRON:  No 
Multiple attempts made to reach each emergency contact throughout the day. Trudy was able to be reached and stated she could not  patient but would try to contact patients mother regarding discharge.   
Patient educated on how to use incentive spirometer. Patient verbalized understanding and demonstrated proper use. Emphasized importance and usage of device, with coughing and deep breathing every 24 hours while awake.        
Spiritual Health History and Assessment/Progress Note  [facility]    (P) Spiritual/Emotional Needs,  ,  ,      Name: Sandie Carrera MRN: [id number]    Age: 54 y.o.     Sex: male   Language: English   Confucianism: Methodist   COPD exacerbation (HCC)     Date: 6/11/2025            Total Time Calculated: (P) 6 min              Spiritual Assessment continued in [facility] 4B        Referral/Consult From: (P) Rounding   Encounter Overview/Reason: (P) Spiritual/Emotional Needs  Service Provided For: (P) Patient    Nancy, Belief, Meaning:   Patient identifies as spiritual  Family/Friends identify as spiritual      Importance and Influence:  Patient has spiritual/personal beliefs that influence decisions regarding their health  Family/Friends have spiritual/personal beliefs that influence decisions regarding the patient's health    Community:  Patient feels well-supported. Support system includes: Parent/s and Extended family  Family/Friends feel well-supported. Support system includes: Parent/s and Extended family    Assessment and Plan of Care:   In my encounter with the 54 yr old patient, while rounding the unit 4B,  I provided spiritual care to patient through conversation, I also came to assess the patient's spiritual needs present. The pt was admitted due to COPD.       Patient Interventions include: Facilitated expression of thoughts and feelings  Family/Friends Interventions include: Facilitated expression of thoughts and feelings    Patient Plan of Care: Spiritual Care available upon further referral  Family/Friends Plan of Care: Spiritual Care available upon further referral    Electronically signed by GISELA Hudson on 6/11/2025 at 1:56 PM   
Spiritual Health History and Assessment/Progress Note  [facility]    (P) Spiritual/Emotional Needs,  ,  ,      Name: Sandie Carrera MRN: [id number]    Age: 54 y.o.     Sex: male   Language: English   Anabaptist: Episcopal   COPD exacerbation (HCC)     Date: 6/13/2025            Total Time Calculated: (P) 9 min              Spiritual Assessment continued in [facility] 4B        Referral/Consult From: (P) Rounding   Encounter Overview/Reason: (P) Spiritual/Emotional Needs  Service Provided For: (P) Patient    Nancy, Belief, Meaning:   Patient identifies as spiritual  Family/Friends identify as spiritual      Importance and Influence:  Patient has spiritual/personal beliefs that influence decisions regarding their health  Family/Friends have spiritual/personal beliefs that influence decisions regarding the patient's health    Community:  Patient is connected with a spiritual community  Family/Friends are connected with a spiritual community:    Assessment and Plan of Care:   In my encounter with the 54 yr old patient, while rounding the unit 4B,  I provided spiritual care to patient through conversation, I also came to assess the patient's spiritual needs present. The pt was admitted due to COPD.     Patient Interventions include: Facilitated expression of thoughts and feelings  Family/Friends Interventions include: Facilitated expression of thoughts and feelings    Patient Plan of Care: Spiritual Care available upon further referral  Family/Friends Plan of Care: Spiritual Care available upon further referral    Electronically signed by GISELA Hudson on 6/13/2025 at 11:06 AM   
Spiritual Health History and Assessment/Progress Note  [facility]    (P) Attempted Encounter,  ,  ,      Name: Sandie Carrera MRN: [id number]    Age: 54 y.o.     Sex: male   Language: English   Episcopal: Zoroastrian   COPD exacerbation (HCC)     Date: 6/10/2025            Total Time Calculated: (P) 5 min              Spiritual Assessment began in [facility] 4B        Referral/Consult From: (P) Rounding   Encounter Overview/Reason: (P) Attempted Encounter  Service Provided For: (P) Patient    Nancy, Belief, Meaning:   Patient unable to assess at this time  Family/Friends No family/friends present      Importance and Influence:  Patient unable to assess at this time  Family/Friends No family/friends present    Community:  Patient feels well-supported. Support system includes: Unknown  Family/Friends feel well-supported. Support system includes: Unknown    Assessment and Plan of Care:   During my encounter with the  54  yr old patient, I attempted to visit with the pt on 4B. The patient appears to be resting (not responsive/resting) now and I didn't want to disturb the patient. I or another  will attempt to visit the patient or the family at another time. The pt was admitted due to COPD.     Patient Interventions include: Other: Prayer  Family/Friends Interventions include: No family/friends present    Patient Plan of Care: Spiritual Care available upon further referral  Family/Friends Plan of Care: Spiritual Care available upon further referral    Electronically signed by GISELA Hudson on 6/10/2025 at 1:15 PM   
Spiritual Health History and Assessment/Progress Note  [facility]    (P) Attempted Encounter,  ,  ,      Name: Sandie Carrera MRN: [id number]    Age: 54 y.o.     Sex: male   Language: English   Church: [employer]   COPD exacerbation (HCC)     Date: 6/16/2025            Total Time Calculated: (P) 5 min              Spiritual Assessment continued in [facility] 4B        Referral/Consult From: (P) Rounding   Encounter Overview/Reason: (P) Attempted Encounter  Service Provided For: (P) Patient    Nancy, Belief, Meaning:   Patient unable to assess at this time  Family/Friends No family/friends present      Importance and Influence:  Patient unable to assess at this time  Family/Friends No family/friends present    Community:  Patient feels well-supported. Support system includes: Parent/s and Extended family  Family/Friends feel well-supported. Support system includes: Parent/s and Extended family    Assessment and Plan of Care:   During my encounter with the 54 yr old patient, I attempted to visit with the pt on 4B. The patient appears to be resting (not responsive/resting) now and I didn't want to disturb the patient. I or another  will attempt to visit the patient or the family at another time. The pt was admitted due to COPD.     Patient Interventions include: Other: Prayer  Family/Friends Interventions include: No family/friends present    Patient Plan of Care: Spiritual Care available upon further referral  Family/Friends Plan of Care: No family/friends present    Electronically signed by GISELA Hudson on 6/16/2025 at 11:29 AM   
Unable to reach mother for consent to release medical records from [facility].   
12/15/2019 10:00 AM     TSH:    Lab Results   Component Value Date/Time    TSH 0.411 02/25/2023 03:22 AM     VITAMIN B12: No components found for: \"B12\"  FOLATE:    Lab Results   Component Value Date/Time    FOLATE 9.0 12/08/2016 10:18 AM     IRON:  No results found for: \"IRON\"  FERRITIN:    Lab Results   Component Value Date/Time    FERRITIN 344 12/11/2021 02:00 AM     Procedure performed:Right heart cath     Post Procedure Diagnosis/Findings:  PA: 66/13 (41)  PCWP: 6  RA: 3     CO 5.47, CI: 2.6  PA sat: 67%      Assessment and Plan:   Acute on chronic hypercapnic resp failure  COPD exac, improved  PAH  Schizophrenia    risperidone  Continue Bronchodilators / O2  Pulmonary hygiene.  Cont nocturnal CPAP  A.m. labs.    Elijah Liu MD, MD    
Flow Rate (L/min): 5 L/min     I/O      Intake/Output Summary (Last 24 hours) at 6/14/2025 1156  Last data filed at 6/14/2025 0929  Gross per 24 hour   Intake 1625 ml   Output 3600 ml   Net -1975 ml     I/O last 3 completed shifts:  In: 3190 [P.O.:3160; I.V.:30]  Out: 5730 [Urine:5730]   Patient Vitals for the past 96 hrs (Last 3 readings):   Weight   06/14/25 0438 93.2 kg (205 lb 6.4 oz)   06/13/25 0319 93.1 kg (205 lb 4.8 oz)   06/11/25 0256 92.6 kg (204 lb 2.3 oz)       Exam   Physical Exam   Constitutional: No distress on 5lpm O2 per NC. Patient appears chronically ill and moderately built.   Head: Normocephalic and atraumatic.   Mouth/Throat: Oropharynx is clear and moist.  No oral thrush.  Eyes: Conjunctivae are normal. Pupils are equal, round. No scleral icterus.   Neck: Neck supple.  No tracheal deviation present.   Cardiovascular: S1 and S2 with no murmur..  Pulmonary/Chest: Normal effort with bilateral air entry, diminished breath sounds, no wheezing. No stridor. No respiratory distress. Patient exhibits no tenderness.   Abdominal: Soft. Bowel sounds audible. No distension or tenderness to palp.   Musculoskeletal: Moves all extremities  Neurological: Patient is alert and follows simple commands noted history of schizophrenia.     Labs  - Old records and notes have been reviewed in [facility]   ABG  Lab Results   Component Value Date/Time    PH 7.36 06/13/2025 05:14 AM    PO2 70 06/13/2025 05:14 AM    PCO2 65 06/13/2025 05:14 AM    HCO3 37 06/13/2025 05:14 AM    O2SAT 92 06/13/2025 05:14 AM     Lab Results   Component Value Date/Time    IFIO2 32 06/13/2025 05:14 AM    MODE Not entered 06/13/2025 05:14 AM    SETTIDVOL 480 06/13/2025 05:14 AM    SETPEEP 10.0 05/17/2023 12:10 AM     CBC  Recent Labs     06/12/25  1148   WBC 7.2   RBC 5.02   HGB 14.9   HCT 49.6   MCV 98.8*   MCH 29.7   MCHC 30.0*      MPV 10.1      BMP  Recent Labs     06/12/25  1148      K 4.8   CL 94*   CO2 31*   BUN 11 
negative.     EKG     Echocardiogram     6/10/2025  Complete ECHO    Left Ventricle Low normal left ventricular systolic function. EF by visual approximation is 50%. Left ventricle size is normal. Normal wall thickness. Normal wall motion.  But Mild septal bouncing noted Grade I diastolic dysfunction with normal LAP.   Left Atrium Left atrium is mildly dilated.   Right Ventricle Right ventricle is moderately dilated. Mildly reduced systolic function.   Right Atrium Right atrium size is normal.   Aortic Valve Valve structure is normal. No regurgitation. No stenosis.   Mitral Valve Valve structure is normal. Mild regurgitation. No stenosis noted.   Tricuspid Valve Valve structure is normal. Mild regurgitation. No stenosis noted. Moderately elevated RVSP, consistent with moderate pulmonary hypertension. RVSP is 63 mmHg.   Pulmonic Valve The pulmonic valve visualization is suboptimal but appears to be functioning normally. Physiologically normal regurgitation. No stenosis noted.   Aorta Normal sized aortic root and ascending aorta.   IVC/Hepatic Veins IVC diameter is dilated and decreases greater than 50% during inspiration; therefore the estimated right atrial pressure is intermediate (~8 mmHg). IVC is dilated.   Pericardium No pericardial effusion.       Echo 5/2023:    Ejection fraction is visually estimated at 50%.   Overall left ventricular function is normal.   Moderately dilated right ventricle.   mildly abnormal strain pattern with RV4CSL -14%   Right ventricular systolic pressure of 85 mm Hg consistent with severe   pulmonary hypertension.   The IVC is dilated .    Radiology    CXR  6/9/25   Impression:  No acute findings.      1/15/25 IMPRESSION:  1. Similar-appearing densities of the left lung base, favor atelectasis   and/or scarring. Developing left basilar consolidation is not entirely   excluded.        Assessment   -Acute on Chronic Hypoxic Respiratory Failure due to COPD exacerbation VS CHF on NIV with 
left ventricular function is normal.   Moderately dilated right ventricle.   mildly abnormal strain pattern with RV4CSL -14%   Right ventricular systolic pressure of 85 mm Hg consistent with severe   pulmonary hypertension.   The IVC is dilated .      Right heart catheterization performed on 12 June 2025 by Dr. Onur Lopez MD.  Systolic (mmHg)Diastolic (mmHg)Mean (mmHg)EDP (mmHg) PA 66/13 (41); RV 66/ -2 (6); RA 3 ;PCW 6  Blood Oximetry Rest O2 Sat (%) PA 66.6 AO 92    CO: 5.47; CI: 2.6        Impression: Pulmonary hypertension due to who group 3-very severe COPD    Radiology      6/11/2025  VQ scan   COMPARISON: None  Correlation: PA and lateral chest radiographs 6/11/2025     FINDINGS:     Chest radiographs: There are small bilateral pleural effusions. Alveolar  reticular opacities are present at the right mid and bilateral lower lungs.     Ventilation scan: There is poor radiotracer uptake on breath-hold images. There  is decreased activity at the bilateral lung apices and bases. There is mild  diffuse radiotracer retention on washout images.     Perfusion scan: Decreased activity at the bilateral lung apices and bases  matches abnormalities seen on the ventilation scan. These defects are triple  matched at the right lung base. No segmental perfusion defects are identified.     IMPRESSION:  Nondiagnostic lung scan for pulmonary embolism due to triple match defects at  the right lung base. If there is clinical concern for pulmonary embolism, CTA of  the chest is recommended.      CXR  6/11/2025 Chest xray     IMPRESSION:  1. Mild cardiomegaly. Tiny tiny bilateral effusions..  2. Moderate interstitial pneumonia right mid and lower lung fields. Questionable  mild infiltrate left lung base.  3. Overall appearance of chest has worsened since prior.      6/9/25   Impression:  No acute findings.      1/15/25 IMPRESSION:  1. Similar-appearing densities of the left lung base, favor atelectasis   and/or scarring. 
after using NIV overnight  -Avoid sedating medications.   -Aspiration precautions with NIV  -As patient has not been seen since 2023 in our office and possibly established with Dr. Montoya at [facility], will attempt to obtain medical records from [facility]   -Smoking cessation encouraged.     Questions and concerns addressed.  Electronically signed by   SAVANNAH Arroyo CNP on 6/12/2025 at 2:56 PM     Addendum by Dr. Leonel MD:  Patient seen by me independently including key components of medical care. Face to face evaluation and examination was performed. Case discussed with . Marcelo Leggett CNP. Agree with Certified nurse practitioner's findings and plan as documented in the Certified nurse practitioner's note. Italicized font, if present,  represents changes to the note made by me. More than 50% of the encounter time involved with patient care by the Pulmonary & Critical care service team spent by me.    Please see my modifications mentioned below:  Right heart catheterization performed on 12 June 2025 by Dr. Onur Lopez MD.  Systolic (mmHg)Diastolic (mmHg)Mean (mmHg)EDP (mmHg) PA 66/13 (41); RV 66/ -2 (6); RA 3 ;PCW 6  Blood Oximetry Rest O2 Sat (%) PA 66.6 AO 92    CO: 5.47; CI: 2.6        Impression: Pulmonary hypertension due to who group 3-very severe COPD    Plan:  Will continue current treatment  Continue patient on noninvasive ventilator    Electronically signed by   Gm Castaneda MD on 6/12/2025 at 6:48 PM

## 2025-06-20 ENCOUNTER — CARE COORDINATION (OUTPATIENT)
Dept: CARE COORDINATION | Age: 55
End: 2025-06-20

## 2025-06-20 NOTE — CARE COORDINATION
Ambulatory Care Coordination Note     6/20/2025 11:02 AM     Patient and HIPAA contact outreach attempts by this ACM today to offer care management services and perform hospital follow up. ACM was unable to reach the patient or HIPAA by telephone today;   left voice message requesting a return phone call to this ACM.     ACM: Amanda Liriano RN     Care Summary Note: Initial attempt to reach patient for Care Coordination enrollment s/p recent CTN referral for assistance with the management of his CHF, COPD, Hypertension, healthcare needs, and in f/u to recent hospital stay for COPD exacerbation (6/9-6/16/2025).  Will continue to work to f/u with patient/family in the future.     PCP/Specialist follow up:   Future Appointments         Provider Specialty Dept Phone    7/23/2025 1:30 PM Jacqueline Castillo APRN - [facility] Pulmonology [phone]    9/15/2025 4:00 PM [facility] PULMONARY FUNCTION ROOM 1 Pulmonary Function Testing [phone]    9/23/2025 2:30 PM Jacqueline Castillo APRN - [facility] Pulmonology [phone]            Follow Up:   Plan for next ACM outreach in approximately 2 weeks to complete:  - outreach attempt to offer care management services.

## 2025-06-23 ENCOUNTER — CARE COORDINATION (OUTPATIENT)
Dept: CARE COORDINATION | Age: 55
End: 2025-06-23

## 2025-06-23 NOTE — CARE COORDINATION
Ambulatory Care Coordination Note     6/23/2025 2:48 PM     Patient outreach attempt by this ACM today to offer care management services. ACM was unable to reach the patient by telephone today;   left voice message requesting a return phone call to this ACM.     ACM: Lynn Torres RN     Care Summary Note: s/p recent CTN referral for assistance with the management of his CHF, COPD, Hypertension, healthcare needs, and in f/u to recent hospital stay for COPD exacerbation (6/9-6/16/2025).     PCP/Specialist follow up:   Future Appointments         Provider Specialty Dept Phone    7/23/2025 1:30 PM Jacqueline Castillo APRN - [facility] Pulmonology [phone]    9/15/2025 4:00 PM [facility] PULMONARY FUNCTION ROOM 1 Pulmonary Function Testing [phone]    9/23/2025 2:30 PM Jacqueline Castillo APRN - CNP Pulmonology [phone]            Follow Up:   Plan for next ACM outreach in approximately 1 week to complete:  - outreach attempt to offer care management services.

## 2025-06-27 ENCOUNTER — CARE COORDINATION (OUTPATIENT)
Dept: CARE COORDINATION | Age: 55
End: 2025-06-27

## 2025-06-27 NOTE — CARE COORDINATION
Ambulatory Care Coordination Note     6/27/2025 12:31 PM     patient outreach attempt by this ACM today to offer care management services. ACM was unable to reach the patient by telephone today;   left voice message requesting a return phone call to this ACM.     Patient closed (unable to reach patient) from the High Risk Care Management program on 6/27/2025.  No further Ambulatory Care Manager follow up scheduled.

## 2025-07-17 ENCOUNTER — HOSPITAL ENCOUNTER (INPATIENT)
Age: 55
LOS: 7 days | Discharge: NURSING, REHAB OR LONG-TERM CARE | DRG: 208 | End: 2025-07-24
Attending: EMERGENCY MEDICINE | Admitting: INTERNAL MEDICINE
Payer: MEDICARE

## 2025-07-17 ENCOUNTER — APPOINTMENT (OUTPATIENT)
Dept: GENERAL RADIOLOGY | Age: 55
DRG: 208 | End: 2025-07-17
Payer: MEDICARE

## 2025-07-17 ENCOUNTER — APPOINTMENT (OUTPATIENT)
Dept: CT IMAGING | Age: 55
DRG: 208 | End: 2025-07-17
Payer: MEDICARE

## 2025-07-17 DIAGNOSIS — I46.9 CARDIAC ARREST (HCC): ICD-10-CM

## 2025-07-17 DIAGNOSIS — I50.32 CHRONIC HEART FAILURE WITH PRESERVED EJECTION FRACTION (HCC): ICD-10-CM

## 2025-07-17 DIAGNOSIS — J44.1 COPD WITH ACUTE EXACERBATION (HCC): ICD-10-CM

## 2025-07-17 DIAGNOSIS — I46.9 CARDIOPULMONARY ARREST WITH SUCCESSFUL RESUSCITATION (HCC): Primary | ICD-10-CM

## 2025-07-17 LAB
ALBUMIN SERPL BCG-MCNC: 3.7 G/DL (ref 3.4–4.9)
ALP SERPL-CCNC: 84 U/L (ref 40–129)
ALT SERPL W/O P-5'-P-CCNC: 25 U/L (ref 10–50)
ANION GAP SERPL CALC-SCNC: 15 MEQ/L (ref 8–16)
ANION GAP SERPL CALC-SCNC: 15 MEQ/L (ref 8–16)
ARTERIAL PATENCY WRIST A: ABNORMAL
AST SERPL-CCNC: 23 U/L (ref 10–50)
BASE EXCESS BLDA CALC-SCNC: -7.5 MMOL/L (ref -2.5–2.5)
BASOPHILS ABSOLUTE: 0 THOU/MM3 (ref 0–0.1)
BASOPHILS NFR BLD AUTO: 0.8 %
BDY SITE: ABNORMAL
BILIRUB CONJ SERPL-MCNC: < 0.1 MG/DL (ref 0–0.2)
BILIRUB SERPL-MCNC: 0.3 MG/DL (ref 0.3–1.2)
BREATHS SETTING VENT: 24 BPM
BUN SERPL-MCNC: 21 MG/DL (ref 8–23)
BUN SERPL-MCNC: 22 MG/DL (ref 8–23)
CA-I BLD ISE-SCNC: 1.19 MMOL/L (ref 1.12–1.32)
CALCIUM SERPL-MCNC: 8.1 MG/DL (ref 8.6–10)
CALCIUM SERPL-MCNC: 8.7 MG/DL (ref 8.6–10)
CHLORIDE BLD-SCNC: 100 MEQ/L (ref 98–109)
CHLORIDE SERPL-SCNC: 101 MEQ/L (ref 98–111)
CHLORIDE SERPL-SCNC: 98 MEQ/L (ref 98–111)
CO2 SERPL-SCNC: 20 MEQ/L (ref 22–29)
CO2 SERPL-SCNC: 26 MEQ/L (ref 22–29)
COLLECTED BY:: ABNORMAL
CREAT SERPL-MCNC: 1 MG/DL (ref 0.7–1.2)
CREAT SERPL-MCNC: 1.2 MG/DL (ref 0.7–1.2)
DEPRECATED RDW RBC AUTO: 55.5 FL (ref 35–45)
DEVICE: ABNORMAL
EKG ATRIAL RATE: 103 BPM
EKG ATRIAL RATE: 91 BPM
EKG P AXIS: 76 DEGREES
EKG P AXIS: 80 DEGREES
EKG P-R INTERVAL: 142 MS
EKG P-R INTERVAL: 154 MS
EKG Q-T INTERVAL: 318 MS
EKG Q-T INTERVAL: 360 MS
EKG QRS DURATION: 82 MS
EKG QRS DURATION: 86 MS
EKG QTC CALCULATION (BAZETT): 416 MS
EKG QTC CALCULATION (BAZETT): 442 MS
EKG R AXIS: 114 DEGREES
EKG R AXIS: 119 DEGREES
EKG T AXIS: -27 DEGREES
EKG T AXIS: 47 DEGREES
EKG VENTRICULAR RATE: 103 BPM
EKG VENTRICULAR RATE: 91 BPM
EOSINOPHIL NFR BLD AUTO: 0.5 %
EOSINOPHILS ABSOLUTE: 0 THOU/MM3 (ref 0–0.4)
ERYTHROCYTE [DISTWIDTH] IN BLOOD BY AUTOMATED COUNT: 14.8 % (ref 11.5–14.5)
FIO2 ON VENT O2 ANALYZER: 80 %
GFR SERPL CREATININE-BSD FRML MDRD: 71 ML/MIN/1.73M2
GFR SERPL CREATININE-BSD FRML MDRD: 88 ML/MIN/1.73M2
GLUCOSE BLD STRIP.AUTO-MCNC: 111 MG/DL (ref 70–108)
GLUCOSE BLD-MCNC: 229 MG/DL (ref 70–108)
GLUCOSE SERPL-MCNC: 107 MG/DL (ref 74–109)
GLUCOSE SERPL-MCNC: 173 MG/DL (ref 74–109)
HCO3 BLDA-SCNC: 26 MMOL/L (ref 23–28)
HCT VFR BLD AUTO: 52.5 % (ref 42–52)
HGB BLD-MCNC: 15.3 GM/DL (ref 14–18)
IMM GRANULOCYTES # BLD AUTO: 0.2 THOU/MM3 (ref 0–0.07)
IMM GRANULOCYTES NFR BLD AUTO: 3.4 %
INR PPP: 1.26 (ref 0.85–1.13)
LACTATE BLD-SCNC: 4.7 MMOL/L (ref 0.5–1.9)
LACTATE SERPL-SCNC: 2.1 MMOL/L (ref 0.5–2.2)
LACTIC ACID, SEPSIS: 2.5 MMOL/L (ref 0.5–1.9)
LACTIC ACID, SEPSIS: 5.5 MMOL/L (ref 0.5–1.9)
LYMPHOCYTES ABSOLUTE: 1.8 THOU/MM3 (ref 1–4.8)
LYMPHOCYTES NFR BLD AUTO: 29.8 %
MAGNESIUM SERPL-MCNC: 2.5 MG/DL (ref 1.6–2.6)
MCH RBC QN AUTO: 29.4 PG (ref 26–33)
MCHC RBC AUTO-ENTMCNC: 29.1 GM/DL (ref 32.2–35.5)
MCV RBC AUTO: 100.8 FL (ref 80–94)
MONOCYTES ABSOLUTE: 0.6 THOU/MM3 (ref 0.4–1.3)
MONOCYTES NFR BLD AUTO: 10.2 %
NEUTROPHILS ABSOLUTE: 3.3 THOU/MM3 (ref 1.8–7.7)
NEUTROPHILS NFR BLD AUTO: 55.3 %
NRBC BLD AUTO-RTO: 1 /100 WBC
NT-PROBNP SERPL IA-MCNC: 2473 PG/ML (ref 0–124)
OSMOLALITY SERPL CALC.SUM OF ELEC: 284.7 MOSMOL/KG (ref 275–300)
PCO2 BLDA: 98 MMHG (ref 35–45)
PEEP SETTING VENT: 8 MMHG
PH BLDA: 7.04 [PH] (ref 7.35–7.45)
PHOSPHATE SERPL-MCNC: 4.6 MG/DL (ref 2.5–4.5)
PLATELET # BLD AUTO: 218 THOU/MM3 (ref 130–400)
PMV BLD AUTO: 10.5 FL (ref 9.4–12.4)
PO2 BLDA: 68 MMHG (ref 71–104)
POC CREATININE WHOLE BLOOD: 1 MG/DL (ref 0.5–1.2)
POTASSIUM BLD-SCNC: 3.7 MEQ/L (ref 3.5–4.9)
POTASSIUM SERPL-SCNC: 4.3 MEQ/L (ref 3.5–5.2)
POTASSIUM SERPL-SCNC: 5.3 MEQ/L (ref 3.5–5.2)
PROT SERPL-MCNC: 6.5 G/DL (ref 6.4–8.3)
PROTHROMBIN TIME: 14.4 SECONDS (ref 10–13.5)
RBC # BLD AUTO: 5.21 MILL/MM3 (ref 4.7–6.1)
REASON FOR REJECTION: NORMAL
REJECTED TEST: NORMAL
SAO2 % BLDA: 81 %
SODIUM BLD-SCNC: 137 MEQ/L (ref 138–146)
SODIUM SERPL-SCNC: 136 MEQ/L (ref 135–145)
SODIUM SERPL-SCNC: 139 MEQ/L (ref 135–145)
TROPONIN, HIGH SENSITIVITY: 30 NG/L (ref 0–12)
TROPONIN, HIGH SENSITIVITY: 55 NG/L (ref 0–12)
TSH SERPL DL<=0.05 MIU/L-ACNC: 3.62 UIU/ML (ref 0.27–4.2)
VENTILATION MODE VENT: ABNORMAL
WBC # BLD AUTO: 5.9 THOU/MM3 (ref 4.8–10.8)

## 2025-07-17 PROCEDURE — 93010 ELECTROCARDIOGRAM REPORT: CPT | Performed by: NUCLEAR MEDICINE

## 2025-07-17 PROCEDURE — 2500000003 HC RX 250 WO HCPCS: Performed by: STUDENT IN AN ORGANIZED HEALTH CARE EDUCATION/TRAINING PROGRAM

## 2025-07-17 PROCEDURE — 85610 PROTHROMBIN TIME: CPT

## 2025-07-17 PROCEDURE — 84484 ASSAY OF TROPONIN QUANT: CPT

## 2025-07-17 PROCEDURE — 2700000000 HC OXYGEN THERAPY PER DAY

## 2025-07-17 PROCEDURE — 71250 CT THORAX DX C-: CPT

## 2025-07-17 PROCEDURE — 6360000002 HC RX W HCPCS: Performed by: EMERGENCY MEDICINE

## 2025-07-17 PROCEDURE — 99285 EMERGENCY DEPT VISIT HI MDM: CPT

## 2025-07-17 PROCEDURE — 83880 ASSAY OF NATRIURETIC PEPTIDE: CPT

## 2025-07-17 PROCEDURE — 87086 URINE CULTURE/COLONY COUNT: CPT

## 2025-07-17 PROCEDURE — 82248 BILIRUBIN DIRECT: CPT

## 2025-07-17 PROCEDURE — 84100 ASSAY OF PHOSPHORUS: CPT

## 2025-07-17 PROCEDURE — 36415 COLL VENOUS BLD VENIPUNCTURE: CPT

## 2025-07-17 PROCEDURE — 82947 ASSAY GLUCOSE BLOOD QUANT: CPT

## 2025-07-17 PROCEDURE — 2500000003 HC RX 250 WO HCPCS

## 2025-07-17 PROCEDURE — 6360000002 HC RX W HCPCS

## 2025-07-17 PROCEDURE — 84443 ASSAY THYROID STIM HORMONE: CPT

## 2025-07-17 PROCEDURE — 71045 X-RAY EXAM CHEST 1 VIEW: CPT

## 2025-07-17 PROCEDURE — 2500000003 HC RX 250 WO HCPCS: Performed by: EMERGENCY MEDICINE

## 2025-07-17 PROCEDURE — 2580000003 HC RX 258: Performed by: STUDENT IN AN ORGANIZED HEALTH CARE EDUCATION/TRAINING PROGRAM

## 2025-07-17 PROCEDURE — 6370000000 HC RX 637 (ALT 250 FOR IP)

## 2025-07-17 PROCEDURE — 82565 ASSAY OF CREATININE: CPT

## 2025-07-17 PROCEDURE — 94002 VENT MGMT INPAT INIT DAY: CPT

## 2025-07-17 PROCEDURE — 84132 ASSAY OF SERUM POTASSIUM: CPT

## 2025-07-17 PROCEDURE — 2500000003 HC RX 250 WO HCPCS: Performed by: INTERNAL MEDICINE

## 2025-07-17 PROCEDURE — 83735 ASSAY OF MAGNESIUM: CPT

## 2025-07-17 PROCEDURE — 82803 BLOOD GASES ANY COMBINATION: CPT

## 2025-07-17 PROCEDURE — 80053 COMPREHEN METABOLIC PANEL: CPT

## 2025-07-17 PROCEDURE — 82330 ASSAY OF CALCIUM: CPT

## 2025-07-17 PROCEDURE — 82948 REAGENT STRIP/BLOOD GLUCOSE: CPT

## 2025-07-17 PROCEDURE — 36600 WITHDRAWAL OF ARTERIAL BLOOD: CPT

## 2025-07-17 PROCEDURE — 94761 N-INVAS EAR/PLS OXIMETRY MLT: CPT

## 2025-07-17 PROCEDURE — 93005 ELECTROCARDIOGRAM TRACING: CPT

## 2025-07-17 PROCEDURE — 99291 CRITICAL CARE FIRST HOUR: CPT | Performed by: INTERNAL MEDICINE

## 2025-07-17 PROCEDURE — 0BH17EZ INSERTION OF ENDOTRACHEAL AIRWAY INTO TRACHEA, VIA NATURAL OR ARTIFICIAL OPENING: ICD-10-PCS | Performed by: BEHAVIOR ANALYST

## 2025-07-17 PROCEDURE — 07HT33Z INSERTION OF INFUSION DEVICE INTO BONE MARROW, PERCUTANEOUS APPROACH: ICD-10-PCS | Performed by: INTERNAL MEDICINE

## 2025-07-17 PROCEDURE — 5A12012 PERFORMANCE OF CARDIAC OUTPUT, SINGLE, MANUAL: ICD-10-PCS | Performed by: BEHAVIOR ANALYST

## 2025-07-17 PROCEDURE — 93005 ELECTROCARDIOGRAM TRACING: CPT | Performed by: EMERGENCY MEDICINE

## 2025-07-17 PROCEDURE — 85025 COMPLETE CBC W/AUTO DIFF WBC: CPT

## 2025-07-17 PROCEDURE — 2000000000 HC ICU R&B

## 2025-07-17 PROCEDURE — 70450 CT HEAD/BRAIN W/O DYE: CPT

## 2025-07-17 PROCEDURE — 82435 ASSAY OF BLOOD CHLORIDE: CPT

## 2025-07-17 PROCEDURE — 0D9670Z DRAINAGE OF STOMACH WITH DRAINAGE DEVICE, VIA NATURAL OR ARTIFICIAL OPENING: ICD-10-PCS | Performed by: INTERNAL MEDICINE

## 2025-07-17 PROCEDURE — 87081 CULTURE SCREEN ONLY: CPT

## 2025-07-17 PROCEDURE — 51702 INSERT TEMP BLADDER CATH: CPT

## 2025-07-17 PROCEDURE — 3E033XZ INTRODUCTION OF VASOPRESSOR INTO PERIPHERAL VEIN, PERCUTANEOUS APPROACH: ICD-10-PCS | Performed by: EMERGENCY MEDICINE

## 2025-07-17 PROCEDURE — 5A1945Z RESPIRATORY VENTILATION, 24-96 CONSECUTIVE HOURS: ICD-10-PCS | Performed by: INTERNAL MEDICINE

## 2025-07-17 PROCEDURE — 6360000002 HC RX W HCPCS: Performed by: INTERNAL MEDICINE

## 2025-07-17 PROCEDURE — 83605 ASSAY OF LACTIC ACID: CPT

## 2025-07-17 PROCEDURE — 2580000003 HC RX 258

## 2025-07-17 PROCEDURE — 84295 ASSAY OF SERUM SODIUM: CPT

## 2025-07-17 PROCEDURE — 94640 AIRWAY INHALATION TREATMENT: CPT

## 2025-07-17 PROCEDURE — 2580000003 HC RX 258: Performed by: INTERNAL MEDICINE

## 2025-07-17 PROCEDURE — 83036 HEMOGLOBIN GLYCOSYLATED A1C: CPT

## 2025-07-17 RX ORDER — IPRATROPIUM BROMIDE AND ALBUTEROL SULFATE 2.5; .5 MG/3ML; MG/3ML
1 SOLUTION RESPIRATORY (INHALATION) ONCE
Status: DISCONTINUED | OUTPATIENT
Start: 2025-07-17 | End: 2025-07-17

## 2025-07-17 RX ORDER — FENTANYL CITRATE 50 UG/ML
INJECTION, SOLUTION INTRAMUSCULAR; INTRAVENOUS
Status: COMPLETED
Start: 2025-07-17 | End: 2025-07-17

## 2025-07-17 RX ORDER — GLUCAGON 1 MG/ML
1 KIT INJECTION PRN
Status: DISCONTINUED | OUTPATIENT
Start: 2025-07-17 | End: 2025-07-24 | Stop reason: HOSPADM

## 2025-07-17 RX ORDER — PROPOFOL 10 MG/ML
5-50 INJECTION, EMULSION INTRAVENOUS CONTINUOUS
Status: DISCONTINUED | OUTPATIENT
Start: 2025-07-17 | End: 2025-07-19

## 2025-07-17 RX ORDER — POLYETHYLENE GLYCOL 3350 17 G/17G
17 POWDER, FOR SOLUTION ORAL DAILY PRN
Status: DISCONTINUED | OUTPATIENT
Start: 2025-07-17 | End: 2025-07-24 | Stop reason: HOSPADM

## 2025-07-17 RX ORDER — FLUTICASONE PROPIONATE 50 MCG
2 SPRAY, SUSPENSION (ML) NASAL 2 TIMES DAILY
Status: DISCONTINUED | OUTPATIENT
Start: 2025-07-17 | End: 2025-07-24 | Stop reason: HOSPADM

## 2025-07-17 RX ORDER — INSULIN LISPRO 100 [IU]/ML
0-4 INJECTION, SOLUTION INTRAVENOUS; SUBCUTANEOUS
Status: DISCONTINUED | OUTPATIENT
Start: 2025-07-17 | End: 2025-07-24 | Stop reason: HOSPADM

## 2025-07-17 RX ORDER — FENTANYL CITRATE-0.9 % NACL/PF 10 MCG/ML
25-200 PLASTIC BAG, INJECTION (ML) INTRAVENOUS CONTINUOUS
Refills: 0 | Status: DISCONTINUED | OUTPATIENT
Start: 2025-07-17 | End: 2025-07-19

## 2025-07-17 RX ORDER — PROPOFOL 10 MG/ML
INJECTION, EMULSION INTRAVENOUS CONTINUOUS PRN
Status: COMPLETED | OUTPATIENT
Start: 2025-07-17 | End: 2025-07-17

## 2025-07-17 RX ORDER — SODIUM CHLORIDE, SODIUM LACTATE, POTASSIUM CHLORIDE, CALCIUM CHLORIDE 600; 310; 30; 20 MG/100ML; MG/100ML; MG/100ML; MG/100ML
INJECTION, SOLUTION INTRAVENOUS CONTINUOUS
Status: DISCONTINUED | OUTPATIENT
Start: 2025-07-17 | End: 2025-07-19

## 2025-07-17 RX ORDER — POTASSIUM CHLORIDE 29.8 MG/ML
20 INJECTION INTRAVENOUS PRN
Status: DISCONTINUED | OUTPATIENT
Start: 2025-07-17 | End: 2025-07-24 | Stop reason: HOSPADM

## 2025-07-17 RX ORDER — SODIUM CHLORIDE 9 MG/ML
INJECTION, SOLUTION INTRAVENOUS PRN
Status: DISCONTINUED | OUTPATIENT
Start: 2025-07-17 | End: 2025-07-24 | Stop reason: HOSPADM

## 2025-07-17 RX ORDER — EPINEPHRINE IN SOD CHLOR,ISO 1 MG/10 ML
SYRINGE (ML) INTRAVENOUS DAILY PRN
Status: COMPLETED | OUTPATIENT
Start: 2025-07-17 | End: 2025-07-17

## 2025-07-17 RX ORDER — ONDANSETRON 4 MG/1
4 TABLET, ORALLY DISINTEGRATING ORAL EVERY 8 HOURS PRN
Status: DISCONTINUED | OUTPATIENT
Start: 2025-07-17 | End: 2025-07-24 | Stop reason: HOSPADM

## 2025-07-17 RX ORDER — MAGNESIUM SULFATE IN WATER 40 MG/ML
2000 INJECTION, SOLUTION INTRAVENOUS PRN
Status: DISCONTINUED | OUTPATIENT
Start: 2025-07-17 | End: 2025-07-24 | Stop reason: HOSPADM

## 2025-07-17 RX ORDER — ACETAMINOPHEN 650 MG/1
650 SUPPOSITORY RECTAL EVERY 6 HOURS PRN
Status: DISCONTINUED | OUTPATIENT
Start: 2025-07-17 | End: 2025-07-24 | Stop reason: HOSPADM

## 2025-07-17 RX ORDER — SODIUM CHLORIDE 0.9 % (FLUSH) 0.9 %
10 SYRINGE (ML) INJECTION PRN
Status: DISCONTINUED | OUTPATIENT
Start: 2025-07-17 | End: 2025-07-24 | Stop reason: HOSPADM

## 2025-07-17 RX ORDER — ALBUTEROL SULFATE 5 MG/ML
15 SOLUTION RESPIRATORY (INHALATION) ONCE
Status: COMPLETED | OUTPATIENT
Start: 2025-07-17 | End: 2025-07-17

## 2025-07-17 RX ORDER — PANTOPRAZOLE SODIUM 40 MG/10ML
40 INJECTION, POWDER, LYOPHILIZED, FOR SOLUTION INTRAVENOUS DAILY
Status: DISCONTINUED | OUTPATIENT
Start: 2025-07-17 | End: 2025-07-21 | Stop reason: ALTCHOICE

## 2025-07-17 RX ORDER — DEXAMETHASONE 4 MG/1
4 TABLET ORAL DAILY
Status: DISCONTINUED | OUTPATIENT
Start: 2025-07-17 | End: 2025-07-19

## 2025-07-17 RX ORDER — KETAMINE HCL IN 0.9 % NACL 50 MG/5 ML
SYRINGE (ML) INTRAVENOUS
Status: COMPLETED
Start: 2025-07-17 | End: 2025-07-17

## 2025-07-17 RX ORDER — PRAVASTATIN SODIUM 40 MG
40 TABLET ORAL NIGHTLY
Status: DISCONTINUED | OUTPATIENT
Start: 2025-07-17 | End: 2025-07-24 | Stop reason: HOSPADM

## 2025-07-17 RX ORDER — POTASSIUM CHLORIDE 7.45 MG/ML
10 INJECTION INTRAVENOUS PRN
Status: DISCONTINUED | OUTPATIENT
Start: 2025-07-17 | End: 2025-07-24 | Stop reason: HOSPADM

## 2025-07-17 RX ORDER — SODIUM CHLORIDE 0.9 % (FLUSH) 0.9 %
10 SYRINGE (ML) INJECTION EVERY 12 HOURS SCHEDULED
Status: DISCONTINUED | OUTPATIENT
Start: 2025-07-17 | End: 2025-07-24 | Stop reason: HOSPADM

## 2025-07-17 RX ORDER — ACETAMINOPHEN 325 MG/1
650 TABLET ORAL EVERY 6 HOURS PRN
Status: DISCONTINUED | OUTPATIENT
Start: 2025-07-17 | End: 2025-07-24 | Stop reason: HOSPADM

## 2025-07-17 RX ORDER — ALBUTEROL SULFATE 0.83 MG/ML
5 SOLUTION RESPIRATORY (INHALATION) EVERY 4 HOURS PRN
Status: DISCONTINUED | OUTPATIENT
Start: 2025-07-17 | End: 2025-07-24 | Stop reason: HOSPADM

## 2025-07-17 RX ORDER — ONDANSETRON 2 MG/ML
4 INJECTION INTRAMUSCULAR; INTRAVENOUS EVERY 6 HOURS PRN
Status: DISCONTINUED | OUTPATIENT
Start: 2025-07-17 | End: 2025-07-24 | Stop reason: HOSPADM

## 2025-07-17 RX ORDER — PROPOFOL 10 MG/ML
INJECTION, EMULSION INTRAVENOUS
Status: DISPENSED
Start: 2025-07-17 | End: 2025-07-18

## 2025-07-17 RX ORDER — DEXTROSE MONOHYDRATE 100 MG/ML
INJECTION, SOLUTION INTRAVENOUS CONTINUOUS PRN
Status: DISCONTINUED | OUTPATIENT
Start: 2025-07-17 | End: 2025-07-24 | Stop reason: HOSPADM

## 2025-07-17 RX ORDER — ENOXAPARIN SODIUM 100 MG/ML
40 INJECTION SUBCUTANEOUS DAILY
Status: DISCONTINUED | OUTPATIENT
Start: 2025-07-17 | End: 2025-07-24 | Stop reason: HOSPADM

## 2025-07-17 RX ORDER — KETAMINE HYDROCHLORIDE 10 MG/ML
50 INJECTION, SOLUTION INTRAMUSCULAR; INTRAVENOUS ONCE
Status: DISCONTINUED | OUTPATIENT
Start: 2025-07-17 | End: 2025-07-19

## 2025-07-17 RX ADMIN — DOXYCYCLINE 100 MG: 100 INJECTION, POWDER, LYOPHILIZED, FOR SOLUTION INTRAVENOUS at 22:16

## 2025-07-17 RX ADMIN — Medication 75 MCG/HR: at 21:26

## 2025-07-17 RX ADMIN — NOREPINEPHRINE BITARTRATE 18 MCG/MIN: 1 INJECTION, SOLUTION, CONCENTRATE INTRAVENOUS at 21:42

## 2025-07-17 RX ADMIN — Medication 5 MCG/MIN: at 13:04

## 2025-07-17 RX ADMIN — PRAVASTATIN SODIUM 40 MG: 40 TABLET ORAL at 23:12

## 2025-07-17 RX ADMIN — SODIUM CHLORIDE, PRESERVATIVE FREE 10 ML: 5 INJECTION INTRAVENOUS at 21:28

## 2025-07-17 RX ADMIN — IPRATROPIUM BROMIDE 0.5 MG: 0.5 SOLUTION RESPIRATORY (INHALATION) at 13:10

## 2025-07-17 RX ADMIN — Medication 1 MG: at 12:16

## 2025-07-17 RX ADMIN — DEXAMETHASONE 4 MG: 4 TABLET ORAL at 21:41

## 2025-07-17 RX ADMIN — FLUTICASONE PROPIONATE 2 SPRAY: 50 SPRAY, METERED NASAL at 23:12

## 2025-07-17 RX ADMIN — PROPOFOL 15 MCG/KG/MIN: 10 INJECTION, EMULSION INTRAVENOUS at 23:15

## 2025-07-17 RX ADMIN — PANTOPRAZOLE SODIUM 40 MG: 40 INJECTION, POWDER, LYOPHILIZED, FOR SOLUTION INTRAVENOUS at 21:40

## 2025-07-17 RX ADMIN — ENOXAPARIN SODIUM 40 MG: 100 INJECTION SUBCUTANEOUS at 21:41

## 2025-07-17 RX ADMIN — WATER 2000 MG: 1 INJECTION INTRAMUSCULAR; INTRAVENOUS; SUBCUTANEOUS at 21:50

## 2025-07-17 RX ADMIN — Medication 1 MG: at 12:13

## 2025-07-17 RX ADMIN — ALBUTEROL SULFATE 15 MG: 2.5 SOLUTION RESPIRATORY (INHALATION) at 13:10

## 2025-07-17 RX ADMIN — Medication 5 MCG/MIN: at 12:20

## 2025-07-17 RX ADMIN — Medication 50 MG: at 12:29

## 2025-07-17 RX ADMIN — FENTANYL CITRATE 100 MCG: 50 INJECTION, SOLUTION INTRAMUSCULAR; INTRAVENOUS at 14:13

## 2025-07-17 RX ADMIN — NOREPINEPHRINE BITARTRATE 20 MCG/MIN: 1 INJECTION, SOLUTION, CONCENTRATE INTRAVENOUS at 19:06

## 2025-07-17 RX ADMIN — PROPOFOL 10 MCG/KG/MIN: 10 INJECTION, EMULSION INTRAVENOUS at 12:31

## 2025-07-17 RX ADMIN — Medication 50 MCG/HR: at 14:37

## 2025-07-17 RX ADMIN — SODIUM CHLORIDE, SODIUM LACTATE, POTASSIUM CHLORIDE, AND CALCIUM CHLORIDE 1000 ML: .6; .31; .03; .02 INJECTION, SOLUTION INTRAVENOUS at 21:27

## 2025-07-17 ASSESSMENT — PULMONARY FUNCTION TESTS
PIF_VALUE: 24
PIF_VALUE: 30
PIF_VALUE: 27

## 2025-07-17 NOTE — ED PROVIDER NOTES
Intubation    Date/Time: 7/17/2025 1:14 PM    Performed by: Dylan Birmingham MD  Authorized by: Anatoliy King DO    Consent:     Consent obtained:  Emergent situation  Universal protocol:     Patient identity confirmed:  Arm band  Pre-procedure details:     Indications: cardio/pulmonary arrest      Patient status:  Unresponsive    Look externally: no concerns      Mouth opening - incisor distance:  3 or more finger widths    Obstruction: none      Neck mobility: normal      Pharmacologic strategy: none    Procedure details:     Preoxygenation:  Bag valve mask    CPR in progress: yes      Number of attempts:  1  Successful intubation attempt details:     Intubation method:  Oral    Intubation technique: video assisted      Laryngoscope blade:  Hypercurved    Bougie used: no      Tube size (mm):  7.5    Tube type:  Cuffed    Tube visualized through cords: yes    Placement assessment:     ETT at teeth/gumline (cm):  26    Tube secured with:  ETT flores    Breath sounds:  Equal    Placement verification: chest rise, CXR verification, equal breath sounds, tube exhalation and waveform ETCO2    Post-procedure details:     Procedure completion:  Tolerated  Comments:      Performed with Dr. Gibbs and Dr. King     
department, final interpretation may not be available as of the writing of this note.      PREVIOUS RECORDS  AND EXTERNAL INFORMATION REVIEWED   History obtained from: EMS.  Pertinent previous and/or external records reviewed: Recent cardiac cath.  Case discussed with specialties other than Emergency Medicine: Intensivist/adult critical care      MEDICAL DECISION MAKING   Differential Diagnosis includes but is not limited to:  COPD exacerbation  Cardiac arrest      Medical Decision Making  Patient presented in cardiac arrest by the EMS having witnessed arrest, EMS was called for altered mental status and shortness of breath.  Code was continued after finding patient in asystole, EMS stated the patient was in PEA before arrest having received 3 doses of epinephrine.  Patient was given dose of epinephrine, CPR continued, patient intubated -please refer to procedure note for intubation - and received a total of 2 doses of epinephrine  before ROSC was obtained and cardiac activity confirmed via ultrasound showing enlarged right ventricle and mild pericardial effusion.  Patient was sedated with ketamine and started on propofol infusion along with epinephrine infusion post ROSC.  ABG showed CO2 of 98 and a pH of 7.04, lactate 0.7.  Patient ventilator settings were adjusted to increased respiratory rate due to increased end-tidal, improved after changing settings.  Patient given some DuoNebs due to chest x-ray showing levels of pulmonary edema and effusion.  Patient was further sedated with fentanyl and started on fentanyl infusion.  Case was discussed with ICU service, CT head and CT chest ordered.  Attempt to wean patient off epinephrine was unsuccessful and patient persistently hypotensive, kept on Levophed.  Patient admitted to the ICU service for further management.  Refer to ED course for additional information.      ED COURSE   ED Medications administered this visit (None if left blank):   Medications   EPINEPHrine

## 2025-07-17 NOTE — H&P
CRITICAL CARE H&P NOTE      Patient:  Sandie Carrera    Unit/Bed:4D-16/016-A  YOB: 1970  MRN: [id number]   PCP: Elijah Liu MD  Date of Admission: 7/17/2025  Chief Complaint:- respiratory cardiac arrest     Assessment and Plan:    Respiratory cardiac arrest: Pt developed difficulty breathing at home, become AMS. EMS noted him in acute respiratory distress, become unresponsive and lost pulse while en route. EMS noted PEA on EKG, started PEA ACLS protocol.,give him 2 mg Epi. In ED he was noted to be in PEA. Was given 2 cycles of Epi before regaining ROSC. Pt woke up post ROSC.   Acute on chronic hypercapnic respiratory failure. 2/2 COPD exacerbation. Intubated on 7/17 post respiratory cardiac arrest. ABG on admit noted acidosis and hypercapnia. CXR am on 7/18.   COPD exacerbation, with Emphysema: severe. PFT on 3/18/24 FEV1 showed severe obstructive disease. On 4 L NC at home. Currently intubated. Cont albuterol, fluticasone, Stiolto. Cont CXT and Azithromycin. Started on Decadron.  Bilateral small Pleural effusion:  Pt does not seem overload. monitor  Lactic acidosis:  resolved. Lactic acid on admit 2.5, trended down to 2.1  Nondisplaced anterior lateral left fifth rib fracture: Noted on CT chest on 7/17. Currently sedated.  Mild elevated BNP: 2473. ECHO ordered.  Hyperglycemia:  Blood glucose on admit over 200. Start on LDSSI. POCT glucose x3. Hypoglycemia protocol  DVT ppx:  Lovenox  GI ppx:  protonix  Shock: resolved. Pt was noted to be required short duration of pressors. Currently off Levophed. MAP >70.    INITIAL H AND P AND ICU COURSE:  HPI was obtained from EMS report  This is 55 yo.M with PMHx of severe COPD on 4 L nasal cannula, mild systolic HF (EF 6/10/25 = 50%),  who presented to [facility] on 7/17 as CODE blue. Per EMS, wife called for pt's difficulty breathing. EMS noted pt sitting in chair in acute respiratory distress.  Initially following the commands and ambulate to bathroom.  Per

## 2025-07-17 NOTE — ED NOTES
Pt moving arms and attempting to pull out tube. Provider notified. Verbal order for 100mcg fentanyl per Dr. Valle.

## 2025-07-17 NOTE — ED TRIAGE NOTES
Pt presents to the ED via [facility] in full cardiac arrest. EMS reports wife called EMS to bring  to hospital for difficulty breathing. EMS states he was sitting in his chair at home and talking. EMS states he had slight AMS and was wearing his home oxygen. EMS states pt walked from chair to stretcher with ease. EMS reports pt was having difficulty breathing and increasing SOB en route and became unresponsive and found to be pulseless. EMS states giving 2mg Epi en route. Upon ED arrival, CPR in progress, Joao in place. Dr. King, Dr. Valle, Dr. Gibbs and respiratory at bedside.

## 2025-07-18 ENCOUNTER — APPOINTMENT (OUTPATIENT)
Age: 55
DRG: 208 | End: 2025-07-18
Payer: MEDICARE

## 2025-07-18 ENCOUNTER — APPOINTMENT (OUTPATIENT)
Dept: GENERAL RADIOLOGY | Age: 55
DRG: 208 | End: 2025-07-18
Payer: MEDICARE

## 2025-07-18 LAB
ANION GAP SERPL CALC-SCNC: 10 MEQ/L (ref 8–16)
ANION GAP SERPL CALC-SCNC: 11 MEQ/L (ref 8–16)
ARTERIAL PATENCY WRIST A: POSITIVE
BASE EXCESS BLDA CALC-SCNC: 3.1 MMOL/L (ref -2–3)
BASE EXCESS BLDA CALC-SCNC: 3.2 MMOL/L (ref -2.5–2.5)
BASOPHILS ABSOLUTE: 0 THOU/MM3 (ref 0–0.1)
BASOPHILS NFR BLD AUTO: 0.1 %
BDY SITE: ABNORMAL
BREATHS SETTING VENT: 8 BPM
BUN SERPL-MCNC: 18 MG/DL (ref 8–23)
BUN SERPL-MCNC: 23 MG/DL (ref 8–23)
CA-I BLD ISE-SCNC: 0.99 MMOL/L (ref 1.12–1.32)
CALCIUM SERPL-MCNC: 5.4 MG/DL (ref 8.6–10)
CALCIUM SERPL-MCNC: 8.3 MG/DL (ref 8.6–10)
CHLORIDE SERPL-SCNC: 100 MEQ/L (ref 98–111)
CHLORIDE SERPL-SCNC: 113 MEQ/L (ref 98–111)
CO2 SERPL-SCNC: 20 MEQ/L (ref 22–29)
CO2 SERPL-SCNC: 27 MEQ/L (ref 22–29)
COLLECTED BY:: ABNORMAL
COLLECTED BY:: ABNORMAL
CREAT SERPL-MCNC: 0.7 MG/DL (ref 0.7–1.2)
CREAT SERPL-MCNC: 1.2 MG/DL (ref 0.7–1.2)
DEPRECATED MEAN GLUCOSE BLD GHB EST-ACNC: 102 MG/DL (ref 70–126)
DEPRECATED RDW RBC AUTO: 54.5 FL (ref 35–45)
DEVICE: ABNORMAL
DEVICE: ABNORMAL
ECHO AV CUSP MM: 1.9 CM
ECHO EST RA PRESSURE: 3 MMHG
ECHO LA DIAMETER INDEX: 1.64 CM/M2
ECHO LA DIAMETER: 3.6 CM
ECHO LV EDV A4C: 82 ML
ECHO LV EDV INDEX A4C: 37 ML/M2
ECHO LV EJECTION FRACTION 3D: 25 %
ECHO LV EJECTION FRACTION A4C: 23 %
ECHO LV ESV A4C: 64 ML
ECHO LV ESV INDEX A4C: 29 ML/M2
ECHO LV FRACTIONAL SHORTENING: 10 % (ref 28–44)
ECHO LV INTERNAL DIMENSION DIASTOLE INDEX: 2.23 CM/M2
ECHO LV INTERNAL DIMENSION DIASTOLIC: 4.9 CM (ref 4.2–5.9)
ECHO LV INTERNAL DIMENSION SYSTOLIC INDEX: 2 CM/M2
ECHO LV INTERNAL DIMENSION SYSTOLIC: 4.4 CM
ECHO LV IVSD: 1.2 CM (ref 0.6–1)
ECHO LV MASS 2D: 226.4 G (ref 88–224)
ECHO LV MASS INDEX 2D: 102.9 G/M2 (ref 49–115)
ECHO LV POSTERIOR WALL DIASTOLIC: 1.2 CM (ref 0.6–1)
ECHO LV RELATIVE WALL THICKNESS RATIO: 0.49
ECHO PULMONARY ARTERY SYSTOLIC PRESSURE (PASP): 55 MMHG
ECHO RIGHT VENTRICULAR SYSTOLIC PRESSURE (RVSP): 48 MMHG
ECHO RV INTERNAL DIMENSION: 3.5 CM
ECHO TV REGURGITANT MAX VELOCITY: 3.35 M/S
ECHO TV REGURGITANT PEAK GRADIENT: 45 MMHG
EOSINOPHIL NFR BLD AUTO: 0.1 %
EOSINOPHILS ABSOLUTE: 0 THOU/MM3 (ref 0–0.4)
ERYTHROCYTE [DISTWIDTH] IN BLOOD BY AUTOMATED COUNT: 14.8 % (ref 11.5–14.5)
FIO2 ON VENT O2 ANALYZER: 35 %
FIO2 ON VENT O2 ANALYZER: 50 %
GFR SERPL CREATININE-BSD FRML MDRD: 71 ML/MIN/1.73M2
GFR SERPL CREATININE-BSD FRML MDRD: > 90 ML/MIN/1.73M2
GLUCOSE BLD STRIP.AUTO-MCNC: 96 MG/DL (ref 70–108)
GLUCOSE BLD-MCNC: 105 MG/DL (ref 70–108)
GLUCOSE BLD-MCNC: 123 MG/DL (ref 70–108)
GLUCOSE SERPL-MCNC: 111 MG/DL (ref 74–109)
GLUCOSE SERPL-MCNC: 129 MG/DL (ref 74–109)
GLUCOSE SERPL-MCNC: 77 MG/DL (ref 74–109)
HBA1C MFR BLD HPLC: 5.4 % (ref 4–6)
HCO3 BLDA-SCNC: 29 MMOL/L (ref 23–28)
HCO3 BLDA-SCNC: 33 MMOL/L (ref 23–28)
HCT VFR BLD AUTO: 51.9 % (ref 42–52)
HGB BLD-MCNC: 15.4 GM/DL (ref 14–18)
IMM GRANULOCYTES # BLD AUTO: 0.03 THOU/MM3 (ref 0–0.07)
IMM GRANULOCYTES NFR BLD AUTO: 0.4 %
LACTATE SERPL-SCNC: 1.3 MMOL/L (ref 0.5–2.2)
LV EF: 25 ML
LYMPHOCYTES ABSOLUTE: 0.8 THOU/MM3 (ref 1–4.8)
LYMPHOCYTES NFR BLD AUTO: 10.5 %
MAGNESIUM SERPL-MCNC: 1.3 MG/DL (ref 1.6–2.6)
MAGNESIUM SERPL-MCNC: 2.1 MG/DL (ref 1.6–2.6)
MCH RBC QN AUTO: 29.2 PG (ref 26–33)
MCHC RBC AUTO-ENTMCNC: 29.7 GM/DL (ref 32.2–35.5)
MCV RBC AUTO: 98.5 FL (ref 80–94)
MONOCYTES ABSOLUTE: 0.7 THOU/MM3 (ref 0.4–1.3)
MONOCYTES NFR BLD AUTO: 9.7 %
NEUTROPHILS ABSOLUTE: 5.8 THOU/MM3 (ref 1.8–7.7)
NEUTROPHILS NFR BLD AUTO: 79.2 %
NRBC BLD AUTO-RTO: 0 /100 WBC
PCO2 BLDA: 45 MMHG (ref 35–45)
PCO2 TEMP ADJ BLDMV: 72 MMHG (ref 41–51)
PEEP SETTING VENT: 10 MMHG
PH BLDA: 7.41 [PH] (ref 7.35–7.45)
PH BLDMV: 7.27 [PH] (ref 7.31–7.41)
PHOSPHATE SERPL-MCNC: 2.6 MG/DL (ref 2.5–4.5)
PIP: 28 CMH2O
PLATELET # BLD AUTO: 207 THOU/MM3 (ref 130–400)
PMV BLD AUTO: 10.3 FL (ref 9.4–12.4)
PO2 BLDA: 53 MMHG (ref 71–104)
PO2 BLDMV: 57 MMHG (ref 25–40)
POTASSIUM SERPL-SCNC: 3.4 MEQ/L (ref 3.5–5.2)
POTASSIUM SERPL-SCNC: 4.9 MEQ/L (ref 3.5–5.2)
POTASSIUM SERPL-SCNC: 5.6 MEQ/L (ref 3.5–5.2)
POTASSIUM SERPL-SCNC: 5.7 MEQ/L (ref 3.5–5.2)
PRESSURE SUPPORT SETTING VENT: 18 CMH2O
RBC # BLD AUTO: 5.27 MILL/MM3 (ref 4.7–6.1)
SAO2 % BLDA: 87 %
SAO2 % BLDMV: 84 %
SET PEEP: 8 MMHG
SET PRESS SUPP: 16 CMH2O
SITE: ABNORMAL
SODIUM SERPL-SCNC: 138 MEQ/L (ref 135–145)
SODIUM SERPL-SCNC: 143 MEQ/L (ref 135–145)
TROPONIN, HIGH SENSITIVITY: 34 NG/L (ref 0–12)
TROPONIN, HIGH SENSITIVITY: 37 NG/L (ref 0–12)
TROPONIN, HIGH SENSITIVITY: 41 NG/L (ref 0–12)
TROPONIN, HIGH SENSITIVITY: 45 NG/L (ref 0–12)
TROPONIN, HIGH SENSITIVITY: 48 NG/L (ref 0–12)
VENTILATION MODE VENT: ABNORMAL
VENTILATION MODE VENT: ABNORMAL
WBC # BLD AUTO: 7.3 THOU/MM3 (ref 4.8–10.8)

## 2025-07-18 PROCEDURE — 80048 BASIC METABOLIC PNL TOTAL CA: CPT

## 2025-07-18 PROCEDURE — 6360000002 HC RX W HCPCS

## 2025-07-18 PROCEDURE — 36415 COLL VENOUS BLD VENIPUNCTURE: CPT

## 2025-07-18 PROCEDURE — 51798 US URINE CAPACITY MEASURE: CPT

## 2025-07-18 PROCEDURE — 85025 COMPLETE CBC W/AUTO DIFF WBC: CPT

## 2025-07-18 PROCEDURE — 2500000003 HC RX 250 WO HCPCS

## 2025-07-18 PROCEDURE — 83735 ASSAY OF MAGNESIUM: CPT

## 2025-07-18 PROCEDURE — 36600 WITHDRAWAL OF ARTERIAL BLOOD: CPT

## 2025-07-18 PROCEDURE — 2000000000 HC ICU R&B

## 2025-07-18 PROCEDURE — 5A09457 ASSISTANCE WITH RESPIRATORY VENTILATION, 24-96 CONSECUTIVE HOURS, CONTINUOUS POSITIVE AIRWAY PRESSURE: ICD-10-PCS | Performed by: INTERNAL MEDICINE

## 2025-07-18 PROCEDURE — 84100 ASSAY OF PHOSPHORUS: CPT

## 2025-07-18 PROCEDURE — 2700000000 HC OXYGEN THERAPY PER DAY

## 2025-07-18 PROCEDURE — 94003 VENT MGMT INPAT SUBQ DAY: CPT

## 2025-07-18 PROCEDURE — 93306 TTE W/DOPPLER COMPLETE: CPT | Performed by: NUCLEAR MEDICINE

## 2025-07-18 PROCEDURE — 82803 BLOOD GASES ANY COMBINATION: CPT

## 2025-07-18 PROCEDURE — 82947 ASSAY GLUCOSE BLOOD QUANT: CPT

## 2025-07-18 PROCEDURE — 71045 X-RAY EXAM CHEST 1 VIEW: CPT

## 2025-07-18 PROCEDURE — 83605 ASSAY OF LACTIC ACID: CPT

## 2025-07-18 PROCEDURE — 84484 ASSAY OF TROPONIN QUANT: CPT

## 2025-07-18 PROCEDURE — 82948 REAGENT STRIP/BLOOD GLUCOSE: CPT

## 2025-07-18 PROCEDURE — 84132 ASSAY OF SERUM POTASSIUM: CPT

## 2025-07-18 PROCEDURE — 94640 AIRWAY INHALATION TREATMENT: CPT

## 2025-07-18 PROCEDURE — 2580000003 HC RX 258

## 2025-07-18 PROCEDURE — 82330 ASSAY OF CALCIUM: CPT

## 2025-07-18 PROCEDURE — 99291 CRITICAL CARE FIRST HOUR: CPT | Performed by: INTERNAL MEDICINE

## 2025-07-18 PROCEDURE — 94660 CPAP INITIATION&MGMT: CPT

## 2025-07-18 PROCEDURE — 93308 TTE F-UP OR LMTD: CPT

## 2025-07-18 PROCEDURE — 6370000000 HC RX 637 (ALT 250 FOR IP)

## 2025-07-18 PROCEDURE — 94761 N-INVAS EAR/PLS OXIMETRY MLT: CPT

## 2025-07-18 RX ORDER — FENTANYL CITRATE 50 UG/ML
INJECTION, SOLUTION INTRAMUSCULAR; INTRAVENOUS
Status: DISCONTINUED
Start: 2025-07-18 | End: 2025-07-19

## 2025-07-18 RX ORDER — CALCIUM GLUCONATE 20 MG/ML
2000 INJECTION, SOLUTION INTRAVENOUS ONCE
Status: COMPLETED | OUTPATIENT
Start: 2025-07-18 | End: 2025-07-18

## 2025-07-18 RX ORDER — FUROSEMIDE 10 MG/ML
20 INJECTION INTRAMUSCULAR; INTRAVENOUS ONCE
Status: COMPLETED | OUTPATIENT
Start: 2025-07-18 | End: 2025-07-18

## 2025-07-18 RX ORDER — DEXMEDETOMIDINE HYDROCHLORIDE 4 UG/ML
.1-1.5 INJECTION, SOLUTION INTRAVENOUS CONTINUOUS
Status: DISCONTINUED | OUTPATIENT
Start: 2025-07-18 | End: 2025-07-19

## 2025-07-18 RX ORDER — FENTANYL CITRATE 50 UG/ML
100 INJECTION, SOLUTION INTRAMUSCULAR; INTRAVENOUS ONCE
Status: DISCONTINUED | OUTPATIENT
Start: 2025-07-18 | End: 2025-07-19

## 2025-07-18 RX ORDER — LIDOCAINE 4 G/G
1 PATCH TOPICAL DAILY
Status: DISCONTINUED | OUTPATIENT
Start: 2025-07-18 | End: 2025-07-24 | Stop reason: HOSPADM

## 2025-07-18 RX ORDER — HALOPERIDOL 5 MG/ML
1 INJECTION INTRAMUSCULAR ONCE
Status: COMPLETED | OUTPATIENT
Start: 2025-07-18 | End: 2025-07-18

## 2025-07-18 RX ORDER — HALOPERIDOL 5 MG/ML
INJECTION INTRAMUSCULAR
Status: COMPLETED
Start: 2025-07-18 | End: 2025-07-18

## 2025-07-18 RX ORDER — MORPHINE SULFATE 2 MG/ML
1 INJECTION, SOLUTION INTRAMUSCULAR; INTRAVENOUS ONCE
Status: COMPLETED | OUTPATIENT
Start: 2025-07-18 | End: 2025-07-18

## 2025-07-18 RX ADMIN — CALCIUM GLUCONATE 2000 MG: 20 INJECTION, SOLUTION INTRAVENOUS at 21:09

## 2025-07-18 RX ADMIN — Medication 1.4 MCG/KG/HR: at 23:38

## 2025-07-18 RX ADMIN — HALOPERIDOL LACTATE 1 MG: 5 INJECTION, SOLUTION INTRAMUSCULAR at 13:28

## 2025-07-18 RX ADMIN — POTASSIUM CHLORIDE 20 MEQ: 29.8 INJECTION, SOLUTION INTRAVENOUS at 19:23

## 2025-07-18 RX ADMIN — MORPHINE SULFATE 1 MG: 2 INJECTION, SOLUTION INTRAMUSCULAR; INTRAVENOUS at 20:54

## 2025-07-18 RX ADMIN — SODIUM ZIRCONIUM CYCLOSILICATE 5 G: 5 POWDER, FOR SUSPENSION ORAL at 10:02

## 2025-07-18 RX ADMIN — POTASSIUM CHLORIDE 20 MEQ: 29.8 INJECTION, SOLUTION INTRAVENOUS at 20:14

## 2025-07-18 RX ADMIN — ENOXAPARIN SODIUM 40 MG: 100 INJECTION SUBCUTANEOUS at 07:51

## 2025-07-18 RX ADMIN — DEXAMETHASONE 4 MG: 4 TABLET ORAL at 07:40

## 2025-07-18 RX ADMIN — SODIUM CHLORIDE, SODIUM LACTATE, POTASSIUM CHLORIDE, AND CALCIUM CHLORIDE: .6; .31; .03; .02 INJECTION, SOLUTION INTRAVENOUS at 11:31

## 2025-07-18 RX ADMIN — Medication 1.4 MCG/KG/HR: at 17:03

## 2025-07-18 RX ADMIN — SODIUM CHLORIDE, PRESERVATIVE FREE 10 ML: 5 INJECTION INTRAVENOUS at 21:10

## 2025-07-18 RX ADMIN — DOXYCYCLINE 100 MG: 100 INJECTION, POWDER, LYOPHILIZED, FOR SOLUTION INTRAVENOUS at 18:43

## 2025-07-18 RX ADMIN — PANTOPRAZOLE SODIUM 40 MG: 40 INJECTION, POWDER, LYOPHILIZED, FOR SOLUTION INTRAVENOUS at 07:40

## 2025-07-18 RX ADMIN — HALOPERIDOL 1 MG: 5 INJECTION INTRAMUSCULAR at 13:28

## 2025-07-18 RX ADMIN — Medication 0.2 MCG/KG/HR: at 11:06

## 2025-07-18 RX ADMIN — DOXYCYCLINE 100 MG: 100 INJECTION, POWDER, LYOPHILIZED, FOR SOLUTION INTRAVENOUS at 06:45

## 2025-07-18 RX ADMIN — ALBUTEROL SULFATE 5 MG: 2.5 SOLUTION RESPIRATORY (INHALATION) at 13:01

## 2025-07-18 RX ADMIN — SODIUM CHLORIDE, PRESERVATIVE FREE 10 ML: 5 INJECTION INTRAVENOUS at 07:40

## 2025-07-18 RX ADMIN — WATER 2000 MG: 1 INJECTION INTRAMUSCULAR; INTRAVENOUS; SUBCUTANEOUS at 18:45

## 2025-07-18 RX ADMIN — FUROSEMIDE 20 MG: 10 INJECTION, SOLUTION INTRAMUSCULAR; INTRAVENOUS at 07:57

## 2025-07-18 ASSESSMENT — PULMONARY FUNCTION TESTS
PIF_VALUE: 28
PIF_VALUE: 27
PIF_VALUE: 27
PIF_VALUE: 19

## 2025-07-18 NOTE — CARE COORDINATION
Case Management Assessment Initial Evaluation    Date/Time of Evaluation: 7/18/2025 9:30 AM  Assessment Completed by: Sydney Viveros RN    If patient is discharged prior to next notation, then this note serves as note for discharge by case management.    Patient Name: Sandie Carrera                   YOB: 1970  Diagnosis: Cardiac arrest (HCC) [I46.9]  COPD with acute exacerbation (HCC) [J44.1]  Cardiopulmonary arrest with successful resuscitation (HCC) [I46.9]                   Date / Time: 7/17/2025 12:09 PM  Location: [address] [place] [address]     Patient Admission Status: Inpatient   Readmission Risk Low 0-14, Mod 15-19), High > 20: Readmission Risk Score: 21.5    Current PCP: Elijah Liu MD  Health Care Decision Makers:   Primary Decision Maker: PoloMaureen - Parent - [phone]    Secondary Decision Maker: Marianela Carrera - Brother/Sister - [phone]    Secondary Decision Maker: Trudy Cuellar - Brother/Sister - [phone]    Additional Case Management Notes: Pt presented to ED as code blue. EMS had been called d/t difficulty breathing and found him sitting in chair in respiratory distress, able to communicate but incomprehensible speech. When EMS put him on stretcher he stopped following commands, snoring respirations then noted to have no pulse. PEA rhythm noted. Still in PEA upon arrival to ED. Intubated in ED. ROSC was achieved and patient woke after code. Admitted to ICU. Fentanyl drip stopped this morning. Levo weaned off this morning. Echo ordered.     Remains on vent w/ETT on SBT, FIO2 40%, sats 100%. Tmax 99.7. NSR. Follows commands x4. Telemetry, OG to mumtaz HADDAD, SCDs. Precedex @ 0.2 mcg/kg/hr, IVF, diprivan @ 10 mcg/kg/min, prn nebs, IV rocephin daily, po decadron, IV doxycycline Q12H, lovenox, SSI, lidoderm patch, IV protonix, pravastatin, lokelma, inhaler, Electrolyte replacement protocols. Received 20 mg IV lasix x1 and 1 mg IM haldol x1 today. Urine sent for culture. K+ 4.3 - up to

## 2025-07-18 NOTE — PROCEDURES
PROCEDURE NOTE  Date: 7/17/2025   Name: Sandie Carrera  YOB: 1970    Procedures  12 lead EKG completed. Results handed to Rohit DACOSTA.

## 2025-07-19 ENCOUNTER — APPOINTMENT (OUTPATIENT)
Dept: GENERAL RADIOLOGY | Age: 55
DRG: 208 | End: 2025-07-19
Payer: MEDICARE

## 2025-07-19 PROBLEM — J81.0 ACUTE PULMONARY EDEMA (HCC): Status: ACTIVE | Noted: 2025-07-19

## 2025-07-19 LAB
ANION GAP SERPL CALC-SCNC: 13 MEQ/L (ref 8–16)
ANION GAP SERPL CALC-SCNC: 9 MEQ/L (ref 8–16)
BACTERIA UR CULT: ABNORMAL
BASE EXCESS BLDA CALC-SCNC: 0.3 MMOL/L (ref -2.5–2.5)
BASE EXCESS BLDA CALC-SCNC: 2.6 MMOL/L (ref -2–3)
BDY SITE: ABNORMAL
BUN SERPL-MCNC: 29 MG/DL (ref 8–23)
BUN SERPL-MCNC: 31 MG/DL (ref 8–23)
CA-I BLD ISE-SCNC: 1.17 MMOL/L (ref 1.12–1.32)
CALCIUM SERPL-MCNC: 8.6 MG/DL (ref 8.6–10)
CALCIUM SERPL-MCNC: 8.7 MG/DL (ref 8.6–10)
CHLORIDE SERPL-SCNC: 100 MEQ/L (ref 98–111)
CHLORIDE SERPL-SCNC: 101 MEQ/L (ref 98–111)
CO2 SERPL-SCNC: 27 MEQ/L (ref 22–29)
CO2 SERPL-SCNC: 30 MEQ/L (ref 22–29)
COLLECTED BY:: ABNORMAL
COLLECTED BY:: ABNORMAL
CREAT SERPL-MCNC: 1.4 MG/DL (ref 0.7–1.2)
CREAT SERPL-MCNC: 1.5 MG/DL (ref 0.7–1.2)
DEPRECATED RDW RBC AUTO: 53.4 FL (ref 35–45)
DEVICE: ABNORMAL
DEVICE: ABNORMAL
ERYTHROCYTE [DISTWIDTH] IN BLOOD BY AUTOMATED COUNT: 14.8 % (ref 11.5–14.5)
FIO2 ON VENT O2 ANALYZER: 50 %
FIO2 ON VENT O2 ANALYZER: 6 %
GFR SERPL CREATININE-BSD FRML MDRD: 54 ML/MIN/1.73M2
GFR SERPL CREATININE-BSD FRML MDRD: 59 ML/MIN/1.73M2
GLUCOSE BLD STRIP.AUTO-MCNC: 103 MG/DL (ref 70–108)
GLUCOSE BLD STRIP.AUTO-MCNC: 145 MG/DL (ref 70–108)
GLUCOSE BLD STRIP.AUTO-MCNC: 84 MG/DL (ref 70–108)
GLUCOSE SERPL-MCNC: 100 MG/DL (ref 74–109)
GLUCOSE SERPL-MCNC: 102 MG/DL (ref 74–109)
HCO3 BLDA-SCNC: 28 MMOL/L (ref 23–28)
HCO3 BLDA-SCNC: 31 MMOL/L (ref 23–28)
HCT VFR BLD AUTO: 50.1 % (ref 42–52)
HGB BLD-MCNC: 15.1 GM/DL (ref 14–18)
LACTATE SERPL-SCNC: 2.7 MMOL/L (ref 0.5–2.2)
LACTATE SERPL-SCNC: 2.7 MMOL/L (ref 0.5–2.2)
MAGNESIUM SERPL-MCNC: 1.9 MG/DL (ref 1.6–2.6)
MCH RBC QN AUTO: 29.4 PG (ref 26–33)
MCHC RBC AUTO-ENTMCNC: 30.1 GM/DL (ref 32.2–35.5)
MCV RBC AUTO: 97.7 FL (ref 80–94)
MRSA SPEC QL CULT: NORMAL
ORGANISM: ABNORMAL
PCO2 BLDA: 55 MMHG (ref 35–45)
PCO2 TEMP ADJ BLDMV: 63 MMHG (ref 41–51)
PEEP SETTING VENT: 10 MMHG
PH BLDA: 7.31 [PH] (ref 7.35–7.45)
PH BLDMV: 7.3 [PH] (ref 7.31–7.41)
PHOSPHATE SERPL-MCNC: 4.4 MG/DL (ref 2.5–4.5)
PIP: 28 CMH2O
PLATELET # BLD AUTO: 163 THOU/MM3 (ref 130–400)
PMV BLD AUTO: 9.8 FL (ref 9.4–12.4)
PO2 BLDA: 77 MMHG (ref 71–104)
PO2 BLDMV: 29 MMHG (ref 25–40)
POTASSIUM SERPL-SCNC: 5.5 MEQ/L (ref 3.5–5.2)
POTASSIUM SERPL-SCNC: 5.9 MEQ/L (ref 3.5–5.2)
POTASSIUM SERPL-SCNC: 5.9 MEQ/L (ref 3.5–5.2)
RBC # BLD AUTO: 5.13 MILL/MM3 (ref 4.7–6.1)
SAO2 % BLDA: 94 %
SAO2 % BLDMV: 46 %
SITE: ABNORMAL
SODIUM SERPL-SCNC: 139 MEQ/L (ref 135–145)
SODIUM SERPL-SCNC: 141 MEQ/L (ref 135–145)
VENTILATION MODE VENT: ABNORMAL
VENTILATION MODE VENT: ABNORMAL
WBC # BLD AUTO: 6.3 THOU/MM3 (ref 4.8–10.8)

## 2025-07-19 PROCEDURE — 2580000003 HC RX 258: Performed by: STUDENT IN AN ORGANIZED HEALTH CARE EDUCATION/TRAINING PROGRAM

## 2025-07-19 PROCEDURE — 6360000002 HC RX W HCPCS

## 2025-07-19 PROCEDURE — 6360000002 HC RX W HCPCS: Performed by: INTERNAL MEDICINE

## 2025-07-19 PROCEDURE — 2580000003 HC RX 258: Performed by: INTERNAL MEDICINE

## 2025-07-19 PROCEDURE — 6370000000 HC RX 637 (ALT 250 FOR IP)

## 2025-07-19 PROCEDURE — 2580000003 HC RX 258

## 2025-07-19 PROCEDURE — 36600 WITHDRAWAL OF ARTERIAL BLOOD: CPT

## 2025-07-19 PROCEDURE — 84132 ASSAY OF SERUM POTASSIUM: CPT

## 2025-07-19 PROCEDURE — 82948 REAGENT STRIP/BLOOD GLUCOSE: CPT

## 2025-07-19 PROCEDURE — 94660 CPAP INITIATION&MGMT: CPT

## 2025-07-19 PROCEDURE — 2700000000 HC OXYGEN THERAPY PER DAY

## 2025-07-19 PROCEDURE — 83605 ASSAY OF LACTIC ACID: CPT

## 2025-07-19 PROCEDURE — 82803 BLOOD GASES ANY COMBINATION: CPT

## 2025-07-19 PROCEDURE — 94761 N-INVAS EAR/PLS OXIMETRY MLT: CPT

## 2025-07-19 PROCEDURE — 80048 BASIC METABOLIC PNL TOTAL CA: CPT

## 2025-07-19 PROCEDURE — 51798 US URINE CAPACITY MEASURE: CPT

## 2025-07-19 PROCEDURE — 83735 ASSAY OF MAGNESIUM: CPT

## 2025-07-19 PROCEDURE — 2500000003 HC RX 250 WO HCPCS

## 2025-07-19 PROCEDURE — 94640 AIRWAY INHALATION TREATMENT: CPT

## 2025-07-19 PROCEDURE — 85027 COMPLETE CBC AUTOMATED: CPT

## 2025-07-19 PROCEDURE — 82330 ASSAY OF CALCIUM: CPT

## 2025-07-19 PROCEDURE — P9047 ALBUMIN (HUMAN), 25%, 50ML: HCPCS | Performed by: INTERNAL MEDICINE

## 2025-07-19 PROCEDURE — 84100 ASSAY OF PHOSPHORUS: CPT

## 2025-07-19 PROCEDURE — 2000000000 HC ICU R&B

## 2025-07-19 PROCEDURE — 36415 COLL VENOUS BLD VENIPUNCTURE: CPT

## 2025-07-19 PROCEDURE — 71045 X-RAY EXAM CHEST 1 VIEW: CPT

## 2025-07-19 RX ORDER — ALBUMIN (HUMAN) 12.5 G/50ML
25 SOLUTION INTRAVENOUS
Status: COMPLETED | OUTPATIENT
Start: 2025-07-19 | End: 2025-07-19

## 2025-07-19 RX ORDER — IPRATROPIUM BROMIDE AND ALBUTEROL SULFATE 2.5; .5 MG/3ML; MG/3ML
1 SOLUTION RESPIRATORY (INHALATION)
Status: DISCONTINUED | OUTPATIENT
Start: 2025-07-19 | End: 2025-07-19 | Stop reason: SDUPTHER

## 2025-07-19 RX ORDER — HALOPERIDOL 5 MG/ML
5 INJECTION INTRAMUSCULAR EVERY 4 HOURS PRN
Status: DISCONTINUED | OUTPATIENT
Start: 2025-07-19 | End: 2025-07-24 | Stop reason: HOSPADM

## 2025-07-19 RX ORDER — ARFORMOTEROL TARTRATE 15 UG/2ML
15 SOLUTION RESPIRATORY (INHALATION)
Status: DISCONTINUED | OUTPATIENT
Start: 2025-07-19 | End: 2025-07-21

## 2025-07-19 RX ORDER — BUDESONIDE 0.5 MG/2ML
0.5 INHALANT ORAL
Status: DISCONTINUED | OUTPATIENT
Start: 2025-07-19 | End: 2025-07-24 | Stop reason: HOSPADM

## 2025-07-19 RX ORDER — BUMETANIDE 0.25 MG/ML
2 INJECTION, SOLUTION INTRAMUSCULAR; INTRAVENOUS ONCE
Status: COMPLETED | OUTPATIENT
Start: 2025-07-19 | End: 2025-07-19

## 2025-07-19 RX ORDER — SODIUM CHLORIDE, SODIUM LACTATE, POTASSIUM CHLORIDE, AND CALCIUM CHLORIDE .6; .31; .03; .02 G/100ML; G/100ML; G/100ML; G/100ML
1000 INJECTION, SOLUTION INTRAVENOUS ONCE
Status: COMPLETED | OUTPATIENT
Start: 2025-07-19 | End: 2025-07-19

## 2025-07-19 RX ORDER — ALBUTEROL SULFATE 5 MG/ML
2.5 SOLUTION RESPIRATORY (INHALATION)
Status: DISCONTINUED | OUTPATIENT
Start: 2025-07-19 | End: 2025-07-24 | Stop reason: HOSPADM

## 2025-07-19 RX ADMIN — CHLOROTHIAZIDE SODIUM 1000 MG: 500 INJECTION, POWDER, LYOPHILIZED, FOR SOLUTION INTRAVENOUS at 12:20

## 2025-07-19 RX ADMIN — SODIUM CHLORIDE, SODIUM LACTATE, POTASSIUM CHLORIDE, AND CALCIUM CHLORIDE: .6; .31; .03; .02 INJECTION, SOLUTION INTRAVENOUS at 04:31

## 2025-07-19 RX ADMIN — BUMETANIDE 2 MG: 0.25 INJECTION INTRAMUSCULAR; INTRAVENOUS at 12:00

## 2025-07-19 RX ADMIN — ALBUTEROL SULFATE 2.5 MG: 2.5 SOLUTION RESPIRATORY (INHALATION) at 15:46

## 2025-07-19 RX ADMIN — SODIUM CHLORIDE, SODIUM LACTATE, POTASSIUM CHLORIDE, AND CALCIUM CHLORIDE 1000 ML: .6; .31; .03; .02 INJECTION, SOLUTION INTRAVENOUS at 02:37

## 2025-07-19 RX ADMIN — WATER 2000 MG: 1 INJECTION INTRAMUSCULAR; INTRAVENOUS; SUBCUTANEOUS at 20:19

## 2025-07-19 RX ADMIN — Medication 1.2 MCG/KG/HR: at 07:12

## 2025-07-19 RX ADMIN — ALBUTEROL SULFATE 2.5 MG: 2.5 SOLUTION RESPIRATORY (INHALATION) at 20:15

## 2025-07-19 RX ADMIN — ARFORMOTEROL TARTRATE 15 MCG: 15 SOLUTION RESPIRATORY (INHALATION) at 20:13

## 2025-07-19 RX ADMIN — PANTOPRAZOLE SODIUM 40 MG: 40 INJECTION, POWDER, LYOPHILIZED, FOR SOLUTION INTRAVENOUS at 10:51

## 2025-07-19 RX ADMIN — ENOXAPARIN SODIUM 40 MG: 100 INJECTION SUBCUTANEOUS at 09:30

## 2025-07-19 RX ADMIN — ALBUTEROL SULFATE 2.5 MG: 2.5 SOLUTION RESPIRATORY (INHALATION) at 13:12

## 2025-07-19 RX ADMIN — ALBUMIN (HUMAN) 25 G: 0.25 INJECTION, SOLUTION INTRAVENOUS at 13:09

## 2025-07-19 RX ADMIN — Medication 1 MCG/KG/HR: at 03:10

## 2025-07-19 RX ADMIN — ALBUTEROL SULFATE 5 MG: 2.5 SOLUTION RESPIRATORY (INHALATION) at 00:39

## 2025-07-19 RX ADMIN — HALOPERIDOL LACTATE 5 MG: 5 INJECTION, SOLUTION INTRAMUSCULAR at 23:13

## 2025-07-19 RX ADMIN — SODIUM CHLORIDE, SODIUM LACTATE, POTASSIUM CHLORIDE, AND CALCIUM CHLORIDE: .6; .31; .03; .02 INJECTION, SOLUTION INTRAVENOUS at 00:55

## 2025-07-19 RX ADMIN — Medication 1.2 MCG/KG/HR: at 10:50

## 2025-07-19 RX ADMIN — TIOTROPIUM BROMIDE AND OLODATEROL 2 PUFF: 3.124; 2.736 SPRAY, METERED RESPIRATORY (INHALATION) at 09:20

## 2025-07-19 RX ADMIN — DOXYCYCLINE 100 MG: 100 INJECTION, POWDER, LYOPHILIZED, FOR SOLUTION INTRAVENOUS at 07:53

## 2025-07-19 RX ADMIN — ALBUMIN (HUMAN) 25 G: 0.25 INJECTION, SOLUTION INTRAVENOUS at 12:18

## 2025-07-19 RX ADMIN — SODIUM CHLORIDE, PRESERVATIVE FREE 10 ML: 5 INJECTION INTRAVENOUS at 09:27

## 2025-07-19 RX ADMIN — BUDESONIDE 500 MCG: 0.5 INHALANT RESPIRATORY (INHALATION) at 20:14

## 2025-07-19 RX ADMIN — FLUTICASONE PROPIONATE 2 SPRAY: 50 SPRAY, METERED NASAL at 20:29

## 2025-07-19 RX ADMIN — DOXYCYCLINE 100 MG: 100 INJECTION, POWDER, LYOPHILIZED, FOR SOLUTION INTRAVENOUS at 20:28

## 2025-07-19 RX ADMIN — SODIUM CHLORIDE, PRESERVATIVE FREE 10 ML: 5 INJECTION INTRAVENOUS at 20:18

## 2025-07-19 ASSESSMENT — PAIN SCALES - GENERAL: PAINLEVEL_OUTOF10: 0

## 2025-07-20 ENCOUNTER — APPOINTMENT (OUTPATIENT)
Dept: GENERAL RADIOLOGY | Age: 55
DRG: 208 | End: 2025-07-20
Payer: MEDICARE

## 2025-07-20 LAB
ALBUMIN SERPL BCG-MCNC: 3.4 G/DL (ref 3.4–4.9)
ALP SERPL-CCNC: 51 U/L (ref 40–129)
ALT SERPL W/O P-5'-P-CCNC: 18 U/L (ref 10–50)
ANION GAP SERPL CALC-SCNC: 13 MEQ/L (ref 8–16)
AST SERPL-CCNC: 17 U/L (ref 10–50)
BASE EXCESS BLDA CALC-SCNC: 9.5 MMOL/L (ref -2–3)
BILIRUB CONJ SERPL-MCNC: 0.2 MG/DL (ref 0–0.2)
BILIRUB SERPL-MCNC: 0.4 MG/DL (ref 0.3–1.2)
BUN SERPL-MCNC: 33 MG/DL (ref 8–23)
CALCIUM SERPL-MCNC: 9.3 MG/DL (ref 8.6–10)
CHLORIDE SERPL-SCNC: 100 MEQ/L (ref 98–111)
CO2 SERPL-SCNC: 28 MEQ/L (ref 22–29)
COLLECTED BY:: ABNORMAL
CREAT SERPL-MCNC: 1.1 MG/DL (ref 0.7–1.2)
DEPRECATED RDW RBC AUTO: 51.8 FL (ref 35–45)
DEVICE: ABNORMAL
ERYTHROCYTE [DISTWIDTH] IN BLOOD BY AUTOMATED COUNT: 15.1 % (ref 11.5–14.5)
FIO2 ON VENT O2 ANALYZER: 5 %
GFR SERPL CREATININE-BSD FRML MDRD: 78 ML/MIN/1.73M2
GLUCOSE BLD STRIP.AUTO-MCNC: 100 MG/DL (ref 70–108)
GLUCOSE BLD STRIP.AUTO-MCNC: 113 MG/DL (ref 70–108)
GLUCOSE BLD STRIP.AUTO-MCNC: 123 MG/DL (ref 70–108)
GLUCOSE BLD STRIP.AUTO-MCNC: 73 MG/DL (ref 70–108)
GLUCOSE BLD STRIP.AUTO-MCNC: 76 MG/DL (ref 70–108)
GLUCOSE SERPL-MCNC: 83 MG/DL (ref 74–109)
HCO3 BLDA-SCNC: 38 MMOL/L (ref 23–28)
HCT VFR BLD AUTO: 44 % (ref 42–52)
HGB BLD-MCNC: 13.6 GM/DL (ref 14–18)
MCH RBC QN AUTO: 29.2 PG (ref 26–33)
MCHC RBC AUTO-ENTMCNC: 30.9 GM/DL (ref 32.2–35.5)
MCV RBC AUTO: 94.4 FL (ref 80–94)
PCO2 TEMP ADJ BLDMV: 63 MMHG (ref 41–51)
PH BLDMV: 7.39 [PH] (ref 7.31–7.41)
PLATELET # BLD AUTO: 159 THOU/MM3 (ref 130–400)
PMV BLD AUTO: 11 FL (ref 9.4–12.4)
PO2 BLDMV: 33 MMHG (ref 25–40)
POTASSIUM SERPL-SCNC: 4.4 MEQ/L (ref 3.5–5.2)
PROT SERPL-MCNC: 6 G/DL (ref 6.4–8.3)
RBC # BLD AUTO: 4.66 MILL/MM3 (ref 4.7–6.1)
SAO2 % BLDMV: 60 %
SITE: ABNORMAL
SODIUM SERPL-SCNC: 141 MEQ/L (ref 135–145)
VENTILATION MODE VENT: ABNORMAL
WBC # BLD AUTO: 7 THOU/MM3 (ref 4.8–10.8)

## 2025-07-20 PROCEDURE — 2500000003 HC RX 250 WO HCPCS

## 2025-07-20 PROCEDURE — 2140000000 HC CCU INTERMEDIATE R&B

## 2025-07-20 PROCEDURE — 6370000000 HC RX 637 (ALT 250 FOR IP): Performed by: INTERNAL MEDICINE

## 2025-07-20 PROCEDURE — 94660 CPAP INITIATION&MGMT: CPT

## 2025-07-20 PROCEDURE — 2580000003 HC RX 258

## 2025-07-20 PROCEDURE — 6360000002 HC RX W HCPCS: Performed by: INTERNAL MEDICINE

## 2025-07-20 PROCEDURE — 71045 X-RAY EXAM CHEST 1 VIEW: CPT

## 2025-07-20 PROCEDURE — 6370000000 HC RX 637 (ALT 250 FOR IP)

## 2025-07-20 PROCEDURE — 80053 COMPREHEN METABOLIC PANEL: CPT

## 2025-07-20 PROCEDURE — 82248 BILIRUBIN DIRECT: CPT

## 2025-07-20 PROCEDURE — 2580000003 HC RX 258: Performed by: INTERNAL MEDICINE

## 2025-07-20 PROCEDURE — 5A0945A ASSISTANCE WITH RESPIRATORY VENTILATION, 24-96 CONSECUTIVE HOURS, HIGH NASAL FLOW/VELOCITY: ICD-10-PCS | Performed by: INTERNAL MEDICINE

## 2025-07-20 PROCEDURE — 94761 N-INVAS EAR/PLS OXIMETRY MLT: CPT

## 2025-07-20 PROCEDURE — 85027 COMPLETE CBC AUTOMATED: CPT

## 2025-07-20 PROCEDURE — 94640 AIRWAY INHALATION TREATMENT: CPT

## 2025-07-20 PROCEDURE — 6360000002 HC RX W HCPCS

## 2025-07-20 PROCEDURE — 36415 COLL VENOUS BLD VENIPUNCTURE: CPT

## 2025-07-20 PROCEDURE — 82948 REAGENT STRIP/BLOOD GLUCOSE: CPT

## 2025-07-20 PROCEDURE — 2700000000 HC OXYGEN THERAPY PER DAY

## 2025-07-20 PROCEDURE — 82803 BLOOD GASES ANY COMBINATION: CPT

## 2025-07-20 RX ORDER — BUPROPION HYDROCHLORIDE 150 MG/1
150 TABLET ORAL EVERY MORNING
Status: DISCONTINUED | OUTPATIENT
Start: 2025-07-21 | End: 2025-07-24 | Stop reason: HOSPADM

## 2025-07-20 RX ORDER — SENNOSIDES 8.6 MG
325 CAPSULE ORAL DAILY
Status: DISCONTINUED | OUTPATIENT
Start: 2025-07-20 | End: 2025-07-24 | Stop reason: HOSPADM

## 2025-07-20 RX ORDER — FUROSEMIDE 10 MG/ML
20 INJECTION INTRAMUSCULAR; INTRAVENOUS DAILY
Status: DISCONTINUED | OUTPATIENT
Start: 2025-07-20 | End: 2025-07-24 | Stop reason: HOSPADM

## 2025-07-20 RX ORDER — BUMETANIDE 0.25 MG/ML
2 INJECTION, SOLUTION INTRAMUSCULAR; INTRAVENOUS ONCE
Status: COMPLETED | OUTPATIENT
Start: 2025-07-20 | End: 2025-07-20

## 2025-07-20 RX ORDER — RISPERIDONE 1 MG/1
2 TABLET ORAL 2 TIMES DAILY
Status: DISCONTINUED | OUTPATIENT
Start: 2025-07-20 | End: 2025-07-24 | Stop reason: HOSPADM

## 2025-07-20 RX ADMIN — RISPERIDONE 2 MG: 1 TABLET, FILM COATED ORAL at 15:21

## 2025-07-20 RX ADMIN — ALBUTEROL SULFATE 2.5 MG: 2.5 SOLUTION RESPIRATORY (INHALATION) at 09:06

## 2025-07-20 RX ADMIN — SODIUM CHLORIDE 20 ML: 0.9 INJECTION, SOLUTION INTRAVENOUS at 10:10

## 2025-07-20 RX ADMIN — BUMETANIDE 2 MG: 0.25 INJECTION INTRAMUSCULAR; INTRAVENOUS at 09:40

## 2025-07-20 RX ADMIN — BUDESONIDE 500 MCG: 0.5 INHALANT RESPIRATORY (INHALATION) at 21:24

## 2025-07-20 RX ADMIN — SODIUM CHLORIDE, PRESERVATIVE FREE 10 ML: 5 INJECTION INTRAVENOUS at 21:34

## 2025-07-20 RX ADMIN — ALBUTEROL SULFATE 5 MG: 2.5 SOLUTION RESPIRATORY (INHALATION) at 12:17

## 2025-07-20 RX ADMIN — CHLOROTHIAZIDE SODIUM 1000 MG: 500 INJECTION, POWDER, LYOPHILIZED, FOR SOLUTION INTRAVENOUS at 10:20

## 2025-07-20 RX ADMIN — PANTOPRAZOLE SODIUM 40 MG: 40 INJECTION, POWDER, LYOPHILIZED, FOR SOLUTION INTRAVENOUS at 09:40

## 2025-07-20 RX ADMIN — FUROSEMIDE 20 MG: 10 INJECTION, SOLUTION INTRAMUSCULAR; INTRAVENOUS at 15:22

## 2025-07-20 RX ADMIN — ASPIRIN 325 MG: 325 TABLET, COATED ORAL at 15:21

## 2025-07-20 RX ADMIN — ARFORMOTEROL TARTRATE 15 MCG: 15 SOLUTION RESPIRATORY (INHALATION) at 21:14

## 2025-07-20 RX ADMIN — ENOXAPARIN SODIUM 40 MG: 100 INJECTION SUBCUTANEOUS at 09:40

## 2025-07-20 RX ADMIN — DOXYCYCLINE 100 MG: 100 INJECTION, POWDER, LYOPHILIZED, FOR SOLUTION INTRAVENOUS at 18:44

## 2025-07-20 RX ADMIN — ARFORMOTEROL TARTRATE 15 MCG: 15 SOLUTION RESPIRATORY (INHALATION) at 09:05

## 2025-07-20 RX ADMIN — ALBUTEROL SULFATE 2.5 MG: 2.5 SOLUTION RESPIRATORY (INHALATION) at 21:15

## 2025-07-20 RX ADMIN — SODIUM CHLORIDE, PRESERVATIVE FREE 10 ML: 5 INJECTION INTRAVENOUS at 09:41

## 2025-07-20 RX ADMIN — PRAVASTATIN SODIUM 40 MG: 40 TABLET ORAL at 21:03

## 2025-07-20 RX ADMIN — ACETAMINOPHEN 650 MG: 325 TABLET ORAL at 21:35

## 2025-07-20 RX ADMIN — WATER 2000 MG: 1 INJECTION INTRAMUSCULAR; INTRAVENOUS; SUBCUTANEOUS at 18:35

## 2025-07-20 RX ADMIN — DOXYCYCLINE 100 MG: 100 INJECTION, POWDER, LYOPHILIZED, FOR SOLUTION INTRAVENOUS at 07:04

## 2025-07-20 RX ADMIN — BUDESONIDE 500 MCG: 0.5 INHALANT RESPIRATORY (INHALATION) at 09:05

## 2025-07-20 RX ADMIN — RISPERIDONE 2 MG: 1 TABLET, FILM COATED ORAL at 21:03

## 2025-07-20 RX ADMIN — ALBUTEROL SULFATE 5 MG: 2.5 SOLUTION RESPIRATORY (INHALATION) at 15:33

## 2025-07-20 RX ADMIN — FLUTICASONE PROPIONATE 2 SPRAY: 50 SPRAY, METERED NASAL at 21:03

## 2025-07-20 RX ADMIN — TIOTROPIUM BROMIDE INHALATION SPRAY 5 MCG: 3.12 SPRAY, METERED RESPIRATORY (INHALATION) at 09:09

## 2025-07-20 NOTE — SIGNIFICANT EVENT
Pt stable condition. Hand -off given to Dr Rooney who is admitting for Hospitalist. Pt going to bed 3B37.    Electronically signed by Corrie Mac MD on 7/20/25 at 8:46 AM EDT

## 2025-07-21 ENCOUNTER — APPOINTMENT (OUTPATIENT)
Dept: GENERAL RADIOLOGY | Age: 55
DRG: 208 | End: 2025-07-21
Payer: MEDICARE

## 2025-07-21 PROBLEM — I42.9 CARDIOMYOPATHY (HCC): Status: ACTIVE | Noted: 2025-07-21

## 2025-07-21 PROBLEM — I50.41 ACUTE COMBINED SYSTOLIC AND DIASTOLIC CONGESTIVE HEART FAILURE (HCC): Status: ACTIVE | Noted: 2025-07-21

## 2025-07-21 LAB
ANION GAP SERPL CALC-SCNC: 13 MEQ/L (ref 8–16)
ARTERIAL PATENCY WRIST A: POSITIVE
BASE EXCESS BLDA CALC-SCNC: 12.4 MMOL/L (ref -2–3)
BASE EXCESS BLDA CALC-SCNC: 14.2 MMOL/L (ref -2.5–2.5)
BDY SITE: ABNORMAL
BREATHS SETTING VENT: 18 BPM
BUN SERPL-MCNC: 20 MG/DL (ref 8–23)
CALCIUM SERPL-MCNC: 8.5 MG/DL (ref 8.6–10)
CHLORIDE SERPL-SCNC: 94 MEQ/L (ref 98–111)
CO2 SERPL-SCNC: 34 MEQ/L (ref 22–29)
COLLECTED BY:: ABNORMAL
COLLECTED BY:: ABNORMAL
CREAT SERPL-MCNC: 0.8 MG/DL (ref 0.7–1.2)
DEPRECATED RDW RBC AUTO: 50 FL (ref 35–45)
DEVICE: ABNORMAL
DEVICE: ABNORMAL
EKG ATRIAL RATE: 86 BPM
EKG P AXIS: 69 DEGREES
EKG P-R INTERVAL: 118 MS
EKG Q-T INTERVAL: 358 MS
EKG QRS DURATION: 80 MS
EKG QTC CALCULATION (BAZETT): 428 MS
EKG R AXIS: 107 DEGREES
EKG T AXIS: 37 DEGREES
EKG VENTRICULAR RATE: 86 BPM
ERYTHROCYTE [DISTWIDTH] IN BLOOD BY AUTOMATED COUNT: 14.7 % (ref 11.5–14.5)
FIO2 ON VENT O2 ANALYZER: 60 %
GFR SERPL CREATININE-BSD FRML MDRD: > 90 ML/MIN/1.73M2
GLUCOSE BLD STRIP.AUTO-MCNC: 120 MG/DL (ref 70–108)
GLUCOSE BLD STRIP.AUTO-MCNC: 140 MG/DL (ref 70–108)
GLUCOSE BLD STRIP.AUTO-MCNC: 76 MG/DL (ref 70–108)
GLUCOSE BLD STRIP.AUTO-MCNC: 99 MG/DL (ref 70–108)
GLUCOSE SERPL-MCNC: 81 MG/DL (ref 74–109)
HCO3 BLDA-SCNC: 40 MMOL/L (ref 23–28)
HCO3 BLDA-SCNC: 43 MMOL/L (ref 23–28)
HCT VFR BLD AUTO: 43.4 % (ref 42–52)
HGB BLD-MCNC: 13.8 GM/DL (ref 14–18)
MCH RBC QN AUTO: 29.2 PG (ref 26–33)
MCHC RBC AUTO-ENTMCNC: 31.8 GM/DL (ref 32.2–35.5)
MCV RBC AUTO: 91.9 FL (ref 80–94)
NT-PROBNP SERPL IA-MCNC: 2228 PG/ML (ref 0–124)
PCO2 BLDA: 72 MMHG (ref 35–45)
PCO2 TEMP ADJ BLDMV: 65 MMHG (ref 41–51)
PEEP SETTING VENT: 6 MMHG
PH BLDA: 7.39 [PH] (ref 7.35–7.45)
PH BLDMV: 7.4 [PH] (ref 7.31–7.41)
PLATELET # BLD AUTO: 142 THOU/MM3 (ref 130–400)
PMV BLD AUTO: 10.8 FL (ref 9.4–12.4)
PO2 BLDA: 71 MMHG (ref 71–104)
PO2 BLDMV: 87 MMHG (ref 25–40)
POTASSIUM SERPL-SCNC: 3.9 MEQ/L (ref 3.5–5.2)
PROCALCITONIN SERPL IA-MCNC: 0.15 NG/ML (ref 0.01–0.09)
RBC # BLD AUTO: 4.72 MILL/MM3 (ref 4.7–6.1)
SAO2 % BLDA: 93 %
SAO2 % BLDMV: 96 %
SITE: ABNORMAL
SODIUM SERPL-SCNC: 141 MEQ/L (ref 135–145)
VENTILATION MODE VENT: ABNORMAL
VENTILATION MODE VENT: ABNORMAL
VT SETTING VENT: 480 ML
WBC # BLD AUTO: 7.5 THOU/MM3 (ref 4.8–10.8)

## 2025-07-21 PROCEDURE — 6360000002 HC RX W HCPCS: Performed by: INTERNAL MEDICINE

## 2025-07-21 PROCEDURE — 97530 THERAPEUTIC ACTIVITIES: CPT

## 2025-07-21 PROCEDURE — 99223 1ST HOSP IP/OBS HIGH 75: CPT | Performed by: INTERNAL MEDICINE

## 2025-07-21 PROCEDURE — 6370000000 HC RX 637 (ALT 250 FOR IP): Performed by: INTERNAL MEDICINE

## 2025-07-21 PROCEDURE — 84145 PROCALCITONIN (PCT): CPT

## 2025-07-21 PROCEDURE — 2140000000 HC CCU INTERMEDIATE R&B

## 2025-07-21 PROCEDURE — 2500000003 HC RX 250 WO HCPCS

## 2025-07-21 PROCEDURE — 6370000000 HC RX 637 (ALT 250 FOR IP)

## 2025-07-21 PROCEDURE — 2700000000 HC OXYGEN THERAPY PER DAY

## 2025-07-21 PROCEDURE — 82948 REAGENT STRIP/BLOOD GLUCOSE: CPT

## 2025-07-21 PROCEDURE — 97162 PT EVAL MOD COMPLEX 30 MIN: CPT

## 2025-07-21 PROCEDURE — 94660 CPAP INITIATION&MGMT: CPT

## 2025-07-21 PROCEDURE — 82803 BLOOD GASES ANY COMBINATION: CPT

## 2025-07-21 PROCEDURE — 6360000002 HC RX W HCPCS

## 2025-07-21 PROCEDURE — 2580000003 HC RX 258

## 2025-07-21 PROCEDURE — 97166 OT EVAL MOD COMPLEX 45 MIN: CPT

## 2025-07-21 PROCEDURE — 93005 ELECTROCARDIOGRAM TRACING: CPT | Performed by: INTERNAL MEDICINE

## 2025-07-21 PROCEDURE — 36415 COLL VENOUS BLD VENIPUNCTURE: CPT

## 2025-07-21 PROCEDURE — 83880 ASSAY OF NATRIURETIC PEPTIDE: CPT

## 2025-07-21 PROCEDURE — 71045 X-RAY EXAM CHEST 1 VIEW: CPT

## 2025-07-21 PROCEDURE — 97535 SELF CARE MNGMENT TRAINING: CPT

## 2025-07-21 PROCEDURE — 93010 ELECTROCARDIOGRAM REPORT: CPT | Performed by: INTERNAL MEDICINE

## 2025-07-21 PROCEDURE — 94640 AIRWAY INHALATION TREATMENT: CPT

## 2025-07-21 PROCEDURE — 80048 BASIC METABOLIC PNL TOTAL CA: CPT

## 2025-07-21 PROCEDURE — 85027 COMPLETE CBC AUTOMATED: CPT

## 2025-07-21 PROCEDURE — 37799 UNLISTED PX VASCULAR SURGERY: CPT

## 2025-07-21 PROCEDURE — 94761 N-INVAS EAR/PLS OXIMETRY MLT: CPT

## 2025-07-21 RX ORDER — METOPROLOL SUCCINATE 25 MG/1
12.5 TABLET, EXTENDED RELEASE ORAL DAILY
Status: DISCONTINUED | OUTPATIENT
Start: 2025-07-21 | End: 2025-07-24 | Stop reason: HOSPADM

## 2025-07-21 RX ORDER — PANTOPRAZOLE SODIUM 40 MG/1
40 TABLET, DELAYED RELEASE ORAL
Status: DISCONTINUED | OUTPATIENT
Start: 2025-07-22 | End: 2025-07-24 | Stop reason: HOSPADM

## 2025-07-21 RX ADMIN — SODIUM CHLORIDE, PRESERVATIVE FREE 10 ML: 5 INJECTION INTRAVENOUS at 17:01

## 2025-07-21 RX ADMIN — BUPROPION HYDROCHLORIDE 150 MG: 150 TABLET, EXTENDED RELEASE ORAL at 10:30

## 2025-07-21 RX ADMIN — BUDESONIDE 500 MCG: 0.5 INHALANT RESPIRATORY (INHALATION) at 07:58

## 2025-07-21 RX ADMIN — RISPERIDONE 2 MG: 1 TABLET, FILM COATED ORAL at 10:30

## 2025-07-21 RX ADMIN — DOXYCYCLINE 100 MG: 100 INJECTION, POWDER, LYOPHILIZED, FOR SOLUTION INTRAVENOUS at 10:39

## 2025-07-21 RX ADMIN — ASPIRIN 325 MG: 325 TABLET, COATED ORAL at 10:30

## 2025-07-21 RX ADMIN — ALBUTEROL SULFATE 2.5 MG: 2.5 SOLUTION RESPIRATORY (INHALATION) at 16:02

## 2025-07-21 RX ADMIN — SODIUM CHLORIDE, PRESERVATIVE FREE 10 ML: 5 INJECTION INTRAVENOUS at 21:23

## 2025-07-21 RX ADMIN — DOXYCYCLINE 100 MG: 100 INJECTION, POWDER, LYOPHILIZED, FOR SOLUTION INTRAVENOUS at 18:17

## 2025-07-21 RX ADMIN — FUROSEMIDE 20 MG: 10 INJECTION, SOLUTION INTRAMUSCULAR; INTRAVENOUS at 10:36

## 2025-07-21 RX ADMIN — PANTOPRAZOLE SODIUM 40 MG: 40 INJECTION, POWDER, LYOPHILIZED, FOR SOLUTION INTRAVENOUS at 10:34

## 2025-07-21 RX ADMIN — RISPERIDONE 2 MG: 1 TABLET, FILM COATED ORAL at 21:22

## 2025-07-21 RX ADMIN — BUDESONIDE 500 MCG: 0.5 INHALANT RESPIRATORY (INHALATION) at 19:48

## 2025-07-21 RX ADMIN — ALBUTEROL SULFATE 2.5 MG: 2.5 SOLUTION RESPIRATORY (INHALATION) at 11:52

## 2025-07-21 RX ADMIN — ALBUTEROL SULFATE 5 MG: 2.5 SOLUTION RESPIRATORY (INHALATION) at 07:58

## 2025-07-21 RX ADMIN — ALBUTEROL SULFATE 2.5 MG: 2.5 SOLUTION RESPIRATORY (INHALATION) at 19:49

## 2025-07-21 RX ADMIN — WATER 2000 MG: 1 INJECTION INTRAMUSCULAR; INTRAVENOUS; SUBCUTANEOUS at 18:08

## 2025-07-21 RX ADMIN — ENOXAPARIN SODIUM 40 MG: 100 INJECTION SUBCUTANEOUS at 10:30

## 2025-07-21 RX ADMIN — ARFORMOTEROL TARTRATE 15 MCG: 15 SOLUTION RESPIRATORY (INHALATION) at 07:58

## 2025-07-21 RX ADMIN — TIOTROPIUM BROMIDE INHALATION SPRAY 5 MCG: 3.12 SPRAY, METERED RESPIRATORY (INHALATION) at 07:58

## 2025-07-21 RX ADMIN — PRAVASTATIN SODIUM 40 MG: 40 TABLET ORAL at 21:22

## 2025-07-21 RX ADMIN — FLUTICASONE PROPIONATE 2 SPRAY: 50 SPRAY, METERED NASAL at 10:27

## 2025-07-21 RX ADMIN — FLUTICASONE PROPIONATE 2 SPRAY: 50 SPRAY, METERED NASAL at 21:22

## 2025-07-21 RX ADMIN — EMPAGLIFLOZIN 10 MG: 10 TABLET, FILM COATED ORAL at 17:01

## 2025-07-21 NOTE — CARE COORDINATION
7/21/25, 1:59 PM EDT    DISCHARGE ON GOING EVALUATION    Sandie BALLARD [facility] day: 4  Location: [facility]26[facility] Reason for admit: Cardiac arrest (HCC) [I46.9]  COPD with acute exacerbation (HCC) [J44.1]  Cardiopulmonary arrest with successful resuscitation (HCC) [I46.9]     Procedures:   7/17 Pre-hospital Code Blue: PEA  7/17 Intubated in ED  7/18 Extubated  Imaging since last note:   7/21 CXR:   Patient is rotated.  Pulmonary opacities are not significantly changed.  No visible pneumothorax.  Cardiomediastinal silhouette is unchanged.  No acute fracture.  Barriers to Discharge: Transfer from ICU. Currently on HFO. IM following. Cardio and pulm consults today. PT/OT. Nebs. IV rocephin. IV doxycyline. IV lasix. IV protonix. Attempting to wean HFO.     PCP: Elijah Liu MD  Readmission Risk Score: 21.3    Patient Goals/Plan/Treatment Preferences: Spoke with Sandie, plans to return home with mother. Uses 4L NC ATC from [facility] [employer] [facility]. Uses a walker at time. He does not anticipate needs. Consider LTACH eval if unable to wean. Had HH in past.

## 2025-07-21 NOTE — CONSULTS
[facility]'s  Consult    Patient's Name/Date of Birth: Sandie Carrera / 1970 (54 y.o.)    Date: July 21, 2025     Referring Provider: Elijah Liu MD    CHIEF COMPLAINT: New CHF s/p cardiac arrest      HPI: This is a pleasant 54 y.o. male presents with with pmhx of schizophrenia, COPD on 4 LNC that presented to [facility] on 7/17/25 in PEA cardiac arrest secondary to respiratory arrest after wife had contacted EMS for difficulty breathing. He was given 2 rounds of epinephrine and ROSC was obtained. He was successfully extubated on 7/18/25. Patient was stable for transfer out of ICU to stepdown and Cardiology was consulted for new EF drop from 50% to 25-30%.     The patient was difficult to understand, with history limited due to speech. He currently has a sitter in the room.     Echo: TTE EF 25-30%, severe global hypokinesis, abnormal diastolic function. Mild MR, mild to moderate TR, moderate pulmonary hypertension. Prior TTE 6/2025 EG 50% with grade 1 diastolic dysfunction.   Stress Test: No results found for this or any previous visit.  LHC:No results found for this or any previous visit.  RHC: 6/12/25 confirmed pulmonary hypertension.   EKG: normal sinus rhythm.   Heart Score: 6    All labs, EKG's, diagnostic testing and images as well as cardiac cath, stress testing were reviewed during this encounter    Past Medical History:   Diagnosis Date    Acute on chronic systolic congestive heart failure (HCC) 4/4/2019    Emphysema (subcutaneous) (surgical) resulting from a procedure     Hypertension     Pneumonia     Schizophrenia (HCC)      Past Surgical History:   Procedure Laterality Date    CARDIAC PROCEDURE N/A 6/12/2025    Right heart cath performed by Onur Lopez MD at [facility] CARDIAC CATH LAB     Current Facility-Administered Medications   Medication Dose Route Frequency Provider Last Rate Last Admin    furosemide (LASIX) injection 20 mg  20 mg IntraVENous Daily Elijah Liu MD

## 2025-07-21 NOTE — PROCEDURES
PROCEDURE NOTE  Date: 7/21/2025   Name: Sandie Carrera  YOB: 1970    Procedures  12 lead EKG completed. Results handed to Gerri DACOSTA.

## 2025-07-21 NOTE — CONSULTS
[facility]      Patient - Sandie Carrera   MRN -  [id number]   [facility] # - [id number]   - 1970      Date of Admission -  2025 12:09 PM  Date of evaluation -  2025  Room - [facility]37/037-A   Hospital Day - 4  Consulting - Elijah Liu MD Primary Care Physician - Elijah Liu MD     Problem List      Active Hospital Problems    Diagnosis Date Noted    Acute pulmonary edema (HCC) [J81.0] 2025    Cardiopulmonary arrest with successful resuscitation (HCC) [I46.9] 2025     Reason for Consult    Acute hypercapnic respiratory failure, COPD exacerbation, PAHTN  HPI   History Obtained From: Patient and electronic medical record.    Sandie Carrera is a 54 y.o. male with a history of severe COPD on home oxygen, mild systolic heart failure (EF 50%), HTN and schizophrenia, who presented to the ED via EMS as a cardiac arrest (CODE BLUE).  He was initially found at home in respiratory distress with altered mental status.  While en route to the hospital, he became unresponsive with snoring respirations, then pulseless.  EMS initiated CPR and administered 2 mg epinephrine.  On ED arrival, he remained in PEA arrest, ROSC was achieved after continue with resuscitation.  He was intubated and transferred to the ICU initial labs showed elevated troponin at 30, mild lactic acidosis, hypercapnia, and proBNP of 2473.  EKG showed sinus tachycardia with PVCs, CT chest revealed bilateral pleural effusions and atelectasis.  Extubated on 2025 and now maintained on high flow oxygen.    He is having shortness of breath: No  Onset: gradual   Diurnal variation:  None.      He can climb steps: No  Flights of steps she can climb: 0        He is having cough: Yes  Duration of cough: for 4 days.   His cough is associated with sputum production: No   The sputum color: clear  Hemoptysis:No  Diurnal variation: None.       He is having chest pain:No        PMHx   Past Medical

## 2025-07-22 LAB
ANION GAP SERPL CALC-SCNC: 11 MEQ/L (ref 8–16)
BUN SERPL-MCNC: 15 MG/DL (ref 8–23)
CALCIUM SERPL-MCNC: 8.7 MG/DL (ref 8.6–10)
CHLORIDE SERPL-SCNC: 95 MEQ/L (ref 98–111)
CO2 SERPL-SCNC: 37 MEQ/L (ref 22–29)
CREAT SERPL-MCNC: 0.9 MG/DL (ref 0.7–1.2)
GFR SERPL CREATININE-BSD FRML MDRD: > 90 ML/MIN/1.73M2
GLUCOSE BLD STRIP.AUTO-MCNC: 216 MG/DL (ref 70–108)
GLUCOSE BLD STRIP.AUTO-MCNC: 84 MG/DL (ref 70–108)
GLUCOSE BLD STRIP.AUTO-MCNC: 85 MG/DL (ref 70–108)
GLUCOSE BLD STRIP.AUTO-MCNC: 98 MG/DL (ref 70–108)
GLUCOSE SERPL-MCNC: 79 MG/DL (ref 74–109)
POTASSIUM SERPL-SCNC: 4.1 MEQ/L (ref 3.5–5.2)
SODIUM SERPL-SCNC: 143 MEQ/L (ref 135–145)

## 2025-07-22 PROCEDURE — 36415 COLL VENOUS BLD VENIPUNCTURE: CPT

## 2025-07-22 PROCEDURE — 6360000002 HC RX W HCPCS: Performed by: INTERNAL MEDICINE

## 2025-07-22 PROCEDURE — 80048 BASIC METABOLIC PNL TOTAL CA: CPT

## 2025-07-22 PROCEDURE — 6370000000 HC RX 637 (ALT 250 FOR IP): Performed by: INTERNAL MEDICINE

## 2025-07-22 PROCEDURE — 2700000000 HC OXYGEN THERAPY PER DAY

## 2025-07-22 PROCEDURE — 82948 REAGENT STRIP/BLOOD GLUCOSE: CPT

## 2025-07-22 PROCEDURE — 6370000000 HC RX 637 (ALT 250 FOR IP)

## 2025-07-22 PROCEDURE — 2500000003 HC RX 250 WO HCPCS

## 2025-07-22 PROCEDURE — 99232 SBSQ HOSP IP/OBS MODERATE 35: CPT | Performed by: PHYSICIAN ASSISTANT

## 2025-07-22 PROCEDURE — 94640 AIRWAY INHALATION TREATMENT: CPT

## 2025-07-22 PROCEDURE — 2140000000 HC CCU INTERMEDIATE R&B

## 2025-07-22 PROCEDURE — 6360000002 HC RX W HCPCS

## 2025-07-22 PROCEDURE — 94761 N-INVAS EAR/PLS OXIMETRY MLT: CPT

## 2025-07-22 PROCEDURE — 97530 THERAPEUTIC ACTIVITIES: CPT

## 2025-07-22 PROCEDURE — 94660 CPAP INITIATION&MGMT: CPT

## 2025-07-22 PROCEDURE — 2580000003 HC RX 258

## 2025-07-22 PROCEDURE — 97535 SELF CARE MNGMENT TRAINING: CPT

## 2025-07-22 PROCEDURE — 99233 SBSQ HOSP IP/OBS HIGH 50: CPT | Performed by: INTERNAL MEDICINE

## 2025-07-22 RX ADMIN — EMPAGLIFLOZIN 10 MG: 10 TABLET, FILM COATED ORAL at 09:04

## 2025-07-22 RX ADMIN — PANTOPRAZOLE SODIUM 40 MG: 40 TABLET, DELAYED RELEASE ORAL at 09:03

## 2025-07-22 RX ADMIN — DOXYCYCLINE 100 MG: 100 INJECTION, POWDER, LYOPHILIZED, FOR SOLUTION INTRAVENOUS at 06:30

## 2025-07-22 RX ADMIN — RISPERIDONE 2 MG: 1 TABLET, FILM COATED ORAL at 20:43

## 2025-07-22 RX ADMIN — PRAVASTATIN SODIUM 40 MG: 40 TABLET ORAL at 20:43

## 2025-07-22 RX ADMIN — ASPIRIN 325 MG: 325 TABLET, COATED ORAL at 09:04

## 2025-07-22 RX ADMIN — FLUTICASONE PROPIONATE 2 SPRAY: 50 SPRAY, METERED NASAL at 20:44

## 2025-07-22 RX ADMIN — FLUTICASONE PROPIONATE 2 SPRAY: 50 SPRAY, METERED NASAL at 09:04

## 2025-07-22 RX ADMIN — ALBUTEROL SULFATE 2.5 MG: 2.5 SOLUTION RESPIRATORY (INHALATION) at 15:41

## 2025-07-22 RX ADMIN — ALBUTEROL SULFATE 2.5 MG: 2.5 SOLUTION RESPIRATORY (INHALATION) at 11:21

## 2025-07-22 RX ADMIN — ALBUTEROL SULFATE 5 MG: 2.5 SOLUTION RESPIRATORY (INHALATION) at 21:33

## 2025-07-22 RX ADMIN — ALBUTEROL SULFATE 2.5 MG: 2.5 SOLUTION RESPIRATORY (INHALATION) at 07:48

## 2025-07-22 RX ADMIN — TIOTROPIUM BROMIDE INHALATION SPRAY 5 MCG: 3.12 SPRAY, METERED RESPIRATORY (INHALATION) at 07:48

## 2025-07-22 RX ADMIN — BUDESONIDE 500 MCG: 0.5 INHALANT RESPIRATORY (INHALATION) at 21:33

## 2025-07-22 RX ADMIN — SODIUM CHLORIDE, PRESERVATIVE FREE 10 ML: 5 INJECTION INTRAVENOUS at 20:43

## 2025-07-22 RX ADMIN — BUDESONIDE 500 MCG: 0.5 INHALANT RESPIRATORY (INHALATION) at 07:48

## 2025-07-22 RX ADMIN — METOPROLOL SUCCINATE 12.5 MG: 25 TABLET, EXTENDED RELEASE ORAL at 09:04

## 2025-07-22 RX ADMIN — RISPERIDONE 2 MG: 1 TABLET, FILM COATED ORAL at 09:07

## 2025-07-22 RX ADMIN — SODIUM CHLORIDE, PRESERVATIVE FREE 10 ML: 5 INJECTION INTRAVENOUS at 09:09

## 2025-07-22 RX ADMIN — ENOXAPARIN SODIUM 40 MG: 100 INJECTION SUBCUTANEOUS at 09:02

## 2025-07-22 RX ADMIN — WATER 2000 MG: 1 INJECTION INTRAMUSCULAR; INTRAVENOUS; SUBCUTANEOUS at 17:56

## 2025-07-22 RX ADMIN — INSULIN LISPRO 1 UNITS: 100 INJECTION, SOLUTION INTRAVENOUS; SUBCUTANEOUS at 12:23

## 2025-07-22 RX ADMIN — BUPROPION HYDROCHLORIDE 150 MG: 150 TABLET, EXTENDED RELEASE ORAL at 09:04

## 2025-07-22 RX ADMIN — FUROSEMIDE 20 MG: 10 INJECTION, SOLUTION INTRAMUSCULAR; INTRAVENOUS at 09:02

## 2025-07-23 LAB
ANION GAP SERPL CALC-SCNC: 9 MEQ/L (ref 8–16)
BUN SERPL-MCNC: 15 MG/DL (ref 8–23)
CALCIUM SERPL-MCNC: 8.3 MG/DL (ref 8.6–10)
CHLORIDE SERPL-SCNC: 96 MEQ/L (ref 98–111)
CO2 SERPL-SCNC: 35 MEQ/L (ref 22–29)
CREAT SERPL-MCNC: 0.8 MG/DL (ref 0.7–1.2)
DEPRECATED RDW RBC AUTO: 52.1 FL (ref 35–45)
ERYTHROCYTE [DISTWIDTH] IN BLOOD BY AUTOMATED COUNT: 14.5 % (ref 11.5–14.5)
GFR SERPL CREATININE-BSD FRML MDRD: > 90 ML/MIN/1.73M2
GLUCOSE BLD STRIP.AUTO-MCNC: 107 MG/DL (ref 70–108)
GLUCOSE BLD STRIP.AUTO-MCNC: 109 MG/DL (ref 70–108)
GLUCOSE BLD STRIP.AUTO-MCNC: 86 MG/DL (ref 70–108)
GLUCOSE BLD STRIP.AUTO-MCNC: 99 MG/DL (ref 70–108)
GLUCOSE SERPL-MCNC: 72 MG/DL (ref 74–109)
HCT VFR BLD AUTO: 43.4 % (ref 42–52)
HGB BLD-MCNC: 13 GM/DL (ref 14–18)
MCH RBC QN AUTO: 29.1 PG (ref 26–33)
MCHC RBC AUTO-ENTMCNC: 30 GM/DL (ref 32.2–35.5)
MCV RBC AUTO: 97.1 FL (ref 80–94)
PLATELET # BLD AUTO: 154 THOU/MM3 (ref 130–400)
PMV BLD AUTO: 10.8 FL (ref 9.4–12.4)
POTASSIUM SERPL-SCNC: 4.4 MEQ/L (ref 3.5–5.2)
RBC # BLD AUTO: 4.47 MILL/MM3 (ref 4.7–6.1)
SODIUM SERPL-SCNC: 140 MEQ/L (ref 135–145)
WBC # BLD AUTO: 6.3 THOU/MM3 (ref 4.8–10.8)

## 2025-07-23 PROCEDURE — 6360000002 HC RX W HCPCS

## 2025-07-23 PROCEDURE — 6370000000 HC RX 637 (ALT 250 FOR IP): Performed by: INTERNAL MEDICINE

## 2025-07-23 PROCEDURE — 6360000002 HC RX W HCPCS: Performed by: INTERNAL MEDICINE

## 2025-07-23 PROCEDURE — 99232 SBSQ HOSP IP/OBS MODERATE 35: CPT | Performed by: PHYSICIAN ASSISTANT

## 2025-07-23 PROCEDURE — 97530 THERAPEUTIC ACTIVITIES: CPT

## 2025-07-23 PROCEDURE — 2140000000 HC CCU INTERMEDIATE R&B

## 2025-07-23 PROCEDURE — 97110 THERAPEUTIC EXERCISES: CPT

## 2025-07-23 PROCEDURE — 94761 N-INVAS EAR/PLS OXIMETRY MLT: CPT

## 2025-07-23 PROCEDURE — 82948 REAGENT STRIP/BLOOD GLUCOSE: CPT

## 2025-07-23 PROCEDURE — 2500000003 HC RX 250 WO HCPCS

## 2025-07-23 PROCEDURE — 94660 CPAP INITIATION&MGMT: CPT

## 2025-07-23 PROCEDURE — 99233 SBSQ HOSP IP/OBS HIGH 50: CPT | Performed by: INTERNAL MEDICINE

## 2025-07-23 PROCEDURE — 85027 COMPLETE CBC AUTOMATED: CPT

## 2025-07-23 PROCEDURE — 94640 AIRWAY INHALATION TREATMENT: CPT

## 2025-07-23 PROCEDURE — 2700000000 HC OXYGEN THERAPY PER DAY

## 2025-07-23 PROCEDURE — 80048 BASIC METABOLIC PNL TOTAL CA: CPT

## 2025-07-23 PROCEDURE — 97535 SELF CARE MNGMENT TRAINING: CPT

## 2025-07-23 PROCEDURE — 6370000000 HC RX 637 (ALT 250 FOR IP)

## 2025-07-23 PROCEDURE — 97116 GAIT TRAINING THERAPY: CPT

## 2025-07-23 PROCEDURE — 36415 COLL VENOUS BLD VENIPUNCTURE: CPT

## 2025-07-23 RX ADMIN — RISPERIDONE 2 MG: 1 TABLET, FILM COATED ORAL at 19:51

## 2025-07-23 RX ADMIN — RISPERIDONE 2 MG: 1 TABLET, FILM COATED ORAL at 09:10

## 2025-07-23 RX ADMIN — ASPIRIN 325 MG: 325 TABLET, COATED ORAL at 09:11

## 2025-07-23 RX ADMIN — BUDESONIDE 500 MCG: 0.5 INHALANT RESPIRATORY (INHALATION) at 07:52

## 2025-07-23 RX ADMIN — BUPROPION HYDROCHLORIDE 150 MG: 150 TABLET, EXTENDED RELEASE ORAL at 09:11

## 2025-07-23 RX ADMIN — WATER 2000 MG: 1 INJECTION INTRAMUSCULAR; INTRAVENOUS; SUBCUTANEOUS at 18:29

## 2025-07-23 RX ADMIN — FUROSEMIDE 20 MG: 10 INJECTION, SOLUTION INTRAMUSCULAR; INTRAVENOUS at 09:11

## 2025-07-23 RX ADMIN — TIOTROPIUM BROMIDE INHALATION SPRAY 5 MCG: 3.12 SPRAY, METERED RESPIRATORY (INHALATION) at 07:52

## 2025-07-23 RX ADMIN — EMPAGLIFLOZIN 10 MG: 10 TABLET, FILM COATED ORAL at 09:11

## 2025-07-23 RX ADMIN — PANTOPRAZOLE SODIUM 40 MG: 40 TABLET, DELAYED RELEASE ORAL at 09:10

## 2025-07-23 RX ADMIN — ALBUTEROL SULFATE 2.5 MG: 2.5 SOLUTION RESPIRATORY (INHALATION) at 16:03

## 2025-07-23 RX ADMIN — FLUTICASONE PROPIONATE 2 SPRAY: 50 SPRAY, METERED NASAL at 09:11

## 2025-07-23 RX ADMIN — ALBUTEROL SULFATE 2.5 MG: 2.5 SOLUTION RESPIRATORY (INHALATION) at 07:52

## 2025-07-23 RX ADMIN — PRAVASTATIN SODIUM 40 MG: 40 TABLET ORAL at 19:51

## 2025-07-23 RX ADMIN — SODIUM CHLORIDE, PRESERVATIVE FREE 10 ML: 5 INJECTION INTRAVENOUS at 19:52

## 2025-07-23 RX ADMIN — ALBUTEROL SULFATE 2.5 MG: 2.5 SOLUTION RESPIRATORY (INHALATION) at 12:21

## 2025-07-23 RX ADMIN — SODIUM CHLORIDE, PRESERVATIVE FREE 10 ML: 5 INJECTION INTRAVENOUS at 09:12

## 2025-07-23 RX ADMIN — ALBUTEROL SULFATE 2.5 MG: 2.5 SOLUTION RESPIRATORY (INHALATION) at 20:00

## 2025-07-23 RX ADMIN — BUDESONIDE 500 MCG: 0.5 INHALANT RESPIRATORY (INHALATION) at 20:00

## 2025-07-23 RX ADMIN — ENOXAPARIN SODIUM 40 MG: 100 INJECTION SUBCUTANEOUS at 09:11

## 2025-07-23 RX ADMIN — FLUTICASONE PROPIONATE 2 SPRAY: 50 SPRAY, METERED NASAL at 19:51

## 2025-07-23 RX ADMIN — METOPROLOL SUCCINATE 12.5 MG: 25 TABLET, EXTENDED RELEASE ORAL at 09:11

## 2025-07-23 NOTE — PALLIATIVE CARE
Initial Evaluation        Patient:   Sandie Carrera  YOB: 1970  Age:  54 y.o.  Room:  [facility]26/026-A  MRN:  [id number]   Acct: [id number]    Date of Admission:  7/17/2025 12:09 PM  Date of Service:  7/23/2025  Completed By:  Lis Polanco RN        Reason for Palliative Care Evaluation:-   Goals of Care     Current Concerns   restlessness     Palliative Performance Scale   60%  Ambulation reduced; Significant disease; Can't do hobbies/housework; intake normal or reduced; occasional assist; LOC full/confusion     Goals of Care Discussions and Plan     Healthcare Power of /Healthcare Surrogate Decision Makers:  Per [place] Code 2133.08: [place] Hierarchy of Surrogate Decision Makers   Patient's mother is currently only known next of kin - Maureen Cuellar  Patient has 2 siblings listed.     Conversation Summary: Spoke with primary RN. Providers were apparently hoping to discuss plan of care with patient's next of kin as well due to patient's mentation. Per primary RN, patient is alert and oriented x4.     In room to speak with patient. Patient very restless, up in chair, high flow in place. Asked patient what brought him in to the hospital, patient stated \"cold chin\". Asked patient to clarify, patient very difficulty to understand. Asked patient who brought him in. Patient stated his brother, for \"cold chin\". Patient pointing to his chin. Attempted to discuss home situation. Patient states he lives at home with \"room mates\". When asking patient if he had children, patient stated \"maybe one\". Due to patient not seeming to be able to have coherent conversation, ended discussion.     Call placed to Maureen (mom) no answer, unable to leave voicemail.   Call placed to Marianela (sister) straight to voicemail. Voicemail left for call back.  Call placed to Trudy (sister) no answer, left voicemail for call back.    Received call from Trudy. Updated on palliative care being consulted. Discussed patient

## 2025-07-23 NOTE — CARE COORDINATION
7/23/25, 1:39 PM EDT    DISCHARGE ON GOING EVALUATION    Sandie BALLARD [facility] day: 6  Location: [address] [place] [address] Reason for admit: Cardiac arrest (HCC) [I46.9]  COPD with acute exacerbation (HCC) [J44.1]  Cardiopulmonary arrest with successful resuscitation (HCC) [I46.9]     Procedures:   7/17 Pre-hospital Code Blue: PEA  7/17 Intubated in ED  7/18 Extubated  7/18 Echo: EF 25-30%. Left ventricle moderately dilated. Severe global hypokinesis present. Right ventricle is moderately dilated. Mild to moderate tricuspid regurgitation. Right atrium is moderately dilated. Moderate pulmonary hypertension present.     Imaging since last note:   7/19 PCXR: Endotracheal tube and NG tube have been removed. There is stable hazy opacity in the right mid to lower lung due to a layering pleural effusion and atelectasis in correlation with the CT. Hazy opacity in the left lung base is mildly increased and is due to a small pleural effusion and mild atelectasis. There are stable bilateral interstitial opacities consistent with interstitial edema. There is upper lobe predominant emphysema. There is no pneumothorax. Enlargement of the cardiac silhouette appears stable.  7/20 PCXR: Cardiomegaly and probable congestive heart failure. No interval change since previous study dated 7/19/2025.  7/21 PCXR: No significant interval change.     Barriers to Discharge: Dr Liu and Pulmonology following. Remains on HHF, 35L and 60% FiO2. Sats 96%. Proventil aerosols qid. Pulmicort bid. Rocephin iv daily. Lasix 20mg iv daily.     PCP: Elijah Liu MD  Readmission Risk Score: 23.2    Patient Goals/Plan/Treatment Preferences: Plans home with mother. Has home O2 at 4L/nc ATC baseline from  [facility]. Has a walker. Consider LTACH eval if unable to wean. Had HH in past.

## 2025-07-24 ENCOUNTER — HOSPITAL ENCOUNTER (OUTPATIENT)
Age: 55
Discharge: HOME | End: 2025-08-06
Attending: INTERNAL MEDICINE
Payer: COMMERCIAL

## 2025-07-24 VITALS
BODY MASS INDEX: 29.81 KG/M2 | TEMPERATURE: 98.1 F | DIASTOLIC BLOOD PRESSURE: 67 MMHG | WEIGHT: 212.96 LBS | OXYGEN SATURATION: 100 % | HEART RATE: 91 BPM | HEIGHT: 71 IN | SYSTOLIC BLOOD PRESSURE: 98 MMHG | RESPIRATION RATE: 16 BRPM

## 2025-07-24 DIAGNOSIS — R93.1 ABNORMAL ECHOCARDIOGRAM: Primary | ICD-10-CM

## 2025-07-24 LAB
ANION GAP SERPL CALC-SCNC: 4 MEQ/L (ref 8–16)
ARTERIAL PATENCY WRIST A: POSITIVE
BASE EXCESS BLDA CALC-SCNC: 18.5 MMOL/L (ref -2.5–2.5)
BDY SITE: ABNORMAL
BUN SERPL-MCNC: 15 MG/DL (ref 8–23)
CALCIUM SERPL-MCNC: 8.3 MG/DL (ref 8.6–10)
CHLORIDE SERPL-SCNC: 98 MEQ/L (ref 98–111)
CHOLEST SERPL-MCNC: 90 MG/DL (ref 100–199)
CO2 SERPL-SCNC: 38 MEQ/L (ref 22–29)
COLLECTED BY:: ABNORMAL
CREAT SERPL-MCNC: 0.8 MG/DL (ref 0.7–1.2)
DEPRECATED RDW RBC AUTO: 52.7 FL (ref 35–45)
DEVICE: ABNORMAL
EKG ATRIAL RATE: 86 BPM
EKG P AXIS: 82 DEGREES
EKG P-R INTERVAL: 156 MS
EKG Q-T INTERVAL: 368 MS
EKG QRS DURATION: 94 MS
EKG QTC CALCULATION (BAZETT): 440 MS
EKG R AXIS: 97 DEGREES
EKG T AXIS: 18 DEGREES
EKG VENTRICULAR RATE: 86 BPM
ERYTHROCYTE [DISTWIDTH] IN BLOOD BY AUTOMATED COUNT: 14.6 % (ref 11.5–14.5)
FIO2 ON VENT O2 ANALYZER: 50 %
GFR SERPL CREATININE-BSD FRML MDRD: > 90 ML/MIN/1.73M2
GLUCOSE BLD STRIP.AUTO-MCNC: 110 MG/DL (ref 70–108)
GLUCOSE BLD STRIP.AUTO-MCNC: 84 MG/DL (ref 70–108)
GLUCOSE BLD STRIP.AUTO-MCNC: 89 MG/DL (ref 70–108)
GLUCOSE SERPL-MCNC: 84 MG/DL (ref 74–109)
HCO3 BLDA-SCNC: 48 MMOL/L (ref 23–28)
HCT VFR BLD AUTO: 42.7 % (ref 42–52)
HDLC SERPL-MCNC: 37 MG/DL
HGB BLD-MCNC: 12.8 GM/DL (ref 14–18)
LDLC SERPL CALC-MCNC: 43 MG/DL
MCH RBC QN AUTO: 29.1 PG (ref 26–33)
MCHC RBC AUTO-ENTMCNC: 30 GM/DL (ref 32.2–35.5)
MCV RBC AUTO: 97 FL (ref 80–94)
PCO2 BLDA: 80 MMHG (ref 35–45)
PH BLDA: 7.39 [PH] (ref 7.35–7.45)
PLATELET # BLD AUTO: 166 THOU/MM3 (ref 130–400)
PMV BLD AUTO: 11 FL (ref 9.4–12.4)
PO2 BLDA: 74 MMHG (ref 71–104)
POTASSIUM SERPL-SCNC: 4.8 MEQ/L (ref 3.5–5.2)
RBC # BLD AUTO: 4.4 MILL/MM3 (ref 4.7–6.1)
SAO2 % BLDA: 93 %
SODIUM SERPL-SCNC: 140 MEQ/L (ref 135–145)
TRIGL SERPL-MCNC: 51 MG/DL (ref 0–199)
VENTILATION MODE VENT: ABNORMAL
WBC # BLD AUTO: 6.2 THOU/MM3 (ref 4.8–10.8)

## 2025-07-24 PROCEDURE — 93005 ELECTROCARDIOGRAM TRACING: CPT | Performed by: PHYSICIAN ASSISTANT

## 2025-07-24 PROCEDURE — 2500000003 HC RX 250 WO HCPCS

## 2025-07-24 PROCEDURE — 97116 GAIT TRAINING THERAPY: CPT

## 2025-07-24 PROCEDURE — 99233 SBSQ HOSP IP/OBS HIGH 50: CPT | Performed by: INTERNAL MEDICINE

## 2025-07-24 PROCEDURE — 36415 COLL VENOUS BLD VENIPUNCTURE: CPT

## 2025-07-24 PROCEDURE — 97530 THERAPEUTIC ACTIVITIES: CPT

## 2025-07-24 PROCEDURE — 99232 SBSQ HOSP IP/OBS MODERATE 35: CPT | Performed by: PHYSICIAN ASSISTANT

## 2025-07-24 PROCEDURE — 6360000002 HC RX W HCPCS: Performed by: INTERNAL MEDICINE

## 2025-07-24 PROCEDURE — 82948 REAGENT STRIP/BLOOD GLUCOSE: CPT

## 2025-07-24 PROCEDURE — 94640 AIRWAY INHALATION TREATMENT: CPT

## 2025-07-24 PROCEDURE — 80061 LIPID PANEL: CPT

## 2025-07-24 PROCEDURE — 80048 BASIC METABOLIC PNL TOTAL CA: CPT

## 2025-07-24 PROCEDURE — 2700000000 HC OXYGEN THERAPY PER DAY

## 2025-07-24 PROCEDURE — 6370000000 HC RX 637 (ALT 250 FOR IP): Performed by: INTERNAL MEDICINE

## 2025-07-24 PROCEDURE — 6370000000 HC RX 637 (ALT 250 FOR IP)

## 2025-07-24 PROCEDURE — 36600 WITHDRAWAL OF ARTERIAL BLOOD: CPT

## 2025-07-24 PROCEDURE — 94761 N-INVAS EAR/PLS OXIMETRY MLT: CPT

## 2025-07-24 PROCEDURE — 93010 ELECTROCARDIOGRAM REPORT: CPT | Performed by: INTERNAL MEDICINE

## 2025-07-24 PROCEDURE — 94660 CPAP INITIATION&MGMT: CPT

## 2025-07-24 PROCEDURE — 97110 THERAPEUTIC EXERCISES: CPT

## 2025-07-24 PROCEDURE — 82803 BLOOD GASES ANY COMBINATION: CPT

## 2025-07-24 PROCEDURE — 85027 COMPLETE CBC AUTOMATED: CPT

## 2025-07-24 PROCEDURE — 6360000002 HC RX W HCPCS

## 2025-07-24 RX ORDER — METOPROLOL SUCCINATE 25 MG/1
12.5 TABLET, EXTENDED RELEASE ORAL DAILY
Status: ON HOLD | DISCHARGE
Start: 2025-07-25

## 2025-07-24 RX ORDER — SENNOSIDES 8.6 MG
325 CAPSULE ORAL DAILY
Status: ON HOLD | DISCHARGE
Start: 2025-07-25

## 2025-07-24 RX ORDER — ENOXAPARIN SODIUM 100 MG/ML
40 INJECTION SUBCUTANEOUS DAILY
Status: ON HOLD | DISCHARGE
Start: 2025-07-25

## 2025-07-24 RX ORDER — ALBUTEROL SULFATE 5 MG/ML
2.5 SOLUTION RESPIRATORY (INHALATION)
Status: ON HOLD | DISCHARGE
Start: 2025-07-24

## 2025-07-24 RX ORDER — FUROSEMIDE 10 MG/ML
20 INJECTION INTRAMUSCULAR; INTRAVENOUS DAILY
Status: ON HOLD | DISCHARGE
Start: 2025-07-25

## 2025-07-24 RX ORDER — ONDANSETRON 2 MG/ML
4 INJECTION INTRAMUSCULAR; INTRAVENOUS EVERY 6 HOURS PRN
Status: ON HOLD | DISCHARGE
Start: 2025-07-24

## 2025-07-24 RX ORDER — HALOPERIDOL DECANOATE 50 MG/ML
50 INJECTION INTRAMUSCULAR
Status: DISCONTINUED | OUTPATIENT
Start: 2025-07-24 | End: 2025-07-24 | Stop reason: HOSPADM

## 2025-07-24 RX ORDER — FLUTICASONE PROPIONATE 50 MCG
2 SPRAY, SUSPENSION (ML) NASAL 2 TIMES DAILY
Status: ON HOLD | DISCHARGE
Start: 2025-07-24

## 2025-07-24 RX ORDER — CARBAMAZEPINE 200 MG/1
200 TABLET ORAL 2 TIMES DAILY
Status: ON HOLD | COMMUNITY
Start: 2025-05-22

## 2025-07-24 RX ORDER — ONDANSETRON 4 MG/1
4 TABLET, ORALLY DISINTEGRATING ORAL EVERY 8 HOURS PRN
Status: ON HOLD | DISCHARGE
Start: 2025-07-24

## 2025-07-24 RX ORDER — BUPROPION HYDROCHLORIDE 150 MG/1
150 TABLET ORAL EVERY MORNING
Status: ON HOLD | DISCHARGE
Start: 2025-07-25

## 2025-07-24 RX ORDER — BUDESONIDE 0.5 MG/2ML
0.5 INHALANT ORAL
Status: ON HOLD | DISCHARGE
Start: 2025-07-24

## 2025-07-24 RX ORDER — ALBUTEROL SULFATE 0.83 MG/ML
5 SOLUTION RESPIRATORY (INHALATION) EVERY 4 HOURS PRN
Status: ON HOLD | DISCHARGE
Start: 2025-07-24

## 2025-07-24 RX ADMIN — ASPIRIN 325 MG: 325 TABLET, COATED ORAL at 09:28

## 2025-07-24 RX ADMIN — TIOTROPIUM BROMIDE INHALATION SPRAY 5 MCG: 3.12 SPRAY, METERED RESPIRATORY (INHALATION) at 07:57

## 2025-07-24 RX ADMIN — ALBUTEROL SULFATE 2.5 MG: 2.5 SOLUTION RESPIRATORY (INHALATION) at 07:57

## 2025-07-24 RX ADMIN — BUPROPION HYDROCHLORIDE 150 MG: 150 TABLET, EXTENDED RELEASE ORAL at 09:28

## 2025-07-24 RX ADMIN — SODIUM CHLORIDE, PRESERVATIVE FREE 10 ML: 5 INJECTION INTRAVENOUS at 12:54

## 2025-07-24 RX ADMIN — BUDESONIDE 500 MCG: 0.5 INHALANT RESPIRATORY (INHALATION) at 07:57

## 2025-07-24 RX ADMIN — FLUTICASONE PROPIONATE 2 SPRAY: 50 SPRAY, METERED NASAL at 12:53

## 2025-07-24 RX ADMIN — RISPERIDONE 2 MG: 1 TABLET, FILM COATED ORAL at 09:27

## 2025-07-24 RX ADMIN — HALOPERIDOL DECANOATE 50 MG: 50 INJECTION INTRAMUSCULAR at 16:20

## 2025-07-24 RX ADMIN — EMPAGLIFLOZIN 10 MG: 10 TABLET, FILM COATED ORAL at 09:28

## 2025-07-24 RX ADMIN — ENOXAPARIN SODIUM 40 MG: 100 INJECTION SUBCUTANEOUS at 09:27

## 2025-07-24 RX ADMIN — METOPROLOL SUCCINATE 12.5 MG: 25 TABLET, EXTENDED RELEASE ORAL at 09:27

## 2025-07-24 RX ADMIN — ALBUTEROL SULFATE 2.5 MG: 2.5 SOLUTION RESPIRATORY (INHALATION) at 11:55

## 2025-07-24 RX ADMIN — PANTOPRAZOLE SODIUM 40 MG: 40 TABLET, DELAYED RELEASE ORAL at 12:53

## 2025-07-24 RX ADMIN — FUROSEMIDE 20 MG: 10 INJECTION, SOLUTION INTRAMUSCULAR; INTRAVENOUS at 09:27

## 2025-07-24 RX ADMIN — ALBUTEROL SULFATE 2.5 MG: 2.5 SOLUTION RESPIRATORY (INHALATION) at 16:09

## 2025-07-24 NOTE — PALLIATIVE CARE
Follow Up / Progress Note        Patient:   Sandie Carrera  YOB: 1970  Age:  54 y.o.  Room:  [facility]26/026-A  MRN:  [id number]           Attempted to reach out to patient's mother, Maureen. No return phone call. Per chart review, patient is transferring to [place].        Electronically signed by Ankita García RN on 7/24/2025 at 4:45 PM             Palliative Care Office: [phone]

## 2025-07-24 NOTE — DISCHARGE INSTR - COC
change in H&P    PHYSICIAN SIGNATURE:  Electronically signed by Elijah Liu MD on 7/24/25 at 3:04 PM EDT

## 2025-07-24 NOTE — PROGRESS NOTES
Patient Weaning Progress    The patient's vent settings was able to be weaned this shift.    Ventilator settings that were weaned              [] Mode   [x] Pressure support weaned   [] Fio2 weaned   [] Peep weaned    Spontaneous weaning trial was not attempted due to defined parameters for SBT (spontaneous breathing trial) not being met.    Reason that defined ventilator parameters for SBT was not met              [] Patient condition requires increased ventilator settings  [] Requires increased sedation   [x] Settings not within weaning range   [] SAT not completed   [] Physician orders    Cuff was deflated to determine cuff leak. A leak was detected.    Unable to get agreement for goals because no family is present and patient cannot respond.        Problem: Respiratory - Adult    Goal: Achieves optimal ventilation and oxygenation  Achieves optimal ventilation and oxygenation:   Assess for changes in respiratory status   Position to facilitate oxygenation and minimize respiratory effort   Assess the need for suctioning and aspirate as needed   Respiratory therapy support as indicated    Outcome: Progressing  
                                [facility] Adult Intravenous to Oral Protocol    pantoprazole changed to PO per [facility] P&T IV to PO protocol.    Patient meets criteria based on the following:  Tolerating diet more advanced than clear liquids  Tolerating other oral medications  Not on vasopressors  No nausea/vomiting within past 24 hours  No active GI bleed  No gastrectomy, gastric outlet or bowel obstruction, ileus, malabsorption syndrome  No seizures for 72 hours (antiepileptics only)    Thank you,  Rachele Chew R.Ph., BCPS., 7/21/2025,3:18 PM      
   [facility]      Patient - Sandie Carrera   MRN -  [id number]   [facility] # - [id number]   - 1970      Date of Admission -  2025 12:09 PM  Date of evaluation -  2025  Room - 3B-26/026   Hospital Day - 6  Consulting - Elijah Liu MD Primary Care Physician - Elijah Liu MD     Problem List      Active Hospital Problems    Diagnosis Date Noted    Cardiomyopathy (HCC) [I42.9] 2025    Acute combined systolic and diastolic congestive heart failure (HCC) [I50.41] 2025    Acute pulmonary edema (HCC) [J81.0] 2025    Cardiopulmonary arrest with successful resuscitation (HCC) [I46.9] 2025     Reason for Consult    Acute on chronic respiratory failure with hypoxia and hypercapnia   HPI   History Obtained From: Patient and electronic medical record.    Sandie Carrera is a 54 y.o. male with a history of severe COPD on home oxygen, mild systolic heart failure (EF 50%), HTN and schizophrenia, who presented to the ED via EMS as a cardiac arrest (CODE BLUE).  He was initially found at home in respiratory distress with altered mental status.  While en route to the hospital, he became unresponsive with snoring respirations, then pulseless.  EMS initiated CPR and administered 2 mg epinephrine.  On ED arrival, he remained in PEA arrest, ROSC was achieved after continue with resuscitation.  He was intubated and transferred to the ICU initial labs showed elevated troponin at 30, mild lactic acidosis, hypercapnia, and proBNP of 2473.  EKG showed sinus tachycardia with PVCs, CT chest revealed bilateral pleural effusions and atelectasis.  Extubated on 2025 and now maintained on high flow oxygen.    He is having shortness of breath: No  Onset: gradual   Diurnal variation:  None.      He can climb steps: No  Flights of steps she can climb: 0        He is having cough: Yes  Duration of cough: for 4 days.   His cough is associated with sputum 
  CRITICAL CARE PROGRESS NOTE      Patient:  Sandie Carrera    Unit/Bed:4D-16/016-A  YOB: 1970  MRN: [id number]   PCP: Elijah Liu MD  Date of Admission: 7/17/2025  Chief Complaint:-S/p cardiac arrest    Assessment and Plan:    COPD exacerbation, with emphysema:  PFT on 3/18/24 FEV1 showed severe obstructive disease. On 4 L NC at home.  Extubated 7/18/2025.  Albuterol, Brovana, Pulmicort, Flonase, Spiriva. AVOID BIPAP as pt is not complaint with his home PAP. Cont nasal cannula for the patient.  Consider discussion with family on CODE STATUS changes. Pt making choices that comport more with comfort than longevity by not wearing bipap when he should.   Need to   Schizophrenia: Continue Haldol  Bilateral small pleural effusion: likely some volume overload with some component of acute heart failure see below.   Will diurese again today, renal function improved after diuresis yesterday   Nondisplaced anterior left lateral fifth rib fracture: Noted on CT chest 7/17.  Lidocaine patch.  Acute on likely chronic systolic and diastolic heart failure: LVEF 25-30%.  Moderately dilated left ventricle.  Normal wall thickness.  Severe global hypokinesis present abnormal diastolic function.   Group 3 pulmonary hypertension with likely group 2 component as well: diuresing above   Hyperglycemia: Low-dose sliding scale insulin.  POCT glucose checks.  Hypoglycemia protocol in place.  DVT PPx: Lovenox  GI PPx: Protonix  Chronic hypercapnic respiratory failure. AVOID BiPAP as pt is non-complaint with his home PAP and we do not want to have his kidneys stop compensating.  Does need GOC discussion with family   Respiratory cardiac arrest: Resolved. Secondary to respiratory arrest due to COPD exacerbation.  Achieved ROSC within 2 cycles of PEA protocol on 7/17.    INITIAL H AND P AND ICU COURSE:  Per initial ICU H&P:\"This is 55 yo.M with PMHx of severe COPD on 4 L nasal cannula, mild systolic HF (EF 6/10/25 = 50%),  who 
  CRITICAL CARE Progress NOTE      Patient:  Sandie Carrera    Unit/Bed:4D-16/016-A  YOB: 1970  MRN: [id number]   PCP: Elijah Liu MD  Date of Admission: 7/17/2025  Chief Complaint:- respiratory cardiac arrest     Assessment and Plan:    Respiratory cardiac arrest: Secondary to respiratory event due to COPD exacerbation.  Achieved ROSC within 2 cycles of PEA protocol on 7/17.  Patient following verbal commands.  Acute on chronic hypercapnic respiratory failure. 2/2 COPD exacerbation. Intubated on 7/17 post respiratory cardiac arrest.  Chest x-ray on 7/18 showing increased haziness on the right.  Wean off vent setting.  Attempted SBT, if patient passes SBT plan for extubation to BiPAP.  COPD exacerbation, with Emphysema: severe. PFT on 3/18/24 FEV1 showed severe obstructive disease. On 4 L NC at home. Currently intubated. Cont albuterol, fluticasone, Stiolto. Cont Rocephin and doxy. Started on Decadron.  Continue albuterol, Stiolto and Flonase.  Continue Decadron.  Continue ceftriaxone and doxy.  Hyperkalemia: Potassium 5.7.  Started on Lokelma.  Give one-time dose of Lasix.  Monitor potassium q6h  Bilateral small Pleural effusion:  No signs of vol overload. CXR showing improvement. Cont to monitor.  Nondisplaced anterior lateral left fifth rib fracture: Noted on CT chest on 7/17. Lidocaine patch  Mild elevated BNP: 2473. F/u pending Echo.  Pulm HTN, group III: noted.   Hyperglycemia:  Blood glucose under control. Cont LDSSI. POCT glucose x3. Hypoglycemia protocol  DVT ppx:  Lovenox  GI ppx:  protonix  Shock: resolved. Pt was noted to be required short duration of pressors. Wean off Levophed.  Lactic acidosis: Resolved    INITIAL H AND P AND ICU COURSE:  HPI was obtained from EMS report  This is 53 yo.M with PMHx of severe COPD on 4 L nasal cannula, mild systolic HF (EF 6/10/25 = 50%),  who presented to [facility] on 7/17 as CODE blue. Per EMS, wife called for pt's difficulty breathing. EMS noted pt 
 [facility]  INPATIENT PHYSICAL THERAPY  DAILY NOTE  STRZ CCU-STEPDOWN 3B - 3B-26/026-A      Discharge Recommendations: cont to assess pts ability to meet STGs as he will need to be able to go up/down a flight of steps and depending on his O2 needs he is currently on high flow and may need LTAC prior to return home   Equipment Recommendations: Yes (may require a RW depending on how the pt's mobility progresses)                 Time In: 1337  Time Out: 1418  Timed Code Treatment Minutes: 41 Minutes  Minutes: 41          Date: 2025  Patient Name: Sandie Carrera,  Gender:  male        MRN: [id number]  : 1970  (54 y.o.)     Referring Practitioner: Elijah Liu MD  Diagnosis: Cardiopulmonary arrest with successful resuscitation (HCC)  Additional Pertinent Hx: This is 55 yo.M with PMHx of severe COPD on 4 L nasal cannula, mild systolic HF (EF 6/10/25 = 50%),  who presented to [facility] on  as CODE blue. Per EMS, wife called for pt's difficulty breathing. EMS noted pt sitting in chair in acute respiratory distress.  Initially following the commands and ambulate to bathroom.  Per report patient was communicating however unable to understand.  Initial GCS score was 14.  However patient looked confused.  Per report wife stated patient was not acting appropriately hours prior to leading to difficulty of breathing.  Per EMS patient ambulated to EMS via call on his own.  Upon laying down onto the stretcher patient began not following commands, snoring respiration and subsequently stopped responding and became pulseless.  EKG by EMS showed a PEA and patient was initiated with PEA ACLS protocol and was given 2 mg of epi.  On arrival in the ED patient was noted to be in PEA.  Intubated and PEA protocol was continued.  After receiving 2 mg of epi patient received ROSC and started to wake up.    Initial labs were significant for lactic acid of 2.5, troponin of 30 with baseline around .  proBNP 2473.  
 [facility]  INPATIENT PHYSICAL THERAPY  DAILY NOTE  STRZ CCU-STEPDOWN 3B - 3B-26/026-A      Discharge Recommendations: if unable to meet STGs pt will need a therapy stay prior to return home as he will need to be able to go up/down 15 reps and unable to progress activity at this time due to being on high flow O2   Equipment Recommendations: Yes (may require a RW depending on how the pt's mobility progresses)                 Time In: 1441  Time Out: 1522  Timed Code Treatment Minutes: 41 Minutes  Minutes: 41          Date: 2025  Patient Name: Sandie Carrera,  Gender:  male        MRN: [id number]  : 1970  (54 y.o.)     Referring Practitioner: Elijah Liu MD  Diagnosis: Cardiopulmonary arrest with successful resuscitation (HCC)  Additional Pertinent Hx: This is 53 yo.M with PMHx of severe COPD on 4 L nasal cannula, mild systolic HF (EF 6/10/25 = 50%),  who presented to [facility] on  as CODE blue. Per EMS, wife called for pt's difficulty breathing. EMS noted pt sitting in chair in acute respiratory distress.  Initially following the commands and ambulate to bathroom.  Per report patient was communicating however unable to understand.  Initial GCS score was 14.  However patient looked confused.  Per report wife stated patient was not acting appropriately hours prior to leading to difficulty of breathing.  Per EMS patient ambulated to EMS via call on his own.  Upon laying down onto the stretcher patient began not following commands, snoring respiration and subsequently stopped responding and became pulseless.  EKG by EMS showed a PEA and patient was initiated with PEA ACLS protocol and was given 2 mg of epi.  On arrival in the ED patient was noted to be in PEA.  Intubated and PEA protocol was continued.  After receiving 2 mg of epi patient received ROSC and started to wake up.    Initial labs were significant for lactic acid of 2.5, troponin of 30 with baseline around .  proBNP 2473.  
1230 pt restless/agitated breathing 40 breaths a minute trying to pull tube out  1235 dr. Mac at bedside. Attempt at verbal redirection unsuccessful. Pt agitated pulling at tube.  Dr. Mac order to extubate  1245 pt extubated to bipap  1250 patient 40-50 breaths per minute on bipap  1315 Restless/agitation continues, patient trying to pull mask off. Verbal redirection unsuccessful.  1328 order for haldol  1345 patient respirations within normal range. Patient resting comfortably. Will continue to monitor  
Cardiology Progress Note      Patient:  Sandie Carrera  YOB: 1970  MRN: [id number]   Acct: [id number]  Admit Date:  7/17/2025  Primary Cardiologist: none    Note per dr walsh \"CHIEF COMPLAINT: New CHF s/p cardiac arrest        HPI: This is a pleasant 54 y.o. male presents with with pmhx of schizophrenia, COPD on 4 LNC that presented to [facility] on 7/17/25 in PEA cardiac arrest secondary to respiratory arrest after wife had contacted EMS for difficulty breathing. He was given 2 rounds of epinephrine and ROSC was obtained. He was successfully extubated on 7/18/25. Patient was stable for transfer out of ICU to stepdown and Cardiology was consulted for new EF drop from 50% to 25-30%.      The patient was difficult to understand, with history limited due to speech. He currently has a sitter in the room. \"    Subjective (Events in last 24 hours):  Pt awake and alert.  NAD. Has chest pain, some tenderness with palpation and worse with some movement     On high flow O2    Net I/o -5.3 L    Objective:   /70   Pulse 84   Temp 97.9 °F (36.6 °C) (Oral)   Resp 16   Ht 1.803 m (5' 11\")   Wt 96.6 kg (212 lb 15.4 oz)   SpO2 91%   BMI 29.70 kg/m²        TELEMETRY:s.t. 102    Physical Exam:  General Appearance: alert and oriented to person, place and time, in no acute distress  Cardiovascular: normal rate, regular rhythm, normal S1 and S2, no murmurs, rubs, clicks, or gallops, distal pulses intact, no carotid bruits, no JVD  Pulmonary/Chest: clear to auscultation bilaterally- no wheezes, rales or rhonchi, normal air movement, no respiratory distress  Abdomen: soft, non-tender, non-distended, normal bowel sounds, no masses Extremities: no cyanosis, clubbing or edema, pulse   Skin: warm and dry, no rash or erythema  Head: normocephalic and atraumatic  Eyes: pupils equal, round, and reactive to light  Neck: supple and non-tender without mass, no thyromegaly     Medications:    metoprolol succinate  12.5 mg Oral 
Cardiology Progress Note      Patient:  Sandie Carrera  YOB: 1970  MRN: [id number]   Acct: [id number]  Admit Date:  7/17/2025  Primary Cardiologist: none    Note per dr walsh \"CHIEF COMPLAINT: New CHF s/p cardiac arrest        HPI: This is a pleasant 54 y.o. male presents with with pmhx of schizophrenia, COPD on 4 LNC that presented to [facility] on 7/17/25 in PEA cardiac arrest secondary to respiratory arrest after wife had contacted EMS for difficulty breathing. He was given 2 rounds of epinephrine and ROSC was obtained. He was successfully extubated on 7/18/25. Patient was stable for transfer out of ICU to stepdown and Cardiology was consulted for new EF drop from 50% to 25-30%.      The patient was difficult to understand, with history limited due to speech. He currently has a sitter in the room. \"    Subjective (Events in last 24 hours):  Pt awake and alert.  NAD. No cp.  Sob is improving. No edema or orthopnea.  No complaints.  Poor historian  On high flow O2    Net I/o -4.1 L    Objective:   /71   Pulse 91   Temp 98.1 °F (36.7 °C) (Oral)   Resp 18   Ht 1.803 m (5' 11\")   Wt 96.2 kg (212 lb 1.3 oz)   SpO2 90%   BMI 29.58 kg/m²        TELEMETRY:s.t. 102    Physical Exam:  General Appearance: alert and oriented to person, place and time, in no acute distress  Cardiovascular: normal rate, regular rhythm, normal S1 and S2, no murmurs, rubs, clicks, or gallops, distal pulses intact, no carotid bruits, no JVD  Pulmonary/Chest: clear to auscultation bilaterally- no wheezes, rales or rhonchi, normal air movement, no respiratory distress  Abdomen: soft, non-tender, non-distended, normal bowel sounds, no masses Extremities: no cyanosis, clubbing or edema, pulse   Skin: warm and dry, no rash or erythema  Head: normocephalic and atraumatic  Eyes: pupils equal, round, and reactive to light  Neck: supple and non-tender without mass, no thyromegaly     Medications:    metoprolol succinate  12.5 mg Oral 
Comprehensive Nutrition Assessment    Type and Reason for Visit:  Initial (ventilator patient - TF recommendations)    Nutrition Recommendations/Plan:   If unable to extubate, recommend begin Vital 1.2 at 10ml/ hour, increase by 10ml every 6 hours as tolerates to goal 50ml/ hour; flush one (2.5 ounce) Proteinex daily; additional free water flush as per Physician   Monitoring labs versus need for TF formula adjustment- Vital 1.2 at goal 50ml/ hour would provide 1974mg potassium which is within renal limitations of 2-3 gams      Malnutrition Assessment:  Malnutrition Status:  At risk for malnutrition (07/18/25 1123)    Context:  Acute Illness     Findings of the 6 clinical characteristics of malnutrition:  Energy Intake:  Unable to assess (intubated)  Weight Loss:  No weight loss     Body Fat Loss:  No body fat loss     Muscle Mass Loss:  No muscle mass loss    Fluid Accumulation:  No fluid accumulation     Strength:  Not Performed    Nutrition Assessment:     Pt. nutritionally compromised AEB NPO status due to intubation 7/17.  At risk for further nutrition compromise r/t admit with cardiac arrest, COPD exacerbation and underlying medical condition (COPD, HF, HTN, schizophrenia).       Nutrition Related Findings:    Pt. Report/Treatments/Miscellaneous: Patient seen intubated, OG tube, recently stopped levophed per RN and report plans to try for extubation; Dr Mac reports plans for extubation- tube feed recommendation if unable to extubate   GI Status: hypoactive bowel sounds per RN   Pertinent Labs: sodium 138, potassium 5.6, BUN 23, Creatinine 1.2, glucose 111  Pertinent Meds: rocephin, decadron, humalog, protonix, lokelma, diprivan     Wound Type: None       Current Nutrition Intake & Therapies:          Diet NPO  Current Tube Feeding (TF) Recommendation:  Feeding Route: Orogastric  Formula: Peptide Based  Schedule: Continuous  Feeding Regimen: Vital 1.2 at 50ml/ hour  Additives/Modulars: Protein (one 2.5 
Comprehensive Nutrition Assessment    Type and Reason for Visit: Reassess    Nutrition Intervention:   Food and/or Nutrient Delivery:   Continue diet as ordered.   Initiate Ensure Plus BID and Magic Cups BID.   Recommend MVI.      Malnutrition Assessment:  [facility] Clinical Malnutrition Criteria  Malnutrition Status: At risk for malnutrition  Context: Acute Illness  Findings of clinical characteristics of malnutrition:   Energy Intake:  Unable to assess (intubated)  Weight Loss:  No weight loss     Body Fat Loss:  No body fat loss     Muscle Mass Loss:  No muscle mass loss    Fluid Accumulation:  No fluid accumulation     Strength:  Not Performed    Nutrition Assessment:    Admission Diagnosis/ Nutrition Course: admitted d/t cardiac arrest, intubated on 7/17 and extubated on 7/18.   Patient has a past medical history of Acute on chronic systolic congestive heart failure (HCC), Emphysema (subcutaneous) (surgical) resulting from a procedure, Hypertension, Pneumonia, and Schizophrenia (HCC).  Patient has a past surgical history that includes Cardiac procedure (N/A, 6/12/2025).    Nutrition Related Findings:    Pt. Report: Patient seen, sitting up in chair in room. Patient difficult to understand; on HFNC. Per RD observation he ate % of his lunch. He reports that he has a good appetite and that he was eating well PTA. He is willing to trial OSN.   GI Status: Last BM unknown   Wound: None   Pertinent Medications: pulmicort, jardiance, lasix, humalog, metoprolol, protonix, statin  Pertinent Labs:   Lab Results   Component Value Date    LABA1C 5.4 07/17/2025    LABA1C 6.0 12/15/2019    LABA1C 5.9 04/17/2017     Recent Labs     07/19/25  1352 07/20/25  0415 07/21/25  0432   NA  --  141 141   K 5.5* 4.4 3.9   CL  --  100 94*   GLUCOSE  --  83 81   BUN  --  33* 20   CREATININE  --  1.1 0.8     Current Nutrition Intake & Therapies:    Recent PO intake: %  Recent Supplement Intake: None Ordered (willing 
INTERNAL MEDICINE Progress Note  7/20/2025 1:54 PM  Subjective:   Admit Date: 7/17/2025  PCP: Elijah Liu MD  Interval History: Resumption of care  Patient admitted into the hospital on 7/17/2025 for acute hypercapnic respiratory failure with cardiac arrest /PEA, successfully resuscitated with ROSC.  Extubated on 7/18/2025.  Now maintained on high flow oxygen.  New CHF reduced LVEF    C/o: Shortness of breath.  Cough, denies chest pain.  No leg swelling.  No nausea, no vomiting.    Objective:   Vitals: /78   Pulse 92   Temp 98.6 °F (37 °C) (Oral)   Resp 24   Ht 1.803 m (5' 11\")   Wt 100 kg (220 lb 7.4 oz)   SpO2 92%   BMI 30.75 kg/m²   General appearance: alert and cooperative with exam, on high flow oxygen.  HEENT: Head: atraumatic, carious dentition  Neck: no adenopathy, no carotid bruit, and no JVD  Lungs: diminished breath sounds bilaterally and rhonchi bilaterally  Heart: S1, S2 normal  Abdomen: soft, non-tender; bowel sounds normal; no masses,  no organomegaly  Extremities: no edema, redness or tenderness in the calves or thighs  Neurologic: Mental status: alertness: Patient is alert, however difficult to understand., orientation: person, affect: blunted      Medications:   Scheduled Meds:   arformoterol tartrate  15 mcg Nebulization BID RT    budesonide  0.5 mg Nebulization BID RT    tiotropium  2 puff Inhalation Daily RT    albuterol  2.5 mg Nebulization 4x Daily RT    lidocaine  1 patch TransDERmal Daily    sodium chloride flush  10 mL IntraVENous 2 times per day    enoxaparin  40 mg SubCUTAneous Daily    fluticasone  2 spray Each Nostril BID    cefTRIAXone (ROCEPHIN) IV  2,000 mg IntraVENous Q24H    doxycycline (VIBRAMYCIN) IV  100 mg IntraVENous Q12H    pravastatin  40 mg Oral Nightly    insulin lispro  0-4 Units SubCUTAneous 4x Daily AC & HS    pantoprazole  40 mg IntraVENous Daily     Continuous Infusions:   sodium chloride 20 mL (07/20/25 1010)    dextrose         Lab Results:   CBC: 
INTERNAL MEDICINE Progress Note  7/21/2025 5:30 PM  Subjective:   Admit Date: 7/17/2025  PCP: Elijah Liu MD  Interval History: Resumption of care  Patient admitted into the hospital on 7/17/2025 for acute hypercapnic respiratory failure with cardiac arrest /PEA, successfully resuscitated with ROSC.  Extubated on 7/18/2025.  Now maintained on high flow oxygen.  New CHF reduced LVEF    C/o: Shortness of breath.  Cough, denies chest pain.  No leg swelling.  No nausea, no vomiting.    7/21/25  On HFO2  Calm, SOB+, cough  Tolerating orally    Objective:   Vitals: /66   Pulse (!) 109   Temp 98.4 °F (36.9 °C) (Oral)   Resp 18   Ht 1.803 m (5' 11\")   Wt 107 kg (235 lb 14.3 oz)   SpO2 93%   BMI 32.90 kg/m²   General appearance: alert and cooperative with exam, on high flow oxygen.  HEENT: : atraumatic, carious dentition  Neck: no adenopathy, no carotid bruit, and no JVD  Lungs: diminished breath sounds bilaterally and rhonchi bilaterally  Heart: S1, S2 normal  Abdomen: soft, non-tender; bowel sounds normal; no masses,  no organomegaly  Extremities: no edema, redness or tenderness in the calves or thighs  Neurologic:  Patient is alert, however difficult to understand., orientation: person, affect: blunted      Medications:   Scheduled Meds:   metoprolol succinate  12.5 mg Oral Daily    empagliflozin  10 mg Oral Daily    [START ON 7/22/2025] pantoprazole  40 mg Oral QAM AC    furosemide  20 mg IntraVENous Daily    aspirin  325 mg Oral Daily    buPROPion  150 mg Oral QAM    risperiDONE  2 mg Oral BID    arformoterol tartrate  15 mcg Nebulization BID RT    budesonide  0.5 mg Nebulization BID RT    tiotropium  2 puff Inhalation Daily RT    albuterol  2.5 mg Nebulization 4x Daily RT    lidocaine  1 patch TransDERmal Daily    sodium chloride flush  10 mL IntraVENous 2 times per day    enoxaparin  40 mg SubCUTAneous Daily    fluticasone  2 spray Each Nostril BID    cefTRIAXone (ROCEPHIN) IV  2,000 mg IntraVENous 
INTERNAL MEDICINE Progress Note  7/22/2025 6:37 PM  Subjective:   Admit Date: 7/17/2025  PCP: Elijah Liu MD  Interval History: Resumption of care  Patient admitted into the hospital on 7/17/2025 for acute hypercapnic respiratory failure with cardiac arrest /PEA, successfully resuscitated with ROSC.  Extubated on 7/18/2025.  Now maintained on high flow oxygen.  New CHF reduced LVEF    C/o: Shortness of breath.  Cough, denies chest pain.  No leg swelling.  No nausea, no vomiting.    7/21/25  On HFO2  Calm, SOB+, cough  Tolerating orally    7/22/25  On HFO2  SOB+    Objective:   Vitals: /74   Pulse 95   Temp 98.4 °F (36.9 °C) (Oral)   Resp 18   Ht 1.803 m (5' 11\")   Wt 97.3 kg (214 lb 8.1 oz)   SpO2 96%   BMI 29.92 kg/m²   General appearance: alert and cooperative with exam, on HFO2  HEENT: : atraumatic, carious dentition  Neck: no adenopathy, no carotid bruit, and no JVD  Lungs: diminished breath sounds bilaterally and rhonchi bilaterally  Heart: S1, S2 normal  Abdomen: soft, non-tender; bowel sounds normal; no masses,  no organomegaly  Extremities: no edema, redness or tenderness in the calves or thighs  Neurologic:  alert, orientation: person, affect: blunted      Medications:   Scheduled Meds:   metoprolol succinate  12.5 mg Oral Daily    empagliflozin  10 mg Oral Daily    pantoprazole  40 mg Oral QAM AC    furosemide  20 mg IntraVENous Daily    aspirin  325 mg Oral Daily    buPROPion  150 mg Oral QAM    risperiDONE  2 mg Oral BID    budesonide  0.5 mg Nebulization BID RT    tiotropium  2 puff Inhalation Daily RT    albuterol  2.5 mg Nebulization 4x Daily RT    lidocaine  1 patch TransDERmal Daily    sodium chloride flush  10 mL IntraVENous 2 times per day    enoxaparin  40 mg SubCUTAneous Daily    fluticasone  2 spray Each Nostril BID    cefTRIAXone (ROCEPHIN) IV  2,000 mg IntraVENous Q24H    pravastatin  40 mg Oral Nightly    insulin lispro  0-4 Units SubCUTAneous 4x Daily AC & HS     Continuous 
INTERNAL MEDICINE Progress Note  7/23/2025 2:53 PM  Subjective:   Admit Date: 7/17/2025  PCP: Elijah Liu MD  Interval History: Resumption of care  Patient admitted into the hospital on 7/17/2025 for acute hypercapnic respiratory failure with cardiac arrest /PEA, successfully resuscitated with ROSC.  Extubated on 7/18/2025.  Now maintained on high flow oxygen.  New CHF reduced LVEF    C/o: Shortness of breath.  Cough, denies chest pain.  No leg swelling.  No nausea, no vomiting.    7/21/25  On HFO2  Calm, SOB+, cough  Tolerating orally    7/22/25  On HFO2  SOB+    7/23/25  Looks better on HFO2,   diuresing well    Objective:   Vitals: /60   Pulse 95   Temp 98.7 °F (37.1 °C) (Oral)   Resp 16   Ht 1.803 m (5' 11\")   Wt 96.2 kg (212 lb 1.3 oz)   SpO2 96%   BMI 29.58 kg/m²   General appearance: alert and cooperative with exam, on HFO2  HEENT: : atraumatic, carious dentition  Neck: no adenopathy, no carotid bruit, and no JVD  Lungs: diminished but improved AE mateo, no rhonchi  Heart: S1, S2 normal  Abdomen: soft, non-tender; bowel sounds normal; no masses,  no organomegaly  Extremities: no edema, redness or tenderness in the calves or thighs  Neurologic:  alert, orientation: person, affect: blunted      Medications:   Scheduled Meds:   metoprolol succinate  12.5 mg Oral Daily    empagliflozin  10 mg Oral Daily    pantoprazole  40 mg Oral QAM AC    furosemide  20 mg IntraVENous Daily    aspirin  325 mg Oral Daily    buPROPion  150 mg Oral QAM    risperiDONE  2 mg Oral BID    budesonide  0.5 mg Nebulization BID RT    tiotropium  2 puff Inhalation Daily RT    albuterol  2.5 mg Nebulization 4x Daily RT    lidocaine  1 patch TransDERmal Daily    sodium chloride flush  10 mL IntraVENous 2 times per day    enoxaparin  40 mg SubCUTAneous Daily    fluticasone  2 spray Each Nostril BID    cefTRIAXone (ROCEPHIN) IV  2,000 mg IntraVENous Q24H    pravastatin  40 mg Oral Nightly    insulin lispro  0-4 Units 
INTERNAL MEDICINE Progress Note  7/24/2025 2:59 PM  Subjective:   Admit Date: 7/17/2025  PCP: Elijah Liu MD  Interval History: Resumption of care  Patient admitted into the hospital on 7/17/2025 for acute hypercapnic respiratory failure with cardiac arrest /PEA, successfully resuscitated with ROSC.  Extubated on 7/18/2025.  Now maintained on high flow oxygen.  New CHF reduced LVEF    C/o: Shortness of breath.  Cough, denies chest pain.  No leg swelling.  No nausea, no vomiting.    7/21/25  On HFO2  Calm, SOB+, cough  Tolerating orally    7/22/25  On HFO2  SOB+    7/23/25  Looks better on HFO2,   diuresing well    7/24/25  Evaluated for LTAC admission  Sandie has some SOB but not in distress  Diuresing well  On HFO2    Objective:   Vitals: /75   Pulse 89   Temp 97.5 °F (36.4 °C) (Oral)   Resp 16   Ht 1.803 m (5' 11\")   Wt 96.6 kg (212 lb 15.4 oz)   SpO2 94%   BMI 29.70 kg/m²   General appearance: alert and cooperative with exam, on HFO2  HEENT: : atraumatic,   Neck: no adenopathy, no carotid bruit, and no JVD  Lungs: diminished but improved AE mateo, no rhonchi  Heart: S1, S2 normal  Abdomen: soft, non-tender; bowel sounds normal; no masses,  no organomegaly  Extremities: no edema, redness or tenderness in the calves or thighs  Neurologic:  alert, orientation: person, affect: blunted      Medications:   Scheduled Meds:   metoprolol succinate  12.5 mg Oral Daily    empagliflozin  10 mg Oral Daily    pantoprazole  40 mg Oral QAM AC    furosemide  20 mg IntraVENous Daily    aspirin  325 mg Oral Daily    buPROPion  150 mg Oral QAM    risperiDONE  2 mg Oral BID    budesonide  0.5 mg Nebulization BID RT    tiotropium  2 puff Inhalation Daily RT    albuterol  2.5 mg Nebulization 4x Daily RT    lidocaine  1 patch TransDERmal Daily    sodium chloride flush  10 mL IntraVENous 2 times per day    enoxaparin  40 mg SubCUTAneous Daily    fluticasone  2 spray Each Nostril BID    pravastatin  40 mg Oral Nightly    
[facility]  [facility] CCU-STEPDOWN 3B  Occupational Therapy  Daily Note    Discharge Recommendations: Subacute/skilled nursing facility  Equipment Recommendations: No (will continue to monitor)        Time In: 1103  Time Out: 1128  Timed Code Treatment Minutes: 25 Minutes  Minutes: 25          Date: 2025  Patient Name: Sandie Carrera,   Gender: male      Room: [address] [place] [address]  MRN: [id number]  : 1970  (54 y.o.)  Referring Practitioner: Elijah Liu MD  Diagnosis: Cardiopulmonary arrest with successful resuscitation (HCC)  Additional Pertinent Hx: Per EMR: 54 y.o. male with a history of severe COPD on home oxygen, mild systolic heart failure (EF 50%) and schizophrenia, who presented to the ED via EMS as a cardiac arrest (CODE BLUE).  He was initially found at home in respiratory distress with altered mental status.  While en route to the hospital, he became unresponsive with snoring respirations, then pulseless.  EMS initiated CPR and administered 2 mg epinephrine.  On ED arrival, he remained in PEA arrest, ROSC was achieved after continue with resuscitation.  He was intubated and transferred to the ICU initial labs showed elevated troponin at 30, mild lactic acidosis, hypercapnia, and proBNP of 2473.  EKG showed sinus tachycardia with PVCs, CT chest revealed bilateral pleural effusions and atelectasis.    Restrictions/Precautions:  Restrictions/Precautions: General Precautions, Fall Risk     Social/Functional History:  Lives With: Family (sister)  Type of Home: Apartment  Home Layout: One level  Home Access: Stairs to enter with rails  Entrance Stairs - Number of Steps: 15  Home Equipment: Oxygen (4-6L baseline)   Bathroom Shower/Tub: Walk-in shower  Bathroom Toilet: Standard  Bathroom Equipment: Grab bars in shower, Shower chair, Grab bars around toilet    Receives Help From: Family  Prior Level of Assist for ADLs: Independent  Prior Level of Assist for Homemaking: Independent  Prior Level of 
[facility]  OCCUPATIONAL THERAPY MISSED TREATMENT NOTE  STRZ CCU-STEPDOWN 3B  3B-26/026-A      Date: 2025  Patient Name: Sandie Carrera        CSN: [id number]   : 1970  (54 y.o.)  Gender: male   Referring Practitioner: Elijah Liu MD            REASON FOR MISSED TREATMENT: RN requesting to allow patient to rest at this time d/t just finishing with PT. OT will check back as time allows.                
[facility]  STRZ CCU-STEPDOWN 3B  Occupational Therapy  Daily Note    Discharge Recommendations: Subacute/skilled nursing facility  Equipment Recommendations: No (will continue to monitor)         Time In: 1255  Time Out: 1320  Timed Code Treatment Minutes: 25 Minutes  Minutes: 25          Date: 2025  Patient Name: Sandie Carrera,   Gender: male      Room: [address] [place] [address]  MRN: [id number]  : 1970  (54 y.o.)  Referring Practitioner: Elijah Liu MD  Diagnosis: Cardiopulmonary arrest with successful resuscitation (HCC)  Additional Pertinent Hx: Per EMR: 54 y.o. male with a history of severe COPD on home oxygen, mild systolic heart failure (EF 50%) and schizophrenia, who presented to the ED via EMS as a cardiac arrest (CODE BLUE).  He was initially found at home in respiratory distress with altered mental status.  While en route to the hospital, he became unresponsive with snoring respirations, then pulseless.  EMS initiated CPR and administered 2 mg epinephrine.  On ED arrival, he remained in PEA arrest, ROSC was achieved after continue with resuscitation.  He was intubated and transferred to the ICU initial labs showed elevated troponin at 30, mild lactic acidosis, hypercapnia, and proBNP of 2473.  EKG showed sinus tachycardia with PVCs, CT chest revealed bilateral pleural effusions and atelectasis.    Restrictions/Precautions:  Restrictions/Precautions: General Precautions, Fall Risk      Social/Functional History:  Lives With: Family (sister)  Type of Home: Apartment  Home Layout: One level  Home Access: Stairs to enter with rails  Entrance Stairs - Number of Steps: 15  Home Equipment: Oxygen (4-6L baseline)   Bathroom Shower/Tub: Walk-in shower  Bathroom Toilet: Standard  Bathroom Equipment: Grab bars in shower, Shower chair, Grab bars around toilet    Receives Help From: Family  Prior Level of Assist for ADLs: Independent  Prior Level of Assist for Homemaking: Independent  Prior Level of 
Patient arrived to unit from ED via bed. Patient transferred to ICU bed and placed on continuous ICU bedside monitor. Patient admitted for Cardiac arrest (HCC) [I46.9]  COPD with acute exacerbation (HCC) [J44.1]  Cardiopulmonary arrest with successful resuscitation (HCC) [I46.9]. Vitals obtained. See flowsheets. Patient's IV access includes 18g right FA, 18g right AC, IO right leg. Current infusions and rates of infusion include propofol at 15mcg/kg/min, fentanyl gtt at 75mcg/hr, levophed at 20mcg/min. Assessment completed by Hermes DACOSTA. Two nurse skin assessment completed by Hermes DACOSTA and Ce RN. See flowsheets for assessment details. Policies and procedures of ICU unable to be explained to patient at this time. Family member(s)/representative(s) present at time of admission include none. Patient rights explained to family member(s)/representatives and patient, as able. Patient/patient's family member(s)/representative(s) N/A to have physician notified of their admission. All questions posed by patient's family member(s)/representative(s) and patient answered at this time.     
Pharmacy Medication History Note    List of current medications patient is taking is complete.    Source of information: Patient and fill history    Changes made to medication list:  Medications marked as not taking (include reason, ex. therapy complete or physician discontinued):  Docusate sodium 100 mg capsule--Therapy complete. Patient is no longer taking.   Flovent  mcg/act inhaler--Therapy complete. Patient is no longer taking.     Medications added/doses adjusted:  None    Other notes (ex. Recent course of antibiotics, Coumadin dosing):  None  Denies use of other OTC or herbal medications.    Allergies reviewed    Electronically signed by Taya Marroquin on 7/21/2025 at 11:45 AM      
Pharmacy Medication History Note    List of current medications patient is taking is complete.    Source of information: Patient and fill history    Changes made to medication list:  Medications marked as not taking (include reason, ex. therapy complete or physician discontinued):  The following medications were removed due to not being on [facility]'s fill history for this patient:  Docusate sodium 100 mg capsule   Flovent  mcg/act inhaler  Albuterol 0.083% nebulizer solution  Albuterol sulfate 90 mcg/act inhaler  Aspirin 325 mg EC  Bupropion 150 mg ER  Famotidine 20 mg  Furosemide 20 mg  Nicotine 14 mg/24 hours  Ondansetron 4 mg  Potassium Chloride 20 mEq  Pravastatin 40 mg  Progesterone 200 mg   Anoro Ellipta 62.5/25 mcg inhaler    Medications added/doses adjusted:  Carbamazepine 200 mg, Take 1 tablet by mouth twice daily.     Other notes (ex. Recent course of antibiotics, Coumadin dosing):  Last Haldol 50 mg/mL injection was 05/22/2025. Confirmed by OLESYA Dent from [facility].   Denies use of other OTC or herbal medications.    Allergies reviewed    Electronically signed by Taya Marroquin on 7/21/2025 at 11:45 AM      
RT attend code, bagged at 100% Fi02. Pt was intubated with a 7.5 ETT at 26cm at the lip. X-ray confirmed placement. Pt place on vent at PCV 22/8, 70%. Report called to ICU RRT.   
Spiritual Health History and Assessment/Progress Note  [facility]    Initial Encounter,  ,  ,      Name: Sandie Carrera MRN: [id number]    Age: 54 y.o.     Sex: male   Language: English   Jewish: Islam   Cardiopulmonary arrest with successful resuscitation (HCC)     Date: 7/23/2025            Total Time Calculated: (P) 4 min              Spiritual Assessment began in STR[facility] CCU-STEPDOWN 3B        Referral/Consult From: Rounding   Encounter Overview/Reason: Initial Encounter  Service Provided For: Patient    Nancy, Belief, Meaning:   Patient identifies as spiritual  Family/Friends No family/friends present      Importance and Influence:  Patient has spiritual/personal beliefs that influence decisions regarding their health  Family/Friends No family/friends present    Community:  Patient Other: None  Family/Friends No family/friends present    Assessment and Plan of Care:   During this encounter with Sandie, a 54 year old patient on 3B, patient is awake and alert as he laid in his bed. Patient offered minimum words verbally. Receptive to 's visit, calm and hopeful. Patient has no family present. Patient showed gratitude for the visit. I provided active listening, encouragement and nurtured hope. I also prayed for healing and peace for patient. Spiritual health staff will follow up with continue support as needed.      Patient Interventions include: Facilitated expression of thoughts and feelings  Family/Friends Interventions include: No family/friends present    Patient Plan of Care: Spiritual Care available upon further referral  Family/Friends Plan of Care: No family/friends present    Electronically signed by GISELA Roach on 7/23/2025 at 12:04 PM   
Transferred per bed with port O2 to 3B 37- Personal belongings sent with pt.  
[facility]  INPATIENT OCCUPATIONAL THERAPY  STRZ CCU-STEPDOWN 3B  EVALUATION      Discharge Recommendations: Continue to assess pending progress, Patient would benefit from continued therapy after discharge  Equipment Recommendations: No (will continue to monitor)        Time In: 823  Time Out: 902  Timed Code Treatment Minutes: 30 Minutes  Minutes: 39          Date: 2025  Patient Name: Sandie Carrera,   Gender: male      MRN: [id number]  : 1970  (54 y.o.)  Referring Practitioner: Elijah Liu MD  Diagnosis: Cardiopulmonary arrest with successful resuscitation (HCC)  Additional Pertinent Hx: Per EMR: 54 y.o. male with a history of severe COPD on home oxygen, mild systolic heart failure (EF 50%) and schizophrenia, who presented to the ED via EMS as a cardiac arrest (CODE BLUE).  He was initially found at home in respiratory distress with altered mental status.  While en route to the hospital, he became unresponsive with snoring respirations, then pulseless.  EMS initiated CPR and administered 2 mg epinephrine.  On ED arrival, he remained in PEA arrest, ROSC was achieved after continue with resuscitation.  He was intubated and transferred to the ICU initial labs showed elevated troponin at 30, mild lactic acidosis, hypercapnia, and proBNP of 2473.  EKG showed sinus tachycardia with PVCs, CT chest revealed bilateral pleural effusions and atelectasis.    Restrictions/Precautions:  Restrictions/Precautions: General Precautions, Fall Risk    Subjective  Chart Reviewed: Yes, Orders, Progress Notes, History and Physical  Patient assessed for rehabilitation services?: Yes  Family / Caregiver Present: No    Subjective: Per RN, okay for therapy. Pt in bed upon therapist arrival w/ live sitter present. Pt agreeable to OT session without additional encouragement or education. Pleasant and cooperative. Alert and oriented x4, though questionable historian and intermittent incomprehensible speech. RN 
Independent  Prior Level of Assist for Homemaking: Independent  Prior Level of Assist for Transfers: Independent  Has the patient had two or more falls in the past year or any fall with injury in the past year?: No    Active : No  Type of Occupation: works at [employer]  Additional Comments: Questionable historian. Reports IND PTA, no AD.    SUBJECTIVE:  Pleasant and cooperative.  Pt was sitting in the chair at the start of session and after having returned from the bathroom.  RN approved session.  Pt agreed to try doing an activity at the sink.   PAIN: None reported.    Vitals: Oxygen: Pt desaturated to 81% for short periods when shaving.  He was given cues to take rest breaks.  Pt was using HFNC at 63%, 50L/min setting.    COGNITION: Inattention and Decreased Safety Awareness    ADL:   Grooming: Minimal Assistance.  Pt shaved with a regular razor and shaving cream.  He needed cues to proceed slowly and assist for thoroughness around his chin. Pt is on Lovenox, per  nursing.   Toilet Transfer: Stand By Assistance. To/from the bedside commode in prep for doing his shaving at the sink.    IADL:   Not Tested    BED MOBILITY:  Not Tested    TRANSFERS:  Sit to Stand:  Stand By Assistance. From the recliner chair  Stand to Sit: Stand By Assistance. To the chair    FUNCTIONAL MOBILITY:  Assistive Device: None  Assist Level:  Stand By Assistance.   Distance: To and from bathroom  Pt has even steps.  He needs help with managing multiple lines.     ADDITIONAL ACTIVITIES:  Sitting Balance Tasks: doing his self care at the sink while sitting on a commode seat  Education: Importance of pacing himself and taking breaks as needed.      Functional Outcome Measures:   AM-PAC Inpatient Daily Activity Raw Score: 19     Modified [place]:  Current Functional Status:  Not Applicable    Education:  Learners: Patient  Coping Strategies; benefit of starting good daily habits to help himself feel better as he recovers from 
 P: 63. RR: 12. B/P: 93/68. O2 Sat: 100% via nasal cannula.  I/O:  2555/600  Body mass index is 30.72 kg/m².   GCS:   13  General:   Ill-appearing -American male  HEENT:  normocephalic and atraumatic.  No scleral icterus. PERR  Neck: supple.  No Thyromegaly.  Lungs: clear to auscultation.  No retractions  Cardiac: RRR.  No JVD.  Abdomen: soft.  Nontender.  Extremities:  No clubbing, cyanosis, or edema x 4.    Vasculature: capillary refill < 3 seconds. Palpable dorsalis pedis pulses.  Skin:  warm and dry.  Psych:  Alert and oriented x0.  Affect inappropriate  Lymph:  No supraclavicular adenopathy.  Neurologic:  No focal deficit. No seizures.    Data: (All radiographs, tracings, PFTs, and imaging are personally viewed and interpreted unless otherwise noted).   Sodium 141, potassium 5.9, chloride 101, bicarb 27, BUN 31, creatinine 1.5  WBC 6.3, hemoglobin 15.1, hematocrit 50.1, platelets 163  Telemetry shows NSR  ABG shows pH 7.31, pCO2 55, , HCO3 28        Seen with multidisciplinary ICU team.  Meets Continued ICU Level Care Criteria:    [x] Yes   [] No - Transfer Planned to listed location:  [] HOSPITALIST CONTACTED-      Case and plan discussed with Dr. Davidson    Electronically signed by Camilo Delvalle III, DO    Addendum by Dr. Stuart MD:  Patient seen by me independently including key components of medical care. Face to face evaluation and examination was performed. Case discussed with the resident physician/MASOOD. Agree with their findings and plan as documented in the resident/MASOOD's note.   More than 50% of the encounter time involved with patient care by the Pulmonary & Critical care service team spent by me.  I have modified the note above.     Critical Care time: 33 minutes.  Time was discontiguous. Time does not include procedure. Time does include my direct assessment of the patient and coordination of care. Pt acutely ill with high likelihood of death due to respiratory failure and 
O2  Acute HFrEF  New CMP - ef 25-30 per TTE 7/18/25, ef 50 per tte 6/10/25  PHTN  Hx schizophrenia       Plan:    Daily I/o and weights  2 liter fluid restriction and 2gm sodium diet  Keep mag >2 and K >4  Daily BMP - todays pending  Judicious diuresis - attending following  Cont BB/jardiance  Need cath once pulm status optimized - and would need approval from POA and confirmation of compliance with medications      Patient and Practitioner mutually agreed upon goal:   Patient and provider goals: Feel better and have more energy  Cardiac Rehab - Discussed the importance of attending Cardiac Rehab, the hours of operation, and staff would be contacting them after discharge for scheduling      Electronically signed by Tyesha Christensen PA-C on 7/22/2025 at 11:36 AM          
respiration is 20 and oxygen saturation is 92%.   Body mass index is 29.92 kg/m².    SUPPLEMENTAL O2: O2 Flow Rate (L/min): 50 L/min     I/O      Intake/Output Summary (Last 24 hours) at 7/22/2025 1211  Last data filed at 7/22/2025 0959  Gross per 24 hour   Intake 1340 ml   Output 2800 ml   Net -1460 ml     I/O last 3 completed shifts:  In: 3797 [P.O.:3797]  Out: 4150 [Urine:4150]   Patient Vitals for the past 96 hrs (Last 3 readings):   Weight   07/22/25 0336 97.3 kg (214 lb 8.1 oz)   07/21/25 0321 107 kg (235 lb 14.3 oz)   07/20/25 0423 100 kg (220 lb 7.4 oz)       Exam     Physical Exam   Constitutional: No distress on O2 per HFNC. Patient appears older than stated age   Head: Normocephalic and atraumatic.   Mouth/Throat: Oropharynx is clear and moist.  No oral thrush.  Eyes: Conjunctivae are normal. Pupils are equal, round. No scleral icterus.   Neck: Neck supple. No tracheal deviation present.   Cardiovascular: S1 and S2 with no murmur.  No peripheral edema  Pulmonary/Chest: Normal effort with bilateral air entry, faint expiratory wheeze. No stridor. No respiratory distress. Patient exhibits no tenderness.   Abdominal: Soft. Bowel sounds audible. No distension or tenderness to palp.   Musculoskeletal: Moves all extremities  Neurological: Patient is alert and oriented to self place and month/year    Labs  - Old records and notes have been reviewed in [facility]   ABG  Lab Results   Component Value Date/Time    PH 7.39 07/21/2025 11:17 PM    PO2 71 07/21/2025 11:17 PM    PCO2 72 07/21/2025 11:17 PM    HCO3 43 07/21/2025 11:17 PM    O2SAT 93 07/21/2025 11:17 PM     Lab Results   Component Value Date/Time    IFIO2 60 07/21/2025 11:17 PM    MODE Not entered 07/21/2025 11:17 PM    SETTIDVOL 480 07/21/2025 11:17 PM    SETPEEP 6.0 07/21/2025 11:17 PM     CBC  Recent Labs     07/20/25  0415 07/21/25  0432   WBC 7.0 7.5   RBC 4.66* 4.72   HGB 13.6* 13.8*   HCT 44.0 43.4   MCV 94.4* 91.9   MCH 29.2 29.2   MCHC 30.9* 31.8* 
environment  Short Term Goal 4: Pt to complete 15 steps with unilateral rail with SBA for safe home entry  Long Term Goals  Time Frame for Long Term Goals : NA due to short ELOS    Following session, patient left in safe position in chair, with alarm, and call light within reach        
practitioner's findings and plan as documented in the Certified nurse practitioner's note. Italicized font, if present,  represents changes to the note made by me. More than 50% of the encounter time involved with patient care by the Pulmonary & Critical care service team spent by me.    Please see my modifications mentioned below:  Improving shortness of breath  Using his noninvasive ventilator well at nighttime  Rest of the management as above  Discussed with RN taking care of patient regarding patient condition and my management plan at bed side.  Sandie Carrera educated about my impression and plan. He verbalizes understanding.      Electronically signed by   Gm Castaneda MD on 7/24/2025 at 8:33 PM

## 2025-07-24 NOTE — PLAN OF CARE
Problem: Chronic Conditions and Co-morbidities  Goal: Patient's chronic conditions and co-morbidity symptoms are monitored and maintained or improved  7/20/2025 0328 by Jamaica Palomino, RN  Outcome: Progressing  Flowsheets (Taken 7/17/2025 1940 by Hailey Quintanilla, RN)  Care Plan - Patient's Chronic Conditions and Co-Morbidity Symptoms are Monitored and Maintained or Improved:   Monitor and assess patient's chronic conditions and comorbid symptoms for stability, deterioration, or improvement   Collaborate with multidisciplinary team to address chronic and comorbid conditions and prevent exacerbation or deterioration  7/19/2025 1847 by Yuliana Alarcon, RN  Outcome: Progressing     Problem: Discharge Planning  Goal: Discharge to home or other facility with appropriate resources  7/20/2025 0328 by Jamaica Palomino, RN  Outcome: Progressing  Flowsheets (Taken 7/17/2025 1940 by Hailey Quintanilla, RN)  Discharge to home or other facility with appropriate resources:   Identify barriers to discharge with patient and caregiver   Arrange for needed discharge resources and transportation as appropriate   Identify discharge learning needs (meds, wound care, etc)   Refer to discharge planning if patient needs post-hospital services based on physician order or complex needs related to functional status, cognitive ability or social support system  7/19/2025 1847 by Yuliana Alarcon, RN  Outcome: Progressing     Problem: Safety - Adult  Goal: Free from fall injury  7/20/2025 0328 by Jamaica Palomino, RN  Outcome: Progressing  Flowsheets (Taken 7/20/2025 0328)  Free From Fall Injury: Instruct family/caregiver on patient safety  7/19/2025 1847 by Yuliana Alarcon, RN  Outcome: Progressing     Problem: Safety - Medical Restraint  Goal: Remains free of injury from restraints (Restraint for Interference with Medical Device)  Description: INTERVENTIONS:  1. Determine that other, less restrictive measures 
  Problem: Chronic Conditions and Co-morbidities  Goal: Patient's chronic conditions and co-morbidity symptoms are monitored and maintained or improved  7/22/2025 1140 by Zia Rosales RN  Outcome: Progressing  7/21/2025 2309 by Mireya Terry RN  Outcome: Progressing  Flowsheets (Taken 7/21/2025 2309)  Care Plan - Patient's Chronic Conditions and Co-Morbidity Symptoms are Monitored and Maintained or Improved: Monitor and assess patient's chronic conditions and comorbid symptoms for stability, deterioration, or improvement     Problem: Discharge Planning  Goal: Discharge to home or other facility with appropriate resources  7/22/2025 1140 by Zia Rosales RN  Outcome: Progressing  7/21/2025 2309 by Mireya Terry RN  Outcome: Progressing  Flowsheets (Taken 7/21/2025 2309)  Discharge to home or other facility with appropriate resources:   Identify barriers to discharge with patient and caregiver   Identify discharge learning needs (meds, wound care, etc)     Problem: Safety - Adult  Goal: Free from fall injury  7/22/2025 1140 by Zia Rosales RN  Outcome: Progressing  7/21/2025 2309 by Mireya Terry RN  Outcome: Progressing  Flowsheets (Taken 7/21/2025 2309)  Free From Fall Injury: Instruct family/caregiver on patient safety     Problem: Respiratory - Adult  Goal: Achieves optimal ventilation and oxygenation  7/22/2025 1140 by Zia Rosales RN  Outcome: Progressing  Flowsheets (Taken 7/22/2025 0750 by Antonio Gerber KAYLIE)  Achieves optimal ventilation and oxygenation: Assess for changes in respiratory status  7/21/2025 2309 by Mireya Terry RN  Outcome: Progressing  Flowsheets (Taken 7/21/2025 2309)  Achieves optimal ventilation and oxygenation: Assess for changes in respiratory status  Goal: Clear lung sounds  7/22/2025 1140 by Zia Rosales RN  Outcome: Progressing  7/21/2025 2309 by Mireya Terry RN  Outcome: Progressing     Problem: Pain  Goal: Verbalizes/displays adequate comfort 
  Problem: Chronic Conditions and Co-morbidities  Goal: Patient's chronic conditions and co-morbidity symptoms are monitored and maintained or improved  7/22/2025 2223 by Mireya Terry RN  Outcome: Progressing  Flowsheets (Taken 7/22/2025 2223)  Care Plan - Patient's Chronic Conditions and Co-Morbidity Symptoms are Monitored and Maintained or Improved: Monitor and assess patient's chronic conditions and comorbid symptoms for stability, deterioration, or improvement     Problem: Safety - Adult  Goal: Free from fall injury  7/22/2025 2223 by Mireya Terry RN  Outcome: Progressing  Flowsheets (Taken 7/22/2025 2223)  Free From Fall Injury: Instruct family/caregiver on patient safety     Problem: Safety - Medical Restraint  Goal: Remains free of injury from restraints (Restraint for Interference with Medical Device)  Description: INTERVENTIONS:  1. Determine that other, less restrictive measures have been tried or would not be effective before applying the restraint  2. Evaluate the patient's condition at the time of restraint application  3. Inform patient/family regarding the reason for restraint  4. Q2H: Monitor safety, psychosocial status, comfort, nutrition and hydration  Recent Flowsheet Documentation  Taken 7/22/2025 2223 by Mireya Terry RN  Remains free of injury from restraints (restraint for interference with medical device): Determine that other, less restrictive measures have been tried or would not be effective before applying the restraint     Problem: Safety - Medical Restraint  Goal: Remains free of injury from restraints (Restraint for Interference with Medical Device)  Description: INTERVENTIONS:  1. Determine that other, less restrictive measures have been tried or would not be effective before applying the restraint  2. Evaluate the patient's condition at the time of restraint application  3. Inform patient/family regarding the reason for restraint  4. Q2H: Monitor safety, psychosocial 
  Problem: Chronic Conditions and Co-morbidities  Goal: Patient's chronic conditions and co-morbidity symptoms are monitored and maintained or improved  Outcome: Progressing  Flowsheets (Taken 7/17/2025 1940 by Hailey Quintanilla, RN)  Care Plan - Patient's Chronic Conditions and Co-Morbidity Symptoms are Monitored and Maintained or Improved:   Monitor and assess patient's chronic conditions and comorbid symptoms for stability, deterioration, or improvement   Collaborate with multidisciplinary team to address chronic and comorbid conditions and prevent exacerbation or deterioration     Problem: Discharge Planning  Goal: Discharge to home or other facility with appropriate resources  Outcome: Progressing  Flowsheets (Taken 7/17/2025 1940 by Hailey Quintanilla, RN)  Discharge to home or other facility with appropriate resources:   Identify barriers to discharge with patient and caregiver   Arrange for needed discharge resources and transportation as appropriate   Identify discharge learning needs (meds, wound care, etc)   Refer to discharge planning if patient needs post-hospital services based on physician order or complex needs related to functional status, cognitive ability or social support system     Problem: Safety - Adult  Goal: Free from fall injury  Outcome: Progressing  Flowsheets (Taken 7/18/2025 1225)  Free From Fall Injury: Instruct family/caregiver on patient safety     Problem: Safety - Medical Restraint  Goal: Remains free of injury from restraints (Restraint for Interference with Medical Device)  Description: INTERVENTIONS:  1. Determine that other, less restrictive measures have been tried or would not be effective before applying the restraint  2. Evaluate the patient's condition at the time of restraint application  3. Inform patient/family regarding the reason for restraint  4. Q2H: Monitor safety, psychosocial status, comfort, nutrition and hydration  7/18/2025 1225 by Xiao Ferrara 
  Problem: Chronic Conditions and Co-morbidities  Goal: Patient's chronic conditions and co-morbidity symptoms are monitored and maintained or improved  Outcome: Progressing  Flowsheets (Taken 7/17/2025 1940 by Hailey Quintanilla, RN)  Care Plan - Patient's Chronic Conditions and Co-Morbidity Symptoms are Monitored and Maintained or Improved:   Monitor and assess patient's chronic conditions and comorbid symptoms for stability, deterioration, or improvement   Collaborate with multidisciplinary team to address chronic and comorbid conditions and prevent exacerbation or deterioration     Problem: Discharge Planning  Goal: Discharge to home or other facility with appropriate resources  Outcome: Progressing  Flowsheets (Taken 7/17/2025 1940 by Hailey Quintanilla, RN)  Discharge to home or other facility with appropriate resources:   Identify barriers to discharge with patient and caregiver   Arrange for needed discharge resources and transportation as appropriate   Identify discharge learning needs (meds, wound care, etc)   Refer to discharge planning if patient needs post-hospital services based on physician order or complex needs related to functional status, cognitive ability or social support system     Problem: Safety - Adult  Goal: Free from fall injury  Outcome: Progressing  Flowsheets (Taken 7/21/2025 0620)  Free From Fall Injury: Instruct family/caregiver on patient safety     Problem: Respiratory - Adult  Goal: Achieves optimal ventilation and oxygenation  7/21/2025 0620 by Gerri Mixon, RN  Outcome: Progressing  Flowsheets (Taken 7/19/2025 2014 by Antonio Gerber RCP)  Achieves optimal ventilation and oxygenation: Assess for changes in respiratory status     Problem: Pain  Goal: Verbalizes/displays adequate comfort level or baseline comfort level  Outcome: Progressing  Flowsheets (Taken 7/18/2025 1225 by Xiao Ferrara, RN)  Verbalizes/displays adequate comfort level or baseline comfort 
  Problem: Discharge Planning  Goal: Discharge to home or other facility with appropriate resources  Outcome: Progr  Problem: Safety - Medical Restraint  Goal: Remains free of injury from restraints (Restraint for Interference with Medical Device)  Description: INTERVENTIONS:  1. Determine that other, less restrictive measures have been tried or would not be effective before applying the restraint  2. Evaluate the patient's condition at the time of restraint application  3. Inform patient/family regarding the reason for restraint  4. Q2H: Monitor safety, psychosocial status, comfort, nutrition and hydration  7/18/2025 1419 by Xiao Ferrara, RN  Outcome: Completed     Problem: Nutrition Deficit:  Goal: Optimize nutritional status  7/18/2025 1225 by Xiao Ferrara RN  Outcome: Progressing  Flowsheets (Taken 7/18/2025 1131 by Maddie Fitzpatrick, RD, LD)  Nutrient intake appropriate for improving, restoring, or maintaining nutritional needs: Assess nutritional status and recommend course of action     Problem: Pain  Goal: Verbalizes/displays adequate comfort level or baseline comfort level  Outcome: Progressing  Flowsheets (Taken 7/18/2025 1225)  Verbalizes/displays adequate comfort level or baseline comfort level:   Assess pain using appropriate pain scale   Encourage patient to monitor pain and request assistance   essing  Flowsheets (Taken 7/17/2025 1940 by Hailey Quintanilla, RN)  Discharge to home or other facility with appropriate resources:   Identify barriers to discharge with patient and caregiver   Arrange for needed discharge resources and transportation as appropriate   Identify discharge learning needs (meds, wound care, etc)   Refer to discharge planning if patient needs post-hospital services based on physician order or complex needs related to functional status, cognitive ability or social support system     
  Problem: Nutrition Deficit:  Goal: Optimize nutritional status  Outcome: Not Progressing     Problem: Chronic Conditions and Co-morbidities  Goal: Patient's chronic conditions and co-morbidity symptoms are monitored and maintained or improved  Outcome: Progressing     Problem: Discharge Planning  Goal: Discharge to home or other facility with appropriate resources  Outcome: Progressing     Problem: Safety - Adult  Goal: Free from fall injury  Outcome: Progressing     Problem: Respiratory - Adult  Goal: Achieves optimal ventilation and oxygenation  Outcome: Progressing     Problem: Pain  Goal: Verbalizes/displays adequate comfort level or baseline comfort level  Outcome: Progressing     Problem: Skin/Tissue Integrity  Goal: Skin integrity remains intact  Description: 1.  Monitor for areas of redness and/or skin breakdown  2.  Assess vascular access sites hourly  3.  Every 4-6 hours minimum:  Change oxygen saturation probe site  4.  Every 4-6 hours:  If on nasal continuous positive airway pressure, respiratory therapy assess nares and determine need for appliance change or resting period  Outcome: Progressing     Problem: Safety - Medical Restraint  Goal: Remains free of injury from restraints (Restraint for Interference with Medical Device)  Description: INTERVENTIONS:  1. Determine that other, less restrictive measures have been tried or would not be effective before applying the restraint  2. Evaluate the patient's condition at the time of restraint application  3. Inform patient/family regarding the reason for restraint  4. Q2H: Monitor safety, psychosocial status, comfort, nutrition and hydration  Outcome: Progressing     Problem: Nutrition Deficit:  Goal: Optimize nutritional status  Outcome: Not Progressing     
  Problem: Respiratory - Adult  Goal: Achieves optimal ventilation and oxygenation  7/20/2025 2121 by Manoj Carter RCP  Outcome: Progressing   Continue breathing tx to improve wob and aeration & weaning highflow as tolerated   
  Problem: Respiratory - Adult  Goal: Achieves optimal ventilation and oxygenation  7/21/2025 0815 by Desiree Oreilly RCP  Outcome: Progressing     
  Problem: Respiratory - Adult  Goal: Achieves optimal ventilation and oxygenation  7/21/2025 1952 by Bina Wang RCP  Outcome: Progressing     Problem: Respiratory - Adult  Goal: Clear lung sounds  Outcome: Progressing     Patient mutually agreed on goals.  
  Problem: Respiratory - Adult  Goal: Achieves optimal ventilation and oxygenation  7/23/2025 0800 by Jennifer Metz RCP  Outcome: Progressing     Problem: Respiratory - Adult  Goal: Clear lung sounds  7/23/2025 0800 by Jennifer Metz RCP  Outcome: Progressing     
  Problem: Respiratory - Adult  Goal: Achieves optimal ventilation and oxygenation  Outcome: Progressing   Continue to wean high flow cannula as tolerated.  Aerosols to improve breath sounds.    
  Problem: Respiratory - Adult  Goal: Achieves optimal ventilation and oxygenation  Outcome: Progressing  Goal: Clear lung sounds  Outcome: Progressing   Patient lung sounds are considered normal for their current lung condition. No signs of distress noted. Current treatment regimen appropriate   
  Problem: Safety - Medical Restraint  Goal: Remains free of injury from restraints (Restraint for Interference with Medical Device)  Description: INTERVENTIONS:  1. Determine that other, less restrictive measures have been tried or would not be effective before applying the restraint  2. Evaluate the patient's condition at the time of restraint application  3. Inform patient/family regarding the reason for restraint  4. Q2H: Monitor safety, psychosocial status, comfort, nutrition and hydration  Remains free of injury from restraints (restraint for interference with medical device):   Determine that other, less restrictive measures have been tried or would not be effective before applying the restraint   Evaluate the patient's condition at the time of restraint application   Every 2 hours: Monitor safety, psychosocial status, comfort, nutrition and hydration   Inform patient/family regarding the reason for restraint    Care plan reviewed with pt's family. Pt's family verbalizes understanding of the care plan and contributed to goal setting.       
Hospitalist -- Hospitalist service notified of pt's transfer out of ICU for respiratory arrest.  When chart reviewed, noted pt is a pt of Dr. Liu.  Contacted Dr. Liu who will be assuming care of Mr. Guo's care.  Please call hospitalists for any further assistance if needed.    Electronically signed by TIFFANIE ZAIDI MD on 7/20/2025 at 1:07 PM   
Progressing  Flowsheets (Taken 7/23/2025 1114)  Verbalizes/displays adequate comfort level or baseline comfort level: Encourage patient to monitor pain and request assistance     Problem: Skin/Tissue Integrity  Goal: Skin integrity remains intact  Description: 1.  Monitor for areas of redness and/or skin breakdown  2.  Assess vascular access sites hourly  3.  Every 4-6 hours minimum:  Change oxygen saturation probe site  4.  Every 4-6 hours:  If on nasal continuous positive airway pressure, respiratory therapy assess nares and determine need for appliance change or resting period  7/23/2025 1114 by Camilla Kelly, RN  Outcome: Progressing  Flowsheets (Taken 7/23/2025 1114)  Skin Integrity Remains Intact: Monitor for areas of redness and/or skin breakdown     Problem: Nutrition Deficit:  Goal: Optimize nutritional status  7/23/2025 1114 by Camilla Kelly, RN  Outcome: Progressing  Flowsheets (Taken 7/23/2025 1114)  Nutrient intake appropriate for improving, restoring, or maintaining nutritional needs: Monitor oral intake, labs, and treatment plans     Problem: Safety - Medical Restraint  Goal: Remains free of injury from restraints (Restraint for Interference with Medical Device)  Description: INTERVENTIONS:  1. Determine that other, less restrictive measures have been tried or would not be effective before applying the restraint  2. Evaluate the patient's condition at the time of restraint application  3. Inform patient/family regarding the reason for restraint  4. Q2H: Monitor safety, psychosocial status, comfort, nutrition and hydration  7/23/2025 1114 by Camilla Kelly, RN  Outcome: Progressing   Care plan reviewed with patient.  Patient verbalizes understanding of the care plan and contributed to goal setting.   
psychosocial status, comfort, nutrition and hydration  Outcome: Progressing  Flowsheets (Taken 7/21/2025 2309)  Remains free of injury from restraints (restraint for interference with medical device): Determine that other, less restrictive measures have been tried or would not be effective before applying the restraint     Problem: Respiratory - Adult  Goal: Achieves optimal ventilation and oxygenation  7/21/2025 2309 by Mireya Terry RN  Outcome: Progressing  Flowsheets (Taken 7/21/2025 2309)  Achieves optimal ventilation and oxygenation: Assess for changes in respiratory status     Problem: Respiratory - Adult  Goal: Clear lung sounds  7/21/2025 2309 by Mireya Terry RN  Outcome: Progressing     Problem: Pain  Goal: Verbalizes/displays adequate comfort level or baseline comfort level  Outcome: Progressing  Flowsheets (Taken 7/21/2025 2309)  Verbalizes/displays adequate comfort level or baseline comfort level:   Encourage patient to monitor pain and request assistance   Assess pain using appropriate pain scale     Problem: Skin/Tissue Integrity  Goal: Skin integrity remains intact  Description: 1.  Monitor for areas of redness and/or skin breakdown  2.  Assess vascular access sites hourly  3.  Every 4-6 hours minimum:  Change oxygen saturation probe site  4.  Every 4-6 hours:  If on nasal continuous positive airway pressure, respiratory therapy assess nares and determine need for appliance change or resting period  Outcome: Progressing  Flowsheets (Taken 7/21/2025 2309)  Skin Integrity Remains Intact: Monitor for areas of redness and/or skin breakdown     Problem: Nutrition Deficit:  Goal: Optimize nutritional status  Outcome: Progressing  Flowsheets (Taken 7/21/2025 2309)  Nutrient intake appropriate for improving, restoring, or maintaining nutritional needs: Assess nutritional status and recommend course of action

## 2025-07-24 NOTE — DISCHARGE SUMMARY
Discharge Summary    Sandie Carrera  :  1970  MRN:  [id number]    Admit date:  2025  Discharge date:      Admitting Physician:  Elijah Liu MD    Discharge Diagnoses:        Acute hypercapnic respiratory failure, required vent support now extubated.  Cardiac arrest / PEA with ROSC  New CHF, acute systolic and diastolic dysfunction.  COPD with exacerbation.  Moderate pulmonary arterial hypertension, confirmed with previous right heart catheterization.  Schizophrenia.   -stable on risperidone  Patient Active Problem List   Diagnosis    Acute on chronic respiratory failure with hypercapnia (HCC)    Respiratory insufficiency/failure    Chronic obstructive pulmonary disease with acute exacerbation (HCC)    Acute on chronic systolic CHF (congestive heart failure) (HCC)    Nicotine abuse    Chest pain    Schizophreniform disorder (HCC)    Respiratory failure (HCC)    Smoker    Acute respiratory failure (HCC)    Acute on chronic respiratory failure (HCC)    Pneumonia of right lower lobe due to infectious organism    Aspiration pneumonia of both lower lobes due to gastric secretions (HCC)    Community acquired bacterial pneumonia    Acute on chronic respiratory failure with hypoxia (HCC)    Hypoxemic respiratory failure, chronic (HCC)    COPD exacerbation (HCC)    Acute on chronic respiratory failure with hypoxia and hypercapnia (HCC)    Acute and chronic respiratory failure (acute-on-chronic) (HCC)    Medically noncompliant    PAH (pulmonary artery hypertension) (HCC)    COPD with acute exacerbation (HCC)    Acute and chronic respiratory failure with hypoxia (HCC)    Chronic heart failure with preserved ejection fraction (HCC)    Pulmonary hypertension (HCC)    Cardiopulmonary arrest with successful resuscitation (HCC)    Acute pulmonary edema (HCC)    Cardiomyopathy (HCC)    Acute combined systolic and diastolic congestive heart failure (HCC)       Admission Condition:  critical  Discharged Condition:

## 2025-07-24 NOTE — PROCEDURES
PROCEDURE NOTE  Date: 7/24/2025   Name: Sandie Carrera  YOB: 1970    Procedures  12 lead EKG completed. Results handed to Tyesha MERLOS.

## 2025-07-25 LAB
ALBUMIN SERPL BCG-MCNC: 3.3 G/DL (ref 3.4–4.9)
ALP SERPL-CCNC: 58 U/L (ref 40–129)
ALT SERPL W/O P-5'-P-CCNC: 19 U/L (ref 10–50)
ANION GAP SERPL CALC-SCNC: 8 MEQ/L (ref 8–16)
AST SERPL-CCNC: 21 U/L (ref 10–50)
BASOPHILS ABSOLUTE: 0.1 THOU/MM3 (ref 0–0.1)
BASOPHILS NFR BLD AUTO: 1 %
BILIRUB SERPL-MCNC: 0.3 MG/DL (ref 0.3–1.2)
BUN SERPL-MCNC: 12 MG/DL (ref 8–23)
CALCIUM SERPL-MCNC: 9 MG/DL (ref 8.6–10)
CHLORIDE SERPL-SCNC: 98 MEQ/L (ref 98–111)
CO2 SERPL-SCNC: 36 MEQ/L (ref 22–29)
CREAT SERPL-MCNC: 0.7 MG/DL (ref 0.7–1.2)
DEPRECATED RDW RBC AUTO: 52.7 FL (ref 35–45)
EOSINOPHIL NFR BLD AUTO: 7.6 %
EOSINOPHILS ABSOLUTE: 0.5 THOU/MM3 (ref 0–0.4)
ERYTHROCYTE [DISTWIDTH] IN BLOOD BY AUTOMATED COUNT: 14.6 % (ref 11.5–14.5)
GFR SERPL CREATININE-BSD FRML MDRD: > 90 ML/MIN/1.73M2
GLUCOSE SERPL-MCNC: 102 MG/DL (ref 74–109)
HCT VFR BLD AUTO: 44.8 % (ref 42–52)
HGB BLD-MCNC: 13.2 GM/DL (ref 14–18)
IMM GRANULOCYTES # BLD AUTO: 0.01 THOU/MM3 (ref 0–0.07)
IMM GRANULOCYTES NFR BLD AUTO: 0.2 %
LYMPHOCYTES ABSOLUTE: 1 THOU/MM3 (ref 1–4.8)
LYMPHOCYTES NFR BLD AUTO: 16.3 %
MCH RBC QN AUTO: 28.9 PG (ref 26–33)
MCHC RBC AUTO-ENTMCNC: 29.5 GM/DL (ref 32.2–35.5)
MCV RBC AUTO: 98 FL (ref 80–94)
MONOCYTES ABSOLUTE: 0.8 THOU/MM3 (ref 0.4–1.3)
MONOCYTES NFR BLD AUTO: 12.6 %
NEUTROPHILS ABSOLUTE: 3.9 THOU/MM3 (ref 1.8–7.7)
NEUTROPHILS NFR BLD AUTO: 62.3 %
NRBC BLD AUTO-RTO: 0 /100 WBC
PLATELET # BLD AUTO: 181 THOU/MM3 (ref 130–400)
PMV BLD AUTO: 10.9 FL (ref 9.4–12.4)
POTASSIUM SERPL-SCNC: 4.9 MEQ/L (ref 3.5–5.2)
PREALB SERPL-MCNC: 8 MG/DL (ref 20–40)
PROT SERPL-MCNC: 6.2 G/DL (ref 6.4–8.3)
RBC # BLD AUTO: 4.57 MILL/MM3 (ref 4.7–6.1)
SODIUM SERPL-SCNC: 142 MEQ/L (ref 135–145)
WBC # BLD AUTO: 6.2 THOU/MM3 (ref 4.8–10.8)

## 2025-07-30 LAB
ALBUMIN SERPL BCG-MCNC: 3.6 G/DL (ref 3.4–4.9)
ALP SERPL-CCNC: 96 U/L (ref 40–129)
ALT SERPL W/O P-5'-P-CCNC: 36 U/L (ref 10–50)
ANION GAP SERPL CALC-SCNC: 10 MEQ/L (ref 8–16)
AST SERPL-CCNC: 34 U/L (ref 10–50)
BASOPHILS ABSOLUTE: 0.1 THOU/MM3 (ref 0–0.1)
BASOPHILS NFR BLD AUTO: 1.4 %
BILIRUB CONJ SERPL-MCNC: < 0.1 MG/DL (ref 0–0.2)
BILIRUB SERPL-MCNC: 0.3 MG/DL (ref 0.3–1.2)
BUN SERPL-MCNC: 13 MG/DL (ref 8–23)
CALCIUM SERPL-MCNC: 9.5 MG/DL (ref 8.6–10)
CHLORIDE SERPL-SCNC: 101 MEQ/L (ref 98–111)
CO2 SERPL-SCNC: 30 MEQ/L (ref 22–29)
CREAT SERPL-MCNC: 0.7 MG/DL (ref 0.7–1.2)
DEPRECATED RDW RBC AUTO: 52.1 FL (ref 35–45)
EOSINOPHIL NFR BLD AUTO: 7 %
EOSINOPHILS ABSOLUTE: 0.4 THOU/MM3 (ref 0–0.4)
ERYTHROCYTE [DISTWIDTH] IN BLOOD BY AUTOMATED COUNT: 14.6 % (ref 11.5–14.5)
GFR SERPL CREATININE-BSD FRML MDRD: > 90 ML/MIN/1.73M2
GLUCOSE SERPL-MCNC: 99 MG/DL (ref 74–109)
HCT VFR BLD AUTO: 48.5 % (ref 42–52)
HGB BLD-MCNC: 14.3 GM/DL (ref 14–18)
IMM GRANULOCYTES # BLD AUTO: 0.02 THOU/MM3 (ref 0–0.07)
IMM GRANULOCYTES NFR BLD AUTO: 0.4 %
LYMPHOCYTES ABSOLUTE: 1.2 THOU/MM3 (ref 1–4.8)
LYMPHOCYTES NFR BLD AUTO: 24.8 %
MAGNESIUM SERPL-MCNC: 2.2 MG/DL (ref 1.6–2.6)
MCH RBC QN AUTO: 28.8 PG (ref 26–33)
MCHC RBC AUTO-ENTMCNC: 29.5 GM/DL (ref 32.2–35.5)
MCV RBC AUTO: 97.6 FL (ref 80–94)
MONOCYTES ABSOLUTE: 0.6 THOU/MM3 (ref 0.4–1.3)
MONOCYTES NFR BLD AUTO: 12.4 %
NEUTROPHILS ABSOLUTE: 2.7 THOU/MM3 (ref 1.8–7.7)
NEUTROPHILS NFR BLD AUTO: 54 %
NRBC BLD AUTO-RTO: 0 /100 WBC
PLATELET # BLD AUTO: 243 THOU/MM3 (ref 130–400)
PMV BLD AUTO: 10.2 FL (ref 9.4–12.4)
POTASSIUM SERPL-SCNC: 5 MEQ/L (ref 3.5–5.2)
PROT SERPL-MCNC: 7.1 G/DL (ref 6.4–8.3)
RBC # BLD AUTO: 4.97 MILL/MM3 (ref 4.7–6.1)
SODIUM SERPL-SCNC: 141 MEQ/L (ref 135–145)
WBC # BLD AUTO: 5 THOU/MM3 (ref 4.8–10.8)

## 2025-08-01 ENCOUNTER — APPOINTMENT (OUTPATIENT)
Age: 55
End: 2025-08-01
Attending: INTERNAL MEDICINE
Payer: COMMERCIAL

## 2025-08-01 PROCEDURE — 93307 TTE W/O DOPPLER COMPLETE: CPT

## 2025-08-02 LAB
ECHO AO ASC DIAM: 3.2 CM
ECHO AV CUSP MM: 1.9 CM
ECHO EST RA PRESSURE: 3 MMHG
ECHO LA AREA 2C: 11.3 CM2
ECHO LA AREA 4C: 15.5 CM2
ECHO LA DIAMETER: 3.7 CM
ECHO LA MAJOR AXIS: 6.4 CM
ECHO LA MINOR AXIS: 3.9 CM
ECHO LA VOL MOD A2C: 26 ML (ref 18–58)
ECHO LA VOL MOD A4C: 30 ML (ref 18–58)
ECHO LV EDV A4C: 91 ML
ECHO LV EF PHYSICIAN: 40 %
ECHO LV EJECTION FRACTION A4C: 36 %
ECHO LV ESV A4C: 58 ML
ECHO LV FRACTIONAL SHORTENING: 20 % (ref 28–44)
ECHO LV INTERNAL DIMENSION DIASTOLIC: 5.6 CM (ref 4.2–5.9)
ECHO LV INTERNAL DIMENSION SYSTOLIC: 4.5 CM
ECHO LV IVSD: 0.8 CM (ref 0.6–1)
ECHO LV MASS 2D: 178.1 G (ref 88–224)
ECHO LV POSTERIOR WALL DIASTOLIC: 0.9 CM (ref 0.6–1)
ECHO LV RELATIVE WALL THICKNESS RATIO: 0.32
ECHO RV INTERNAL DIMENSION: 3.4 CM
ECHO RV TAPSE: 1.7 CM (ref 1.7–?)

## 2025-08-02 PROCEDURE — 93307 TTE W/O DOPPLER COMPLETE: CPT | Performed by: INTERNAL MEDICINE

## 2025-08-03 LAB
ANION GAP SERPL CALC-SCNC: 10 MEQ/L (ref 8–16)
BASOPHILS ABSOLUTE: 0.1 THOU/MM3 (ref 0–0.1)
BASOPHILS NFR BLD AUTO: 1 %
BUN SERPL-MCNC: 15 MG/DL (ref 8–23)
CALCIUM SERPL-MCNC: 8.9 MG/DL (ref 8.6–10)
CHLORIDE SERPL-SCNC: 101 MEQ/L (ref 98–111)
CO2 SERPL-SCNC: 31 MEQ/L (ref 22–29)
CREAT SERPL-MCNC: 0.8 MG/DL (ref 0.7–1.2)
DEPRECATED RDW RBC AUTO: 50.4 FL (ref 35–45)
EOSINOPHIL NFR BLD AUTO: 7.4 %
EOSINOPHILS ABSOLUTE: 0.4 THOU/MM3 (ref 0–0.4)
ERYTHROCYTE [DISTWIDTH] IN BLOOD BY AUTOMATED COUNT: 14.6 % (ref 11.5–14.5)
GFR SERPL CREATININE-BSD FRML MDRD: > 90 ML/MIN/1.73M2
GLUCOSE SERPL-MCNC: 80 MG/DL (ref 74–109)
HCT VFR BLD AUTO: 44.3 % (ref 42–52)
HGB BLD-MCNC: 13.6 GM/DL (ref 14–18)
IMM GRANULOCYTES # BLD AUTO: 0.04 THOU/MM3 (ref 0–0.07)
IMM GRANULOCYTES NFR BLD AUTO: 0.8 %
LYMPHOCYTES ABSOLUTE: 1.8 THOU/MM3 (ref 1–4.8)
LYMPHOCYTES NFR BLD AUTO: 35.2 %
MAGNESIUM SERPL-MCNC: 2.1 MG/DL (ref 1.6–2.6)
MCH RBC QN AUTO: 28.9 PG (ref 26–33)
MCHC RBC AUTO-ENTMCNC: 30.7 GM/DL (ref 32.2–35.5)
MCV RBC AUTO: 94.3 FL (ref 80–94)
MONOCYTES ABSOLUTE: 0.5 THOU/MM3 (ref 0.4–1.3)
MONOCYTES NFR BLD AUTO: 10.4 %
NEUTROPHILS ABSOLUTE: 2.4 THOU/MM3 (ref 1.8–7.7)
NEUTROPHILS NFR BLD AUTO: 45.2 %
NRBC BLD AUTO-RTO: 0 /100 WBC
PLATELET # BLD AUTO: 259 THOU/MM3 (ref 130–400)
PMV BLD AUTO: 10.9 FL (ref 9.4–12.4)
POTASSIUM SERPL-SCNC: 4.5 MEQ/L (ref 3.5–5.2)
RBC # BLD AUTO: 4.7 MILL/MM3 (ref 4.7–6.1)
SODIUM SERPL-SCNC: 142 MEQ/L (ref 135–145)
WBC # BLD AUTO: 5.2 THOU/MM3 (ref 4.8–10.8)

## 2025-08-04 ENCOUNTER — TRANSCRIBE ORDERS (OUTPATIENT)
Dept: ADMINISTRATIVE | Age: 55
End: 2025-08-04

## 2025-08-04 DIAGNOSIS — R93.1 LOW LEFT VENTRICULAR EJECTION FRACTION: Primary | ICD-10-CM

## 2025-08-04 DIAGNOSIS — I50.9 CONGESTIVE HEART FAILURE, UNSPECIFIED HF CHRONICITY, UNSPECIFIED HEART FAILURE TYPE (HCC): Primary | ICD-10-CM

## 2025-08-04 DIAGNOSIS — I46.9 CARDIAC ARREST (HCC): ICD-10-CM

## 2025-08-05 ENCOUNTER — HOSPITAL ENCOUNTER (OUTPATIENT)
Dept: NUCLEAR MEDICINE | Age: 55
Discharge: HOME | End: 2025-08-05
Attending: INTERNAL MEDICINE
Payer: COMMERCIAL

## 2025-08-05 ENCOUNTER — HOSPITAL ENCOUNTER (OUTPATIENT)
Age: 55
Discharge: HOME | End: 2025-08-07
Attending: INTERNAL MEDICINE
Payer: COMMERCIAL

## 2025-08-05 DIAGNOSIS — I50.9 CONGESTIVE HEART FAILURE, UNSPECIFIED HF CHRONICITY, UNSPECIFIED HEART FAILURE TYPE (HCC): ICD-10-CM

## 2025-08-05 LAB
NUC STRESS EJECTION FRACTION: 43 %
STRESS BASELINE DIAS BP: 78 MMHG
STRESS BASELINE HR: 75 BPM
STRESS BASELINE SYS BP: 111 MMHG
STRESS ESTIMATED WORKLOAD: 1 METS
STRESS PEAK DIAS BP: 61 MMHG
STRESS PEAK SYS BP: 129 MMHG
STRESS PERCENT HR ACHIEVED: 52 %
STRESS POST PEAK HR: 86 BPM
STRESS RATE PRESSURE PRODUCT: NORMAL BPM*MMHG
STRESS STAGE 1 BP: NORMAL MMHG
STRESS STAGE 1 DURATION: 1 MIN:SEC
STRESS STAGE 1 HR: 75 BPM
STRESS STAGE 2 BP: NORMAL MMHG
STRESS STAGE 2 DURATION: 1 MIN:SEC
STRESS STAGE 2 HR: 84 BPM
STRESS STAGE 3 BP: NORMAL MMHG
STRESS STAGE 3 DURATION: 1 MIN:SEC
STRESS STAGE 3 HR: 82 BPM
STRESS STAGE RECOVERY 1 BP: NORMAL MMHG
STRESS STAGE RECOVERY 1 DURATION: 1 MIN:SEC
STRESS STAGE RECOVERY 1 HR: 81 BPM
STRESS STAGE RECOVERY 2 BP: NORMAL MMHG
STRESS STAGE RECOVERY 2 DURATION: 1 MIN:SEC
STRESS STAGE RECOVERY 2 HR: 80 BPM
STRESS STAGE RECOVERY 3 BP: NORMAL MMHG
STRESS STAGE RECOVERY 3 DURATION: 1 MIN:SEC
STRESS STAGE RECOVERY 3 HR: 78 BPM
STRESS STAGE RECOVERY 4 BP: NORMAL MMHG
STRESS STAGE RECOVERY 4 DURATION: 1 MIN:SEC
STRESS STAGE RECOVERY 4 HR: 79 BPM
STRESS TARGET HR: 164 BPM
TID: 1.1

## 2025-08-05 PROCEDURE — 93016 CV STRESS TEST SUPVJ ONLY: CPT | Performed by: INTERNAL MEDICINE

## 2025-08-05 PROCEDURE — 78452 HT MUSCLE IMAGE SPECT MULT: CPT | Performed by: INTERNAL MEDICINE

## 2025-08-05 PROCEDURE — 6360000002 HC RX W HCPCS: Performed by: INTERNAL MEDICINE

## 2025-08-05 PROCEDURE — 78452 HT MUSCLE IMAGE SPECT MULT: CPT

## 2025-08-05 PROCEDURE — A9500 TC99M SESTAMIBI: HCPCS | Performed by: INTERNAL MEDICINE

## 2025-08-05 PROCEDURE — 93017 CV STRESS TEST TRACING ONLY: CPT

## 2025-08-05 PROCEDURE — 3430000000 HC RX DIAGNOSTIC RADIOPHARMACEUTICAL: Performed by: INTERNAL MEDICINE

## 2025-08-05 PROCEDURE — 93018 CV STRESS TEST I&R ONLY: CPT | Performed by: INTERNAL MEDICINE

## 2025-08-05 RX ORDER — TETRAKIS(2-METHOXYISOBUTYLISOCYANIDE)COPPER(I) TETRAFLUOROBORATE 1 MG/ML
9.5 INJECTION, POWDER, LYOPHILIZED, FOR SOLUTION INTRAVENOUS
Status: COMPLETED | OUTPATIENT
Start: 2025-08-05 | End: 2025-08-05

## 2025-08-05 RX ORDER — REGADENOSON 0.08 MG/ML
0.4 INJECTION, SOLUTION INTRAVENOUS
Status: COMPLETED | OUTPATIENT
Start: 2025-08-05 | End: 2025-08-05

## 2025-08-05 RX ORDER — TETRAKIS(2-METHOXYISOBUTYLISOCYANIDE)COPPER(I) TETRAFLUOROBORATE 1 MG/ML
34 INJECTION, POWDER, LYOPHILIZED, FOR SOLUTION INTRAVENOUS
Status: COMPLETED | OUTPATIENT
Start: 2025-08-05 | End: 2025-08-05

## 2025-08-05 RX ADMIN — REGADENOSON 0.4 MG: 0.08 INJECTION, SOLUTION INTRAVENOUS at 08:10

## 2025-08-05 RX ADMIN — Medication 9.5 MILLICURIE: at 07:12

## 2025-08-05 RX ADMIN — Medication 34 MILLICURIE: at 08:05

## 2025-08-19 ENCOUNTER — TELEPHONE (OUTPATIENT)
Dept: CARDIOLOGY CLINIC | Age: 55
End: 2025-08-19

## 2025-08-19 ENCOUNTER — OFFICE VISIT (OUTPATIENT)
Dept: CARDIOLOGY CLINIC | Age: 55
End: 2025-08-19
Payer: MEDICARE

## 2025-08-19 VITALS
HEART RATE: 102 BPM | WEIGHT: 204 LBS | BODY MASS INDEX: 28.56 KG/M2 | HEIGHT: 71 IN | DIASTOLIC BLOOD PRESSURE: 70 MMHG | SYSTOLIC BLOOD PRESSURE: 105 MMHG

## 2025-08-19 DIAGNOSIS — R07.9 CHEST PAIN IN ADULT: ICD-10-CM

## 2025-08-19 DIAGNOSIS — I50.42 CHRONIC COMBINED SYSTOLIC AND DIASTOLIC CONGESTIVE HEART FAILURE (HCC): ICD-10-CM

## 2025-08-19 DIAGNOSIS — I27.20 PULMONARY HYPERTENSION (HCC): ICD-10-CM

## 2025-08-19 DIAGNOSIS — J96.11 HYPOXEMIC RESPIRATORY FAILURE, CHRONIC (HCC): ICD-10-CM

## 2025-08-19 DIAGNOSIS — R06.02 SOB (SHORTNESS OF BREATH) ON EXERTION: ICD-10-CM

## 2025-08-19 DIAGNOSIS — I42.9 CARDIOMYOPATHY, UNSPECIFIED TYPE (HCC): Primary | ICD-10-CM

## 2025-08-19 PROBLEM — J96.00 ACUTE RESPIRATORY FAILURE (HCC): Status: RESOLVED | Noted: 2020-05-17 | Resolved: 2025-08-19

## 2025-08-19 PROBLEM — I50.23 ACUTE ON CHRONIC SYSTOLIC CHF (CONGESTIVE HEART FAILURE) (HCC): Status: RESOLVED | Noted: 2019-04-04 | Resolved: 2025-08-19

## 2025-08-19 PROBLEM — J96.90 RESPIRATORY FAILURE (HCC): Status: RESOLVED | Noted: 2020-03-02 | Resolved: 2025-08-19

## 2025-08-19 PROBLEM — I50.32 CHRONIC HEART FAILURE WITH PRESERVED EJECTION FRACTION (HCC): Status: RESOLVED | Noted: 2025-06-11 | Resolved: 2025-08-19

## 2025-08-19 PROBLEM — J44.1 COPD WITH ACUTE EXACERBATION (HCC): Status: RESOLVED | Noted: 2024-01-18 | Resolved: 2025-08-19

## 2025-08-19 PROBLEM — J96.21 ACUTE ON CHRONIC RESPIRATORY FAILURE WITH HYPOXIA (HCC): Status: RESOLVED | Noted: 2022-05-05 | Resolved: 2025-08-19

## 2025-08-19 PROBLEM — J44.1 COPD EXACERBATION (HCC): Status: RESOLVED | Noted: 2023-02-24 | Resolved: 2025-08-19

## 2025-08-19 PROBLEM — J96.20 ACUTE ON CHRONIC RESPIRATORY FAILURE (HCC): Status: RESOLVED | Noted: 2020-05-17 | Resolved: 2025-08-19

## 2025-08-19 PROCEDURE — G8427 DOCREV CUR MEDS BY ELIG CLIN: HCPCS | Performed by: INTERNAL MEDICINE

## 2025-08-19 PROCEDURE — G8419 CALC BMI OUT NRM PARAM NOF/U: HCPCS | Performed by: INTERNAL MEDICINE

## 2025-08-19 PROCEDURE — 1111F DSCHRG MED/CURRENT MED MERGE: CPT | Performed by: INTERNAL MEDICINE

## 2025-08-19 PROCEDURE — 3017F COLORECTAL CA SCREEN DOC REV: CPT | Performed by: INTERNAL MEDICINE

## 2025-08-19 PROCEDURE — 99215 OFFICE O/P EST HI 40 MIN: CPT | Performed by: INTERNAL MEDICINE

## 2025-08-19 PROCEDURE — 4004F PT TOBACCO SCREEN RCVD TLK: CPT | Performed by: INTERNAL MEDICINE

## 2025-08-19 RX ORDER — ATORVASTATIN CALCIUM 10 MG/1
10 TABLET, FILM COATED ORAL DAILY
COMMUNITY

## 2025-08-19 RX ORDER — FUROSEMIDE 40 MG/1
40 TABLET ORAL 2 TIMES DAILY
COMMUNITY

## 2025-08-22 ENCOUNTER — HOSPITAL ENCOUNTER (OUTPATIENT)
Age: 55
Setting detail: OUTPATIENT SURGERY
Discharge: HOME | End: 2025-08-22
Attending: INTERNAL MEDICINE | Admitting: INTERNAL MEDICINE
Payer: MEDICARE

## 2025-08-22 VITALS
HEIGHT: 71 IN | BODY MASS INDEX: 28.1 KG/M2 | OXYGEN SATURATION: 95 % | HEART RATE: 78 BPM | RESPIRATION RATE: 22 BRPM | SYSTOLIC BLOOD PRESSURE: 112 MMHG | TEMPERATURE: 98.4 F | WEIGHT: 200.7 LBS | DIASTOLIC BLOOD PRESSURE: 86 MMHG

## 2025-08-22 DIAGNOSIS — R06.02 SOB (SHORTNESS OF BREATH): ICD-10-CM

## 2025-08-22 PROBLEM — Z98.890 S/P CARDIAC CATH: Status: ACTIVE | Noted: 2025-08-22

## 2025-08-22 LAB
ABO GROUP BLD: NORMAL
ANION GAP SERPL CALC-SCNC: 12 MEQ/L (ref 8–16)
APTT PPP: 28.4 SECONDS (ref 22–38)
BUN SERPL-MCNC: 8 MG/DL (ref 8–23)
CALCIUM SERPL-MCNC: 8.6 MG/DL (ref 8.5–10.5)
CHLORIDE SERPL-SCNC: 101 MEQ/L (ref 98–111)
CHOLEST SERPL-MCNC: 127 MG/DL (ref 100–199)
CO2 SERPL-SCNC: 27 MEQ/L (ref 22–29)
CREAT SERPL-MCNC: 0.9 MG/DL (ref 0.7–1.2)
DEPRECATED RDW RBC AUTO: 50.8 FL (ref 35–45)
ECHO BSA: 2.14 M2
EKG ATRIAL RATE: 78 BPM
EKG P AXIS: 75 DEGREES
EKG P-R INTERVAL: 156 MS
EKG Q-T INTERVAL: 400 MS
EKG QRS DURATION: 98 MS
EKG QTC CALCULATION (BAZETT): 456 MS
EKG R AXIS: 100 DEGREES
EKG T AXIS: 29 DEGREES
EKG VENTRICULAR RATE: 78 BPM
ERYTHROCYTE [DISTWIDTH] IN BLOOD BY AUTOMATED COUNT: 15.3 % (ref 11.5–14.5)
GFR SERPL CREATININE-BSD FRML MDRD: > 90 ML/MIN/1.73M2
GLUCOSE SERPL-MCNC: 79 MG/DL (ref 74–109)
HCT VFR BLD AUTO: 44.6 % (ref 42–52)
HDLC SERPL-MCNC: 42 MG/DL
HGB BLD-MCNC: 14.1 GM/DL (ref 14–18)
IAT IGG-SP REAG SERPL QL: NORMAL
INR PPP: 1.12 (ref 0.85–1.13)
LDLC SERPL CALC-MCNC: 72 MG/DL
MCH RBC QN AUTO: 28.9 PG (ref 26–33)
MCHC RBC AUTO-ENTMCNC: 31.6 GM/DL (ref 32.2–35.5)
MCV RBC AUTO: 91.4 FL (ref 80–94)
PLATELET # BLD AUTO: 167 THOU/MM3 (ref 130–400)
PMV BLD AUTO: 10.4 FL (ref 9.4–12.4)
POTASSIUM SERPL-SCNC: 4.2 MEQ/L (ref 3.5–5.2)
PROTHROMBIN TIME: 13.1 SECONDS (ref 10–13.5)
RBC # BLD AUTO: 4.88 MILL/MM3 (ref 4.7–6.1)
RH BLD: NORMAL
SODIUM SERPL-SCNC: 140 MEQ/L (ref 135–145)
TRIGL SERPL-MCNC: 63 MG/DL (ref 0–199)
WBC # BLD AUTO: 5.7 THOU/MM3 (ref 4.8–10.8)

## 2025-08-22 PROCEDURE — 93458 L HRT ARTERY/VENTRICLE ANGIO: CPT | Performed by: INTERNAL MEDICINE

## 2025-08-22 PROCEDURE — 86900 BLOOD TYPING SEROLOGIC ABO: CPT

## 2025-08-22 PROCEDURE — 86901 BLOOD TYPING SEROLOGIC RH(D): CPT

## 2025-08-22 PROCEDURE — 86885 COOMBS TEST INDIRECT QUAL: CPT

## 2025-08-22 PROCEDURE — 2580000003 HC RX 258: Performed by: PHYSICIAN ASSISTANT

## 2025-08-22 PROCEDURE — 85610 PROTHROMBIN TIME: CPT

## 2025-08-22 PROCEDURE — 85027 COMPLETE CBC AUTOMATED: CPT

## 2025-08-22 PROCEDURE — 6360000002 HC RX W HCPCS: Performed by: INTERNAL MEDICINE

## 2025-08-22 PROCEDURE — 80048 BASIC METABOLIC PNL TOTAL CA: CPT

## 2025-08-22 PROCEDURE — 7100000011 HC PHASE II RECOVERY - ADDTL 15 MIN: Performed by: INTERNAL MEDICINE

## 2025-08-22 PROCEDURE — 7100000010 HC PHASE II RECOVERY - FIRST 15 MIN: Performed by: INTERNAL MEDICINE

## 2025-08-22 PROCEDURE — 80061 LIPID PANEL: CPT

## 2025-08-22 PROCEDURE — 93005 ELECTROCARDIOGRAM TRACING: CPT | Performed by: PHYSICIAN ASSISTANT

## 2025-08-22 PROCEDURE — 2709999900 HC NON-CHARGEABLE SUPPLY: Performed by: INTERNAL MEDICINE

## 2025-08-22 PROCEDURE — 2500000003 HC RX 250 WO HCPCS: Performed by: INTERNAL MEDICINE

## 2025-08-22 PROCEDURE — C1894 INTRO/SHEATH, NON-LASER: HCPCS | Performed by: INTERNAL MEDICINE

## 2025-08-22 PROCEDURE — 6360000004 HC RX CONTRAST MEDICATION: Performed by: INTERNAL MEDICINE

## 2025-08-22 PROCEDURE — 6370000000 HC RX 637 (ALT 250 FOR IP): Performed by: PHYSICIAN ASSISTANT

## 2025-08-22 PROCEDURE — 36415 COLL VENOUS BLD VENIPUNCTURE: CPT

## 2025-08-22 PROCEDURE — C1769 GUIDE WIRE: HCPCS | Performed by: INTERNAL MEDICINE

## 2025-08-22 PROCEDURE — 85730 THROMBOPLASTIN TIME PARTIAL: CPT

## 2025-08-22 RX ORDER — SODIUM CHLORIDE 9 MG/ML
INJECTION, SOLUTION INTRAVENOUS CONTINUOUS
Status: DISCONTINUED | OUTPATIENT
Start: 2025-08-22 | End: 2025-08-22 | Stop reason: HOSPADM

## 2025-08-22 RX ORDER — SODIUM CHLORIDE 0.9 % (FLUSH) 0.9 %
5-40 SYRINGE (ML) INJECTION EVERY 12 HOURS SCHEDULED
Status: DISCONTINUED | OUTPATIENT
Start: 2025-08-22 | End: 2025-08-22 | Stop reason: HOSPADM

## 2025-08-22 RX ORDER — SODIUM CHLORIDE 0.9 % (FLUSH) 0.9 %
5-40 SYRINGE (ML) INJECTION PRN
Status: DISCONTINUED | OUTPATIENT
Start: 2025-08-22 | End: 2025-08-22 | Stop reason: HOSPADM

## 2025-08-22 RX ORDER — SODIUM CHLORIDE 9 MG/ML
INJECTION, SOLUTION INTRAVENOUS PRN
Status: DISCONTINUED | OUTPATIENT
Start: 2025-08-22 | End: 2025-08-22 | Stop reason: HOSPADM

## 2025-08-22 RX ORDER — NITROGLYCERIN 0.4 MG/1
0.4 TABLET SUBLINGUAL EVERY 5 MIN PRN
Status: DISCONTINUED | OUTPATIENT
Start: 2025-08-22 | End: 2025-08-22 | Stop reason: HOSPADM

## 2025-08-22 RX ORDER — UMECLIDINIUM BROMIDE AND VILANTEROL TRIFENATATE 62.5; 25 UG/1; UG/1
1 POWDER RESPIRATORY (INHALATION) DAILY
COMMUNITY

## 2025-08-22 RX ORDER — CARBAMAZEPINE 200 MG/1
200 CAPSULE, EXTENDED RELEASE ORAL 2 TIMES DAILY
COMMUNITY

## 2025-08-22 RX ORDER — BUDESONIDE 0.5 MG/2ML
1 INHALANT ORAL 2 TIMES DAILY
COMMUNITY

## 2025-08-22 RX ORDER — IOPAMIDOL 755 MG/ML
INJECTION, SOLUTION INTRAVASCULAR PRN
Status: DISCONTINUED | OUTPATIENT
Start: 2025-08-22 | End: 2025-08-22 | Stop reason: HOSPADM

## 2025-08-22 RX ORDER — ASPIRIN 325 MG
325 TABLET ORAL ONCE
Status: COMPLETED | OUTPATIENT
Start: 2025-08-22 | End: 2025-08-22

## 2025-08-22 RX ORDER — ACETAMINOPHEN 325 MG/1
650 TABLET ORAL EVERY 4 HOURS PRN
Status: DISCONTINUED | OUTPATIENT
Start: 2025-08-22 | End: 2025-08-22 | Stop reason: HOSPADM

## 2025-08-22 RX ADMIN — ASPIRIN 325 MG: 325 TABLET ORAL at 13:07

## 2025-08-22 RX ADMIN — SODIUM CHLORIDE: 0.9 INJECTION, SOLUTION INTRAVENOUS at 12:35

## 2025-08-22 ASSESSMENT — PAIN SCALES - GENERAL
PAINLEVEL_OUTOF10: 0

## (undated) DEVICE — Device

## (undated) DEVICE — CATHETER DIAG 5FR L100CM LUMN ID0.047IN JL3.5 CRV 0 SIDE H

## (undated) DEVICE — DRAPE KIT RAMAPR RADIATION SHIELD

## (undated) DEVICE — KIT INTRO 5FR L7CM NDL 21GA L4CM 0018IN NIT COR PLAT TIP

## (undated) DEVICE — BAND COMPR L24CM REG CLR PLAS HEMSTAT EXT HK AND LOOP RETEN

## (undated) DEVICE — SWAN-GANZ DOUBLE LUMEN MONITORING CATHETER: Brand: SWAN-GANZ

## (undated) DEVICE — OFF - ST. RITAS VASC: Brand: MEDLINE INDUSTRIES, INC.

## (undated) DEVICE — CANNULA NSL W/ O2 DEL AD 4 M

## (undated) DEVICE — CATHETER DIAG 5FR L100CM LUMN ID0.047IN JR4 CRV 0 SIDE H

## (undated) DEVICE — GUIDEWIRE VASC L260CM DIA0.035IN RAD 3MM J TIP L7CM PTFE

## (undated) DEVICE — DRAPE SURG NEO W43.5XL60IN ABSRB REINF W18XL20IN FEN